# Patient Record
Sex: FEMALE | Race: WHITE | NOT HISPANIC OR LATINO | Employment: OTHER | URBAN - METROPOLITAN AREA
[De-identification: names, ages, dates, MRNs, and addresses within clinical notes are randomized per-mention and may not be internally consistent; named-entity substitution may affect disease eponyms.]

---

## 2017-03-30 DIAGNOSIS — I65.29 OCCLUSION AND STENOSIS OF UNSPECIFIED CAROTID ARTERY: ICD-10-CM

## 2017-03-30 DIAGNOSIS — I70.219 ATHEROSCLEROSIS OF NATIVE ARTERIES OF EXTREMITY WITH INTERMITTENT CLAUDICATION (HCC): ICD-10-CM

## 2017-04-27 ENCOUNTER — HOSPITAL ENCOUNTER (OUTPATIENT)
Dept: RADIOLOGY | Facility: HOSPITAL | Age: 77
Discharge: HOME | End: 2017-04-27
Payer: MEDICARE

## 2017-04-27 ENCOUNTER — HOSPITAL ENCOUNTER (OUTPATIENT)
Dept: RADIOLOGY | Facility: HOSPITAL | Age: 77
Discharge: HOME | End: 2017-04-27
Attending: SURGERY
Payer: MEDICARE

## 2017-04-27 DIAGNOSIS — I70.219 ATHEROSCLEROSIS OF NATIVE ARTERIES OF EXTREMITY WITH INTERMITTENT CLAUDICATION (HCC): ICD-10-CM

## 2017-04-27 DIAGNOSIS — I70.219 EXTREMITY ATHEROSCLEROSIS WITH INTERMITTENT CLAUDICATION (HCC): ICD-10-CM

## 2017-04-27 DIAGNOSIS — I65.29 OCCLUSION AND STENOSIS OF UNSPECIFIED CAROTID ARTERY: ICD-10-CM

## 2017-04-27 DIAGNOSIS — I65.29 CAROTID ARTERY OBSTRUCTION: ICD-10-CM

## 2017-04-27 PROCEDURE — 93880 EXTRACRANIAL BILAT STUDY: CPT

## 2017-04-27 PROCEDURE — 93923 UPR/LXTR ART STDY 3+ LVLS: CPT

## 2017-04-27 PROCEDURE — 93978 VASCULAR STUDY: CPT

## 2017-04-27 PROCEDURE — 93925 LOWER EXTREMITY STUDY: CPT

## 2017-05-30 ENCOUNTER — ALLSCRIPTS OFFICE VISIT (OUTPATIENT)
Dept: OTHER | Facility: OTHER | Age: 77
End: 2017-05-30

## 2017-08-29 DIAGNOSIS — I74.09 OTHER ARTERIAL EMBOLISM AND THROMBOSIS OF ABDOMINAL AORTA (HCC): ICD-10-CM

## 2017-08-29 DIAGNOSIS — I77.1 STRICTURE OF ARTERY (HCC): ICD-10-CM

## 2017-08-29 DIAGNOSIS — I10 ESSENTIAL (PRIMARY) HYPERTENSION: ICD-10-CM

## 2017-08-29 DIAGNOSIS — I65.29 OCCLUSION AND STENOSIS OF UNSPECIFIED CAROTID ARTERY: ICD-10-CM

## 2017-08-30 ENCOUNTER — APPOINTMENT (EMERGENCY)
Dept: RADIOLOGY | Facility: HOSPITAL | Age: 77
DRG: 068 | End: 2017-08-30
Payer: MEDICARE

## 2017-08-30 ENCOUNTER — HOSPITAL ENCOUNTER (INPATIENT)
Facility: HOSPITAL | Age: 77
LOS: 2 days | Discharge: HOME | DRG: 068 | End: 2017-09-01
Attending: EMERGENCY MEDICINE | Admitting: FAMILY MEDICINE
Payer: MEDICARE

## 2017-08-30 ENCOUNTER — GENERIC CONVERSION - ENCOUNTER (OUTPATIENT)
Dept: OTHER | Facility: OTHER | Age: 77
End: 2017-08-30

## 2017-08-30 DIAGNOSIS — I73.9 PAD (PERIPHERAL ARTERY DISEASE) (HCC): ICD-10-CM

## 2017-08-30 DIAGNOSIS — I16.0 HYPERTENSIVE URGENCY: Primary | ICD-10-CM

## 2017-08-30 DIAGNOSIS — I35.0 AORTIC STENOSIS: Chronic | ICD-10-CM

## 2017-08-30 DIAGNOSIS — I65.23 STENOSIS OF BOTH INTERNAL CAROTID ARTERIES: ICD-10-CM

## 2017-08-30 PROBLEM — R53.1 WEAKNESS: Status: ACTIVE | Noted: 2017-08-30

## 2017-08-30 PROBLEM — I65.29 CAROTID ARTERY STENOSIS: Chronic | Status: ACTIVE | Noted: 2017-08-30

## 2017-08-30 PROBLEM — R42 VERTIGO: Chronic | Status: ACTIVE | Noted: 2017-08-30

## 2017-08-30 PROBLEM — I10 HYPERTENSION: Chronic | Status: ACTIVE | Noted: 2017-08-30

## 2017-08-30 PROBLEM — R42 DIZZINESS: Status: ACTIVE | Noted: 2017-08-30

## 2017-08-30 PROBLEM — I70.90 ATHEROSCLEROSIS: Chronic | Status: ACTIVE | Noted: 2017-08-30

## 2017-08-30 LAB
ALBUMIN SERPL BCP-MCNC: 4.2 G/DL (ref 3.5–5)
ALP SERPL-CCNC: 87 U/L (ref 46–116)
ALT SERPL W P-5'-P-CCNC: 33 U/L (ref 12–78)
ANION GAP SERPL CALCULATED.3IONS-SCNC: 12 MMOL/L (ref 4–13)
APTT PPP: 27 SECONDS (ref 23–35)
AST SERPL W P-5'-P-CCNC: 40 U/L (ref 5–45)
BASOPHILS # BLD AUTO: 0.1 THOUSANDS/ΜL (ref 0–0.1)
BASOPHILS NFR BLD AUTO: 1 % (ref 0–1)
BILIRUB SERPL-MCNC: 0.6 MG/DL (ref 0.2–1)
BILIRUB UR QL STRIP: NEGATIVE
BUN SERPL-MCNC: 19 MG/DL (ref 5–25)
CALCIUM SERPL-MCNC: 9.3 MG/DL (ref 8.3–10.1)
CHLORIDE SERPL-SCNC: 101 MMOL/L (ref 100–108)
CLARITY UR: CLEAR
CO2 SERPL-SCNC: 25 MMOL/L (ref 21–32)
COLOR UR: YELLOW
CREAT SERPL-MCNC: 0.66 MG/DL (ref 0.6–1.3)
EOSINOPHIL # BLD AUTO: 0.3 THOUSAND/ΜL (ref 0–0.61)
EOSINOPHIL NFR BLD AUTO: 3 % (ref 0–6)
ERYTHROCYTE [DISTWIDTH] IN BLOOD BY AUTOMATED COUNT: 16.3 % (ref 11.6–15.1)
GFR SERPL CREATININE-BSD FRML MDRD: 85 ML/MIN/1.73SQ M
GLUCOSE SERPL-MCNC: 84 MG/DL (ref 65–140)
GLUCOSE SERPL-MCNC: 98 MG/DL (ref 65–140)
GLUCOSE UR STRIP-MCNC: NEGATIVE MG/DL
HCT VFR BLD AUTO: 39.4 % (ref 37–47)
HGB BLD-MCNC: 13.3 G/DL (ref 12–16)
HGB UR QL STRIP.AUTO: NEGATIVE
INR PPP: 1.11 (ref 0.86–1.16)
KETONES UR STRIP-MCNC: NEGATIVE MG/DL
LEUKOCYTE ESTERASE UR QL STRIP: NEGATIVE
LYMPHOCYTES # BLD AUTO: 2.6 THOUSANDS/ΜL (ref 0.6–4.47)
LYMPHOCYTES NFR BLD AUTO: 28 % (ref 14–44)
MCH RBC QN AUTO: 34 PG (ref 27–31)
MCHC RBC AUTO-ENTMCNC: 33.7 G/DL (ref 31.4–37.4)
MCV RBC AUTO: 101 FL (ref 82–98)
MONOCYTES # BLD AUTO: 0.9 THOUSAND/ΜL (ref 0.17–1.22)
MONOCYTES NFR BLD AUTO: 10 % (ref 4–12)
NEUTROPHILS # BLD AUTO: 5.6 THOUSANDS/ΜL (ref 1.85–7.62)
NEUTS SEG NFR BLD AUTO: 59 % (ref 43–75)
NITRITE UR QL STRIP: NEGATIVE
NRBC BLD AUTO-RTO: 0 /100 WBCS
PH UR STRIP.AUTO: 5.5 [PH] (ref 5–9)
PLATELET # BLD AUTO: 207 THOUSANDS/UL (ref 130–400)
PMV BLD AUTO: 10 FL (ref 8.9–12.7)
POTASSIUM SERPL-SCNC: 4.1 MMOL/L (ref 3.5–5.3)
PROT SERPL-MCNC: 8.1 G/DL (ref 6.4–8.2)
PROT UR STRIP-MCNC: NEGATIVE MG/DL
PROTHROMBIN TIME: 11.7 SECONDS (ref 9.4–11.7)
RBC # BLD AUTO: 3.9 MILLION/UL (ref 4.2–5.4)
SODIUM SERPL-SCNC: 138 MMOL/L (ref 136–145)
SP GR UR STRIP.AUTO: <=1.005 (ref 1–1.03)
TROPONIN I SERPL-MCNC: <0.02 NG/ML
UROBILINOGEN UR QL STRIP.AUTO: 0.2 E.U./DL
WBC # BLD AUTO: 9.6 THOUSAND/UL (ref 4.8–10.8)

## 2017-08-30 PROCEDURE — 85610 PROTHROMBIN TIME: CPT | Performed by: EMERGENCY MEDICINE

## 2017-08-30 PROCEDURE — 85025 COMPLETE CBC W/AUTO DIFF WBC: CPT | Performed by: EMERGENCY MEDICINE

## 2017-08-30 PROCEDURE — 82948 REAGENT STRIP/BLOOD GLUCOSE: CPT

## 2017-08-30 PROCEDURE — 96374 THER/PROPH/DIAG INJ IV PUSH: CPT

## 2017-08-30 PROCEDURE — 84484 ASSAY OF TROPONIN QUANT: CPT | Performed by: EMERGENCY MEDICINE

## 2017-08-30 PROCEDURE — 93005 ELECTROCARDIOGRAM TRACING: CPT | Performed by: EMERGENCY MEDICINE

## 2017-08-30 PROCEDURE — 87081 CULTURE SCREEN ONLY: CPT | Performed by: FAMILY MEDICINE

## 2017-08-30 PROCEDURE — 80053 COMPREHEN METABOLIC PANEL: CPT | Performed by: EMERGENCY MEDICINE

## 2017-08-30 PROCEDURE — 81003 URINALYSIS AUTO W/O SCOPE: CPT | Performed by: EMERGENCY MEDICINE

## 2017-08-30 PROCEDURE — 96361 HYDRATE IV INFUSION ADD-ON: CPT

## 2017-08-30 PROCEDURE — 85730 THROMBOPLASTIN TIME PARTIAL: CPT | Performed by: EMERGENCY MEDICINE

## 2017-08-30 PROCEDURE — 71010 HB CHEST X-RAY 1 VIEW FRONTAL: CPT

## 2017-08-30 PROCEDURE — 99285 EMERGENCY DEPT VISIT HI MDM: CPT

## 2017-08-30 PROCEDURE — 36415 COLL VENOUS BLD VENIPUNCTURE: CPT | Performed by: EMERGENCY MEDICINE

## 2017-08-30 PROCEDURE — 70450 CT HEAD/BRAIN W/O DYE: CPT

## 2017-08-30 RX ORDER — ATENOLOL 50 MG/1
50 TABLET ORAL 2 TIMES DAILY
Status: DISCONTINUED | OUTPATIENT
Start: 2017-08-30 | End: 2017-09-01 | Stop reason: HOSPADM

## 2017-08-30 RX ORDER — ATENOLOL 50 MG/1
1 TABLET ORAL 2 TIMES DAILY
Status: ON HOLD | COMMUNITY
Start: 2015-01-09 | End: 2017-09-01

## 2017-08-30 RX ORDER — ENALAPRIL MALEATE 5 MG/1
5 TABLET ORAL ONCE
Status: COMPLETED | OUTPATIENT
Start: 2017-08-30 | End: 2017-08-30

## 2017-08-30 RX ORDER — ENALAPRIL MALEATE 5 MG/1
5 TABLET ORAL DAILY
Status: DISCONTINUED | OUTPATIENT
Start: 2017-08-30 | End: 2017-08-30

## 2017-08-30 RX ORDER — ASCORBATE CALCIUM/BIOFLAVONOID 1000-200MG
1 TABLET, EXTENDED RELEASE ORAL DAILY
Status: DISCONTINUED | OUTPATIENT
Start: 2017-08-30 | End: 2017-09-01 | Stop reason: HOSPADM

## 2017-08-30 RX ORDER — ENALAPRIL MALEATE 5 MG/1
TABLET ORAL
COMMUNITY
Start: 2015-07-08 | End: 2017-09-01 | Stop reason: HOSPADM

## 2017-08-30 RX ORDER — ATORVASTATIN CALCIUM 40 MG/1
1 TABLET, FILM COATED ORAL
COMMUNITY
Start: 2015-07-08 | End: 2017-09-01 | Stop reason: HOSPADM

## 2017-08-30 RX ORDER — ASCORBATE CALCIUM/BIOFLAVONOID 1000-200MG
TABLET, EXTENDED RELEASE ORAL DAILY
COMMUNITY
End: 2020-04-16

## 2017-08-30 RX ORDER — ATORVASTATIN CALCIUM 40 MG/1
40 TABLET, FILM COATED ORAL
Status: DISCONTINUED | OUTPATIENT
Start: 2017-08-30 | End: 2017-08-31

## 2017-08-30 RX ORDER — ENALAPRIL MALEATE 10 MG/1
10 TABLET ORAL DAILY
Status: DISCONTINUED | OUTPATIENT
Start: 2017-08-31 | End: 2017-08-31

## 2017-08-30 RX ORDER — FLUTICASONE PROPIONATE 50 MCG
SPRAY, SUSPENSION (ML) NASAL
COMMUNITY
Start: 2016-09-22 | End: 2018-02-13 | Stop reason: SDUPTHER

## 2017-08-30 RX ORDER — HYDRALAZINE HYDROCHLORIDE 20 MG/ML
10 INJECTION INTRAMUSCULAR; INTRAVENOUS ONCE
Status: COMPLETED | OUTPATIENT
Start: 2017-08-30 | End: 2017-08-30

## 2017-08-30 RX ORDER — NICOTINE 21 MG/24HR
1 PATCH, TRANSDERMAL 24 HOURS TRANSDERMAL DAILY
Status: DISCONTINUED | OUTPATIENT
Start: 2017-08-30 | End: 2017-09-01 | Stop reason: HOSPADM

## 2017-08-30 RX ORDER — ASPIRIN 81 MG/1
162 TABLET, CHEWABLE ORAL DAILY
Status: DISCONTINUED | OUTPATIENT
Start: 2017-08-31 | End: 2017-09-01 | Stop reason: HOSPADM

## 2017-08-30 RX ORDER — FLUTICASONE PROPIONATE 50 MCG
1 SPRAY, SUSPENSION (ML) NASAL DAILY
Status: DISCONTINUED | OUTPATIENT
Start: 2017-08-30 | End: 2017-09-01 | Stop reason: HOSPADM

## 2017-08-30 RX ADMIN — ENOXAPARIN SODIUM 40 MG: 40 INJECTION SUBCUTANEOUS at 16:32

## 2017-08-30 RX ADMIN — NICOTINE 1 PATCH: 21 PATCH, EXTENDED RELEASE TRANSDERMAL at 16:32

## 2017-08-30 RX ADMIN — ENALAPRIL MALEATE 5 MG: 5 TABLET ORAL at 15:55

## 2017-08-30 RX ADMIN — FLUTICASONE PROPIONATE 1 SPRAY: 50 SPRAY, METERED NASAL at 16:33

## 2017-08-30 RX ADMIN — SODIUM CHLORIDE 1000 ML: 0.9 INJECTION, SOLUTION INTRAVENOUS at 11:11

## 2017-08-30 RX ADMIN — HYDRALAZINE HYDROCHLORIDE 10 MG: 20 INJECTION INTRAMUSCULAR; INTRAVENOUS at 11:19

## 2017-08-30 RX ADMIN — ATORVASTATIN CALCIUM 40 MG: 40 TABLET, FILM COATED ORAL at 22:17

## 2017-08-30 RX ADMIN — ATENOLOL 50 MG: 50 TABLET ORAL at 18:49

## 2017-08-31 ENCOUNTER — APPOINTMENT (INPATIENT)
Dept: NON INVASIVE DIAGNOSTICS | Facility: HOSPITAL | Age: 77
DRG: 068 | End: 2017-08-31
Payer: MEDICARE

## 2017-08-31 ENCOUNTER — GENERIC CONVERSION - ENCOUNTER (OUTPATIENT)
Dept: OTHER | Facility: OTHER | Age: 77
End: 2017-08-31

## 2017-08-31 ENCOUNTER — APPOINTMENT (INPATIENT)
Dept: RADIOLOGY | Facility: HOSPITAL | Age: 77
DRG: 068 | End: 2017-08-31
Payer: MEDICARE

## 2017-08-31 PROBLEM — I16.0 HYPERTENSIVE URGENCY: Status: RESOLVED | Noted: 2017-08-30 | Resolved: 2017-08-31

## 2017-08-31 PROBLEM — F17.200 NICOTINE DEPENDENCE: Status: ACTIVE | Noted: 2017-08-31

## 2017-08-31 PROBLEM — R42 DIZZINESS: Status: RESOLVED | Noted: 2017-08-30 | Resolved: 2017-08-31

## 2017-08-31 PROBLEM — E03.9 HYPOTHYROIDISM: Status: ACTIVE | Noted: 2017-08-31

## 2017-08-31 PROBLEM — R53.1 WEAKNESS: Status: RESOLVED | Noted: 2017-08-30 | Resolved: 2017-08-31

## 2017-08-31 LAB
ANION GAP SERPL CALCULATED.3IONS-SCNC: 13 MMOL/L (ref 4–13)
ATRIAL RATE: 58 BPM
BUN SERPL-MCNC: 21 MG/DL (ref 5–25)
CALCIUM SERPL-MCNC: 8.9 MG/DL (ref 8.3–10.1)
CHLORIDE SERPL-SCNC: 106 MMOL/L (ref 100–108)
CHOLEST SERPL-MCNC: 103 MG/DL (ref 50–200)
CO2 SERPL-SCNC: 21 MMOL/L (ref 21–32)
CREAT SERPL-MCNC: 0.56 MG/DL (ref 0.6–1.3)
ERYTHROCYTE [DISTWIDTH] IN BLOOD BY AUTOMATED COUNT: 17.7 % (ref 11.6–15.1)
GFR SERPL CREATININE-BSD FRML MDRD: 90 ML/MIN/1.73SQ M
GLUCOSE SERPL-MCNC: 76 MG/DL (ref 65–140)
HCT VFR BLD AUTO: 35.2 % (ref 37–47)
HDLC SERPL-MCNC: 58 MG/DL (ref 40–60)
HGB BLD-MCNC: 11.5 G/DL (ref 12–16)
LDLC SERPL CALC-MCNC: 35 MG/DL (ref 0–100)
MAGNESIUM SERPL-MCNC: 1.8 MG/DL (ref 1.6–2.6)
MCH RBC QN AUTO: 34.5 PG (ref 27–31)
MCHC RBC AUTO-ENTMCNC: 32.7 G/DL (ref 31.4–37.4)
MCV RBC AUTO: 105 FL (ref 82–98)
P AXIS: 79 DEGREES
PHOSPHATE SERPL-MCNC: 3.4 MG/DL (ref 2.3–4.1)
PLATELET # BLD AUTO: 154 THOUSANDS/UL (ref 130–400)
PMV BLD AUTO: 10.1 FL (ref 8.9–12.7)
POTASSIUM SERPL-SCNC: 3.7 MMOL/L (ref 3.5–5.3)
PR INTERVAL: 176 MS
QRS AXIS: 68 DEGREES
QRSD INTERVAL: 90 MS
QT INTERVAL: 442 MS
QTC INTERVAL: 433 MS
RBC # BLD AUTO: 3.34 MILLION/UL (ref 4.2–5.4)
SODIUM SERPL-SCNC: 140 MMOL/L (ref 136–145)
T WAVE AXIS: 62 DEGREES
T3 SERPL-MCNC: 0.8 NG/ML (ref 0.6–1.8)
T4 FREE SERPL-MCNC: 0.82 NG/DL (ref 0.76–1.46)
TRIGL SERPL-MCNC: 52 MG/DL
TSH SERPL DL<=0.05 MIU/L-ACNC: 5.3 UIU/ML (ref 0.36–3.74)
VENTRICULAR RATE: 58 BPM
WBC # BLD AUTO: 6.4 THOUSAND/UL (ref 4.8–10.8)

## 2017-08-31 PROCEDURE — 93880 EXTRACRANIAL BILAT STUDY: CPT

## 2017-08-31 PROCEDURE — 93005 ELECTROCARDIOGRAM TRACING: CPT | Performed by: STUDENT IN AN ORGANIZED HEALTH CARE EDUCATION/TRAINING PROGRAM

## 2017-08-31 PROCEDURE — 93978 VASCULAR STUDY: CPT

## 2017-08-31 PROCEDURE — 83735 ASSAY OF MAGNESIUM: CPT | Performed by: FAMILY MEDICINE

## 2017-08-31 PROCEDURE — 84480 ASSAY TRIIODOTHYRONINE (T3): CPT | Performed by: STUDENT IN AN ORGANIZED HEALTH CARE EDUCATION/TRAINING PROGRAM

## 2017-08-31 PROCEDURE — 85027 COMPLETE CBC AUTOMATED: CPT | Performed by: FAMILY MEDICINE

## 2017-08-31 PROCEDURE — 84439 ASSAY OF FREE THYROXINE: CPT | Performed by: STUDENT IN AN ORGANIZED HEALTH CARE EDUCATION/TRAINING PROGRAM

## 2017-08-31 PROCEDURE — 93925 LOWER EXTREMITY STUDY: CPT

## 2017-08-31 PROCEDURE — 93923 UPR/LXTR ART STDY 3+ LVLS: CPT

## 2017-08-31 PROCEDURE — 80048 BASIC METABOLIC PNL TOTAL CA: CPT | Performed by: FAMILY MEDICINE

## 2017-08-31 PROCEDURE — 80061 LIPID PANEL: CPT | Performed by: STUDENT IN AN ORGANIZED HEALTH CARE EDUCATION/TRAINING PROGRAM

## 2017-08-31 PROCEDURE — 93306 TTE W/DOPPLER COMPLETE: CPT

## 2017-08-31 PROCEDURE — 84443 ASSAY THYROID STIM HORMONE: CPT | Performed by: FAMILY MEDICINE

## 2017-08-31 PROCEDURE — 84100 ASSAY OF PHOSPHORUS: CPT | Performed by: FAMILY MEDICINE

## 2017-08-31 RX ORDER — LEVOTHYROXINE SODIUM 0.03 MG/1
25 TABLET ORAL
Status: DISCONTINUED | OUTPATIENT
Start: 2017-08-31 | End: 2017-09-01 | Stop reason: HOSPADM

## 2017-08-31 RX ORDER — ENALAPRIL MALEATE 10 MG/1
20 TABLET ORAL DAILY
Status: DISCONTINUED | OUTPATIENT
Start: 2017-09-01 | End: 2017-09-01 | Stop reason: HOSPADM

## 2017-08-31 RX ORDER — CHLORTHALIDONE 25 MG/1
25 TABLET ORAL DAILY
Status: DISCONTINUED | OUTPATIENT
Start: 2017-08-31 | End: 2017-09-01 | Stop reason: HOSPADM

## 2017-08-31 RX ORDER — ENALAPRIL MALEATE 10 MG/1
10 TABLET ORAL ONCE
Status: COMPLETED | OUTPATIENT
Start: 2017-08-31 | End: 2017-08-31

## 2017-08-31 RX ORDER — ATORVASTATIN CALCIUM 80 MG/1
80 TABLET, FILM COATED ORAL
Status: DISCONTINUED | OUTPATIENT
Start: 2017-08-31 | End: 2017-09-01 | Stop reason: HOSPADM

## 2017-08-31 RX ADMIN — CHLORTHALIDONE 25 MG: 25 TABLET ORAL at 15:19

## 2017-08-31 RX ADMIN — ATENOLOL 50 MG: 50 TABLET ORAL at 11:21

## 2017-08-31 RX ADMIN — ENOXAPARIN SODIUM 40 MG: 40 INJECTION SUBCUTANEOUS at 11:21

## 2017-08-31 RX ADMIN — NICOTINE 1 PATCH: 21 PATCH, EXTENDED RELEASE TRANSDERMAL at 11:23

## 2017-08-31 RX ADMIN — FLUTICASONE PROPIONATE 1 SPRAY: 50 SPRAY, METERED NASAL at 11:22

## 2017-08-31 RX ADMIN — ENALAPRIL MALEATE 10 MG: 10 TABLET ORAL at 11:22

## 2017-08-31 RX ADMIN — ASPIRIN 81 MG 162 MG: 81 TABLET ORAL at 11:21

## 2017-08-31 RX ADMIN — ATENOLOL 50 MG: 50 TABLET ORAL at 17:59

## 2017-08-31 RX ADMIN — ENALAPRIL MALEATE 10 MG: 10 TABLET ORAL at 15:19

## 2017-08-31 RX ADMIN — ATORVASTATIN CALCIUM 80 MG: 80 TABLET, FILM COATED ORAL at 22:00

## 2017-09-01 VITALS
HEIGHT: 64 IN | TEMPERATURE: 98.9 F | BODY MASS INDEX: 24.11 KG/M2 | DIASTOLIC BLOOD PRESSURE: 80 MMHG | WEIGHT: 141.2 LBS | RESPIRATION RATE: 20 BRPM | SYSTOLIC BLOOD PRESSURE: 176 MMHG | OXYGEN SATURATION: 98 % | HEART RATE: 50 BPM

## 2017-09-01 PROBLEM — I16.0 HYPERTENSIVE URGENCY: Status: RESOLVED | Noted: 2017-09-01 | Resolved: 2017-09-01

## 2017-09-01 PROBLEM — I16.0 HYPERTENSIVE URGENCY: Status: ACTIVE | Noted: 2017-09-01

## 2017-09-01 LAB
ANION GAP SERPL CALCULATED.3IONS-SCNC: 9 MMOL/L (ref 4–13)
BUN SERPL-MCNC: 15 MG/DL (ref 5–25)
CALCIUM SERPL-MCNC: 9.4 MG/DL (ref 8.3–10.1)
CHLORIDE SERPL-SCNC: 105 MMOL/L (ref 100–108)
CO2 SERPL-SCNC: 26 MMOL/L (ref 21–32)
CREAT SERPL-MCNC: 0.66 MG/DL (ref 0.6–1.3)
ERYTHROCYTE [DISTWIDTH] IN BLOOD BY AUTOMATED COUNT: 16.9 % (ref 11.6–15.1)
GFR SERPL CREATININE-BSD FRML MDRD: 85 ML/MIN/1.73SQ M
GLUCOSE SERPL-MCNC: 87 MG/DL (ref 65–140)
HCT VFR BLD AUTO: 37.1 % (ref 37–47)
HGB BLD-MCNC: 12.5 G/DL (ref 12–16)
MAGNESIUM SERPL-MCNC: 1.6 MG/DL (ref 1.6–2.6)
MCH RBC QN AUTO: 34.1 PG (ref 27–31)
MCHC RBC AUTO-ENTMCNC: 33.7 G/DL (ref 31.4–37.4)
MCV RBC AUTO: 101 FL (ref 82–98)
MRSA NOSE QL CULT: NORMAL
PHOSPHATE SERPL-MCNC: 3.4 MG/DL (ref 2.3–4.1)
PLATELET # BLD AUTO: 178 THOUSANDS/UL (ref 130–400)
PMV BLD AUTO: 9.9 FL (ref 8.9–12.7)
POTASSIUM SERPL-SCNC: 3.7 MMOL/L (ref 3.5–5.3)
RBC # BLD AUTO: 3.67 MILLION/UL (ref 4.2–5.4)
SODIUM SERPL-SCNC: 140 MMOL/L (ref 136–145)
WBC # BLD AUTO: 7.3 THOUSAND/UL (ref 4.8–10.8)

## 2017-09-01 PROCEDURE — 85027 COMPLETE CBC AUTOMATED: CPT | Performed by: STUDENT IN AN ORGANIZED HEALTH CARE EDUCATION/TRAINING PROGRAM

## 2017-09-01 PROCEDURE — 80048 BASIC METABOLIC PNL TOTAL CA: CPT | Performed by: STUDENT IN AN ORGANIZED HEALTH CARE EDUCATION/TRAINING PROGRAM

## 2017-09-01 PROCEDURE — 84100 ASSAY OF PHOSPHORUS: CPT | Performed by: STUDENT IN AN ORGANIZED HEALTH CARE EDUCATION/TRAINING PROGRAM

## 2017-09-01 PROCEDURE — 83735 ASSAY OF MAGNESIUM: CPT | Performed by: STUDENT IN AN ORGANIZED HEALTH CARE EDUCATION/TRAINING PROGRAM

## 2017-09-01 RX ORDER — ATENOLOL 50 MG/1
50 TABLET ORAL DAILY
Refills: 0
Start: 2017-09-01 | End: 2018-02-13 | Stop reason: SDUPTHER

## 2017-09-01 RX ORDER — ATORVASTATIN CALCIUM 80 MG/1
80 TABLET, FILM COATED ORAL
Refills: 0
Start: 2017-09-01 | End: 2018-02-13 | Stop reason: SDUPTHER

## 2017-09-01 RX ORDER — NICOTINE 21 MG/24HR
1 PATCH, TRANSDERMAL 24 HOURS TRANSDERMAL DAILY
Qty: 28 PATCH | Refills: 0 | Status: SHIPPED | OUTPATIENT
Start: 2017-09-01 | End: 2018-03-23 | Stop reason: ALTCHOICE

## 2017-09-01 RX ORDER — ENALAPRIL MALEATE 20 MG/1
20 TABLET ORAL DAILY
Refills: 0
Start: 2017-09-01 | End: 2018-02-13 | Stop reason: SDUPTHER

## 2017-09-01 RX ORDER — NICOTINE 21 MG/24HR
1 PATCH, TRANSDERMAL 24 HOURS TRANSDERMAL EVERY 24 HOURS
Qty: 28 PATCH | Refills: 0 | Status: ON HOLD | OUTPATIENT
Start: 2017-09-01 | End: 2017-09-14

## 2017-09-01 RX ORDER — CHLORTHALIDONE 25 MG/1
25 TABLET ORAL DAILY
Refills: 0
Start: 2017-09-01 | End: 2018-02-13 | Stop reason: SDUPTHER

## 2017-09-01 RX ORDER — LEVOTHYROXINE SODIUM 0.03 MG/1
25 TABLET ORAL
Refills: 0
Start: 2017-09-01 | End: 2018-02-13 | Stop reason: SDUPTHER

## 2017-09-01 RX ADMIN — ENOXAPARIN SODIUM 40 MG: 40 INJECTION SUBCUTANEOUS at 08:43

## 2017-09-01 RX ADMIN — NICOTINE 1 PATCH: 21 PATCH, EXTENDED RELEASE TRANSDERMAL at 08:48

## 2017-09-01 RX ADMIN — ENALAPRIL MALEATE 20 MG: 10 TABLET ORAL at 08:43

## 2017-09-01 RX ADMIN — CHLORTHALIDONE 25 MG: 25 TABLET ORAL at 08:44

## 2017-09-01 RX ADMIN — LEVOTHYROXINE SODIUM 25 MCG: 25 TABLET ORAL at 06:55

## 2017-09-01 RX ADMIN — ASPIRIN 81 MG 162 MG: 81 TABLET ORAL at 08:43

## 2017-09-01 RX ADMIN — FLUTICASONE PROPIONATE 1 SPRAY: 50 SPRAY, METERED NASAL at 08:43

## 2017-09-01 RX ADMIN — ATENOLOL 50 MG: 50 TABLET ORAL at 08:45

## 2017-09-07 ENCOUNTER — ALLSCRIPTS OFFICE VISIT (OUTPATIENT)
Dept: OTHER | Facility: OTHER | Age: 77
End: 2017-09-07

## 2017-09-09 LAB
ATRIAL RATE: 58 BPM
P AXIS: 78 DEGREES
PR INTERVAL: 188 MS
QRS AXIS: 64 DEGREES
QRSD INTERVAL: 86 MS
QT INTERVAL: 432 MS
QTC INTERVAL: 424 MS
T WAVE AXIS: 71 DEGREES
VENTRICULAR RATE: 58 BPM

## 2017-09-14 ENCOUNTER — GENERIC CONVERSION - ENCOUNTER (OUTPATIENT)
Dept: OTHER | Facility: OTHER | Age: 77
End: 2017-09-14

## 2017-09-14 ENCOUNTER — HOSPITAL ENCOUNTER (INPATIENT)
Facility: HOSPITAL | Age: 77
LOS: 1 days | Discharge: HOME | DRG: 305 | End: 2017-09-15
Attending: FAMILY MEDICINE | Admitting: FAMILY MEDICINE
Payer: MEDICARE

## 2017-09-14 DIAGNOSIS — I65.29 STENOSIS OF CAROTID ARTERY, UNSPECIFIED LATERALITY: Primary | Chronic | ICD-10-CM

## 2017-09-14 DIAGNOSIS — I70.90 ATHEROSCLEROSIS: Chronic | ICD-10-CM

## 2017-09-14 DIAGNOSIS — I10 ESSENTIAL HYPERTENSION: Chronic | ICD-10-CM

## 2017-09-14 LAB
ALBUMIN SERPL BCP-MCNC: 4.1 G/DL (ref 3.5–5)
ALP SERPL-CCNC: 74 U/L (ref 46–116)
ALT SERPL W P-5'-P-CCNC: 27 U/L (ref 12–78)
ANION GAP SERPL CALCULATED.3IONS-SCNC: 11 MMOL/L (ref 4–13)
APTT PPP: 27 SECONDS (ref 23–35)
AST SERPL W P-5'-P-CCNC: 27 U/L (ref 5–45)
BILIRUB SERPL-MCNC: 0.4 MG/DL (ref 0.2–1)
BUN SERPL-MCNC: 26 MG/DL (ref 5–25)
CALCIUM SERPL-MCNC: 9.6 MG/DL (ref 8.3–10.1)
CHLORIDE SERPL-SCNC: 99 MMOL/L (ref 100–108)
CO2 SERPL-SCNC: 24 MMOL/L (ref 21–32)
CREAT SERPL-MCNC: 0.86 MG/DL (ref 0.6–1.3)
ERYTHROCYTE [DISTWIDTH] IN BLOOD BY AUTOMATED COUNT: 15.7 % (ref 11.6–15.1)
GFR SERPL CREATININE-BSD FRML MDRD: 65 ML/MIN/1.73SQ M
GLUCOSE SERPL-MCNC: 80 MG/DL (ref 65–140)
HCT VFR BLD AUTO: 39.7 % (ref 37–47)
HGB BLD-MCNC: 13.1 G/DL (ref 12–16)
INR PPP: 1.1 (ref 0.86–1.16)
MAGNESIUM SERPL-MCNC: 1.6 MG/DL (ref 1.6–2.6)
MCH RBC QN AUTO: 33.8 PG (ref 27–31)
MCHC RBC AUTO-ENTMCNC: 33.1 G/DL (ref 31.4–37.4)
MCV RBC AUTO: 102 FL (ref 82–98)
PHOSPHATE SERPL-MCNC: 4.4 MG/DL (ref 2.3–4.1)
PLATELET # BLD AUTO: 187 THOUSANDS/UL (ref 130–400)
PMV BLD AUTO: 9.6 FL (ref 8.9–12.7)
POTASSIUM SERPL-SCNC: 3.7 MMOL/L (ref 3.5–5.3)
PROT SERPL-MCNC: 7.7 G/DL (ref 6.4–8.2)
PROTHROMBIN TIME: 11.6 SECONDS (ref 9.4–11.7)
RBC # BLD AUTO: 3.89 MILLION/UL (ref 4.2–5.4)
SODIUM SERPL-SCNC: 134 MMOL/L (ref 136–145)
WBC # BLD AUTO: 10.4 THOUSAND/UL (ref 4.8–10.8)

## 2017-09-14 PROCEDURE — 83735 ASSAY OF MAGNESIUM: CPT | Performed by: STUDENT IN AN ORGANIZED HEALTH CARE EDUCATION/TRAINING PROGRAM

## 2017-09-14 PROCEDURE — 85027 COMPLETE CBC AUTOMATED: CPT | Performed by: STUDENT IN AN ORGANIZED HEALTH CARE EDUCATION/TRAINING PROGRAM

## 2017-09-14 PROCEDURE — 80053 COMPREHEN METABOLIC PANEL: CPT | Performed by: STUDENT IN AN ORGANIZED HEALTH CARE EDUCATION/TRAINING PROGRAM

## 2017-09-14 PROCEDURE — 85730 THROMBOPLASTIN TIME PARTIAL: CPT | Performed by: STUDENT IN AN ORGANIZED HEALTH CARE EDUCATION/TRAINING PROGRAM

## 2017-09-14 PROCEDURE — 93005 ELECTROCARDIOGRAM TRACING: CPT | Performed by: STUDENT IN AN ORGANIZED HEALTH CARE EDUCATION/TRAINING PROGRAM

## 2017-09-14 PROCEDURE — 84100 ASSAY OF PHOSPHORUS: CPT | Performed by: STUDENT IN AN ORGANIZED HEALTH CARE EDUCATION/TRAINING PROGRAM

## 2017-09-14 PROCEDURE — 85610 PROTHROMBIN TIME: CPT | Performed by: STUDENT IN AN ORGANIZED HEALTH CARE EDUCATION/TRAINING PROGRAM

## 2017-09-14 PROCEDURE — 87081 CULTURE SCREEN ONLY: CPT | Performed by: STUDENT IN AN ORGANIZED HEALTH CARE EDUCATION/TRAINING PROGRAM

## 2017-09-14 RX ORDER — CHLORTHALIDONE 25 MG/1
25 TABLET ORAL DAILY
Status: DISCONTINUED | OUTPATIENT
Start: 2017-09-15 | End: 2017-09-15 | Stop reason: HOSPADM

## 2017-09-14 RX ORDER — FLUTICASONE PROPIONATE 50 MCG
1 SPRAY, SUSPENSION (ML) NASAL DAILY
Status: DISCONTINUED | OUTPATIENT
Start: 2017-09-15 | End: 2017-09-15 | Stop reason: HOSPADM

## 2017-09-14 RX ORDER — NICOTINE 21 MG/24HR
1 PATCH, TRANSDERMAL 24 HOURS TRANSDERMAL DAILY
Status: DISCONTINUED | OUTPATIENT
Start: 2017-09-15 | End: 2017-09-15 | Stop reason: HOSPADM

## 2017-09-14 RX ORDER — ATENOLOL 50 MG/1
50 TABLET ORAL DAILY
Status: DISCONTINUED | OUTPATIENT
Start: 2017-09-15 | End: 2017-09-15 | Stop reason: HOSPADM

## 2017-09-14 RX ORDER — ASCORBATE CALCIUM/BIOFLAVONOID 1000-200MG
TABLET, EXTENDED RELEASE ORAL DAILY
Status: DISCONTINUED | OUTPATIENT
Start: 2017-09-15 | End: 2017-09-14

## 2017-09-14 RX ORDER — ENALAPRIL MALEATE 10 MG/1
20 TABLET ORAL DAILY
Status: DISCONTINUED | OUTPATIENT
Start: 2017-09-15 | End: 2017-09-15 | Stop reason: HOSPADM

## 2017-09-14 RX ORDER — NICOTINE 21 MG/24HR
1 PATCH, TRANSDERMAL 24 HOURS TRANSDERMAL DAILY
Status: DISCONTINUED | OUTPATIENT
Start: 2017-09-15 | End: 2017-09-14

## 2017-09-14 RX ORDER — HYDRALAZINE HYDROCHLORIDE 20 MG/ML
5 INJECTION INTRAMUSCULAR; INTRAVENOUS EVERY 4 HOURS PRN
Status: DISCONTINUED | OUTPATIENT
Start: 2017-09-14 | End: 2017-09-15 | Stop reason: HOSPADM

## 2017-09-14 RX ORDER — ATORVASTATIN CALCIUM 80 MG/1
80 TABLET, FILM COATED ORAL
Status: DISCONTINUED | OUTPATIENT
Start: 2017-09-14 | End: 2017-09-15 | Stop reason: HOSPADM

## 2017-09-14 RX ORDER — LEVOTHYROXINE SODIUM 0.03 MG/1
25 TABLET ORAL
Status: DISCONTINUED | OUTPATIENT
Start: 2017-09-15 | End: 2017-09-15 | Stop reason: HOSPADM

## 2017-09-14 RX ORDER — ASPIRIN 81 MG/1
162 TABLET, CHEWABLE ORAL DAILY
Status: DISCONTINUED | OUTPATIENT
Start: 2017-09-15 | End: 2017-09-15 | Stop reason: HOSPADM

## 2017-09-14 RX ADMIN — ATORVASTATIN CALCIUM 80 MG: 80 TABLET, FILM COATED ORAL at 22:34

## 2017-09-15 ENCOUNTER — APPOINTMENT (INPATIENT)
Dept: RADIOLOGY | Facility: HOSPITAL | Age: 77
DRG: 305 | End: 2017-09-15
Payer: MEDICARE

## 2017-09-15 VITALS
RESPIRATION RATE: 18 BRPM | HEART RATE: 54 BPM | SYSTOLIC BLOOD PRESSURE: 134 MMHG | BODY MASS INDEX: 23.56 KG/M2 | HEIGHT: 64 IN | DIASTOLIC BLOOD PRESSURE: 63 MMHG | OXYGEN SATURATION: 99 % | TEMPERATURE: 98 F | WEIGHT: 138.01 LBS

## 2017-09-15 PROBLEM — R42 VERTIGO: Chronic | Status: RESOLVED | Noted: 2017-08-30 | Resolved: 2017-09-15

## 2017-09-15 PROBLEM — I16.0 HYPERTENSIVE URGENCY: Status: RESOLVED | Noted: 2017-09-01 | Resolved: 2017-09-15

## 2017-09-15 LAB
ANION GAP SERPL CALCULATED.3IONS-SCNC: 10 MMOL/L (ref 4–13)
ATRIAL RATE: 61 BPM
BASOPHILS # BLD AUTO: 0 THOUSANDS/ΜL (ref 0–0.1)
BASOPHILS NFR BLD AUTO: 1 % (ref 0–1)
BUN SERPL-MCNC: 25 MG/DL (ref 5–25)
CALCIUM SERPL-MCNC: 9.8 MG/DL (ref 8.3–10.1)
CHLORIDE SERPL-SCNC: 102 MMOL/L (ref 100–108)
CO2 SERPL-SCNC: 24 MMOL/L (ref 21–32)
CREAT SERPL-MCNC: 0.64 MG/DL (ref 0.6–1.3)
EOSINOPHIL # BLD AUTO: 0.3 THOUSAND/ΜL (ref 0–0.61)
EOSINOPHIL NFR BLD AUTO: 5 % (ref 0–6)
ERYTHROCYTE [DISTWIDTH] IN BLOOD BY AUTOMATED COUNT: 15.8 % (ref 11.6–15.1)
GFR SERPL CREATININE-BSD FRML MDRD: 86 ML/MIN/1.73SQ M
GLUCOSE SERPL-MCNC: 80 MG/DL (ref 65–140)
HCT VFR BLD AUTO: 36.4 % (ref 37–47)
HGB BLD-MCNC: 12.1 G/DL (ref 12–16)
LYMPHOCYTES # BLD AUTO: 2 THOUSANDS/ΜL (ref 0.6–4.47)
LYMPHOCYTES NFR BLD AUTO: 32 % (ref 14–44)
MAGNESIUM SERPL-MCNC: 1.6 MG/DL (ref 1.6–2.6)
MCH RBC QN AUTO: 33.7 PG (ref 27–31)
MCHC RBC AUTO-ENTMCNC: 33.2 G/DL (ref 31.4–37.4)
MCV RBC AUTO: 102 FL (ref 82–98)
MONOCYTES # BLD AUTO: 0.8 THOUSAND/ΜL (ref 0.17–1.22)
MONOCYTES NFR BLD AUTO: 14 % (ref 4–12)
NEUTROPHILS # BLD AUTO: 3.1 THOUSANDS/ΜL (ref 1.85–7.62)
NEUTS SEG NFR BLD AUTO: 49 % (ref 43–75)
NRBC BLD AUTO-RTO: 0 /100 WBCS
P AXIS: 83 DEGREES
PHOSPHATE SERPL-MCNC: 4.3 MG/DL (ref 2.3–4.1)
PLATELET # BLD AUTO: 176 THOUSANDS/UL (ref 130–400)
PMV BLD AUTO: 10 FL (ref 8.9–12.7)
POTASSIUM SERPL-SCNC: 3.5 MMOL/L (ref 3.5–5.3)
PR INTERVAL: 170 MS
QRS AXIS: 68 DEGREES
QRSD INTERVAL: 86 MS
QT INTERVAL: 432 MS
QTC INTERVAL: 434 MS
RBC # BLD AUTO: 3.58 MILLION/UL (ref 4.2–5.4)
SODIUM SERPL-SCNC: 136 MMOL/L (ref 136–145)
T WAVE AXIS: 61 DEGREES
VENTRICULAR RATE: 61 BPM
WBC # BLD AUTO: 6.3 THOUSAND/UL (ref 4.8–10.8)

## 2017-09-15 PROCEDURE — 85025 COMPLETE CBC W/AUTO DIFF WBC: CPT | Performed by: STUDENT IN AN ORGANIZED HEALTH CARE EDUCATION/TRAINING PROGRAM

## 2017-09-15 PROCEDURE — 83735 ASSAY OF MAGNESIUM: CPT | Performed by: STUDENT IN AN ORGANIZED HEALTH CARE EDUCATION/TRAINING PROGRAM

## 2017-09-15 PROCEDURE — 70496 CT ANGIOGRAPHY HEAD: CPT

## 2017-09-15 PROCEDURE — 70498 CT ANGIOGRAPHY NECK: CPT

## 2017-09-15 PROCEDURE — 84100 ASSAY OF PHOSPHORUS: CPT | Performed by: STUDENT IN AN ORGANIZED HEALTH CARE EDUCATION/TRAINING PROGRAM

## 2017-09-15 PROCEDURE — 80048 BASIC METABOLIC PNL TOTAL CA: CPT | Performed by: STUDENT IN AN ORGANIZED HEALTH CARE EDUCATION/TRAINING PROGRAM

## 2017-09-15 PROCEDURE — 90670 PCV13 VACCINE IM: CPT | Performed by: FAMILY MEDICINE

## 2017-09-15 PROCEDURE — G0009 ADMIN PNEUMOCOCCAL VACCINE: HCPCS | Performed by: FAMILY MEDICINE

## 2017-09-15 RX ADMIN — NICOTINE 1 PATCH: 21 PATCH, EXTENDED RELEASE TRANSDERMAL at 08:46

## 2017-09-15 RX ADMIN — LEVOTHYROXINE SODIUM 25 MCG: 25 TABLET ORAL at 05:39

## 2017-09-15 RX ADMIN — PNEUMOCOCCAL 13-VALENT CONJUGATE VACCINE 0.5 ML: 2.2; 2.2; 2.2; 2.2; 2.2; 4.4; 2.2; 2.2; 2.2; 2.2; 2.2; 2.2; 2.2 INJECTION, SUSPENSION INTRAMUSCULAR at 17:26

## 2017-09-15 RX ADMIN — IOHEXOL 85 ML: 350 INJECTION, SOLUTION INTRAVENOUS at 15:55

## 2017-09-15 RX ADMIN — ASPIRIN 81 MG 162 MG: 81 TABLET ORAL at 08:44

## 2017-09-15 RX ADMIN — CHLORTHALIDONE 25 MG: 25 TABLET ORAL at 08:53

## 2017-09-15 RX ADMIN — ENOXAPARIN SODIUM 40 MG: 40 INJECTION SUBCUTANEOUS at 08:46

## 2017-09-15 RX ADMIN — ATENOLOL 50 MG: 50 TABLET ORAL at 08:45

## 2017-09-15 RX ADMIN — ENALAPRIL MALEATE 20 MG: 10 TABLET ORAL at 08:44

## 2017-09-15 RX ADMIN — FLUTICASONE PROPIONATE 1 SPRAY: 50 SPRAY, METERED NASAL at 08:46

## 2017-09-16 LAB — MRSA NOSE QL CULT: NORMAL

## 2017-09-18 ENCOUNTER — ALLSCRIPTS OFFICE VISIT (OUTPATIENT)
Dept: OTHER | Facility: OTHER | Age: 77
End: 2017-09-18

## 2017-09-20 ENCOUNTER — GENERIC CONVERSION - ENCOUNTER (OUTPATIENT)
Dept: OTHER | Facility: OTHER | Age: 77
End: 2017-09-20

## 2017-09-20 ENCOUNTER — ALLSCRIPTS OFFICE VISIT (OUTPATIENT)
Dept: OTHER | Facility: OTHER | Age: 77
End: 2017-09-20

## 2017-09-25 ENCOUNTER — ALLSCRIPTS OFFICE VISIT (OUTPATIENT)
Dept: OTHER | Facility: OTHER | Age: 77
End: 2017-09-25

## 2017-10-04 ENCOUNTER — ALLSCRIPTS OFFICE VISIT (OUTPATIENT)
Dept: OTHER | Facility: OTHER | Age: 77
End: 2017-10-04

## 2017-10-30 DIAGNOSIS — F17.200 NICOTINE DEPENDENCE, UNCOMPLICATED: ICD-10-CM

## 2017-10-30 DIAGNOSIS — I70.219 ATHEROSCLEROSIS OF NATIVE ARTERIES OF EXTREMITY WITH INTERMITTENT CLAUDICATION (HCC): ICD-10-CM

## 2017-10-30 DIAGNOSIS — I65.29 OCCLUSION AND STENOSIS OF UNSPECIFIED CAROTID ARTERY: ICD-10-CM

## 2017-10-30 DIAGNOSIS — I74.09 OTHER ARTERIAL EMBOLISM AND THROMBOSIS OF ABDOMINAL AORTA (HCC): ICD-10-CM

## 2017-11-06 ENCOUNTER — GENERIC CONVERSION - ENCOUNTER (OUTPATIENT)
Dept: OTHER | Facility: OTHER | Age: 77
End: 2017-11-06

## 2018-01-11 NOTE — PROGRESS NOTES
Chief Complaint  patient pressure discussed with DR Ace Johnson patient ok to go home return Monday for recheck      Active Problems     1  Aortic stenosis (424 1) (I35 0)   2  Aortoiliac occlusive disease (444 09) (I74 09)   3  Atherosclerosis of native artery of extremity with intermittent claudication (440 21)   (I70 219)   4  Back pain (724 5) (M54 9)   5  Benign essential hypertension (401 1) (I10)   6  Body mass index (BMI) 22 0-22 9, adult (V85 1) (Z68 22)   7  Bug bite (919 4,E906 4) (W57 XXXA)   8  Bunion (727 1) (M21 619)   9  Callus (700) (L84)   10  Carotid artery stenosis (433 10) (I65 29)   11  Difficulty walking (719 7) (R26 2)   12  Dizziness (780 4) (R42)   13  Edema of leg (782 3) (R60 0)   14  Need for influenza vaccination (V04 81) (Z23)   15  Need for Tdap vaccination (V06 1) (Z23)   16  Nicotine dependence (305 1) (F17 200)   17  Onychomycosis (110 1) (B35 1)   18  Screening for cardiovascular condition (V81 2) (Z13 6)   19  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)   20  Subclavian artery stenosis, left (447 1) (I77 1)   21  Vertigo (780 4) (R42)   22  Visit for screening mammogram (V76 12) (Z12 31)   23  Worried well (V65 5) (Z71 1)    Pain in both feet (729 5) (M79 671)          Current Meds   1  Aspirin 81 MG TABS; TAKE 1 TABLET DAILY; Therapy: 03TYW2717 to (Evaluate:23Jan2014)  Requested for: 55Ytg5160; Last   Rx:41Dsi9606 Ordered   2  Atenolol 50 MG Oral Tablet; take 1 tablet by mouth once daily as directed; Therapy: 70YRF1179 to (Evaluate:12Jan2017)  Requested for: 09MHL8622; Last   Rx:18Jan2016 Ordered   3  Atorvastatin Calcium 40 MG Oral Tablet; Take 1 tablet by mouth at bedtime; Therapy: 95EIU8936 to (Evaluate:13Aco6485)  Requested for: 77GDU6240; Last   Rx:22Mar2016 Ordered   4  Calcium TABS; Therapy: (Sonido Jensen) to Recorded   5  Enalapril Maleate 5 MG Oral Tablet; TAKE 1 TABLET DAILY;    Therapy: 71AML0683 to (Evaluate:29Obe7184)  Requested for: 90YAM0632; Last Rx:30Jun2016 Ordered   6  Fluticasone Propionate 50 MCG/ACT Nasal Suspension; instill 1 spray into each nostril   once daily; Therapy: 45MVY0966 to (Evaluate:10Dsc1508)  Requested for: 17ODG8975; Last   Rx:22Sep2016 Ordered   7  Garlic CAPS; take 1 capsule daily; Therapy: (Recorded:48Eks7106) to Recorded   8  Meclizine HCl - 25 MG Oral Tablet; TAKE 1 TABLET 4 TIMES DAILY AS NEEDED FOR   DIZZINESS; Therapy: 75TMB8440 to (Evaluate:26Rlv9492)  Requested for: 56Tmf8072; Last   Rx:76Ahc0409 Ordered   9  Potassium TABS; Therapy: (Walter Romeo) to Recorded   10  Ventolin  (90 Base) MCG/ACT Inhalation Aerosol Solution; take 2 puffs four    times a day if needed; Therapy: 37PLZ8626 to (Evaluate:72Wfx5906)  Requested for: 54AJQ3436; Last    Rx:14Jan2016 Ordered   11  Vitamin B Complex CAPS; Therapy: (Recorded:18Cef6838) to Recorded    Allergies    1  No Known Drug Allergies    Vitals  Signs    Systolic: 176  Diastolic: 96  Heart Rate: 88    Signatures   Electronically signed by :  PHAM Sawant ; Nov 14 2016 10:02AM EST                       (Author)

## 2018-01-12 VITALS
DIASTOLIC BLOOD PRESSURE: 92 MMHG | SYSTOLIC BLOOD PRESSURE: 182 MMHG | WEIGHT: 140.56 LBS | HEIGHT: 64 IN | RESPIRATION RATE: 16 BRPM | HEART RATE: 80 BPM | BODY MASS INDEX: 24 KG/M2

## 2018-01-13 VITALS
HEIGHT: 64 IN | SYSTOLIC BLOOD PRESSURE: 102 MMHG | OXYGEN SATURATION: 99 % | DIASTOLIC BLOOD PRESSURE: 60 MMHG | RESPIRATION RATE: 16 BRPM | BODY MASS INDEX: 23.22 KG/M2 | WEIGHT: 136 LBS | HEART RATE: 58 BPM

## 2018-01-13 VITALS
HEART RATE: 56 BPM | SYSTOLIC BLOOD PRESSURE: 142 MMHG | OXYGEN SATURATION: 96 % | HEIGHT: 64 IN | BODY MASS INDEX: 23.31 KG/M2 | DIASTOLIC BLOOD PRESSURE: 80 MMHG | WEIGHT: 136.5 LBS

## 2018-01-13 NOTE — PROGRESS NOTES
Chief Complaint  Patient here for BP check  Right arm 230/110, left arm 182/110  Results given to Dr Magda Vu  Also, patient reported that she fell yesterday and injured her left foot  Left foot is swollen and has a small abrasion on it  Discussed this with Dr Magda Vu, and an order for an x-ray was given to the patient  Patient was advised that Dr Magda Vu has a phone call to Dr Sushma Renae in regards to her BP's and stenosis  Advised patient that she will either hear back from Dr Magda Vu or Dr Sushma Renae  Patient verbalized understanding  Brenda Gomez RN      Active Problems    1  Aortic stenosis (424 1) (I35 0)   2  Aortoiliac occlusive disease (444 09) (I74 09)   3  Atherosclerosis of native artery of extremity with intermittent claudication (440 21)   (I70 219)   4  Back pain (724 5) (M54 9)   5  Benign essential hypertension (401 1) (I10)   6  Body mass index (BMI) 22 0-22 9, adult (V85 1) (Z68 22)   7  Bug bite (919 4,E906 4) (W57 XXXA)   8  Bunion (727 1) (M21 619)   9  Callus (700) (L84)   10  Carotid artery stenosis (433 10) (I65 29)   11  Difficulty walking (719 7) (R26 2)   12  Dizziness (780 4) (R42)   13  Edema of leg (782 3) (R60 0)   14  Need for influenza vaccination (V04 81) (Z23)   15  Need for Tdap vaccination (V06 1) (Z23)   16  Nicotine dependence (305 1) (F17 200)   17  Onychomycosis (110 1) (B35 1)   18  Pain in both feet (729 5) (M79 671,M79 672)   19  Screening for cardiovascular condition (V81 2) (Z13 6)   20  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)   21  Subclavian artery stenosis, left (447 1) (I77 1)   22  Vertigo (780 4) (R42)   23  Visit for screening mammogram (V76 12) (Z12 31)   24  Worried well (V65 5) (Z71 1)    Current Meds   1  Aspirin 81 MG TABS; TAKE 1 TABLET DAILY; Therapy: 72XMK6076 to (Evaluate:23Jan2014)  Requested for: 24Wzy3650; Last   Rx:11Xxx5162 Ordered   2  Atenolol 50 MG Oral Tablet; take 1 tablet by mouth once daily as directed;    Therapy: 14TAP8491 to (Evaluate:12Jan2017)  Requested for: 34YMM6026; Last   Rx:18Jan2016 Ordered   3  Atorvastatin Calcium 40 MG Oral Tablet; Take 1 tablet by mouth at bedtime; Therapy: 90FVH6923 to (Evaluate:81Hbd8864)  Requested for: 89QNH4785; Last   Rx:22Mar2016 Ordered   4  Calcium TABS; Therapy: (Alex Diego) to Recorded   5  Enalapril Maleate 5 MG Oral Tablet; TAKE 1 TABLET DAILY; Therapy: 27HMA8248 to (Evaluate:56Whh6801)  Requested for: 23EKY9580; Last   Rx:30Jun2016 Ordered   6  Fluticasone Propionate 50 MCG/ACT Nasal Suspension; instill 1 spray into each nostril   once daily; Therapy: 16AUJ2381 to (Evaluate:79Tgr9187)  Requested for: 39QNV0332; Last   Rx:22Sep2016 Ordered   7  Garlic CAPS; take 1 capsule daily; Therapy: (Recorded:61Dlx9650) to Recorded   8  Meclizine HCl - 25 MG Oral Tablet; TAKE 1 TABLET 4 TIMES DAILY AS NEEDED FOR   DIZZINESS; Therapy: 86ULS4173 to (Evaluate:68Lpx6934)  Requested for: 45Yfj3630; Last   Rx:82Mvd4512 Ordered   9  Potassium TABS; Therapy: (Alex Diego) to Recorded   10  Ventolin  (90 Base) MCG/ACT Inhalation Aerosol Solution; take 2 puffs four    times a day if needed; Therapy: 65FJF7938 to (Evaluate:16Lyn8324)  Requested for: 84XFQ2023; Last    Rx:14Jan2016 Ordered   11  Vitamin B Complex CAPS; Therapy: (Recorded:41Bus3522) to Recorded    Allergies    1  No Known Drug Allergies    Signatures   Electronically signed by :  PHAM Bucio ; Nov 14 2016 10:01AM EST                       (Author)

## 2018-01-14 VITALS
TEMPERATURE: 97.9 F | HEIGHT: 64 IN | OXYGEN SATURATION: 97 % | WEIGHT: 140 LBS | SYSTOLIC BLOOD PRESSURE: 180 MMHG | HEART RATE: 51 BPM | RESPIRATION RATE: 16 BRPM | BODY MASS INDEX: 23.9 KG/M2 | DIASTOLIC BLOOD PRESSURE: 90 MMHG

## 2018-01-14 VITALS
SYSTOLIC BLOOD PRESSURE: 180 MMHG | HEIGHT: 64 IN | BODY MASS INDEX: 23.56 KG/M2 | WEIGHT: 138 LBS | DIASTOLIC BLOOD PRESSURE: 100 MMHG | RESPIRATION RATE: 14 BRPM | HEART RATE: 60 BPM

## 2018-01-14 NOTE — PROGRESS NOTES
Chief Complaint  patient saw vascular surgeon yesterday and he did not recommend surgery , patient is still feeling lightheaded today, does not see Dr Jennifer Franks until 10/11/17 and sees Dr Adele Zuniga on 10/4/17 per Dr Bliane Bautista patient needs to see cardiology sooner can you call Dr Jennifer Franks office to see if they will see her sooner      Active Problems     1  Aortoiliac occlusive disease (444 09) (I74 09)   2  Atherosclerosis of native artery of extremity with intermittent claudication (440 21)   (I70 219)   3  Back pain (724 5) (M54 9)   4  Benign essential hypertension (401 1) (I10)   5  Body mass index (BMI) 22 0-22 9, adult (V85 1) (Z68 22)   6  Bug bite (919 4,E906 4) (W57 XXXA)   7  Bunion (727 1) (M21 619)   8  Callus (700) (L84)   9  Depression screening (V79 0) (Z13 89)   10  Difficulty walking (719 7) (R26 2)   11  Dizziness (780 4) (R42)   12  Edema of leg (782 3) (R60 0)   13  Encounter for smoking cessation counseling (V65 42,305 1) (Z71 6,Z72 0)   14  Hypothyroidism (244 9) (E03 9)   15  Need for influenza vaccination (V04 81) (Z23)   16  Need for Tdap vaccination (V06 1) (Z23)   17  Onychomycosis (110 1) (B35 1)   18  Pain in both feet (729 5) (M79 671,M79 672)   19  Screening for cardiovascular condition (V81 2) (Z13 6)   20  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)   21  Subclavian artery stenosis, left (447 1) (I77 1)   22  Vertigo (780 4) (R42)   23  Visit for screening mammogram (V76 12) (Z12 31)   24  Worried well (V65 5) (Z71 1)    Carotid artery stenosis (433 10) (I65 29)       Nicotine dependence (305 1) (F17 200)       Aortic stenosis (424 1) (I35 0)       Accelerated hypertension (401 0) (I10)          Current Meds   1  Aspirin 81 MG TABS; TAKE 1 TABLET DAILY; Therapy: 52GQK9646 to (Evaluate:23Jan2014)  Requested for: 55Eha9695; Last   Rx:56Ixw4395 Ordered   2  Atenolol 50 MG Oral Tablet; take 1 tablet by mouth twice a day;    Therapy: 96TSS5057 to (Rosetta Grady)  Requested for: 61YJN9319; Last Rx: 24DMC4338 Ordered   3  Atorvastatin Calcium 80 MG Oral Tablet; TAKE 1 TABLET DAILY; Therapy: 04YVE4752 to (Rayshawn Ham)  Requested for: 01Sep2017; Last   Rx:01Sep2017 Ordered   4  Calcium TABS; Therapy: (Aura Herrera) to Recorded   5  Chlorthalidone 25 MG Oral Tablet; TAKE 1 TABLET DAILY; Therapy: 01Sep2017 to (Evaluate:99Vyr8641); Last Rx:01Sep2017 Ordered   6  Enalapril Maleate 20 MG Oral Tablet; TAKE 1 TABLET DAILY AS DIRECTED; Therapy: 08FFQ7128 to (Evaluate:20Mar2018)  Requested for: 77Xfq4775; Last   Rx:01Sep2017 Ordered   7  Fluticasone Propionate 50 MCG/ACT Nasal Suspension; instill 1 spray into each nostril   once daily; Therapy: 80HUX2005 to (Evaluate:14Mka2452)  Requested for: 85WLD4613; Last   Rx:81Bjp2828 Ordered   8  Garlic CAPS; take 1 capsule daily; Therapy: (Recorded:94Tpa0899) to Recorded   9  Levothyroxine Sodium 25 MCG Oral Tablet; TAKE 1 TABLET DAILY; Therapy: 01Sep2017 to (Evaluate:67Jym7649); Last Rx:01Sep2017 Ordered   10  Meclizine HCl - 25 MG Oral Tablet; TAKE 1 TABLET 4 TIMES DAILY AS NEEDED FOR    DIZZINESS; Therapy: 61NPH5069 to (Evaluate:17Hux0069)  Requested for: 43Fhx8350; Last    Rx:63Vjg6790 Ordered   11  Nicotine 21 MG/24HR Transdermal Patch 24 Hour; USE AS DIRECTED; Therapy: 01Sep2017 to (Last Rx:01Sep2017) Ordered   12  Potassium TABS; Therapy: (Aura Herrera) to Recorded   13  Ventolin  (90 Base) MCG/ACT Inhalation Aerosol Solution; take 2 puffs four    times a day if needed; Therapy: 59KWW4832 to (Evaluate:26Xfx2656)  Requested for: 60MTS7601; Last    Rx:14Jan2016 Ordered   14  Vitamin B Complex CAPS; Therapy: (Recorded:20Kjd8369) to Recorded    Allergies    1   No Known Drug Allergies    Vitals  Signs    Temperature: 97 1 F  Heart Rate: 57  Respiration: 18  Systolic: 091, RUE, Sitting  Diastolic: 72, RUE, Sitting  O2 Saturation: 96  Systolic: 327, LUE, Sitting  Diastolic: 64, LUE, Sitting    Future Appointments    Date/Time Provider Specialty Site   11/06/2017 11:00 AM Miles Esposito DO Cardiology [facility] CARDIOLOGY [facility]   10/04/2017 11:15 AM PHAM Khanna  [facility]   10/25/2017 10:15 AM PHAM Khanna  Family [facility]     Signatures   Electronically signed by : Janel Haque DO; Sep 20 2017  4:26PM EST                       (Author)    Electronically signed by :  PHAM Alcaraz ; Sep 26 2017 11:41AM EST                       (Author)

## 2018-01-15 NOTE — RESULT NOTES
Verified Results  *VB-Depression Screening 62Rla7582 11:52AM Dean Potter     Test Name Result Flag Reference   Depression Scale Result      Depression Screen - Negative For Symptoms

## 2018-01-15 NOTE — MISCELLANEOUS
Message   Recorded as Task   Date: 10/31/2016 02:54 PM, Created By: Rekha Cristina   Task Name: Care Coordination   Assigned To: Pam Mathews   Regarding Patient: Prince Chaudhry, Status: Active   CommentCharlyn Shorten - 31 Oct 2016 2:54 PM     TASK CREATED  spoke with dr Honorio Guillermo he states that he would expect a lower pressure   advised CTA of abdomen and pelvis  and to lower BP  will make beta blocker BID call her later to check  and will adjust as needed      no symptoms at all  no headaches  no chest or SOB no visaul changes  no numbness or tingling  no confusion      if any of the above occur to go to ED asap        Signatures   Electronically signed by :  PHAM Velázquez ; Oct 31 2016  2:55PM EST                       (Author)

## 2018-01-15 NOTE — PROGRESS NOTES
Assessment    1  Encounter for preventive health examination (V70 0) (Z00 00)   2  Benign essential hypertension (401 1) (I10)   3  Atherosclerosis of native artery of extremity with intermittent claudication (440 21)   (I70 219)    Plan  Need for influenza vaccination    · Fluzone High-Dose 0 5 ML Intramuscular Suspension Prefilled Syringe    Discussion/Summary  health maintenance visit Currently, she eats a healthy diet  Screening lab work includes hemoglobin, glucose and lipid profile  The immunizations are up to date  She was advised to be evaluated by she sees vascular several times a year  Here for her yearly CPE labs done in aug   no need for mammo and gyn  Chief Complaint  Annual Physical      History of Present Illness  HM, Adult Female: The patient is being seen for a health maintenance evaluation  The last health maintenance visit was 12 months month(s) ago  Social History: Household members include 3 daughter(s) and 1 deseasedf rom metastatic melanoma  She is   Work status: retired  The patient quit smoking > 1 year ago  She reports never drinking alcohol  General Health: The patient's health since the last visit is described as fair  She has regular dental visits  Immunizations status: up to date   wears glasses  Lifestyle:  She consumes a diverse and healthy diet  She does not have any weight concerns  She does not exercise regularly  PVD  Reproductive health: the patient is postmenopausal    Screening: cancer screening reviewed and current  metabolic screening reviewed and current  Review of Systems    Preventive Quality 65 and Older: The patient currently has no urinary incontinence symptoms  Active Problems    1  Aortic stenosis (424 1) (I35 0)   2  Aortoiliac occlusive disease (444 09) (I74 09)   3  Atherosclerosis of native artery of extremity with intermittent claudication (440 21)   (I70 219)   4  Back pain (724 5) (M54 9)   5   Benign essential hypertension (401 1) (I10)   6  Body mass index (BMI) 22 0-22 9, adult (V85 1) (Z68 22)   7  Bug bite (919 4,E906 4) (W57 XXXA)   8  Bunion (727 1) (M21 619)   9  Callus (700) (L84)   10  Carotid artery stenosis (433 10) (I65 29)   11  Difficulty walking (719 7) (R26 2)   12  Dizziness (780 4) (R42)   13  Edema of leg (782 3) (R60 0)   14  Need for Tdap vaccination (V06 1) (Z23)   15  Nicotine dependence (305 1) (F17 200)   16  Onychomycosis (110 1) (B35 1)   17  Pain in both feet (729 5) (M79 671,M79 672)   18  Screening for cardiovascular condition (V81 2) (Z13 6)   19  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)   20  Subclavian artery stenosis, left (447 1) (I77 1)   21  Vertigo (780 4) (R42)   22  Visit for screening mammogram (V76 12) (Z12 31)   23  Worried well (V65 5) (Z71 1)    Past Medical History    · History of Acute effusion of both middle ears (381 00) (H65 193)   · Benign essential hypertension (401 1) (I10)   · History of CAD (coronary artery disease) (414 00) (I25 10)   · History of arthritis (V13 4) (Z87 39)   · History of other diseases of the circulatory system, not elsewhere classified (V12 59)  (Z86 79)    Surgical History    · History of Breast Surgery   · History of Colonoscopy (Fiberoptic) Screening   · History of Hysterectomy   · History of Incisional Breast Biopsy    Family History  Father    · Family history of Hypertension (V17 49)  Family History    · Family history of CAD (coronary artery disease)   · Family history of arthritis (V17 7) (Z82 61)    Social History    · Alcohol   · 2 DRINKS/DAY- 50 YRS   · Current every day smoker (305 1) (F17 200)   · Current Smoker (305 1)   · smokes 1/2 ppd on average, has 2 boxes of nicorette gums at home to help quit   ·    · Denied: History of Drug Use   · Exercises rarely (V49 89) (Z78 9)   · Sleeps 6 -7 hours a day    Current Meds   1  Aspirin 81 MG TABS; TAKE 1 TABLET DAILY;    Therapy: 35UFC5375 to (Evaluate:23Jan2014)  Requested for: 97DBI4759; Last   Rx:69Bsb7019 Ordered   2  Atenolol 50 MG Oral Tablet; take 1 tablet by mouth once daily as directed; Therapy: 39IDQ9219 to (Evaluate:12Jan2017)  Requested for: 51RWC0352; Last   Rx:18Jan2016 Ordered   3  Atorvastatin Calcium 40 MG Oral Tablet; Take 1 tablet by mouth at bedtime; Therapy: 95JEL0858 to (Evaluate:28Lnq0399)  Requested for: 95XZH9427; Last   Rx:22Mar2016 Ordered   4  Calcium TABS; Therapy: (Brigette Bernal) to Recorded   5  Enalapril Maleate 5 MG Oral Tablet; TAKE 1 TABLET DAILY; Therapy: 07YHO4447 to (Evaluate:80Xaj5750)  Requested for: 27IFE0250; Last   Rx:30Jun2016 Ordered   6  Fluticasone Propionate 50 MCG/ACT Nasal Suspension; instill 1 spray into each nostril   once daily; Therapy: 62ZTK5048 to (Evaluate:43Jvw1861)  Requested for: 06MEX1521; Last   Rx:22Sep2016 Ordered   7  Garlic CAPS; take 1 capsule daily; Therapy: (Recorded:43Wjs9047) to Recorded   8  Meclizine HCl - 25 MG Oral Tablet; TAKE 1 TABLET 4 TIMES DAILY AS NEEDED FOR   DIZZINESS; Therapy: 87WSS5712 to (Evaluate:55Svr3231)  Requested for: 51Fnd9591; Last   Rx:82Cka7772 Ordered   9  Potassium TABS; Therapy: (Brigette Bernal) to Recorded   10  Ventolin  (90 Base) MCG/ACT Inhalation Aerosol Solution; take 2 puffs four    times a day if needed; Therapy: 97CXT5893 to (Evaluate:22Uiv3129)  Requested for: 68HNR5062; Last    Rx:14Jan2016 Ordered   11  Vitamin B Complex CAPS; Therapy: (Recorded:55Bxo0946) to Recorded    Allergies    1  No Known Drug Allergies    Vitals   Recorded: 54FWD5326 89:43XP   Systolic 856, RUE, Sitting   Diastolic 78, RUE, Sitting   Heart Rate 60   Respiration 18   Temperature 96 F   O2 Saturation 98   Height 5 ft 4 in   Weight 132 lb    BMI Calculated 22 66   BSA Calculated 1 64     Physical Exam    Constitutional   General appearance: No acute distress, well appearing and well nourished      Head and Face   Head and face: Normal     Palpation of the face and sinuses: No sinus tenderness  Eyes   Conjunctiva and lids: No swelling, erythema or discharge  Pupils and irises: Equal, round, reactive to light  Ears, Nose, Mouth, and Throat   External inspection of ears and nose: Normal     Otoscopic examination: Tympanic membranes translucent with normal light reflex  Canals patent without erythema  Hearing: Normal     Oropharynx: Normal with no erythema, edema, exudate or lesions  Neck   Neck: Supple, symmetric, trachea midline, no masses  Thyroid: Normal, no thyromegaly  Pulmonary   Respiratory effort: No increased work of breathing or signs of respiratory distress  Auscultation of lungs: Clear to auscultation  Cardiovascular   Auscultation of heart: Normal rate and rhythm, normal S1 and S2, no murmurs  Peripheral vascular exam: Abnormal   Posterior tibialis: right 1+ and left 1+  Dorsalis pedis: right 1+ and left 1+  Abdominal Aorta: no bruit heard  Chest   Breasts: Normal, no dimpling or skin changes appreciated  Palpation of breasts and axillae: Normal, no masses palpated  Chest: Normal     Abdomen   Abdomen: Non-tender, no masses  Liver and spleen: No hepatomegaly or splenomegaly  Genitourinary over 75 all in past were normal    Lymphatic   Palpation of lymph nodes in neck: No lymphadenopathy  Musculoskeletal   Gait and station: Normal     Skin   Skin and subcutaneous tissue: Normal without rashes or lesions  Neurologic   Cranial nerves: Cranial nerves II-XII intact  Cortical function: Normal mental status  Reflexes: 2+ and symmetric  Sensation: No sensory loss  Coordination: Normal finger to nose and heel to shin  Psychiatric   Judgment and insight: Normal     Orientation to person, place, and time: Normal     Recent and remote memory: Intact      Mood and affect: Normal        Results/Data  Falls Risk Assessment (Dx Z13 89 Screen for Neurologic Disorder) 81NFW4650 11:20AM User, [employer]     Test Name Result Flag Reference Falls Risk      No falls in the past year       Health Management  Visit for screening mammogram   Digital Bilateral Screening Mammogram With CAD; every 1 year; Last 50XAN7569; Next  Due: 91IGY4157; Near Due    Signatures   Electronically signed by :  PHAM Bucio ; Oct 22 2016  8:41PM EST                       (Author)

## 2018-01-22 VITALS
SYSTOLIC BLOOD PRESSURE: 136 MMHG | OXYGEN SATURATION: 99 % | BODY MASS INDEX: 23.73 KG/M2 | DIASTOLIC BLOOD PRESSURE: 80 MMHG | WEIGHT: 139 LBS | HEART RATE: 64 BPM | HEIGHT: 64 IN

## 2018-01-22 VITALS
DIASTOLIC BLOOD PRESSURE: 78 MMHG | WEIGHT: 138 LBS | SYSTOLIC BLOOD PRESSURE: 160 MMHG | OXYGEN SATURATION: 98 % | RESPIRATION RATE: 16 BRPM | HEART RATE: 56 BPM | BODY MASS INDEX: 23.56 KG/M2 | TEMPERATURE: 96 F | HEIGHT: 64 IN

## 2018-01-22 VITALS
DIASTOLIC BLOOD PRESSURE: 72 MMHG | RESPIRATION RATE: 18 BRPM | HEART RATE: 57 BPM | SYSTOLIC BLOOD PRESSURE: 144 MMHG | TEMPERATURE: 97.1 F | OXYGEN SATURATION: 96 %

## 2018-02-13 ENCOUNTER — TELEPHONE (OUTPATIENT)
Dept: FAMILY MEDICINE CLINIC | Facility: CLINIC | Age: 78
End: 2018-02-13

## 2018-02-13 DIAGNOSIS — T78.40XS ALLERGIC STATE, SEQUELA: ICD-10-CM

## 2018-02-13 DIAGNOSIS — E03.9 ACQUIRED HYPOTHYROIDISM: ICD-10-CM

## 2018-02-13 DIAGNOSIS — I10 ESSENTIAL HYPERTENSION: Primary | ICD-10-CM

## 2018-02-13 DIAGNOSIS — E78.2 MIXED HYPERLIPIDEMIA: ICD-10-CM

## 2018-02-13 RX ORDER — CHLORTHALIDONE 25 MG/1
25 TABLET ORAL DAILY
Qty: 90 TABLET | Refills: 3
Start: 2018-02-13 | End: 2018-04-26 | Stop reason: SDUPTHER

## 2018-02-13 RX ORDER — CHLORTHALIDONE 25 MG/1
25 TABLET ORAL DAILY
Refills: 0
Start: 2018-02-13 | End: 2018-02-13 | Stop reason: SDUPTHER

## 2018-02-13 RX ORDER — ENALAPRIL MALEATE 20 MG/1
20 TABLET ORAL DAILY
Qty: 90 TABLET | Refills: 3
Start: 2018-02-13 | End: 2018-07-02 | Stop reason: SDUPTHER

## 2018-02-13 RX ORDER — ATENOLOL 50 MG/1
50 TABLET ORAL DAILY
Refills: 0
Start: 2018-02-13 | End: 2018-02-13 | Stop reason: SDUPTHER

## 2018-02-13 RX ORDER — LEVOTHYROXINE SODIUM 0.03 MG/1
25 TABLET ORAL
Qty: 90 TABLET | Refills: 3
Start: 2018-02-13 | End: 2018-04-26 | Stop reason: SDUPTHER

## 2018-02-13 RX ORDER — ATORVASTATIN CALCIUM 80 MG/1
80 TABLET, FILM COATED ORAL
Refills: 0
Start: 2018-02-13 | End: 2018-04-19 | Stop reason: SDUPTHER

## 2018-02-13 RX ORDER — ENALAPRIL MALEATE 20 MG/1
20 TABLET ORAL DAILY
Refills: 0
Start: 2018-02-13 | End: 2018-02-13 | Stop reason: SDUPTHER

## 2018-02-13 RX ORDER — LEVOTHYROXINE SODIUM 0.03 MG/1
25 TABLET ORAL
Refills: 0
Start: 2018-02-13 | End: 2018-02-13 | Stop reason: SDUPTHER

## 2018-02-13 RX ORDER — FLUTICASONE PROPIONATE 50 MCG
1 SPRAY, SUSPENSION (ML) NASAL DAILY
Qty: 16 G | Refills: 0 | Status: SHIPPED | OUTPATIENT
Start: 2018-02-13 | End: 2018-05-04 | Stop reason: SDUPTHER

## 2018-02-13 RX ORDER — ATENOLOL 50 MG/1
50 TABLET ORAL DAILY
Qty: 90 TABLET | Refills: 3
Start: 2018-02-13 | End: 2018-04-19 | Stop reason: SDUPTHER

## 2018-02-13 NOTE — TELEPHONE ENCOUNTER
DR LI  PT,PT CALLED TO ASKS IF YOU WILL ORDER  A 90 DAY SUPPLY FOR ALL HER MEDS  HER INSURANCE TOLD HER THIS WILL BE CHEAPER FOR HER  USES [facility]

## 2018-03-02 ENCOUNTER — TELEPHONE (OUTPATIENT)
Dept: FAMILY MEDICINE CLINIC | Facility: CLINIC | Age: 78
End: 2018-03-02

## 2018-03-02 NOTE — TELEPHONE ENCOUNTER
Patient wanted 90 day supplies of her meds which were filled by dr Maryjane Heredia 2/13 but [facility]   Can they be called over pleasE?

## 2018-03-05 ENCOUNTER — HOSPITAL ENCOUNTER (OUTPATIENT)
Dept: RADIOLOGY | Facility: HOSPITAL | Age: 78
Discharge: HOME | End: 2018-03-05
Attending: SURGERY
Payer: MEDICARE

## 2018-03-05 ENCOUNTER — TELEPHONE (OUTPATIENT)
Dept: VASCULAR SURGERY | Facility: CLINIC | Age: 78
End: 2018-03-05

## 2018-03-05 DIAGNOSIS — I70.219 ATHEROSCLEROSIS OF NATIVE ARTERIES OF EXTREMITY WITH INTERMITTENT CLAUDICATION (HCC): ICD-10-CM

## 2018-03-05 DIAGNOSIS — I70.0 ATHEROSCLEROSIS OF AORTA (HCC): Primary | ICD-10-CM

## 2018-03-05 DIAGNOSIS — I74.09 OTHER ARTERIAL EMBOLISM AND THROMBOSIS OF ABDOMINAL AORTA (HCC): ICD-10-CM

## 2018-03-05 DIAGNOSIS — I73.9 PERIPHERAL VASCULAR DISEASE (HCC): ICD-10-CM

## 2018-03-05 DIAGNOSIS — F17.200 NICOTINE DEPENDENCE, UNCOMPLICATED: ICD-10-CM

## 2018-03-05 DIAGNOSIS — I65.29 OCCLUSION AND STENOSIS OF UNSPECIFIED CAROTID ARTERY: ICD-10-CM

## 2018-03-05 PROCEDURE — 93978 VASCULAR STUDY: CPT

## 2018-03-05 PROCEDURE — 93880 EXTRACRANIAL BILAT STUDY: CPT

## 2018-03-05 PROCEDURE — 93923 UPR/LXTR ART STDY 3+ LVLS: CPT

## 2018-03-05 PROCEDURE — 93925 LOWER EXTREMITY STUDY: CPT

## 2018-03-05 NOTE — TELEPHONE ENCOUNTER
Called to state the pt is there for her studies and the PHILIP/AOIL orders are not in her chart   I placed the orders then faxed them to her at [phone]

## 2018-03-06 PROCEDURE — 93880 EXTRACRANIAL BILAT STUDY: CPT | Performed by: SURGERY

## 2018-03-06 PROCEDURE — 93978 VASCULAR STUDY: CPT | Performed by: SURGERY

## 2018-03-06 PROCEDURE — 93922 UPR/L XTREMITY ART 2 LEVELS: CPT | Performed by: SURGERY

## 2018-03-06 PROCEDURE — 93925 LOWER EXTREMITY STUDY: CPT | Performed by: SURGERY

## 2018-03-16 ENCOUNTER — TELEPHONE (OUTPATIENT)
Dept: ADMINISTRATIVE | Facility: HOSPITAL | Age: 78
End: 2018-03-16

## 2018-03-16 NOTE — TELEPHONE ENCOUNTER
----- Message from Maite Addison MD sent at 3/12/2018  9:17 AM EDT -----  That would be fine  Thanks  ----- Message -----  From: Candy Cortez  Sent: 3/9/2018   2:32 PM  To: MD Dr Cuate Yanes [facility],  The next time you are in [address] [place] [address] Road availability is 4/24  Can this pt see Kasey Huitron?    ----- Message -----  From: Dale Romo RN  Sent: 3/8/2018   5:22 PM  To: Candy Cortez, Diane Mayer, #        ----- Message -----  From: Maite Addison MD  Sent: 3/8/2018   5:10 PM  To: [facility]    Please schedule office visit follow-up  ----- Message -----  From: Irais Santa  Sent: 3/8/2018   4:42 PM  To: Maite Addison MD    Please rev Lower Limb Arterial Duplex Bilateral Study

## 2018-03-18 DIAGNOSIS — I65.29 OCCLUSION AND STENOSIS OF UNSPECIFIED CAROTID ARTERY: ICD-10-CM

## 2018-03-18 DIAGNOSIS — F17.200 NICOTINE DEPENDENCE, UNCOMPLICATED: ICD-10-CM

## 2018-03-23 ENCOUNTER — OFFICE VISIT (OUTPATIENT)
Dept: FAMILY MEDICINE CLINIC | Facility: CLINIC | Age: 78
End: 2018-03-23
Payer: MEDICARE

## 2018-03-23 VITALS
SYSTOLIC BLOOD PRESSURE: 148 MMHG | RESPIRATION RATE: 16 BRPM | DIASTOLIC BLOOD PRESSURE: 100 MMHG | BODY MASS INDEX: 24.89 KG/M2 | OXYGEN SATURATION: 96 % | HEART RATE: 54 BPM | TEMPERATURE: 97.7 F | WEIGHT: 145 LBS

## 2018-03-23 DIAGNOSIS — D23.9 ATYPICAL MOLE SYNDROME: ICD-10-CM

## 2018-03-23 DIAGNOSIS — I73.9 PERIPHERAL ARTERY DISEASE (HCC): Primary | ICD-10-CM

## 2018-03-23 DIAGNOSIS — I10 ESSENTIAL HYPERTENSION: ICD-10-CM

## 2018-03-23 PROCEDURE — 99213 OFFICE O/P EST LOW 20 MIN: CPT | Performed by: FAMILY MEDICINE

## 2018-03-26 NOTE — PROGRESS NOTES
Assessment/Plan:    No problem-specific Assessment & Plan notes found for this encounter  Diagnoses and all orders for this visit:    Peripheral artery disease ([address] )  -     Comprehensive metabolic panel; Future  -     CBC and differential; Future  -     LDL cholesterol, direct; Future    Essential hypertension    Atypical mole syndrome          Subjective:      Patient ID: Jennifer Bey is a 68 y o  female  Patient here for check of her blood pressure repeat is about the same I do believe with her profuse peripheral vascular disease she has issues with renovascular hypertension she is taking her vascular surgeon soon and I told her to address it with him as her blood pressure will be very hard to control if there is renal artery stenosis    She also is concerned about a mole that now has different colors she does have a history of a daughter who  from metastatic malignant melanoma so she is going to go to a dermatologist for screening test she has moles and freckles everywhere            The following portions of the patient's history were reviewed and updated as appropriate: allergies, past medical history, past surgical history and problem list     Review of Systems   Constitutional: Negative  HENT: Negative  Respiratory: Negative  Cardiovascular: Negative  Gastrointestinal: Negative  Objective:      /100 (BP Location: Left arm, Patient Position: Sitting, Cuff Size: Large)   Pulse (!) 54   Temp 97 7 °F (36 5 °C) (Tympanic)   Resp 16   Wt 65 8 kg (145 lb)   SpO2 96%   BMI 24 89 kg/m²          Physical Exam   Constitutional: She appears well-developed and well-nourished  Cardiovascular: Normal rate and regular rhythm      Pulmonary/Chest: Effort normal and breath sounds normal

## 2018-04-17 ENCOUNTER — APPOINTMENT (OUTPATIENT)
Dept: LAB | Facility: HOSPITAL | Age: 78
End: 2018-04-17
Attending: FAMILY MEDICINE
Payer: MEDICARE

## 2018-04-17 ENCOUNTER — TRANSCRIBE ORDERS (OUTPATIENT)
Dept: ADMINISTRATIVE | Facility: HOSPITAL | Age: 78
End: 2018-04-17

## 2018-04-17 DIAGNOSIS — I73.9 PERIPHERAL ARTERY DISEASE (HCC): ICD-10-CM

## 2018-04-17 LAB
ALBUMIN SERPL BCP-MCNC: 3.7 G/DL (ref 3.5–5)
ALP SERPL-CCNC: 86 U/L (ref 46–116)
ALT SERPL W P-5'-P-CCNC: 47 U/L (ref 12–78)
ANION GAP SERPL CALCULATED.3IONS-SCNC: 12 MMOL/L (ref 4–13)
AST SERPL W P-5'-P-CCNC: 34 U/L (ref 5–45)
BASOPHILS # BLD AUTO: 0.1 THOUSANDS/ΜL (ref 0–0.1)
BASOPHILS NFR BLD AUTO: 2 % (ref 0–1)
BILIRUB SERPL-MCNC: 0.6 MG/DL (ref 0.2–1)
BUN SERPL-MCNC: 32 MG/DL (ref 5–25)
CALCIUM SERPL-MCNC: 9.7 MG/DL (ref 8.3–10.1)
CHLORIDE SERPL-SCNC: 109 MMOL/L (ref 100–108)
CO2 SERPL-SCNC: 24 MMOL/L (ref 21–32)
CREAT SERPL-MCNC: 0.94 MG/DL (ref 0.6–1.3)
EOSINOPHIL # BLD AUTO: 0.3 THOUSAND/ΜL (ref 0–0.61)
EOSINOPHIL NFR BLD AUTO: 4 % (ref 0–6)
ERYTHROCYTE [DISTWIDTH] IN BLOOD BY AUTOMATED COUNT: 15.4 % (ref 11.6–15.1)
GFR SERPL CREATININE-BSD FRML MDRD: 59 ML/MIN/1.73SQ M
GLUCOSE P FAST SERPL-MCNC: 99 MG/DL (ref 65–99)
HCT VFR BLD AUTO: 37.1 % (ref 37–47)
HGB BLD-MCNC: 12.6 G/DL (ref 12–16)
LDLC SERPL DIRECT ASSAY-MCNC: 43 MG/DL (ref 0–100)
LYMPHOCYTES # BLD AUTO: 1.7 THOUSANDS/ΜL (ref 0.6–4.47)
LYMPHOCYTES NFR BLD AUTO: 23 % (ref 14–44)
MCH RBC QN AUTO: 33.7 PG (ref 27–31)
MCHC RBC AUTO-ENTMCNC: 33.9 G/DL (ref 31.4–37.4)
MCV RBC AUTO: 100 FL (ref 82–98)
MONOCYTES # BLD AUTO: 1.2 THOUSAND/ΜL (ref 0.17–1.22)
MONOCYTES NFR BLD AUTO: 15 % (ref 4–12)
NEUTROPHILS # BLD AUTO: 4.4 THOUSANDS/ΜL (ref 1.85–7.62)
NEUTS SEG NFR BLD AUTO: 56 % (ref 43–75)
PLATELET # BLD AUTO: 150 THOUSANDS/UL (ref 130–400)
PMV BLD AUTO: 10.3 FL (ref 8.9–12.7)
POTASSIUM SERPL-SCNC: 3.9 MMOL/L (ref 3.5–5.3)
PROT SERPL-MCNC: 7.9 G/DL (ref 6.4–8.2)
RBC # BLD AUTO: 3.73 MILLION/UL (ref 4.2–5.4)
SODIUM SERPL-SCNC: 145 MMOL/L (ref 136–145)
WBC # BLD AUTO: 7.7 THOUSAND/UL (ref 4.8–10.8)

## 2018-04-17 PROCEDURE — 36415 COLL VENOUS BLD VENIPUNCTURE: CPT

## 2018-04-17 PROCEDURE — 83721 ASSAY OF BLOOD LIPOPROTEIN: CPT

## 2018-04-17 PROCEDURE — 80053 COMPREHEN METABOLIC PANEL: CPT

## 2018-04-17 PROCEDURE — 85025 COMPLETE CBC W/AUTO DIFF WBC: CPT

## 2018-04-19 DIAGNOSIS — E78.2 MIXED HYPERLIPIDEMIA: ICD-10-CM

## 2018-04-19 DIAGNOSIS — I10 ESSENTIAL HYPERTENSION: ICD-10-CM

## 2018-04-19 RX ORDER — ATORVASTATIN CALCIUM 80 MG/1
80 TABLET, FILM COATED ORAL
Refills: 0
Start: 2018-04-19 | End: 2018-07-26 | Stop reason: SDUPTHER

## 2018-04-19 RX ORDER — ATENOLOL 50 MG/1
50 TABLET ORAL DAILY
Qty: 90 TABLET | Refills: 0
Start: 2018-04-19 | End: 2018-07-19 | Stop reason: SDUPTHER

## 2018-04-24 ENCOUNTER — OFFICE VISIT (OUTPATIENT)
Dept: VASCULAR SURGERY | Facility: CLINIC | Age: 78
End: 2018-04-24
Payer: MEDICARE

## 2018-04-24 VITALS
WEIGHT: 143 LBS | DIASTOLIC BLOOD PRESSURE: 76 MMHG | TEMPERATURE: 97.1 F | HEIGHT: 64 IN | SYSTOLIC BLOOD PRESSURE: 148 MMHG | BODY MASS INDEX: 24.41 KG/M2 | RESPIRATION RATE: 18 BRPM | HEART RATE: 56 BPM

## 2018-04-24 DIAGNOSIS — F17.218 CIGARETTE NICOTINE DEPENDENCE WITH OTHER NICOTINE-INDUCED DISORDER: ICD-10-CM

## 2018-04-24 DIAGNOSIS — I77.1 SUBCLAVIAN ARTERIAL STENOSIS (HCC): Chronic | ICD-10-CM

## 2018-04-24 DIAGNOSIS — I70.219 ATHEROSCLEROSIS OF ARTERY OF EXTREMITY WITH INTERMITTENT CLAUDICATION (HCC): Primary | Chronic | ICD-10-CM

## 2018-04-24 DIAGNOSIS — I74.09 AORTOILIAC OCCLUSIVE DISEASE (HCC): Chronic | ICD-10-CM

## 2018-04-24 DIAGNOSIS — I65.21 STENOSIS OF RIGHT CAROTID ARTERY: Chronic | ICD-10-CM

## 2018-04-24 PROCEDURE — 99214 OFFICE O/P EST MOD 30 MIN: CPT | Performed by: SURGERY

## 2018-04-24 NOTE — PROGRESS NOTES
Assessment/Plan: Aortoiliac occlusive disease (HCC)  Severe aortoiliac and infrainguinal arterial occlusive disease which is minimally symptomatic  Patient states majority of her limitations are secondary to lower back pain  She denies rest pain or other limb-threatening symptoms  We did discuss the findings on most recent imaging studies along with the indications for further evaluation and treatment  At this time we will continue close surveillance with duplex follow-up and office visit in 6 months  We did discuss the signs and symptoms of progressive disease and the importance of early evaluation if there is any significant progression  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 (HCC)  Severe aortoiliac and infrainguinal arterial occlusive disease which is minimally symptomatic  Patient states majority of her limitations are secondary to lower back pain  She denies rest pain or other limb-threatening symptoms  We did discuss the findings on most recent imaging studies along with the indications for further evaluation and treatment  At this time we will continue close surveillance with duplex follow-up and office visit in 6 months  We did discuss the signs and symptoms of progressive disease and the importance of early evaluation if there is any significant progression  Carotid artery stenosis  Asymptomatic carotid occlusive disease  There has been no significant progression on recent duplex follow-up  Will continue current follow-up with duplex evaluation 6 months  Subclavian arterial stenosis (HCC)  Asymptomatic subclavian artery stenosis  This will also be followed with periodic duplex imaging  Nicotine dependence  Cigarette smoking  We discussed the relationship between cigarette smoking and atherosclerotic disease, disease progression and associated signs and symptoms  We discussed specifically the importance of smoking cessation in this regard  Diagnoses and all orders for this visit:    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 (HCC)  -     VAS lower limb arterial duplex, complete bilateral; Future  -     VAS carotid complete study; Future  -     VAS abdominal aorta/iliacs; complete study; Future    Aortoiliac occlusive disease (HCC)  -     VAS lower limb arterial duplex, complete bilateral; Future  -     VAS carotid complete study; Future  -     VAS abdominal aorta/iliacs; complete study; Future    Stenosis of right carotid artery  -     VAS lower limb arterial duplex, complete bilateral; Future  -     VAS carotid complete study; Future  -     VAS abdominal aorta/iliacs; complete study; Future    Subclavian arterial stenosis (HCC)  -     VAS lower limb arterial duplex, complete bilateral; Future  -     VAS carotid complete study; Future  -     VAS abdominal aorta/iliacs; complete study; Future    Cigarette nicotine dependence with other nicotine-induced disorder    Other orders  -     Potassium (POTASSIMIN PO); Take by mouth daily               Patient ID: Jacqeulin Damian is a 68 y o  female  CHIEF COMPLAINT: Pt is here to review PHILIP and AOIL done 3/5/18  Pt states she has cramping pain to right leg when walking  Pt states she can only walk half a block before stopping for a few minutes  Pt states she gets "sprak feelings to bottom of heel"  Pt denies numbness or tingling  Pt denies open wounds or sores  Pt does c/o lower back pain that is on and off  Pt denies abd  Pt is taking aspirin daily  49-year-old smoker with long history of cerebrovascular and peripheral vascular occlusive disease presents in follow-up after recent duplex evaluation  She states she had a difficult winter mostly due to respiratory symptoms  She states her activities been severely limited secondary to these symptoms as well as the weather  She states she is starting to walk more  Unfortunately her walking is limited because of lower back pain  She describes discomfort in the lower back which limits her walking to 1/2-1 block  She denies any typical buttock, thigh or calf claudication  She states this discomfort is centralized the back but she also notices some numbness periodically in her feet more severe on the right as compared to the left  This does not appear to be directly related to activity or position  She denies rest pain symptoms  She denies any nonhealing wounds  She also denies any signs or symptoms which would be consistent with TIA or CVA  She does continue to smoke 1/2 -1 pack per day  She states she has tried to quit in the past but she feels it is unlikely she will be able to quit  The following portions of the patient's history were reviewed and updated as appropriate: allergies, current medications, past family history, past medical history, past social history, past surgical history and problem list     Review of Systems   Constitutional: Negative  HENT: Negative  Eyes: Negative  Respiratory: Negative  Cardiovascular: Negative  Gastrointestinal: Negative  Endocrine: Negative  Genitourinary: Negative  Musculoskeletal: Negative  Leg pain  Leg cramping when walking  Skin: Negative  Allergic/Immunologic: Negative  Neurological: Negative  Hematological: Negative  Psychiatric/Behavioral: Negative  Objective:      /76 (BP Location: Right arm, Patient Position: Sitting, Cuff Size: Adult)   Pulse 56   Temp (!) 97 1 °F (36 2 °C) (Tympanic)   Resp 18   Ht 5' 4" (1 626 m)   Wt 64 9 kg (143 lb)   BMI 24 55 kg/m²          Physical Exam   Constitutional: She is oriented to person, place, and time  She appears well-developed and well-nourished  HENT:   Head: Normocephalic and atraumatic  Eyes: Conjunctivae and EOM are normal    Neck: Normal range of motion  Neck supple  No JVD present  Carotid bruit is present (Bilateral carotid bruit)     Cardiovascular: Normal rate, regular rhythm, S1 normal and S2 normal     Murmur heard  Systolic murmur is present   Pulses:       Carotid pulses are 2+ on the right side, and 2+ on the left side  Radial pulses are 2+ on the right side, and 2+ on the left side  Femoral pulses are 0 on the right side, and 2+ on the left side  Popliteal pulses are 0 on the right side, and 2+ on the left side  Dorsalis pedis pulses are 0 on the right side, and 0 on the left side  Posterior tibial pulses are 0 on the right side, and 0 on the left side  Right foot is ruborous on dependency  Pulmonary/Chest: Effort normal  She has wheezes (Bilateral expiratory wheeze)  Abdominal: Soft  Normal aorta  There is no tenderness  No hernia  Musculoskeletal: Normal range of motion  She exhibits no edema, tenderness or deformity  Neurological: She is alert and oriented to person, place, and time  No cranial nerve deficit  Skin: Skin is warm, dry and intact  Ruborous changes on the right with no areas of tissue loss  Psychiatric: She has a normal mood and affect

## 2018-04-24 NOTE — PATIENT INSTRUCTIONS
Assessment/Plan: Aortoiliac occlusive disease (HCC)  Severe aortoiliac and infrainguinal arterial occlusive disease which is minimally symptomatic  Patient states majority of her limitations are secondary to lower back pain  She denies rest pain or other limb-threatening symptoms  We did discuss the findings on most recent imaging studies along with the indications for further evaluation and treatment  At this time we will continue close surveillance with duplex follow-up and office visit in 6 months  We did discuss the signs and symptoms of progressive disease and the importance of early evaluation if there is any significant progression  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 (HCC)    Carotid artery stenosis  Asymptomatic carotid occlusive disease  There has been no significant progression on recent duplex follow-up  Will continue current follow-up with duplex evaluation 6 months  Subclavian arterial stenosis (HCC)  Asymptomatic subclavian artery stenosis  This will also be followed with periodic duplex imaging  Nicotine dependence  Cigarette smoking  We discussed the relationship between cigarette smoking and atherosclerotic disease, disease progression and associated signs and symptoms  We discussed specifically the importance of smoking cessation in this regard

## 2018-04-24 NOTE — ASSESSMENT & PLAN NOTE
Severe aortoiliac and infrainguinal arterial occlusive disease which is minimally symptomatic  Patient states majority of her limitations are secondary to lower back pain  She denies rest pain or other limb-threatening symptoms  We did discuss the findings on most recent imaging studies along with the indications for further evaluation and treatment  At this time we will continue close surveillance with duplex follow-up and office visit in 6 months  We did discuss the signs and symptoms of progressive disease and the importance of early evaluation if there is any significant progression

## 2018-04-24 NOTE — ASSESSMENT & PLAN NOTE
Cigarette smoking  We discussed the relationship between cigarette smoking and atherosclerotic disease, disease progression and associated signs and symptoms  We discussed specifically the importance of smoking cessation in this regard

## 2018-04-24 NOTE — ASSESSMENT & PLAN NOTE
Asymptomatic carotid occlusive disease  There has been no significant progression on recent duplex follow-up  Will continue current follow-up with duplex evaluation 6 months

## 2018-04-24 NOTE — LETTER
April 24, 2018     [facility], [address]    Patient: Ramesh Elder   YOB: 1940   Date of Visit: 4/24/2018       Dear Dr Dieudonne Talamantes: Thank you for referring Rafita Henning to me for evaluation  Below are the relevant portions of my assessment and plan of care  Assessment/Plan: Aortoiliac occlusive disease (HCC)  Severe aortoiliac and infrainguinal arterial occlusive disease which is minimally symptomatic  Patient states majority of her limitations are secondary to lower back pain  She denies rest pain or other limb-threatening symptoms  We did discuss the findings on most recent imaging studies along with the indications for further evaluation and treatment  At this time we will continue close surveillance with duplex follow-up and office visit in 6 months  We did discuss the signs and symptoms of progressive disease and the importance of early evaluation if there is any significant progression  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 (HCC)    Carotid artery stenosis  Asymptomatic carotid occlusive disease  There has been no significant progression on recent duplex follow-up  Will continue current follow-up with duplex evaluation 6 months  Subclavian arterial stenosis (HCC)  Asymptomatic subclavian artery stenosis  This will also be followed with periodic duplex imaging  Nicotine dependence  Cigarette smoking  We discussed the relationship between cigarette smoking and atherosclerotic disease, disease progression and associated signs and symptoms  We discussed specifically the importance of smoking cessation in this regard  If you have questions, please do not hesitate to call me  I look forward to following Jovon Curtis along with you           Sincerely,        Angie Escoto MD        CC: No Recipients

## 2018-04-26 DIAGNOSIS — E78.2 MIXED HYPERLIPIDEMIA: ICD-10-CM

## 2018-04-26 DIAGNOSIS — I10 ESSENTIAL HYPERTENSION: ICD-10-CM

## 2018-04-27 RX ORDER — CHLORTHALIDONE 25 MG/1
25 TABLET ORAL DAILY
Qty: 90 TABLET | Refills: 0
Start: 2018-04-27 | End: 2018-04-30 | Stop reason: SDUPTHER

## 2018-04-27 RX ORDER — LEVOTHYROXINE SODIUM 0.03 MG/1
25 TABLET ORAL
Qty: 90 TABLET | Refills: 0
Start: 2018-04-27 | End: 2018-04-30 | Stop reason: SDUPTHER

## 2018-04-30 DIAGNOSIS — F17.200 NICOTINE DEPENDENCE, UNCOMPLICATED: ICD-10-CM

## 2018-04-30 DIAGNOSIS — E78.2 MIXED HYPERLIPIDEMIA: ICD-10-CM

## 2018-04-30 DIAGNOSIS — I74.09 OTHER ARTERIAL EMBOLISM AND THROMBOSIS OF ABDOMINAL AORTA (HCC): ICD-10-CM

## 2018-04-30 DIAGNOSIS — I70.219 ATHEROSCLEROSIS OF NATIVE ARTERIES OF EXTREMITY WITH INTERMITTENT CLAUDICATION (HCC): ICD-10-CM

## 2018-04-30 DIAGNOSIS — I65.29 OCCLUSION AND STENOSIS OF UNSPECIFIED CAROTID ARTERY: ICD-10-CM

## 2018-04-30 DIAGNOSIS — I10 ESSENTIAL HYPERTENSION: ICD-10-CM

## 2018-05-04 DIAGNOSIS — E78.2 MIXED HYPERLIPIDEMIA: ICD-10-CM

## 2018-05-04 RX ORDER — FLUTICASONE PROPIONATE 50 MCG
1 SPRAY, SUSPENSION (ML) NASAL DAILY
Qty: 16 G | Refills: 0 | Status: SHIPPED | OUTPATIENT
Start: 2018-05-04 | End: 2018-09-04 | Stop reason: SDUPTHER

## 2018-05-04 RX ORDER — LEVOTHYROXINE SODIUM 0.03 MG/1
25 TABLET ORAL
Qty: 90 TABLET | Refills: 0
Start: 2018-05-04 | End: 2018-07-26 | Stop reason: SDUPTHER

## 2018-05-04 RX ORDER — CHLORTHALIDONE 25 MG/1
25 TABLET ORAL DAILY
Qty: 90 TABLET | Refills: 0
Start: 2018-05-04 | End: 2018-07-26 | Stop reason: SDUPTHER

## 2018-05-18 ENCOUNTER — OFFICE VISIT (OUTPATIENT)
Dept: CARDIOLOGY CLINIC | Facility: CLINIC | Age: 78
End: 2018-05-18
Payer: MEDICARE

## 2018-05-18 VITALS
RESPIRATION RATE: 16 BRPM | DIASTOLIC BLOOD PRESSURE: 72 MMHG | HEIGHT: 64 IN | BODY MASS INDEX: 24.72 KG/M2 | WEIGHT: 144.8 LBS | HEART RATE: 56 BPM | SYSTOLIC BLOOD PRESSURE: 132 MMHG

## 2018-05-18 DIAGNOSIS — E78.5 DYSLIPIDEMIA: ICD-10-CM

## 2018-05-18 DIAGNOSIS — I70.219 ATHEROSCLEROSIS OF ARTERY OF EXTREMITY WITH INTERMITTENT CLAUDICATION (HCC): ICD-10-CM

## 2018-05-18 DIAGNOSIS — F17.218 CIGARETTE NICOTINE DEPENDENCE WITH OTHER NICOTINE-INDUCED DISORDER: ICD-10-CM

## 2018-05-18 DIAGNOSIS — I35.0 NONRHEUMATIC AORTIC VALVE STENOSIS: ICD-10-CM

## 2018-05-18 DIAGNOSIS — I10 ESSENTIAL HYPERTENSION: Chronic | ICD-10-CM

## 2018-05-18 DIAGNOSIS — I65.21 STENOSIS OF RIGHT CAROTID ARTERY: Chronic | ICD-10-CM

## 2018-05-18 DIAGNOSIS — E03.9 ACQUIRED HYPOTHYROIDISM: ICD-10-CM

## 2018-05-18 DIAGNOSIS — I10 BENIGN ESSENTIAL HYPERTENSION: Primary | ICD-10-CM

## 2018-05-18 PROCEDURE — 99214 OFFICE O/P EST MOD 30 MIN: CPT | Performed by: INTERNAL MEDICINE

## 2018-05-18 PROCEDURE — 93000 ELECTROCARDIOGRAM COMPLETE: CPT | Performed by: INTERNAL MEDICINE

## 2018-05-18 NOTE — PROGRESS NOTES
Assessment/Plan     1  Benign essential hypertension    2  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 ([address] )    3  Nonrheumatic aortic valve stenosis    4  Stenosis of right carotid artery    5  Essential hypertension    6  Cigarette nicotine dependence with other nicotine-induced disorder    7  Acquired hypothyroidism    8  Dyslipidemia      Hypertension, normal blood pressure with treatment  Evidence of target organ damage: left ventricular hypertrophy  Medication: no change  Continue atenolol, chlorthalidone and lisinopril  Recent blood work reviewed  Normal renal function and sodium level  Dietary sodium restriction  Regular aerobic exercise  LDL < 70  Continue high intensity atorvastatin  ASA  Close followup with vascular surgery  Subjective     Jc Fiore is a 68 y o  female who presents for evaluation of elevated blood pressures  Age at onset of elevated blood pressure: 60  Cardiac symptoms: none  Patient denies: chest pain, dyspnea, orthopnea, palpitations, paroxysmal nocturnal dyspnea and syncope  Cardiovascular risk factors: advanced age (older than 54 for men, 72 for women), dyslipidemia, hypertension, sedentary lifestyle and smoking/ tobacco exposure  Use of agents associated with hypertension: none  History of target organ damage: left ventricular hypertrophy  The following portions of the patient's history were reviewed and updated as appropriate: She  has a past medical history of Aortic stenosis; Arthritis; Benign essential hypertension; CAD (coronary artery disease); Dizziness; Edema of leg; Hyperlipidemia; Hypertension; and Other disorder of circulatory system (CODE)  She  has a past surgical history that includes Hysterectomy; Breast surgery; Colonoscopy; and Incisional breast biopsy  Her family history includes Arthritis in her family; Coronary artery disease in her family; Hypertension in her father  She  reports that she has been smoking  She has been smoking about 1 00 pack per day  She has never used smokeless tobacco  She reports that she drinks alcohol  She reports that she does not use drugs  Current Outpatient Prescriptions   Medication Sig Dispense Refill    aspirin 81 MG tablet Take 2 tablets by mouth daily      atenolol (TENORMIN) 50 mg tablet Take 1 tablet (50 mg total) by mouth daily 90 tablet 0    atorvastatin (LIPITOR) 80 mg tablet Take 1 tablet (80 mg total) by mouth Medrol Dose Pack scheduling ONLY  0    chlorthalidone 25 mg tablet Take 1 tablet (25 mg total) by mouth daily 90 tablet 0    enalapril (VASOTEC) 20 mg tablet Take 1 tablet (20 mg total) by mouth daily 90 tablet 3    Flaxseed, Linseed, (FLAX SEED OIL PO) Take by mouth daily      fluticasone (FLONASE) 50 mcg/act nasal spray 1 spray into each nostril daily 16 g 0    levothyroxine 25 mcg tablet Take 1 tablet (25 mcg total) by mouth daily in the early morning 90 tablet 0    Potassium (POTASSIMIN PO) Take by mouth daily      Multiple Minerals (CALCIUM-MAGNESIUM-ZINC) TABS Take by mouth daily       No current facility-administered medications for this visit        Current Outpatient Prescriptions on File Prior to Visit   Medication Sig    aspirin 81 MG tablet Take 2 tablets by mouth daily    atenolol (TENORMIN) 50 mg tablet Take 1 tablet (50 mg total) by mouth daily    atorvastatin (LIPITOR) 80 mg tablet Take 1 tablet (80 mg total) by mouth Medrol Dose Pack scheduling ONLY    chlorthalidone 25 mg tablet Take 1 tablet (25 mg total) by mouth daily    enalapril (VASOTEC) 20 mg tablet Take 1 tablet (20 mg total) by mouth daily    fluticasone (FLONASE) 50 mcg/act nasal spray 1 spray into each nostril daily    levothyroxine 25 mcg tablet Take 1 tablet (25 mcg total) by mouth daily in the early morning    Potassium (POTASSIMIN PO) Take by mouth daily    Multiple Minerals (CALCIUM-MAGNESIUM-ZINC) TABS Take by mouth daily     No current facility-administered medications on file prior to visit  She has No Known Allergies       Review of Systems  Pertinent items are noted in HPI  Objective     /72   Pulse 56   Resp 16   Ht 5' 4" (1 626 m)   Wt 65 7 kg (144 lb 12 8 oz)   BMI 24 85 kg/m²    Physical Exam   Constitutional: She appears healthy  No distress  HENT:   Nose: Nose normal    Mouth/Throat: Oropharynx is clear  Neck: Neck supple  No JVD present  Cardiovascular: Normal rate and regular rhythm  Exam reveals no distant heart sounds and no friction rub  Murmur heard  Pulmonary/Chest: Effort normal and breath sounds normal  She has no wheezes  She has no rales  Abdominal: Soft  She exhibits no distension  There is no tenderness  Musculoskeletal: She exhibits no edema  Neurological: She is alert and oriented to person, place, and time  Skin: Skin is warm and dry  No rash noted  Cardiographics  ECG: sinus bradycardia, non-specific ST abnormalities      Recent blood work Results Reviewed

## 2018-05-30 ENCOUNTER — TELEPHONE (OUTPATIENT)
Dept: FAMILY MEDICINE CLINIC | Facility: CLINIC | Age: 78
End: 2018-05-30

## 2018-05-30 NOTE — TELEPHONE ENCOUNTER
Pt calling to ask if she should have a mammo done, got a letter from the hospital  Last mammo was 2015, has been gettting them since she was 21 yrs old  Will medicare pay for her to have one?

## 2018-07-02 DIAGNOSIS — I10 ESSENTIAL HYPERTENSION: ICD-10-CM

## 2018-07-09 RX ORDER — ENALAPRIL MALEATE 20 MG/1
20 TABLET ORAL DAILY
Qty: 90 TABLET | Refills: 0
Start: 2018-07-09 | End: 2018-07-19 | Stop reason: SDUPTHER

## 2018-07-11 ENCOUNTER — TELEPHONE (OUTPATIENT)
Dept: CARDIOLOGY CLINIC | Facility: CLINIC | Age: 78
End: 2018-07-11

## 2018-07-12 ENCOUNTER — OFFICE VISIT (OUTPATIENT)
Dept: PODIATRY | Facility: CLINIC | Age: 78
End: 2018-07-12
Payer: MEDICARE

## 2018-07-12 VITALS
RESPIRATION RATE: 16 BRPM | HEART RATE: 66 BPM | BODY MASS INDEX: 24.72 KG/M2 | HEIGHT: 64 IN | SYSTOLIC BLOOD PRESSURE: 134 MMHG | DIASTOLIC BLOOD PRESSURE: 88 MMHG | WEIGHT: 144.8 LBS

## 2018-07-12 DIAGNOSIS — M79.672 PAIN IN BOTH FEET: ICD-10-CM

## 2018-07-12 DIAGNOSIS — L03.039 PARONYCHIA OF TOENAIL, UNSPECIFIED LATERALITY: ICD-10-CM

## 2018-07-12 DIAGNOSIS — B35.1 ONYCHOMYCOSIS: ICD-10-CM

## 2018-07-12 DIAGNOSIS — M79.671 PAIN IN BOTH FEET: ICD-10-CM

## 2018-07-12 DIAGNOSIS — I70.219 ATHEROSCLEROSIS OF ARTERY OF EXTREMITY WITH INTERMITTENT CLAUDICATION (HCC): Primary | Chronic | ICD-10-CM

## 2018-07-12 PROCEDURE — 99213 OFFICE O/P EST LOW 20 MIN: CPT | Performed by: PODIATRIST

## 2018-07-12 NOTE — PROGRESS NOTES
Assessment/Plan:  Patient has pain upon ambulation  She has mycosis of nail  Paronychia  Dermatophytosis  Peripheral artery disease  Low back pain  Plan  Nails debrided  We will start topical antifungal   Patient will follow up with vascular surgery  Patient declines gabapentin  There are no diagnoses linked to this encounter  Subjective:  Patient has pain in her feet with ambulation  Patient has leg cramps  She has peripheral artery disease  She is concerned with thickened toenails  Patient ID: Juliana Samira is a 66 y o  female      Past Medical History:   Diagnosis Date    Aortic stenosis     Arthritis     Benign essential hypertension     CAD (coronary artery disease)     Dizziness     Edema of leg     Hyperlipidemia     Hypertension     Other disorder of circulatory system (CODE)        Past Surgical History:   Procedure Laterality Date    BREAST SURGERY      bxs,benign    COLONOSCOPY      HYSTERECTOMY      INCISIONAL BREAST BIOPSY      x5, 1964, 1965, 1985 and 1990       No Known Allergies      Current Outpatient Prescriptions:     aspirin 81 MG tablet, Take 2 tablets by mouth daily, Disp: , Rfl:     atenolol (TENORMIN) 50 mg tablet, Take 1 tablet (50 mg total) by mouth daily, Disp: 90 tablet, Rfl: 0    atorvastatin (LIPITOR) 80 mg tablet, Take 1 tablet (80 mg total) by mouth Medrol Dose Pack scheduling ONLY, Disp: , Rfl: 0    chlorthalidone 25 mg tablet, Take 1 tablet (25 mg total) by mouth daily, Disp: 90 tablet, Rfl: 0    enalapril (VASOTEC) 20 mg tablet, Take 1 tablet (20 mg total) by mouth daily, Disp: 90 tablet, Rfl: 0    Flaxseed, Linseed, (FLAX SEED OIL PO), Take by mouth daily, Disp: , Rfl:     fluticasone (FLONASE) 50 mcg/act nasal spray, 1 spray into each nostril daily, Disp: 16 g, Rfl: 0    levothyroxine 25 mcg tablet, Take 1 tablet (25 mcg total) by mouth daily in the early morning, Disp: 90 tablet, Rfl: 0    Multiple Minerals (CALCIUM-MAGNESIUM-ZINC) TABS, Take by mouth daily, Disp: , Rfl:     Potassium (POTASSIMIN PO), Take by mouth daily, Disp: , Rfl:     Patient Active Problem List   Diagnosis    Aortoiliac occlusive disease ([facility] [address] )    Carotid artery stenosis    Chronic Hypertension    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 (HCC)    Hypothyroidism    Nicotine dependence    Subclavian artery stenosis, left (HCC)    Aortic stenosis    Atherosclerosis 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)    Benign essential hypertension    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HCC)    Dyslipidemia       HPI    The following portions of the patient's history were reviewed and updated as appropriate: allergies, current medications, past family history, past medical history, past social history, past surgical history and problem list     Review of Systems      Historical Information   Past Medical History:   Diagnosis Date    Aortic stenosis     Arthritis     Benign essential hypertension     CAD (coronary artery disease)     Dizziness     Edema of leg     Hyperlipidemia     Hypertension     Other disorder of circulatory system (CODE)      Past Surgical History:   Procedure Laterality Date    BREAST SURGERY      bxs,benign    COLONOSCOPY      HYSTERECTOMY      INCISIONAL BREAST BIOPSY      x5, 1964, 1965, 1985 and 1990     Social History   History   Alcohol Use    Yes     Comment: manhattans 2-3 every day for 50 years     History   Drug Use No     Family History:   Family History   Problem Relation Age of Onset    Hypertension Father     Coronary artery disease Family     Arthritis Family        Meds/Allergies   No Known Allergies    Current Outpatient Prescriptions on File Prior to Visit   Medication Sig Dispense Refill    aspirin 81 MG tablet Take 2 tablets by mouth daily      atenolol (TENORMIN) 50 mg tablet Take 1 tablet (50 mg total) by mouth daily 90 tablet 0    atorvastatin (LIPITOR) 80 mg tablet Take 1 tablet (80 mg total) by mouth Medrol Dose Pack scheduling ONLY  0    chlorthalidone 25 mg tablet Take 1 tablet (25 mg total) by mouth daily 90 tablet 0    enalapril (VASOTEC) 20 mg tablet Take 1 tablet (20 mg total) by mouth daily 90 tablet 0    Flaxseed, Linseed, (FLAX SEED OIL PO) Take by mouth daily      fluticasone (FLONASE) 50 mcg/act nasal spray 1 spray into each nostril daily 16 g 0    levothyroxine 25 mcg tablet Take 1 tablet (25 mcg total) by mouth daily in the early morning 90 tablet 0    Multiple Minerals (CALCIUM-MAGNESIUM-ZINC) TABS Take by mouth daily      Potassium (POTASSIMIN PO) Take by mouth daily       No current facility-administered medications on file prior to visit  Objective:      Foot Exam    General  General Appearance: appears stated age and healthy   Orientation: alert and oriented to person, place, and time   Affect: appropriate   Gait: antalgic       Right Foot/Ankle     Inspection and Palpation  Tenderness: metatarsals   Swelling: metatarsals   Arch: pes planus  Hammertoes: second toe and fifth toe  Skin Exam: callus and dry skin;     Neurovascular  Dorsalis pedis: absent  Posterior tibial: 1+  Saphenous nerve sensation: diminished  Tibial nerve sensation: diminished  Superficial peroneal nerve sensation: diminished  Deep peroneal nerve sensation: diminished  Sural nerve sensation: diminished  Achilles reflex: 1+    Muscle Strength  Ankle dorsiflexion: 4+      Left Foot/Ankle      Inspection and Palpation  Tenderness: metatarsals   Swelling: metatarsals   Arch: pes planus  Hammertoes: second toe and fifth toe  Skin Exam: callus and dry skin;     Neurovascular  Dorsalis pedis: 1+  Posterior tibial: 1+  Saphenous nerve sensation: diminished  Tibial nerve sensation: diminished  Superficial peroneal nerve sensation: diminished  Deep peroneal nerve sensation: diminished  Sural nerve sensation: diminished  Achilles reflex: 1+    Muscle Strength  Ankle dorsiflexion: 4+        Physical Exam   Constitutional: She appears well-developed and well-nourished  Cardiovascular: Normal rate and regular rhythm  Pulses:       Dorsalis pedis pulses are Absent on the right side, and 1+ on the left side  Posterior tibial pulses are 1+ on the right side, and 1+ on the left side  Rubor of feet noted  Q 9 findings bilateral    Feet:   Right Foot:   Skin Integrity: Positive for callus and dry skin  Left Foot:   Skin Integrity: Positive for callus and dry skin  Neurological:   Reflex Scores:       Achilles reflexes are 1+ on the right side and 1+ on the left side  Skin:   Tyloma  noted heel bilateral     Severe profound mycotic toenail    Onychogryphosis hallux bilateral   Secondary paronychia

## 2018-07-18 NOTE — TELEPHONE ENCOUNTER
Dr Milo Steiner pt  Questioning if he participated in [address] - received her answer from [facility]

## 2018-07-19 DIAGNOSIS — I10 ESSENTIAL HYPERTENSION: ICD-10-CM

## 2018-07-19 RX ORDER — ENALAPRIL MALEATE 20 MG/1
20 TABLET ORAL DAILY
Qty: 90 TABLET | Refills: 0
Start: 2018-07-19 | End: 2018-10-12 | Stop reason: SDUPTHER

## 2018-07-19 RX ORDER — ATENOLOL 50 MG/1
50 TABLET ORAL DAILY
Qty: 90 TABLET | Refills: 0
Start: 2018-07-19 | End: 2019-04-15 | Stop reason: SDUPTHER

## 2018-07-24 ENCOUNTER — TELEPHONE (OUTPATIENT)
Dept: FAMILY MEDICINE CLINIC | Facility: CLINIC | Age: 78
End: 2018-07-24

## 2018-07-24 NOTE — TELEPHONE ENCOUNTER
PT CALLED RE ENALAPRIL AND ATENOLOL  IT LOOKS LIKE DR LI ORDERED THESE BUT DID NOT SEND THEM  CAN SOMEONE CALL THEM IN TO [facility]?

## 2018-07-26 DIAGNOSIS — I10 ESSENTIAL HYPERTENSION: ICD-10-CM

## 2018-07-26 DIAGNOSIS — E78.2 MIXED HYPERLIPIDEMIA: ICD-10-CM

## 2018-07-26 RX ORDER — CHLORTHALIDONE 25 MG/1
25 TABLET ORAL DAILY
Qty: 90 TABLET | Refills: 0
Start: 2018-07-26 | End: 2019-01-16 | Stop reason: SDUPTHER

## 2018-07-26 RX ORDER — ATORVASTATIN CALCIUM 80 MG/1
80 TABLET, FILM COATED ORAL
Refills: 0
Start: 2018-07-26 | End: 2019-05-21 | Stop reason: SDUPTHER

## 2018-07-26 RX ORDER — LEVOTHYROXINE SODIUM 0.03 MG/1
25 TABLET ORAL
Qty: 90 TABLET | Refills: 0
Start: 2018-07-26 | End: 2019-01-16 | Stop reason: SDUPTHER

## 2018-09-04 DIAGNOSIS — E78.2 MIXED HYPERLIPIDEMIA: ICD-10-CM

## 2018-09-04 DIAGNOSIS — J41.0 SIMPLE CHRONIC BRONCHITIS (HCC): Primary | ICD-10-CM

## 2018-09-17 RX ORDER — ALBUTEROL SULFATE 90 UG/1
2 AEROSOL, METERED RESPIRATORY (INHALATION) EVERY 6 HOURS PRN
Qty: 18 G | Refills: 2 | Status: SHIPPED | OUTPATIENT
Start: 2018-09-17 | End: 2020-07-23 | Stop reason: SDUPTHER

## 2018-09-17 RX ORDER — FLUTICASONE PROPIONATE 50 MCG
1 SPRAY, SUSPENSION (ML) NASAL DAILY
Qty: 16 G | Refills: 2 | Status: SHIPPED | OUTPATIENT
Start: 2018-09-17 | End: 2019-01-03 | Stop reason: SDUPTHER

## 2018-09-26 DIAGNOSIS — I70.0 ATHEROSCLEROSIS OF AORTA (HCC): ICD-10-CM

## 2018-09-26 DIAGNOSIS — I65.23 BILATERAL CAROTID ARTERY STENOSIS: Primary | ICD-10-CM

## 2018-10-04 ENCOUNTER — OFFICE VISIT (OUTPATIENT)
Dept: FAMILY MEDICINE CLINIC | Facility: CLINIC | Age: 78
End: 2018-10-04
Payer: MEDICARE

## 2018-10-04 VITALS
BODY MASS INDEX: 24.55 KG/M2 | HEART RATE: 90 BPM | DIASTOLIC BLOOD PRESSURE: 68 MMHG | RESPIRATION RATE: 18 BRPM | TEMPERATURE: 98.1 F | OXYGEN SATURATION: 98 % | WEIGHT: 143 LBS | SYSTOLIC BLOOD PRESSURE: 108 MMHG

## 2018-10-04 DIAGNOSIS — J06.9 VIRAL URI WITH COUGH: ICD-10-CM

## 2018-10-04 DIAGNOSIS — J06.9 VIRAL UPPER RESPIRATORY ILLNESS: Primary | ICD-10-CM

## 2018-10-04 PROCEDURE — 99213 OFFICE O/P EST LOW 20 MIN: CPT | Performed by: FAMILY MEDICINE

## 2018-10-04 RX ORDER — BENZONATATE 100 MG/1
100 CAPSULE ORAL 3 TIMES DAILY PRN
Qty: 20 CAPSULE | Refills: 0 | Status: SHIPPED | OUTPATIENT
Start: 2018-10-04 | End: 2018-10-17 | Stop reason: SDUPTHER

## 2018-10-06 NOTE — PROGRESS NOTES
Assessment/Plan:    Viral URI with cough  Patient is afebrile, and lungs are clear to auscultation  This is likely secondary to viral upper respiratory infection that will take longer than expected to resolve due to extensive smoking  Patient was counseled on importance of smoking cessation, and utilizing vitamin-C supplementation to help overcome viral illness  I also recommended supportive care including  adequate hydration, utilizing Ventolin albuterol inhaler as needed for shortness of breath, benzonatate Perles as needed for cough, and Nasonex spray for congestion and ear pain  I do not recommend an antibiotic at this time, as this illnesses likely secondary to viral infection, and patient remains afebrile with no evidence of pneumonia on physical exam   I advised patient to contact me if she develops a fever, shortness of breath, or difficulty breathing  Subjective:      Patient ID: Sana Sosa is a 66 y o  female  Renetta Braxton is a 44-year-old female with extensive smoking history who presents with 2 week history of dry cough, nasal congestion, rhinorrhea, headache, ear pain  She also developed diarrhea today  She denies any fevers, shortness of breath, or wheezing  Cough is described as dry in nature, nagging, and without sputum production  She reports multiple sick contacts during vacation in [place] 3 weeks prior  Although she developed diarrhea today, she denies any nausea, and is tolerating oral intake  She denies any lightheadedness, dizziness, chest pain, shortness of breath, wheezing  She does continue to smoke 1 pack a day, and is unclear if she is ready to quit at this time  She has not needed her Ventolin inhaler during this time          The following portions of the patient's history were reviewed and updated as appropriate: allergies, current medications, past family history, past medical history, past social history, past surgical history and problem list     Review of Systems   Constitutional: Negative for chills, fatigue and fever  HENT: Positive for congestion, ear pain and rhinorrhea  Negative for sore throat  Eyes: Negative for visual disturbance  Respiratory: Positive for cough  Negative for shortness of breath and wheezing  Cardiovascular: Negative for chest pain and palpitations  Gastrointestinal: Positive for diarrhea  Negative for abdominal pain, nausea and vomiting  Genitourinary: Negative for dysuria  Musculoskeletal: Negative for arthralgias  Neurological: Negative for dizziness, weakness and light-headedness  Psychiatric/Behavioral: Negative for suicidal ideas  Objective:      /68   Pulse 90   Temp 98 1 °F (36 7 °C)   Resp 18   Wt 64 9 kg (143 lb)   SpO2 98%   BMI 24 55 kg/m²          Physical Exam   Constitutional: She is oriented to person, place, and time  She appears well-developed and well-nourished  No distress  HENT:   Head: Normocephalic and atraumatic  Nose: Nose normal    Mouth/Throat: Oropharynx is clear and moist  No oropharyngeal exudate  Bilateral middle ear effusions, no erythema, no pus behind tympanic membrane   Eyes: Pupils are equal, round, and reactive to light  Conjunctivae and EOM are normal  Right eye exhibits no discharge  Left eye exhibits no discharge  No scleral icterus  Neck: Normal range of motion  Neck supple  No JVD present  No thyromegaly present  Cardiovascular: Normal rate, regular rhythm, normal heart sounds and intact distal pulses  Exam reveals no gallop and no friction rub  No murmur heard  Pulmonary/Chest: Effort normal and breath sounds normal  No respiratory distress  She has no wheezes  She has no rales  Abdominal: Soft  Bowel sounds are normal  She exhibits no distension  There is no tenderness  There is no rebound and no guarding  Musculoskeletal: She exhibits no edema  Lymphadenopathy:     She has no cervical adenopathy     Neurological: She is alert and oriented to person, place, and time  Skin: Skin is warm and dry  No rash noted  She is not diaphoretic  No pallor  Psychiatric: She has a normal mood and affect   Her behavior is normal

## 2018-10-06 NOTE — ASSESSMENT & PLAN NOTE
Patient is afebrile, and lungs are clear to auscultation  This is likely secondary to viral upper respiratory infection that will take longer than expected to resolve due to extensive smoking  Patient was counseled on importance of smoking cessation, and utilizing vitamin-C supplementation to help overcome viral illness  I also recommended supportive care including  adequate hydration, utilizing Ventolin albuterol inhaler as needed for shortness of breath, benzonatate Perles as needed for cough, and Nasonex spray for congestion and ear pain  I do not recommend an antibiotic at this time, as this illnesses likely secondary to viral infection, and patient remains afebrile with no evidence of pneumonia on physical exam   I advised patient to contact me if she develops a fever, shortness of breath, or difficulty breathing

## 2018-10-09 ENCOUNTER — TELEPHONE (OUTPATIENT)
Dept: FAMILY MEDICINE CLINIC | Facility: CLINIC | Age: 78
End: 2018-10-09

## 2018-10-09 NOTE — TELEPHONE ENCOUNTER
PT CALLED ,HAS BEEN SICK FOR 3 WEEKS  HAS A COUGH AND IS FEELING WEAK,ALSO HAS DIARRHEA WAS HERE LAST WEEK

## 2018-10-09 NOTE — TELEPHONE ENCOUNTER
Spoke with patient who was seen last week DX Viral URI  Per MD note he did advise her due to history of smoking may take weeks for cough to quite I reminded her of this and told her it can take up to 4-8 weeks sometimes  Patient stated not getting worse , no fever or SOB  Advised to continue her inhalers, cough medicine, humidifier,& warm fluids  and to call if any questions or new/worsing symptoms   Patient agreed

## 2018-10-12 DIAGNOSIS — I10 ESSENTIAL HYPERTENSION: ICD-10-CM

## 2018-10-16 RX ORDER — ENALAPRIL MALEATE 20 MG/1
20 TABLET ORAL DAILY
Qty: 90 TABLET | Refills: 0
Start: 2018-10-16 | End: 2018-10-17 | Stop reason: SDUPTHER

## 2018-10-17 DIAGNOSIS — J06.9 VIRAL UPPER RESPIRATORY ILLNESS: ICD-10-CM

## 2018-10-17 DIAGNOSIS — I10 ESSENTIAL HYPERTENSION: ICD-10-CM

## 2018-10-17 NOTE — TELEPHONE ENCOUNTER
DR Will Uribe - PT IS REQUESTING REFILL FOR BENZONATATE  SHE WOULD LIKE THIS TODAY  PT ALSO NEEDS REFILL ENALAPRIL

## 2018-10-18 RX ORDER — ENALAPRIL MALEATE 20 MG/1
20 TABLET ORAL DAILY
Qty: 90 TABLET | Refills: 0 | Status: SHIPPED | OUTPATIENT
Start: 2018-10-18 | End: 2019-01-16 | Stop reason: SDUPTHER

## 2018-10-18 RX ORDER — BENZONATATE 100 MG/1
100 CAPSULE ORAL 3 TIMES DAILY PRN
Qty: 20 CAPSULE | Refills: 0 | Status: SHIPPED | OUTPATIENT
Start: 2018-10-18 | End: 2019-03-05 | Stop reason: ALTCHOICE

## 2018-10-24 ENCOUNTER — HOSPITAL ENCOUNTER (OUTPATIENT)
Dept: RADIOLOGY | Facility: HOSPITAL | Age: 78
Discharge: HOME | End: 2018-10-24
Attending: SURGERY
Payer: MEDICARE

## 2018-10-24 DIAGNOSIS — I77.1 SUBCLAVIAN ARTERIAL STENOSIS (HCC): Chronic | ICD-10-CM

## 2018-10-24 DIAGNOSIS — I65.21 STENOSIS OF RIGHT CAROTID ARTERY: Chronic | ICD-10-CM

## 2018-10-24 DIAGNOSIS — I74.09 AORTOILIAC OCCLUSIVE DISEASE (HCC): Chronic | ICD-10-CM

## 2018-10-24 DIAGNOSIS — I70.219 ATHEROSCLEROSIS OF ARTERY OF EXTREMITY WITH INTERMITTENT CLAUDICATION (HCC): Chronic | ICD-10-CM

## 2018-10-24 PROCEDURE — 93880 EXTRACRANIAL BILAT STUDY: CPT | Performed by: SURGERY

## 2018-10-24 PROCEDURE — 93923 UPR/LXTR ART STDY 3+ LVLS: CPT

## 2018-10-24 PROCEDURE — 93880 EXTRACRANIAL BILAT STUDY: CPT

## 2018-10-24 PROCEDURE — 93978 VASCULAR STUDY: CPT

## 2018-10-24 PROCEDURE — 93925 LOWER EXTREMITY STUDY: CPT

## 2018-10-24 PROCEDURE — 93978 VASCULAR STUDY: CPT | Performed by: SURGERY

## 2018-10-25 PROCEDURE — 93922 UPR/L XTREMITY ART 2 LEVELS: CPT | Performed by: SURGERY

## 2018-10-25 PROCEDURE — 93925 LOWER EXTREMITY STUDY: CPT | Performed by: SURGERY

## 2018-11-26 ENCOUNTER — TELEPHONE (OUTPATIENT)
Dept: VASCULAR SURGERY | Facility: CLINIC | Age: 78
End: 2018-11-26

## 2018-11-26 NOTE — TELEPHONE ENCOUNTER
Spoke with patient   She has had no change in symptoms and she knows to use good foot care and call if any changes in pain or ulcers on her feet  She will stay in doppler recall and have an [facility] visit

## 2018-11-26 NOTE — TELEPHONE ENCOUNTER
----- Message from Jose L Ortega PA-C sent at 11/21/2018  3:39 PM EST -----  Madilyn Boast,   Dr Ashu Ralph had recommended 6 mo surveillance and OV per his note  As long as she is doing all right, she would have to come in but it would be a good idea to be checked annually in the office for [facility]          ----- Message -----  From: Hamlet Alonso RN  Sent: 11/5/2018  11:48 AM  To: Jose L Ortega PA-C    Patient had Tran Rater and ao/iliac and received letters saying everything stayed same  She is asking why she has to come in for appointment with you

## 2018-11-29 ENCOUNTER — OFFICE VISIT (OUTPATIENT)
Dept: FAMILY MEDICINE CLINIC | Facility: CLINIC | Age: 78
End: 2018-11-29
Payer: MEDICARE

## 2018-11-29 VITALS
OXYGEN SATURATION: 99 % | HEART RATE: 98 BPM | DIASTOLIC BLOOD PRESSURE: 82 MMHG | TEMPERATURE: 98 F | SYSTOLIC BLOOD PRESSURE: 152 MMHG | WEIGHT: 139 LBS | BODY MASS INDEX: 23.86 KG/M2 | RESPIRATION RATE: 16 BRPM

## 2018-11-29 DIAGNOSIS — Z00.00 MEDICARE ANNUAL WELLNESS VISIT, INITIAL: Primary | ICD-10-CM

## 2018-11-29 DIAGNOSIS — Z23 NEED FOR INFLUENZA VACCINATION: ICD-10-CM

## 2018-11-29 PROCEDURE — G0008 ADMIN INFLUENZA VIRUS VAC: HCPCS | Performed by: FAMILY MEDICINE

## 2018-11-29 PROCEDURE — 90662 IIV NO PRSV INCREASED AG IM: CPT | Performed by: FAMILY MEDICINE

## 2018-11-29 PROCEDURE — G0439 PPPS, SUBSEQ VISIT: HCPCS | Performed by: FAMILY MEDICINE

## 2018-11-29 NOTE — PROGRESS NOTES
Silvia Connell is here for her Subsequent Wellness visit  Last Medicare Wellness visit information reviewed, patient interviewed and updates made to the record today  Health Risk Assessment:  Patient rates overall health as fair  Patient feels that their physical health rating is Same  Eyesight was rated as Same  Hearing was rated as Slightly worse  Patient feels that their emotional and mental health rating is Slightly worse  Pain experienced by patient in the last 7 days has been Some  Patient's pain rating has been 7/10  Emotional/Mental Health:  Patient has been feeling nervous/anxious  PHQ-9 Depression Screening:    Frequency of the following problems over the past two weeks:      1  Little interest or pleasure in doing things: 1 - several days      2  Feeling down, depressed, or hopeless: 1 - several days  PHQ-2 Score: 2          Broken Bones/Falls: Fall Risk Assessment:    In the past year, patient has experienced: History of falling in past year     Number of falls: 2 or more  Patient feels unsteady standing  Patient often has a need to rush to the toilet  Bladder/Bowel:  Patient has leaked urine accidently in the last six months  Patient reports loss of bowel control  Immunizations:  Patient has had a flu vaccination within the last year  Patient has received a pneumonia shot  Patient has received a shingles shot  Patient has received tetanus/diphtheria shot  Date of tetanus/diphtheria shot: 12/22/2014    Home Safety:  Patient has trouble with stairs inside or outside of their home     Patient currently reports that there are no safety hazards present in home, working smoke alarms,     Preventative Screenings:   No breast cancer screening performed, no colon cancer screen completed, cholesterol screen completed, 4/17/2018  glaucoma eye exam completed, 11/30/2015      Nutrition:  Current diet: Regular and Frequent junk food with servings of the following:    Medications:  Patient is not currently taking any over-the-counter supplements  Patient is able to manage medications  Lifestyle Choices:  Patient reports current tobacco use  Patient reports alcohol use  Patient does not drive a vehicle    Current level of exercise of physical activity described by patient as: minimal         Activities of Daily Living:  Can get out of bed by his or her self, able to dress self, able to make own meals, able to do own shopping, able to bathe self, can do own laundry/housekeeping, can manage own money, pay bills and track expenses        Of note the falls that she described her last year and they were all on the ice

## 2018-12-24 ENCOUNTER — HOSPITAL ENCOUNTER (EMERGENCY)
Facility: HOSPITAL | Age: 78
Discharge: HOME | End: 2018-12-24
Attending: EMERGENCY MEDICINE | Admitting: EMERGENCY MEDICINE
Payer: MEDICARE

## 2018-12-24 ENCOUNTER — APPOINTMENT (EMERGENCY)
Dept: RADIOLOGY | Facility: HOSPITAL | Age: 78
End: 2018-12-24
Payer: MEDICARE

## 2018-12-24 VITALS
RESPIRATION RATE: 18 BRPM | HEART RATE: 61 BPM | DIASTOLIC BLOOD PRESSURE: 84 MMHG | OXYGEN SATURATION: 100 % | SYSTOLIC BLOOD PRESSURE: 206 MMHG | TEMPERATURE: 97.3 F
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
## 2018-12-24 DIAGNOSIS — R07.81 RIB PAIN: Primary | ICD-10-CM

## 2018-12-24 LAB
ANION GAP SERPL CALCULATED.3IONS-SCNC: 14 MMOL/L (ref 4–13)
ATRIAL RATE: 61 BPM
BASOPHILS # BLD AUTO: 0.05 THOUSANDS/ΜL (ref 0–0.1)
BASOPHILS NFR BLD AUTO: 1 % (ref 0–1)
BILIRUB UR QL STRIP: NEGATIVE
BUN SERPL-MCNC: 30 MG/DL (ref 5–25)
CALCIUM SERPL-MCNC: 9.1 MG/DL (ref 8.3–10.1)
CHLORIDE SERPL-SCNC: 99 MMOL/L (ref 100–108)
CLARITY UR: CLEAR
CO2 SERPL-SCNC: 22 MMOL/L (ref 21–32)
COLOR UR: NORMAL
CREAT SERPL-MCNC: 1.34 MG/DL (ref 0.6–1.3)
EOSINOPHIL # BLD AUTO: 0.3 THOUSAND/ΜL (ref 0–0.61)
EOSINOPHIL NFR BLD AUTO: 3 % (ref 0–6)
ERYTHROCYTE [DISTWIDTH] IN BLOOD BY AUTOMATED COUNT: 15.1 % (ref 11.6–15.1)
GFR SERPL CREATININE-BSD FRML MDRD: 38 ML/MIN/1.73SQ M
GLUCOSE SERPL-MCNC: 79 MG/DL (ref 65–140)
GLUCOSE SERPL-MCNC: 86 MG/DL (ref 65–140)
GLUCOSE UR STRIP-MCNC: NEGATIVE MG/DL
HCT VFR BLD AUTO: 36.6 % (ref 34.8–46.1)
HGB BLD-MCNC: 12.1 G/DL (ref 11.5–15.4)
HGB UR QL STRIP.AUTO: NEGATIVE
IMM GRANULOCYTES # BLD AUTO: 0.04 THOUSAND/UL (ref 0–0.2)
IMM GRANULOCYTES NFR BLD AUTO: 0 % (ref 0–2)
KETONES UR STRIP-MCNC: NEGATIVE MG/DL
LEUKOCYTE ESTERASE UR QL STRIP: NEGATIVE
LYMPHOCYTES # BLD AUTO: 1.87 THOUSANDS/ΜL (ref 0.6–4.47)
LYMPHOCYTES NFR BLD AUTO: 21 % (ref 14–44)
MCH RBC QN AUTO: 34.2 PG (ref 26.8–34.3)
MCHC RBC AUTO-ENTMCNC: 33.1 G/DL (ref 31.4–37.4)
MCV RBC AUTO: 103 FL (ref 82–98)
MONOCYTES # BLD AUTO: 1.29 THOUSAND/ΜL (ref 0.17–1.22)
MONOCYTES NFR BLD AUTO: 14 % (ref 4–12)
NEUTROPHILS # BLD AUTO: 5.56 THOUSANDS/ΜL (ref 1.85–7.62)
NEUTS SEG NFR BLD AUTO: 61 % (ref 43–75)
NITRITE UR QL STRIP: NEGATIVE
NRBC BLD AUTO-RTO: 0 /100 WBCS
P AXIS: 75 DEGREES
PH UR STRIP.AUTO: 6 [PH] (ref 5–9)
PLATELET # BLD AUTO: 176 THOUSANDS/UL (ref 149–390)
PMV BLD AUTO: 11.8 FL (ref 8.9–12.7)
POTASSIUM SERPL-SCNC: 3.8 MMOL/L (ref 3.5–5.3)
PR INTERVAL: 186 MS
PROT UR STRIP-MCNC: NEGATIVE MG/DL
QRS AXIS: 62 DEGREES
QRSD INTERVAL: 92 MS
QT INTERVAL: 416 MS
QTC INTERVAL: 418 MS
RBC # BLD AUTO: 3.54 MILLION/UL (ref 3.81–5.12)
SODIUM SERPL-SCNC: 135 MMOL/L (ref 136–145)
SP GR UR STRIP.AUTO: <=1.005 (ref 1–1.03)
T WAVE AXIS: 54 DEGREES
TROPONIN I SERPL-MCNC: <0.02 NG/ML
UROBILINOGEN UR QL STRIP.AUTO: 0.2 E.U./DL
VENTRICULAR RATE: 61 BPM
WBC # BLD AUTO: 9.11 THOUSAND/UL (ref 4.31–10.16)

## 2018-12-24 PROCEDURE — 72125 CT NECK SPINE W/O DYE: CPT

## 2018-12-24 PROCEDURE — 93010 ELECTROCARDIOGRAM REPORT: CPT | Performed by: INTERNAL MEDICINE

## 2018-12-24 PROCEDURE — 84484 ASSAY OF TROPONIN QUANT: CPT | Performed by: PHYSICIAN ASSISTANT

## 2018-12-24 PROCEDURE — 85025 COMPLETE CBC W/AUTO DIFF WBC: CPT | Performed by: PHYSICIAN ASSISTANT

## 2018-12-24 PROCEDURE — 81003 URINALYSIS AUTO W/O SCOPE: CPT | Performed by: PHYSICIAN ASSISTANT

## 2018-12-24 PROCEDURE — 93005 ELECTROCARDIOGRAM TRACING: CPT

## 2018-12-24 PROCEDURE — 82948 REAGENT STRIP/BLOOD GLUCOSE: CPT

## 2018-12-24 PROCEDURE — 36415 COLL VENOUS BLD VENIPUNCTURE: CPT | Performed by: PHYSICIAN ASSISTANT

## 2018-12-24 PROCEDURE — 71101 X-RAY EXAM UNILAT RIBS/CHEST: CPT

## 2018-12-24 PROCEDURE — 80048 BASIC METABOLIC PNL TOTAL CA: CPT | Performed by: PHYSICIAN ASSISTANT

## 2018-12-24 PROCEDURE — 99284 EMERGENCY DEPT VISIT MOD MDM: CPT

## 2018-12-24 PROCEDURE — 70450 CT HEAD/BRAIN W/O DYE: CPT

## 2018-12-24 NOTE — ED PROVIDER NOTES
History  Chief Complaint   Patient presents with    Dizziness      pt presents to the ed with headache post fall  pt states she fell lastnight with unkown loss of loc  pt states she has chronic dizziness and that usually subsides but is now constant      72-year-old female hx HTN, dizziness presents with headache x1 day  She states she has a history of dizziness, she has meclizine at home she takes when she feels dizzy but states she was not diagnosed vertigo  Last night she felt dizzy, lost her balance and fell on her side and hit the back of her head  She does take aspirin daily  She did not lose consciousness  She was able to get up after the fall without difficulty  She has bruising to the right side of her ribs and breast   At this time she does not feel dizzy  She has a mild headache at the back of her head  She denies any fever, chills, chest pain, difficulty breathing, shortness of breath, abdominal pain, nausea, vomiting, diarrhea, constipation, blurry vision, double vision, numbness, tingling  Prior to Admission Medications   Prescriptions Last Dose Informant Patient Reported? Taking?    Flaxseed, Linseed, (FLAX SEED OIL PO)  Self Yes No   Sig: Take by mouth daily   Multiple Minerals (CALCIUM-MAGNESIUM-ZINC) TABS  Self Yes No   Sig: Take by mouth daily   Potassium (POTASSIMIN PO)  Self Yes No   Sig: Take by mouth daily   albuterol (VENTOLIN HFA) 90 mcg/act inhaler   No No   Sig: Inhale 2 puffs every 6 (six) hours as needed for wheezing   aspirin 81 MG tablet  Self Yes No   Sig: Take 2 tablets by mouth daily   atenolol (TENORMIN) 50 mg tablet   No No   Sig: Take 1 tablet (50 mg total) by mouth daily   atorvastatin (LIPITOR) 80 mg tablet   No No   Sig: Take 1 tablet (80 mg total) by mouth Medrol Dose Pack scheduling ONLY   benzonatate (TESSALON PERLES) 100 mg capsule   No No   Sig: Take 1 capsule (100 mg total) by mouth 3 (three) times a day as needed for cough   chlorthalidone 25 mg tablet   No No   Sig: Take 1 tablet (25 mg total) by mouth daily   ciclopirox (LOPROX) 0 77 % cream   No No   Sig: Apply topically 2 (two) times a day for 20 days   enalapril (VASOTEC) 20 mg tablet   No No   Sig: Take 1 tablet (20 mg total) by mouth daily   fluticasone (FLONASE) 50 mcg/act nasal spray   No No   Si spray into each nostril daily   levothyroxine 25 mcg tablet   No No   Sig: Take 1 tablet (25 mcg total) by mouth daily in the early morning      Facility-Administered Medications: None       Past Medical History:   Diagnosis Date    Aortic stenosis     Arthritis     Benign essential hypertension     CAD (coronary artery disease)     Dizziness     Edema of leg     Hyperlipidemia     Hypertension     Other disorder of circulatory system (CODE)        Past Surgical History:   Procedure Laterality Date    BREAST SURGERY      bxs,benign    COLONOSCOPY      HYSTERECTOMY      INCISIONAL BREAST BIOPSY      x5, 1964, 1965,  and        Family History   Problem Relation Age of Onset    Hypertension Father     Coronary artery disease Family     Arthritis Family      I have reviewed and agree with the history as documented  Social History   Substance Use Topics    Smoking status: Current Every Day Smoker     Packs/day: 1 00    Smokeless tobacco: Never Used    Alcohol use Yes      Comment: manhattans 2-3 every day for 50 years        Review of Systems   Constitutional: Negative for chills and fever  HENT: Negative for sneezing and sore throat  Eyes: Negative for photophobia and visual disturbance  Respiratory: Negative for cough and shortness of breath  Cardiovascular: Negative for chest pain, palpitations and leg swelling  Gastrointestinal: Negative for abdominal pain, constipation, diarrhea, nausea and vomiting  Musculoskeletal: Positive for myalgias  Negative for back pain, gait problem and joint swelling  Skin: Negative for color change, pallor, rash and wound     Neurological: Positive for headaches  Negative for dizziness, syncope, weakness, light-headedness and numbness  All other systems reviewed and are negative  Physical Exam  Physical Exam   Constitutional: She is oriented to person, place, and time  She appears well-developed and well-nourished  No distress  HENT:   Head: Normocephalic and atraumatic  Nose: Nose normal    Eyes: Pupils are equal, round, and reactive to light  Conjunctivae and EOM are normal  Right eye exhibits no discharge  Left eye exhibits no discharge  No scleral icterus  Neck: Normal range of motion  Neck supple  Cardiovascular: Normal rate, regular rhythm, normal heart sounds and intact distal pulses  Exam reveals no gallop and no friction rub  No murmur heard  Pulmonary/Chest: Effort normal and breath sounds normal  No respiratory distress  She has no wheezes  She has no rales  Chest wall is not dull to percussion  She exhibits bony tenderness  She exhibits no mass, no laceration, no crepitus, no edema, no deformity, no swelling and no retraction  Sp02 is 100% indicating adequate oxygenation on room air   Musculoskeletal:   No midline tenderness at cervical, thoracic, or lumbar spine  No paravertebral tenderness  No step offs  No bruising on back  Lymphadenopathy:     She has no cervical adenopathy  Neurological: She is alert and oriented to person, place, and time  She has normal strength  She displays no atrophy and no tremor  No cranial nerve deficit or sensory deficit  She exhibits normal muscle tone  She displays a negative Romberg sign  Coordination and gait normal    No signs of ataxia  Good finger to nose, heel to shin, rapid alternating movements  Skin: Skin is warm and dry  Capillary refill takes less than 2 seconds  No rash noted  She is not diaphoretic  No erythema  No pallor  Nursing note and vitals reviewed        Vital Signs  ED Triage Vitals   Temperature Pulse Respirations Blood Pressure SpO2   12/24/18 1230 12/24/18 1230 12/24/18 1230 12/24/18 1233 12/24/18 1230   (!) 97 3 °F (36 3 °C) 61 18 (!) 206/84 100 %      Temp Source Heart Rate Source Patient Position - Orthostatic VS BP Location FiO2 (%)   12/24/18 1230 12/24/18 1230 -- -- --   Tympanic Monitor         Pain Score       --                  Vitals:    12/24/18 1230 12/24/18 1233   BP:  (!) 206/84   Pulse: 61        Visual Acuity      ED Medications  Medications - No data to display    Diagnostic Studies  Results Reviewed     Procedure Component Value Units Date/Time    UA (URINE) with reflex to Microscopic [[id number]] Collected:  12/24/18 1522    Lab Status:  Final result Specimen:  Urine from Urine, Clean Catch Updated:  12/24/18 1527     Color, UA Light Yellow     Clarity, UA Clear     Specific Gravity, UA <=1 005     pH, UA 6 0     Leukocytes, UA Negative     Nitrite, UA Negative     Protein, UA Negative mg/dl      Glucose, UA Negative mg/dl      Ketones, UA Negative mg/dl      Urobilinogen, UA 0 2 E U /dl      Bilirubin, UA Negative     Blood, UA Negative    Troponin I [[id number]]  (Normal) Collected:  12/24/18 1400    Lab Status:  Final result Specimen:  Blood from Arm, Right Updated:  12/24/18 1426     Troponin I <0 02 ng/mL     Basic metabolic panel [[id number]]  (Abnormal) Collected:  12/24/18 1400    Lab Status:  Final result Specimen:  Blood from Arm, Right Updated:  12/24/18 1417     Sodium 135 (L) mmol/L      Potassium 3 8 mmol/L      Chloride 99 (L) mmol/L      CO2 22 mmol/L      ANION GAP 14 (H) mmol/L      BUN 30 (H) mg/dL      Creatinine 1 34 (H) mg/dL      Glucose 86 mg/dL      Calcium 9 1 mg/dL      eGFR 38 ml/min/1 73sq m     Narrative:         National Kidney Disease Education Program recommendations are as follows:  GFR calculation is accurate only with a steady state creatinine  Chronic Kidney disease less than 60 ml/min/1 73 sq  meters  Kidney failure less than 15 ml/min/1 73 sq  meters      CBC and differential [[id number]]  (Abnormal) Collected:  12/24/18 1400    Lab Status:  Final result Specimen:  Blood from Arm, Right Updated:  12/24/18 1406     WBC 9 11 Thousand/uL      RBC 3 54 (L) Million/uL      Hemoglobin 12 1 g/dL      Hematocrit 36 6 %       (H) fL      MCH 34 2 pg      MCHC 33 1 g/dL      RDW 15 1 %      MPV 11 8 fL      Platelets 718 Thousands/uL      nRBC 0 /100 WBCs      Neutrophils Relative 61 %      Immat GRANS % 0 %      Lymphocytes Relative 21 %      Monocytes Relative 14 (H) %      Eosinophils Relative 3 %      Basophils Relative 1 %      Neutrophils Absolute 5 56 Thousands/µL      Immature Grans Absolute 0 04 Thousand/uL      Lymphocytes Absolute 1 87 Thousands/µL      Monocytes Absolute 1 29 (H) Thousand/µL      Eosinophils Absolute 0 30 Thousand/µL      Basophils Absolute 0 05 Thousands/µL     Fingerstick Glucose (POCT) [[id number]]  (Normal) Collected:  12/24/18 1358    Lab Status:  Final result Updated:  12/24/18 1359     POC Glucose 79 mg/dl                  CT head without contrast   Final Result by Stacey Clark DO (12/24 1456)   No acute intracranial abnormality  Workstation performed: [id number]         XR ribs with pa chest min 3 views RIGHT   Final Result by Justyna Felix MD (12/24 1446)      No active cardiopulmonary disease  No evidence of rib fractures  Workstation performed: [id number]         CT spine cervical without contrast   Final Result by Stacey Clark DO (12/24 1438)   Advanced degenerative changes commensurate with patient's age  No cervical spine fracture or traumatic malalignment                     Workstation performed: [id number]                    Procedures  Procedures       Phone Contacts  ED Phone Contact    ED Course  ED Course as of Dec 24 2021   Mon Dec 24, 2018   1512 Patient feeling much better, no longer feeling dizzy, will check urine and d/c                                MDM  Number of Diagnoses or Management Options  Fall, initial encounter:   Rib pain:   Diagnosis management comments: Patient well appearing, neurologically intact  CT and CXR no acute findings  Advised to continue to use meclizine as needed for dizziness  Can take tylenol or ibuprofen as needed for pain, can use heating pad for rib pain  Gave patient proper education regarding diagnosis  Answered all questions  Return to ED for any worsening of symptoms otherwise follow up with primary care physician for re-evaluation  Discussed plan with patient who verbalized understanding and agreed to plan  Amount and/or Complexity of Data Reviewed  Clinical lab tests: reviewed and ordered  Tests in the radiology section of CPT®: ordered and reviewed  Tests in the medicine section of CPT®: reviewed and ordered  Discussion of test results with the performing providers: yes  Review and summarize past medical records: yes  Discuss the patient with other providers: yes  Independent visualization of images, tracings, or specimens: yes      CritCare Time    Disposition  Final diagnoses:   Rib pain   Fall, initial encounter     Time reflects when diagnosis was documented in both MDM as applicable and the Disposition within this note     Time User Action Codes Description Comment    12/24/2018  3:32 PM Belenda Kocher Add [R07 81] Rib pain     12/24/2018  3:32 PM Belenda Kocher Add [W19  XXXA] Fall, initial encounter       ED Disposition     ED Disposition Condition Comment    Discharge  Toniae She Mariella discharge to home/self care      Condition at discharge: Good        Follow-up Information     Follow up With Specialties Details Why Contact Info Additional [address], DO Family Medicine, Internal Medicine Schedule an appointment as soon as possible for a visit in 3 days If symptoms worsen [address] Emergency Department Emergency Medicine Go to As needed [address] [facility] [place]  [phone] [facility], [place], [place], [address]          Discharge Medication List as of 12/24/2018  3:32 PM      CONTINUE these medications which have NOT CHANGED    Details   albuterol (VENTOLIN HFA) 90 mcg/act inhaler Inhale 2 puffs every 6 (six) hours as needed for wheezing, Starting Mon 9/17/2018, Normal      aspirin 81 MG tablet Take 2 tablets by mouth daily, Starting Tue 12/24/2013, Historical Med      atenolol (TENORMIN) 50 mg tablet Take 1 tablet (50 mg total) by mouth daily, Starting Thu 7/19/2018, No Print      atorvastatin (LIPITOR) 80 mg tablet Take 1 tablet (80 mg total) by mouth Medrol Dose Pack scheduling ONLY, Starting Thu 7/26/2018, No Print      benzonatate (TESSALON PERLES) 100 mg capsule Take 1 capsule (100 mg total) by mouth 3 (three) times a day as needed for cough, Starting Thu 10/18/2018, Normal      chlorthalidone 25 mg tablet Take 1 tablet (25 mg total) by mouth daily, Starting Thu 7/26/2018, No Print      ciclopirox (LOPROX) 0 77 % cream Apply topically 2 (two) times a day for 20 days, Starting Thu 7/12/2018, Until Wed 8/1/2018, Normal      enalapril (VASOTEC) 20 mg tablet Take 1 tablet (20 mg total) by mouth daily, Starting Thu 10/18/2018, Normal      Flaxseed, Linseed, (FLAX SEED OIL PO) Take by mouth daily, Historical Med      fluticasone (FLONASE) 50 mcg/act nasal spray 1 spray into each nostril daily, Starting Mon 9/17/2018, Normal      levothyroxine 25 mcg tablet Take 1 tablet (25 mcg total) by mouth daily in the early morning, Starting Thu 7/26/2018, No Print      Multiple Minerals (CALCIUM-MAGNESIUM-ZINC) TABS Take by mouth daily, Historical Med      Potassium (POTASSIMIN PO) Take by mouth daily, Historical Med           No discharge procedures on file      ED Provider  Electronically Signed by           Yas Zarate PA-C  12/24/18 2021

## 2018-12-24 NOTE — DISCHARGE INSTRUCTIONS
Costochondritis   WHAT YOU NEED TO KNOW:   Costochondritis is a condition that causes pain in the cartilage that connect your ribs to your sternum (breastbone)  Cartilage is the tough, bendable tissue that protects your bones  DISCHARGE INSTRUCTIONS:   Medicines:   · Acetaminophen: This medicine decreases pain  Acetaminophen is available without a doctor's order  Ask how much to take and how often to take it  Follow directions  Acetaminophen can cause liver damage if not taken correctly  · NSAIDs , such as ibuprofen, help decrease swelling, pain, and fever  This medicine is available with or without a doctor's order  NSAIDs can cause stomach bleeding or kidney problems in certain people  If you take blood thinner medicine, always ask if NSAIDs are safe for you  Always read the medicine label and follow directions  Do not give these medicines to children under 10months of age without direction from your child's healthcare provider  · Take your medicine as directed  Contact your healthcare provider if you think your medicine is not helping or if you have side effects  Tell him of her if you are allergic to any medicine  Keep a list of the medicines, vitamins, and herbs you take  Include the amounts, and when and why you take them  Bring the list or the pill bottles to follow-up visits  Carry your medicine list with you in case of an emergency  Follow up with your healthcare provider as directed:  Write down your questions so you remember to ask them during your visits  Rest:  You may need to get more rest  Learn which movements and activities cause pain, and avoid doing them  Do not carry objects, such as a purse or backpack, if this is painful  Avoid activities such as rowing and weightlifting until your pain decreases or goes away  Ask which activities are best for you to do while you recover  Heat:  Heat helps decrease pain in some patients   Apply heat on the area for 20 to 30 minutes every 2 hours for as many days as directed  Ice:  Ice helps decrease swelling and pain  Ice may also help prevent tissue damage  Use an ice pack, or put crushed ice in a plastic bag  Cover it with a towel and place it on the painful area for 15 to 20 minutes every hour or as directed  Stretching exercises:  Gentle stretching may help your symptoms   a doorway and put your hands on the door frame at the level of your ears or shoulders  Take 1 step forward and gently stretch your chest  Try this with your hands higher up on the doorway  Contact your healthcare provider if:   · You have a fever  · The painful areas of your chest look swollen, red, and feel warm to the touch  · You cannot sleep because of the pain  · You have questions or concerns about your condition or care  © 2017 [employer]  Information is for End User's use only and may not be sold, redistributed or otherwise used for commercial purposes  All illustrations and images included in CareNotes® are the copyrighted property of A D [employer] M [employer] Inc  or [employer] Godinez  The above information is an  only  It is not intended as medical advice for individual conditions or treatments  Talk to your doctor, nurse or pharmacist before following any medical regimen to see if it is safe and effective for you

## 2018-12-28 ENCOUNTER — VBI (OUTPATIENT)
Dept: FAMILY MEDICINE CLINIC | Facility: CLINIC | Age: 78
End: 2018-12-28

## 2018-12-28 NOTE — TELEPHONE ENCOUNTER
Pt was seen in [address] on 12/24/18  CC: Dizziness  DX: Rib pain; fall   Left message, informed pt of  on call, office hours and phone number

## 2019-01-03 DIAGNOSIS — E78.2 MIXED HYPERLIPIDEMIA: ICD-10-CM

## 2019-01-03 RX ORDER — FLUTICASONE PROPIONATE 50 MCG
1 SPRAY, SUSPENSION (ML) NASAL DAILY
Qty: 16 G | Refills: 3 | Status: SHIPPED | OUTPATIENT
Start: 2019-01-03 | End: 2020-11-07 | Stop reason: SDUPTHER

## 2019-01-11 DIAGNOSIS — I70.219 EXTREMITY ATHEROSCLEROSIS WITH INTERMITTENT CLAUDICATION (HCC): Primary | ICD-10-CM

## 2019-01-16 DIAGNOSIS — E78.2 MIXED HYPERLIPIDEMIA: ICD-10-CM

## 2019-01-16 DIAGNOSIS — I10 ESSENTIAL HYPERTENSION: ICD-10-CM

## 2019-01-16 DIAGNOSIS — E03.9 HYPOTHYROIDISM, UNSPECIFIED TYPE: Primary | ICD-10-CM

## 2019-01-16 NOTE — TELEPHONE ENCOUNTER
Pt also dropped off parking placard form to be completed     Copy made for scanning and original placed with sharda

## 2019-01-17 RX ORDER — ENALAPRIL MALEATE 20 MG/1
20 TABLET ORAL DAILY
Qty: 90 TABLET | Refills: 0 | Status: SHIPPED | OUTPATIENT
Start: 2019-01-17 | End: 2019-04-15 | Stop reason: SDUPTHER

## 2019-01-17 RX ORDER — CHLORTHALIDONE 25 MG/1
25 TABLET ORAL DAILY
Qty: 90 TABLET | Refills: 0
Start: 2019-01-17 | End: 2019-04-15 | Stop reason: SDUPTHER

## 2019-01-17 RX ORDER — LEVOTHYROXINE SODIUM 0.03 MG/1
25 TABLET ORAL
Qty: 90 TABLET | Refills: 0
Start: 2019-01-17 | End: 2019-04-15 | Stop reason: SDUPTHER

## 2019-02-08 ENCOUNTER — OFFICE VISIT (OUTPATIENT)
Dept: FAMILY MEDICINE CLINIC | Facility: CLINIC | Age: 79
End: 2019-02-08
Payer: COMMERCIAL

## 2019-02-08 VITALS
HEART RATE: 102 BPM | DIASTOLIC BLOOD PRESSURE: 92 MMHG | WEIGHT: 143 LBS | RESPIRATION RATE: 16 BRPM | OXYGEN SATURATION: 98 % | SYSTOLIC BLOOD PRESSURE: 162 MMHG | BODY MASS INDEX: 24.55 KG/M2

## 2019-02-08 DIAGNOSIS — I10 ESSENTIAL HYPERTENSION: Primary | ICD-10-CM

## 2019-02-08 DIAGNOSIS — I74.09 AORTOILIAC OCCLUSIVE DISEASE (HCC): ICD-10-CM

## 2019-02-08 DIAGNOSIS — J41.0 SIMPLE CHRONIC BRONCHITIS (HCC): ICD-10-CM

## 2019-02-08 PROCEDURE — 99213 OFFICE O/P EST LOW 20 MIN: CPT | Performed by: FAMILY MEDICINE

## 2019-02-11 PROBLEM — J41.0 SIMPLE CHRONIC BRONCHITIS (HCC): Status: ACTIVE | Noted: 2019-02-11

## 2019-02-12 NOTE — PROGRESS NOTES
Assessment/Plan:    No problem-specific Assessment & Plan notes found for this encounter  Diagnoses and all orders for this visit:    Essential hypertension    Simple chronic bronchitis (HCC)    Aortoiliac occlusive disease ([address] )          Subjective:      Patient ID: Jennifer Bey is a 66 y o  female  Aleah Loredo is here for follow-up of her chronic medical problems including hypertension and peripheral vascular disease she does follow with Dr Cipriano Wong skin nothing has changed since her last visit the just continue to monitor her peripheral vascular disease she has quit smoking      The following portions of the patient's history were reviewed and updated as appropriate: current medications, past family history, past medical history, past social history, past surgical history and problem list     Review of Systems   Constitutional: Negative  HENT: Negative  Eyes: Negative  Respiratory: Negative  Cardiovascular: Negative  Gastrointestinal: Negative  Endocrine: Negative  Genitourinary: Negative  Musculoskeletal: Negative  Objective:      /92   Pulse 102   Resp 16   Wt 64 9 kg (143 lb)   SpO2 98%   BMI 24 55 kg/m²          Physical Exam   Constitutional: She is oriented to person, place, and time  She appears well-developed and well-nourished  HENT:   Head: Normocephalic and atraumatic  Eyes: Pupils are equal, round, and reactive to light  Conjunctivae and EOM are normal    Neck: Normal range of motion  Cardiovascular: Normal rate, regular rhythm and normal heart sounds  Pulmonary/Chest: Effort normal and breath sounds normal    Abdominal: Soft  Bowel sounds are normal    Musculoskeletal: Normal range of motion  Neurological: She is alert and oriented to person, place, and time  Skin: Skin is warm and dry  Psychiatric: She has a normal mood and affect   Her behavior is normal  Judgment and thought content normal

## 2019-02-22 ENCOUNTER — PROCEDURE VISIT (OUTPATIENT)
Dept: FAMILY MEDICINE CLINIC | Facility: CLINIC | Age: 79
End: 2019-02-22
Payer: COMMERCIAL

## 2019-02-22 VITALS
TEMPERATURE: 96.6 F | OXYGEN SATURATION: 99 % | RESPIRATION RATE: 20 BRPM | WEIGHT: 139 LBS | HEART RATE: 70 BPM | DIASTOLIC BLOOD PRESSURE: 80 MMHG | BODY MASS INDEX: 23.86 KG/M2 | SYSTOLIC BLOOD PRESSURE: 138 MMHG

## 2019-02-22 DIAGNOSIS — D22.9 ATYPICAL MOLE: ICD-10-CM

## 2019-02-22 DIAGNOSIS — L81.9 PIGMENTED SKIN LESION SUSPICIOUS FOR MALIGNANT NEOPLASM: Primary | ICD-10-CM

## 2019-02-22 PROCEDURE — 88341 IMHCHEM/IMCYTCHM EA ADD ANTB: CPT | Performed by: PATHOLOGY

## 2019-02-22 PROCEDURE — 11401 EXC TR-EXT B9+MARG 0.6-1 CM: CPT | Performed by: FAMILY MEDICINE

## 2019-02-22 PROCEDURE — 88305 TISSUE EXAM BY PATHOLOGIST: CPT | Performed by: PATHOLOGY

## 2019-02-22 PROCEDURE — 88342 IMHCHEM/IMCYTCHM 1ST ANTB: CPT | Performed by: PATHOLOGY

## 2019-02-25 ENCOUNTER — LAB REQUISITION (OUTPATIENT)
Dept: LAB | Facility: HOSPITAL | Age: 79
End: 2019-02-25
Payer: COMMERCIAL

## 2019-02-25 DIAGNOSIS — L81.9 DISORDER OF PIGMENTATION: ICD-10-CM

## 2019-03-05 ENCOUNTER — OFFICE VISIT (OUTPATIENT)
Dept: FAMILY MEDICINE CLINIC | Facility: CLINIC | Age: 79
End: 2019-03-05
Payer: COMMERCIAL

## 2019-03-05 VITALS
WEIGHT: 140.5 LBS | BODY MASS INDEX: 24.12 KG/M2 | DIASTOLIC BLOOD PRESSURE: 88 MMHG | TEMPERATURE: 98.4 F | RESPIRATION RATE: 18 BRPM | SYSTOLIC BLOOD PRESSURE: 138 MMHG | HEART RATE: 91 BPM | OXYGEN SATURATION: 98 %

## 2019-03-05 DIAGNOSIS — Z48.02 VISIT FOR SUTURE REMOVAL: Primary | ICD-10-CM

## 2019-03-05 PROCEDURE — 99213 OFFICE O/P EST LOW 20 MIN: CPT | Performed by: FAMILY MEDICINE

## 2019-03-05 NOTE — PROGRESS NOTES
Suture removal  Date/Time: 3/5/2019 1:27 PM  Performed by: Isa Galvan DO  Authorized by: Mansoor Be MD     Patient location:  Bedside  Consent:     Consent obtained:  Verbal    Consent given by:  Patient    Risks discussed:  Bleeding, pain and wound separation    Alternatives discussed:  Delayed treatment and no treatment  Universal protocol:     Procedure explained and questions answered to patient or proxy's satisfaction: yes      Relevant documents present and verified: yes      Patient identity confirmed:  Verbally with patient  Location:     Laterality:  Median    Location:  Trunk (upper chest area)    Trunk location:  Chest  Procedure details: Tools used:  Suture removal kit    Wound appearance:  No sign(s) of infection and good wound healing    Number of sutures removed:  1  Post-procedure details:     Post-removal:  No dressing applied    Patient tolerance of procedure:   Tolerated well, no immediate complications

## 2019-03-05 NOTE — PROGRESS NOTES
Assessment/Plan:    No problem-specific Assessment & Plan notes found for this encounter  Diagnoses and all orders for this visit:    Pigmented skin lesion suspicious for malignant neoplasm  -     Cancel: MISCELLANEOUS LAB TEST  -     MISCELLANEOUS LAB TEST    Atypical mole          Subjective:      Patient ID: Nadeen Chavez is a 66 y o  female      Piper Pond is here for removal of the lesion on her chest that keeps irritating her getting larger      The following portions of the patient's history were reviewed and updated as appropriate: current medications, past family history, past medical history, past social history, past surgical history and problem list     Review of Systems   Skin:        See chief complaint         Objective:      /80 (BP Location: Left arm, Patient Position: Sitting, Cuff Size: Adult)   Pulse 70   Temp (!) 96 6 °F (35 9 °C) (Tympanic)   Resp 20   Wt 63 kg (139 lb)   SpO2 99%   BMI 23 86 kg/m²          Physical Exam   Skin:   The area has actually gone down in size per the patient actually have difficulty finding on the chest I did located it is not palpable as it has been in the past and just scaly a 5 mm punch was taken area was prepped and cleaned and a for a 1 interrupted suture was placed sent for biopsy as well

## 2019-03-14 ENCOUNTER — TELEPHONE (OUTPATIENT)
Dept: ADMINISTRATIVE | Facility: HOSPITAL | Age: 79
End: 2019-03-14

## 2019-03-14 DIAGNOSIS — I74.09 AORTOILIAC OCCLUSIVE DISEASE (HCC): ICD-10-CM

## 2019-03-14 DIAGNOSIS — I70.219 EXTREMITY ATHEROSCLEROSIS WITH INTERMITTENT CLAUDICATION (HCC): Primary | ICD-10-CM

## 2019-03-14 DIAGNOSIS — I65.23 CAROTID STENOSIS, BILATERAL: ICD-10-CM

## 2019-04-15 DIAGNOSIS — I10 ESSENTIAL HYPERTENSION: ICD-10-CM

## 2019-04-15 DIAGNOSIS — E03.9 HYPOTHYROIDISM, UNSPECIFIED TYPE: ICD-10-CM

## 2019-04-15 RX ORDER — CHLORTHALIDONE 25 MG/1
25 TABLET ORAL DAILY
Qty: 90 TABLET | Refills: 3 | Status: SHIPPED | OUTPATIENT
Start: 2019-04-15 | End: 2019-06-24 | Stop reason: SDUPTHER

## 2019-04-15 RX ORDER — LEVOTHYROXINE SODIUM 0.03 MG/1
25 TABLET ORAL
Qty: 90 TABLET | Refills: 3 | Status: SHIPPED | OUTPATIENT
Start: 2019-04-15 | End: 2019-09-19 | Stop reason: SDUPTHER

## 2019-04-15 RX ORDER — ENALAPRIL MALEATE 20 MG/1
20 TABLET ORAL DAILY
Qty: 90 TABLET | Refills: 3 | Status: SHIPPED | OUTPATIENT
Start: 2019-04-15 | End: 2019-09-11

## 2019-04-15 RX ORDER — ATENOLOL 50 MG/1
50 TABLET ORAL DAILY
Qty: 90 TABLET | Refills: 3 | Status: SHIPPED | OUTPATIENT
Start: 2019-04-15 | End: 2019-09-18 | Stop reason: SDUPTHER

## 2019-05-21 DIAGNOSIS — E78.2 MIXED HYPERLIPIDEMIA: ICD-10-CM

## 2019-05-21 RX ORDER — ATORVASTATIN CALCIUM 80 MG/1
TABLET, FILM COATED ORAL
Qty: 90 TABLET | Refills: 3 | Status: SHIPPED | OUTPATIENT
Start: 2019-05-21 | End: 2020-02-18 | Stop reason: SDUPTHER

## 2019-05-28 ENCOUNTER — OFFICE VISIT (OUTPATIENT)
Dept: FAMILY MEDICINE CLINIC | Facility: CLINIC | Age: 79
End: 2019-05-28
Payer: COMMERCIAL

## 2019-05-28 ENCOUNTER — HOSPITAL ENCOUNTER (OUTPATIENT)
Dept: RADIOLOGY | Facility: HOSPITAL | Age: 79
Discharge: HOME | End: 2019-05-28
Attending: SURGERY
Payer: COMMERCIAL

## 2019-05-28 VITALS
TEMPERATURE: 98.4 F | DIASTOLIC BLOOD PRESSURE: 92 MMHG | SYSTOLIC BLOOD PRESSURE: 172 MMHG | BODY MASS INDEX: 23.38 KG/M2 | HEART RATE: 100 BPM | WEIGHT: 136.19 LBS | OXYGEN SATURATION: 98 %

## 2019-05-28 DIAGNOSIS — I65.23 CAROTID STENOSIS, BILATERAL: ICD-10-CM

## 2019-05-28 DIAGNOSIS — I74.09 AORTOILIAC OCCLUSIVE DISEASE (HCC): ICD-10-CM

## 2019-05-28 DIAGNOSIS — I10 ESSENTIAL HYPERTENSION: Primary | ICD-10-CM

## 2019-05-28 PROCEDURE — 93880 EXTRACRANIAL BILAT STUDY: CPT

## 2019-05-28 PROCEDURE — 93978 VASCULAR STUDY: CPT

## 2019-05-28 PROCEDURE — 99213 OFFICE O/P EST LOW 20 MIN: CPT | Performed by: FAMILY MEDICINE

## 2019-05-28 PROCEDURE — 93880 EXTRACRANIAL BILAT STUDY: CPT | Performed by: SURGERY

## 2019-05-28 RX ORDER — TRIAMCINOLONE ACETONIDE 1 MG/G
1 CREAM TOPICAL 2 TIMES DAILY
Refills: 0 | COMMUNITY
Start: 2019-04-24 | End: 2019-08-21 | Stop reason: ALTCHOICE

## 2019-05-28 RX ORDER — ATENOLOL 25 MG/1
TABLET ORAL
Qty: 30 TABLET | Refills: 0 | Status: SHIPPED | OUTPATIENT
Start: 2019-05-28 | End: 2019-06-25 | Stop reason: SDUPTHER

## 2019-05-29 PROCEDURE — 93978 VASCULAR STUDY: CPT | Performed by: SURGERY

## 2019-05-31 ENCOUNTER — OFFICE VISIT (OUTPATIENT)
Dept: FAMILY MEDICINE CLINIC | Facility: CLINIC | Age: 79
End: 2019-05-31
Payer: COMMERCIAL

## 2019-05-31 VITALS — SYSTOLIC BLOOD PRESSURE: 164 MMHG | DIASTOLIC BLOOD PRESSURE: 78 MMHG

## 2019-05-31 DIAGNOSIS — Z01.30 BLOOD PRESSURE CHECK: Primary | ICD-10-CM

## 2019-05-31 PROCEDURE — 99211 OFF/OP EST MAY X REQ PHY/QHP: CPT | Performed by: FAMILY MEDICINE

## 2019-06-06 ENCOUNTER — OFFICE VISIT (OUTPATIENT)
Dept: FAMILY MEDICINE CLINIC | Facility: CLINIC | Age: 79
End: 2019-06-06
Payer: COMMERCIAL

## 2019-06-06 VITALS — SYSTOLIC BLOOD PRESSURE: 154 MMHG | DIASTOLIC BLOOD PRESSURE: 94 MMHG

## 2019-06-06 DIAGNOSIS — I10 BENIGN ESSENTIAL HYPERTENSION: Primary | ICD-10-CM

## 2019-06-06 PROCEDURE — 99211 OFF/OP EST MAY X REQ PHY/QHP: CPT | Performed by: FAMILY MEDICINE

## 2019-06-24 DIAGNOSIS — I10 ESSENTIAL HYPERTENSION: ICD-10-CM

## 2019-06-24 RX ORDER — CHLORTHALIDONE 25 MG/1
25 TABLET ORAL DAILY
Qty: 90 TABLET | Refills: 3 | Status: SHIPPED | OUTPATIENT
Start: 2019-06-24 | End: 2019-09-11

## 2019-06-25 DIAGNOSIS — I10 ESSENTIAL HYPERTENSION: ICD-10-CM

## 2019-06-25 RX ORDER — ATENOLOL 25 MG/1
TABLET ORAL
Qty: 30 TABLET | Refills: 0 | Status: SHIPPED | OUTPATIENT
Start: 2019-06-25 | End: 2019-07-25 | Stop reason: SDUPTHER

## 2019-07-01 ENCOUNTER — HOSPITAL ENCOUNTER (OUTPATIENT)
Dept: RADIOLOGY | Facility: HOSPITAL | Age: 79
Discharge: HOME | End: 2019-07-01
Attending: SURGERY
Payer: COMMERCIAL

## 2019-07-01 DIAGNOSIS — I70.219 EXTREMITY ATHEROSCLEROSIS WITH INTERMITTENT CLAUDICATION (HCC): ICD-10-CM

## 2019-07-01 PROCEDURE — 93925 LOWER EXTREMITY STUDY: CPT

## 2019-07-01 PROCEDURE — 93923 UPR/LXTR ART STDY 3+ LVLS: CPT

## 2019-07-02 PROCEDURE — 93925 LOWER EXTREMITY STUDY: CPT | Performed by: SURGERY

## 2019-07-02 PROCEDURE — 93922 UPR/L XTREMITY ART 2 LEVELS: CPT | Performed by: SURGERY

## 2019-07-08 ENCOUNTER — TELEPHONE (OUTPATIENT)
Dept: ADMINISTRATIVE | Facility: HOSPITAL | Age: 79
End: 2019-07-08

## 2019-07-08 NOTE — TELEPHONE ENCOUNTER
----- Message from Shola Banuelos sent at 7/3/2019  1:49 PM EDT -----  Call patient for OV with Armond Bruno

## 2019-07-19 NOTE — PROGRESS NOTES
Assessment/Plan:    No problem-specific Assessment & Plan notes found for this encounter  Patient here for follow-up of hypertension patient here for follow-up of hypertension  She is running slightly she is running slightly more more high than-like like her run but as her on but as we have tried we have tried in the past we lower in the past we lower her her to 150/160 slowly to 150/160 slowly she become symptomatic she becomes symptomatic she has significant peripheral vascular she has significant peripheral vascular disease not sure disease in her sugars some cerebrovascular disease there is some cerebrovascular disease but she becomes but she becomes weight 2 symptomatic weight 2 symptomatic even even in systolics in systolics in the 381P and feels very in the 150s and feels very vertiginous and she will fall vertiginous and she will fall     Diagnoses and all orders for this visit:    Essential hypertension  -     Discontinue: atenolol (TENORMIN) 25 mg tablet; 1/2 pill with her 50 mg dose    Other orders  -     Discontinue: triamcinolone (KENALOG) 0 1 % cream; Apply 1 application topically 2 (two) times a day          Subjective:      Patient ID: Teddy Gant is a 78 y o  female      HPI  Here for HTN check  She is feeling well overall  I run her BP slightly high she has a lot of PVD if I try to lower she becomes very symptomatic with lightheadedness and dizzyness  She is stable at this BP- annia gotten her to systolic 715 and she gets grossly symptomatic    The following portions of the patient's history were reviewed and updated as appropriate: allergies, past family history, past medical history, past social history, past surgical history and problem list     Review of Systems      Objective:      BP (!) 172/92 (BP Location: Left arm, Patient Position: Sitting, Cuff Size: Large)   Pulse 100   Temp 98 4 °F (36 9 °C) (Tympanic)   Wt 61 8 kg (136 lb 3 oz)   SpO2 98%   BMI 23 38 kg/m²          Physical Exam      gen-NAD  Cardio- RRR no MCG

## 2019-07-25 DIAGNOSIS — I10 ESSENTIAL HYPERTENSION: ICD-10-CM

## 2019-07-25 RX ORDER — ATENOLOL 25 MG/1
TABLET ORAL
Qty: 30 TABLET | Refills: 0 | Status: SHIPPED | OUTPATIENT
Start: 2019-07-25 | End: 2019-09-09 | Stop reason: SDUPTHER

## 2019-07-25 NOTE — TELEPHONE ENCOUNTER
DR Brannon [place] - PT HAD JUST GOTTEN THIS REFILLED, BUT SHE LOST IT    SHE IS REQUESTING A NEW REFILL FOR ATENOLOL 25 MG

## 2019-08-07 ENCOUNTER — OFFICE VISIT (OUTPATIENT)
Dept: FAMILY MEDICINE CLINIC | Facility: CLINIC | Age: 79
End: 2019-08-07
Payer: COMMERCIAL

## 2019-08-07 VITALS
HEART RATE: 87 BPM | OXYGEN SATURATION: 98 % | WEIGHT: 131.8 LBS | BODY MASS INDEX: 22.62 KG/M2 | DIASTOLIC BLOOD PRESSURE: 78 MMHG | SYSTOLIC BLOOD PRESSURE: 144 MMHG | TEMPERATURE: 97.6 F

## 2019-08-07 DIAGNOSIS — I73.9 PERIPHERAL VASCULAR DISEASE (HCC): ICD-10-CM

## 2019-08-07 DIAGNOSIS — I10 ESSENTIAL HYPERTENSION: ICD-10-CM

## 2019-08-07 DIAGNOSIS — Z23 ENCOUNTER FOR VACCINATION: Primary | ICD-10-CM

## 2019-08-07 PROCEDURE — 90714 TD VACC NO PRESV 7 YRS+ IM: CPT | Performed by: FAMILY MEDICINE

## 2019-08-07 PROCEDURE — 99213 OFFICE O/P EST LOW 20 MIN: CPT | Performed by: FAMILY MEDICINE

## 2019-08-07 PROCEDURE — 90471 IMMUNIZATION ADMIN: CPT

## 2019-08-07 RX ORDER — VITAMIN B COMPLEX
TABLET ORAL DAILY
COMMUNITY

## 2019-08-09 DIAGNOSIS — I10 ESSENTIAL HYPERTENSION: ICD-10-CM

## 2019-08-09 DIAGNOSIS — E78.2 MIXED HYPERLIPIDEMIA: ICD-10-CM

## 2019-08-09 RX ORDER — FLUTICASONE PROPIONATE 50 MCG
1 SPRAY, SUSPENSION (ML) NASAL DAILY
Qty: 16 G | Refills: 3 | Status: CANCELLED | OUTPATIENT
Start: 2019-08-09

## 2019-08-09 RX ORDER — ATENOLOL 25 MG/1
TABLET ORAL
Qty: 30 TABLET | Refills: 0 | Status: CANCELLED | OUTPATIENT
Start: 2019-08-09

## 2019-08-12 NOTE — PROGRESS NOTES
Assessment/Plan:    No problem-specific Assessment & Plan notes found for this encounter  Diagnoses and all orders for this visit:    Encounter for vaccination  -     DTAP 5 PERTUSSIS ANTIGENS VACCINE IM (Daptacel)    Peripheral vascular disease ([facility] [address] )    Essential hypertension    Other orders  -     Multiple Vitamins-Minerals (CENTRUM SILVER PO); Take by mouth  -     B Complex Vitamins (VITAMIN B-COMPLEX) TABS; Take by mouth  -     GARLIC PO; Take by mouth          Subjective:      Patient ID: Rian Beltre is a 78 y o  female  Gavi Simons is here to discuss the reports for Doppler she was notified by the vascular surgeon to make an appointment relatively soon but no in discussed with her she wanted to come in here so I could explain it to her we did discuss that I did tell her that ultimately the final point is that her disease is progressing it is important that she make sure she kept the appointment as she did have an appointment today but she canceled that because she wanted me to explain it to her  Again encouraged smoking cessation she clearly is not ready to do it but I told her that the single best thing she can do because if surgery is to be considered an option it will not be successful song she keeps smoking  She at times has resting claudication and definitely has exertional claudication  And she does display dependent rubor  I did discuss with her the various different modes of smoking cessation as her seriously consider them and she will let me know what her choice is      The following portions of the patient's history were reviewed and updated as appropriate: current medications, past family history, past medical history, past social history, past surgical history and problem list     Review of Systems   Eyes: Negative  Respiratory: Negative  Cardiovascular: Negative            Objective:      /78 (BP Location: Left arm, Patient Position: Sitting, Cuff Size: Adult)   Pulse 87   Temp 97 6 °F (36 4 °C) (Tympanic)   Wt 59 8 kg (131 lb 12 8 oz)   SpO2 98%   BMI 22 62 kg/m²          Physical Exam   Cardiovascular: Normal rate, regular rhythm and normal heart sounds     Pulmonary/Chest: Effort normal and breath sounds normal    Skin:   Dependent rubor in the sitting position very delayed capillary refill

## 2019-08-21 ENCOUNTER — TELEPHONE (OUTPATIENT)
Dept: ADMINISTRATIVE | Facility: HOSPITAL | Age: 79
End: 2019-08-21

## 2019-08-21 ENCOUNTER — OFFICE VISIT (OUTPATIENT)
Dept: VASCULAR SURGERY | Facility: CLINIC | Age: 79
End: 2019-08-21
Payer: COMMERCIAL

## 2019-08-21 VITALS
RESPIRATION RATE: 18 BRPM | SYSTOLIC BLOOD PRESSURE: 128 MMHG | DIASTOLIC BLOOD PRESSURE: 88 MMHG | HEIGHT: 64 IN | WEIGHT: 131 LBS | HEART RATE: 75 BPM | BODY MASS INDEX: 22.36 KG/M2

## 2019-08-21 DIAGNOSIS — I70.219 ATHEROSCLEROSIS OF ARTERY OF EXTREMITY WITH INTERMITTENT CLAUDICATION (HCC): Chronic | ICD-10-CM

## 2019-08-21 DIAGNOSIS — I77.9 AORTO-ILIAC DISEASE (HCC): Primary | ICD-10-CM

## 2019-08-21 DIAGNOSIS — F17.210 CIGARETTE SMOKER: Chronic | ICD-10-CM

## 2019-08-21 DIAGNOSIS — N18.30 CKD (CHRONIC KIDNEY DISEASE) STAGE 3, GFR 30-59 ML/MIN (HCC): Chronic | ICD-10-CM

## 2019-08-21 DIAGNOSIS — I74.09 AORTOILIAC OCCLUSIVE DISEASE (HCC): Primary | Chronic | ICD-10-CM

## 2019-08-21 PROCEDURE — 99215 OFFICE O/P EST HI 40 MIN: CPT | Performed by: SURGERY

## 2019-08-21 NOTE — TELEPHONE ENCOUNTER
Patient was seen today 08/21/19 by Dr Lucero Olivares and needs Nephrology clearance for a CTA Abdomen w run off wwo contrast  Spoke with Desire from [facility] and schedule patient for [email or web address]  Patient's daughter Phil Gamez was informed, also patient needs to have blood work done prior her appointment(order in [address])  Forms were faxed to [phone]

## 2019-08-21 NOTE — ASSESSMENT & PLAN NOTE
Cigarette smoking  We did discuss the relationship between cigarette smoking and atherosclerotic disease along with recurrent disease if any intervention is performed  She states she would like to try to quit and is planning to start Chantix as per her primary care physician  I have encouraged her to follow this plan and make every attempt to quit

## 2019-08-21 NOTE — PROGRESS NOTES
Assessment/Plan: Aortoiliac occlusive disease (HCC)   Severe aortoiliac and infrainguinal arterial occlusive disease with symptoms which may be consistent with a rest pain equivalent in the right foot  There has been significant progression disease since previous evaluation  We discussed the pathophysiology of occlusive disease and the indications for treatment  At this point her perfusion level does put her at risk of tissue loss  We discussed the option of further evaluation and potential treatment  Since she has severe aortoiliac occlusive disease to include occlusion of the right common iliac artery initial evaluation with CT angiogram would be advised  With her underlying renal insufficiency we will ask that Nephrology evaluation be performed prior to imaging  CKD (chronic kidney disease) stage 3, GFR 30-59 ml/min ([facility])    Chronic renal insufficiency with GFR of 38  With plans to proceed with contrast based imaging we will ask that Nephrology evaluate and treat prior to CT angiogram     Cigarette smoker    Cigarette smoking  We did discuss the relationship between cigarette smoking and atherosclerotic disease along with recurrent disease if any intervention is performed  She states she would like to try to quit and is planning to start Chantix as per her primary care physician  I have encouraged her to follow this plan and make every attempt to quit  Diagnoses and all orders for this visit:    Aortoiliac occlusive disease ([facility] [address] )  -     Ambulatory referral to Nephrology; Future  -     CTA abdominal w run off w wo contrast; Future    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 ([facility] [address] )  -     Ambulatory referral to Nephrology; Future  -     CTA abdominal w run off w wo contrast; Future    CKD (chronic kidney disease) stage 3, GFR 30-59 ml/min ([facility])  -     Ambulatory referral to Nephrology;  Future    Cigarette smoker          Subjective:      Patient ID: Elvira Johnston is a 78 y o  female  Patient had a PHILIP on 7/1/19  Pt c/o tingling in the right leg that starts in the foot and radiates up to her buttock  Pt has occasional numbness in the right foot  Pt has notice that this started about 1 month ago  Pt states that she rest for 2 minutes to get feeling back  Pt has occasional cramping pain at night  Pt has redness in her right foot  Pt denies any tissue loss  Pt is on ASA 81 mg and Lipitor  Pt continues to smoke 1 ppd       75-year-old with long history of severe peripheral arterial occlusive disease presents in follow-up after recent duplex evaluation  On evaluation today she does complain of bilateral leg discomfort which does not follow a specific pattern consistent with claudication but she does have some known underlying spinal disease for which she complains of some numbness more in her right leg than the left  She does note at nights she frequently gets a cramping sensation in the right 1st toe and ankle which does resolve with dependency or when she stands to walk briefly  This does appear to be consistent with a rest pain equivalent  She denies any wound healing problems  She denies any chest pain or shortness of breath  She notes she does continue to smoke 1/2 -1 pack per day and has discussed the option of quitting in using Chantix with her primary care physician  Multiple previous arterial duplex studies are reviewed to include aortic and lower extremity imaging studies  This shows right common iliac artery occlusion and greater than 75% left common iliac artery stenosis with diffuse disease throughout the femoral -popliteal segments with ankle-brachial index is 0 17 on the right and 0 35 on the left  This is a marked progression since previous study  Carotid duplex is also reviewed with 50-69% bilateral internal carotid artery stenosis without significant change        The following portions of the patient's history were reviewed and updated as appropriate: allergies, current medications, past family history, past medical history, past social history, past surgical history and problem list     The ROS as john was reviewed and changes made as indictated       Review of Systems   Constitutional: Negative  HENT: Negative  Eyes: Negative  Respiratory: Negative  Cardiovascular: Negative  Gastrointestinal: Negative  Endocrine: Negative  Genitourinary: Negative  Musculoskeletal: Positive for arthralgias, back pain, gait problem and myalgias  Leg pain   Skin: Positive for color change  Negative for wound  Allergic/Immunologic: Negative  Neurological: Positive for numbness  Hematological: Negative  Psychiatric/Behavioral: Negative  Objective:      /88 (BP Location: Left arm, Patient Position: Sitting)   Pulse 75   Resp 18   Ht 5' 4" (1 626 m)   Wt 59 4 kg (131 lb)   BMI 22 49 kg/m²          Physical Exam   Constitutional: She is oriented to person, place, and time  She appears well-developed and well-nourished  HENT:   Head: Normocephalic and atraumatic  Eyes: Conjunctivae and EOM are normal    Neck: Normal range of motion  Neck supple  No JVD present  Carotid bruit is present ( bilateral)  Cardiovascular: Normal rate, regular rhythm, S1 normal and S2 normal    Murmur heard  Systolic murmur is present  Pulses:       Carotid pulses are 2+ on the right side with bruit, and 2+ on the left side with bruit  Radial pulses are 2+ on the right side, and 2+ on the left side  Femoral pulses are 0 on the right side, and 2+ on the left side  Popliteal pulses are 0 on the right side, and 0 on the left side  Dorsalis pedis pulses are 0 on the right side, and 0 on the left side  Posterior tibial pulses are 0 on the right side, and 0 on the left side       Right foot is ruborous on dependency and  Pale with elevation   Pulmonary/Chest: Effort normal and breath sounds normal  Abdominal: Soft  Normal appearance and normal aorta  She exhibits no abdominal bruit and no pulsatile midline mass  There is no tenderness  No hernia  Musculoskeletal: Normal range of motion  She exhibits no edema, tenderness or deformity  Neurological: She is alert and oriented to person, place, and time  She has normal strength  No sensory deficit  Skin: Skin is warm, dry and intact  No pallor  Psychiatric: She has a normal mood and affect   Her speech is normal and behavior is normal

## 2019-08-21 NOTE — ASSESSMENT & PLAN NOTE
Chronic renal insufficiency with GFR of 38   With plans to proceed with contrast based imaging we will ask that Nephrology evaluate and treat prior to CT angiogram

## 2019-08-21 NOTE — LETTER
August 21, 2019     Dick Junior, [address]    Patient: Elias Arreola   YOB: 1940   Date of Visit: 8/21/2019       Dear Dr Yung Ring: Thank you for referring Tosin Venegas to me for evaluation  Below are the relevant portions of my assessment and plan of care  Aortoiliac occlusive disease (HCC)   Severe aortoiliac and infrainguinal arterial occlusive disease with symptoms which may be consistent with a rest pain equivalent in the right foot  There has been significant progression disease since previous evaluation  We discussed the pathophysiology of occlusive disease and the indications for treatment  At this point her perfusion level does put her at risk of tissue loss  We discussed the option of further evaluation and potential treatment  Since she has severe aortoiliac occlusive disease to include occlusion of the right common iliac artery initial evaluation with CT angiogram would be advised  With her underlying renal insufficiency we will ask that Nephrology evaluation be performed prior to imaging  CKD (chronic kidney disease) stage 3, GFR 30-59 ml/min (HCC)    Chronic renal insufficiency with GFR of 38  With plans to proceed with contrast based imaging we will ask that Nephrology evaluate and treat prior to CT angiogram     Cigarette smoker    Cigarette smoking  We did discuss the relationship between cigarette smoking and atherosclerotic disease along with recurrent disease if any intervention is performed  She states she would like to try to quit and is planning to start Chantix as per her primary care physician  I have encouraged her to follow this plan and make every attempt to quit  If you have questions, please do not hesitate to call me  I look forward to following Wilma Jacobs along with you           Sincerely,        Sukhwinder Marr MD        CC: No Recipients

## 2019-08-21 NOTE — ASSESSMENT & PLAN NOTE
Severe aortoiliac and infrainguinal arterial occlusive disease with symptoms which may be consistent with a rest pain equivalent in the right foot  There has been significant progression disease since previous evaluation  We discussed the pathophysiology of occlusive disease and the indications for treatment  At this point her perfusion level does put her at risk of tissue loss  We discussed the option of further evaluation and potential treatment  Since she has severe aortoiliac occlusive disease to include occlusion of the right common iliac artery initial evaluation with CT angiogram would be advised  With her underlying renal insufficiency we will ask that Nephrology evaluation be performed prior to imaging

## 2019-08-21 NOTE — PATIENT INSTRUCTIONS
Aortoiliac occlusive disease (HCC)   Severe aortoiliac and infrainguinal arterial occlusive disease with symptoms which may be consistent with a rest pain equivalent in the right foot  There has been significant progression disease since previous evaluation  We discussed the pathophysiology of occlusive disease and the indications for treatment  At this point her perfusion level does put her at risk of tissue loss  We discussed the option of further evaluation and potential treatment  Since she has severe aortoiliac occlusive disease to include occlusion of the right common iliac artery initial evaluation with CT angiogram would be advised  With her underlying renal insufficiency we will ask that Nephrology evaluation be performed prior to imaging  CKD (chronic kidney disease) stage 3, GFR 30-59 ml/min (HCC)    Chronic renal insufficiency with GFR of 38  With plans to proceed with contrast based imaging we will ask that Nephrology evaluate and treat prior to CT angiogram     Cigarette smoker    Cigarette smoking  We did discuss the relationship between cigarette smoking and atherosclerotic disease along with recurrent disease if any intervention is performed  She states she would like to try to quit and is planning to start Chantix as per her primary care physician  I have encouraged her to follow this plan and make every attempt to quit

## 2019-08-27 ENCOUNTER — APPOINTMENT (OUTPATIENT)
Dept: LAB | Facility: HOSPITAL | Age: 79
End: 2019-08-27
Attending: SURGERY
Payer: COMMERCIAL

## 2019-08-27 ENCOUNTER — TELEPHONE (OUTPATIENT)
Dept: FAMILY MEDICINE CLINIC | Facility: CLINIC | Age: 79
End: 2019-08-27

## 2019-08-27 ENCOUNTER — TRANSCRIBE ORDERS (OUTPATIENT)
Dept: ADMINISTRATIVE | Facility: HOSPITAL | Age: 79
End: 2019-08-27

## 2019-08-27 DIAGNOSIS — I77.9 AORTO-ILIAC DISEASE (HCC): ICD-10-CM

## 2019-08-27 LAB
ANION GAP SERPL CALCULATED.3IONS-SCNC: 11 MMOL/L (ref 4–13)
BUN SERPL-MCNC: 29 MG/DL (ref 5–25)
CALCIUM SERPL-MCNC: 9.4 MG/DL (ref 8.3–10.1)
CHLORIDE SERPL-SCNC: 98 MMOL/L (ref 100–108)
CO2 SERPL-SCNC: 26 MMOL/L (ref 21–32)
CREAT SERPL-MCNC: 1.06 MG/DL (ref 0.6–1.3)
GFR SERPL CREATININE-BSD FRML MDRD: 50 ML/MIN/1.73SQ M
GLUCOSE P FAST SERPL-MCNC: 93 MG/DL (ref 65–99)
POTASSIUM SERPL-SCNC: 4 MMOL/L (ref 3.5–5.3)
SODIUM SERPL-SCNC: 135 MMOL/L (ref 136–145)

## 2019-08-27 PROCEDURE — 36415 COLL VENOUS BLD VENIPUNCTURE: CPT

## 2019-08-27 PROCEDURE — 80048 BASIC METABOLIC PNL TOTAL CA: CPT

## 2019-08-27 NOTE — TELEPHONE ENCOUNTER
DR  [address] [facility]  SHE SAID SHE SPOKE TO YOU ABOUT IT AT HER LAST VISIT

## 2019-09-04 ENCOUNTER — TELEPHONE (OUTPATIENT)
Dept: CARDIOLOGY CLINIC | Facility: CLINIC | Age: 79
End: 2019-09-04

## 2019-09-09 DIAGNOSIS — I10 ESSENTIAL HYPERTENSION: ICD-10-CM

## 2019-09-09 DIAGNOSIS — J41.0 SIMPLE CHRONIC BRONCHITIS (HCC): ICD-10-CM

## 2019-09-11 ENCOUNTER — CONSULT (OUTPATIENT)
Dept: NEPHROLOGY | Facility: CLINIC | Age: 79
End: 2019-09-11
Payer: COMMERCIAL

## 2019-09-11 VITALS — HEIGHT: 64 IN | BODY MASS INDEX: 22.26 KG/M2 | WEIGHT: 130.4 LBS

## 2019-09-11 DIAGNOSIS — Z71.6 ENCOUNTER FOR SMOKING CESSATION COUNSELING: Primary | ICD-10-CM

## 2019-09-11 DIAGNOSIS — I70.219 ATHEROSCLEROSIS OF ARTERY OF EXTREMITY WITH INTERMITTENT CLAUDICATION (HCC): Chronic | ICD-10-CM

## 2019-09-11 DIAGNOSIS — I10 ESSENTIAL HYPERTENSION: ICD-10-CM

## 2019-09-11 DIAGNOSIS — R01.1 CARDIAC MURMUR: Primary | ICD-10-CM

## 2019-09-11 DIAGNOSIS — N18.30 CKD (CHRONIC KIDNEY DISEASE) STAGE 3, GFR 30-59 ML/MIN (HCC): Chronic | ICD-10-CM

## 2019-09-11 DIAGNOSIS — I74.09 AORTOILIAC OCCLUSIVE DISEASE (HCC): Chronic | ICD-10-CM

## 2019-09-11 PROCEDURE — 99205 OFFICE O/P NEW HI 60 MIN: CPT | Performed by: INTERNAL MEDICINE

## 2019-09-11 RX ORDER — VARENICLINE TARTRATE 25 MG
KIT ORAL
Qty: 53 TABLET | Refills: 0 | Status: ON HOLD | OUTPATIENT
Start: 2019-09-11 | End: 2019-12-06 | Stop reason: CLARIF

## 2019-09-11 RX ORDER — ATENOLOL 25 MG/1
TABLET ORAL
Qty: 45 TABLET | Refills: 3 | Status: SHIPPED | OUTPATIENT
Start: 2019-09-11 | End: 2019-09-18 | Stop reason: SDUPTHER

## 2019-09-11 RX ORDER — ENALAPRIL MALEATE 20 MG/1
20 TABLET ORAL 2 TIMES DAILY
Qty: 180 TABLET | Refills: 3 | Status: SHIPPED | OUTPATIENT
Start: 2019-09-11 | End: 2020-07-21

## 2019-09-11 RX ORDER — AMLODIPINE BESYLATE 5 MG/1
5 TABLET ORAL DAILY
Qty: 90 TABLET | Refills: 3 | Status: SHIPPED | OUTPATIENT
Start: 2019-09-11 | End: 2020-01-13

## 2019-09-11 NOTE — TELEPHONE ENCOUNTER
S/w Pt, she would like a call back about RX being called in for Chantix, states this was discussed in the past and she is now ready to quit smoking

## 2019-09-11 NOTE — PATIENT INSTRUCTIONS
1  Medication changes today:  -increase enalapril to 20 mg twice a day beginning today  -begin amlodipine 5 mg daily in the morning  Watch for leg swelling and if he develops any please call me  -stop chlorthalidone  -begin torsemide 5 mg daily beginning tomorrow morning     2  Please wait 1 week after the above medication changes and go for nonfasting lab work but in the morning    3  Please go for an ultrasound of your kidneys which we will arrange in the next week or so  4  Please go for a echocardiogram of your heart which will arrange for the next week or so     5  Please make an appointment to see Dr Mechelle Reeder your cardiologist in the next few weeks  If you need any intervention on her legs it will be important to get cardiac clearance  You also have a heart murmur and that is why we are doing the echocardiogram and having Dr Mechelle Reeder see you  6  Please take 1 week a blood pressure readings beginning on Monday 9/16 for 1 full week morning evening:  -the morning readings before taking any medications  -the evening readings before supper  THEN PLEASE BRING IN THESE READINGS TO SEE 1 OF MY ADVANCED PRACTITIONER'S ALONG WITH BRING IN YOUR BLOOD PRESSURE MACHINE   WE WILL RE-EVALUATE YOUR BLOOD PRESSURE MACHINE ACCURACY AND YOUR BLOOD PRESSURE READINGS AT HOME   WE MAY MAKE FURTHER CHANGES AT THAT TIME  7  In preparation for your x-ray/CT dye  study that will be ordered by Dr Jacqueline Orona:  -please hold your Enalapril and torsemide 1 day before the procedure, and the day of the procedure  Restart your medications the day after the procedure   -avoid any type of medications such as Advil, Aleve, ibuprofen Motrin Naprosyn etc  -you will need to go for a basic metabolic profile/lab work ordered by the vascular surgeons the day after the procedure  If you do not have a lab slip please call      8  Follow-up in 3 months:  -please bring in 1 week a blood pressure readings morning evening, sitting and standing:  · Take the morning readings before any medications  · Take the evening readings before supper  · When taking standing readings, keep your arm supported at heart level and not dangling  · Make sure you are sitting with your back supported in feet on the ground on crossed  · Make sure you have not taken any coffee or caffeine products or exercised or smoke cigarettes at least 30 minutes before taking your blood pressure  -please go for fasting lab work prior to your appointment    9  General instructions:  -AVOID SALT BUT NOT ADDING AN READING LABELS TO MAKE SURE THERE IS LOW-SALT IN THE FOOD THAT YOU ARE EATING  -avoid nonsteroidal anti-inflammatory drugs such as Naprosyn, ibuprofen, Aleve, Advil, Celebrex, etc   You can use Tylenol as needed if you do not have any liver condition to be concerned about  -avoid medications such as Sudafed or decongestants and antihistamines that contained the D component which is the decongestant    You can take antihistamines without the decongestant or D component   -try to exercise at least 30 minutes 3 days a week to begin with with an ultimate goal of 5 days a week for at least 30 minutes, when you are able to    [address] 1 DRINK A DAY AS THIS WILL HELP YOUR BLOOD PRESSURE  PLEASE CALL IF YOUR BLOOD PRESSURE PERSISTS OVER 180 ON THE TOP NUMBER OR IF YOU HAVE ANY SYMPTOMS SUCH AS HEADACHE OR CHEST PAIN  PLEASE CONTINUE TO WORK WITH YOUR FAMILY PHYSICIAN REGARDING TAPERING OFF YOUR CIGARETTE SMOKING AS THIS IS [address]

## 2019-09-11 NOTE — LETTER
September 11, 2019     Reza Pineda MD  [address] [place] [address] [address]  [address] [address]    Patient: Ivette Pablo   YOB: 1940   Date of Visit: 9/11/2019       Dear Dr Delonte Damon:    Thank you for referring Lucille Villafuerte to me for evaluation  Below are my notes for this consultation  If you have questions, please do not hesitate to call me  I look forward to following your patient along with you  Sincerely,        Kelsie Metzger MD        CC: DO Bita Elizabeth MD  9/11/2019  5:39 PM  Sign at close encounter  Consultation - Nephrology 9/11/2019        History of Present Illness   Reason for Consult / Principal Problem:  Contrast clearance in the setting of CKD stage 3    HPI: Ivette Pablo is a 78y o  year old female with a history of severe aortoiliac and infra inguinal arterial occlusion disease associated with rest pain in the right foot  Apparently this has been progressive  Apparently she is at risk for tissue loss going forward  Dr Delonte Damon the vascular surgeon would like to perform a CT angiography  We are asked to see her regarding CKD stage 3 in preparation for the contrast   The patient as the following creatinine levels:  · 0 94 mg/dL as of 04/17/2018  · 1 34 mg/dL as of 12/24/2018  · 1 06 mg/dL as of 08/27/2019; normal calcium  · Serum sodium of 135 with a normal glucose (in review of her records her sodium in the last 2 years has ranged 134-145)  · Urinalysis:  Negative proteinuria, negative hematuria negative pyuria as of 12/24/2018    The patient denies any prior kidney disease  She denies any dysuria hematuria voiding symptoms or foamy urine  She denies any history kidney stones, frequent urinary tract infections, pyelonephritis or bladder problems  She denies any excessive NSAID use only once in awhile Advil use at most twice a month  No significant leg swelling  On and off mild joint pains  No unusual skin rash at this time    No history of diabetes mellitus  History of hypertension:  For approximately 20 years  Somewhat labile  She does not monitor the blood pressure at home  Blood pressure medications:  · Enalapril 20 mg in the morning  · Atenolol 75 mg daily in the morning   · Chlorthalidone 25 mg daily in the morning  Of note, patient is on atorvastatin 80 mg once a day  General:  Overall feels well  No recent fevers chills or illnesses  Appetite is good and weight has been stable  Energy is fair with ups and Downs at times  Cardiovascular:  No chest pain, shortness of breath or leg swelling at this time  Respiratory:  :  She has asthma/postnasal drip with occasional wheezing but no coughing  Gastrointestinal:  Intermittent occasional diarrhea versus constipation, no nausea vomiting, lot of gas problems; last colonoscopy was within the last 10 years    Genitourinary:  See HPI  Neurology:  Pins and needles in sometimes numbness all the way up her legs at times; rare headaches, rare dizziness or lightheadedness upon standing  Rest of review of systems as completely reviewed with the patient are negative    Historical Information   Past Medical History:   Diagnosis Date    Aortic stenosis     Arthritis     Benign essential hypertension     CAD (coronary artery disease)     Dizziness     Edema of leg     Hyperlipidemia     Hypertension     Other disorder of circulatory system (CODE)    ·  History of asthma improved/no history of CVA or seizure/anemia when younger/no thyroid disease/no history of cancer/no history of diabetes mellitus/no history of an MI/angina or CHF  Past Surgical History:   Procedure Laterality Date    BREAST SURGERY      bxs,benign    COLONOSCOPY      HYSTERECTOMY      INCISIONAL BREAST BIOPSY      x5, 1964, 1965, 1985 and 1990     Social History   Social History     Substance and Sexual Activity   Alcohol Use Yes    Comment: manhattans 2-3 every day for 50 years     Social History     Substance and Sexual Activity   Drug Use No     Social History     Tobacco Use   Smoking Status Current Every Day Smoker    Packs/day: 1 00   Smokeless Tobacco Never Used   · Fifty pack year smoking history currently smoking  · Two mixed drinks daily  · Does try to stay on a low-sodium diet  · Slight walking but no overload dedicated exercise  Family History   Problem Relation Age of Onset    Hypertension Father     Coronary artery disease Family     Arthritis Family    · No family history kidney disease  · Father and mother with hypertension    Meds/Allergies   all current active meds have been reviewed, current meds:   Current Outpatient Medications:     albuterol (VENTOLIN HFA) 90 mcg/act inhaler, Inhale 2 puffs every 6 (six) hours as needed for wheezing, Disp: 18 g, Rfl: 2    aspirin 81 MG tablet, Take 2 tablets by mouth daily, Disp: , Rfl:     atenolol (TENORMIN) 25 mg tablet, 1/2 tab daily, Disp: 45 tablet, Rfl: 3    atenolol (TENORMIN) 50 mg tablet, Take 1 tablet (50 mg total) by mouth daily, Disp: 90 tablet, Rfl: 3    atorvastatin (LIPITOR) 80 mg tablet, take 1 tablet by mouth once daily, Disp: 90 tablet, Rfl: 3    B Complex Vitamins (VITAMIN B-COMPLEX) TABS, Take by mouth, Disp: , Rfl:     Bioflavonoid Products (VITAMIN C PLUS PO), Take by mouth daily, Disp: , Rfl:     ciclopirox (LOPROX) 0 77 % cream, Apply topically 2 (two) times a day for 20 days, Disp: 45 g, Rfl: 0    enalapril (VASOTEC) 20 mg tablet, Take 1 tablet (20 mg total) by mouth 2 (two) times a day, Disp: 180 tablet, Rfl: 3    fluticasone (FLONASE) 50 mcg/act nasal spray, 1 spray into each nostril daily, Disp: 16 g, Rfl: 3    GARLIC PO, Take by mouth, Disp: , Rfl:     levothyroxine 25 mcg tablet, Take 1 tablet (25 mcg total) by mouth daily in the early morning, Disp: 90 tablet, Rfl: 3    Multiple Minerals (CALCIUM-MAGNESIUM-ZINC) TABS, Take by mouth daily, Disp: , Rfl:     Potassium (POTASSIMIN PO), Take by mouth daily, Disp: , Rfl:    amLODIPine (NORVASC) 5 mg tablet, Take 1 tablet (5 mg total) by mouth daily, Disp: 90 tablet, Rfl: 3    Multiple Vitamins-Minerals (CENTRUM SILVER PO), Take by mouth, Disp: , Rfl:     varenicline (CHANTIX STARTING MONTH PAK) 0 5 MG X 11 & 1 MG X 42 tablet, Take one 0 5mg tab by mouth 1x daily for 3 days, then increase to one 0 5mg tab 2x daily for 3 days, then increase to one 1mg tab 2x daily (Patient not taking: Reported on 9/11/2019), Disp: 53 tablet, Rfl: 0    Allergies   Allergen Reactions    Thiazide-Type Diuretics      Hyponatremia       Objective    BP sitting on right:200/80 with a heart rate of 92 and regular; negative Osler maneuver  BP sitting on left:196/80  BP standing on right:  200/82 with a heart rate of 92 and regular    Body mass index is 22 38 kg/m²  General:  Well-developed well-nourished no acute distress  Skin:  No acute rash  Eyes:  No scleral icterus, noninjected; conjunctivae are normal  ENT:  Normocephalic/atraumatic, mucous membranes moist  Neck:  Supple, no jugular venous distention, 1+ carotid upstroke; bilateral carotid bruits  Back:  No CVA tenderness  Chest:  Clear to auscultation and percussion, good respiratory effort, no use of accessory respiratory muscles  CVS:  Regular rate and rhythm with a grade 3/6 systolic ejection type murmur heard throughout the precordium; no rubs or gallops appreciable  Abdomen:  Normal bowel sounds, soft and nontender without hepatosplenomegaly or bruits or masses appreciable  Extremities:  No clubbing, no cyanosis, no edema,; rubor of the right toes; poor distal pulses; no femoral bruits; no arthritic changes  Neuro:  No gross focality  Psych:  Alert oriented appropriate    Current Weight:   Weight (last 2 days)     Date/Time   Weight    09/11/19 1606   59 1 (130 4)                Lab Results:  I have personally reviewed pertinent labs    Results for orders placed or performed in visit on 16/00/90   Basic metabolic panel   Result Value Ref Range Sodium 135 (L) 136 - 145 mmol/L    Potassium 4 0 3 5 - 5 3 mmol/L    Chloride 98 (L) 100 - 108 mmol/L    CO2 26 21 - 32 mmol/L    ANION GAP 11 4 - 13 mmol/L    BUN 29 (H) 5 - 25 mg/dL    Creatinine 1 06 0 60 - 1 30 mg/dL    Glucose, Fasting 93 65 - 99 mg/dL    Calcium 9 4 8 3 - 10 1 mg/dL    eGFR 50 ml/min/1 73sq m               ASSESSMENT AND PLAN:  1  CKD stage 3:  Differential diagnosis would include hypertensive nephrosclerosis/arteriolar nephrosclerosis/? Renal artery disease given peripheral artery disease  Less likely a primary glomerular process given lack of proteinuria and hematuria on the urinalysis from last December  Also rule out obstructive uropathy  Recommendations: Workup:  · UA:  · Renal ultrasound:     · Renal artery duplex[de-identified]  Will do CT angiography  · Follow-up chemistry profile at this time to determine stability of creatinine  · Mineral bone disease evaluation including magnesium/phosphorus/PTH intact level/vitamin-D level  · Anemia evaluation with a CBC further evaluation depending upon the result  Treatment:  · In the setting of contrast I would recommend the following:        IV fluid with isotonic saline in preparation        Mucomyst 1200 mg twice a day the day before and the day of the procedure        Monitor renal function 1-2 days after the contrast procedure  · Treat hypertension please see below  · Treat dyslipidemia  · Avoid nephrotoxic agents such as NSAIDs, patient counseled as such  · Overall good health with weight loss, isotonic exercise and avoidance of salt; patient counseled as such    2  Hypertension:  Patient clearly with accelerated hypertension but not any overt and organ involvement at the moment  There may be a component of anxiety given the fact that she had locked her purse in her apartment before the appointment making her late as well as white coat hypertension    Clearly however we need to rule out secondary hypertension including renal artery disease as outlined above  (no evidence of pseudo hypertension given negative Osler maneuver on exam)  Recommendations: Workup:  · CT angiography to evaluate lower extremities as well as renal arteries to be ordered by the vascular surgeon Dr Tito Felix  · Thyroid profile  · Aldosterone/renin ratio to rule out primary aldosterone state  · Plasma free metanephrines to rule out pheochromocytoma  · Normal calcium essentially rules out primary hyperparathyroidism  · No history of snoring to suggest obstructive sleep apnea  Treatment:  · Certainly push nonmedical regimen:        -decrease alcohol intake to 1 drink per day as this may be contributing        -Low sodium diet        -exercise as able        -taper cigarette smoking; patient is working with her internist on this which will also help her vascular disease  · Medication changes today:        -stop chlorthalidone given borderline hyponatremia        -increase enalapril to 20 mg twice a day        -add amlodipine 5 mg daily        -switch chlorthalidone to torsemide 5 mg daily        -next possibility is either increasing amlodipine or adding clonidine  · I would have the patient begin taking blood pressures at home; then check the blood pressure monitor accuracy in the office in the next 1 week with a follow-up in 1 week regarding the blood pressure  · GOAL BLOOD PRESSURE IS LESS THAN 120-125/80 GIVEN PERIPHERAL ARTERY DISEASE  3  Hyponatremia:  Most likely related to the chlorthalidone  Switch to torsemide  If persisting consider further evaluation workup  4  PAD:  Evaluation per vascular surgery as outlined  Please see below for preparation and clearance  5  Other problems:  · Carotid bruits: Followed by Dr Tito Felix; 50-69% stenosis on the right, less than 50% on the left  This was in May of 2019  Part of this may be related to the aortic murmur  · Cardiac murmur:  I will reorder an echocardiogram at this time    I would also recommend follow-up with   Emelia given severe comorbidities with the risk for cardiac disease  · Cigarette smoking:  Being managed by primary medical physician for potential tapering  I have personally discussed the risks and benefits of the CT angiography from a renal standpoint with the patient in depth, and advised the patient about the risk of SERJIO and the course of SERJIO if it occurs with the small probability of the need for renal replacement therapy/hemodialysis with the kidney machine in the worse case scenario  Patient voiced understanding   From a renal standpoint the patient is at a low to moderate risk for the CT angiography/dye causing acute kidney injury/contrast induced nephropathy:  with the following recommendations:   Fluids to administer:  Isotonic saline at 250 mL/hour 2 hours prior to the procedure and subsequently 100 mL/hour through the procedure and 4 hours after the procedure and then can be hep-locked   Medication Recommendations:   Minimize any IV contrast use (If IV contrast is used, please check BMP POD # 1)   Hold NSAIDs for at least 10 days prior to surgery   Hold ACEi/ARB :  Hold Vasotec 1 day before the procedure the day of the procedure and restart the next day   Hold the torsemide the day before the procedure on the day of the procedure and then re-initiate the next day   Monitor blood pressure through the procedure and call if systolic blood pressure greater than or equal to 180   Other Recommendations:   If possible give Mucomyst 1200 mg twice a day the day before the procedure and the day of the procedure   AGAIN, PLEASE OBTAIN A BASIC METABOLIC PROFILE 1-2 DAYS AFTER THE PROCEDURE      I reviewed the above impression and plan with the patient including the different causes for her chronic mild kidney disease as well as the plan for monitoring and keeping her kidney function stable    In addition, we discussed at length her elevated blood pressure and measures to treat and monitor going forward in all the nonmedical treatments as outlined  I answered all questions to the patient's satisfaction  This was a complex consult  Patient Instructions   1  Medication changes today:  -increase enalapril to 20 mg twice a day beginning today  -begin amlodipine 5 mg daily in the morning  Watch for leg swelling and if he develops any please call me  -stop chlorthalidone  -begin torsemide 5 mg daily beginning tomorrow morning     2  Please wait 1 week after the above medication changes and go for nonfasting lab work but in the morning    3  Please go for an ultrasound of your kidneys which we will arrange in the next week or so  4  Please go for a echocardiogram of your heart which will arrange for the next week or so     5  Please make an appointment to see Dr Killian Marte your cardiologist in the next few weeks  If you need any intervention on her legs it will be important to get cardiac clearance  You also have a heart murmur and that is why we are doing the echocardiogram and having Dr Killian Marte see you  6  Please take 1 week a blood pressure readings beginning on Monday 9/16 for 1 full week morning evening:  -the morning readings before taking any medications  -the evening readings before supper  THEN PLEASE BRING IN THESE READINGS TO SEE 1 OF MY ADVANCED PRACTITIONER'S ALONG WITH BRING IN YOUR BLOOD PRESSURE MACHINE   WE WILL RE-EVALUATE YOUR BLOOD PRESSURE MACHINE ACCURACY AND YOUR BLOOD PRESSURE READINGS AT HOME   WE MAY MAKE FURTHER CHANGES AT THAT TIME  7  In preparation for your x-ray/CT dye  study that will be ordered by Dr Ludwig [address]:  -please hold your Enalapril and torsemide 1 day before the procedure, and the day of the procedure  Restart your medications the day after the procedure   -avoid any type of medications such as Advil, Aleve, ibuprofen Motrin Naprosyn etc  -you will need to go for a basic metabolic profile/lab work ordered by the vascular surgeons the day after the procedure    If you do not have a lab slip please call  8  Follow-up in 3 months:  -please bring in 1 week a blood pressure readings morning evening, sitting and standing:  · Take the morning readings before any medications  · Take the evening readings before supper  · When taking standing readings, keep your arm supported at heart level and not dangling  · Make sure you are sitting with your back supported in feet on the ground on crossed  · Make sure you have not taken any coffee or caffeine products or exercised or smoke cigarettes at least 30 minutes before taking your blood pressure  -please go for fasting lab work prior to your appointment    9  General instructions:  -AVOID SALT BUT NOT ADDING AN READING LABELS TO MAKE SURE THERE IS LOW-SALT IN THE FOOD THAT YOU ARE EATING  -avoid nonsteroidal anti-inflammatory drugs such as Naprosyn, ibuprofen, Aleve, Advil, Celebrex, etc   You can use Tylenol as needed if you do not have any liver condition to be concerned about  -avoid medications such as Sudafed or decongestants and antihistamines that contained the D component which is the decongestant    You can take antihistamines without the decongestant or D component   -try to exercise at least 30 minutes 3 days a week to begin with with an ultimate goal of 5 days a week for at least 30 minutes, when you are able to    [address] 1 DRINK A DAY AS THIS WILL HELP YOUR BLOOD PRESSURE  PLEASE CALL IF YOUR BLOOD PRESSURE PERSISTS OVER 180 ON THE TOP NUMBER OR IF YOU HAVE ANY SYMPTOMS SUCH AS HEADACHE OR CHEST PAIN  PLEASE CONTINUE TO WORK WITH YOUR FAMILY PHYSICIAN REGARDING TAPERING OFF YOUR CIGARETTE SMOKING AS THIS IS IMPERATIVE FOR OVERALL HEALTH AND FOR YOUR BLOOD VESSEL [address]          Portions of the record may have been created with voice recognition software   Occasional wrong word or "sound a like" substitutions may have occurred due to the inherent limitations of voice recognition software   Read the chart carefully and recognize, using context, where substitutions have occurred      Angelo Mccormick MD

## 2019-09-11 NOTE — PROGRESS NOTES
Consultation - Nephrology 9/11/2019        History of Present Illness   Reason for Consult / Principal Problem:  Contrast clearance in the setting of CKD stage 3    HPI: Phyllis Lowery is a 78y o  year old female with a history of severe aortoiliac and infra inguinal arterial occlusion disease associated with rest pain in the right foot  Apparently this has been progressive  Apparently she is at risk for tissue loss going forward  Dr Claude Velazquez the vascular surgeon would like to perform a CT angiography  We are asked to see her regarding CKD stage 3 in preparation for the contrast   The patient as the following creatinine levels:  · 0 94 mg/dL as of 04/17/2018  · 1 34 mg/dL as of 12/24/2018  · 1 06 mg/dL as of 08/27/2019; normal calcium  · Serum sodium of 135 with a normal glucose (in review of her records her sodium in the last 2 years has ranged 134-145)  · Urinalysis:  Negative proteinuria, negative hematuria negative pyuria as of 12/24/2018    The patient denies any prior kidney disease  She denies any dysuria hematuria voiding symptoms or foamy urine  She denies any history kidney stones, frequent urinary tract infections, pyelonephritis or bladder problems  She denies any excessive NSAID use only once in awhile Advil use at most twice a month  No significant leg swelling  On and off mild joint pains  No unusual skin rash at this time  No history of diabetes mellitus  History of hypertension:  For approximately 20 years  Somewhat labile  She does not monitor the blood pressure at home  Blood pressure medications:  · Enalapril 20 mg in the morning  · Atenolol 75 mg daily in the morning   · Chlorthalidone 25 mg daily in the morning  Of note, patient is on atorvastatin 80 mg once a day  General:  Overall feels well  No recent fevers chills or illnesses  Appetite is good and weight has been stable  Energy is fair with ups and Downs at times    Cardiovascular:  No chest pain, shortness of breath or leg swelling at this time  Respiratory:  :  She has asthma/postnasal drip with occasional wheezing but no coughing  Gastrointestinal:  Intermittent occasional diarrhea versus constipation, no nausea vomiting, lot of gas problems; last colonoscopy was within the last 10 years    Genitourinary:  See HPI  Neurology:  Pins and needles in sometimes numbness all the way up her legs at times; rare headaches, rare dizziness or lightheadedness upon standing  Rest of review of systems as completely reviewed with the patient are negative    Historical Information   Past Medical History:   Diagnosis Date    Aortic stenosis     Arthritis     Benign essential hypertension     CAD (coronary artery disease)     Dizziness     Edema of leg     Hyperlipidemia     Hypertension     Other disorder of circulatory system (CODE)    ·  History of asthma improved/no history of CVA or seizure/anemia when younger/no thyroid disease/no history of cancer/no history of diabetes mellitus/no history of an MI/angina or CHF  Past Surgical History:   Procedure Laterality Date    BREAST SURGERY      bxs,benign    COLONOSCOPY      HYSTERECTOMY      INCISIONAL BREAST BIOPSY      x5, 1964, 1965, 1985 and 1990     Social History   Social History     Substance and Sexual Activity   Alcohol Use Yes    Comment: [facility] 2-3 every day for 50 years     Social History     Substance and Sexual Activity   Drug Use No     Social History     Tobacco Use   Smoking Status Current Every Day Smoker    Packs/day: 1 00   Smokeless Tobacco Never Used   · Fifty pack year smoking history currently smoking  · Two mixed drinks daily  · Does try to stay on a low-sodium diet  · Slight walking but no overload dedicated exercise  Family History   Problem Relation Age of Onset    Hypertension Father     Coronary artery disease Family     Arthritis Family    · No family history kidney disease  · Father and mother with hypertension    Meds/Allergies   all current active meds have been reviewed, current meds:   Current Outpatient Medications:     albuterol (VENTOLIN HFA) 90 mcg/act inhaler, Inhale 2 puffs every 6 (six) hours as needed for wheezing, Disp: 18 g, Rfl: 2    aspirin 81 MG tablet, Take 2 tablets by mouth daily, Disp: , Rfl:     atenolol (TENORMIN) 25 mg tablet, 1/2 tab daily, Disp: 45 tablet, Rfl: 3    atenolol (TENORMIN) 50 mg tablet, Take 1 tablet (50 mg total) by mouth daily, Disp: 90 tablet, Rfl: 3    atorvastatin (LIPITOR) 80 mg tablet, take 1 tablet by mouth once daily, Disp: 90 tablet, Rfl: 3    B Complex Vitamins (VITAMIN B-COMPLEX) TABS, Take by mouth, Disp: , Rfl:     Bioflavonoid Products (VITAMIN C PLUS PO), Take by mouth daily, Disp: , Rfl:     ciclopirox (LOPROX) 0 77 % cream, Apply topically 2 (two) times a day for 20 days, Disp: 45 g, Rfl: 0    enalapril (VASOTEC) 20 mg tablet, Take 1 tablet (20 mg total) by mouth 2 (two) times a day, Disp: 180 tablet, Rfl: 3    fluticasone (FLONASE) 50 mcg/act nasal spray, 1 spray into each nostril daily, Disp: 16 g, Rfl: 3    GARLIC PO, Take by mouth, Disp: , Rfl:     levothyroxine 25 mcg tablet, Take 1 tablet (25 mcg total) by mouth daily in the early morning, Disp: 90 tablet, Rfl: 3    Multiple Minerals (CALCIUM-MAGNESIUM-ZINC) TABS, Take by mouth daily, Disp: , Rfl:     Potassium (POTASSIMIN PO), Take by mouth daily, Disp: , Rfl:     amLODIPine (NORVASC) 5 mg tablet, Take 1 tablet (5 mg total) by mouth daily, Disp: 90 tablet, Rfl: 3    Multiple Vitamins-Minerals (CENTRUM SILVER PO), Take by mouth, Disp: , Rfl:     varenicline (CHANTIX STARTING MONTH BALDEMAR) 0 5 MG X 11 & 1 MG X 42 tablet, Take one 0 5mg tab by mouth 1x daily for 3 days, then increase to one 0 5mg tab 2x daily for 3 days, then increase to one 1mg tab 2x daily (Patient not taking: Reported on 9/11/2019), Disp: 53 tablet, Rfl: 0    Allergies   Allergen Reactions    Thiazide-Type Diuretics      Hyponatremia       Objective    BP sitting on right:200/80 with a heart rate of 92 and regular; negative Osler maneuver  BP sitting on left:196/80  BP standing on right:  200/82 with a heart rate of 92 and regular    Body mass index is 22 38 kg/m²  General:  Well-developed well-nourished no acute distress  Skin:  No acute rash  Eyes:  No scleral icterus, noninjected; conjunctivae are normal  ENT:  Normocephalic/atraumatic, mucous membranes moist  Neck:  Supple, no jugular venous distention, 1+ carotid upstroke; bilateral carotid bruits  Back:  No CVA tenderness  Chest:  Clear to auscultation and percussion, good respiratory effort, no use of accessory respiratory muscles  CVS:  Regular rate and rhythm with a grade 3/6 systolic ejection type murmur heard throughout the precordium; no rubs or gallops appreciable  Abdomen:  Normal bowel sounds, soft and nontender without hepatosplenomegaly or bruits or masses appreciable  Extremities:  No clubbing, no cyanosis, no edema,; rubor of the right toes; poor distal pulses; no femoral bruits; no arthritic changes  Neuro:  No gross focality  Psych:  Alert oriented appropriate    Current Weight:   Weight (last 2 days)     Date/Time   Weight    09/11/19 1606   59 1 (130 4)                Lab Results:  I have personally reviewed pertinent labs  Results for orders placed or performed in visit on 58/32/22   Basic metabolic panel   Result Value Ref Range    Sodium 135 (L) 136 - 145 mmol/L    Potassium 4 0 3 5 - 5 3 mmol/L    Chloride 98 (L) 100 - 108 mmol/L    CO2 26 21 - 32 mmol/L    ANION GAP 11 4 - 13 mmol/L    BUN 29 (H) 5 - 25 mg/dL    Creatinine 1 06 0 60 - 1 30 mg/dL    Glucose, Fasting 93 65 - 99 mg/dL    Calcium 9 4 8 3 - 10 1 mg/dL    eGFR 50 ml/min/1 73sq m               ASSESSMENT AND PLAN:  1  CKD stage 3:  Differential diagnosis would include hypertensive nephrosclerosis/arteriolar nephrosclerosis/? Renal artery disease given peripheral artery disease    Less likely a primary glomerular process given lack of proteinuria and hematuria on the urinalysis from last December  Also rule out obstructive uropathy  Recommendations: Workup:  · UA:  · Renal ultrasound:     · Renal artery duplex[de-identified]  Will do CT angiography  · Follow-up chemistry profile at this time to determine stability of creatinine  · Mineral bone disease evaluation including magnesium/phosphorus/PTH intact level/vitamin-D level  · Anemia evaluation with a CBC further evaluation depending upon the result  Treatment:  · In the setting of contrast I would recommend the following:        IV fluid with isotonic saline in preparation        Mucomyst 1200 mg twice a day the day before and the day of the procedure        Monitor renal function 1-2 days after the contrast procedure  · Treat hypertension please see below  · Treat dyslipidemia  · Avoid nephrotoxic agents such as NSAIDs, patient counseled as such  · Overall good health with weight loss, isotonic exercise and avoidance of salt; patient counseled as such    2  Hypertension:  Patient clearly with accelerated hypertension but not any overt and organ involvement at the moment  There may be a component of anxiety given the fact that she had locked her purse in her apartment before the appointment making her late as well as white coat hypertension  Clearly however we need to rule out secondary hypertension including renal artery disease as outlined above  (no evidence of pseudo hypertension given negative Osler maneuver on exam)  Recommendations:   Workup:  · CT angiography to evaluate lower extremities as well as renal arteries to be ordered by the vascular surgeon Dr Parth Maloney  · Thyroid profile  · Aldosterone/renin ratio to rule out primary aldosterone state  · Plasma free metanephrines to rule out pheochromocytoma  · Normal calcium essentially rules out primary hyperparathyroidism  · No history of snoring to suggest obstructive sleep apnea  Treatment:  · Certainly push nonmedical regimen: -decrease alcohol intake to 1 drink per day as this may be contributing        -Low sodium diet        -exercise as able        -taper cigarette smoking; patient is working with her internist on this which will also help her vascular disease  · Medication changes today:        -stop chlorthalidone given borderline hyponatremia        -increase enalapril to 20 mg twice a day        -add amlodipine 5 mg daily        -switch chlorthalidone to torsemide 5 mg daily        -next possibility is either increasing amlodipine or adding clonidine  · I would have the patient begin taking blood pressures at home; then check the blood pressure monitor accuracy in the office in the next 1 week with a follow-up in 1 week regarding the blood pressure  · GOAL BLOOD PRESSURE IS LESS THAN 120-125/80 GIVEN PERIPHERAL ARTERY DISEASE  3  Hyponatremia:  Most likely related to the chlorthalidone  Switch to torsemide  If persisting consider further evaluation workup  4  PAD:  Evaluation per vascular surgery as outlined  Please see below for preparation and clearance  5  Other problems:  · Carotid bruits: Followed by Dr Roman Pap; 50-69% stenosis on the right, less than 50% on the left  This was in May of 2019  Part of this may be related to the aortic murmur  · Cardiac murmur:  I will reorder an echocardiogram at this time  I would also recommend follow-up with Dr Dior Wayne given severe comorbidities with the risk for cardiac disease  · Cigarette smoking:  Being managed by primary medical physician for potential tapering  I have personally discussed the risks and benefits of the CT angiography from a renal standpoint with the patient in depth, and advised the patient about the risk of SERJIO and the course of SERJIO if it occurs with the small probability of the need for renal replacement therapy/hemodialysis with the kidney machine in the worse case scenario  Patient voiced understanding       From a renal standpoint the patient is at a low to moderate risk for the CT angiography/dye causing acute kidney injury/contrast induced nephropathy:  with the following recommendations:   Fluids to administer:  Isotonic saline at 250 mL/hour 2 hours prior to the procedure and subsequently 100 mL/hour through the procedure and 4 hours after the procedure and then can be hep-locked   Medication Recommendations:   Minimize any IV contrast use (If IV contrast is used, please check BMP POD # 1)   Hold NSAIDs for at least 10 days prior to surgery   Hold ACEi/ARB :  Hold Vasotec 1 day before the procedure the day of the procedure and restart the next day   Hold the torsemide the day before the procedure on the day of the procedure and then re-initiate the next day   Monitor blood pressure through the procedure and call if systolic blood pressure greater than or equal to 180   Other Recommendations:   If possible give Mucomyst 1200 mg twice a day the day before the procedure and the day of the procedure   AGAIN, PLEASE OBTAIN A BASIC METABOLIC PROFILE 1-2 DAYS AFTER THE PROCEDURE      I reviewed the above impression and plan with the patient including the different causes for her chronic mild kidney disease as well as the plan for monitoring and keeping her kidney function stable  In addition, we discussed at length her elevated blood pressure and measures to treat and monitor going forward in all the nonmedical treatments as outlined  I answered all questions to the patient's satisfaction  This was a complex consult  Patient Instructions   1  Medication changes today:  -increase enalapril to 20 mg twice a day beginning today  -begin amlodipine 5 mg daily in the morning  Watch for leg swelling and if he develops any please call me  -stop chlorthalidone  -begin torsemide 5 mg daily beginning tomorrow morning     2  Please wait 1 week after the above medication changes and go for nonfasting lab work but in the morning    3   Please go for an ultrasound of your kidneys which we will arrange in the next week or so  4  Please go for a echocardiogram of your heart which will arrange for the next week or so     5  Please make an appointment to see Dr Dorota Gamez your cardiologist in the next few weeks  If you need any intervention on her legs it will be important to get cardiac clearance  You also have a heart murmur and that is why we are doing the echocardiogram and having Dr Dorota Gamez see you  6  Please take 1 week a blood pressure readings beginning on Monday 9/16 for 1 full week morning evening:  -the morning readings before taking any medications  -the evening readings before supper  THEN PLEASE BRING IN THESE READINGS TO SEE 1 OF MY ADVANCED PRACTITIONER'S ALONG WITH BRING IN YOUR BLOOD PRESSURE MACHINE   WE WILL RE-EVALUATE YOUR BLOOD PRESSURE MACHINE ACCURACY AND YOUR BLOOD PRESSURE READINGS AT HOME   WE MAY MAKE FURTHER CHANGES AT THAT TIME  7  In preparation for your x-ray/CT dye  study that will be ordered by Dr Esau Tabares:  -please hold your Enalapril and torsemide 1 day before the procedure, and the day of the procedure  Restart your medications the day after the procedure   -avoid any type of medications such as Advil, Aleve, ibuprofen Motrin Naprosyn etc  -you will need to go for a basic metabolic profile/lab work ordered by the vascular surgeons the day after the procedure  If you do not have a lab slip please call      8  Follow-up in 3 months:  -please bring in 1 week a blood pressure readings morning evening, sitting and standing:  · Take the morning readings before any medications  · Take the evening readings before supper  · When taking standing readings, keep your arm supported at heart level and not dangling  · Make sure you are sitting with your back supported in feet on the ground on crossed  · Make sure you have not taken any coffee or caffeine products or exercised or smoke cigarettes at least 30 minutes before taking your blood pressure  -please go for fasting lab work prior to your appointment    9  General instructions:  -AVOID SALT BUT NOT ADDING AN READING LABELS TO MAKE SURE THERE IS LOW-SALT IN THE FOOD THAT YOU ARE EATING  -avoid nonsteroidal anti-inflammatory drugs such as Naprosyn, ibuprofen, Aleve, Advil, Celebrex, etc   You can use Tylenol as needed if you do not have any liver condition to be concerned about  -avoid medications such as Sudafed or decongestants and antihistamines that contained the D component which is the decongestant  You can take antihistamines without the decongestant or D component   -try to exercise at least 30 minutes 3 days a week to begin with with an ultimate goal of 5 days a week for at least 30 minutes, when you are able to    [address] 1 DRINK A DAY AS THIS WILL HELP YOUR BLOOD PRESSURE  PLEASE CALL IF YOUR BLOOD PRESSURE PERSISTS OVER 180 ON THE TOP NUMBER OR IF YOU HAVE ANY SYMPTOMS SUCH AS HEADACHE OR CHEST PAIN  PLEASE CONTINUE TO WORK WITH YOUR FAMILY PHYSICIAN REGARDING TAPERING OFF YOUR CIGARETTE SMOKING AS THIS IS IMPERATIVE FOR OVERALL HEALTH AND FOR YOUR BLOOD [address]          Portions of the record may have been created with voice recognition software   Occasional wrong word or "sound a like" substitutions may have occurred due to the inherent limitations of voice recognition software   Read the chart carefully and recognize, using context, where substitutions have occurred      Honorio Johnson MD

## 2019-09-12 ENCOUNTER — TELEPHONE (OUTPATIENT)
Dept: VASCULAR SURGERY | Facility: CLINIC | Age: 79
End: 2019-09-12

## 2019-09-12 ENCOUNTER — TELEPHONE (OUTPATIENT)
Dept: FAMILY MEDICINE CLINIC | Facility: CLINIC | Age: 79
End: 2019-09-12

## 2019-09-12 DIAGNOSIS — I10 ESSENTIAL HYPERTENSION: ICD-10-CM

## 2019-09-12 DIAGNOSIS — I74.09 AORTOILIAC OCCLUSIVE DISEASE (HCC): Primary | ICD-10-CM

## 2019-09-12 RX ORDER — SODIUM CHLORIDE 9 MG/ML
250 INJECTION, SOLUTION INTRAVENOUS CONTINUOUS
Status: CANCELLED | OUTPATIENT
Start: 2019-10-07 | End: 2019-10-07

## 2019-09-12 RX ORDER — SODIUM CHLORIDE 9 MG/ML
100 INJECTION, SOLUTION INTRAVENOUS CONTINUOUS
Status: CANCELLED | OUTPATIENT
Start: 2019-10-07 | End: 2019-10-07

## 2019-09-12 NOTE — PROGRESS NOTES
Per nephrology recommendations, patient should also continue to receive hydration during procedure at 100 mL/hr

## 2019-09-12 NOTE — TELEPHONE ENCOUNTER
Hydration orders placed in beacon as requested by email from [facility]

## 2019-09-12 NOTE — TELEPHONE ENCOUNTER
Lmom for pt to call back to schedule CTA abdominal w runoff w wo contrast  Pt needs 2 hrs pre and 4 hrs post hydration

## 2019-09-12 NOTE — TELEPHONE ENCOUNTER
Please schedule patient for the [facility]  Per email instructions, patient needs 2 hr pre hydration, hydration during procedure, and 4 hr post hydration  Email also sent to Kim Matias to schedule

## 2019-09-12 NOTE — TELEPHONE ENCOUNTER
Please have Dr Sims Most place hydration orders in beacon as he recommends, also provide patient with Mycomyst order    Thank you

## 2019-09-13 ENCOUNTER — TELEPHONE (OUTPATIENT)
Dept: ADMINISTRATIVE | Facility: HOSPITAL | Age: 79
End: 2019-09-13

## 2019-09-13 NOTE — TELEPHONE ENCOUNTER
Pt returned my call and she want to hold off on doing her CTA  She does have nephrology clearance from Dr Iveth Ramachandran as of 9/11/19  She has a few different blood test and other testing Dr Iveth Ramachandran would like her to get done first prior to the CTA  The pt also said she is going on a trip within the next few weeks and would prefer to wait until she gets back from her trip and when she is done with all of the testing from Dr Iveth Ramachandran  The nursing staff and I will continue to f/u with her within the next few weeks and for her to consider testing again

## 2019-09-13 NOTE — TELEPHONE ENCOUNTER
Rhoda Reeves called and said patient wants to hold off on scheudling because dr Brittni Ibrahim gave her multiple things to do , and she wants to hold off right now

## 2019-09-17 NOTE — TELEPHONE ENCOUNTER
Not sure whats going on with this, I called the pharmacy and they have the sig as 25 mg tablet take 1/2 tablet daily, is this the correct sig?

## 2019-09-18 RX ORDER — ATENOLOL 25 MG/1
TABLET ORAL
Qty: 90 TABLET | Refills: 3 | Status: SHIPPED | OUTPATIENT
Start: 2019-09-18 | End: 2019-09-19 | Stop reason: SDUPTHER

## 2019-09-18 RX ORDER — ATENOLOL 50 MG/1
50 TABLET ORAL DAILY
Qty: 90 TABLET | Refills: 3 | Status: SHIPPED | OUTPATIENT
Start: 2019-09-18 | End: 2019-09-19 | Stop reason: SDUPTHER

## 2019-09-18 NOTE — TELEPHONE ENCOUNTER
Spoke to patient regarding her atenolol, patient states she takes 75 mg daily and no longer needs to cut the 50 mg tablet in half since she now has 25 mg  (She has been cutting one 50mg tablet in half to get 75 mg)  She needs the Atenolol 50 mg and the 25 mg refilled for 90 tablets each, also needs refill of her levothyroxine, dispense #90  She is going away and needs to take these with her

## 2019-09-19 DIAGNOSIS — I10 ESSENTIAL HYPERTENSION: ICD-10-CM

## 2019-09-19 DIAGNOSIS — E03.9 HYPOTHYROIDISM, UNSPECIFIED TYPE: ICD-10-CM

## 2019-09-20 ENCOUNTER — TELEPHONE (OUTPATIENT)
Dept: NEPHROLOGY | Facility: CLINIC | Age: 79
End: 2019-09-20

## 2019-09-24 NOTE — TELEPHONE ENCOUNTER
Patient is returning your call she will be going to [facility] for her lab work        Sara Gilmore MA

## 2019-09-25 ENCOUNTER — TRANSCRIBE ORDERS (OUTPATIENT)
Dept: ADMINISTRATIVE | Facility: HOSPITAL | Age: 79
End: 2019-09-25

## 2019-09-25 ENCOUNTER — APPOINTMENT (OUTPATIENT)
Dept: LAB | Facility: HOSPITAL | Age: 79
End: 2019-09-25
Attending: INTERNAL MEDICINE
Payer: COMMERCIAL

## 2019-09-25 DIAGNOSIS — I70.219 ATHEROSCLEROSIS OF ARTERY OF EXTREMITY WITH INTERMITTENT CLAUDICATION (HCC): Chronic | ICD-10-CM

## 2019-09-25 DIAGNOSIS — N18.30 CHRONIC KIDNEY DISEASE, STAGE III (MODERATE) (HCC): Primary | ICD-10-CM

## 2019-09-25 DIAGNOSIS — I74.09 AORTOILIAC OCCLUSIVE DISEASE (HCC): Chronic | ICD-10-CM

## 2019-09-25 DIAGNOSIS — N18.30 CKD (CHRONIC KIDNEY DISEASE) STAGE 3, GFR 30-59 ML/MIN (HCC): Chronic | ICD-10-CM

## 2019-09-25 LAB
25(OH)D3 SERPL-MCNC: 57.3 NG/ML (ref 30–100)
ANION GAP SERPL CALCULATED.3IONS-SCNC: 8 MMOL/L (ref 4–13)
BACTERIA UR QL AUTO: ABNORMAL /HPF
BILIRUB UR QL STRIP: NEGATIVE
BUN SERPL-MCNC: 23 MG/DL (ref 5–25)
CALCIUM SERPL-MCNC: 8.9 MG/DL (ref 8.3–10.1)
CHLORIDE SERPL-SCNC: 104 MMOL/L (ref 100–108)
CHOLEST SERPL-MCNC: 110 MG/DL (ref 50–200)
CK SERPL-CCNC: 94 U/L (ref 26–192)
CLARITY UR: CLEAR
CO2 SERPL-SCNC: 28 MMOL/L (ref 21–32)
COLOR UR: YELLOW
CREAT SERPL-MCNC: 0.87 MG/DL (ref 0.6–1.3)
ERYTHROCYTE [DISTWIDTH] IN BLOOD BY AUTOMATED COUNT: 15 % (ref 11.6–15.1)
GFR SERPL CREATININE-BSD FRML MDRD: 64 ML/MIN/1.73SQ M
GLUCOSE P FAST SERPL-MCNC: 97 MG/DL (ref 65–99)
GLUCOSE UR STRIP-MCNC: NEGATIVE MG/DL
HCT VFR BLD AUTO: 34.4 % (ref 34.8–46.1)
HDLC SERPL-MCNC: 82 MG/DL (ref 40–60)
HGB BLD-MCNC: 11.5 G/DL (ref 11.5–15.4)
HGB UR QL STRIP.AUTO: ABNORMAL
KETONES UR STRIP-MCNC: NEGATIVE MG/DL
LDLC SERPL CALC-MCNC: 23 MG/DL (ref 0–100)
LEUKOCYTE ESTERASE UR QL STRIP: ABNORMAL
MAGNESIUM SERPL-MCNC: 1.3 MG/DL (ref 1.6–2.6)
MCH RBC QN AUTO: 35.1 PG (ref 26.8–34.3)
MCHC RBC AUTO-ENTMCNC: 33.4 G/DL (ref 31.4–37.4)
MCV RBC AUTO: 105 FL (ref 82–98)
NITRITE UR QL STRIP: NEGATIVE
NON-SQ EPI CELLS URNS QL MICRO: ABNORMAL /HPF
PH UR STRIP.AUTO: 6 [PH]
PHOSPHATE SERPL-MCNC: 3.7 MG/DL (ref 2.3–4.1)
PLATELET # BLD AUTO: 197 THOUSANDS/UL (ref 149–390)
PMV BLD AUTO: 11.8 FL (ref 8.9–12.7)
POTASSIUM SERPL-SCNC: 3.9 MMOL/L (ref 3.5–5.3)
PROT UR STRIP-MCNC: ABNORMAL MG/DL
PTH-INTACT SERPL-MCNC: 36 PG/ML (ref 18.4–80.1)
RBC # BLD AUTO: 3.28 MILLION/UL (ref 3.81–5.12)
RBC #/AREA URNS AUTO: ABNORMAL /HPF
SODIUM SERPL-SCNC: 140 MMOL/L (ref 136–145)
SP GR UR STRIP.AUTO: 1.01 (ref 1–1.03)
TRIGL SERPL-MCNC: 24 MG/DL
TSH SERPL DL<=0.05 MIU/L-ACNC: 2.12 UIU/ML (ref 0.36–3.74)
UROBILINOGEN UR QL STRIP.AUTO: 0.2 E.U./DL
WBC # BLD AUTO: 7.84 THOUSAND/UL (ref 4.31–10.16)
WBC #/AREA URNS AUTO: ABNORMAL /HPF

## 2019-09-25 PROCEDURE — 83735 ASSAY OF MAGNESIUM: CPT

## 2019-09-25 PROCEDURE — 36415 COLL VENOUS BLD VENIPUNCTURE: CPT

## 2019-09-25 PROCEDURE — 81001 URINALYSIS AUTO W/SCOPE: CPT

## 2019-09-25 PROCEDURE — 82550 ASSAY OF CK (CPK): CPT

## 2019-09-25 PROCEDURE — 82088 ASSAY OF ALDOSTERONE: CPT

## 2019-09-25 PROCEDURE — 85027 COMPLETE CBC AUTOMATED: CPT

## 2019-09-25 PROCEDURE — 80048 BASIC METABOLIC PNL TOTAL CA: CPT

## 2019-09-25 PROCEDURE — 84443 ASSAY THYROID STIM HORMONE: CPT

## 2019-09-25 PROCEDURE — 83835 ASSAY OF METANEPHRINES: CPT

## 2019-09-25 PROCEDURE — 82306 VITAMIN D 25 HYDROXY: CPT

## 2019-09-25 PROCEDURE — 84244 ASSAY OF RENIN: CPT

## 2019-09-25 PROCEDURE — 84100 ASSAY OF PHOSPHORUS: CPT

## 2019-09-25 PROCEDURE — 80061 LIPID PANEL: CPT

## 2019-09-25 PROCEDURE — 83970 ASSAY OF PARATHORMONE: CPT

## 2019-09-26 ENCOUNTER — TELEPHONE (OUTPATIENT)
Dept: NEPHROLOGY | Facility: CLINIC | Age: 79
End: 2019-09-26

## 2019-09-26 NOTE — TELEPHONE ENCOUNTER
----- Message from Han Fine MD sent at 9/26/2019  8:20 AM EDT -----  Please let the patient know that most of her labs look good except her magnesium is low 1 3  Recommend:  1   Magnesium oxide 400 mg daily watch for diarrhea

## 2019-09-27 ENCOUNTER — OFFICE VISIT (OUTPATIENT)
Dept: NEPHROLOGY | Facility: CLINIC | Age: 79
End: 2019-09-27
Payer: COMMERCIAL

## 2019-09-27 VITALS
DIASTOLIC BLOOD PRESSURE: 80 MMHG | SYSTOLIC BLOOD PRESSURE: 182 MMHG | BODY MASS INDEX: 22.02 KG/M2 | WEIGHT: 129 LBS | HEIGHT: 64 IN

## 2019-09-27 DIAGNOSIS — R01.1 CARDIAC MURMUR: ICD-10-CM

## 2019-09-27 DIAGNOSIS — E78.5 DYSLIPIDEMIA: ICD-10-CM

## 2019-09-27 DIAGNOSIS — I12.9 HYPERTENSIVE CHRONIC KIDNEY DISEASE WITH STAGE 1 THROUGH STAGE 4 CHRONIC KIDNEY DISEASE, OR UNSPECIFIED CHRONIC KIDNEY DISEASE: Primary | ICD-10-CM

## 2019-09-27 DIAGNOSIS — N18.30 CKD (CHRONIC KIDNEY DISEASE) STAGE 3, GFR 30-59 ML/MIN (HCC): Chronic | ICD-10-CM

## 2019-09-27 PROCEDURE — 99215 OFFICE O/P EST HI 40 MIN: CPT | Performed by: INTERNAL MEDICINE

## 2019-09-27 RX ORDER — SPIRONOLACTONE 25 MG/1
25 TABLET ORAL DAILY
Qty: 30 TABLET | Refills: 5 | Status: SHIPPED | OUTPATIENT
Start: 2019-09-27 | End: 2020-03-20

## 2019-09-27 RX ORDER — TORSEMIDE 5 MG/1
5 TABLET ORAL DAILY
Qty: 30 TABLET | Refills: 5 | Status: SHIPPED | OUTPATIENT
Start: 2019-09-27 | End: 2020-03-05 | Stop reason: SDUPTHER

## 2019-09-27 NOTE — PATIENT INSTRUCTIONS
1  Medication changes today:  · Please begin torsemide 5 mg daily in the morning  · Please begin spironolactone 25 mg daily in the morning watch for breast soreness and call if you developed any  · Please begin magnesium oxide 400 mg once a day for your low magnesium level  Please watch for diarrhea and call if you developed any period  · Please make sure to continue taking Enalapril/Vasotec 20 mg twice a day  · Please continue atenolol/Tenormin 75 mg once a day  · Please continue amlodipine 5 mg daily in the morning  · Please make sure to stop taking potassium supplement and the chlorthalidone  2  Please go for nonfasting lab work 1 week after making the above medication changes  3  Please go for the ultrasound of the kidneys which we will help to arrange  4  Please go for the echocardiogram or ultrasound of the heart which we will order  This will be before your appointment with Dr Chi Grady    5  Please wait 1 week after taking the above new medications and all the changes and take an additional week a blood pressure readings morning and evening just sitting as outlined:  · Take the morning readings before any medications  · Take the evening readings before supper  · When taking standing readings, keep your arm supported at heart level and not dangling  · Make sure you are sitting with your back supported in feet on the ground on crossed  · Make sure you have not taken any coffee or caffeine products or exercised or smoke cigarettes at least 30 minutes before taking your blood pressure  Then please send those blood pressures in  If your blood pressure persists over 180 on the top number or if he develops any severe headaches chest pain etc please call me    8  In preparation for your x-ray/CT dye  study that will be ordered by Dr Christophe Mcdonald:  · please hold your Enalapril and torsemide and the spironolactone 1 day before the procedure, and the day of the procedure    Restart your medications the day after the procedure  · avoid any type of medications such as Advil, Aleve, ibuprofen Motrin Naprosyn etc  · you will need to go for a basic metabolic profile/lab work ordered by the vascular surgeons the day after the procedure  If you do not have a lab slip please call  · Dr Conway Homes a office will make any preparation for intravenous fluids prior to the procedure  9  Please follow-up in 3 months:  · Please bring in 1 week a blood pressure readings morning evening, sitting and standing is outlined above  · Please go for fasting lab work prior to your appointment    10  General instructions:   AVOID SALT BUT NOT ADDING AN READING LABELS TO MAKE SURE THERE IS LOW-SALT IN THE FOOD THAT YOU ARE EATING     Avoid nonsteroidal anti-inflammatory drugs such as Naprosyn, ibuprofen, Aleve, Advil, Celebrex, etc   You can use Tylenol as needed if you do not have any liver condition to be concerned about     Avoid medications such as Sudafed or decongestants and antihistamines that contained the D component which is the decongestant  You can take antihistamines without the decongestant or D component   Try to exercise at least 30 minutes 3 days a week to begin with with an ultimate goal of 5 days a week for at least 30 minutes     Try to lose 5-10 lb by your next visit     Please do not drink more than 2 glasses of alcohol/wine on a daily basis as this may contribute to your high blood pressure

## 2019-09-27 NOTE — PROGRESS NOTES
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  · Baseline creatinine:  1-1 34  · Current creatinine:  0 87 at goal  · Urine protein creatinine ratio: To be obtained  · UA:  Trace proteinuria, 1-2 RBCs, 4-10 WBCs  · Renal ultrasound: To be obtained  · CT angiography: To be obtained  Recommendations:  · Treat hypertension-please see below  · Treat dyslipidemia-please see below  · Maintain proteinuria less than 1 g or as low as possible  · Avoid nephrotoxic agents such as NSAIDs, patient counseled as such  2   Volume:  Euvolemic  3  Hypertension:  Secondary workup:  · CT angiography regarding renal artery disease:  · TSH: To be obtained  · Plasma free metanephrines: To be obtained  · Aldosterone/renin ratio: To be obtained       Current blood pressure averages:  AM:  Roughly 190/81  PM:  Roughly 150/76    · Goal blood pressure:  Less than 120-125/80 given PAD  Recommendations:  · Push nonmedical regimen including weight loss, isotonic exercise and a low sodium diet  Patient has been counseled the such  · MedicationChanges today:  I will readmit torsemide 5 mg daily and spironolactone 25 mg daily and stop the potassium supplement  We will like to gradually lower her blood pressure to eventually get to goal   We do not want to do a sudden dramatic decrease as this may cause hypoperfusion  She will send in a week a blood pressure readings 1 week after making the above changes  Next step may include increasing amlodipine to 5 twice a day, possibly adding another agent such as clonidine or hydralazine  4   Electrolytes: All acceptable  5  Mineral bone disorder:  · Calcium/magnesium/phosphorus:  Hypomagnesemia:  Replete  · PTH intact:  36 which is normal  · Vitamin-D:  57 3 which is at goal  6    Dyslipidemia:  · Goal LDL:  Less than 70 given PAD  · Current lipid profile:  LDL 23/HDL 82/triglycerides 24    Recommendations: At goal therefore no changes  7  Anemia:  Stable at 11 5  Check iron studies next visit  8   Other problems:  · PAD followed by vascular surgery  · Carotid bruits followed by Dr Keyana Espinoza  · Cardiac murmur:  Echo to be obtained was ordered    I have personally discussed the risks and benefits of the CT angiography from a renal standpoint with the patient in depth, and advised the patient about the risk of SERJIO and the course of SERJIO if it occurs with the small probability of the need for renal replacement therapy/hemodialysis with the kidney machine in the worse case scenario  Patient voiced understanding      · From a renal standpoint the patient is at a low to moderate risk for the CT angiography/dye causing acute kidney injury/contrast induced nephropathy:  with the following recommendations:  · Fluids to administer:  Isotonic saline at 250 mL/hour 2 hours prior to the procedure and subsequently 100 mL/hour through the procedure and 4 hours after the procedure and then can be hep-locked  · Medication Recommendations:  · Minimize any IV contrast use (If IV contrast is used, please check BMP POD # 1)  · Hold NSAIDs for at least 10 days prior to surgery  · Hold ACEi/ARB :  Hold Vasotec 1 day before the procedure the day of the procedure and restart the next day  · Hold the torsemide the day before the procedure on the day of the procedure and then re-initiate the next day  · Monitor blood pressure through the procedure and call if systolic blood pressure greater than or equal to 180  · Other Recommendations:  · If possible give Mucomyst 1200 mg twice a day the day before the procedure and the day of the procedure  · AGAIN, PLEASE OBTAIN A BASIC METABOLIC PROFILE 1-2 DAYS AFTER THE PROCEDURE      PATIENT INSTRUCTIONS:    Patient Instructions   1   Medication changes today:  · Please begin torsemide 5 mg daily in the morning  · Please begin spironolactone 25 mg daily in the morning watch for breast soreness and call if you developed any  · Please begin magnesium oxide 400 mg once a day for your low magnesium level  Please watch for diarrhea and call if you developed any period  · Please make sure to continue taking Enalapril/Vasotec 20 mg twice a day  · Please continue atenolol/Tenormin 75 mg once a day  · Please continue amlodipine 5 mg daily in the morning  · Please make sure to stop taking potassium supplement and the chlorthalidone  2  Please go for nonfasting lab work 1 week after making the above medication changes  3  Please go for the ultrasound of the kidneys which we will help to arrange  4  Please go for the echocardiogram or ultrasound of the heart which we will order  This will be before your appointment with Dr Milo Steiner    5  Please wait 1 week after taking the above new medications and all the changes and take an additional week a blood pressure readings morning and evening just sitting as outlined:  · Take the morning readings before any medications  · Take the evening readings before supper  · When taking standing readings, keep your arm supported at heart level and not dangling  · Make sure you are sitting with your back supported in feet on the ground on crossed  · Make sure you have not taken any coffee or caffeine products or exercised or smoke cigarettes at least 30 minutes before taking your blood pressure  Then please send those blood pressures in  If your blood pressure persists over 180 on the top number or if he develops any severe headaches chest pain etc please call me    8  In preparation for your x-ray/CT dye  study that will be ordered by Dr Neto Gilbert:  · please hold your Enalapril and torsemide and the spironolactone 1 day before the procedure, and the day of the procedure  Restart your medications the day after the procedure    · avoid any type of medications such as Advil, Aleve, ibuprofen Motrin Naprosyn etc  · you will need to go for a basic metabolic profile/lab work ordered by the vascular surgeons the day after the procedure  If you do not have a lab slip please call  · Dr Conway Homes a office will make any preparation for intravenous fluids prior to the procedure  9  Please follow-up in 3 months:  · Please bring in 1 week a blood pressure readings morning evening, sitting and standing is outlined above  · Please go for fasting lab work prior to your appointment    10  General instructions:   AVOID SALT BUT NOT ADDING AN READING LABELS TO MAKE SURE THERE IS LOW-SALT IN THE FOOD THAT YOU ARE EATING     Avoid nonsteroidal anti-inflammatory drugs such as Naprosyn, ibuprofen, Aleve, Advil, Celebrex, etc   You can use Tylenol as needed if you do not have any liver condition to be concerned about     Avoid medications such as Sudafed or decongestants and antihistamines that contained the D component which is the decongestant  You can take antihistamines without the decongestant or D component   Try to exercise at least 30 minutes 3 days a week to begin with with an ultimate goal of 5 days a week for at least 30 minutes     Try to lose 5-10 lb by your next visit     Please do not drink more than 2 glasses of alcohol/wine on a daily basis as this may contribute to your high blood pressure  Subjective: The patient overall is feeling well  She has not had some of the studies including the CT angiography as of yet  No fevers chills cough or colds    Good appetite and good energy  No urinary symptoms including foamy urine or blood in the urine  No gastrointestinal symptoms  No cardiovascular symptoms including swelling of the legs  No headaches, dizziness or lightheadedness  Blood pressure medications:  · Potassium once a day over-the-counter  · Enalapril/Vasotec 20 mg twice a day  · Atenolol/Tenormin 75 mg daily  · Amlodipine 5 mg daily in the morning  · Torsemide 5 mg daily but she did not fill it yet at this time      ROS:  See HPI, otherwise review of systems as completely reviewed with the patient are negative    Past Medical History:   Diagnosis Date    Aortic stenosis     Arthritis     Benign essential hypertension     CAD (coronary artery disease)     Dizziness     Edema of leg     Hyperlipidemia     Hypertension     Other disorder of circulatory system (CODE)      Past Surgical History:   Procedure Laterality Date    BREAST SURGERY      bxs,benign    COLONOSCOPY      HYSTERECTOMY      INCISIONAL BREAST BIOPSY      x5, 1964, 1965, 1985 and 1990     Family History   Problem Relation Age of Onset    Hypertension Father     Coronary artery disease Family     Arthritis Family       reports that she has been smoking  She has been smoking about 1 00 pack per day  She has never used smokeless tobacco  She reports that she drinks alcohol  She reports that she does not use drugs  I COMPLETELY REVIEWED THE PAST MEDICAL HISTORY/PAST SURGICAL HISTORY/SOCIAL HISTORY/FAMILY HISTORY/AND MEDICATIONS  AND UPDATED ALL    Objective:     Vitals:   Vitals:    09/27/19 1123   BP: (!) 182/80    BP sitting on right:210/72 same on left:  Heart rate 64 and regular  BP standing on left:206/68 with a heart rate of 64 and regular    Weight (last 2 days)     Date/Time   Weight    09/27/19 1123   58 5 (129)            Wt Readings from Last 3 Encounters:   09/27/19 58 5 kg (129 lb)   09/11/19 59 1 kg (130 lb 6 4 oz)   08/21/19 59 4 kg (131 lb)       Body mass index is 22 14 kg/m²      Physical Exam: General:  No acute distress  Skin:  No acute rash  Eyes:  No scleral icterus, noninjected  ENT:  Moist mucous membranes  Neck:  Supple, no jugular venous distention  Back   No CVAT  Chest:  Clear to auscultation and percussion, good respiratory effort  CVS:  Regular rate and rhythm without a rub, murmurs or gallops  Abdomen:  Soft and nontender with normal bowel sounds  Extremities:  No cyanosis and no edema  Neuro:  Grossly intact  Psych:  Alert, oriented x3 and appropriate      Medications:    Current Outpatient Medications:     albuterol (VENTOLIN HFA) 90 mcg/act inhaler, Inhale 2 puffs every 6 (six) hours as needed for wheezing, Disp: 18 g, Rfl: 2    amLODIPine (NORVASC) 5 mg tablet, Take 1 tablet (5 mg total) by mouth daily, Disp: 90 tablet, Rfl: 3    aspirin 81 MG tablet, Take 2 tablets by mouth daily, Disp: , Rfl:     atenolol (TENORMIN) 25 mg tablet, 1/2 tab daily (Patient taking differently: Take 25 mg by mouth daily ), Disp: 90 tablet, Rfl: 3    atenolol (TENORMIN) 50 mg tablet, Take 1 tablet (50 mg total) by mouth daily, Disp: 90 tablet, Rfl: 3    atorvastatin (LIPITOR) 80 mg tablet, take 1 tablet by mouth once daily, Disp: 90 tablet, Rfl: 3    B Complex Vitamins (VITAMIN B-COMPLEX) TABS, Take by mouth, Disp: , Rfl:     Bioflavonoid Products (VITAMIN C PLUS PO), Take by mouth daily, Disp: , Rfl:     ciclopirox (LOPROX) 0 77 % cream, Apply topically 2 (two) times a day for 20 days, Disp: 45 g, Rfl: 0    enalapril (VASOTEC) 20 mg tablet, Take 1 tablet (20 mg total) by mouth 2 (two) times a day, Disp: 180 tablet, Rfl: 3    fluticasone (FLONASE) 50 mcg/act nasal spray, 1 spray into each nostril daily, Disp: 16 g, Rfl: 3    GARLIC PO, Take by mouth daily , Disp: , Rfl:     levothyroxine 25 mcg tablet, Take 1 tablet (25 mcg total) by mouth daily in the early morning, Disp: 90 tablet, Rfl: 3    Multiple Vitamins-Minerals (CENTRUM SILVER PO), Take by mouth, Disp: , Rfl:     magnesium oxide (MAG-OX) 400 mg, Take 1 tablet (400 mg total) by mouth daily, Disp: 30 tablet, Rfl: 5    Multiple Minerals (CALCIUM-MAGNESIUM-ZINC) TABS, Take by mouth daily, Disp: , Rfl:     spironolactone (ALDACTONE) 25 mg tablet, Take 1 tablet (25 mg total) by mouth daily, Disp: 30 tablet, Rfl: 5    torsemide (DEMADEX) 5 MG tablet, Take 1 tablet (5 mg total) by mouth daily, Disp: 30 tablet, Rfl: 5    varenicline (CHANTIX STARTING MONTH BALDEMAR) 0 5 MG X 11 & 1 MG X 42 tablet, Take one 0 5mg tab by mouth 1x daily for 3 days, then increase to one 0 5mg tab 2x daily for 3 days, then increase to one 1mg tab 2x daily (Patient not taking: Reported on 9/11/2019), Disp: 53 tablet, Rfl: 0    Lab, Imaging and other studies: I have personally reviewed pertinent labs    Laboratory Results:  Results for orders placed or performed in visit on 56/35/98   Basic metabolic panel   Result Value Ref Range    Sodium 140 136 - 145 mmol/L    Potassium 3 9 3 5 - 5 3 mmol/L    Chloride 104 100 - 108 mmol/L    CO2 28 21 - 32 mmol/L    ANION GAP 8 4 - 13 mmol/L    BUN 23 5 - 25 mg/dL    Creatinine 0 87 0 60 - 1 30 mg/dL    Glucose, Fasting 97 65 - 99 mg/dL    Calcium 8 9 8 3 - 10 1 mg/dL    eGFR 64 ml/min/1 73sq m   CBC   Result Value Ref Range    WBC 7 84 4 31 - 10 16 Thousand/uL    RBC 3 28 (L) 3 81 - 5 12 Million/uL    Hemoglobin 11 5 11 5 - 15 4 g/dL    Hematocrit 34 4 (L) 34 8 - 46 1 %     (H) 82 - 98 fL    MCH 35 1 (H) 26 8 - 34 3 pg    MCHC 33 4 31 4 - 37 4 g/dL    RDW 15 0 11 6 - 15 1 %    Platelets 977 372 - 713 Thousands/uL    MPV 11 8 8 9 - 12 7 fL   CK   Result Value Ref Range    Total CK 94 26 - 192 U/L   Lipid Panel with Direct LDL reflex   Result Value Ref Range    Cholesterol 110 50 - 200 mg/dL    Triglycerides 24 <=150 mg/dL    HDL, Direct 82 (H) 40 - 60 mg/dL    LDL Calculated 23 0 - 100 mg/dL   Magnesium   Result Value Ref Range    Magnesium 1 3 (L) 1 6 - 2 6 mg/dL   Phosphorus   Result Value Ref Range    Phosphorus 3 7 2 3 - 4 1 mg/dL   PTH, intact   Result Value Ref Range    PTH 36 0 18 4 - 80 1 pg/mL   Vitamin D 25 hydroxy   Result Value Ref Range    Vit D, 25-Hydroxy 57 3 30 0 - 100 0 ng/mL   Urinalysis with microscopic   Result Value Ref Range    Clarity, UA Clear     Color, UA Yellow     Specific Gravity, UA 1 010 1 000 - 1 030    pH, UA 6 0 5 0, 5 5, 6 0, 6 5, 7 0, 7 5, 8 0, 8 5, 9 0    Glucose, UA Negative Negative mg/dl    Ketones, UA Negative Negative mg/dl    Blood, UA Trace-Intact (A) Negative    Protein, UA Trace (A) Negative mg/dl    Nitrite, UA Negative Negative    Bilirubin, UA Negative Negative    Urobilinogen, UA 0 2 0 2, 1 0 E U /dl E U /dl    Leukocytes, UA Trace (A) Negative    WBC, UA 4-10 (A) None Seen, 0-5, 5-55, 5-65 /hpf    RBC, UA 1-2 (A) None Seen, 0-5 /hpf    Bacteria, UA Occasional None Seen, Occasional /hpf    Epithelial Cells Occasional None Seen, Occasional /hpf   TSH, 3rd generation with Free T4 reflex   Result Value Ref Range    TSH 3RD GENERATON 2 115 0 358 - 3 740 uIU/mL       Results from last 7 days   Lab Units 09/25/19  0713   WBC Thousand/uL 7 84   HEMOGLOBIN g/dL 11 5   HEMATOCRIT % 34 4*   PLATELETS Thousands/uL 197   POTASSIUM mmol/L 3 9   CHLORIDE mmol/L 104   CO2 mmol/L 28   BUN mg/dL 23   CREATININE mg/dL 0 87   CALCIUM mg/dL 8 9   MAGNESIUM mg/dL 1 3*   PHOSPHORUS mg/dL 3 7         Radiology review:   chest X-ray    Ultrasound      Portions of the record may have been created with voice recognition software  Occasional wrong word or "sound a like" substitutions may have occurred due to the inherent limitations of voice recognition software  Read the chart carefully and recognize, using context, where substitutions have occurred

## 2019-09-27 NOTE — LETTER
September 27, 2019     [facility], [address] [address]    Patient: Ramesh Elder   YOB: 1940   Date of Visit: 9/27/2019       Dear Dr Dieudonne Talamantes: Thank you for referring Rafita Henning to me for evaluation  Below are my notes for this consultation  If you have questions, please do not hesitate to call me  I look forward to following your patient along with you  Sincerely,        Sandra Cabezas MD        CC: DO Angie Hernandez MD Pearle Ring, MD  9/27/2019 12:16 PM  Sign at close encounter  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  · Baseline creatinine:  1-1 34  · Current creatinine:  0 87 at goal  · Urine protein creatinine ratio: To be obtained  · UA:  Trace proteinuria, 1-2 RBCs, 4-10 WBCs  · Renal ultrasound: To be obtained  · CT angiography: To be obtained  Recommendations:  · Treat hypertension-please see below  · Treat dyslipidemia-please see below  · Maintain proteinuria less than 1 g or as low as possible  · Avoid nephrotoxic agents such as NSAIDs, patient counseled as such  2   Volume:  Euvolemic  3  Hypertension:  Secondary workup:  · CT angiography regarding renal artery disease:  · TSH: To be obtained  · Plasma free metanephrines: To be obtained  · Aldosterone/renin ratio: To be obtained       Current blood pressure averages:  AM:  Roughly 190/81  PM:  Roughly 150/76    · Goal blood pressure:  Less than 120-125/80 given PAD  Recommendations:  · Push nonmedical regimen including weight loss, isotonic exercise and a low sodium diet  Patient has been counseled the such  · MedicationChanges today:  I will readmit torsemide 5 mg daily and spironolactone 25 mg daily and stop the potassium supplement    We will like to gradually lower her blood pressure to eventually get to goal   We do not want to do a sudden dramatic decrease as this may cause hypoperfusion  She will send in a week a blood pressure readings 1 week after making the above changes  Next step may include increasing amlodipine to 5 twice a day, possibly adding another agent such as clonidine or hydralazine  4   Electrolytes: All acceptable  5  Mineral bone disorder:  · Calcium/magnesium/phosphorus:  Hypomagnesemia:  Replete  · PTH intact:  36 which is normal  · Vitamin-D:  57 3 which is at goal  6  Dyslipidemia:  · Goal LDL:  Less than 70 given PAD  · Current lipid profile:  LDL 23/HDL 82/triglycerides 24    Recommendations: At goal therefore no changes  7  Anemia:  Stable at 11 5  Check iron studies next visit  8   Other problems:  · PAD followed by vascular surgery  · Carotid bruits followed by Dr Pat Nunn  · Cardiac murmur:  Echo to be obtained was ordered    I have personally discussed the risks and benefits of the CT angiography from a renal standpoint with the patient in depth, and advised the patient about the risk of SERJIO and the course of SERJIO if it occurs with the small probability of the need for renal replacement therapy/hemodialysis with the kidney machine in the worse case scenario  Patient voiced understanding      · From a renal standpoint the patient is at a low to moderate risk for the CT angiography/dye causing acute kidney injury/contrast induced nephropathy:  with the following recommendations:  · Fluids to administer:  Isotonic saline at 250 mL/hour 2 hours prior to the procedure and subsequently 100 mL/hour through the procedure and 4 hours after the procedure and then can be hep-locked  · Medication Recommendations:  · Minimize any IV contrast use (If IV contrast is used, please check BMP POD # 1)  · Hold NSAIDs for at least 10 days prior to surgery  · Hold ACEi/ARB :  Hold Vasotec 1 day before the procedure the day of the procedure and restart the next day    · Hold the torsemide the day before the procedure on the day of the procedure and then re-initiate the next day  · Monitor blood pressure through the procedure and call if systolic blood pressure greater than or equal to 180  · Other Recommendations:  · If possible give Mucomyst 1200 mg twice a day the day before the procedure and the day of the procedure  · AGAIN, PLEASE OBTAIN A BASIC METABOLIC PROFILE 1-2 DAYS AFTER THE PROCEDURE      PATIENT INSTRUCTIONS:    Patient Instructions   1  Medication changes today:  · Please begin torsemide 5 mg daily in the morning  · Please begin spironolactone 25 mg daily in the morning watch for breast soreness and call if you developed any  · Please begin magnesium oxide 400 mg once a day for your low magnesium level  Please watch for diarrhea and call if you developed any period  · Please make sure to continue taking Enalapril/Vasotec 20 mg twice a day  · Please continue atenolol/Tenormin 75 mg once a day  · Please continue amlodipine 5 mg daily in the morning  · Please make sure to stop taking potassium supplement and the chlorthalidone  2  Please go for nonfasting lab work 1 week after making the above medication changes  3  Please go for the ultrasound of the kidneys which we will help to arrange  4  Please go for the echocardiogram or ultrasound of the heart which we will order  This will be before your appointment with Dr Robert Helms    5   Please wait 1 week after taking the above new medications and all the changes and take an additional week a blood pressure readings morning and evening just sitting as outlined:  · Take the morning readings before any medications  · Take the evening readings before supper  · When taking standing readings, keep your arm supported at heart level and not dangling  · Make sure you are sitting with your back supported in feet on the ground on crossed  · Make sure you have not taken any coffee or caffeine products or exercised or smoke cigarettes at least 30 minutes before taking your blood pressure  Then please send those blood pressures in  If your blood pressure persists over 180 on the top number or if he develops any severe headaches chest pain etc please call me    8  In preparation for your x-ray/CT dye  study that will be ordered by Dr Manuel Chavis:  · please hold your Enalapril and torsemide and the spironolactone 1 day before the procedure, and the day of the procedure  Restart your medications the day after the procedure  · avoid any type of medications such as Advil, Aleve, ibuprofen Motrin Naprosyn etc  · you will need to go for a basic metabolic profile/lab work ordered by the vascular surgeons the day after the procedure  If you do not have a lab slip please call  · Dr Mona Rascon a office will make any preparation for intravenous fluids prior to the procedure  9  Please follow-up in 3 months:  · Please bring in 1 week a blood pressure readings morning evening, sitting and standing is outlined above  · Please go for fasting lab work prior to your appointment    10  General instructions:   AVOID SALT BUT NOT ADDING AN READING LABELS TO MAKE SURE THERE IS LOW-SALT IN THE FOOD THAT YOU ARE EATING     Avoid nonsteroidal anti-inflammatory drugs such as Naprosyn, ibuprofen, Aleve, Advil, Celebrex, etc   You can use Tylenol as needed if you do not have any liver condition to be concerned about     Avoid medications such as Sudafed or decongestants and antihistamines that contained the D component which is the decongestant  You can take antihistamines without the decongestant or D component   Try to exercise at least 30 minutes 3 days a week to begin with with an ultimate goal of 5 days a week for at least 30 minutes     Try to lose 5-10 lb by your next visit     Please do not drink more than 2 glasses of alcohol/wine on a daily basis as this may contribute to your high blood pressure  Subjective: The patient overall is feeling well    She has not had some of the studies including the CT angiography as of yet  No fevers chills cough or colds  Good appetite and good energy  No urinary symptoms including foamy urine or blood in the urine  No gastrointestinal symptoms  No cardiovascular symptoms including swelling of the legs  No headaches, dizziness or lightheadedness  Blood pressure medications:  · Potassium once a day over-the-counter  · Enalapril/Vasotec 20 mg twice a day  · Atenolol/Tenormin 75 mg daily  · Amlodipine 5 mg daily in the morning  · Torsemide 5 mg daily but she did not fill it yet at this time      ROS:  See HPI, otherwise review of systems as completely reviewed with the patient are negative    Past Medical History:   Diagnosis Date    Aortic stenosis     Arthritis     Benign essential hypertension     CAD (coronary artery disease)     Dizziness     Edema of leg     Hyperlipidemia     Hypertension     Other disorder of circulatory system (CODE)      Past Surgical History:   Procedure Laterality Date    BREAST SURGERY      bxs,benign    COLONOSCOPY      HYSTERECTOMY      INCISIONAL BREAST BIOPSY      x5, 1964, 1965, 1985 and 1990     Family History   Problem Relation Age of Onset    Hypertension Father     Coronary artery disease Family     Arthritis Family       reports that she has been smoking  She has been smoking about 1 00 pack per day  She has never used smokeless tobacco  She reports that she drinks alcohol  She reports that she does not use drugs      I COMPLETELY REVIEWED THE PAST MEDICAL HISTORY/PAST SURGICAL HISTORY/SOCIAL HISTORY/FAMILY HISTORY/AND MEDICATIONS  AND UPDATED ALL    Objective:     Vitals:   Vitals:    09/27/19 1123   BP: (!) 182/80    BP sitting on right:210/72 same on left:  Heart rate 64 and regular  BP standing on left:206/68 with a heart rate of 64 and regular    Weight (last 2 days)     Date/Time   Weight    09/27/19 1123   58 5 (129)            Wt Readings from Last 3 Encounters:   09/27/19 58 5 kg (129 lb)   09/11/19 59 1 kg (130 lb 6 4 oz)   08/21/19 59 4 kg (131 lb)       Body mass index is 22 14 kg/m²      Physical Exam: General:  No acute distress  Skin:  No acute rash  Eyes:  No scleral icterus, noninjected  ENT:  Moist mucous membranes  Neck:  Supple, no jugular venous distention  Back   No CVAT  Chest:  Clear to auscultation and percussion, good respiratory effort  CVS:  Regular rate and rhythm without a rub, murmurs or gallops  Abdomen:  Soft and nontender with normal bowel sounds  Extremities:  No cyanosis and no edema  Neuro:  Grossly intact  Psych:  Alert, oriented x3 and appropriate      Medications:    Current Outpatient Medications:     albuterol (VENTOLIN HFA) 90 mcg/act inhaler, Inhale 2 puffs every 6 (six) hours as needed for wheezing, Disp: 18 g, Rfl: 2    amLODIPine (NORVASC) 5 mg tablet, Take 1 tablet (5 mg total) by mouth daily, Disp: 90 tablet, Rfl: 3    aspirin 81 MG tablet, Take 2 tablets by mouth daily, Disp: , Rfl:     atenolol (TENORMIN) 25 mg tablet, 1/2 tab daily (Patient taking differently: Take 25 mg by mouth daily ), Disp: 90 tablet, Rfl: 3    atenolol (TENORMIN) 50 mg tablet, Take 1 tablet (50 mg total) by mouth daily, Disp: 90 tablet, Rfl: 3    atorvastatin (LIPITOR) 80 mg tablet, take 1 tablet by mouth once daily, Disp: 90 tablet, Rfl: 3    B Complex Vitamins (VITAMIN B-COMPLEX) TABS, Take by mouth, Disp: , Rfl:     Bioflavonoid Products (VITAMIN C PLUS PO), Take by mouth daily, Disp: , Rfl:     ciclopirox (LOPROX) 0 77 % cream, Apply topically 2 (two) times a day for 20 days, Disp: 45 g, Rfl: 0    enalapril (VASOTEC) 20 mg tablet, Take 1 tablet (20 mg total) by mouth 2 (two) times a day, Disp: 180 tablet, Rfl: 3    fluticasone (FLONASE) 50 mcg/act nasal spray, 1 spray into each nostril daily, Disp: 16 g, Rfl: 3    GARLIC PO, Take by mouth daily , Disp: , Rfl:     levothyroxine 25 mcg tablet, Take 1 tablet (25 mcg total) by mouth daily in the early morning, Disp: 90 tablet, Rfl: 3    Multiple Vitamins-Minerals (CENTRUM SILVER PO), Take by mouth, Disp: , Rfl:     magnesium oxide (MAG-OX) 400 mg, Take 1 tablet (400 mg total) by mouth daily, Disp: 30 tablet, Rfl: 5    Multiple Minerals (CALCIUM-MAGNESIUM-ZINC) TABS, Take by mouth daily, Disp: , Rfl:     spironolactone (ALDACTONE) 25 mg tablet, Take 1 tablet (25 mg total) by mouth daily, Disp: 30 tablet, Rfl: 5    torsemide (DEMADEX) 5 MG tablet, Take 1 tablet (5 mg total) by mouth daily, Disp: 30 tablet, Rfl: 5    varenicline (CHANTIX STARTING MONTH BALDEMAR) 0 5 MG X 11 & 1 MG X 42 tablet, Take one 0 5mg tab by mouth 1x daily for 3 days, then increase to one 0 5mg tab 2x daily for 3 days, then increase to one 1mg tab 2x daily (Patient not taking: Reported on 9/11/2019), Disp: 53 tablet, Rfl: 0    Lab, Imaging and other studies: I have personally reviewed pertinent labs    Laboratory Results:  Results for orders placed or performed in visit on 61/94/51   Basic metabolic panel   Result Value Ref Range    Sodium 140 136 - 145 mmol/L    Potassium 3 9 3 5 - 5 3 mmol/L    Chloride 104 100 - 108 mmol/L    CO2 28 21 - 32 mmol/L    ANION GAP 8 4 - 13 mmol/L    BUN 23 5 - 25 mg/dL    Creatinine 0 87 0 60 - 1 30 mg/dL    Glucose, Fasting 97 65 - 99 mg/dL    Calcium 8 9 8 3 - 10 1 mg/dL    eGFR 64 ml/min/1 73sq m   CBC   Result Value Ref Range    WBC 7 84 4 31 - 10 16 Thousand/uL    RBC 3 28 (L) 3 81 - 5 12 Million/uL    Hemoglobin 11 5 11 5 - 15 4 g/dL    Hematocrit 34 4 (L) 34 8 - 46 1 %     (H) 82 - 98 fL    MCH 35 1 (H) 26 8 - 34 3 pg    MCHC 33 4 31 4 - 37 4 g/dL    RDW 15 0 11 6 - 15 1 %    Platelets 821 086 - 887 Thousands/uL    MPV 11 8 8 9 - 12 7 fL   CK   Result Value Ref Range    Total CK 94 26 - 192 U/L   Lipid Panel with Direct LDL reflex   Result Value Ref Range    Cholesterol 110 50 - 200 mg/dL    Triglycerides 24 <=150 mg/dL    HDL, Direct 82 (H) 40 - 60 mg/dL    LDL Calculated 23 0 - 100 mg/dL   Magnesium   Result Value Ref Range Magnesium 1 3 (L) 1 6 - 2 6 mg/dL   Phosphorus   Result Value Ref Range    Phosphorus 3 7 2 3 - 4 1 mg/dL   PTH, intact   Result Value Ref Range    PTH 36 0 18 4 - 80 1 pg/mL   Vitamin D 25 hydroxy   Result Value Ref Range    Vit D, 25-Hydroxy 57 3 30 0 - 100 0 ng/mL   Urinalysis with microscopic   Result Value Ref Range    Clarity, UA Clear     Color, UA Yellow     Specific Gravity, UA 1 010 1 000 - 1 030    pH, UA 6 0 5 0, 5 5, 6 0, 6 5, 7 0, 7 5, 8 0, 8 5, 9 0    Glucose, UA Negative Negative mg/dl    Ketones, UA Negative Negative mg/dl    Blood, UA Trace-Intact (A) Negative    Protein, UA Trace (A) Negative mg/dl    Nitrite, UA Negative Negative    Bilirubin, UA Negative Negative    Urobilinogen, UA 0 2 0 2, 1 0 E U /dl E U /dl    Leukocytes, UA Trace (A) Negative    WBC, UA 4-10 (A) None Seen, 0-5, 5-55, 5-65 /hpf    RBC, UA 1-2 (A) None Seen, 0-5 /hpf    Bacteria, UA Occasional None Seen, Occasional /hpf    Epithelial Cells Occasional None Seen, Occasional /hpf   TSH, 3rd generation with Free T4 reflex   Result Value Ref Range    TSH 3RD GENERATON 2 115 0 358 - 3 740 uIU/mL       Results from last 7 days   Lab Units 09/25/19  0713   WBC Thousand/uL 7 84   HEMOGLOBIN g/dL 11 5   HEMATOCRIT % 34 4*   PLATELETS Thousands/uL 197   POTASSIUM mmol/L 3 9   CHLORIDE mmol/L 104   CO2 mmol/L 28   BUN mg/dL 23   CREATININE mg/dL 0 87   CALCIUM mg/dL 8 9   MAGNESIUM mg/dL 1 3*   PHOSPHORUS mg/dL 3 7         Radiology review:   chest X-ray    Ultrasound      Portions of the record may have been created with voice recognition software  Occasional wrong word or "sound a like" substitutions may have occurred due to the inherent limitations of voice recognition software  Read the chart carefully and recognize, using context, where substitutions have occurred

## 2019-09-27 NOTE — LETTER
September 27, 2019     Kaiden Blake, [address] [facility] [address]    Patient: Marlene Vazquez   YOB: 1940   Date of Visit: 9/27/2019       Dear Dr Kenia Herman: Thank you for referring Joel Sánchez to me for evaluation  Below are my notes for this consultation  If you have questions, please do not hesitate to call me  I look forward to following your patient along with you  Sincerely,        Howard Jaramillo MD        CC: Cyndia Boyers, DO Grace Ganser, MD Marcial Millet, MD  9/27/2019 12:15 PM  Sign at close encounter  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  · Baseline creatinine:  1-1 34  · Current creatinine:  0 87 at goal  · Urine protein creatinine ratio: To be obtained  · UA:  Trace proteinuria, 1-2 RBCs, 4-10 WBCs  · Renal ultrasound: To be obtained  · CT angiography: To be obtained  Recommendations:  · Treat hypertension-please see below  · Treat dyslipidemia-please see below  · Maintain proteinuria less than 1 g or as low as possible  · Avoid nephrotoxic agents such as NSAIDs, patient counseled as such  2   Volume:  Euvolemic  3  Hypertension:  Secondary workup:  · CT angiography regarding renal artery disease:  · TSH: To be obtained  · Plasma free metanephrines: To be obtained  · Aldosterone/renin ratio: To be obtained       Current blood pressure averages:  AM:  Roughly 190/81  PM:  Roughly 150/76    · Goal blood pressure:  Less than 120-125/80 given PAD  Recommendations:  · Push nonmedical regimen including weight loss, isotonic exercise and a low sodium diet  Patient has been counseled the such  · MedicationChanges today:  I will readmit torsemide 5 mg daily and spironolactone 25 mg daily and stop the potassium supplement    We will like to gradually lower her blood pressure to eventually get to goal   We do not want to do a sudden dramatic decrease as this may cause hypoperfusion  She will send in a week a blood pressure readings 1 week after making the above changes  Next step may include increasing amlodipine to 5 twice a day, possibly adding another agent such as clonidine or hydralazine  4   Electrolytes: All acceptable  5  Mineral bone disorder:  · Calcium/magnesium/phosphorus:  Hypomagnesemia:  Replete  · PTH intact:  36 which is normal  · Vitamin-D:  57 3 which is at goal  6  Dyslipidemia:  · Goal LDL:  Less than 70 given PAD  · Current lipid profile:  LDL 23/HDL 82/triglycerides 24    Recommendations: At goal therefore no changes  7  Anemia:  Stable at 11 5  Check iron studies next visit  8   Other problems:  · PAD followed by vascular surgery  · Carotid bruits followed by Dr Stephanie Vogel  · Cardiac murmur:  Echo to be obtained was ordered          PATIENT INSTRUCTIONS:    Patient Instructions   1  Medication changes today:  · Please begin torsemide 5 mg daily in the morning  · Please begin spironolactone 25 mg daily in the morning watch for breast soreness and call if you developed any  · Please begin magnesium oxide 400 mg once a day for your low magnesium level  Please watch for diarrhea and call if you developed any period  · Please make sure to continue taking Enalapril/Vasotec 20 mg twice a day  · Please continue atenolol/Tenormin 75 mg once a day  · Please continue amlodipine 5 mg daily in the morning  · Please make sure to stop taking potassium supplement and the chlorthalidone  2  Please go for nonfasting lab work 1 week after making the above medication changes  3  Please go for the ultrasound of the kidneys which we will help to arrange  4  Please go for the echocardiogram or ultrasound of the heart which we will order  This will be before your appointment with Dr Sandra Forrest    5   Please wait 1 week after taking the above new medications and all the changes and take an additional week a blood pressure readings morning and evening just sitting as outlined:  · Take the morning readings before any medications  · Take the evening readings before supper  · When taking standing readings, keep your arm supported at heart level and not dangling  · Make sure you are sitting with your back supported in feet on the ground on crossed  · Make sure you have not taken any coffee or caffeine products or exercised or smoke cigarettes at least 30 minutes before taking your blood pressure  Then please send those blood pressures in  If your blood pressure persists over 180 on the top number or if he develops any severe headaches chest pain etc please call me    8  In preparation for your x-ray/CT dye  study that will be ordered by Dr Esau Tabares:  · please hold your Enalapril and torsemide and the spironolactone 1 day before the procedure, and the day of the procedure  Restart your medications the day after the procedure  · avoid any type of medications such as Advil, Aleve, ibuprofen Motrin Naprosyn etc  · you will need to go for a basic metabolic profile/lab work ordered by the vascular surgeons the day after the procedure  If you do not have a lab slip please call  · Dr Deya Allen a office will make any preparation for intravenous fluids prior to the procedure  9  Please follow-up in 3 months:  · Please bring in 1 week a blood pressure readings morning evening, sitting and standing is outlined above  · Please go for fasting lab work prior to your appointment    10  General instructions:   AVOID SALT BUT NOT ADDING AN READING LABELS TO MAKE SURE THERE IS LOW-SALT IN THE FOOD THAT YOU ARE EATING     Avoid nonsteroidal anti-inflammatory drugs such as Naprosyn, ibuprofen, Aleve, Advil, Celebrex, etc   You can use Tylenol as needed if you do not have any liver condition to be concerned about     Avoid medications such as Sudafed or decongestants and antihistamines that contained the D component which is the decongestant    You can take antihistamines without the decongestant or D component   Try to exercise at least 30 minutes 3 days a week to begin with with an ultimate goal of 5 days a week for at least 30 minutes     Try to lose 5-10 lb by your next visit     Please do not drink more than 2 glasses of alcohol/wine on a daily basis as this may contribute to your high blood pressure  Subjective: The patient overall is feeling well  She has not had some of the studies including the CT angiography as of yet  No fevers chills cough or colds  Good appetite and good energy  No urinary symptoms including foamy urine or blood in the urine  No gastrointestinal symptoms  No cardiovascular symptoms including swelling of the legs  No headaches, dizziness or lightheadedness  Blood pressure medications:  · Potassium once a day over-the-counter  · Enalapril/Vasotec 20 mg twice a day  · Atenolol/Tenormin 75 mg daily  · Amlodipine 5 mg daily in the morning  · Torsemide 5 mg daily but she did not fill it yet at this time      ROS:  See HPI, otherwise review of systems as completely reviewed with the patient are negative    Past Medical History:   Diagnosis Date    Aortic stenosis     Arthritis     Benign essential hypertension     CAD (coronary artery disease)     Dizziness     Edema of leg     Hyperlipidemia     Hypertension     Other disorder of circulatory system (CODE)      Past Surgical History:   Procedure Laterality Date    BREAST SURGERY      bxs,benign    COLONOSCOPY      HYSTERECTOMY      INCISIONAL BREAST BIOPSY      x5, 1964, 1965, 1985 and 1990     Family History   Problem Relation Age of Onset    Hypertension Father     Coronary artery disease Family     Arthritis Family       reports that she has been smoking  She has been smoking about 1 00 pack per day  She has never used smokeless tobacco  She reports that she drinks alcohol  She reports that she does not use drugs      I COMPLETELY REVIEWED THE PAST MEDICAL HISTORY/PAST SURGICAL HISTORY/SOCIAL HISTORY/FAMILY HISTORY/AND MEDICATIONS  AND UPDATED ALL    Objective:     Vitals:   Vitals:    09/27/19 1123   BP: (!) 182/80    BP sitting on right:210/72 same on left:  Heart rate 64 and regular  BP standing on left:206/68 with a heart rate of 64 and regular    Weight (last 2 days)     Date/Time   Weight    09/27/19 1123   58 5 (129)            Wt Readings from Last 3 Encounters:   09/27/19 58 5 kg (129 lb)   09/11/19 59 1 kg (130 lb 6 4 oz)   08/21/19 59 4 kg (131 lb)       Body mass index is 22 14 kg/m²      Physical Exam: General:  No acute distress  Skin:  No acute rash  Eyes:  No scleral icterus, noninjected  ENT:  Moist mucous membranes  Neck:  Supple, no jugular venous distention  Back   No CVAT  Chest:  Clear to auscultation and percussion, good respiratory effort  CVS:  Regular rate and rhythm without a rub, murmurs or gallops  Abdomen:  Soft and nontender with normal bowel sounds  Extremities:  No cyanosis and no edema  Neuro:  Grossly intact  Psych:  Alert, oriented x3 and appropriate      Medications:    Current Outpatient Medications:     albuterol (VENTOLIN HFA) 90 mcg/act inhaler, Inhale 2 puffs every 6 (six) hours as needed for wheezing, Disp: 18 g, Rfl: 2    amLODIPine (NORVASC) 5 mg tablet, Take 1 tablet (5 mg total) by mouth daily, Disp: 90 tablet, Rfl: 3    aspirin 81 MG tablet, Take 2 tablets by mouth daily, Disp: , Rfl:     atenolol (TENORMIN) 25 mg tablet, 1/2 tab daily (Patient taking differently: Take 25 mg by mouth daily ), Disp: 90 tablet, Rfl: 3    atenolol (TENORMIN) 50 mg tablet, Take 1 tablet (50 mg total) by mouth daily, Disp: 90 tablet, Rfl: 3    atorvastatin (LIPITOR) 80 mg tablet, take 1 tablet by mouth once daily, Disp: 90 tablet, Rfl: 3    B Complex Vitamins (VITAMIN B-COMPLEX) TABS, Take by mouth, Disp: , Rfl:     Bioflavonoid Products (VITAMIN C PLUS PO), Take by mouth daily, Disp: , Rfl:     ciclopirox (LOPROX) 0 77 % cream, Apply topically 2 (two) times a day for 20 days, Disp: 45 g, Rfl: 0    enalapril (VASOTEC) 20 mg tablet, Take 1 tablet (20 mg total) by mouth 2 (two) times a day, Disp: 180 tablet, Rfl: 3    fluticasone (FLONASE) 50 mcg/act nasal spray, 1 spray into each nostril daily, Disp: 16 g, Rfl: 3    GARLIC PO, Take by mouth daily , Disp: , Rfl:     levothyroxine 25 mcg tablet, Take 1 tablet (25 mcg total) by mouth daily in the early morning, Disp: 90 tablet, Rfl: 3    Multiple Vitamins-Minerals (CENTRUM SILVER PO), Take by mouth, Disp: , Rfl:     magnesium oxide (MAG-OX) 400 mg, Take 1 tablet (400 mg total) by mouth daily, Disp: 30 tablet, Rfl: 5    Multiple Minerals (CALCIUM-MAGNESIUM-ZINC) TABS, Take by mouth daily, Disp: , Rfl:     spironolactone (ALDACTONE) 25 mg tablet, Take 1 tablet (25 mg total) by mouth daily, Disp: 30 tablet, Rfl: 5    torsemide (DEMADEX) 5 MG tablet, Take 1 tablet (5 mg total) by mouth daily, Disp: 30 tablet, Rfl: 5    varenicline (CHANTIX STARTING MONTH PAK) 0 5 MG X 11 & 1 MG X 42 tablet, Take one 0 5mg tab by mouth 1x daily for 3 days, then increase to one 0 5mg tab 2x daily for 3 days, then increase to one 1mg tab 2x daily (Patient not taking: Reported on 9/11/2019), Disp: 53 tablet, Rfl: 0    Lab, Imaging and other studies: I have personally reviewed pertinent labs    Laboratory Results:  Results for orders placed or performed in visit on 96/23/25   Basic metabolic panel   Result Value Ref Range    Sodium 140 136 - 145 mmol/L    Potassium 3 9 3 5 - 5 3 mmol/L    Chloride 104 100 - 108 mmol/L    CO2 28 21 - 32 mmol/L    ANION GAP 8 4 - 13 mmol/L    BUN 23 5 - 25 mg/dL    Creatinine 0 87 0 60 - 1 30 mg/dL    Glucose, Fasting 97 65 - 99 mg/dL    Calcium 8 9 8 3 - 10 1 mg/dL    eGFR 64 ml/min/1 73sq m   CBC   Result Value Ref Range    WBC 7 84 4 31 - 10 16 Thousand/uL    RBC 3 28 (L) 3 81 - 5 12 Million/uL    Hemoglobin 11 5 11 5 - 15 4 g/dL    Hematocrit 34 4 (L) 34 8 - 46 1 %  (H) 82 - 98 fL    MCH 35 1 (H) 26 8 - 34 3 pg    MCHC 33 4 31 4 - 37 4 g/dL    RDW 15 0 11 6 - 15 1 %    Platelets 822 561 - 054 Thousands/uL    MPV 11 8 8 9 - 12 7 fL   CK   Result Value Ref Range    Total CK 94 26 - 192 U/L   Lipid Panel with Direct LDL reflex   Result Value Ref Range    Cholesterol 110 50 - 200 mg/dL    Triglycerides 24 <=150 mg/dL    HDL, Direct 82 (H) 40 - 60 mg/dL    LDL Calculated 23 0 - 100 mg/dL   Magnesium   Result Value Ref Range    Magnesium 1 3 (L) 1 6 - 2 6 mg/dL   Phosphorus   Result Value Ref Range    Phosphorus 3 7 2 3 - 4 1 mg/dL   PTH, intact   Result Value Ref Range    PTH 36 0 18 4 - 80 1 pg/mL   Vitamin D 25 hydroxy   Result Value Ref Range    Vit D, 25-Hydroxy 57 3 30 0 - 100 0 ng/mL   Urinalysis with microscopic   Result Value Ref Range    Clarity, UA Clear     Color, UA Yellow     Specific Gravity, UA 1 010 1 000 - 1 030    pH, UA 6 0 5 0, 5 5, 6 0, 6 5, 7 0, 7 5, 8 0, 8 5, 9 0    Glucose, UA Negative Negative mg/dl    Ketones, UA Negative Negative mg/dl    Blood, UA Trace-Intact (A) Negative    Protein, UA Trace (A) Negative mg/dl    Nitrite, UA Negative Negative    Bilirubin, UA Negative Negative    Urobilinogen, UA 0 2 0 2, 1 0 E U /dl E U /dl    Leukocytes, UA Trace (A) Negative    WBC, UA 4-10 (A) None Seen, 0-5, 5-55, 5-65 /hpf    RBC, UA 1-2 (A) None Seen, 0-5 /hpf    Bacteria, UA Occasional None Seen, Occasional /hpf    Epithelial Cells Occasional None Seen, Occasional /hpf   TSH, 3rd generation with Free T4 reflex   Result Value Ref Range    TSH 3RD GENERATON 2 115 0 358 - 3 740 uIU/mL       Results from last 7 days   Lab Units 09/25/19  0713   WBC Thousand/uL 7 84   HEMOGLOBIN g/dL 11 5   HEMATOCRIT % 34 4*   PLATELETS Thousands/uL 197   POTASSIUM mmol/L 3 9   CHLORIDE mmol/L 104   CO2 mmol/L 28   BUN mg/dL 23   CREATININE mg/dL 0 87   CALCIUM mg/dL 8 9   MAGNESIUM mg/dL 1 3*   PHOSPHORUS mg/dL 3 7         Radiology review:   chest X-ray    Ultrasound      Portions of the record may have been created with voice recognition software  Occasional wrong word or "sound a like" substitutions may have occurred due to the inherent limitations of voice recognition software  Read the chart carefully and recognize, using context, where substitutions have occurred

## 2019-09-28 LAB — RENIN PLAS-CCNC: 0.43 NG/ML/HR (ref 0.17–5.38)

## 2019-09-30 ENCOUNTER — HOSPITAL ENCOUNTER (OUTPATIENT)
Dept: RADIOLOGY | Facility: HOSPITAL | Age: 79
Discharge: HOME | End: 2019-09-30
Attending: INTERNAL MEDICINE
Payer: COMMERCIAL

## 2019-09-30 DIAGNOSIS — N18.30 CKD (CHRONIC KIDNEY DISEASE) STAGE 3, GFR 30-59 ML/MIN (HCC): Chronic | ICD-10-CM

## 2019-09-30 DIAGNOSIS — I70.219 ATHEROSCLEROSIS OF ARTERY OF EXTREMITY WITH INTERMITTENT CLAUDICATION (HCC): Chronic | ICD-10-CM

## 2019-09-30 DIAGNOSIS — I74.09 AORTOILIAC OCCLUSIVE DISEASE (HCC): Chronic | ICD-10-CM

## 2019-09-30 LAB — ALDOST SERPL-MCNC: 1 NG/DL (ref 0–30)

## 2019-09-30 PROCEDURE — 51798 US URINE CAPACITY MEASURE: CPT

## 2019-09-30 NOTE — TELEPHONE ENCOUNTER
Pt I scheduled for CTA w/ hydration at [facility] on 10/7 at 8 am for 2 hrs pre hydration and CTA is at 10:30 am followed by 4 hrs of post hydration

## 2019-10-03 RX ORDER — LEVOTHYROXINE SODIUM 0.03 MG/1
25 TABLET ORAL
Qty: 90 TABLET | Refills: 3 | Status: SHIPPED | OUTPATIENT
Start: 2019-10-03 | End: 2020-12-01 | Stop reason: SDUPTHER

## 2019-10-03 RX ORDER — ATENOLOL 25 MG/1
25 TABLET ORAL DAILY
Qty: 90 TABLET | Refills: 3 | Status: SHIPPED | OUTPATIENT
Start: 2019-10-03 | End: 2019-10-10 | Stop reason: ALTCHOICE

## 2019-10-03 RX ORDER — ATENOLOL 50 MG/1
50 TABLET ORAL DAILY
Qty: 90 TABLET | Refills: 3 | Status: SHIPPED | OUTPATIENT
Start: 2019-10-03 | End: 2019-10-10 | Stop reason: ALTCHOICE

## 2019-10-06 LAB
METANEPH FREE SERPL-MCNC: 11 PG/ML (ref 0–62)
NORMETANEPHRINE SERPL-MCNC: 63 PG/ML (ref 0–145)

## 2019-10-07 ENCOUNTER — HOSPITAL ENCOUNTER (OUTPATIENT)
Dept: RADIOLOGY | Facility: HOSPITAL | Age: 79
Discharge: HOME | End: 2019-10-07
Attending: SURGERY
Payer: COMMERCIAL

## 2019-10-07 ENCOUNTER — HOSPITAL ENCOUNTER (OUTPATIENT)
Dept: INFUSION CENTER | Facility: HOSPITAL | Age: 79
Discharge: HOME | End: 2019-10-07
Payer: COMMERCIAL

## 2019-10-07 VITALS — TEMPERATURE: 98 F | RESPIRATION RATE: 20 BRPM | OXYGEN SATURATION: 99 % | HEART RATE: 63 BPM

## 2019-10-07 DIAGNOSIS — I74.09 AORTOILIAC OCCLUSIVE DISEASE (HCC): Primary | ICD-10-CM

## 2019-10-07 DIAGNOSIS — I70.219 ATHEROSCLEROSIS OF ARTERY OF EXTREMITY WITH INTERMITTENT CLAUDICATION (HCC): ICD-10-CM

## 2019-10-07 DIAGNOSIS — I74.09 AORTOILIAC OCCLUSIVE DISEASE (HCC): ICD-10-CM

## 2019-10-07 LAB
CREAT UR-MCNC: 37.3 MG/DL
PROT UR-MCNC: 7 MG/DL
PROT/CREAT UR: 0.19 MG/G{CREAT} (ref 0–0.1)

## 2019-10-07 PROCEDURE — 75635 CT ANGIO ABDOMINAL ARTERIES: CPT

## 2019-10-07 PROCEDURE — 84156 ASSAY OF PROTEIN URINE: CPT | Performed by: INTERNAL MEDICINE

## 2019-10-07 PROCEDURE — 82570 ASSAY OF URINE CREATININE: CPT | Performed by: INTERNAL MEDICINE

## 2019-10-07 RX ORDER — SODIUM CHLORIDE 9 MG/ML
100 INJECTION, SOLUTION INTRAVENOUS CONTINUOUS
Status: CANCELLED | OUTPATIENT
Start: 2019-10-07

## 2019-10-07 RX ORDER — SODIUM CHLORIDE 9 MG/ML
250 INJECTION, SOLUTION INTRAVENOUS CONTINUOUS
Status: CANCELLED | OUTPATIENT
Start: 2019-10-07

## 2019-10-07 RX ORDER — SODIUM CHLORIDE 9 MG/ML
100 INJECTION, SOLUTION INTRAVENOUS CONTINUOUS
Status: DISPENSED | OUTPATIENT
Start: 2019-10-07 | End: 2019-10-07

## 2019-10-07 RX ORDER — SODIUM CHLORIDE 9 MG/ML
250 INJECTION, SOLUTION INTRAVENOUS CONTINUOUS
Status: DISPENSED | OUTPATIENT
Start: 2019-10-07 | End: 2019-10-07

## 2019-10-07 RX ADMIN — IOHEXOL 150 ML: 350 INJECTION, SOLUTION INTRAVENOUS at 11:06

## 2019-10-07 RX ADMIN — SODIUM CHLORIDE 250 ML/HR: 0.9 INJECTION, SOLUTION INTRAVENOUS at 08:38

## 2019-10-07 RX ADMIN — SODIUM CHLORIDE 100 ML/HR: 0.9 INJECTION, SOLUTION INTRAVENOUS at 11:15

## 2019-10-07 NOTE — PLAN OF CARE
Problem: Potential for Falls  Goal: Patient will remain free of falls  Description  INTERVENTIONS:  - Assess patient frequently for physical needs  -  Identify cognitive and physical deficits and behaviors that affect risk of falls    -  [place] fall precautions as indicated by assessment   - Educate patient/family on patient safety including physical limitations  - Instruct patient to call for assistance with activity based on assessment  - Modify environment to reduce risk of injury  CARE COMPANION AT CHAIRSIDE  Outcome: Progressing     Problem: SAFETY ADULT  Goal: Maintain or return to baseline ADL function  Description  INTERVENTIONS:  -  Assess patient's ability to carry out ADLs; assess patient's baseline for ADL function and identify physical deficits which impact ability to perform ADLs (bathing, care of mouth/teeth, toileting, grooming, dressing, etc )  - Assess/evaluate cause of self-care deficits   - Assess range of motion  - Assess patient's mobility; develop plan if impaired  - Assess patient's need for assistive devices and provide as appropriate  - Encourage maximum independence but intervene and supervise when necessary  - Involve family in performance of ADLs  - Assess for home care needs following discharge   - Consider OT consult to assist with ADL evaluation and planning for discharge  - Provide patient education as appropriate  Outcome: Progressing  Goal: Maintain or return mobility status to optimal level  Description  INTERVENTIONS:  - Assess patient's baseline mobility status (ambulation, transfers, stairs, etc )    - Identify cognitive and physical deficits and behaviors that affect mobility  - Identify mobility aids required to assist with transfers and/or ambulation (gait belt, sit-to-stand, lift, walker, cane, etc )  - [place] fall precautions as indicated by assessment  - Record patient progress and toleration of activity level on Mobility SBAR; progress patient to next Phase/Stage  - Instruct patient to call for assistance with activity based on assessment  - Consider rehabilitation consult to assist with strengthening/weightbearing, etc   Outcome: Progressing     Problem: Knowledge Deficit  Goal: Patient/family/caregiver demonstrates understanding of disease process, treatment plan, medications, and discharge instructions  Description  Complete learning assessment and assess knowledge base    Interventions:  - Provide teaching at level of understanding  - Provide teaching via preferred learning methods  Outcome: Progressing

## 2019-10-08 ENCOUNTER — TELEPHONE (OUTPATIENT)
Dept: NEPHROLOGY | Facility: CLINIC | Age: 79
End: 2019-10-08

## 2019-10-08 NOTE — TELEPHONE ENCOUNTER
I called patient to schedule December follow up appt with Dr Colon Forth  She said she will call the office back when she is ready to schedule

## 2019-10-10 ENCOUNTER — OFFICE VISIT (OUTPATIENT)
Dept: CARDIOLOGY CLINIC | Facility: CLINIC | Age: 79
End: 2019-10-10
Payer: COMMERCIAL

## 2019-10-10 VITALS
DIASTOLIC BLOOD PRESSURE: 78 MMHG | SYSTOLIC BLOOD PRESSURE: 162 MMHG | WEIGHT: 129 LBS | HEIGHT: 64 IN | HEART RATE: 83 BPM | BODY MASS INDEX: 22.02 KG/M2 | OXYGEN SATURATION: 97 %

## 2019-10-10 DIAGNOSIS — R01.1 CARDIAC MURMUR: ICD-10-CM

## 2019-10-10 DIAGNOSIS — I74.09 AORTOILIAC OCCLUSIVE DISEASE (HCC): Chronic | ICD-10-CM

## 2019-10-10 DIAGNOSIS — I70.219 ATHEROSCLEROSIS OF ARTERY OF EXTREMITY WITH INTERMITTENT CLAUDICATION (HCC): Chronic | ICD-10-CM

## 2019-10-10 DIAGNOSIS — N18.30 CKD (CHRONIC KIDNEY DISEASE) STAGE 3, GFR 30-59 ML/MIN (HCC): Chronic | ICD-10-CM

## 2019-10-10 DIAGNOSIS — I15.0 RENOVASCULAR HYPERTENSION: Primary | ICD-10-CM

## 2019-10-10 PROCEDURE — 93000 ELECTROCARDIOGRAM COMPLETE: CPT | Performed by: INTERNAL MEDICINE

## 2019-10-10 PROCEDURE — 99214 OFFICE O/P EST MOD 30 MIN: CPT | Performed by: INTERNAL MEDICINE

## 2019-10-10 RX ORDER — CARVEDILOL 12.5 MG/1
12.5 TABLET ORAL 2 TIMES DAILY WITH MEALS
Qty: 60 TABLET | Refills: 5 | Status: SHIPPED | OUTPATIENT
Start: 2019-10-10 | End: 2020-07-16

## 2019-10-10 NOTE — PROGRESS NOTES
Cardiology Follow Up    Lev Monae  1940  [id number]      Interval History: Lev Monae is here for follow up of hypertension and to discuss heart murmur  She was last seen In May 2018 because of hypertension and peripheral vascular disease  At that time, blood pressure was controlled with atenolol, lisinopril and chlorthalidone  Blood pressure has continued to increase  She follows with vascular surgery who are planning on intervention of an occlusive iliac disease  She was seen by Nephrology prior to this procedure and blood pressure medications were adjusted  She continues to have elevated blood pressure  Her systolic blood pressure on home measurements ranges from 160 to 180 mm Hg  She reports good adherence with medications  She continues to smoke half a pack per day  She denies any shortness of breath or chest pain  She has pain in both extremities as she walks      The following portions of the patient's history were reviewed and updated as appropriate: allergies, current medications, past family history, past medical history, past social history, past surgical history and problem list     Current Outpatient Medications on File Prior to Visit   Medication Sig Dispense Refill    albuterol (VENTOLIN HFA) 90 mcg/act inhaler Inhale 2 puffs every 6 (six) hours as needed for wheezing 18 g 2    amLODIPine (NORVASC) 5 mg tablet Take 1 tablet (5 mg total) by mouth daily 90 tablet 3    aspirin 81 MG tablet Take 2 tablets by mouth daily      atenolol (TENORMIN) 25 mg tablet Take 1 tablet (25 mg total) by mouth daily 90 tablet 3    atenolol (TENORMIN) 50 mg tablet Take 1 tablet (50 mg total) by mouth daily 90 tablet 3    atorvastatin (LIPITOR) 80 mg tablet take 1 tablet by mouth once daily 90 tablet 3    B Complex Vitamins (VITAMIN B-COMPLEX) TABS Take by mouth      Bioflavonoid Products (VITAMIN C PLUS PO) Take by mouth daily      ciclopirox (LOPROX) 0 77 % cream Apply topically 2 (two) times a day for 20 days 45 g 0    enalapril (VASOTEC) 20 mg tablet Take 1 tablet (20 mg total) by mouth 2 (two) times a day 180 tablet 3    fluticasone (FLONASE) 50 mcg/act nasal spray 1 spray into each nostril daily 16 g 3    GARLIC PO Take by mouth daily       levothyroxine 25 mcg tablet Take 1 tablet (25 mcg total) by mouth daily in the early morning 90 tablet 3    magnesium oxide (MAG-OX) 400 mg Take 1 tablet (400 mg total) by mouth daily 30 tablet 5    Multiple Minerals (CALCIUM-MAGNESIUM-ZINC) TABS Take by mouth daily      Multiple Vitamins-Minerals (CENTRUM SILVER PO) Take by mouth      spironolactone (ALDACTONE) 25 mg tablet Take 1 tablet (25 mg total) by mouth daily 30 tablet 5    torsemide (DEMADEX) 5 MG tablet Take 1 tablet (5 mg total) by mouth daily 30 tablet 5    varenicline (CHANTIX STARTING MONTH PAK) 0 5 MG X 11 & 1 MG X 42 tablet Take one 0 5mg tab by mouth 1x daily for 3 days, then increase to one 0 5mg tab 2x daily for 3 days, then increase to one 1mg tab 2x daily (Patient not taking: Reported on 10/10/2019) 53 tablet 0     No current facility-administered medications on file prior to visit  Review of Systems:  Review of Systems   Constitutional: Negative for chills, fatigue and fever  HENT: Negative for congestion, nosebleeds and postnasal drip  Respiratory: Negative for cough, chest tightness and shortness of breath  Cardiovascular: Negative for chest pain, palpitations and leg swelling  Gastrointestinal: Negative for abdominal distention, abdominal pain, diarrhea, nausea and vomiting  Endocrine: Negative for polydipsia, polyphagia and polyuria  Musculoskeletal: Positive for arthralgias and myalgias  Negative for gait problem  Skin: Negative for color change, pallor and rash  Allergic/Immunologic: Negative for environmental allergies, food allergies and immunocompromised state     Neurological: Negative for dizziness, seizures, syncope and light-headedness  Hematological: Negative for adenopathy  Does not bruise/bleed easily  Psychiatric/Behavioral: Negative for dysphoric mood  The patient is not nervous/anxious  Physical Exam:  Physical Exam   Constitutional: She is oriented to person, place, and time  She appears well-developed  No distress  HENT:   Head: Normocephalic and atraumatic  Eyes: Pupils are equal, round, and reactive to light  Conjunctivae and EOM are normal    Neck: Neck supple  No JVD present  No thyromegaly present  Cardiovascular: Normal rate and regular rhythm  Exam reveals no gallop and no friction rub  Murmur heard  Systolic murmur is present with a grade of 3/6  Pulmonary/Chest: Effort normal and breath sounds normal    Abdominal: Soft  She exhibits no distension  There is no tenderness  Musculoskeletal: She exhibits no edema  Neurological: She is alert and oriented to person, place, and time  No cranial nerve deficit  Skin: Skin is warm and dry  No rash noted  She is not diaphoretic  No erythema  Psychiatric: She has a normal mood and affect   Her behavior is normal  Judgment and thought content normal        Cardiographics  ECG: NSR with LVH    Labs:  Lab Results   Component Value Date    K 3 9 09/25/2019     09/25/2019    CO2 28 09/25/2019    BUN 23 09/25/2019    CREATININE 0 87 09/25/2019    CALCIUM 8 9 09/25/2019     Lab Results   Component Value Date    WBC 7 84 09/25/2019    HGB 11 5 09/25/2019    HCT 34 4 (L) 09/25/2019     (H) 09/25/2019     09/25/2019     Lab Results   Component Value Date    CHOL 151 09/09/2014    TRIG 24 09/25/2019    TRIG 58 09/09/2014    HDL 82 (H) 09/25/2019    HDL 72 09/09/2014    LDLDIRECT 43 04/17/2018    LDLDIRECT 67 09/09/2014     Imaging: Cta Abdominal W Run Off W Wo Contrast    Result Date: 10/9/2019  Narrative: CT ANGIOGRAM OF THE AORTA AND LOWER EXTREMITIES WITH IV CONTRAST INDICATION:  Severe aorto iliac and infrainguinal arterial occlusive disease with symptoms which may be consistent with rest pain equivalent in the right foot  Significant progression since previous evaluation  Risk of tissue loss  COMPARISON: None  TECHNIQUE:  CT angiogram examination of the abdomen, pelvis, and lower extremities was performed according to standard protocol with intravenous contrast   This examination, like all CT scans performed in the [place], was performed utilizing techniques to minimize radiation dose exposure, including the use of iterative reconstruction and automated exposure control  3D reconstructions were performed an independent workstation, and are supplied for review  Rad dose 1814 08 mGy-cm IV Contrast:  150 mL of iohexol (OMNIPAQUE)  FINDINGS: VASCULAR STRUCTURES:   The aorta is normal in caliber  There is variant anatomy of the celiac and superior mesenteric  The left gastric, superior mesenteric, and celiac all take their origin on top of each other  There is stenosis of both renal arteries  There is poststenotic dilation, versus aneurysm of a hypertrophied L4 lumbar branch  This may merely be due to increased flow, due to the iliac disease  This should be followed to ensure regression after treatment of the iliac disease  Right side: Low dense material fills the entirety of the lumen of the common iliac  Distally, there is dense calcification filling the entirety of the lumen at the level of the distal common iliac  Please see image [id number]  I suspect this lesion is causative in the thrombosis of the right common iliac  The common iliac bifurcation is reconstituted  There is a very small external iliac  Some of this may be due to low flow  I suspect there is some atherosclerotic disease of the right external iliac is well  There is a patent right common femoral which would allow for retrograde access  The femoropopliteal system is patent  There is three-vessel runoff    The posterior tibial is the dominant vessel  Left side: The left common iliac shows calcified plaque causing a high-grade stenosis proximally  Please see image [id number]  This is in a vessel that is otherwise 7 to 8 mm in diameter  External iliac is patent  The femoropopliteal system is patent  There is three-vessel runoff  The posterior tibial is the dominant vessel  OTHER FINDINGS ABDOMEN LOWER CHEST:  No significant abnormality in the lung bases  LIVER/BILIARY TREE:  Unremarkable  GALLBLADDER:  No calcified gallstones  No pericholecystic inflammatory change  SPLEEN:  Unremarkable  Normal size  PANCREAS:  Unremarkable  ADRENAL GLANDS: Unremarkable  KIDNEYS/URETERS:  No solid renal mass  No hydronephrosis  No urinary tract calculi  PELVIS REPRODUCTIVE ORGANS:  Unremarkable for patient's age  URINARY BLADDER:  Unremarkable  ADDITIONAL ABDOMINAL AND PELVIC STRUCTURES STOMACH AND BOWEL: ABDOMINOPELVIC CAVITY:   No pathologically enlarged mesenteric or retroperitoneal lymph nodes  No ascites or free intraperitoneal air  ABDOMINAL WALL/INGUINAL REGIONS:  Unremarkable  OSSEOUS STRUCTURES:  There is a ventral epidural mass at the level of the body of L1  This is fairly dense  It's difficult to tell this is calcified, or enhancing  Possibilities include meningioma, calcified chronic herniation of disc, atypical osteophyte  Impression: High-grade densely calcified lesion of the distal external iliac on the right  There appears to be back thrombosis to the level of the origin of the common iliac  There is probably some intrinsic disease of the right external iliac is well  Its difficult to tell due to underfilling of the artery  Left common iliac artery stenosis Lesion of spine, consider MRI with contrast  Workstation performed: [id number]     Us Kidney And Bladder With Pvr    Result Date: 10/2/2019  Narrative: RENAL ULTRASOUND INDICATION:   I74 09:  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 I70 219: Atherosclerosis of native arteries of extremities with intermittent claudication, unspecified extremity N18 3: Chronic kidney disease, stage 3 (moderate)  COMPARISON: None TECHNIQUE:   Ultrasound of the retroperitoneum was performed with a curvilinear transducer utilizing volumetric sweeps and still imaging techniques  FINDINGS: KIDNEYS: The right kidney is slightly smaller than the left  Right kidney:  9 8 x 2 7 cm  The renal cortex measures 1 1 cm  Left kidney:  11 4 x 4 4 cm  The renal cortex measures 1 6 cm  Right kidney Normal echogenicity and contour  No suspicious masses detected  No hydronephrosis  No shadowing calculi  No perinephric fluid collections  Left kidney Normal echogenicity and contour  No suspicious masses detected  No hydronephrosis  No shadowing calculi  No perinephric fluid collections  URETERS: Nonvisualized  BLADDER: Normally distended  The prevoiding volume is 1 23 mL  There is no post void residual  No focal thickening or mass lesions  Bilateral ureteral jets detected  Impression: 1  The right kidney is slightly smaller than the left  2   No post void residual  Workstation performed: [id number]       Discussion/Summary:  1  Essential hypertension    2  Aortoiliac occlusive disease ([facility])    3  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 ([facility] [address] )    4  CKD (chronic kidney disease) stage 3, GFR 30-59 ml/min (formerly [facility])    5  Cardiac murmur      - Will discuss with vascular surgery - she has refractory hypertension in spite of being on 5 BP medications (including 2 diuretics)  Renal ultrasound showed right renal artery stenosis  Consider insertion of renal artery stent for assistance of treatment of renovascular hypertension   - Will switch atenolol to carvedilol  - continue amlodipine and spironolactone  - Continue enalapril   - Last LDL was at goal < 40 mg/dL  -   Discussed smoking cessation in detail    She has a large amount of vascular disease present which is a direct result of tobacco use  She has Chantix but is hesitant on using it  Discussed with her in detail  She will likely begin Chantix  - Echocardiogram scheduled for systolic murmur likely due to aortic stenosis  No significant valve disease was seen in 2017

## 2019-10-11 ENCOUNTER — APPOINTMENT (OUTPATIENT)
Dept: LAB | Facility: HOSPITAL | Age: 79
End: 2019-10-11
Attending: INTERNAL MEDICINE
Payer: COMMERCIAL

## 2019-10-11 ENCOUNTER — TRANSCRIBE ORDERS (OUTPATIENT)
Dept: ADMINISTRATIVE | Facility: HOSPITAL | Age: 79
End: 2019-10-11

## 2019-10-11 ENCOUNTER — HOSPITAL ENCOUNTER (OUTPATIENT)
Dept: NON INVASIVE DIAGNOSTICS | Facility: HOSPITAL | Age: 79
Discharge: HOME | End: 2019-10-11
Attending: INTERNAL MEDICINE
Payer: COMMERCIAL

## 2019-10-11 DIAGNOSIS — N18.30 CKD (CHRONIC KIDNEY DISEASE) STAGE 3, GFR 30-59 ML/MIN (HCC): Chronic | ICD-10-CM

## 2019-10-11 DIAGNOSIS — I74.09 AORTOILIAC OCCLUSIVE DISEASE (HCC): Chronic | ICD-10-CM

## 2019-10-11 DIAGNOSIS — I10 ESSENTIAL HYPERTENSION: ICD-10-CM

## 2019-10-11 DIAGNOSIS — R01.1 CARDIAC MURMUR: ICD-10-CM

## 2019-10-11 DIAGNOSIS — I70.219 ATHEROSCLEROSIS OF ARTERY OF EXTREMITY WITH INTERMITTENT CLAUDICATION (HCC): Chronic | ICD-10-CM

## 2019-10-11 LAB
ANION GAP SERPL CALCULATED.3IONS-SCNC: 8 MMOL/L (ref 4–13)
BUN SERPL-MCNC: 19 MG/DL (ref 5–25)
CALCIUM SERPL-MCNC: 9.2 MG/DL (ref 8.3–10.1)
CHLORIDE SERPL-SCNC: 103 MMOL/L (ref 100–108)
CO2 SERPL-SCNC: 30 MMOL/L (ref 21–32)
CREAT SERPL-MCNC: 0.96 MG/DL (ref 0.6–1.3)
GFR SERPL CREATININE-BSD FRML MDRD: 56 ML/MIN/1.73SQ M
GLUCOSE P FAST SERPL-MCNC: 94 MG/DL (ref 65–99)
POTASSIUM SERPL-SCNC: 4.5 MMOL/L (ref 3.5–5.3)
SODIUM SERPL-SCNC: 141 MMOL/L (ref 136–145)

## 2019-10-11 PROCEDURE — 80048 BASIC METABOLIC PNL TOTAL CA: CPT

## 2019-10-11 PROCEDURE — 93306 TTE W/DOPPLER COMPLETE: CPT

## 2019-10-11 PROCEDURE — 36415 COLL VENOUS BLD VENIPUNCTURE: CPT

## 2019-10-14 ENCOUNTER — TELEPHONE (OUTPATIENT)
Dept: NEPHROLOGY | Facility: CLINIC | Age: 79
End: 2019-10-14

## 2019-10-14 PROCEDURE — 93306 TTE W/DOPPLER COMPLETE: CPT | Performed by: INTERNAL MEDICINE

## 2019-10-14 NOTE — TELEPHONE ENCOUNTER
----- Message from Antwan Cortés MD sent at 10/10/2019  3:45 PM EDT -----  We need to have the patient come in to see me in the next couple of weeks to discuss possible further intervention on her kidney arteries for her blood pressure

## 2019-10-14 NOTE — TELEPHONE ENCOUNTER
----- Message from Chalino Green MD sent at 10/14/2019  7:44 AM EDT -----  Please let the patient know that essentially her echocardiogram looks good

## 2019-10-14 NOTE — TELEPHONE ENCOUNTER
Patient is aware that echocardiogram was normal      Patient going away the end of the month will call to schedule

## 2019-10-22 ENCOUNTER — TELEPHONE (OUTPATIENT)
Dept: NEPHROLOGY | Facility: CLINIC | Age: 79
End: 2019-10-22

## 2019-10-22 NOTE — TELEPHONE ENCOUNTER
I attempted to call and schedule December follow up appt w/ Dr Adama Storey, phone just rang with no voice mail, I will try again at a later date

## 2019-11-04 ENCOUNTER — TELEPHONE (OUTPATIENT)
Dept: ADMINISTRATIVE | Facility: HOSPITAL | Age: 79
End: 2019-11-04

## 2019-11-04 NOTE — TELEPHONE ENCOUNTER
Patient received letter to schedule ov to review CTA  She states Cardiology went over this with her and she doesn't wish to come in  She will speak with Dr Tea Oneal as well to make sure she doesn't need to follow up

## 2019-11-12 ENCOUNTER — TELEPHONE (OUTPATIENT)
Dept: VASCULAR SURGERY | Facility: CLINIC | Age: 79
End: 2019-11-12

## 2019-11-12 NOTE — TELEPHONE ENCOUNTER
Pt called stating she is confused about CTA results  Advised her we have been trying to reach her, Dr Queta Phelps wants to see her in office to review  She states she saw Dr Florentino Vásquez a few days after the cta and was told it was ok  Explained that Dr Queta Phelps needs to review results w/ her  She has been in [place] and just returned approx one week ago  She agrees to make f/u ov, transf to Jonatan Cruz to schedule apt

## 2019-11-15 ENCOUNTER — TELEPHONE (OUTPATIENT)
Dept: GASTROENTEROLOGY | Facility: CLINIC | Age: 79
End: 2019-11-15

## 2019-11-15 NOTE — TELEPHONE ENCOUNTER
Called pt she stated [facility]

## 2019-11-19 ENCOUNTER — OFFICE VISIT (OUTPATIENT)
Dept: GASTROENTEROLOGY | Facility: CLINIC | Age: 79
End: 2019-11-19
Payer: COMMERCIAL

## 2019-11-19 VITALS
HEART RATE: 70 BPM | DIASTOLIC BLOOD PRESSURE: 60 MMHG | BODY MASS INDEX: 21.41 KG/M2 | HEIGHT: 64 IN | WEIGHT: 125.4 LBS | RESPIRATION RATE: 18 BRPM | TEMPERATURE: 97 F | SYSTOLIC BLOOD PRESSURE: 140 MMHG

## 2019-11-19 DIAGNOSIS — R19.8 CHANGE IN BOWEL FUNCTION: ICD-10-CM

## 2019-11-19 DIAGNOSIS — R19.8 ALTERNATING CONSTIPATION AND DIARRHEA: Primary | ICD-10-CM

## 2019-11-19 PROCEDURE — 99204 OFFICE O/P NEW MOD 45 MIN: CPT | Performed by: INTERNAL MEDICINE

## 2019-11-19 NOTE — LETTER
November 19, 2019     [place], [address]    Patient: Lorene Wells   YOB: 1940   Date of Visit: 11/19/2019       Dear Dr Julián Ohara: Thank you for referring Yeny Jones to me for evaluation  Below are my notes for this consultation  If you have questions, please do not hesitate to call me  I look forward to following your patient along with you  Sincerely,        Grupo Rivero MD        CC: No Recipients  Grupo Rivero MD  11/19/2019  4:47 PM  Sign at close encounter  Consultation - [address] [facility]  Lorene Wells 78 y o  female MRN: [id number]  Unit/Bed#:  Encounter: [id number]        Consults    ASSESSMENT/PLAN:     1  Change in bowel habits alternating diarrhea and constipation-may be secondary to recent change in medications, she has recently started on magnesium for hypomagnesemia  Other possibility does include colon polyps or lesions as her previous colonoscopy was almost 10 years ago  Fortunately her hemoglobin is normal and CT did not show any obvious colonic lesions or lymphadenopathy   -we discussed adding probiotics  -add a bulking agent such as Metamucil or Benefiber to help with constipation  -increase water intake to 64 oz daily   -continue magnesium as magnesium levels were low on previous labs  -calcium was normal   TSH was normal   -will plan for colonoscopy to assess for colon polyps and lesions   - Patient was explained about  the risks and benefits of the procedure  Risks including but not limited to bleeding, infection, perforation were explained in detail  Also explained about less than 100% sensitivity with the exam and other alternatives    -we discussed at length regarding importance of smoking cessation given significant peripheral vascular disease             ______________________________________________________________________    Reason for Consult / Principal Problem: [unfilled]    HPI: Leonie Maldonado is a 78y o  year old female with history of atherosclerosis disease, aortoiliac occlusive disease, hypothyroidism, aortic stenosis, essential hypertension, current smoker, presents for colon cancer screening evaluation  Patient states that she underwent colonoscopy close to 10 years ago  She states that she is unsure whether not she had polyps  Patient is more concerned because over the past 6 months to year she has had change in bowel habits with alternating diarrhea and constipation  She states that she has days when she has a several bowel movements followed by days when she has to use a suppository to have a bowel movement  She denies any nocturnal bowel movements  She also complains of associated bloating  She states that she has not noted any hematochezia or unintentional weight loss  Denies abdominal pain but does report occasional fecal incontinence  She reports that she has had change in medications, unsure which medications are new  On review of her medications, it appears that she was started on magnesium oxide recently, she was also placed on carvedilol instead of metoprolol by Cardiology  She is on aspirin 81 mg  She denies any heartburn symptoms, denies dysphagia, hematemesis, coffee-ground emesis or melena  She does not take any acid suppressive medications  She is an active smoker  She has never had an EGD  Denies history of peptic ulcer disease  Labs are reviewed, creatinine is 0 96, LFTs are normal, hemoglobin is 11 5, , platelets 546  INR is 1 1  She underwent CT a recently which was notable for normal gallbladder, normal biliary tree, normal pancreas, no lymphadenopathy  She does have high-grade calcified lesion in the distal ext external iliac on the right she also has left common iliac artery stenosis  She is being followed by vascular surgery and is planned to undergo intervention for occlusive iliac disease      Review of Systems: The remainder of the review of systems was negative except for the pertinent positives noted in HPI  Historical Information   Past Medical History:   Diagnosis Date    Aortic stenosis     Arthritis     Benign essential hypertension     CAD (coronary artery disease)     Dizziness     Edema of leg     Hyperlipidemia     Hypertension     Other disorder of circulatory system (CODE)      Past Surgical History:   Procedure Laterality Date    BREAST SURGERY      bxs,benign    COLONOSCOPY      HYSTERECTOMY      INCISIONAL BREAST BIOPSY      x5, 1964, 1965, 1985 and 1990     Social History   Social History     Substance and Sexual Activity   Alcohol Use Yes    Comment: manhattans 2-3 every day for 50 years     Social History     Substance and Sexual Activity   Drug Use No     Social History     Tobacco Use   Smoking Status Current Every Day Smoker    Packs/day: 1 00   Smokeless Tobacco Never Used     Family History   Problem Relation Age of Onset    Hypertension Father     Coronary artery disease Family     Arthritis Family        Meds/Allergies       (Not in a hospital admission)  No current facility-administered medications for this visit  Allergies   Allergen Reactions    Thiazide-Type Diuretics      Hyponatremia       Objective     Blood pressure 140/60, pulse 70, temperature (!) 97 °F (36 1 °C), temperature source Tympanic, resp  rate 18, height 5' 4" (1 626 m), weight 56 9 kg (125 lb 6 4 oz)  [unfilled]    PHYSICAL EXAM     GEN: well nourished, well developed, no acute distress  HEENT: anicteric, MMM, no cervical or supraclavicular lymphadenopathy  CV: RRR, no m/r/g  CHEST: CTA b/l, no WRR  ABD: +BS, soft, NT/ND, no hepatosplenomegaly  EXT: no c/c/e  SKIN: no rashes,  NEURO: aaox3    Lab Results:   No visits with results within 1 Day(s) from this visit     Latest known visit with results is:   Appointment on 10/11/2019   Component Date Value    Sodium 10/11/2019 141     Potassium 10/11/2019 4 5     Chloride 10/11/2019 103     CO2 10/11/2019 30     ANION GAP 10/11/2019 8     BUN 10/11/2019 19     Creatinine 10/11/2019 0 96     Glucose, Fasting 10/11/2019 94     Calcium 10/11/2019 9 2     eGFR 10/11/2019 56      Imaging Studies: I have personally reviewed pertinent films in PACS

## 2019-11-19 NOTE — PROGRESS NOTES
Consultation - 126 [facility]  Jose Bejarano 78 y o  female MRN: [id number]  Unit/Bed#:  Encounter: [id number]        Consults    ASSESSMENT/PLAN:     1  Change in bowel habits alternating diarrhea and constipation-may be secondary to recent change in medications, she has recently started on magnesium for hypomagnesemia  Other possibility does include colon polyps or lesions as her previous colonoscopy was almost 10 years ago  Fortunately her hemoglobin is normal and CT did not show any obvious colonic lesions or lymphadenopathy   -we discussed adding probiotics  -add a bulking agent such as Metamucil or Benefiber to help with constipation  -increase water intake to 64 oz daily   -continue magnesium as magnesium levels were low on previous labs  -calcium was normal   TSH was normal   -will plan for colonoscopy to assess for colon polyps and lesions   - Patient was explained about  the risks and benefits of the procedure  Risks including but not limited to bleeding, infection, perforation were explained in detail  Also explained about less than 100% sensitivity with the exam and other alternatives  -we discussed at length regarding importance of smoking cessation given significant peripheral vascular disease             ______________________________________________________________________    Reason for Consult / Principal Problem: [unfilled]    HPI: Jose Bejarano is a 78y o  year old female with history of atherosclerosis disease, aortoiliac occlusive disease, hypothyroidism, aortic stenosis, essential hypertension, current smoker, presents for colon cancer screening evaluation  Patient states that she underwent colonoscopy close to 10 years ago  She states that she is unsure whether not she had polyps  Patient is more concerned because over the past 6 months to year she has had change in bowel habits with alternating diarrhea and constipation    She states that she has days when she has a several bowel movements followed by days when she has to use a suppository to have a bowel movement  She denies any nocturnal bowel movements  She also complains of associated bloating  She states that she has not noted any hematochezia or unintentional weight loss  Denies abdominal pain but does report occasional fecal incontinence  She reports that she has had change in medications, unsure which medications are new  On review of her medications, it appears that she was started on magnesium oxide recently, she was also placed on carvedilol instead of metoprolol by Cardiology  She is on aspirin 81 mg  She denies any heartburn symptoms, denies dysphagia, hematemesis, coffee-ground emesis or melena  She does not take any acid suppressive medications  She is an active smoker  She has never had an EGD  Denies history of peptic ulcer disease  Labs are reviewed, creatinine is 0 96, LFTs are normal, hemoglobin is 11 5, , platelets 306  INR is 1 1  She underwent CT a recently which was notable for normal gallbladder, normal biliary tree, normal pancreas, no lymphadenopathy  She does have high-grade calcified lesion in the distal ext external iliac on the right she also has left common iliac artery stenosis  She is being followed by vascular surgery and is planned to undergo intervention for occlusive iliac disease  Review of Systems: The remainder of the review of systems was negative except for the pertinent positives noted in HPI       Historical Information   Past Medical History:   Diagnosis Date    Aortic stenosis     Arthritis     Benign essential hypertension     CAD (coronary artery disease)     Dizziness     Edema of leg     Hyperlipidemia     Hypertension     Other disorder of circulatory system (CODE)      Past Surgical History:   Procedure Laterality Date    BREAST SURGERY      bxs,benign    COLONOSCOPY      HYSTERECTOMY      INCISIONAL BREAST BIOPSY x5, 1964, 1965, 1985 and 1990     Social History   Social History     Substance and Sexual Activity   Alcohol Use Yes    Comment: wilfrid 2-3 every day for 50 years     Social History     Substance and Sexual Activity   Drug Use No     Social History     Tobacco Use   Smoking Status Current Every Day Smoker    Packs/day: 1 00   Smokeless Tobacco Never Used     Family History   Problem Relation Age of Onset    Hypertension Father     Coronary artery disease Family     Arthritis Family        Meds/Allergies       (Not in a hospital admission)  No current facility-administered medications for this visit  Allergies   Allergen Reactions    Thiazide-Type Diuretics      Hyponatremia       Objective     Blood pressure 140/60, pulse 70, temperature (!) 97 °F (36 1 °C), temperature source Tympanic, resp  rate 18, height 5' 4" (1 626 m), weight 56 9 kg (125 lb 6 4 oz)  [unfilled]    PHYSICAL EXAM     GEN: well nourished, well developed, no acute distress  HEENT: anicteric, MMM, no cervical or supraclavicular lymphadenopathy  CV: RRR, no m/r/g  CHEST: CTA b/l, no WRR  ABD: +BS, soft, NT/ND, no hepatosplenomegaly  EXT: no c/c/e  SKIN: no rashes,  NEURO: aaox3    Lab Results:   No visits with results within 1 Day(s) from this visit     Latest known visit with results is:   Appointment on 10/11/2019   Component Date Value    Sodium 10/11/2019 141     Potassium 10/11/2019 4 5     Chloride 10/11/2019 103     CO2 10/11/2019 30     ANION GAP 10/11/2019 8     BUN 10/11/2019 19     Creatinine 10/11/2019 0 96     Glucose, Fasting 10/11/2019 94     Calcium 10/11/2019 9 2     eGFR 10/11/2019 56      Imaging Studies: I have personally reviewed pertinent films in PACS

## 2019-11-24 ENCOUNTER — APPOINTMENT (EMERGENCY)
Dept: RADIOLOGY | Facility: HOSPITAL | Age: 79
DRG: 872 | End: 2019-11-24
Payer: COMMERCIAL

## 2019-11-24 ENCOUNTER — HOSPITAL ENCOUNTER (INPATIENT)
Facility: HOSPITAL | Age: 79
LOS: 3 days | Discharge: HOME | DRG: 872 | End: 2019-11-27
Attending: EMERGENCY MEDICINE | Admitting: FAMILY MEDICINE
Payer: COMMERCIAL

## 2019-11-24 DIAGNOSIS — N17.9 AKI (ACUTE KIDNEY INJURY) (HCC): ICD-10-CM

## 2019-11-24 DIAGNOSIS — R78.81 BACTEREMIA DUE TO GRAM-NEGATIVE BACTERIA: ICD-10-CM

## 2019-11-24 DIAGNOSIS — I73.9 PAD (PERIPHERAL ARTERY DISEASE) (HCC): ICD-10-CM

## 2019-11-24 DIAGNOSIS — F17.218 CIGARETTE NICOTINE DEPENDENCE WITH OTHER NICOTINE-INDUCED DISORDER: ICD-10-CM

## 2019-11-24 DIAGNOSIS — A41.9 SEPSIS (HCC): Primary | ICD-10-CM

## 2019-11-24 DIAGNOSIS — N39.0 UTI (URINARY TRACT INFECTION): ICD-10-CM

## 2019-11-24 PROBLEM — R65.20 SEVERE SEPSIS (HCC): Status: ACTIVE | Noted: 2019-11-24

## 2019-11-24 LAB
ALBUMIN SERPL BCP-MCNC: 2.8 G/DL (ref 3.5–5)
ALP SERPL-CCNC: 84 U/L (ref 46–116)
ALT SERPL W P-5'-P-CCNC: 24 U/L (ref 12–78)
ANION GAP SERPL CALCULATED.3IONS-SCNC: 11 MMOL/L (ref 4–13)
ANION GAP SERPL CALCULATED.3IONS-SCNC: 13 MMOL/L (ref 4–13)
APTT PPP: 24 SECONDS (ref 23–37)
AST SERPL W P-5'-P-CCNC: 58 U/L (ref 5–45)
BACTERIA UR QL AUTO: ABNORMAL /HPF
BASOPHILS # BLD AUTO: 0.04 THOUSANDS/ΜL (ref 0–0.1)
BASOPHILS NFR BLD AUTO: 0 % (ref 0–1)
BILIRUB SERPL-MCNC: 0.9 MG/DL (ref 0.2–1)
BILIRUB UR QL STRIP: NEGATIVE
BUN SERPL-MCNC: 34 MG/DL (ref 5–25)
BUN SERPL-MCNC: 35 MG/DL (ref 5–25)
CALCIUM SERPL-MCNC: 7.8 MG/DL (ref 8.3–10.1)
CALCIUM SERPL-MCNC: 8.5 MG/DL (ref 8.3–10.1)
CHLORIDE SERPL-SCNC: 100 MMOL/L (ref 100–108)
CHLORIDE SERPL-SCNC: 97 MMOL/L (ref 100–108)
CLARITY UR: ABNORMAL
CO2 SERPL-SCNC: 22 MMOL/L (ref 21–32)
CO2 SERPL-SCNC: 23 MMOL/L (ref 21–32)
COLOR UR: YELLOW
CREAT SERPL-MCNC: 1.42 MG/DL (ref 0.6–1.3)
CREAT SERPL-MCNC: 1.6 MG/DL (ref 0.6–1.3)
EOSINOPHIL # BLD AUTO: 0.01 THOUSAND/ΜL (ref 0–0.61)
EOSINOPHIL NFR BLD AUTO: 0 % (ref 0–6)
ERYTHROCYTE [DISTWIDTH] IN BLOOD BY AUTOMATED COUNT: 15.3 % (ref 11.6–15.1)
GFR SERPL CREATININE-BSD FRML MDRD: 30 ML/MIN/1.73SQ M
GFR SERPL CREATININE-BSD FRML MDRD: 35 ML/MIN/1.73SQ M
GLUCOSE SERPL-MCNC: 187 MG/DL (ref 65–140)
GLUCOSE SERPL-MCNC: 193 MG/DL (ref 65–140)
GLUCOSE UR STRIP-MCNC: NEGATIVE MG/DL
HCT VFR BLD AUTO: 31.8 % (ref 34.8–46.1)
HGB BLD-MCNC: 10.6 G/DL (ref 11.5–15.4)
HGB UR QL STRIP.AUTO: ABNORMAL
IMM GRANULOCYTES # BLD AUTO: 0.15 THOUSAND/UL (ref 0–0.2)
IMM GRANULOCYTES NFR BLD AUTO: 1 % (ref 0–2)
INR PPP: 1.09 (ref 0.91–1.09)
KETONES UR STRIP-MCNC: NEGATIVE MG/DL
LACTATE SERPL-SCNC: 1.5 MMOL/L (ref 0.5–2)
LACTATE SERPL-SCNC: 4.2 MMOL/L (ref 0.5–2)
LEUKOCYTE ESTERASE UR QL STRIP: ABNORMAL
LYMPHOCYTES # BLD AUTO: 0.82 THOUSANDS/ΜL (ref 0.6–4.47)
LYMPHOCYTES NFR BLD AUTO: 4 % (ref 14–44)
MCH RBC QN AUTO: 34.6 PG (ref 26.8–34.3)
MCHC RBC AUTO-ENTMCNC: 33.3 G/DL (ref 31.4–37.4)
MCV RBC AUTO: 104 FL (ref 82–98)
MONOCYTES # BLD AUTO: 1.96 THOUSAND/ΜL (ref 0.17–1.22)
MONOCYTES NFR BLD AUTO: 10 % (ref 4–12)
NEUTROPHILS # BLD AUTO: 15.84 THOUSANDS/ΜL (ref 1.85–7.62)
NEUTS SEG NFR BLD AUTO: 85 % (ref 43–75)
NITRITE UR QL STRIP: POSITIVE
NON-SQ EPI CELLS URNS QL MICRO: ABNORMAL /HPF
NRBC BLD AUTO-RTO: 0 /100 WBCS
PH UR STRIP.AUTO: 6 [PH]
PLATELET # BLD AUTO: 140 THOUSANDS/UL (ref 149–390)
PMV BLD AUTO: 12 FL (ref 8.9–12.7)
POTASSIUM SERPL-SCNC: 3.9 MMOL/L (ref 3.5–5.3)
POTASSIUM SERPL-SCNC: 5.9 MMOL/L (ref 3.5–5.3)
PROT SERPL-MCNC: 7.4 G/DL (ref 6.4–8.2)
PROT UR STRIP-MCNC: ABNORMAL MG/DL
PROTHROMBIN TIME: 11.7 SECONDS (ref 9.8–12)
RBC # BLD AUTO: 3.06 MILLION/UL (ref 3.81–5.12)
RBC #/AREA URNS AUTO: ABNORMAL /HPF
SODIUM SERPL-SCNC: 132 MMOL/L (ref 136–145)
SODIUM SERPL-SCNC: 134 MMOL/L (ref 136–145)
SP GR UR STRIP.AUTO: 1.02 (ref 1–1.03)
UROBILINOGEN UR QL STRIP.AUTO: 0.2 E.U./DL
WBC # BLD AUTO: 18.82 THOUSAND/UL (ref 4.31–10.16)
WBC #/AREA URNS AUTO: ABNORMAL /HPF

## 2019-11-24 PROCEDURE — 81001 URINALYSIS AUTO W/SCOPE: CPT | Performed by: EMERGENCY MEDICINE

## 2019-11-24 PROCEDURE — 96360 HYDRATION IV INFUSION INIT: CPT

## 2019-11-24 PROCEDURE — 87086 URINE CULTURE/COLONY COUNT: CPT | Performed by: EMERGENCY MEDICINE

## 2019-11-24 PROCEDURE — 94664 DEMO&/EVAL PT USE INHALER: CPT

## 2019-11-24 PROCEDURE — 87077 CULTURE AEROBIC IDENTIFY: CPT | Performed by: EMERGENCY MEDICINE

## 2019-11-24 PROCEDURE — 93005 ELECTROCARDIOGRAM TRACING: CPT

## 2019-11-24 PROCEDURE — 71045 X-RAY EXAM CHEST 1 VIEW: CPT

## 2019-11-24 PROCEDURE — 99221 1ST HOSP IP/OBS SF/LOW 40: CPT | Performed by: FAMILY MEDICINE

## 2019-11-24 PROCEDURE — 99285 EMERGENCY DEPT VISIT HI MDM: CPT

## 2019-11-24 PROCEDURE — 85610 PROTHROMBIN TIME: CPT | Performed by: EMERGENCY MEDICINE

## 2019-11-24 PROCEDURE — 87186 SC STD MICRODIL/AGAR DIL: CPT | Performed by: EMERGENCY MEDICINE

## 2019-11-24 PROCEDURE — 80053 COMPREHEN METABOLIC PANEL: CPT | Performed by: EMERGENCY MEDICINE

## 2019-11-24 PROCEDURE — 83605 ASSAY OF LACTIC ACID: CPT | Performed by: EMERGENCY MEDICINE

## 2019-11-24 PROCEDURE — 36415 COLL VENOUS BLD VENIPUNCTURE: CPT | Performed by: EMERGENCY MEDICINE

## 2019-11-24 PROCEDURE — 80048 BASIC METABOLIC PNL TOTAL CA: CPT | Performed by: STUDENT IN AN ORGANIZED HEALTH CARE EDUCATION/TRAINING PROGRAM

## 2019-11-24 PROCEDURE — 85025 COMPLETE CBC W/AUTO DIFF WBC: CPT | Performed by: EMERGENCY MEDICINE

## 2019-11-24 PROCEDURE — 87040 BLOOD CULTURE FOR BACTERIA: CPT | Performed by: EMERGENCY MEDICINE

## 2019-11-24 PROCEDURE — 85730 THROMBOPLASTIN TIME PARTIAL: CPT | Performed by: EMERGENCY MEDICINE

## 2019-11-24 RX ORDER — ASPIRIN 81 MG/1
162 TABLET, CHEWABLE ORAL DAILY
Status: DISCONTINUED | OUTPATIENT
Start: 2019-11-24 | End: 2019-11-27 | Stop reason: HOSPADM

## 2019-11-24 RX ORDER — ACETAMINOPHEN 325 MG/1
650 TABLET ORAL ONCE
Status: COMPLETED | OUTPATIENT
Start: 2019-11-24 | End: 2019-11-24

## 2019-11-24 RX ORDER — TORSEMIDE 10 MG/1
5 TABLET ORAL DAILY
Status: DISCONTINUED | OUTPATIENT
Start: 2019-11-24 | End: 2019-11-24

## 2019-11-24 RX ORDER — ACETAMINOPHEN 325 MG/1
650 TABLET ORAL EVERY 6 HOURS PRN
Status: DISCONTINUED | OUTPATIENT
Start: 2019-11-24 | End: 2019-11-25

## 2019-11-24 RX ORDER — CARVEDILOL 12.5 MG/1
12.5 TABLET ORAL 2 TIMES DAILY WITH MEALS
Status: DISCONTINUED | OUTPATIENT
Start: 2019-11-24 | End: 2019-11-25

## 2019-11-24 RX ORDER — CARVEDILOL 12.5 MG/1
12.5 TABLET ORAL 2 TIMES DAILY WITH MEALS
Status: DISCONTINUED | OUTPATIENT
Start: 2019-11-24 | End: 2019-11-24

## 2019-11-24 RX ORDER — TORSEMIDE 10 MG/1
5 TABLET ORAL DAILY
Status: DISCONTINUED | OUTPATIENT
Start: 2019-11-25 | End: 2019-11-25

## 2019-11-24 RX ORDER — SODIUM CHLORIDE 9 MG/ML
75 INJECTION, SOLUTION INTRAVENOUS CONTINUOUS
Status: DISCONTINUED | OUTPATIENT
Start: 2019-11-24 | End: 2019-11-27

## 2019-11-24 RX ORDER — SPIRONOLACTONE 25 MG/1
25 TABLET ORAL DAILY
Status: DISCONTINUED | OUTPATIENT
Start: 2019-11-24 | End: 2019-11-24

## 2019-11-24 RX ORDER — SPIRONOLACTONE 25 MG/1
25 TABLET ORAL DAILY
Status: DISCONTINUED | OUTPATIENT
Start: 2019-11-25 | End: 2019-11-27 | Stop reason: HOSPADM

## 2019-11-24 RX ORDER — LISINOPRIL 20 MG/1
40 TABLET ORAL DAILY
Status: DISCONTINUED | OUTPATIENT
Start: 2019-11-25 | End: 2019-11-24

## 2019-11-24 RX ORDER — CEFTRIAXONE 1 G/50ML
1000 INJECTION, SOLUTION INTRAVENOUS EVERY 24 HOURS
Status: DISCONTINUED | OUTPATIENT
Start: 2019-11-25 | End: 2019-11-27

## 2019-11-24 RX ORDER — AMLODIPINE BESYLATE 5 MG/1
5 TABLET ORAL DAILY
Status: DISCONTINUED | OUTPATIENT
Start: 2019-11-24 | End: 2019-11-24

## 2019-11-24 RX ORDER — NICOTINE 21 MG/24HR
1 PATCH, TRANSDERMAL 24 HOURS TRANSDERMAL DAILY
Status: DISCONTINUED | OUTPATIENT
Start: 2019-11-24 | End: 2019-11-27 | Stop reason: HOSPADM

## 2019-11-24 RX ORDER — LISINOPRIL 20 MG/1
40 TABLET ORAL DAILY
Status: DISCONTINUED | OUTPATIENT
Start: 2019-11-24 | End: 2019-11-24

## 2019-11-24 RX ORDER — AMLODIPINE BESYLATE 5 MG/1
5 TABLET ORAL DAILY
Status: DISCONTINUED | OUTPATIENT
Start: 2019-11-25 | End: 2019-11-25

## 2019-11-24 RX ORDER — HEPARIN SODIUM 5000 [USP'U]/ML
5000 INJECTION, SOLUTION INTRAVENOUS; SUBCUTANEOUS EVERY 8 HOURS SCHEDULED
Status: DISCONTINUED | OUTPATIENT
Start: 2019-11-24 | End: 2019-11-27 | Stop reason: HOSPADM

## 2019-11-24 RX ORDER — ATORVASTATIN CALCIUM 80 MG/1
80 TABLET, FILM COATED ORAL
Status: DISCONTINUED | OUTPATIENT
Start: 2019-11-24 | End: 2019-11-27 | Stop reason: HOSPADM

## 2019-11-24 RX ORDER — CEFTRIAXONE 1 G/50ML
1000 INJECTION, SOLUTION INTRAVENOUS ONCE
Status: COMPLETED | OUTPATIENT
Start: 2019-11-24 | End: 2019-11-24

## 2019-11-24 RX ORDER — LEVOTHYROXINE SODIUM 0.03 MG/1
25 TABLET ORAL
Status: DISCONTINUED | OUTPATIENT
Start: 2019-11-25 | End: 2019-11-27 | Stop reason: HOSPADM

## 2019-11-24 RX ADMIN — ATORVASTATIN CALCIUM 80 MG: 80 TABLET, FILM COATED ORAL at 17:38

## 2019-11-24 RX ADMIN — CARVEDILOL 12.5 MG: 12.5 TABLET, FILM COATED ORAL at 17:38

## 2019-11-24 RX ADMIN — HEPARIN SODIUM 5000 UNITS: 5000 INJECTION INTRAVENOUS; SUBCUTANEOUS at 14:09

## 2019-11-24 RX ADMIN — SODIUM CHLORIDE 1000 ML: 0.9 INJECTION, SOLUTION INTRAVENOUS at 12:29

## 2019-11-24 RX ADMIN — SODIUM CHLORIDE 1000 ML: 0.9 INJECTION, SOLUTION INTRAVENOUS at 11:38

## 2019-11-24 RX ADMIN — SODIUM CHLORIDE 150 ML/HR: 0.9 INJECTION, SOLUTION INTRAVENOUS at 14:11

## 2019-11-24 RX ADMIN — ACETAMINOPHEN 650 MG: 325 TABLET, FILM COATED ORAL at 11:44

## 2019-11-24 RX ADMIN — ASPIRIN 81 MG 162 MG: 81 TABLET ORAL at 14:09

## 2019-11-24 RX ADMIN — AMLODIPINE BESYLATE 5 MG: 5 TABLET ORAL at 14:09

## 2019-11-24 RX ADMIN — NICOTINE 1 PATCH: 21 PATCH, EXTENDED RELEASE TRANSDERMAL at 14:08

## 2019-11-24 RX ADMIN — SODIUM CHLORIDE 100 ML/HR: 0.9 INJECTION, SOLUTION INTRAVENOUS at 21:34

## 2019-11-24 RX ADMIN — HEPARIN SODIUM 5000 UNITS: 5000 INJECTION INTRAVENOUS; SUBCUTANEOUS at 21:34

## 2019-11-24 RX ADMIN — CEFTRIAXONE 1000 MG: 1 INJECTION, SOLUTION INTRAVENOUS at 12:29

## 2019-11-25 PROBLEM — R65.20 SEVERE SEPSIS (HCC): Status: RESOLVED | Noted: 2019-11-24 | Resolved: 2019-11-25

## 2019-11-25 PROBLEM — A41.9 SEVERE SEPSIS (HCC): Status: RESOLVED | Noted: 2019-11-24 | Resolved: 2019-11-25

## 2019-11-25 PROBLEM — R78.81 BACTEREMIA: Status: ACTIVE | Noted: 2019-11-25

## 2019-11-25 LAB
ACANTHOCYTES BLD QL SMEAR: PRESENT
ALBUMIN SERPL BCP-MCNC: 2.3 G/DL (ref 3.5–5)
ALP SERPL-CCNC: 78 U/L (ref 46–116)
ALT SERPL W P-5'-P-CCNC: 30 U/L (ref 12–78)
ANION GAP SERPL CALCULATED.3IONS-SCNC: 10 MMOL/L (ref 4–13)
ANISOCYTOSIS BLD QL SMEAR: PRESENT
AST SERPL W P-5'-P-CCNC: 44 U/L (ref 5–45)
ATRIAL RATE: 111 BPM
BASOPHILS # BLD MANUAL: 0 THOUSAND/UL (ref 0–0.1)
BASOPHILS NFR MAR MANUAL: 0 % (ref 0–1)
BILIRUB SERPL-MCNC: 0.5 MG/DL (ref 0.2–1)
BUN SERPL-MCNC: 25 MG/DL (ref 5–25)
BURR CELLS BLD QL SMEAR: PRESENT
CALCIUM SERPL-MCNC: 7.5 MG/DL (ref 8.3–10.1)
CHLORIDE SERPL-SCNC: 105 MMOL/L (ref 100–108)
CO2 SERPL-SCNC: 22 MMOL/L (ref 21–32)
CREAT SERPL-MCNC: 0.94 MG/DL (ref 0.6–1.3)
EOSINOPHIL # BLD MANUAL: 0 THOUSAND/UL (ref 0–0.4)
EOSINOPHIL NFR BLD MANUAL: 0 % (ref 0–6)
ERYTHROCYTE [DISTWIDTH] IN BLOOD BY AUTOMATED COUNT: 15 % (ref 11.6–15.1)
GFR SERPL CREATININE-BSD FRML MDRD: 58 ML/MIN/1.73SQ M
GLUCOSE SERPL-MCNC: 112 MG/DL (ref 65–140)
HCT VFR BLD AUTO: 28.9 % (ref 34.8–46.1)
HGB BLD-MCNC: 9.8 G/DL (ref 11.5–15.4)
LYMPHOCYTES # BLD AUTO: 0.64 THOUSAND/UL (ref 0.6–4.47)
LYMPHOCYTES # BLD AUTO: 4 % (ref 14–44)
MAGNESIUM SERPL-MCNC: 1.5 MG/DL (ref 1.6–2.6)
MCH RBC QN AUTO: 34.9 PG (ref 26.8–34.3)
MCHC RBC AUTO-ENTMCNC: 33.9 G/DL (ref 31.4–37.4)
MCV RBC AUTO: 103 FL (ref 82–98)
MONOCYTES # BLD AUTO: 0.32 THOUSAND/UL (ref 0–1.22)
MONOCYTES NFR BLD: 2 % (ref 4–12)
NEUTROPHILS # BLD MANUAL: 15.07 THOUSAND/UL (ref 1.85–7.62)
NEUTS BAND NFR BLD MANUAL: 1 % (ref 0–8)
NEUTS SEG NFR BLD AUTO: 93 % (ref 43–75)
NRBC BLD AUTO-RTO: 0 /100 WBCS
OVALOCYTES BLD QL SMEAR: PRESENT
P AXIS: 79 DEGREES
PHOSPHATE SERPL-MCNC: 2.3 MG/DL (ref 2.3–4.1)
PLATELET # BLD AUTO: 138 THOUSANDS/UL (ref 149–390)
PLATELET BLD QL SMEAR: ABNORMAL
PMV BLD AUTO: 12 FL (ref 8.9–12.7)
POIKILOCYTOSIS BLD QL SMEAR: PRESENT
POTASSIUM SERPL-SCNC: 3.3 MMOL/L (ref 3.5–5.3)
PR INTERVAL: 170 MS
PROT SERPL-MCNC: 6.2 G/DL (ref 6.4–8.2)
QRS AXIS: 70 DEGREES
QRSD INTERVAL: 80 MS
QT INTERVAL: 310 MS
QTC INTERVAL: 421 MS
RBC # BLD AUTO: 2.81 MILLION/UL (ref 3.81–5.12)
RBC MORPH BLD: PRESENT
SODIUM SERPL-SCNC: 137 MMOL/L (ref 136–145)
T WAVE AXIS: 64 DEGREES
TOTAL CELLS COUNTED SPEC: 100
VENTRICULAR RATE: 111 BPM
WBC # BLD AUTO: 16.03 THOUSAND/UL (ref 4.31–10.16)

## 2019-11-25 PROCEDURE — 84100 ASSAY OF PHOSPHORUS: CPT | Performed by: STUDENT IN AN ORGANIZED HEALTH CARE EDUCATION/TRAINING PROGRAM

## 2019-11-25 PROCEDURE — 83735 ASSAY OF MAGNESIUM: CPT | Performed by: STUDENT IN AN ORGANIZED HEALTH CARE EDUCATION/TRAINING PROGRAM

## 2019-11-25 PROCEDURE — 80053 COMPREHEN METABOLIC PANEL: CPT | Performed by: STUDENT IN AN ORGANIZED HEALTH CARE EDUCATION/TRAINING PROGRAM

## 2019-11-25 PROCEDURE — 99232 SBSQ HOSP IP/OBS MODERATE 35: CPT | Performed by: FAMILY MEDICINE

## 2019-11-25 PROCEDURE — 85027 COMPLETE CBC AUTOMATED: CPT | Performed by: STUDENT IN AN ORGANIZED HEALTH CARE EDUCATION/TRAINING PROGRAM

## 2019-11-25 PROCEDURE — 93010 ELECTROCARDIOGRAM REPORT: CPT | Performed by: INTERNAL MEDICINE

## 2019-11-25 PROCEDURE — 85007 BL SMEAR W/DIFF WBC COUNT: CPT | Performed by: STUDENT IN AN ORGANIZED HEALTH CARE EDUCATION/TRAINING PROGRAM

## 2019-11-25 RX ORDER — ACETAMINOPHEN 325 MG/1
650 TABLET ORAL EVERY 6 HOURS PRN
Status: DISCONTINUED | OUTPATIENT
Start: 2019-11-25 | End: 2019-11-27 | Stop reason: HOSPADM

## 2019-11-25 RX ORDER — LISINOPRIL 20 MG/1
40 TABLET ORAL DAILY
Status: DISCONTINUED | OUTPATIENT
Start: 2019-11-25 | End: 2019-11-25

## 2019-11-25 RX ORDER — CARVEDILOL 12.5 MG/1
12.5 TABLET ORAL 2 TIMES DAILY WITH MEALS
Status: DISCONTINUED | OUTPATIENT
Start: 2019-11-25 | End: 2019-11-27 | Stop reason: HOSPADM

## 2019-11-25 RX ORDER — AMLODIPINE BESYLATE 5 MG/1
5 TABLET ORAL DAILY
Status: DISCONTINUED | OUTPATIENT
Start: 2019-11-25 | End: 2019-11-27 | Stop reason: HOSPADM

## 2019-11-25 RX ORDER — ACETAMINOPHEN 325 MG/1
650 TABLET ORAL EVERY 6 HOURS PRN
Status: DISCONTINUED | OUTPATIENT
Start: 2019-11-25 | End: 2019-11-25

## 2019-11-25 RX ORDER — LISINOPRIL 20 MG/1
20 TABLET ORAL 2 TIMES DAILY
Status: DISCONTINUED | OUTPATIENT
Start: 2019-11-25 | End: 2019-11-27 | Stop reason: HOSPADM

## 2019-11-25 RX ORDER — POTASSIUM CHLORIDE 20 MEQ/1
40 TABLET, EXTENDED RELEASE ORAL ONCE
Status: COMPLETED | OUTPATIENT
Start: 2019-11-25 | End: 2019-11-25

## 2019-11-25 RX ORDER — MAGNESIUM SULFATE HEPTAHYDRATE 40 MG/ML
2 INJECTION, SOLUTION INTRAVENOUS ONCE
Status: COMPLETED | OUTPATIENT
Start: 2019-11-25 | End: 2019-11-25

## 2019-11-25 RX ORDER — TORSEMIDE 10 MG/1
5 TABLET ORAL DAILY
Status: DISCONTINUED | OUTPATIENT
Start: 2019-11-25 | End: 2019-11-27 | Stop reason: HOSPADM

## 2019-11-25 RX ADMIN — CARVEDILOL 12.5 MG: 12.5 TABLET, FILM COATED ORAL at 17:04

## 2019-11-25 RX ADMIN — CEFTRIAXONE 1000 MG: 1 INJECTION, SOLUTION INTRAVENOUS at 12:32

## 2019-11-25 RX ADMIN — HEPARIN SODIUM 5000 UNITS: 5000 INJECTION INTRAVENOUS; SUBCUTANEOUS at 05:35

## 2019-11-25 RX ADMIN — NICOTINE 1 PATCH: 21 PATCH, EXTENDED RELEASE TRANSDERMAL at 08:37

## 2019-11-25 RX ADMIN — SODIUM CHLORIDE 75 ML/HR: 0.9 INJECTION, SOLUTION INTRAVENOUS at 21:23

## 2019-11-25 RX ADMIN — CARVEDILOL 12.5 MG: 12.5 TABLET, FILM COATED ORAL at 08:36

## 2019-11-25 RX ADMIN — LISINOPRIL 20 MG: 20 TABLET ORAL at 08:37

## 2019-11-25 RX ADMIN — HEPARIN SODIUM 5000 UNITS: 5000 INJECTION INTRAVENOUS; SUBCUTANEOUS at 13:58

## 2019-11-25 RX ADMIN — ASPIRIN 81 MG 162 MG: 81 TABLET ORAL at 08:36

## 2019-11-25 RX ADMIN — AMLODIPINE BESYLATE 5 MG: 5 TABLET ORAL at 08:36

## 2019-11-25 RX ADMIN — ACETAMINOPHEN 650 MG: 325 TABLET, FILM COATED ORAL at 21:42

## 2019-11-25 RX ADMIN — MAGNESIUM SULFATE HEPTAHYDRATE 2 G: 40 INJECTION, SOLUTION INTRAVENOUS at 09:07

## 2019-11-25 RX ADMIN — TORSEMIDE 5 MG: 10 TABLET ORAL at 08:37

## 2019-11-25 RX ADMIN — LEVOTHYROXINE SODIUM 25 MCG: 25 TABLET ORAL at 05:35

## 2019-11-25 RX ADMIN — SODIUM CHLORIDE 75 ML/HR: 0.9 INJECTION, SOLUTION INTRAVENOUS at 09:09

## 2019-11-25 RX ADMIN — POTASSIUM CHLORIDE 40 MEQ: 1500 TABLET, EXTENDED RELEASE ORAL at 08:36

## 2019-11-25 RX ADMIN — HEPARIN SODIUM 5000 UNITS: 5000 INJECTION INTRAVENOUS; SUBCUTANEOUS at 21:15

## 2019-11-25 RX ADMIN — SPIRONOLACTONE 25 MG: 25 TABLET ORAL at 09:43

## 2019-11-25 RX ADMIN — LISINOPRIL 20 MG: 20 TABLET ORAL at 17:04

## 2019-11-25 RX ADMIN — ATORVASTATIN CALCIUM 80 MG: 80 TABLET, FILM COATED ORAL at 17:04

## 2019-11-26 ENCOUNTER — TELEPHONE (OUTPATIENT)
Dept: GASTROENTEROLOGY | Facility: CLINIC | Age: 79
End: 2019-11-26

## 2019-11-26 LAB
ANION GAP SERPL CALCULATED.3IONS-SCNC: 12 MMOL/L (ref 4–13)
BACTERIA UR CULT: ABNORMAL
BASOPHILS # BLD AUTO: 0.04 THOUSANDS/ΜL (ref 0–0.1)
BASOPHILS NFR BLD AUTO: 0 % (ref 0–1)
BUN SERPL-MCNC: 23 MG/DL (ref 5–25)
CALCIUM SERPL-MCNC: 8 MG/DL (ref 8.3–10.1)
CHLORIDE SERPL-SCNC: 106 MMOL/L (ref 100–108)
CO2 SERPL-SCNC: 21 MMOL/L (ref 21–32)
CREAT SERPL-MCNC: 0.75 MG/DL (ref 0.6–1.3)
EOSINOPHIL # BLD AUTO: 0.08 THOUSAND/ΜL (ref 0–0.61)
EOSINOPHIL NFR BLD AUTO: 1 % (ref 0–6)
ERYTHROCYTE [DISTWIDTH] IN BLOOD BY AUTOMATED COUNT: 15.1 % (ref 11.6–15.1)
GFR SERPL CREATININE-BSD FRML MDRD: 76 ML/MIN/1.73SQ M
GLUCOSE SERPL-MCNC: 96 MG/DL (ref 65–140)
HCT VFR BLD AUTO: 28.5 % (ref 34.8–46.1)
HGB BLD-MCNC: 9.6 G/DL (ref 11.5–15.4)
IMM GRANULOCYTES # BLD AUTO: 0.18 THOUSAND/UL (ref 0–0.2)
IMM GRANULOCYTES NFR BLD AUTO: 1 % (ref 0–2)
LYMPHOCYTES # BLD AUTO: 1.04 THOUSANDS/ΜL (ref 0.6–4.47)
LYMPHOCYTES NFR BLD AUTO: 8 % (ref 14–44)
MAGNESIUM SERPL-MCNC: 1.7 MG/DL (ref 1.6–2.6)
MCH RBC QN AUTO: 34.5 PG (ref 26.8–34.3)
MCHC RBC AUTO-ENTMCNC: 33.7 G/DL (ref 31.4–37.4)
MCV RBC AUTO: 103 FL (ref 82–98)
MONOCYTES # BLD AUTO: 1.24 THOUSAND/ΜL (ref 0.17–1.22)
MONOCYTES NFR BLD AUTO: 9 % (ref 4–12)
NEUTROPHILS # BLD AUTO: 10.65 THOUSANDS/ΜL (ref 1.85–7.62)
NEUTS SEG NFR BLD AUTO: 81 % (ref 43–75)
NRBC BLD AUTO-RTO: 0 /100 WBCS
PHOSPHATE SERPL-MCNC: 2.3 MG/DL (ref 2.3–4.1)
PLATELET # BLD AUTO: 172 THOUSANDS/UL (ref 149–390)
PMV BLD AUTO: 12.2 FL (ref 8.9–12.7)
POTASSIUM SERPL-SCNC: 3.4 MMOL/L (ref 3.5–5.3)
RBC # BLD AUTO: 2.78 MILLION/UL (ref 3.81–5.12)
SODIUM SERPL-SCNC: 139 MMOL/L (ref 136–145)
WBC # BLD AUTO: 13.23 THOUSAND/UL (ref 4.31–10.16)

## 2019-11-26 PROCEDURE — 83735 ASSAY OF MAGNESIUM: CPT | Performed by: STUDENT IN AN ORGANIZED HEALTH CARE EDUCATION/TRAINING PROGRAM

## 2019-11-26 PROCEDURE — 4A02XFZ MEASUREMENT OF CARDIAC RHYTHM, EXTERNAL APPROACH: ICD-10-PCS | Performed by: EMERGENCY MEDICINE

## 2019-11-26 PROCEDURE — 80048 BASIC METABOLIC PNL TOTAL CA: CPT | Performed by: STUDENT IN AN ORGANIZED HEALTH CARE EDUCATION/TRAINING PROGRAM

## 2019-11-26 PROCEDURE — 84100 ASSAY OF PHOSPHORUS: CPT | Performed by: STUDENT IN AN ORGANIZED HEALTH CARE EDUCATION/TRAINING PROGRAM

## 2019-11-26 PROCEDURE — 85025 COMPLETE CBC W/AUTO DIFF WBC: CPT | Performed by: STUDENT IN AN ORGANIZED HEALTH CARE EDUCATION/TRAINING PROGRAM

## 2019-11-26 PROCEDURE — 99231 SBSQ HOSP IP/OBS SF/LOW 25: CPT | Performed by: FAMILY MEDICINE

## 2019-11-26 RX ORDER — DOCUSATE SODIUM 100 MG/1
100 CAPSULE, LIQUID FILLED ORAL 2 TIMES DAILY
Status: DISCONTINUED | OUTPATIENT
Start: 2019-11-26 | End: 2019-11-27 | Stop reason: HOSPADM

## 2019-11-26 RX ORDER — POLYETHYLENE GLYCOL 3350 17 G/17G
17 POWDER, FOR SOLUTION ORAL DAILY PRN
Status: DISCONTINUED | OUTPATIENT
Start: 2019-11-26 | End: 2019-11-27 | Stop reason: HOSPADM

## 2019-11-26 RX ORDER — CEPHALEXIN 500 MG/1
500 CAPSULE ORAL EVERY 6 HOURS SCHEDULED
Qty: 28 CAPSULE | Refills: 0 | Status: SHIPPED | OUTPATIENT
Start: 2019-11-27 | End: 2019-11-27 | Stop reason: SDUPTHER

## 2019-11-26 RX ORDER — POTASSIUM CHLORIDE 20 MEQ/1
40 TABLET, EXTENDED RELEASE ORAL ONCE
Status: COMPLETED | OUTPATIENT
Start: 2019-11-26 | End: 2019-11-26

## 2019-11-26 RX ORDER — DOCUSATE SODIUM 100 MG/1
100 CAPSULE, LIQUID FILLED ORAL 2 TIMES DAILY
Status: DISCONTINUED | OUTPATIENT
Start: 2019-11-26 | End: 2019-11-26

## 2019-11-26 RX ADMIN — SPIRONOLACTONE 25 MG: 25 TABLET ORAL at 08:29

## 2019-11-26 RX ADMIN — AMLODIPINE BESYLATE 5 MG: 5 TABLET ORAL at 08:28

## 2019-11-26 RX ADMIN — HEPARIN SODIUM 5000 UNITS: 5000 INJECTION INTRAVENOUS; SUBCUTANEOUS at 13:59

## 2019-11-26 RX ADMIN — ATORVASTATIN CALCIUM 80 MG: 80 TABLET, FILM COATED ORAL at 17:33

## 2019-11-26 RX ADMIN — HEPARIN SODIUM 5000 UNITS: 5000 INJECTION INTRAVENOUS; SUBCUTANEOUS at 21:03

## 2019-11-26 RX ADMIN — LISINOPRIL 20 MG: 20 TABLET ORAL at 17:33

## 2019-11-26 RX ADMIN — CARVEDILOL 12.5 MG: 12.5 TABLET, FILM COATED ORAL at 17:33

## 2019-11-26 RX ADMIN — CARVEDILOL 12.5 MG: 12.5 TABLET, FILM COATED ORAL at 07:45

## 2019-11-26 RX ADMIN — DOCUSATE SODIUM 100 MG: 100 CAPSULE, LIQUID FILLED ORAL at 12:06

## 2019-11-26 RX ADMIN — ASPIRIN 81 MG 162 MG: 81 TABLET ORAL at 08:28

## 2019-11-26 RX ADMIN — TORSEMIDE 5 MG: 10 TABLET ORAL at 08:28

## 2019-11-26 RX ADMIN — POTASSIUM CHLORIDE 40 MEQ: 1500 TABLET, EXTENDED RELEASE ORAL at 07:13

## 2019-11-26 RX ADMIN — LEVOTHYROXINE SODIUM 25 MCG: 25 TABLET ORAL at 06:27

## 2019-11-26 RX ADMIN — SODIUM CHLORIDE 75 ML/HR: 0.9 INJECTION, SOLUTION INTRAVENOUS at 11:19

## 2019-11-26 RX ADMIN — HEPARIN SODIUM 5000 UNITS: 5000 INJECTION INTRAVENOUS; SUBCUTANEOUS at 06:29

## 2019-11-26 RX ADMIN — NICOTINE 1 PATCH: 21 PATCH, EXTENDED RELEASE TRANSDERMAL at 08:29

## 2019-11-26 RX ADMIN — LISINOPRIL 20 MG: 20 TABLET ORAL at 08:28

## 2019-11-26 RX ADMIN — CEFTRIAXONE 1000 MG: 1 INJECTION, SOLUTION INTRAVENOUS at 12:05

## 2019-11-26 NOTE — TELEPHONE ENCOUNTER
Pt is currently admitted into hospital, lmom at home (did not want to disturb pt while admitted) we need to reschedule colon procedure with Dr Barbra Martinez

## 2019-11-27 ENCOUNTER — TELEPHONE (OUTPATIENT)
Dept: VASCULAR SURGERY | Facility: CLINIC | Age: 79
End: 2019-11-27

## 2019-11-27 ENCOUNTER — TELEPHONE (OUTPATIENT)
Dept: FAMILY MEDICINE CLINIC | Facility: CLINIC | Age: 79
End: 2019-11-27

## 2019-11-27 VITALS
HEIGHT: 64 IN | SYSTOLIC BLOOD PRESSURE: 176 MMHG | DIASTOLIC BLOOD PRESSURE: 73 MMHG | WEIGHT: 123.46 LBS | TEMPERATURE: 98 F | BODY MASS INDEX: 21.08 KG/M2 | HEART RATE: 85 BPM | OXYGEN SATURATION: 96 % | RESPIRATION RATE: 20 BRPM

## 2019-11-27 LAB
ANION GAP SERPL CALCULATED.3IONS-SCNC: 10 MMOL/L (ref 4–13)
BACTERIA BLD CULT: ABNORMAL
BASOPHILS # BLD AUTO: 0.05 THOUSANDS/ΜL (ref 0–0.1)
BASOPHILS NFR BLD AUTO: 1 % (ref 0–1)
BUN SERPL-MCNC: 14 MG/DL (ref 5–25)
CALCIUM SERPL-MCNC: 8.2 MG/DL (ref 8.3–10.1)
CHLORIDE SERPL-SCNC: 108 MMOL/L (ref 100–108)
CO2 SERPL-SCNC: 23 MMOL/L (ref 21–32)
CREAT SERPL-MCNC: 0.68 MG/DL (ref 0.6–1.3)
EOSINOPHIL # BLD AUTO: 0.15 THOUSAND/ΜL (ref 0–0.61)
EOSINOPHIL NFR BLD AUTO: 1 % (ref 0–6)
ERYTHROCYTE [DISTWIDTH] IN BLOOD BY AUTOMATED COUNT: 15.1 % (ref 11.6–15.1)
GFR SERPL CREATININE-BSD FRML MDRD: 83 ML/MIN/1.73SQ M
GLUCOSE SERPL-MCNC: 88 MG/DL (ref 65–140)
GRAM STN SPEC: ABNORMAL
HCT VFR BLD AUTO: 28.5 % (ref 34.8–46.1)
HGB BLD-MCNC: 9.6 G/DL (ref 11.5–15.4)
IMM GRANULOCYTES # BLD AUTO: 0.26 THOUSAND/UL (ref 0–0.2)
IMM GRANULOCYTES NFR BLD AUTO: 2 % (ref 0–2)
LYMPHOCYTES # BLD AUTO: 1.32 THOUSANDS/ΜL (ref 0.6–4.47)
LYMPHOCYTES NFR BLD AUTO: 12 % (ref 14–44)
MAGNESIUM SERPL-MCNC: 1.4 MG/DL (ref 1.6–2.6)
MCH RBC QN AUTO: 34.5 PG (ref 26.8–34.3)
MCHC RBC AUTO-ENTMCNC: 33.7 G/DL (ref 31.4–37.4)
MCV RBC AUTO: 103 FL (ref 82–98)
MONOCYTES # BLD AUTO: 1.15 THOUSAND/ΜL (ref 0.17–1.22)
MONOCYTES NFR BLD AUTO: 11 % (ref 4–12)
NEUTROPHILS # BLD AUTO: 7.69 THOUSANDS/ΜL (ref 1.85–7.62)
NEUTS SEG NFR BLD AUTO: 73 % (ref 43–75)
NRBC BLD AUTO-RTO: 0 /100 WBCS
PHOSPHATE SERPL-MCNC: 2.8 MG/DL (ref 2.3–4.1)
PLATELET # BLD AUTO: 213 THOUSANDS/UL (ref 149–390)
PMV BLD AUTO: 12.1 FL (ref 8.9–12.7)
POTASSIUM SERPL-SCNC: 3.4 MMOL/L (ref 3.5–5.3)
RBC # BLD AUTO: 2.78 MILLION/UL (ref 3.81–5.12)
SODIUM SERPL-SCNC: 141 MMOL/L (ref 136–145)
WBC # BLD AUTO: 10.62 THOUSAND/UL (ref 4.31–10.16)

## 2019-11-27 PROCEDURE — 84100 ASSAY OF PHOSPHORUS: CPT | Performed by: STUDENT IN AN ORGANIZED HEALTH CARE EDUCATION/TRAINING PROGRAM

## 2019-11-27 PROCEDURE — 85025 COMPLETE CBC W/AUTO DIFF WBC: CPT | Performed by: STUDENT IN AN ORGANIZED HEALTH CARE EDUCATION/TRAINING PROGRAM

## 2019-11-27 PROCEDURE — 83735 ASSAY OF MAGNESIUM: CPT | Performed by: STUDENT IN AN ORGANIZED HEALTH CARE EDUCATION/TRAINING PROGRAM

## 2019-11-27 PROCEDURE — 99238 HOSP IP/OBS DSCHRG MGMT 30/<: CPT | Performed by: FAMILY MEDICINE

## 2019-11-27 PROCEDURE — 97110 THERAPEUTIC EXERCISES: CPT

## 2019-11-27 PROCEDURE — NC001 PR NO CHARGE: Performed by: FAMILY MEDICINE

## 2019-11-27 PROCEDURE — 80048 BASIC METABOLIC PNL TOTAL CA: CPT | Performed by: STUDENT IN AN ORGANIZED HEALTH CARE EDUCATION/TRAINING PROGRAM

## 2019-11-27 PROCEDURE — 97163 PT EVAL HIGH COMPLEX 45 MIN: CPT

## 2019-11-27 RX ORDER — NICOTINE 21 MG/24HR
1 PATCH, TRANSDERMAL 24 HOURS TRANSDERMAL EVERY 24 HOURS
Qty: 28 PATCH | Refills: 0 | Status: SHIPPED | OUTPATIENT
Start: 2019-11-27 | End: 2020-07-21

## 2019-11-27 RX ORDER — CEFTRIAXONE 1 G/50ML
1000 INJECTION, SOLUTION INTRAVENOUS EVERY 24 HOURS
Status: DISCONTINUED | OUTPATIENT
Start: 2019-11-27 | End: 2019-11-27 | Stop reason: HOSPADM

## 2019-11-27 RX ORDER — CEPHALEXIN 500 MG/1
500 CAPSULE ORAL EVERY 6 HOURS SCHEDULED
Qty: 24 CAPSULE | Refills: 0 | Status: SHIPPED | OUTPATIENT
Start: 2019-11-27 | End: 2019-12-03

## 2019-11-27 RX ADMIN — LEVOTHYROXINE SODIUM 25 MCG: 25 TABLET ORAL at 05:00

## 2019-11-27 RX ADMIN — NICOTINE 1 PATCH: 21 PATCH, EXTENDED RELEASE TRANSDERMAL at 08:05

## 2019-11-27 RX ADMIN — CARVEDILOL 12.5 MG: 12.5 TABLET, FILM COATED ORAL at 07:39

## 2019-11-27 RX ADMIN — CEFTRIAXONE 1000 MG: 1 INJECTION, SOLUTION INTRAVENOUS at 07:39

## 2019-11-27 RX ADMIN — SPIRONOLACTONE 25 MG: 25 TABLET ORAL at 08:04

## 2019-11-27 RX ADMIN — HEPARIN SODIUM 5000 UNITS: 5000 INJECTION INTRAVENOUS; SUBCUTANEOUS at 05:00

## 2019-11-27 RX ADMIN — LISINOPRIL 20 MG: 20 TABLET ORAL at 08:05

## 2019-11-27 RX ADMIN — AMLODIPINE BESYLATE 5 MG: 5 TABLET ORAL at 08:04

## 2019-11-27 RX ADMIN — ASPIRIN 81 MG 162 MG: 81 TABLET ORAL at 08:05

## 2019-11-27 RX ADMIN — TORSEMIDE 5 MG: 10 TABLET ORAL at 08:05

## 2019-11-27 RX ADMIN — SODIUM CHLORIDE 75 ML/HR: 0.9 INJECTION, SOLUTION INTRAVENOUS at 01:54

## 2019-11-27 NOTE — TELEPHONE ENCOUNTER
Patient is currently in house at [facility]   Sw pts friend and left a message for Slick Winston to call back and confirm the change of appointment to 12/6

## 2019-11-27 NOTE — TELEPHONE ENCOUNTER
Pt calling requesting call back to discuss her appt with dr Swapna Rivers and what your discussion with him was about

## 2019-11-29 LAB — BACTERIA BLD CULT: NORMAL

## 2019-11-30 ENCOUNTER — TELEPHONE (OUTPATIENT)
Dept: OTHER | Facility: OTHER | Age: 79
End: 2019-11-30

## 2019-11-30 NOTE — TELEPHONE ENCOUNTER
Mary Olivas 1940  CONFIDENTIALTY NOTICE: This fax transmission is intended only for the addressee  It contains information that is legally privileged,  confidential or otherwise protected from use or disclosure  If you are not the intended recipient, you are strictly prohibited from reviewing,  disclosing, copying using or disseminating any of this information or taking any action in reliance on or regarding this information  If you have  received this fax in error, please notify us immediately by telephone so that we can arrange for its return to us  Page:  2  Call Id: [id number]  Health Call  Standard Call Report  Health Call  Patient Name: Mary Olivas  Gender: Female  : 1940  Age: 78 Y 11 M 21 D  Return Phone  Number: [phone] (Home)  Address: [address] [place] [address], [facility] [address] [address]  City/State/Zip: [address] [place] [address]  Practice Name: [employer] Charged:  Physician:  [address] [place] [address] Name:  Relationship To  Patient: Self  Return Phone Number: [phone] (Home)  Presenting Problem: "I am still having some numbness in  my right leg "  Service Type: Triage  Charged Service 1: Evangelina Mosher U  38  Name and  Number:  Nurse Assessment  Nurse: Tri Forbes Date/Time: 2019 9:18:51 AM  Type of assessment required:  ---General (Adult or Child)  Duration of Current S/S  ---  Location/Radiation  ---Left Leg  Temperature (F) and route:  ---Denies  Symptom Specific Meds (Dose/Time):  ---None  Other S/S  ---Pain comes at night  The numbness present all day  Ongoing  Been seen by Dr Himanshu Aponte  for this  She is feeling very depressed over the ongoing condition  She is just very  frustrated over this still ongoing  Lots of pin and needle feelings now and numbness,  Family is checking in on her to help    Pain Scale on scale of 1-10, 10 being the worst:  ---None  Symptom progression:  ---worse  Intake and Output  Mary Olivas 1940  CONFIDENTIALTY NOTICE: This fax transmission is intended only for the addressee  It contains information that is legally privileged,  confidential or otherwise protected from use or disclosure  If you are not the intended recipient, you are strictly prohibited from reviewing,  disclosing, copying using or disseminating any of this information or taking any action in reliance on or regarding this information  If you have  received this fax in error, please notify us immediately by telephone so that we can arrange for its return to us  Page: 2 of 2  Call Id: [id number]  Nurse Assessment  ---WNL  Last Exam/Treatment:  ---Just discharged from the hospital this week  Protocols  Protocol Title Nurse Date/Time  Neurologic Deficit Zelda Shock 11/30/2019 9:22:58 AM  Question [address]  Time Disposition Final User  11/30/2019 9:23:50 AM See PCP When Office is Open (within 3  days)  Yes Zelda Shock  11/30/2019 9:24:11 AM RN Triaged Derek Kapoor Advice Given Per Protocol  SEE PCP WITHIN 3 DAYS: * You need to be seen within 2 or 3 days  Call your doctor during regular office hours and make an  appointment  An urgent care center is often the best source of care if your doctor's office is closed or you can't get an appointment  NOTE: If office will be open tomorrow, tell caller to call then, not in 3 days  CALL BACK IF: * Constant numbness or weakness in arms  or legs * Problems with bowel or bladder control * You become worse  CARE ADVICE given per Neurologic Deficit (Adult) guideline    Caller Understands: Yes  Caller Disagree/Comply: Comply  PreDisposition: Unsure

## 2019-12-03 ENCOUNTER — TELEPHONE (OUTPATIENT)
Dept: FAMILY MEDICINE CLINIC | Facility: CLINIC | Age: 79
End: 2019-12-03

## 2019-12-03 NOTE — TELEPHONE ENCOUNTER
Dr Delmar Barroso    Pt was at the [facility] and will like you to call her or prescribe a pain pills she is in a lot of pain    She rito't sleep and the legs get numbness    [phone] cell

## 2019-12-03 NOTE — TELEPHONE ENCOUNTER
Can't move due to pain being so bad so couldn't come in to office for appointment  PAtient said she isn't in [place] and is at her daughters    Would like a call back  [phone]

## 2019-12-05 ENCOUNTER — CLINICAL SUPPORT (OUTPATIENT)
Dept: FAMILY MEDICINE CLINIC | Facility: CLINIC | Age: 79
End: 2019-12-05

## 2019-12-05 DIAGNOSIS — N39.0 URINARY TRACT INFECTION WITHOUT HEMATURIA, SITE UNSPECIFIED: Primary | ICD-10-CM

## 2019-12-05 DIAGNOSIS — R30.0 DYSURIA: ICD-10-CM

## 2019-12-06 ENCOUNTER — OFFICE VISIT (OUTPATIENT)
Dept: VASCULAR SURGERY | Facility: CLINIC | Age: 79
End: 2019-12-06
Payer: COMMERCIAL

## 2019-12-06 ENCOUNTER — APPOINTMENT (EMERGENCY)
Dept: RADIOLOGY | Facility: HOSPITAL | Age: 79
End: 2019-12-06
Attending: EMERGENCY MEDICINE
Payer: COMMERCIAL

## 2019-12-06 ENCOUNTER — HOSPITAL ENCOUNTER (INPATIENT)
Facility: HOSPITAL | Age: 79
LOS: 10 days | Discharge: NURSING, REHAB OR LONG-TERM CARE | DRG: 269 | End: 2019-12-16
Attending: INTERNAL MEDICINE | Admitting: ANESTHESIOLOGY
Payer: COMMERCIAL

## 2019-12-06 ENCOUNTER — HOSPITAL ENCOUNTER (EMERGENCY)
Facility: HOSPITAL | Age: 79
End: 2019-12-06
Attending: EMERGENCY MEDICINE
Payer: COMMERCIAL

## 2019-12-06 ENCOUNTER — APPOINTMENT (EMERGENCY)
Dept: RADIOLOGY | Facility: HOSPITAL | Age: 79
End: 2019-12-06
Payer: COMMERCIAL

## 2019-12-06 VITALS
BODY MASS INDEX: 20.93 KG/M2 | HEART RATE: 72 BPM | OXYGEN SATURATION: 97 % | TEMPERATURE: 97.8 F | WEIGHT: 121.91 LBS | SYSTOLIC BLOOD PRESSURE: 187 MMHG | DIASTOLIC BLOOD PRESSURE: 71 MMHG | RESPIRATION RATE: 18 BRPM

## 2019-12-06 VITALS — SYSTOLIC BLOOD PRESSURE: 126 MMHG | DIASTOLIC BLOOD PRESSURE: 68 MMHG | TEMPERATURE: 97.4 F | HEART RATE: 68 BPM

## 2019-12-06 DIAGNOSIS — M89.8X8 MASS OF SPINE: Chronic | ICD-10-CM

## 2019-12-06 DIAGNOSIS — I70.229 ATHEROSCLEROSIS OF NATIVE ARTERY OF LOWER EXTREMITY WITH REST PAIN, UNSPECIFIED LATERALITY (HCC): Primary | ICD-10-CM

## 2019-12-06 DIAGNOSIS — I74.09 AORTOILIAC OCCLUSIVE DISEASE (HCC): Primary | Chronic | ICD-10-CM

## 2019-12-06 DIAGNOSIS — I35.0 NONRHEUMATIC AORTIC VALVE STENOSIS: ICD-10-CM

## 2019-12-06 DIAGNOSIS — I70.229 ATHEROSCLEROSIS OF ARTERY OF EXTREMITY WITH REST PAIN (HCC): Chronic | ICD-10-CM

## 2019-12-06 DIAGNOSIS — F17.218 CIGARETTE NICOTINE DEPENDENCE WITH OTHER NICOTINE-INDUCED DISORDER: ICD-10-CM

## 2019-12-06 DIAGNOSIS — I70.209 OCCLUSIVE DISEASE OF ARTERY OF LOWER EXTREMITY (HCC): Primary | ICD-10-CM

## 2019-12-06 PROBLEM — I50.32 CHRONIC DIASTOLIC HEART FAILURE (HCC): Status: ACTIVE | Noted: 2019-12-06

## 2019-12-06 LAB
ALBUMIN SERPL BCP-MCNC: 3.2 G/DL (ref 3.5–5)
ALP SERPL-CCNC: 86 U/L (ref 46–116)
ALT SERPL W P-5'-P-CCNC: 48 U/L (ref 12–78)
ANION GAP SERPL CALCULATED.3IONS-SCNC: 11 MMOL/L (ref 4–13)
APPEARANCE UR: CLEAR
APTT PPP: 24 SECONDS (ref 23–37)
APTT PPP: 93 SECONDS (ref 23–37)
AST SERPL W P-5'-P-CCNC: 50 U/L (ref 5–45)
BACTERIA UR CULT: NORMAL
BACTERIA URNS QL MICRO: NORMAL
BASOPHILS # BLD AUTO: 0.08 THOUSANDS/ΜL (ref 0–0.1)
BASOPHILS NFR BLD AUTO: 1 % (ref 0–1)
BILIRUB SERPL-MCNC: 0.5 MG/DL (ref 0.2–1)
BILIRUB UR QL STRIP: NEGATIVE
BUN SERPL-MCNC: 32 MG/DL (ref 5–25)
CALCIUM SERPL-MCNC: 9.3 MG/DL (ref 8.3–10.1)
CHLORIDE SERPL-SCNC: 102 MMOL/L (ref 100–108)
CO2 SERPL-SCNC: 27 MMOL/L (ref 21–32)
COLOR UR: YELLOW
CREAT SERPL-MCNC: 0.86 MG/DL (ref 0.6–1.3)
EOSINOPHIL # BLD AUTO: 0.18 THOUSAND/ΜL (ref 0–0.61)
EOSINOPHIL NFR BLD AUTO: 2 % (ref 0–6)
EPI CELLS #/AREA URNS HPF: NORMAL /HPF (ref 0–10)
ERYTHROCYTE [DISTWIDTH] IN BLOOD BY AUTOMATED COUNT: 15.3 % (ref 11.6–15.1)
ERYTHROCYTE [DISTWIDTH] IN BLOOD BY AUTOMATED COUNT: 15.4 % (ref 11.6–15.1)
GFR SERPL CREATININE-BSD FRML MDRD: 64 ML/MIN/1.73SQ M
GLUCOSE SERPL-MCNC: 94 MG/DL (ref 65–140)
GLUCOSE UR QL: NEGATIVE
HCT VFR BLD AUTO: 30.5 % (ref 34.8–46.1)
HCT VFR BLD AUTO: 31.8 % (ref 34.8–46.1)
HGB BLD-MCNC: 10 G/DL (ref 11.5–15.4)
HGB BLD-MCNC: 10.6 G/DL (ref 11.5–15.4)
HGB UR QL STRIP: NEGATIVE
IMM GRANULOCYTES # BLD AUTO: 0.1 THOUSAND/UL (ref 0–0.2)
IMM GRANULOCYTES NFR BLD AUTO: 1 % (ref 0–2)
INR PPP: 1.02 (ref 0.91–1.09)
INR PPP: 1.14 (ref 0.84–1.19)
KETONES UR QL STRIP: NEGATIVE
LEUKOCYTE ESTERASE UR QL STRIP: NEGATIVE
LYMPHOCYTES # BLD AUTO: 1.75 THOUSANDS/ΜL (ref 0.6–4.47)
LYMPHOCYTES NFR BLD AUTO: 20 % (ref 14–44)
Lab: NO GROWTH
MAGNESIUM SERPL-MCNC: 1.8 MG/DL (ref 1.6–2.6)
MCH RBC QN AUTO: 33.7 PG (ref 26.8–34.3)
MCH RBC QN AUTO: 34.2 PG (ref 26.8–34.3)
MCHC RBC AUTO-ENTMCNC: 32.8 G/DL (ref 31.4–37.4)
MCHC RBC AUTO-ENTMCNC: 33.3 G/DL (ref 31.4–37.4)
MCV RBC AUTO: 103 FL (ref 82–98)
MCV RBC AUTO: 103 FL (ref 82–98)
MICRO URNS: NORMAL
MICRO URNS: NORMAL
MONOCYTES # BLD AUTO: 0.83 THOUSAND/ΜL (ref 0.17–1.22)
MONOCYTES NFR BLD AUTO: 9 % (ref 4–12)
MUCOUS THREADS URNS QL MICRO: PRESENT
NEUTROPHILS # BLD AUTO: 6.03 THOUSANDS/ΜL (ref 1.85–7.62)
NEUTS SEG NFR BLD AUTO: 67 % (ref 43–75)
NITRITE UR QL STRIP: NEGATIVE
NRBC BLD AUTO-RTO: 0 /100 WBCS
PH UR STRIP: 6.5 [PH] (ref 5–7.5)
PLATELET # BLD AUTO: 352 THOUSANDS/UL (ref 149–390)
PLATELET # BLD AUTO: 369 THOUSANDS/UL (ref 149–390)
PMV BLD AUTO: 10.7 FL (ref 8.9–12.7)
PMV BLD AUTO: 11.1 FL (ref 8.9–12.7)
POTASSIUM SERPL-SCNC: 4.2 MMOL/L (ref 3.5–5.3)
PROT SERPL-MCNC: 7.9 G/DL (ref 6.4–8.2)
PROT UR QL STRIP: NEGATIVE
PROTHROMBIN TIME: 11 SECONDS (ref 9.8–12)
PROTHROMBIN TIME: 14.2 SECONDS (ref 11.6–14.5)
RBC # BLD AUTO: 2.97 MILLION/UL (ref 3.81–5.12)
RBC # BLD AUTO: 3.1 MILLION/UL (ref 3.81–5.12)
RBC #/AREA URNS HPF: NORMAL /HPF (ref 0–2)
SODIUM SERPL-SCNC: 140 MMOL/L (ref 136–145)
SP GR UR: 1.01 (ref 1–1.03)
UROBILINOGEN UR STRIP-ACNC: 0.2 MG/DL (ref 0.2–1)
WBC # BLD AUTO: 8.97 THOUSAND/UL (ref 4.31–10.16)
WBC # BLD AUTO: 9.1 THOUSAND/UL (ref 4.31–10.16)
WBC #/AREA URNS HPF: NORMAL /HPF (ref 0–5)

## 2019-12-06 PROCEDURE — 85025 COMPLETE CBC W/AUTO DIFF WBC: CPT | Performed by: EMERGENCY MEDICINE

## 2019-12-06 PROCEDURE — 85730 THROMBOPLASTIN TIME PARTIAL: CPT | Performed by: EMERGENCY MEDICINE

## 2019-12-06 PROCEDURE — 99214 OFFICE O/P EST MOD 30 MIN: CPT | Performed by: SURGERY

## 2019-12-06 PROCEDURE — 83735 ASSAY OF MAGNESIUM: CPT | Performed by: EMERGENCY MEDICINE

## 2019-12-06 PROCEDURE — 75635 CT ANGIO ABDOMINAL ARTERIES: CPT

## 2019-12-06 PROCEDURE — 85610 PROTHROMBIN TIME: CPT | Performed by: INTERNAL MEDICINE

## 2019-12-06 PROCEDURE — 93925 LOWER EXTREMITY STUDY: CPT

## 2019-12-06 PROCEDURE — 93923 UPR/LXTR ART STDY 3+ LVLS: CPT

## 2019-12-06 PROCEDURE — 80053 COMPREHEN METABOLIC PANEL: CPT | Performed by: EMERGENCY MEDICINE

## 2019-12-06 PROCEDURE — ND001 PR NO DOCUMENTATION: Performed by: SURGERY

## 2019-12-06 PROCEDURE — 85610 PROTHROMBIN TIME: CPT | Performed by: EMERGENCY MEDICINE

## 2019-12-06 PROCEDURE — 99285 EMERGENCY DEPT VISIT HI MDM: CPT

## 2019-12-06 PROCEDURE — 96366 THER/PROPH/DIAG IV INF ADDON: CPT

## 2019-12-06 PROCEDURE — 99222 1ST HOSP IP/OBS MODERATE 55: CPT | Performed by: INTERNAL MEDICINE

## 2019-12-06 PROCEDURE — 85730 THROMBOPLASTIN TIME PARTIAL: CPT | Performed by: INTERNAL MEDICINE

## 2019-12-06 PROCEDURE — 85027 COMPLETE CBC AUTOMATED: CPT | Performed by: INTERNAL MEDICINE

## 2019-12-06 PROCEDURE — 96375 TX/PRO/DX INJ NEW DRUG ADDON: CPT

## 2019-12-06 PROCEDURE — 96365 THER/PROPH/DIAG IV INF INIT: CPT

## 2019-12-06 PROCEDURE — 36415 COLL VENOUS BLD VENIPUNCTURE: CPT | Performed by: EMERGENCY MEDICINE

## 2019-12-06 RX ORDER — HEPARIN SODIUM 10000 [USP'U]/100ML
3-20 INJECTION, SOLUTION INTRAVENOUS
Status: DISCONTINUED | OUTPATIENT
Start: 2019-12-06 | End: 2019-12-09

## 2019-12-06 RX ORDER — VITAMIN B COMPLEX
1 TABLET ORAL DAILY
Status: DISCONTINUED | OUTPATIENT
Start: 2019-12-07 | End: 2019-12-06

## 2019-12-06 RX ORDER — LEVOTHYROXINE SODIUM 0.03 MG/1
25 TABLET ORAL
Status: DISCONTINUED | OUTPATIENT
Start: 2019-12-07 | End: 2019-12-16 | Stop reason: HOSPADM

## 2019-12-06 RX ORDER — HYDROMORPHONE HCL/PF 1 MG/ML
0.5 SYRINGE (ML) INJECTION
Status: DISCONTINUED | OUTPATIENT
Start: 2019-12-06 | End: 2019-12-16 | Stop reason: HOSPADM

## 2019-12-06 RX ORDER — ACETAMINOPHEN 325 MG/1
650 TABLET ORAL EVERY 6 HOURS PRN
Status: DISCONTINUED | OUTPATIENT
Start: 2019-12-06 | End: 2019-12-16 | Stop reason: HOSPADM

## 2019-12-06 RX ORDER — NICOTINE 21 MG/24HR
1 PATCH, TRANSDERMAL 24 HOURS TRANSDERMAL DAILY
Status: DISCONTINUED | OUTPATIENT
Start: 2019-12-07 | End: 2019-12-16 | Stop reason: HOSPADM

## 2019-12-06 RX ORDER — ATORVASTATIN CALCIUM 80 MG/1
80 TABLET, FILM COATED ORAL DAILY
Status: DISCONTINUED | OUTPATIENT
Start: 2019-12-07 | End: 2019-12-16 | Stop reason: HOSPADM

## 2019-12-06 RX ORDER — SENNOSIDES 8.6 MG
1 TABLET ORAL DAILY
Status: DISCONTINUED | OUTPATIENT
Start: 2019-12-07 | End: 2019-12-16 | Stop reason: HOSPADM

## 2019-12-06 RX ORDER — CARVEDILOL 12.5 MG/1
12.5 TABLET ORAL 2 TIMES DAILY WITH MEALS
Status: DISCONTINUED | OUTPATIENT
Start: 2019-12-06 | End: 2019-12-12

## 2019-12-06 RX ORDER — HEPARIN SODIUM 1000 [USP'U]/ML
3300 INJECTION, SOLUTION INTRAVENOUS; SUBCUTANEOUS AS NEEDED
Status: DISCONTINUED | OUTPATIENT
Start: 2019-12-06 | End: 2019-12-09

## 2019-12-06 RX ORDER — TORSEMIDE 5 MG/1
5 TABLET ORAL DAILY
Status: DISCONTINUED | OUTPATIENT
Start: 2019-12-07 | End: 2019-12-06

## 2019-12-06 RX ORDER — HEPARIN SODIUM 1000 [USP'U]/ML
1650 INJECTION, SOLUTION INTRAVENOUS; SUBCUTANEOUS AS NEEDED
Status: DISCONTINUED | OUTPATIENT
Start: 2019-12-06 | End: 2019-12-09

## 2019-12-06 RX ORDER — HEPARIN SODIUM 10000 [USP'U]/100ML
3-30 INJECTION, SOLUTION INTRAVENOUS
Status: DISCONTINUED | OUTPATIENT
Start: 2019-12-06 | End: 2019-12-06 | Stop reason: HOSPADM

## 2019-12-06 RX ORDER — LISINOPRIL 20 MG/1
20 TABLET ORAL DAILY
Status: DISCONTINUED | OUTPATIENT
Start: 2019-12-07 | End: 2019-12-09

## 2019-12-06 RX ORDER — OXYCODONE HYDROCHLORIDE 5 MG/1
5 TABLET ORAL EVERY 4 HOURS PRN
Status: DISCONTINUED | OUTPATIENT
Start: 2019-12-06 | End: 2019-12-16 | Stop reason: HOSPADM

## 2019-12-06 RX ORDER — SPIRONOLACTONE 25 MG/1
25 TABLET ORAL DAILY
Status: DISCONTINUED | OUTPATIENT
Start: 2019-12-07 | End: 2019-12-06

## 2019-12-06 RX ORDER — AMLODIPINE BESYLATE 5 MG/1
5 TABLET ORAL DAILY
Status: DISCONTINUED | OUTPATIENT
Start: 2019-12-07 | End: 2019-12-12

## 2019-12-06 RX ORDER — ASPIRIN 81 MG/1
162 TABLET, CHEWABLE ORAL DAILY
Status: DISCONTINUED | OUTPATIENT
Start: 2019-12-07 | End: 2019-12-16 | Stop reason: HOSPADM

## 2019-12-06 RX ORDER — FLUTICASONE PROPIONATE 50 MCG
1 SPRAY, SUSPENSION (ML) NASAL DAILY
Status: DISCONTINUED | OUTPATIENT
Start: 2019-12-07 | End: 2019-12-16 | Stop reason: HOSPADM

## 2019-12-06 RX ORDER — HEPARIN SODIUM 1000 [USP'U]/ML
4400 INJECTION, SOLUTION INTRAVENOUS; SUBCUTANEOUS ONCE
Status: COMPLETED | OUTPATIENT
Start: 2019-12-06 | End: 2019-12-06

## 2019-12-06 RX ORDER — DOCUSATE SODIUM 100 MG/1
100 CAPSULE, LIQUID FILLED ORAL 2 TIMES DAILY
Status: DISCONTINUED | OUTPATIENT
Start: 2019-12-06 | End: 2019-12-16 | Stop reason: HOSPADM

## 2019-12-06 RX ORDER — OXYCODONE HYDROCHLORIDE 5 MG/1
10 TABLET ORAL EVERY 4 HOURS PRN
Status: DISCONTINUED | OUTPATIENT
Start: 2019-12-06 | End: 2019-12-16 | Stop reason: HOSPADM

## 2019-12-06 RX ADMIN — HEPARIN SODIUM 4400 UNITS: 1000 INJECTION, SOLUTION INTRAVENOUS; SUBCUTANEOUS at 13:00

## 2019-12-06 RX ADMIN — IOHEXOL 150 ML: 350 INJECTION, SOLUTION INTRAVENOUS at 13:33

## 2019-12-06 RX ADMIN — CARVEDILOL 12.5 MG: 12.5 TABLET, FILM COATED ORAL at 18:25

## 2019-12-06 RX ADMIN — OXYCODONE HYDROCHLORIDE 10 MG: 10 TABLET ORAL at 18:24

## 2019-12-06 RX ADMIN — HEPARIN SODIUM AND DEXTROSE 12 UNITS/KG/HR: 10000; 5 INJECTION INTRAVENOUS at 18:11

## 2019-12-06 RX ADMIN — DOCUSATE SODIUM 100 MG: 100 CAPSULE, LIQUID FILLED ORAL at 18:25

## 2019-12-06 RX ADMIN — HEPARIN SODIUM AND DEXTROSE 18 UNITS/KG/HR: 10000; 5 INJECTION INTRAVENOUS at 13:01

## 2019-12-06 NOTE — ED PROVIDER NOTES
History  Chief Complaint   Patient presents with    Leg Pain     c/o increased pain in both legs and states "I can't walk, my doctor wants to admit me, I have vascular problems"     77 y/o female with aortoocclusive disease presents with left leg pain greater than right ongoing for a few weeks worse over the past 2 weeks, sharp continuous radiating from her left hip  No trauma  Patient had evaluation for this and had severe occlusive disease with a CT angiogram leg,abdomen a few weeks ago  Seen by Dr Aparicio this morning who sent her in to the ED for further evaluation and management  Patient denies any chest pain,dyspnea, nausea,vomiting,rash, fevers or any other symptoms  also found to have a incidental mass on the spine, which Dr Aparicio wanted a neurosurgery consult  Patient denies any urinary incontinence, urinary retention, saddle anesthesia, bowel incontinence  History provided by:  Patient   used: No        Prior to Admission Medications   Prescriptions Last Dose Informant Patient Reported? Taking?    B Complex Vitamins (VITAMIN B-COMPLEX) TABS  Self Yes No   Sig: Take by mouth   Bioflavonoid Products (VITAMIN C PLUS PO)  Self Yes No   Sig: Take by mouth daily   GARLIC PO  Self Yes No   Sig: Take by mouth daily    Multiple Minerals (CALCIUM-MAGNESIUM-ZINC) TABS  Self Yes No   Sig: Take by mouth daily   Multiple Vitamins-Minerals (CENTRUM SILVER PO)  Self Yes No   Sig: Take by mouth   albuterol (VENTOLIN HFA) 90 mcg/act inhaler  Self No No   Sig: Inhale 2 puffs every 6 (six) hours as needed for wheezing   amLODIPine (NORVASC) 5 mg tablet  Self No No   Sig: Take 1 tablet (5 mg total) by mouth daily   aspirin 81 MG tablet  Self Yes No   Sig: Take 2 tablets by mouth daily   atorvastatin (LIPITOR) 80 mg tablet  Self No No   Sig: take 1 tablet by mouth once daily   carvedilol (COREG) 12 5 mg tablet  Self No No   Sig: Take 1 tablet (12 5 mg total) by mouth 2 (two) times a day with meals Patient not taking: Reported on 2019   enalapril (VASOTEC) 20 mg tablet  Self No No   Sig: Take 1 tablet (20 mg total) by mouth 2 (two) times a day   Patient not taking: Reported on 2019   fluticasone (FLONASE) 50 mcg/act nasal spray  Self No No   Si spray into each nostril daily   levothyroxine 25 mcg tablet  Self No No   Sig: Take 1 tablet (25 mcg total) by mouth daily in the early morning   magnesium oxide (MAG-OX) 400 mg  Self No No   Sig: Take 1 tablet (400 mg total) by mouth daily   nicotine (NICODERM CQ) 14 mg/24hr TD 24 hr patch  Self No No   Sig: Place 1 patch on the skin every 24 hours   potassium chloride (MICRO-K) 8 mEq CR capsule  Self Yes No   Sig: Take 8 mEq by mouth 2 (two) times a day   spironolactone (ALDACTONE) 25 mg tablet  Self No No   Sig: Take 1 tablet (25 mg total) by mouth daily   torsemide (DEMADEX) 5 MG tablet  Self No No   Sig: Take 1 tablet (5 mg total) by mouth daily   varenicline (CHANTIX STARTING MONTH ) 0 5 MG X 11 & 1 MG X 42 tablet  Self No No   Sig: Take one 0 5mg tab by mouth 1x daily for 3 days, then increase to one 0 5mg tab 2x daily for 3 days, then increase to one 1mg tab 2x daily   Patient not taking: Reported on 2019      Facility-Administered Medications: None       Past Medical History:   Diagnosis Date    Aortic stenosis     Arthritis     Benign essential hypertension     CAD (coronary artery disease)     Disease of thyroid gland     Dizziness     Edema of leg     Hyperlipidemia     Hypertension     Other disorder of circulatory system (CODE)        Past Surgical History:   Procedure Laterality Date    BREAST SURGERY      bxs,benign    COLONOSCOPY      HYSTERECTOMY      INCISIONAL BREAST BIOPSY      x5, 1964, 1965,  and        Family History   Problem Relation Age of Onset    Hypertension Father     Coronary artery disease Family     Arthritis Family      I have reviewed and agree with the history as documented  Social History     Tobacco Use    Smoking status: Current Every Day Smoker     Packs/day: 1 00    Smokeless tobacco: Never Used    Tobacco comment: last cigarette was 1 week ago   Substance Use Topics    Alcohol use: Yes     Comment: wilfrid 2-3 every day for 50 years    Drug use: No        Review of Systems   All other systems reviewed and are negative  Physical Exam  Physical Exam   Constitutional: She is oriented to person, place, and time  She appears well-developed and well-nourished  HENT:   Head: Normocephalic and atraumatic  Eyes: Pupils are equal, round, and reactive to light  EOM are normal    Neck: Normal range of motion  Neck supple  Cardiovascular: Normal rate and regular rhythm  Pulmonary/Chest: Effort normal and breath sounds normal    Abdominal: Soft  Bowel sounds are normal    Musculoskeletal: Normal range of motion  Left lower extremity: no palpable pulse noted, leg is cold, sensation intact, no blue discoloration, per patient this has been there for some time  Right lower extremity: minimal palpable pulse DP, PT pulse noted  sensation intact, no blue discoloration,  Warm lower leg    No midline tenderness noted lumbar no step offs   Neurological: She is alert and oriented to person, place, and time  Skin: Skin is warm and dry  Psychiatric: She has a normal mood and affect  Nursing note and vitals reviewed        Vital Signs  ED Triage Vitals [12/06/19 1205]   Temperature Pulse Respirations Blood Pressure SpO2   97 7 °F (36 5 °C) 69 20 148/65 98 %      Temp Source Heart Rate Source Patient Position - Orthostatic VS BP Location FiO2 (%)   Tympanic Monitor Sitting Left arm --      Pain Score       Worst Possible Pain           Vitals:    12/06/19 1205 12/06/19 1413   BP: 148/65 (!) 187/71   Pulse: 69 72   Patient Position - Orthostatic VS: Sitting Lying         Visual Acuity      ED Medications  Medications   heparin (porcine) 25,000 units in 250 mL infusion (premix) (18 Units/kg/hr × 55 kg (Order-Specific) Intravenous New Bag 12/6/19 1301)   heparin (porcine) injection 4,400 Units (4,400 Units Intravenous Given 12/6/19 1300)   iohexol (OMNIPAQUE) 350 MG/ML injection (MULTI-DOSE) 150 mL (150 mL Intravenous Given 12/6/19 1333)       Diagnostic Studies  Results Reviewed     Procedure Component Value Units Date/Time    Comprehensive metabolic panel [[id number]]  (Abnormal) Collected:  12/06/19 1231    Lab Status:  Final result Specimen:  Blood from Arm, Right Updated:  12/06/19 1253     Sodium 140 mmol/L      Potassium 4 2 mmol/L      Chloride 102 mmol/L      CO2 27 mmol/L      ANION GAP 11 mmol/L      BUN 32 mg/dL      Creatinine 0 86 mg/dL      Glucose 94 mg/dL      Calcium 9 3 mg/dL      AST 50 U/L      ALT 48 U/L      Alkaline Phosphatase 86 U/L      Total Protein 7 9 g/dL      Albumin 3 2 g/dL      Total Bilirubin 0 50 mg/dL      eGFR 64 ml/min/1 73sq m     Narrative:       [place] guidelines for Chronic Kidney Disease (CKD):     Stage 1 with normal or high GFR (GFR > 90 mL/min/1 73 square meters)    Stage 2 Mild CKD (GFR = 60-89 mL/min/1 73 square meters)    Stage 3A Moderate CKD (GFR = 45-59 mL/min/1 73 square meters)    Stage 3B Moderate CKD (GFR = 30-44 mL/min/1 73 square meters)    Stage 4 Severe CKD (GFR = 15-29 mL/min/1 73 square meters)    Stage 5 End Stage CKD (GFR <15 mL/min/1 73 square meters)  Note: GFR calculation is accurate only with a steady state creatinine    Magnesium [[id number]]  (Normal) Collected:  12/06/19 1231    Lab Status:  Final result Specimen:  Blood from Arm, Right Updated:  12/06/19 1253     Magnesium 1 8 mg/dL     Protime-INR [[id number]]  (Normal) Collected:  12/06/19 1231    Lab Status:  Final result Specimen:  Blood from Arm, Right Updated:  12/06/19 1249     Protime 11 0 seconds      INR 1 02    APTT [[id number]]  (Normal) Collected:  12/06/19 1231    Lab Status:  Final result Specimen:  Blood from Arm, Right Updated:  12/06/19 1249     PTT 24 seconds     CBC and differential [[id number]]  (Abnormal) Collected:  12/06/19 1231    Lab Status:  Final result Specimen:  Blood from Arm, Right Updated:  12/06/19 1236     WBC 8 97 Thousand/uL      RBC 2 97 Million/uL      Hemoglobin 10 0 g/dL      Hematocrit 30 5 %       fL      MCH 33 7 pg      MCHC 32 8 g/dL      RDW 15 3 %      MPV 10 7 fL      Platelets 429 Thousands/uL      nRBC 0 /100 WBCs      Neutrophils Relative 67 %      Immat GRANS % 1 %      Lymphocytes Relative 20 %      Monocytes Relative 9 %      Eosinophils Relative 2 %      Basophils Relative 1 %      Neutrophils Absolute 6 03 Thousands/µL      Immature Grans Absolute 0 10 Thousand/uL      Lymphocytes Absolute 1 75 Thousands/µL      Monocytes Absolute 0 83 Thousand/µL      Eosinophils Absolute 0 18 Thousand/µL      Basophils Absolute 0 08 Thousands/µL                  VAS lower limb arterial duplex, complete bilateral    (Results Pending)   CTA abdominal w run off w wo contrast    (Results Pending)              Procedures  Procedures         ED Course                               MDM  Number of Diagnoses or Management Options  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 [facility]):   Diagnosis management comments: Patient evaluated with labs, imaging  Spoke to Dr Diego Gambino wanted her transferred to [facility] for evaluation and management  Reviewed results discussed with patient  Patient started on heparin drip  Patient verbalized understanding of results and agreed with the plan         Amount and/or Complexity of Data Reviewed  Clinical lab tests: reviewed and ordered  Tests in the radiology section of CPT®: ordered and reviewed  Tests in the medicine section of CPT®: ordered and reviewed    Patient Progress  Patient progress: stable        Disposition  Final diagnoses:   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 ([facility] [address] )     Time reflects when diagnosis was documented in both MDM as applicable and the Disposition within this note     Time User Action Codes Description Comment    12/6/2019  1:06 PM Jessica Frazier Add [I49 [id number]] 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 [facility])       ED Disposition     ED Disposition Condition Date/Time Comment    Transfer to Another Facility-In Network  Fri Dec 6, 2019  1:06 PM Shannon Husain should be transferred out to [facility]        MD Documentation      Most Recent Value   Patient Condition  The patient has been stabilized such that within reasonable medical probability, no material deterioration of the patient condition or the condition of the unborn child(melissa) is likely to result from the transfer   Reason for Transfer  Level of Care needed not available at this facility   Benefits of Transfer  Specialized equipment and/or services available at the receiving facility (Include comment)________________________   Risks of Transfer  Potential for delay in receiving treatment   Accepting Physician  Dr Rodolfo Dsouza Name, [address] by (Company and Unit #)  Chantell Jones   Sending MD Dr Beverley Dalton   Provider Certification  General risk, such as traffic hazards, adverse weather conditions, rough terrain or turbulence, possible failure of equipment (including vehicle or aircraft), or consequences of actions of persons outside the control of the transport personnel      RN Documentation      Most [address] [facility] [address] Name, Ysabel [address]   Bed Assignment  C017   Medications Reviewed with Next Provider of Service  Yes   Transport Mode  Ambulance   Transported by (Company and Unit #)  [employer]   Level of Care  Advanced life support   Copies of Medical Records Sent  History and Physical, Progress note, Orders, Transfer form, Nursing note, Labs   Patient Belongings Disposition  Sent with family   Transfer Date  12/06/19   Transfer Time  1515      Follow-up Information    None Patient's Medications   Discharge Prescriptions    No medications on file     No discharge procedures on file      ED Provider  Electronically Signed by           Aimee Marks DO  12/06/19 3733

## 2019-12-06 NOTE — ASSESSMENT & PLAN NOTE
Acute on chronic occulusive disease  CT abd run off (done in [facility] ED) results pending  S/p ct abd run off on 10/19 revealing occulusino of Rt iliac seg and L common iliac artery stenosis  Vascular recommended transfer to [place] for further intervention  On heparin gtt  Cont supportive care, prn pain analgesics

## 2019-12-06 NOTE — EMTALA/ACUTE CARE TRANSFER
[address] [facility] [address]  [place] [address]  Dept: [phone]      EMTALA TRANSFER CONSENT    NAME Agustín Brown                                         1940                              MRN [id number]    I have been informed of my rights regarding examination, treatment, and transfer   by Dr Claude Burn, DO    Benefits: Specialized equipment and/or services available at the receiving facility (Include comment)________________________    Risks: Potential for delay in receiving treatment      Consent for Transfer:  I acknowledge that my medical condition has been evaluated and explained to me by the emergency department physician or other qualified medical person and/or my attending physician, who has recommended that I be transferred to the service of  Accepting Physician: Dr Kingston Warner at [address] [place] [address] Name, [address] : [facility]  The above potential benefits of such transfer, the potential risks associated with such transfer, and the probable risks of not being transferred have been explained to me, and I fully understand them  The doctor has explained that, in my case, the benefits of transfer outweigh the risks  I agree to be transferred  I authorize the performance of emergency medical procedures and treatments upon me in both transit and upon arrival at the receiving facility  Additionally, I authorize the release of any and all medical records to the receiving facility and request they be transported with me, if possible  I understand that the safest mode of transportation during a medical emergency is an ambulance and that the Hospital advocates the use of this mode of transport  Risks of traveling to the receiving facility by car, including absence of medical control, life sustaining equipment, such as oxygen, and medical personnel has been explained to me and I fully understand them      ([address])  [  ]  I consent to the stated transfer and to be transported by ambulance/helicopter  [  ]  I consent to the stated transfer, but refuse transportation by ambulance and accept full responsibility for my transportation by car  I understand the risks of non-ambulance transfers and I exonerate the Hospital and its staff from any deterioration in my condition that results from this refusal     X___________________________________________    DATE  19  TIME________  Signature of patient or legally responsible individual signing on patient behalf           RELATIONSHIP TO PATIENT_________________________          Provider Certification    NAME Etta Trimble                                         1940                              MRN [id number]    A medical screening exam was performed on the above named patient  Based on the examination:    Condition Necessitating Transfer The encounter diagnosis was 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 ([address] )  Patient Condition: The patient has been stabilized such that within reasonable medical probability, no material deterioration of the patient condition or the condition of the unborn child(melissa) is likely to result from the transfer    Reason for Transfer: Level of Care needed not available at this facility    Transfer Requirements: [address]   · Space available and qualified personnel available for treatment as acknowledged by    · Agreed to accept transfer and to provide appropriate medical treatment as acknowledged by       Dr Starr You  · Appropriate medical records of the examination and treatment of the patient are provided at the time of transfer   [address] _______  · Transfer will be performed by qualified personnel from    and appropriate transfer equipment as required, including the use of necessary and appropriate life support measures      Provider Certification: I have examined the patient and explained the following risks and benefits of being transferred/refusing transfer to the patient/family:  General risk, such as traffic hazards, adverse weather conditions, rough terrain or turbulence, possible failure of equipment (including vehicle or aircraft), or consequences of actions of persons outside the control of the transport personnel      Based on these reasonable risks and benefits to the patient and/or the unborn child(melissa), and based upon the information available at the time of the patients examination, I certify that the medical benefits reasonably to be expected from the provision of appropriate medical treatments at another medical facility outweigh the increasing risks, if any, to the individuals medical condition, and in the case of labor to the unborn child, from effecting the transfer      X____________________________________________ DATE 12/06/19        TIME_______      ORIGINAL - SEND TO MEDICAL RECORDS   COPY - SEND WITH PATIENT DURING TRANSFER

## 2019-12-06 NOTE — ED NOTES
Doppler done to both lower legs,with faint pulses noted to the right leg/foot,and absent pulses noted to the left leg/foot  Both feet cool to touch  Patient states when feet are touched it feels like needles/pins,but has decreased feeling in both lower extremities       Jose Bergeron RN  12/06/19 [id number]

## 2019-12-06 NOTE — ASSESSMENT & PLAN NOTE
Aortoiliac and infrainguinal arterial occlusive disease with bilateral rest pain  Patient has noticed over the past 1-2 weeks worsening of symptoms mostly on the left side to the point in which she is no longer able to walk  We discussed the possibility of acute on chronic occlusive disease and the importance of urgent evaluation and intervention  I have advised her to go to the emergency room for admission at which time she will be anticoagulated and further vascular workup will be initiated with a new CT angiogram after which further treatment recommendations will be made

## 2019-12-06 NOTE — ASSESSMENT & PLAN NOTE
Mass within the spinal canal identified on recent CT scan with differential diagnosis of bony abnormality verses meningioma  During current hospitalization we will ask Neurology/ neuro surgery to evaluate and make recommendations

## 2019-12-06 NOTE — H&P
H&P- Jose Carlos Adame 1940, 78 y o  female MRN: [id number]    Unit/Bed#: [facility] 812-01 Encounter: [id number]    Primary Care Provider: Kathia Sanchez DO   Date and time admitted to hospital: 12/6/2019  4:13 PM        * Aortoiliac occlusive disease ([facility] [address] )  Assessment & Plan  Acute on chronic occulusive disease  CT abd run off (done in [place] ED) results pending  S/p ct abd run off on 10/19 revealing occulusino of Rt iliac seg and L common iliac artery stenosis  Vascular recommended transfer to [place] for further intervention  On heparin gtt  Cont supportive care, prn pain analgesics      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 [facility])  Assessment & Plan  Care above    Mass of spine  Assessment & Plan  Incidental finding on ct abd run off revealing L1 epidural mass; ddx meningioma vs calcified herniated disc vs osteophyte  Will consult neurosurgery  May need MRI but will defer to [facility] upon their eval     Chronic diastolic heart failure ([facility] [address] )  Assessment & Plan  Wt Readings from Last 3 Encounters:   12/06/19 55 3 kg (121 lb 14 6 oz)   11/24/19 56 kg (123 lb 7 3 oz)   11/19/19 56 9 kg (125 lb 6 4 oz)     No sign of acute decompensation  Recent echo on 10/19  Chronically on torsemide and aldactone will hold for tonight given pending procedure tomorrow  May resume post op        Benign essential HTN  Assessment & Plan  Cont meds    CKD (chronic kidney disease) stage 3, GFR 30-59 ml/min ([facility])  Assessment & Plan  Cr w/in baseline range    Nicotine dependence  Assessment & Plan  Smoking cessation since a few weeks ago; trial of chantix intolerant thus on nicotine patch      VTE Prophylaxis: Heparin Drip  / reason for no mechanical VTE prophylaxis LE pain   Code Status: DNR/DNI, discussed with patient   She has living will in place  POLST: There is no POLST form on file for this patient (pre-hospital)  Discussion with family: Patient    Anticipated Length of Stay:  Patient will be admitted on an Inpatient basis with an anticipated length of stay of  > 2 midnights  Justification for Hospital Stay: Ischemic Lower extremity    Total Time for Visit, including Counseling / Coordination of Care: 1 hour  Greater than 50% of this total time spent on direct patient counseling and coordination of care  Chief Complaint:   Sent from vascular office visit due to acute on chronic occlusive disease    History of Present Illness:    Lamonte Live is a 78 y o  female medical history significant for peripheral artery disease (aortoiliac and infrainguinal arterial occlusive disease), hypertension, chronic kidney disease, chronic diastolic heart failure who had presented to her vascular surgeon follow-up visit for results of recent CT abdomen runoff  Patient reported of bilateral lower extremity numbness, like pins and needles; L > R  She reports that she had fallen twice due to loss of sensation in her legs  She denied any trauma to the head  She reports of 10/10 in b/l feet  She also noted of coloration of RT foot  She recently quit smoking a few weeks ago  Upon visit, per vascular surgery recommended patient to present to [place] [facility] for admission for further intervention  However she decided to instead present to [facility]  Upon arrival to [place] [facility], case discussed with vascular surgeon on call recommended transfer to [place] with admission to [facility] service  She is presently on heparin drip  Also noted of incidental finding of epidural mass at level of L1  She reports of some weight loss but admits that it had been intentional     S/p CT abd run off and VAS arterial has been done while at [facility] ED    Review of Systems:    Review of Systems   Constitutional: Positive for activity change  HENT: Negative  Eyes: Negative  Respiratory: Negative  Cardiovascular: Negative  Gastrointestinal: Negative  Endocrine: Negative  Genitourinary: Negative      Musculoskeletal:        B/l lower extremity pain   Skin: Positive for color change  Neurological: Negative  Hematological: Negative  Psychiatric/Behavioral: Negative  Past Medical and Surgical History:     Past Medical History:   Diagnosis Date    Aortic stenosis     Arthritis     Benign essential hypertension     CAD (coronary artery disease)     Disease of thyroid gland     Dizziness     Edema of leg     Hyperlipidemia     Hypertension     Other disorder of circulatory system (CODE)    chronic diastolic heart failure    Past Surgical History:   Procedure Laterality Date    BREAST SURGERY      bxs,benign    COLONOSCOPY      HYSTERECTOMY      INCISIONAL BREAST BIOPSY      x5, 1964, 1965, 1985 and 1990       Meds/Allergies:    Prior to Admission medications    Medication Sig Start Date End Date Taking?  Authorizing Provider   albuterol (VENTOLIN HFA) 90 mcg/act inhaler Inhale 2 puffs every 6 (six) hours as needed for wheezing 9/17/18   Clayton Clubs, DO   amLODIPine (NORVASC) 5 mg tablet Take 1 tablet (5 mg total) by mouth daily 9/11/19   Maryellen Coronado MD   aspirin 81 MG tablet Take 2 tablets by mouth daily 12/24/13   Historical Provider, MD   atorvastatin (LIPITOR) 80 mg tablet take 1 tablet by mouth once daily 5/21/19   Clayton Clubs, DO   B Complex Vitamins (VITAMIN B-COMPLEX) TABS Take by mouth    Historical Provider, MD   Bioflavonoid Products (VITAMIN C PLUS PO) Take by mouth daily    Historical Provider, MD   carvedilol (COREG) 12 5 mg tablet Take 1 tablet (12 5 mg total) by mouth 2 (two) times a day with meals  Patient not taking: Reported on 11/19/2019 10/10/19   Sid Kapoor DO   enalapril (VASOTEC) 20 mg tablet Take 1 tablet (20 mg total) by mouth 2 (two) times a day  Patient not taking: Reported on 11/24/2019 9/11/19   Maryellen Coronado MD   fluticasone Filhermes Elizondo) 50 mcg/act nasal spray 1 spray into each nostril daily 1/3/19   Clayton Clubs, DO   GARLIC PO Take by mouth daily     Historical Provider, MD   levothyroxine 25 mcg tablet Take 1 tablet (25 mcg total) by mouth daily in the early morning 10/3/19   Delores Public, DO   magnesium oxide (MAG-OX) 400 mg Take 1 tablet (400 mg total) by mouth daily 9/27/19   Gimlar Vargas MD   Multiple Minerals ([address]) TABS Take by mouth daily    Historical Provider, MD   Multiple Vitamins-Minerals (CENTRUM SILVER PO) Take by mouth    Historical Provider, MD   nicotine (NICODERM CQ) 14 mg/24hr TD 24 hr patch Place 1 patch on the skin every 24 hours 11/27/19   Thea Armstrong MD   potassium chloride (MICRO-K) 8 mEq CR capsule Take 8 mEq by mouth 2 (two) times a day    Historical Provider, MD   spironolactone (ALDACTONE) 25 mg tablet Take 1 tablet (25 mg total) by mouth daily 9/27/19   Gilmar Vargas MD   torsemide [facility]) 5 MG tablet Take 1 tablet (5 mg total) by mouth daily 9/27/19   Gilmar Vargas MD   varenicline (CHANTIX STARTING MONTH PAK) 0 5 MG X 11 & 1 MG X 42 tablet Take one 0 5mg tab by mouth 1x daily for 3 days, then increase to one 0 5mg tab 2x daily for 3 days, then increase to one 1mg tab 2x daily  Patient not taking: Reported on 11/19/2019 9/11/19   Delores Valderrama, DO   ciclopirox (LOPROX) 0 77 % cream Apply topically 2 (two) times a day for 20 days 7/12/18 12/6/19  SHEILA JesusM     I have reviewed home medications with patient personally  Allergies:    Allergies   Allergen Reactions    Thiazide-Type Diuretics      Hyponatremia       Social History:     Marital Status:    Occupation: Retired counselor  Patient Pre-hospital Living Situation: Resides independently in a senior apartment complex  Patient Pre-hospital Level of Mobility: no assisted device but recently has been using a walker  Patient Pre-hospital Diet Restrictions: none  Substance Use History:   Social History     Substance and Sexual Activity   Alcohol Use Yes    Comment: lizbethttans 2-3 every day for 50 years     Social History     Tobacco Use   Smoking Status Current Every Day Smoker    Packs/day: 1 00   Smokeless Tobacco Never Used   Tobacco Comment    last cigarette was 1 week ago     Social History     Substance and Sexual Activity   Drug Use No       Family History:    non-contributory    Physical Exam:     Vitals:   Blood Pressure: 140/97 (12/06/19 1630)  Pulse: 89 (12/06/19 1630)  Temperature: 98 5 °F (36 9 °C) (12/06/19 1630)  Respirations: 18 (12/06/19 1630)  SpO2: 99 % (12/06/19 1630)    Physical Exam   Constitutional: She is oriented to person, place, and time  She appears well-developed and well-nourished  Neck: Neck supple  Cardiovascular: Normal rate, regular rhythm, normal heart sounds and intact distal pulses  Exam reveals no gallop and no friction rub  No murmur heard  Pulmonary/Chest: Effort normal and breath sounds normal  No stridor  No respiratory distress  She has no wheezes  She has no rales  She exhibits no tenderness  Abdominal: Soft  Bowel sounds are normal  She exhibits no distension  There is no tenderness  There is no rebound and no guarding  Musculoskeletal: She exhibits tenderness  RT foot diffuse erythema, cold to touch  Lt foot, mottled, cold to touch  Non palpable pedal pulses b/l  Tender to palpation   Neurological: She is alert and oriented to person, place, and time  She displays normal reflexes  No cranial nerve deficit or sensory deficit  She exhibits normal muscle tone  Coordination normal            Additional Data:     Lab Results: I have personally reviewed pertinent reports        Results from last 7 days   Lab Units 12/06/19  1231   WBC Thousand/uL 8 97   HEMOGLOBIN g/dL 10 0*   HEMATOCRIT % 30 5*   PLATELETS Thousands/uL 369   NEUTROS PCT % 67   LYMPHS PCT % 20   MONOS PCT % 9   EOS PCT % 2     Results from last 7 days   Lab Units 12/06/19  1231   SODIUM mmol/L 140   POTASSIUM mmol/L 4 2   CHLORIDE mmol/L 102   CO2 mmol/L 27   BUN mg/dL 32*   CREATININE mg/dL 0 86   ANION GAP mmol/L 11   CALCIUM mg/dL 9 3   ALBUMIN g/dL 3 2* TOTAL BILIRUBIN mg/dL 0 50   ALK PHOS U/L 86   ALT U/L 48   AST U/L 50*   GLUCOSE RANDOM mg/dL 94     Results from last 7 days   Lab Units 12/06/19  1231   INR  1 02                   Imaging: I have personally reviewed pertinent reports  No orders to display       EKG, Pathology, and Other Studies Reviewed on Admission:   · EKG:     Allscripts / Epic Records Reviewed: Yes     ** Please Note: This note has been constructed using a voice recognition system   **

## 2019-12-06 NOTE — ASSESSMENT & PLAN NOTE
Incidental finding on ct abd run off revealing L1 epidural mass; ddx meningioma vs calcified herniated disc vs osteophyte  Will consult neurosurgery  May need MRI but will defer to [facility] upon their eval

## 2019-12-06 NOTE — PATIENT INSTRUCTIONS
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 ([address] )  Aortoiliac and infrainguinal arterial occlusive disease with bilateral rest pain  Patient has noticed over the past 1-2 weeks worsening of symptoms mostly on the left side to the point in which she is no longer able to walk  We discussed the possibility of acute on chronic occlusive disease and the importance of urgent evaluation and intervention  I have advised her to go to the emergency room for admission at which time she will be anticoagulated and further vascular workup will be initiated with a new CT angiogram after which further treatment recommendations will be made  Mass of spine    Mass within the spinal canal identified on recent CT scan with differential diagnosis of bony abnormality verses meningioma  During current hospitalization we will ask Neurology/ neuro surgery to evaluate and make recommendations

## 2019-12-06 NOTE — LETTER
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 ([address] )  Aortoiliac and infrainguinal arterial occlusive disease with bilateral rest pain  Patient has noticed over the past 1-2 weeks worsening of symptoms mostly on the left side to the point in which she is no longer able to walk  We discussed the possibility of acute on chronic occlusive disease and the importance of urgent evaluation and intervention  I have advised her to go to the emergency room for admission at which time she will be anticoagulated and further vascular workup will be initiated with a new CT angiogram after which further treatment recommendations will be made  Mass of spine    Mass within the spinal canal identified on recent CT scan with differential diagnosis of bony abnormality verses meningioma  During current hospitalization we will ask Neurology/ neuro surgery to evaluate and make recommendations  If you have questions, please do not hesitate to call me  I look forward to following Jay Rondon along with you           Sincerely,        Rell Handley MD        CC: No Recipients

## 2019-12-06 NOTE — EMTALA/ACUTE CARE TRANSFER
[address] [address]  Dept: [phone]      EMTALA TRANSFER CONSENT    NAME Leonie Maldonado                                         1940                              MRN [id number]    I have been informed of my rights regarding examination, treatment, and transfer   by Dr Jagdish De Paz DO    Benefits: Specialized equipment and/or services available at the receiving facility (Include comment)________________________    Risks: Potential for delay in receiving treatment      Transfer Request   I acknowledge that my medical condition has been evaluated and explained to me by the emergency department physician or other qualified medical person and/or my attending physician who has recommended and offered to me further medical examination and treatment  I understand the Hospital's obligation with respect to the treatment and stabilization of my emergency medical condition  I nevertheless request to be transferred  I release the Hospital, the doctor, and any other persons caring for me from all responsibility or liability for any injury or ill effects that may result from my transfer and agree to accept all responsibility for the consequences of my choice to transfer, rather than receive stabilizing treatment at the Hospital  I understand that because the transfer is my request, my insurance may not provide reimbursement for the services  The Hospital will assist and direct me and my family in how to make arrangements for transfer, but the hospital is not liable for any fees charged by the transport service  In spite of this understanding, I refuse to consent to further medical examination and treatment which has been offered to me, and request transfer to  [facility], [address] : [facility] [address]   I authorize the performance of emergency medical procedures and treatments upon me in both transit and upon arrival at the receiving facility  Additionally, I authorize the release of any and all medical records to the receiving facility and request they be transported with me, if possible  I authorize the performance of emergency medical procedures and treatments upon me in both transit and upon arrival at the receiving facility  Additionally, I authorize the release of any and all medical records to the receiving facility and request they be transported with me, if possible  I understand that the safest mode of transportation during a medical emergency is an ambulance and that the Hospital advocates the use of this mode of transport  Risks of traveling to the receiving facility by car, including absence of medical control, life sustaining equipment, such as oxygen, and medical personnel has been explained to me and I fully understand them  (GAIL CORRECT BOX BELOW)  [  ]  I consent to the stated transfer and to be transported by ambulance/helicopter  [  ]  I consent to the stated transfer, but refuse transportation by ambulance and accept full responsibility for my transportation by car  I understand the risks of non-ambulance transfers and I exonerate the Hospital and its staff from any deterioration in my condition that results from this refusal     X___________________________________________    DATE  19  TIME________  Signature of patient or legally responsible individual signing on patient behalf           RELATIONSHIP TO PATIENT_________________________          Provider Certification    NAME Lorene Wells                                        [facility] 1940                              MRN [id number]    A medical screening exam was performed on the above named patient  Based on the examination:    Condition Necessitating Transfer The encounter diagnosis was 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 ([facility] [address] )      Patient Condition: The patient has been stabilized such that within reasonable medical probability, no material deterioration of the patient condition or the condition of the unborn child(melissa) is likely to result from the transfer    Reason for Transfer: Level of Care needed not available at this facility    Transfer Requirements: [facility] Anatoly[facility]   · Space available and qualified personnel available for treatment as acknowledged by    · Agreed to accept transfer and to provide appropriate medical treatment as acknowledged by       Dr Debra Phelps  · Appropriate medical records of the examination and treatment of the patient are provided at the time of transfer   [address], [address] _______  · Transfer will be performed by qualified personnel from [facility]  and appropriate transfer equipment as required, including the use of necessary and appropriate life support measures  Provider Certification: I have examined the patient and explained the following risks and benefits of being transferred/refusing transfer to the patient/family:  General risk, such as traffic hazards, adverse weather conditions, rough terrain or turbulence, possible failure of equipment (including vehicle or aircraft), or consequences of actions of persons outside the control of the transport personnel      Based on these reasonable risks and benefits to the patient and/or the unborn child(melissa), and based upon the information available at the time of the patients examination, I certify that the medical benefits reasonably to be expected from the provision of appropriate medical treatments at another medical facility outweigh the increasing risks, if any, to the individuals medical condition, and in the case of labor to the unborn child, from effecting the transfer      X____________________________________________ DATE 12/06/19        TIME_______      ORIGINAL - SEND TO MEDICAL RECORDS   COPY - SEND WITH PATIENT DURING TRANSFER

## 2019-12-06 NOTE — ASSESSMENT & PLAN NOTE
Wt Readings from Last 3 Encounters:   12/06/19 55 3 kg (121 lb 14 6 oz)   11/24/19 56 kg (123 lb 7 3 oz)   11/19/19 56 9 kg (125 lb 6 4 oz)     No sign of acute decompensation  Recent echo on 10/19  Chronically on torsemide and aldactone will hold for tonight given pending procedure tomorrow  May resume post op

## 2019-12-06 NOTE — CONSULTS
Consultation - Vascular Surgery   Lorene Wells 78 y o  female MRN: [id number]  Unit/Bed#: [facility] 812-01 Encounter: [id number]      Assessment/Plan      Assessment:  78 y o  F w hx of Aortoiliac occlusive disease who presents with bilateral lower extremity rest pain, left worse than right    AVSS  Pulse exam: Absent DP/PT/AT/peroneal, Dopp pop and femoral bilaterally  Motor/sensory intact  CTA: 12/6 CTA: Interval development of thrombus within the infrarenal aorta beginning proximal to the ANDRAE origin, L iliofemoral segment, and persistent thrombosis of the R iliofemoral segment  Reconstitution of the bilateral CFAs and branches of the internal iliac arteries  R CFA with mild stenosis  R SFA, PFA, and pop are patent supplying 3 vessel runoff  L CFA mild stenosis  L PFA, SFA, pop are patent supplying the foot  Plan:  Patient will ultimately need an aorto-bifemoral bypass, timing TBD but likely early next week  Continue heparin gtt  Recommend cardiology consult and neurosurgery consult   Rest of care per primary team  Vascular surgery will continue to follow  History of Present Illness   Physician Requesting Consult: Diego Coelho MD  Reason for Consult / Principal Problem: bilateral lower extremity rest pain     HPI: Lorene Wells is a 78y o  year old female with hx of HTN, HLD, CAD, and aorto-iliac occlusive disease who presents with bilateral lower extremity rest pain, left worse than right  Patient states the symptoms have gotten worse over the alst 1-2 weeks  She was originally seen in the vascular surgery clinic earlier today where she was evaluated by Dr Tran Payne, who was concerned about her vascular exam  He recommended she go to the ED for a repeat CTA with runoff  She presented to the [facility] ED where she had this study performed and then was transferred to [place] as a direct admission  Patient states she has had RLE pain for several years   She has followed with Dr Tran Payne for around 5 years, getting ultrasounds every 6 months to monitor her aortoiliac occlusive disease  Her LLE pain has been present intermittently for a while as well  It became more severe and constant about 1 week ago when she was admitted to the hospital for sepsis secondary to a UTI  Since then, she has had progressively worsening rest pain in her left leg  She denies rest pain in her right leg at this time  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 which may be contributing to her BLE pain  Inpatient consult to Vascular Surgery     Date/Time 12/6/2019 6:44 PM     Performed by  Solange Brown DO     Authorized by Sandra Sheriff DO              Review of Systems   Constitutional: Negative for chills, fatigue and fever  HENT: Negative  Eyes: Negative  Respiratory: Negative for cough, chest tightness and shortness of breath  Cardiovascular: Negative for chest pain and palpitations  Gastrointestinal: Negative for abdominal pain, blood in stool, constipation, diarrhea, nausea and vomiting  Endocrine: Negative  Genitourinary: Negative  Negative for difficulty urinating  Musculoskeletal: Positive for back pain (chronic)  Negative for arthralgias  Bilateral lower extremity pain  Rest pain on left  Right leg pain with walking/   Skin: Negative  Negative for rash  Allergic/Immunologic: Negative  Neurological: Negative  Negative for dizziness and light-headedness  Hematological: Negative  Psychiatric/Behavioral: Negative          Historical Information   Past Medical History:   Diagnosis Date    Aortic stenosis     Arthritis     Benign essential hypertension     CAD (coronary artery disease)     Disease of thyroid gland     Dizziness     Edema of leg     Hyperlipidemia     Hypertension     Other disorder of circulatory system (CODE)      Past Surgical History:   Procedure Laterality Date    BREAST SURGERY      bxs,benign    COLONOSCOPY  HYSTERECTOMY      INCISIONAL BREAST BIOPSY      x5, 1964, 1965, 1985 and 1990     Social History   Social History     Substance and Sexual Activity   Alcohol Use Yes    Comment: wilfrid 2-3 every day for 50 years     Social History     Substance and Sexual Activity   Drug Use No     Social History     Tobacco Use   Smoking Status Current Every Day Smoker    Packs/day: 1 00   Smokeless Tobacco Never Used   Tobacco Comment    last cigarette was 1 week ago     Family History: non-contributory}    Meds/Allergies   all current active meds have been reviewed  Allergies   Allergen Reactions    Thiazide-Type Diuretics      Hyponatremia       Objective   Vitals: Blood pressure 140/97, pulse 89, temperature 98 5 °F (36 9 °C), resp  rate 18, SpO2 99 %  ,There is no height or weight on file to calculate BMI  No intake or output data in the 24 hours ending 12/06/19 1722  Invasive Devices     Peripheral Intravenous Line            Peripheral IV 12/06/19 Right Antecubital less than 1 day                Physical Exam   Constitutional: She is oriented to person, place, and time  She appears well-developed and well-nourished  No distress  HENT:   Head: Normocephalic and atraumatic  Eyes: Pupils are equal, round, and reactive to light  Conjunctivae and EOM are normal    Neck: Normal range of motion  Cardiovascular: Normal rate, regular rhythm and normal heart sounds  Pulse exam: Absent DP/PT/AT/peroneal, Dopp pop and femoral bilaterally   Pulmonary/Chest: Effort normal  No respiratory distress  Abdominal: Soft  She exhibits no distension  There is no tenderness  There is no rebound  Musculoskeletal: Normal range of motion  She exhibits no edema or deformity  Neurological: She is alert and oriented to person, place, and time  No cranial nerve deficit  Motor and sensation are intact  She is able to wiggle her toes bilaterally, slightly less on the left compared to the right  Skin: Skin is warm and dry   She is not diaphoretic  Left foot does feel cooler than the right  Psychiatric: She has a normal mood and affect  Her behavior is normal    Nursing note and vitals reviewed  Lab Results:   Coags:   Lab Results   Component Value Date    PTT 24 12/06/2019    INR 1 02 12/06/2019   , CBC with diff:   Lab Results   Component Value Date    WBC 8 97 12/06/2019    HGB 10 0 (L) 12/06/2019    HCT 30 5 (L) 12/06/2019     (H) 12/06/2019     12/06/2019    MCH 33 7 12/06/2019    MCHC 32 8 12/06/2019    RDW 15 3 (H) 12/06/2019    MPV 10 7 12/06/2019    NRBC 0 12/06/2019   , BMP/CMP:   Lab Results   Component Value Date    SODIUM 140 12/06/2019    K 4 2 12/06/2019     12/06/2019    CO2 27 12/06/2019    BUN 32 (H) 12/06/2019    CREATININE 0 86 12/06/2019    CALCIUM 9 3 12/06/2019    AST 50 (H) 12/06/2019    ALT 48 12/06/2019    ALKPHOS 86 12/06/2019    EGFR 64 12/06/2019     Imaging Studies: I have personally reviewed pertinent reports  EKG, Pathology, and Other Studies: I have personally reviewed pertinent reports  VTE Prophylaxis: Sequential compression device (Venodyne) , heparin gtt  Code Status: Prior  Advance Directive and Living Will:      Power of :    POLST:      Counseling / Coordination of Care  Counseling/Coordination of Care: Total floor / unit time spent today 30 minutes  Greater than 50% of total time was spent with the patient and / or family counseling and / or coordination of care   A description of the counseling / coordination of care: discussion with patient and surgical team

## 2019-12-06 NOTE — PROGRESS NOTES
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 ([address] )  Aortoiliac and infrainguinal arterial occlusive disease with bilateral rest pain  Patient has noticed over the past 1-2 weeks worsening of symptoms mostly on the left side to the point in which she is no longer able to walk  We discussed the possibility of acute on chronic occlusive disease and the importance of urgent evaluation and intervention  I have advised her to go to the emergency room for admission at which time she will be anticoagulated and further vascular workup will be initiated with a new CT angiogram after which further treatment recommendations will be made  Mass of spine    Mass within the spinal canal identified on recent CT scan with differential diagnosis of bony abnormality verses meningioma  During current hospitalization we will ask Neurology/ neuro surgery to evaluate and make recommendations  Diagnoses and all orders for this visit:    Aortoiliac occlusive disease ([address] )    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 ([address] )    Mass of spine    Other orders  -     potassium chloride (MICRO-K) 8 mEq CR capsule; Take 8 mEq by mouth 2 (two) times a day          Subjective:      Patient ID: Brittny Webster is a 78 y o  female  Patient presents in office to review the results of her CTA abd w/ run off done on 10/7/2019  Patient was seen in ED on 11/24/2019 at [facility] for leg weakness and a fall  Patient reports falling twice while using her walker due to not being able to feel her legs x 1 week  Patient reports having B/L leg numbness with L>R that occasionally goes into the pelvis area  Patient reports feeling pins and needles in both legs  Patient is taking aspirin and atorvastatin         80-year-old smoker with significant aortoiliac and infrainguinal arterial occlusive disease presents in follow-up after recent CT angiogram   This angiogram was delayed to some extent secondary to nephrology evaluation and ultimately a recent hospitalization on 11/24/2019 at which time she was diagnosed with urosepsis  On evaluation today she mostly complains of bilateral foot pain,more severe on the left as compared to the right  She states in actuality her admission 11/24/2019 was initiated from her standpoint because she fell  She fell at this time she had developed pain in her foot along with weakness and numbness  On evaluation today she states she is unable to walk and even has a hard time standing secondary to pain  She feels decreased sensation in both feet and upon touch a sharp sensation/hypersensitivity  CT angiogram 10/07/2019 with right common and external iliac artery occlusion  On the left there is significant calcific disease with multiple areas of stenoses approaching 75 percent  Incidental finding of a spinal mass with differential diagnosis of bony abnormality versus meningioma    Previous aortic and lower extremity arterial studies with occlusion of the right iliac segment and high-grade stenosis of left common iliac artery with ankle-brachial index is 0 17 on the right and 0 35 on the left with toe pressures 0 on the right in 28 on the left  I have spent 35 minutes with Patient and family today in which greater than 50% of this time was spent in counseling/coordination of care regarding Diagnostic results, Prognosis, Risks and benefits of tx options, Intructions for management, Patient and family education, Importance of tx compliance and Impressions  The following portions of the patient's history were reviewed and updated as appropriate: allergies, current medications, past family history, past medical history, past social history, past surgical history and problem list     The ROS as bellow was reviewed and changes made as indictated         Review of Systems   Constitutional: Positive for activity change, appetite change, chills, fatigue, fever and unexpected weight change (loss)  HENT: Negative  Eyes: Positive for itching  Respiratory: Positive for cough and wheezing  Cardiovascular: Negative  Gastrointestinal: Negative  Endocrine: Negative  Genitourinary: Negative  Musculoskeletal: Positive for arthralgias, back pain and gait problem  Leg pain   Skin: Negative  Allergic/Immunologic: Negative  Neurological: Negative for dizziness, light-headedness and headaches  Hematological: Negative  Psychiatric/Behavioral: Positive for agitation and sleep disturbance  Objective:      /68 (BP Location: Right arm, Patient Position: Sitting, Cuff Size: Adult)   Pulse 68   Temp (!) 97 4 °F (36 3 °C) (Tympanic)          Physical Exam   Constitutional: She is oriented to person, place, and time  She appears well-developed and well-nourished  Cardiovascular:   Pulses:       Femoral pulses are 0 on the right side, and 0 on the left side  Popliteal pulses are 0 on the right side, and 0 on the left side  Dorsalis pedis pulses are 0 on the right side, and 0 on the left side  Posterior tibial pulses are 0 on the right side, and 0 on the left side  The right foot is ruborous on dependency  There is some mottling of the left foot  Capillary refill is diminished bilaterally  Musculoskeletal: Normal range of motion  She exhibits no edema  Neurological: She is alert and oriented to person, place, and time  A sensory deficit (  Hypersensitivity of both feet to light touch) is present  Motor function does remain intact in both feet  Skin: Capillary refill takes more than 3 seconds  Both feet are cool to touch   Psychiatric: She has a normal mood and affect

## 2019-12-07 LAB
ANION GAP SERPL CALCULATED.3IONS-SCNC: 7 MMOL/L (ref 4–13)
APTT PPP: 51 SECONDS (ref 23–37)
APTT PPP: 58 SECONDS (ref 23–37)
APTT PPP: 58 SECONDS (ref 23–37)
BUN SERPL-MCNC: 31 MG/DL (ref 5–25)
CALCIUM SERPL-MCNC: 9.4 MG/DL (ref 8.3–10.1)
CHLORIDE SERPL-SCNC: 108 MMOL/L (ref 100–108)
CO2 SERPL-SCNC: 27 MMOL/L (ref 21–32)
CREAT SERPL-MCNC: 0.95 MG/DL (ref 0.6–1.3)
ERYTHROCYTE [DISTWIDTH] IN BLOOD BY AUTOMATED COUNT: 15.4 % (ref 11.6–15.1)
GFR SERPL CREATININE-BSD FRML MDRD: 57 ML/MIN/1.73SQ M
GLUCOSE SERPL-MCNC: 105 MG/DL (ref 65–140)
HCT VFR BLD AUTO: 29.4 % (ref 34.8–46.1)
HGB BLD-MCNC: 9.7 G/DL (ref 11.5–15.4)
MAGNESIUM SERPL-MCNC: 2 MG/DL (ref 1.6–2.6)
MCH RBC QN AUTO: 33.9 PG (ref 26.8–34.3)
MCHC RBC AUTO-ENTMCNC: 33 G/DL (ref 31.4–37.4)
MCV RBC AUTO: 103 FL (ref 82–98)
PHOSPHATE SERPL-MCNC: 3.9 MG/DL (ref 2.3–4.1)
PLATELET # BLD AUTO: 339 THOUSANDS/UL (ref 149–390)
PMV BLD AUTO: 10.9 FL (ref 8.9–12.7)
POTASSIUM SERPL-SCNC: 3.5 MMOL/L (ref 3.5–5.3)
RBC # BLD AUTO: 2.86 MILLION/UL (ref 3.81–5.12)
SODIUM SERPL-SCNC: 142 MMOL/L (ref 136–145)
WBC # BLD AUTO: 9.73 THOUSAND/UL (ref 4.31–10.16)

## 2019-12-07 PROCEDURE — 99223 1ST HOSP IP/OBS HIGH 75: CPT | Performed by: SURGERY

## 2019-12-07 PROCEDURE — 85730 THROMBOPLASTIN TIME PARTIAL: CPT | Performed by: INTERNAL MEDICINE

## 2019-12-07 PROCEDURE — 99233 SBSQ HOSP IP/OBS HIGH 50: CPT | Performed by: INTERNAL MEDICINE

## 2019-12-07 PROCEDURE — 83735 ASSAY OF MAGNESIUM: CPT | Performed by: INTERNAL MEDICINE

## 2019-12-07 PROCEDURE — 80048 BASIC METABOLIC PNL TOTAL CA: CPT | Performed by: INTERNAL MEDICINE

## 2019-12-07 PROCEDURE — 85027 COMPLETE CBC AUTOMATED: CPT | Performed by: INTERNAL MEDICINE

## 2019-12-07 PROCEDURE — 99223 1ST HOSP IP/OBS HIGH 75: CPT | Performed by: PHYSICIAN ASSISTANT

## 2019-12-07 PROCEDURE — 84100 ASSAY OF PHOSPHORUS: CPT | Performed by: INTERNAL MEDICINE

## 2019-12-07 PROCEDURE — 99223 1ST HOSP IP/OBS HIGH 75: CPT | Performed by: INTERNAL MEDICINE

## 2019-12-07 RX ORDER — SPIRONOLACTONE 25 MG/1
25 TABLET ORAL DAILY
Status: DISCONTINUED | OUTPATIENT
Start: 2019-12-07 | End: 2019-12-09

## 2019-12-07 RX ORDER — TORSEMIDE 5 MG/1
5 TABLET ORAL DAILY
Status: DISCONTINUED | OUTPATIENT
Start: 2019-12-07 | End: 2019-12-09

## 2019-12-07 RX ORDER — HYDROXYZINE HYDROCHLORIDE 10 MG/1
10 TABLET, FILM COATED ORAL EVERY 6 HOURS PRN
Status: DISCONTINUED | OUTPATIENT
Start: 2019-12-07 | End: 2019-12-16 | Stop reason: HOSPADM

## 2019-12-07 RX ADMIN — HEPARIN SODIUM 1650 UNITS: 1000 INJECTION INTRAVENOUS; SUBCUTANEOUS at 03:20

## 2019-12-07 RX ADMIN — HEPARIN SODIUM 1650 UNITS: 1000 INJECTION INTRAVENOUS; SUBCUTANEOUS at 19:45

## 2019-12-07 RX ADMIN — OXYCODONE HYDROCHLORIDE 10 MG: 10 TABLET ORAL at 12:56

## 2019-12-07 RX ADMIN — DOCUSATE SODIUM 100 MG: 100 CAPSULE, LIQUID FILLED ORAL at 18:01

## 2019-12-07 RX ADMIN — ATORVASTATIN CALCIUM 80 MG: 80 TABLET, FILM COATED ORAL at 08:35

## 2019-12-07 RX ADMIN — AMLODIPINE BESYLATE 5 MG: 5 TABLET ORAL at 08:35

## 2019-12-07 RX ADMIN — CARVEDILOL 12.5 MG: 12.5 TABLET, FILM COATED ORAL at 18:01

## 2019-12-07 RX ADMIN — LISINOPRIL 20 MG: 20 TABLET ORAL at 08:35

## 2019-12-07 RX ADMIN — HEPARIN SODIUM AND DEXTROSE 16 UNITS/KG/HR: 10000; 5 INJECTION INTRAVENOUS at 19:32

## 2019-12-07 RX ADMIN — OXYCODONE HYDROCHLORIDE 10 MG: 10 TABLET ORAL at 00:26

## 2019-12-07 RX ADMIN — HYDROMORPHONE HYDROCHLORIDE 0.5 MG: 1 INJECTION, SOLUTION INTRAMUSCULAR; INTRAVENOUS; SUBCUTANEOUS at 19:31

## 2019-12-07 RX ADMIN — OXYCODONE HYDROCHLORIDE 10 MG: 10 TABLET ORAL at 18:01

## 2019-12-07 RX ADMIN — OXYCODONE HYDROCHLORIDE 10 MG: 10 TABLET ORAL at 07:31

## 2019-12-07 RX ADMIN — LEVOTHYROXINE SODIUM 25 MCG: 25 TABLET ORAL at 05:35

## 2019-12-07 RX ADMIN — HEPARIN SODIUM 1650 UNITS: 1000 INJECTION INTRAVENOUS; SUBCUTANEOUS at 11:30

## 2019-12-07 NOTE — CONSULTS
Consult- Hood Buenrostro 1940, 78 y o  female MRN: [id number]    Unit/Bed#: [facility] 812-01 Encounter: [id number]    Primary Care Provider: Constantin Bro DO   Date and time admitted to hospital: 12/6/2019  4:13 PM    Inpatient consult to Neurosurgery  Consult performed by: Farzad Ritchie PA-C  Consult ordered by: France Walker DO        Mass of spine  Assessment & Plan  · Benign calcified herniated nucleus pulposus versus possible meningioma  · Would recommend MRI for further evaluation if patient is able  Unable to determine based on present CT imaging  · CT abdomen with runoff 12/06/2019: It is stable calcified ventral epidural mass at level of L1 compared to previous CT abdomen from October 2019  · Patient will require surgical intervention for aortic occlusive disease  This appears to be patients pressing issues at this time  · Currently on heparin drip  · No other current recommendations at this time  Patient does not require bracing or anticipated neurosurgical intervention acutely  · Neurosurgery will continue to follow with completion of MRI L-spine  Call with questions or concerns  * Aortoiliac occlusive disease ([address] )  Assessment & Plan  · Vascular surgery following  · Patient will likely need aorto Bi-femoral bypass likely this week timing to be determined  · Currently on heparin drip  History of Present Illness   HPI: Hood Buenrostro is a 78 y o  female with PMH including disorders of circulatory system, hypertension, hyperlipidemia, dizziness, thyroid disease, CAD, aortic stenosis who presents to the emergency department following outpatient appointment with the vascular surgeon  She was present and appointment to review imaging and was reporting bilateral lower extremity numbness pins and needles and discoloration right foot  She was noted to have incidental finding of L1 epidural mass versus calcified disc protrusion   Patient currently complains of diffuse pinprick that is sporadic in her BLE  Patients right foot is significantly discolored and cold  She is sitting at the edge of the bed and states that she has some back pain as well  Previous to this episode patient does report that she sometimes got numbness into her groin, but it was not consistent  Denies any bowel or bladder incontinence/retention past or present  No weakness other than associated with pain  Review of Systems   Constitutional: Negative for activity change, appetite change, fatigue and fever  HENT: Negative for hearing loss, trouble swallowing and voice change  Eyes: Negative for photophobia and visual disturbance  Respiratory: Negative for cough, chest tightness and shortness of breath  Cardiovascular: Negative for chest pain  Significant LE arterial disease  Cold LE extremities  Purple right foot  Gastrointestinal: Negative for abdominal pain, constipation, diarrhea, nausea and vomiting  Genitourinary: Negative for difficulty urinating  Musculoskeletal: Positive for back pain  Negative for gait problem, neck pain and neck stiffness  Skin: Negative for rash and wound  Neurological: Negative for dizziness, tremors, weakness, light-headedness and numbness  Psychiatric/Behavioral: Negative for agitation, behavioral problems and confusion         Historical Information   Past Medical History:   Diagnosis Date    Aortic stenosis     Arthritis     Benign essential hypertension     CAD (coronary artery disease)     Disease of thyroid gland     Dizziness     Edema of leg     Hyperlipidemia     Hypertension     Other disorder of circulatory system (CODE)      Past Surgical History:   Procedure Laterality Date    BREAST SURGERY      bxs,benign    COLONOSCOPY      HYSTERECTOMY      INCISIONAL BREAST BIOPSY      x5, 1964, 1965, 1985 and 1990     Social History     Substance and Sexual Activity   Alcohol Use Yes    Comment: manhattans 2-3 every day for 50 years Social History     Substance and Sexual Activity   Drug Use No     Social History     Tobacco Use   Smoking Status Current Every Day Smoker    Packs/day: 1 00   Smokeless Tobacco Never Used   Tobacco Comment    last cigarette was 1 week ago     Family History   Problem Relation Age of Onset    Hypertension Father     Coronary artery disease Family     Arthritis Family        Meds/Allergies   all current active meds have been reviewed, current meds:   Current Facility-Administered Medications   Medication Dose Route Frequency    acetaminophen (TYLENOL) tablet 650 mg  650 mg Oral Q6H PRN    amLODIPine (NORVASC) tablet 5 mg  5 mg Oral Daily    aspirin chewable tablet 162 mg  162 mg Oral Daily    atorvastatin (LIPITOR) tablet 80 mg  80 mg Oral Daily    carvedilol (COREG) tablet 12 5 mg  12 5 mg Oral BID With Meals    docusate sodium (COLACE) capsule 100 mg  100 mg Oral BID    fluticasone (FLONASE) 50 mcg/act nasal spray 1 spray  1 spray Nasal Daily    heparin (porcine) 25,000 units in 250 mL infusion (premix)  3-20 Units/kg/hr (Order-Specific) Intravenous Titrated    heparin (porcine) injection 1,650 Units  1,650 Units Intravenous PRN    heparin (porcine) injection 3,300 Units  3,300 Units Intravenous PRN    HYDROmorphone (DILAUDID) injection 0 5 mg  0 5 mg Intravenous Q3H PRN    hydrOXYzine HCL (ATARAX) tablet 10 mg  10 mg Oral Q6H PRN    levothyroxine tablet 25 mcg  25 mcg Oral Early Morning    lisinopril (ZESTRIL) tablet 20 mg  20 mg Oral Daily    magnesium oxide (MAG-OX) tablet 400 mg  400 mg Oral Daily    multivitamin stress formula tablet 1 tablet  1 tablet Oral Daily    nicotine (NICODERM CQ) 21 mg/24 hr TD 24 hr patch 1 patch  1 patch Transdermal Daily    oxyCODONE (ROXICODONE) immediate release tablet 10 mg  10 mg Oral Q4H PRN    oxyCODONE (ROXICODONE) IR tablet 5 mg  5 mg Oral Q4H PRN    senna (SENOKOT) tablet 8 6 mg  1 tablet Oral Daily    spironolactone (ALDACTONE) tablet 25 mg 25 mg Oral Daily    torsemide (DEMADEX) tablet 5 mg  5 mg Oral Daily    and PTA meds:   Prior to Admission Medications   Prescriptions Last Dose Informant Patient Reported? Taking?    B Complex Vitamins (VITAMIN B-COMPLEX) TABS  Self Yes No   Sig: Take by mouth   Bioflavonoid Products (VITAMIN C PLUS PO)  Self Yes No   Sig: Take by mouth daily   GARLIC PO  Self Yes No   Sig: Take by mouth daily    Multiple Minerals (CALCIUM-MAGNESIUM-ZINC) TABS  Self Yes No   Sig: Take by mouth daily   Multiple Vitamins-Minerals (CENTRUM SILVER PO)  Self Yes No   Sig: Take by mouth   albuterol (VENTOLIN HFA) 90 mcg/act inhaler  Self No No   Sig: Inhale 2 puffs every 6 (six) hours as needed for wheezing   amLODIPine (NORVASC) 5 mg tablet  Self No No   Sig: Take 1 tablet (5 mg total) by mouth daily   aspirin 81 MG tablet  Self Yes No   Sig: Take 2 tablets by mouth daily   atorvastatin (LIPITOR) 80 mg tablet  Self No No   Sig: take 1 tablet by mouth once daily   carvedilol (COREG) 12 5 mg tablet  Self No No   Sig: Take 1 tablet (12 5 mg total) by mouth 2 (two) times a day with meals   Patient not taking: Reported on 2019   enalapril (VASOTEC) 20 mg tablet  Self No No   Sig: Take 1 tablet (20 mg total) by mouth 2 (two) times a day   Patient not taking: Reported on 2019   fluticasone (FLONASE) 50 mcg/act nasal spray  Self No No   Si spray into each nostril daily   levothyroxine 25 mcg tablet  Self No No   Sig: Take 1 tablet (25 mcg total) by mouth daily in the early morning   magnesium oxide (MAG-OX) 400 mg  Self No No   Sig: Take 1 tablet (400 mg total) by mouth daily   nicotine (NICODERM CQ) 14 mg/24hr TD 24 hr patch  Self No No   Sig: Place 1 patch on the skin every 24 hours   potassium chloride (MICRO-K) 8 mEq CR capsule  Self Yes No   Sig: Take 8 mEq by mouth 2 (two) times a day   spironolactone (ALDACTONE) 25 mg tablet  Self No No   Sig: Take 1 tablet (25 mg total) by mouth daily   torsemide (DEMADEX) 5 MG tablet Self No No   Sig: Take 1 tablet (5 mg total) by mouth daily      Facility-Administered Medications: None     Allergies   Allergen Reactions    Thiazide-Type Diuretics      Hyponatremia       Objective   I/O       12/05 0701 - 12/06 0700 12/06 0701 - 12/07 0700 12/07 0701 - 12/08 0700    P  O    0    Total Intake(mL/kg)   0 (0)    Urine (mL/kg/hr)  800 0 (0)    Total Output  800 0    Net  -800 0                 Physical Exam   Constitutional: She is oriented to person, place, and time  She appears well-developed and well-nourished  HENT:   Head: Normocephalic and atraumatic  Eyes: Pupils are equal, round, and reactive to light  Conjunctivae and EOM are normal  No scleral icterus  Neck: Normal range of motion  Neck supple  No JVD present  No tracheal deviation present  Cardiovascular: Normal rate  Pulmonary/Chest: Effort normal  No respiratory distress  Abdominal: Soft  She exhibits no distension  Musculoskeletal: Normal range of motion  She exhibits no edema or deformity  Neurological: She is alert and oriented to person, place, and time  No cranial nerve deficit  She has a normal Finger-Nose-Finger Test    Reflex Scores:       Bicep reflexes are 2+ on the right side and 2+ on the left side  Patellar reflexes are 3+ on the right side and 2+ on the left side  Skin: Skin is dry  Patient with cold BLE  significant vascular disease readily evident  Right foot is very bluish/purple in color with pink toe nails  Psychiatric: She has a normal mood and affect  Her speech is normal and behavior is normal  Thought content normal      Neurologic Exam     Mental Status   Oriented to person, place, and time  Attention: normal  Concentration: normal    Speech: speech is normal   Level of consciousness: alert  Knowledge: good  Normal comprehension  Cranial Nerves     CN III, IV, VI   Pupils are equal, round, and reactive to light    Extraocular motions are normal    CN III: no CN III palsy  CN VI: no CN VI palsy  Nystagmus: none   Upgaze: normal  Downgaze: normal    CN V   Facial sensation intact  CN VII   Facial expression full, symmetric  CN VIII   CN VIII normal    Hearing: intact    CN XI   CN XI normal      CN XII   Tongue: not atrophic  Tongue deviation: none    Motor Exam   Muscle bulk: normal  Overall muscle tone: normal  Right arm tone: normal  Left arm tone: normal  Right arm pronator drift: absent  Left arm pronator drift: absent  Right leg tone: decreased  Left leg tone: normal    Strength   Right deltoid: 5/5  Left deltoid: 5/5  Right biceps: 5/5  Left biceps: 5/5  Right triceps: 5/5  Left triceps: 5/5  Right wrist flexion: 5/5  Left wrist flexion: 5/5  Right wrist extension: 5/5  Left wrist extension: 5/5  Right iliopsoas: 4/5  Left iliopsoas: 4/5  Right quadriceps: 5/5  Left quadriceps: 4/5  Right hamstrin/5  Left hamstrin/5  Right anterior tibial: 5/5  Left anterior tibial: 5/5  Right gastroc: 5/5  Left gastroc: 5/5    Sensory Exam   Light touch normal    Proprioception normal    Subjective paraesthesias in BLE, but sensation intact to light touch bilaterally including inner thighs and hips/outer groin area  Gait, Coordination, and Reflexes     Coordination   Finger to nose coordination: normal    Tremor   Resting tremor: absent  Action tremor: absent    Reflexes   Right biceps: 2+  Left biceps: 2+  Right patellar: 3+  Left patellar: 2+Did not check clonus  Patient states that her legs are very sensitive and was having some pain while I was testing her strength  Vitals:Blood pressure 158/60, pulse 89, temperature 98 1 °F (36 7 °C), temperature source Oral, resp  rate 16, height 5' 4" (1 626 m), weight 53 9 kg (118 lb 12 8 oz), SpO2 98 %  ,Body mass index is 20 39 kg/m²       Lab Results:   Results from last 7 days   Lab Units 19  0506 19  1850 19  1231   WBC Thousand/uL 9 73 9 10 8 97   HEMOGLOBIN g/dL 9 7* 10 6* 10 0*   HEMATOCRIT % 29 4* 31 8* 30 5*   PLATELETS Thousands/uL 339 352 369   NEUTROS PCT %  --   --  67   MONOS PCT %  --   --  9     Results from last 7 days   Lab Units 12/07/19  0506 12/06/19  1231   POTASSIUM mmol/L 3 5 4 2   CHLORIDE mmol/L 108 102   CO2 mmol/L 27 27   BUN mg/dL 31* 32*   CREATININE mg/dL 0 95 0 86   CALCIUM mg/dL 9 4 9 3   ALK PHOS U/L  --  86   ALT U/L  --  48   AST U/L  --  50*     Results from last 7 days   Lab Units 12/07/19  0506 12/06/19  1231   MAGNESIUM mg/dL 2 0 1 8     Results from last 7 days   Lab Units 12/07/19  0506   PHOSPHORUS mg/dL 3 9     Results from last 7 days   Lab Units 12/07/19  1030 12/07/19  0233 12/06/19  1850 12/06/19  1231   INR   --   --  1 14 1 02   PTT seconds 58* 51* 93* 24     No results found for: TROPONINT  ABG:No results found for: PHART, BTT1TMC, PO2ART, LVJ0SDX, P2MOCLZD, BEART, SOURCE    Imaging Studies: I have personally reviewed pertinent reports  and I have personally reviewed pertinent films in PACS    Cta Abdominal W Run Off W Wo Contrast    Result Date: 12/6/2019  Impression: Interval thrombosis of the infrarenal abdominal aorta and left iliofemoral segment with persistent thrombosis of the right iliofemoral segment  Stable bilateral three-vessel distal runoff  Diverticulosis  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  * I personally telephoned this result to [facility] on 12/6/2019 2:25 PM  Workstation performed: [id number]       EKG, Pathology, and Other Studies: I have personally reviewed pertinent reports  and I have personally reviewed pertinent films in PACS    VTE Prophylaxis: Sequential compression device (Venodyne)  and Heparin gtt    Code Status: Level 3 - DNAR and DNI  Advance Directive and Living Will:      Power of :    POLST:      Counseling / Coordination of Care  I spent 30 minutes with the patient

## 2019-12-07 NOTE — PROGRESS NOTES
Progress Note - Vascular Surgery   Isaac Burks 78 y o  female MRN: [id number]  Unit/Bed#: [facility] 812-01 Encounter: [id number]    Assessment:  78 y o  F w hx of Aortoiliac occlusive disease who presents with bilateral lower extremity rest pain, left worse than right     AVSS  Pulse exam: Absent DP/PT/AT/peroneal, Dopp pop and femoral bilaterally  Motor/sensory intact       CTA: 12/6 CTA: Interval development of thrombus within the infrarenal aorta beginning proximal to the ANDRAE origin, L iliofemoral segment, and persistent thrombosis of the R iliofemoral segment  Reconstitution of the bilateral CFAs and branches of the internal iliac arteries  R CFA with mild stenosis  R SFA, PFA, and pop are patent supplying 3 vessel runoff  L CFA mild stenosis  L PFA, SFA, pop are patent supplying the foot          Plan:  Patient will ultimately need an aorto-bifemoral bypass, timing TBD but likely early next week  Continue heparin gtt  Recommend cardiology consult and neurosurgery consult   Rest of care per primary team  Vascular surgery will continue to follow  Subjective/Objective     Subjective: No acute events overnight  Complains of persistent LLE pain, controlled on prn analgesia  Tolerating diet without nausea/vomiting  No fevers, chills  Objective:     Blood pressure 140/53, pulse 71, temperature 98 2 °F (36 8 °C), resp  rate 18, height 5' 4" (1 626 m), weight 53 9 kg (118 lb 12 8 oz), SpO2 96 %  ,Body mass index is 20 39 kg/m²        Intake/Output Summary (Last 24 hours) at 12/7/2019 0648  Last data filed at 12/7/2019 0300  Gross per 24 hour   Intake    Output 800 ml   Net -800 ml       Invasive Devices     Peripheral Intravenous Line            Peripheral IV 12/06/19 Right Antecubital less than 1 day                  NAD, alert and oriented x3  Normocephalic, atraumatic  MMM, EOMI, PERRLA  Norm resp effort on RA  RRR  Abd soft, NT/ND  No calf tenderness or peripheral edema  Motor/sensation intact in distal extremities  CN grossly intact  -rash/lesions      Lab, Imaging and other studies:  CBC:   Lab Results   Component Value Date    WBC 9 73 12/07/2019    HGB 9 7 (L) 12/07/2019    HCT 29 4 (L) 12/07/2019     (H) 12/07/2019     12/07/2019    MCH 33 9 12/07/2019    MCHC 33 0 12/07/2019    RDW 15 4 (H) 12/07/2019    MPV 10 9 12/07/2019    NRBC 0 12/06/2019   , CMP:   Lab Results   Component Value Date    SODIUM 142 12/07/2019    K 3 5 12/07/2019     12/07/2019    CO2 27 12/07/2019    BUN 31 (H) 12/07/2019    CREATININE 0 95 12/07/2019    CALCIUM 9 4 12/07/2019    AST 50 (H) 12/06/2019    ALT 48 12/06/2019    ALKPHOS 86 12/06/2019    EGFR 57 12/07/2019     VTE Pharmacologic Prophylaxis: Heparin gtt  VTE Mechanical Prophylaxis: sequential compression device

## 2019-12-07 NOTE — ASSESSMENT & PLAN NOTE
· Vascular surgery following  · Patient will likely need aorto Bi-femoral bypass likely this week timing to be determined  · Currently on heparin drip

## 2019-12-07 NOTE — ASSESSMENT & PLAN NOTE
Wt Readings from Last 3 Encounters:   12/06/19 53 9 kg (118 lb 12 8 oz)   12/06/19 55 3 kg (121 lb 14 6 oz)   11/24/19 56 kg (123 lb 7 3 oz)     No sign of acute decompensation  Recent echo on 10/19  Patient appears euvolemic at this time  Daily weights, fluid restrict, I/O's  Continue torsemide and spironolactone and Coreg

## 2019-12-07 NOTE — ASSESSMENT & PLAN NOTE
Incidental finding on ct abd run off revealing L1 epidural mass; ddx meningioma vs calcified herniated disc vs osteophyte  Consult neurosurgery, appreciate input  Plans for MRI for further eval

## 2019-12-07 NOTE — ASSESSMENT & PLAN NOTE
Acute on chronic occulusive disease  CT abd run off (done in [facility] ED) 12/6 shows interval thrombosis of the infrarenal abdominal aorta and left iliofemoral segment with persistent thrombosis of the right iliofemoral segment  Stable bilateral three-vessel distal runoff    S/p ct abd run off on 10/19 revealing occulusino of Rt iliac seg and L common iliac artery stenosis  On heparin gtt  Cont supportive care, prn pain analgesics  Vascular surgery consulted, appreciate input; plans for intervention next week  Cardiology to risk stratify, appreciate input

## 2019-12-07 NOTE — PLAN OF CARE
Problem: Prexisting or High Potential for Compromised Skin Integrity  Goal: Skin integrity is maintained or improved  Description  INTERVENTIONS:  - Identify patients at risk for skin breakdown  - Assess and monitor skin integrity  - Assess and monitor nutrition and hydration status  - Monitor labs   - Assess for incontinence   - Turn and reposition patient  - Assist with mobility/ambulation  - Relieve pressure over bony prominences  - Avoid friction and shearing  - Provide appropriate hygiene as needed including keeping skin clean and dry  - Evaluate need for skin moisturizer/barrier cream  - Collaborate with interdisciplinary team   - Patient/family teaching  - Consider wound care consult   Outcome: Progressing     Problem: Potential for Falls  Goal: Patient will remain free of falls  Description  INTERVENTIONS:  - Assess patient frequently for physical needs  -  Identify cognitive and physical deficits and behaviors that affect risk of falls    -  [place] fall precautions as indicated by assessment   - Educate patient/family on patient safety including physical limitations  - Instruct patient to call for assistance with activity based on assessment  - Modify environment to reduce risk of injury  - Consider OT/PT consult to assist with strengthening/mobility  Outcome: Progressing     Problem: PAIN - ADULT  Goal: Verbalizes/displays adequate comfort level or baseline comfort level  Description  Interventions:  - Encourage patient to monitor pain and request assistance  - Assess pain using appropriate pain scale  - Administer analgesics based on type and severity of pain and evaluate response  - Implement non-pharmacological measures as appropriate and evaluate response  - Consider cultural and social influences on pain and pain management  - Notify physician/advanced practitioner if interventions unsuccessful or patient reports new pain  Outcome: Progressing     Problem: INFECTION - ADULT  Goal: Absence or prevention of progression during hospitalization  Description  INTERVENTIONS:  - Assess and monitor for signs and symptoms of infection  - Monitor lab/diagnostic results  - Monitor all insertion sites, i e  indwelling lines, tubes, and drains  - Monitor endotracheal if appropriate and nasal secretions for changes in amount and color  - [place] appropriate cooling/warming therapies per order  - Administer medications as ordered  - Instruct and encourage patient and family to use good hand hygiene technique  - Identify and instruct in appropriate isolation precautions for identified infection/condition  Outcome: Progressing  Goal: Absence of fever/infection during neutropenic period  Description  INTERVENTIONS:  - Monitor WBC    Outcome: Progressing     Problem: SAFETY ADULT  Goal: Maintain or return to baseline ADL function  Description  INTERVENTIONS:  -  Assess patient's ability to carry out ADLs; assess patient's baseline for ADL function and identify physical deficits which impact ability to perform ADLs (bathing, care of mouth/teeth, toileting, grooming, dressing, etc )  - Assess/evaluate cause of self-care deficits   - Assess range of motion  - Assess patient's mobility; develop plan if impaired  - Assess patient's need for assistive devices and provide as appropriate  - Encourage maximum independence but intervene and supervise when necessary  - Involve family in performance of ADLs  - Assess for home care needs following discharge   - Consider OT consult to assist with ADL evaluation and planning for discharge  - Provide patient education as appropriate  Outcome: Progressing  Goal: Maintain or return mobility status to optimal level  Description  INTERVENTIONS:  - Assess patient's baseline mobility status (ambulation, transfers, stairs, etc )    - Identify cognitive and physical deficits and behaviors that affect mobility  - Identify mobility aids required to assist with transfers and/or ambulation (gait belt, sit-to-stand, lift, walker, cane, etc )  - [place] fall precautions as indicated by assessment  - Record patient progress and toleration of activity level on Mobility SBAR; progress patient to next Phase/Stage  - Instruct patient to call for assistance with activity based on assessment  - Consider rehabilitation consult to assist with strengthening/weightbearing, etc   Outcome: Progressing     Problem: DISCHARGE PLANNING  Goal: Discharge to home or other facility with appropriate resources  Description  INTERVENTIONS:  - Identify barriers to discharge w/patient and caregiver  - Arrange for needed discharge resources and transportation as appropriate  - Identify discharge learning needs (meds, wound care, etc )  - Arrange for interpretive services to assist at discharge as needed  - Refer to Case Management Department for coordinating discharge planning if the patient needs post-hospital services based on physician/advanced practitioner order or complex needs related to functional status, cognitive ability, or social support system  Outcome: Progressing     Problem: Knowledge Deficit  Goal: Patient/family/caregiver demonstrates understanding of disease process, treatment plan, medications, and discharge instructions  Description  Complete learning assessment and assess knowledge base    Interventions:  - Provide teaching at level of understanding  - Provide teaching via preferred learning methods  Outcome: Progressing     Problem: MUSCULOSKELETAL - ADULT  Goal: Maintain or return mobility to safest level of function  Description  INTERVENTIONS:  - Assess patient's ability to carry out ADLs; assess patient's baseline for ADL function and identify physical deficits which impact ability to perform ADLs (bathing, care of mouth/teeth, toileting, grooming, dressing, etc )  - Assess/evaluate cause of self-care deficits   - Assess range of motion  - Assess patient's mobility  - Assess patient's need for assistive devices and provide as appropriate  - Encourage maximum independence but intervene and supervise when necessary  - Involve family in performance of ADLs  - Assess for home care needs following discharge   - Consider OT consult to assist with ADL evaluation and planning for discharge  - Provide patient education as appropriate  Outcome: Progressing  Goal: Maintain proper alignment of affected body part  Description  INTERVENTIONS:  - Support, maintain and protect limb and body alignment  - Provide patient/ family with appropriate education  Outcome: Progressing

## 2019-12-07 NOTE — PROGRESS NOTES
Progress Note - Runell Estimable 1940, 78 y o  female MRN: [id number]    Unit/Bed#: [facility] 812-01 Encounter: [id number]    Primary Care Provider: Michael Layne DO   Date and time admitted to hospital: 12/6/2019  4:13 PM        Chronic diastolic heart failure (HCC)  Assessment & Plan  Wt Readings from Last 3 Encounters:   12/06/19 53 9 kg (118 lb 12 8 oz)   12/06/19 55 3 kg (121 lb 14 6 oz)   11/24/19 56 kg (123 lb 7 3 oz)     No sign of acute decompensation  Recent echo on 10/19  Patient appears euvolemic at this time  Daily weights, fluid restrict, I/O's  Continue torsemide and spironolactone and Coreg        Mass of spine  Assessment & Plan  Incidental finding on ct abd run off revealing L1 epidural mass; ddx meningioma vs calcified herniated disc vs osteophyte  Consult neurosurgery, appreciate input  Plans for MRI for further eval    CKD (chronic kidney disease) stage 3, GFR 30-59 ml/min ([facility])  Assessment & Plan  Cr w/in baseline range  Monitor BUN/Cr    Benign essential HTN  Assessment & Plan  Continue Coreg, lisinopril, Norvasc  Controlled    Nicotine dependence  Assessment & Plan  Counseled on cessation    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 [facility])  Assessment & Plan  Care as above    * Aortoiliac occlusive disease ([address] )  Assessment & Plan  Acute on chronic occulusive disease  CT abd run off (done in [facility] ED) 12/6 shows interval thrombosis of the infrarenal abdominal aorta and left iliofemoral segment with persistent thrombosis of the right iliofemoral segment  Stable bilateral three-vessel distal runoff    S/p ct abd run off on 10/19 revealing occulusino of Rt iliac seg and L common iliac artery stenosis  On heparin gtt  Cont supportive care, prn pain analgesics  Vascular surgery consulted, appreciate input; plans for intervention next week  Cardiology to risk stratify, appreciate input        VTE Pharmacologic Prophylaxis:   Pharmacologic: Heparin Drip  Mechanical VTE Prophylaxis in Place: Yes    Patient Centered Rounds: I have performed bedside rounds with nursing staff today  Discussions with Specialists or Other Care Team Provider: none    Education and Discussions with Family / Patient: patient    Time Spent for Care: 30 minutes  More than 50% of total time spent on counseling and coordination of care as described above  Current Length of Stay: 1 day(s)    Current Patient Status: Inpatient   Certification Statement: The patient will continue to require additional inpatient hospital stay due to req surgery    Discharge Plan:  Pending hospital course    Code Status: Level 3 - DNAR and DNI      Subjective:   No acute overnight events  Patient this morning complains of back pain due to a hospital bed  Objective:     Vitals:   Temp (24hrs), Av 2 °F (36 8 °C), Min:98 1 °F (36 7 °C), Max:98 2 °F (36 8 °C)    Temp:  [98 1 °F (36 7 °C)-98 2 °F (36 8 °C)] 98 2 °F (36 8 °C)  HR:  [] 101  Resp:  [15-18] 18  BP: (140-161)/(53-75) 161/75  SpO2:  [96 %-98 %] 97 %  Body mass index is 20 39 kg/m²  Input and Output Summary (last 24 hours): Intake/Output Summary (Last 24 hours) at 2019 1749  Last data filed at 2019 1409  Gross per 24 hour   Intake 120 ml   Output 500 ml   Net -380 ml       Physical Exam:     Physical Exam   Constitutional: She is oriented to person, place, and time  She appears well-developed and well-nourished  HENT:   Head: Normocephalic and atraumatic  Mouth/Throat: Oropharynx is clear and moist    Eyes: Pupils are equal, round, and reactive to light  Conjunctivae are normal    Neck: Neck supple  No JVD present  Cardiovascular: Normal rate, regular rhythm, normal heart sounds and intact distal pulses  Pulmonary/Chest: Effort normal and breath sounds normal    Abdominal: Soft  Bowel sounds are normal    Musculoskeletal: She exhibits no edema or tenderness  Neurological: She is alert and oriented to person, place, and time     Skin: Skin is warm and dry  Capillary refill takes less than 2 seconds  Psychiatric: She has a normal mood and affect  Her behavior is normal  Judgment and thought content normal    Nursing note and vitals reviewed  Additional Data:     Labs:    Results from last 7 days   Lab Units 12/07/19  0506  12/06/19  1231   WBC Thousand/uL 9 73   < > 8 97   HEMOGLOBIN g/dL 9 7*   < > 10 0*   HEMATOCRIT % 29 4*   < > 30 5*   PLATELETS Thousands/uL 339   < > 369   NEUTROS PCT %  --   --  67   LYMPHS PCT %  --   --  20   MONOS PCT %  --   --  9   EOS PCT %  --   --  2    < > = values in this interval not displayed  Results from last 7 days   Lab Units 12/07/19  0506 12/06/19  1231   SODIUM mmol/L 142 140   POTASSIUM mmol/L 3 5 4 2   CHLORIDE mmol/L 108 102   CO2 mmol/L 27 27   BUN mg/dL 31* 32*   CREATININE mg/dL 0 95 0 86   ANION GAP mmol/L 7 11   CALCIUM mg/dL 9 4 9 3   ALBUMIN g/dL  --  3 2*   TOTAL BILIRUBIN mg/dL  --  0 50   ALK PHOS U/L  --  86   ALT U/L  --  48   AST U/L  --  50*   GLUCOSE RANDOM mg/dL 105 94     Results from last 7 days   Lab Units 12/06/19  1850   INR  1 14                       * I Have Reviewed All Lab Data Listed Above  * Additional Pertinent Lab Tests Reviewed:  All Labs Within Last 24 Hours Reviewed    Imaging:    Imaging Reports Reviewed Today Include: CT abd  Imaging Personally Reviewed by Myself Includes:  none    Recent Cultures (last 7 days):           Last 24 Hours Medication List:     Current Facility-Administered Medications:  acetaminophen 650 mg Oral Q6H PRN Bay Fuse, DO    amLODIPine 5 mg Oral Daily Bay Fuse, DO    aspirin 162 mg Oral Daily Bay Fuse, DO    atorvastatin 80 mg Oral Daily Bay Fuse, DO    carvedilol 12 5 mg Oral BID With Meals Bay Fuse, DO    docusate sodium 100 mg Oral BID Bay Fuse, DO    fluticasone 1 spray Nasal Daily Bay Fuse, DO    heparin (porcine) 3-20 Units/kg/hr (Order-Specific) Intravenous Titrated Little Credit DO Angelo Last Rate: 12 Units/kg/hr (12/07/19 0321)   heparin (porcine) 1,650 Units Intravenous PRN Bay Fuse, DO    heparin (porcine) 3,300 Units Intravenous PRN Bay Fuse, DO    HYDROmorphone 0 5 mg Intravenous Q3H PRN Bay Fuse, DO    hydrOXYzine HCL 10 mg Oral Q6H PRN Antonette Damon MD    levothyroxine 25 mcg Oral Early Morning Bay Fuse, DO    lisinopril 20 mg Oral Daily Bay Fuse, DO    magnesium oxide 400 mg Oral Daily Bay Fuse, DO    multivitamin stress formula 1 tablet Oral Daily Richardson Knapp MD    nicotine 1 patch Transdermal Daily Bay Fuse, DO    oxyCODONE 10 mg Oral Q4H PRN Bay Fuse, DO    oxyCODONE 5 mg Oral Q4H PRN Bay Fuse, DO    senna 1 tablet Oral Daily Bay Fuse, DO    spironolactone 25 mg Oral Daily Antonette Damon MD    torsemide 5 mg Oral Daily Antonette Damon MD         Today, Patient Was Seen By: Antonette Damon MD    ** Please Note: Dictation voice to text software may have been used in the creation of this document   **

## 2019-12-07 NOTE — ASSESSMENT & PLAN NOTE
· Benign calcified herniated nucleus pulposus versus possible meningioma  · Would recommend MRI for further evaluation if patient is able  Unable to determine based on present CT imaging  · CT abdomen with runoff 12/06/2019: It is stable calcified ventral epidural mass at level of L1 compared to previous CT abdomen from October 2019  · Patient will require surgical intervention for aortic occlusive disease  This appears to be patients pressing issues at this time  · Currently on heparin drip  · No other current recommendations at this time  Patient does not require bracing or anticipated neurosurgical intervention acutely  · Neurosurgery will continue to follow with completion of MRI L-spine  Call with questions or concerns

## 2019-12-07 NOTE — UTILIZATION REVIEW
Initial Clinical Review    Admission: Date/Time/Statement: Inpatient Admission Orders (From admission, onward)     Ordered        12/06/19 1706  Inpatient Admission  Once                   Orders Placed This Encounter   Procedures    Inpatient Admission     Standing Status:   Standing     Number of Occurrences:   1     Order Specific Question:   Admitting Physician     Answer:   Leticia Person [[id number]]     Order Specific Question:   Level of Care     Answer:   Med Surg [[id number]]     Order Specific Question:   Estimated length of stay     Answer:   More than 2 Midnights     Order Specific Question:   Certification     Answer:   I certify that inpatient services are medically necessary for this patient for a duration of greater than two midnights  See H&P and MD Progress Notes for additional information about the patient's course of treatment  ED Arrival Information     Patient not seen in ED -- transfer from [address] [place] [address]                     Chief complaint:  Sent from vascular office visit due to acute on chronic occlusive disease    Assessment/Plan: 77 y/o female with PMHx for PVD (aortoiliac and infrainguinal arterial occlusive disease), HTN, CKD, chronic diastolic HF who had presented to her vascular surgeon for f/u visit for results of  CT abdomen runoff  Patient reported of b/l lower extremity numbness, like pins and needles; L > R  She reports that she had fallen twice due to loss of sensation in her legs  She reports of 10/10 pain in b/l feet with coloration of RT foot  Upon visit, per vascular surgery recommended patient to present to [place] ED for admission for further intervention  However went to [facility] ED  Transferred to [facility] for vascular surgery eval & tx  Pt with pain in B/L lower extremities at rest, L>R  Admit inpatient to M/S/Tele unit with acute on chronic occlusive disease -- tele monitoring, Hep gtt, analgesics prn, NV checks q4h,    NSx consulted for incidental finding of L1 epidural mass, MRI T-spine  Vascular planning on aortobifemoral bypass  Consult cardiology and NSx  Cardiology ok for pt to proceed with surgery  ED Triage Vitals   Temperature Pulse Respirations Blood Pressure SpO2   12/06/19 1630 12/06/19 1630 12/06/19 1630 12/06/19 1630 12/06/19 1630   98 5 °F (36 9 °C) 89 18 140/97 99 %      Temp Source Heart Rate Source Patient Position - Orthostatic VS BP Location FiO2 (%)   12/07/19 0806 12/07/19 0806 12/07/19 0806 12/07/19 0806 --   Oral Monitor Lying Right arm       Pain Score       12/06/19 1824       Worst Possible Pain        Wt Readings from Last 1 Encounters:   12/06/19 53 9 kg (118 lb 12 8 oz)     Additional Vital Signs:   Date/Time  Temp  Pulse  Resp  BP  MAP (mmHg)  SpO2  O2 Device   12/07/19 0806  98 1 °F (36 7 °C)  89  16  158/60    98 %  None (Room air)   12/07/19 07:31:42  98 1 °F (36 7 °C)  51Abnormal   15  160/56  91  96 %  None (Room air)   12/06/19 23:54:34  98 2 °F (36 8 °C)  71  18  140/53  82  96 %     12/06/19 2030              None (Room air)   12/06/19 16:30:39  98 5 °F (36 9 °C)  89  18  140/97  111  99 %     12/06/19 1630              None (Room air)       Pertinent Labs/Diagnostic Test Results:   CTA abdominal run off 12/6 -- Interval thrombosis of the infrarenal abdominal aorta and left iliofemoral segment with persistent thrombosis of the right iliofemoral segment   Stable bilateral three-vessel distal runoff  Diverticulosis  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      Results from last 7 days   Lab Units 12/07/19  0506 12/06/19  1850 12/06/19  1231   WBC Thousand/uL 9 73 9 10 8 97   HEMOGLOBIN g/dL 9 7* 10 6* 10 0*   HEMATOCRIT % 29 4* 31 8* 30 5*   PLATELETS Thousands/uL 339 352 369   NEUTROS ABS Thousands/µL  --   --  6 03     Results from last 7 days   Lab Units 12/07/19  0506 12/06/19  1231   SODIUM mmol/L 142 140   POTASSIUM mmol/L 3 5 4 2   CHLORIDE mmol/L 108 102   CO2 mmol/L 27 27   ANION GAP mmol/L 7 11   BUN mg/dL 31* 32*   CREATININE mg/dL 0 95 0 86   EGFR ml/min/1 73sq m 57 64   CALCIUM mg/dL 9 4 9 3   MAGNESIUM mg/dL 2 0 1 8   PHOSPHORUS mg/dL 3 9  --      Results from last 7 days   Lab Units 12/06/19  1231   AST U/L 50*   ALT U/L 48   ALK PHOS U/L 86   TOTAL PROTEIN g/dL 7 9   ALBUMIN g/dL 3 2*   TOTAL BILIRUBIN mg/dL 0 50     Results from last 7 days   Lab Units 12/07/19  0506 12/06/19  1231   GLUCOSE RANDOM mg/dL 105 94     Results from last 7 days   Lab Units 12/05/19  1438   PH  6 5     Results from last 7 days   Lab Units 12/07/19  1030 12/07/19  0233 12/06/19  1850 12/06/19  1231   PROTIME seconds  --   --  14 2 11 0   INR   --   --  1 14 1 02   PTT seconds 58* 51* 93* 24     Results from last 7 days   Lab Units 12/05/19  1438   COLOR UA  Yellow   SPEC GRAV UA  1 012   KETONES UA  Negative   BLOOD UA  Negative   NITRITE UA  Negative   BILIRUBIN, URINE  Negative   UROBILINOGEN UA mg/dL 0 2   LEUKOCYTES UA  Negative   WBC UA /hpf 0-5   RBC UA /hpf 0-2   BACTERIA UA  Few   EPITHELEAL /hpf 0-10   MUCUS THREADS  Present       Past Medical History:   Diagnosis Date    Aortic stenosis     Arthritis     Benign essential hypertension     CAD (coronary artery disease)     Disease of thyroid gland     Dizziness     Edema of leg     Hyperlipidemia     Hypertension     Other disorder of circulatory system (CODE)      Present on Admission:   Aortoiliac occlusive disease (HCC)   CKD (chronic kidney disease) stage 3, GFR 30-59 ml/min (HCC)   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)   Nicotine dependence   Mass of spine      Admitting Diagnosis: 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 ([facility] [facility] ) [I70 [id number]]  Age/Sex: 78 y o  female  Admission Orders:  Scheduled Medications:  Medications:  amLODIPine 5 mg Oral Daily   aspirin 162 mg Oral Daily   atorvastatin 80 mg Oral Daily   carvedilol 12 5 mg Oral BID With Meals   docusate sodium 100 mg Oral BID fluticasone 1 spray Nasal Daily   levothyroxine 25 mcg Oral Early Morning   lisinopril 20 mg Oral Daily   magnesium oxide 400 mg Oral Daily   multivitamin stress formula 1 tablet Oral Daily   nicotine 1 patch Transdermal Daily   senna 1 tablet Oral Daily   spironolactone 25 mg Oral Daily   torsemide 5 mg Oral Daily     Continuous IV Infusions:  heparin (porcine) 3-20 Units/kg/hr (Order-Specific) Intravenous Titrated     PRN Meds:  acetaminophen 650 mg Oral Q6H PRN   heparin (porcine) 1,650 Units Intravenous PRN   heparin (porcine) 3,300 Units Intravenous PRN   HYDROmorphone 0 5 mg Intravenous Q3H PRN   hydrOXYzine HCL 10 mg Oral Q6H PRN   oxyCODONE 10 mg Oral Q4H PRN x4 doses   oxyCODONE 5 mg Oral Q4H PRN       IP CONSULT TO VASCULAR SURGERY  IP CONSULT TO NEUROSURGERY  IP CONSULT TO CARDIOLOGY    Network Utilization Review Department  [email or web address]  ATTENTION: Please call with any questions or concerns to [phone] and carefully listen to the prompts so that you are directed to the right person  All voicemails are confidential   Marine Muss all requests for admission clinical reviews, approved or denied determinations and any other requests to dedicated fax number below belonging to the campus where the patient is receiving treatment   List of dedicated fax numbers for the Facilities:  FACILITY NAME UR FAX NUMBER   ADMISSION DENIALS (Administrative/Medical Necessity) [phone]   [address] (Maternity/NICU/Pediatrics) [phone]   [place] [phone]   [facility] [phone]   [place] [phone]   [place] [address] [facility] [phone]   [facility] [address] Deandre Campoverde [address] [facility] [phone]     [address] [facility] [phone]

## 2019-12-08 LAB
APTT PPP: 57 SECONDS (ref 23–37)
APTT PPP: 60 SECONDS (ref 23–37)
APTT PPP: 72 SECONDS (ref 23–37)
APTT PPP: 97 SECONDS (ref 23–37)

## 2019-12-08 PROCEDURE — 99233 SBSQ HOSP IP/OBS HIGH 50: CPT | Performed by: INTERNAL MEDICINE

## 2019-12-08 PROCEDURE — 99024 POSTOP FOLLOW-UP VISIT: CPT | Performed by: SURGERY

## 2019-12-08 PROCEDURE — 85730 THROMBOPLASTIN TIME PARTIAL: CPT | Performed by: INTERNAL MEDICINE

## 2019-12-08 RX ADMIN — LEVOTHYROXINE SODIUM 25 MCG: 25 TABLET ORAL at 05:13

## 2019-12-08 RX ADMIN — FLUTICASONE PROPIONATE 1 SPRAY: 50 SPRAY, METERED NASAL at 08:21

## 2019-12-08 RX ADMIN — NICOTINE 1 PATCH: 21 PATCH, EXTENDED RELEASE TRANSDERMAL at 08:21

## 2019-12-08 RX ADMIN — HYDROMORPHONE HYDROCHLORIDE 0.5 MG: 1 INJECTION, SOLUTION INTRAMUSCULAR; INTRAVENOUS; SUBCUTANEOUS at 20:00

## 2019-12-08 RX ADMIN — ZINC 1 TABLET: TAB ORAL at 08:23

## 2019-12-08 RX ADMIN — MAGNESIUM OXIDE TAB 400 MG (240 MG ELEMENTAL MG) 400 MG: 400 (240 MG) TAB at 08:23

## 2019-12-08 RX ADMIN — DOCUSATE SODIUM 100 MG: 100 CAPSULE, LIQUID FILLED ORAL at 17:42

## 2019-12-08 RX ADMIN — HYDROMORPHONE HYDROCHLORIDE 0.5 MG: 1 INJECTION, SOLUTION INTRAMUSCULAR; INTRAVENOUS; SUBCUTANEOUS at 13:08

## 2019-12-08 RX ADMIN — HYDROMORPHONE HYDROCHLORIDE 0.5 MG: 1 INJECTION, SOLUTION INTRAMUSCULAR; INTRAVENOUS; SUBCUTANEOUS at 05:13

## 2019-12-08 RX ADMIN — OXYCODONE HYDROCHLORIDE 10 MG: 10 TABLET ORAL at 16:39

## 2019-12-08 RX ADMIN — AMLODIPINE BESYLATE 5 MG: 5 TABLET ORAL at 08:23

## 2019-12-08 RX ADMIN — LISINOPRIL 20 MG: 20 TABLET ORAL at 08:24

## 2019-12-08 RX ADMIN — CARVEDILOL 12.5 MG: 12.5 TABLET, FILM COATED ORAL at 08:23

## 2019-12-08 RX ADMIN — ASPIRIN 81 MG 162 MG: 81 TABLET ORAL at 08:23

## 2019-12-08 RX ADMIN — DOCUSATE SODIUM 100 MG: 100 CAPSULE, LIQUID FILLED ORAL at 08:22

## 2019-12-08 RX ADMIN — ATORVASTATIN CALCIUM 80 MG: 80 TABLET, FILM COATED ORAL at 08:22

## 2019-12-08 RX ADMIN — CARVEDILOL 12.5 MG: 12.5 TABLET, FILM COATED ORAL at 17:41

## 2019-12-08 RX ADMIN — SENNOSIDES 8.6 MG: 8.6 TABLET, FILM COATED ORAL at 08:22

## 2019-12-08 RX ADMIN — TORSEMIDE 5 MG: 5 TABLET ORAL at 08:23

## 2019-12-08 RX ADMIN — OXYCODONE HYDROCHLORIDE 10 MG: 10 TABLET ORAL at 03:22

## 2019-12-08 RX ADMIN — OXYCODONE HYDROCHLORIDE 10 MG: 10 TABLET ORAL at 12:15

## 2019-12-08 RX ADMIN — OXYCODONE HYDROCHLORIDE 10 MG: 10 TABLET ORAL at 22:13

## 2019-12-08 RX ADMIN — SPIRONOLACTONE 25 MG: 25 TABLET ORAL at 08:24

## 2019-12-08 NOTE — PROGRESS NOTES
Progress Note - Benjamin Gamboa 78 y o  female MRN: [id number]    Unit/Bed#: [facility] 812-01 Encounter: [id number]      Assessment:  35-year-old female with aortoiliac occlusive disease presenting with bilateral lower extremity rest pain  VSS  Afebrile  Absent dp/pt b/l but dopplerable singh and fem b/l  Plan:  Planning aorto bi-fem bypass next week  Anxiety control  Pain control  Continue heparin gtt    Subjective:   Anxious at times  Explained that her sensation and motor have not changed since yesterday  Denied any complaints  Otherwise, Denied fever, chills, chest pain, shortness of breath, nausea, vomiting, or abdominal pain this morning  Objective:     Vitals: Blood pressure 161/59, pulse 62, temperature 98 4 °F (36 9 °C), resp  rate 16, height 5' 4" (1 626 m), weight 53 9 kg (118 lb 12 8 oz), SpO2 97 %  ,Body mass index is 20 39 kg/m²  Intake/Output Summary (Last 24 hours) at 12/8/2019 0635  Last data filed at 12/8/2019 0401  Gross per 24 hour   Intake 331 8 ml   Output 750 ml   Net -418 2 ml       Physical Exam  General: NAD  HEENT: NC/AT  MMM  Cv: RRR  Lungs: normal effort  Ab: Soft, NT/ND  Ex: lower extremities w bluish tinge  Sensation/ motor intact  dopplerable fems and pop b/l     Neuro: AAOx3      Invasive Devices     Peripheral Intravenous Line            Peripheral IV 12/06/19 Right Antecubital 1 day              Scheduled Meds:    Current Facility-Administered Medications:  acetaminophen 650 mg Oral Q6H PRN Sandra Sheriff, DO    amLODIPine 5 mg Oral Daily Sandra Sheriff, DO    aspirin 162 mg Oral Daily Sandra Sheriff, DO    atorvastatin 80 mg Oral Daily Sandra Sheriff, DO    carvedilol 12 5 mg Oral BID With Meals Sandra Sheriff, DO    docusate sodium 100 mg Oral BID Sandra Sheriff, DO    fluticasone 1 spray Nasal Daily Sandra Sheriff, DO    heparin (porcine) 3-20 Units/kg/hr (Order-Specific) Intravenous Titrated Sandra Sheriff, DO Last Rate: 14 Units/kg/hr (12/08/19 0210)   heparin (porcine) 1,650 Units Intravenous PRN Garlon Nicely, DO    heparin (porcine) 3,300 Units Intravenous PRN Garlon Nicely, DO    HYDROmorphone 0 5 mg Intravenous Q3H PRN Garlon Nicely, DO    hydrOXYzine HCL 10 mg Oral Q6H PRN Joseph Martinez MD    levothyroxine 25 mcg Oral Early Morning Garlon Nicely, DO    lisinopril 20 mg Oral Daily Garlon Nicely, DO    magnesium oxide 400 mg Oral Daily Garlon Nicely, DO    multivitamin stress formula 1 tablet Oral Daily Otto Feldman MD    nicotine 1 patch Transdermal Daily Garlon Nicely, DO    oxyCODONE 10 mg Oral Q4H PRN Garlon Nicely, DO    oxyCODONE 5 mg Oral Q4H PRN Garlon Nicely, DO    senna 1 tablet Oral Daily Garlon Nicely, DO    spironolactone 25 mg Oral Daily Joseph Martinez MD    torsemide 5 mg Oral Daily Joseph Martinez MD      Continuous Infusions:    heparin (porcine) 3-20 Units/kg/hr (Order-Specific) Last Rate: 14 Units/kg/hr (12/08/19 0210)     PRN Meds:   acetaminophen    heparin (porcine)    heparin (porcine)    HYDROmorphone    hydrOXYzine HCL    oxyCODONE    oxyCODONE      Lab, Imaging and other studies: I have personally reviewed pertinent reports      VTE Pharmacologic Prophylaxis: Heparin  VTE Mechanical Prophylaxis: reason for no mechanical VTE prophylaxis discomfort

## 2019-12-08 NOTE — PROGRESS NOTES
Progress Note - Mary Anne Barrientos 1940, 78 y o  female MRN: [id number]    Unit/Bed#: [facility] 812-01 Encounter: [id number]    Primary Care Provider: Berta Cervantes DO   Date and time admitted to hospital: 12/6/2019  4:13 PM        Chronic diastolic heart failure (HCC)  Assessment & Plan  Wt Readings from Last 3 Encounters:   12/06/19 53 9 kg (118 lb 12 8 oz)   12/08/19 53 5 kg (118 lb)   12/06/19 55 3 kg (121 lb 14 6 oz)     No sign of acute decompensation  Recent echo on 10/19  Patient appears euvolemic at this time  Daily weights, fluid restrict, I/O's  Continue torsemide, spironolactone and Coreg        Mass of spine  Assessment & Plan  Incidental finding on ct abd run off revealing L1 epidural mass; ddx meningioma vs calcified herniated disc vs osteophyte  Consult neurosurgery, appreciate input  Plans for MRI for further eval    CKD (chronic kidney disease) stage 3, GFR 30-59 ml/min ([facility])  Assessment & Plan  Cr w/in baseline range  Monitor BUN/Cr    Benign essential HTN  Assessment & Plan  Continue Coreg, lisinopril, Norvasc  Controlled    Nicotine dependence  Assessment & Plan  Counseled on cessation  Nicotine patch    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 [facility])  Assessment & Plan  Care as above    * Aortoiliac occlusive disease ([facility] [address] )  Assessment & Plan  Acute on chronic occulusive disease  CT abd run off (done in [place] ED) 12/6 shows interval thrombosis of the infrarenal abdominal aorta and left iliofemoral segment with persistent thrombosis of the right iliofemoral segment  Stable bilateral three-vessel distal runoff    S/p ct abd run off on 10/19 revealing occulusino of Rt iliac seg and L common iliac artery stenosis  On heparin gtt  Cont supportive care, prn pain analgesics  Vascular surgery consulted, appreciate input; plans for intervention next week  Cardiology to risk stratify, appreciate input        VTE Pharmacologic Prophylaxis:   Pharmacologic: Heparin Drip  Mechanical VTE Prophylaxis in Place: Yes    Patient Centered Rounds: I have performed bedside rounds with nursing staff today  Discussions with Specialists or Other Care Team Provider: none    Education and Discussions with Family / Patient: patient and friend    Time Spent for Care: 30 minutes  More than 50% of total time spent on counseling and coordination of care as described above  Current Length of Stay: 2 day(s)    Current Patient Status: Inpatient   Certification Statement: The patient will continue to require additional inpatient hospital stay due to surgical intervention    Discharge Plan: pending hospital course    Code Status: Level 3 - DNAR and DNI      Subjective:   No acute overnight events  This morning patient states she feels comfortable  Objective:     Vitals:   Temp (24hrs), Av 3 °F (36 8 °C), Min:98 1 °F (36 7 °C), Max:98 4 °F (36 9 °C)    Temp:  [98 1 °F (36 7 °C)-98 4 °F (36 9 °C)] 98 4 °F (36 9 °C)  HR:  [62] 62  Resp:  [16-20] 20  BP: (161-169)/(58-60) 169/60  SpO2:  [97 %] 97 %  Body mass index is 20 39 kg/m²  Input and Output Summary (last 24 hours): Intake/Output Summary (Last 24 hours) at 2019 1612  Last data filed at 2019 1411  Gross per 24 hour   Intake 571 8 ml   Output 1250 ml   Net -678 2 ml       Physical Exam:     Physical Exam   Constitutional: She is oriented to person, place, and time  She appears well-developed and well-nourished  HENT:   Head: Normocephalic and atraumatic  Mouth/Throat: Oropharynx is clear and moist    Eyes: Pupils are equal, round, and reactive to light  Conjunctivae are normal    Neck: Neck supple  No JVD present  Cardiovascular: Normal rate, regular rhythm, normal heart sounds and intact distal pulses  Pulmonary/Chest: Effort normal and breath sounds normal    Abdominal: Soft  Bowel sounds are normal    Musculoskeletal: She exhibits no edema or tenderness  Neurological: She is alert and oriented to person, place, and time  Skin: Skin is warm and dry  Capillary refill takes less than 2 seconds  Psychiatric: She has a normal mood and affect  Her behavior is normal  Judgment and thought content normal    Nursing note and vitals reviewed  Additional Data:     Labs:    Results from last 7 days   Lab Units 12/07/19  0506  12/06/19  1231   WBC Thousand/uL 9 73   < > 8 97   HEMOGLOBIN g/dL 9 7*   < > 10 0*   HEMATOCRIT % 29 4*   < > 30 5*   PLATELETS Thousands/uL 339   < > 369   NEUTROS PCT %  --   --  67   LYMPHS PCT %  --   --  20   MONOS PCT %  --   --  9   EOS PCT %  --   --  2    < > = values in this interval not displayed  Results from last 7 days   Lab Units 12/07/19  0506 12/06/19  1231   SODIUM mmol/L 142 140   POTASSIUM mmol/L 3 5 4 2   CHLORIDE mmol/L 108 102   CO2 mmol/L 27 27   BUN mg/dL 31* 32*   CREATININE mg/dL 0 95 0 86   ANION GAP mmol/L 7 11   CALCIUM mg/dL 9 4 9 3   ALBUMIN g/dL  --  3 2*   TOTAL BILIRUBIN mg/dL  --  0 50   ALK PHOS U/L  --  86   ALT U/L  --  48   AST U/L  --  50*   GLUCOSE RANDOM mg/dL 105 94     Results from last 7 days   Lab Units 12/06/19  1850   INR  1 14                       * I Have Reviewed All Lab Data Listed Above  * Additional Pertinent Lab Tests Reviewed:  All Labs Within Last 24 Hours Reviewed    Imaging:    Imaging Reports Reviewed Today Include: n/a  Imaging Personally Reviewed by Myself Includes:  n/a    Recent Cultures (last 7 days):           Last 24 Hours Medication List:     Current Facility-Administered Medications:  acetaminophen 650 mg Oral Q6H PRN Tri Freeze, DO    amLODIPine 5 mg Oral Daily Tri Freeze, DO    aspirin 162 mg Oral Daily Tri Freeze, DO    atorvastatin 80 mg Oral Daily Tri Freeze, DO    carvedilol 12 5 mg Oral BID With Meals Tri Freeze, DO    docusate sodium 100 mg Oral BID Tri Freeze, DO    fluticasone 1 spray Nasal Daily Tri Freeze, DO    heparin (porcine) 3-20 Units/kg/hr (Order-Specific) Intravenous Titrated Garlon Nicely, DO Last Rate: 16 Units/kg/hr (12/08/19 1006)   heparin (porcine) 1,650 Units Intravenous PRN Garlon Nicely, DO    heparin (porcine) 3,300 Units Intravenous PRN Garlon Nicely, DO    HYDROmorphone 0 5 mg Intravenous Q3H PRN Garlon Nicely, DO    hydrOXYzine HCL 10 mg Oral Q6H PRN Joseph Martinez MD    levothyroxine 25 mcg Oral Early Morning Garlon Nicely, DO    lisinopril 20 mg Oral Daily Garlon Nicely, DO    magnesium oxide 400 mg Oral Daily Garlon Nicely, DO    multivitamin stress formula 1 tablet Oral Daily Otto Feldman MD    nicotine 1 patch Transdermal Daily Garlon Nicely, DO    oxyCODONE 10 mg Oral Q4H PRN Garlon Nicely, DO    oxyCODONE 5 mg Oral Q4H PRN Garlon Nicely, DO    senna 1 tablet Oral Daily Garlon Nicely, DO    spironolactone 25 mg Oral Daily Joseph Martinez MD    torsemide 5 mg Oral Daily Joseph Martinez MD         Today, Patient Was Seen By: Joseph Martinez MD    ** Please Note: Dictation voice to text software may have been used in the creation of this document   **

## 2019-12-08 NOTE — ASSESSMENT & PLAN NOTE
Wt Readings from Last 3 Encounters:   12/06/19 53 9 kg (118 lb 12 8 oz)   12/08/19 53 5 kg (118 lb)   12/06/19 55 3 kg (121 lb 14 6 oz)     No sign of acute decompensation  Recent echo on 10/19  Patient appears euvolemic at this time  Daily weights, fluid restrict, I/O's  Continue torsemide, spironolactone and Coreg

## 2019-12-08 NOTE — ASSESSMENT & PLAN NOTE
Acute on chronic occulusive disease  CT abd run off (done in [facility] ED) 12/6 shows interval thrombosis of the infrarenal abdominal aorta and left iliofemoral segment with persistent thrombosis of the right iliofemoral segment  Stable bilateral three-vessel distal runoff    S/p ct abd run off on 10/19 revealing occulusino of Rt iliac seg and L common iliac artery stenosis  On heparin gtt  Cont supportive care, prn pain analgesics  Vascular surgery consulted, appreciate input; plans for intervention next week  Cardiology to risk stratify, appreciate input

## 2019-12-08 NOTE — SOCIAL WORK
Pt is transfer from [facility] and [facility]  CM met w/pt to discuss CM role and possible d/c needs  Pt reports she lives alone in 2nd floor apartment with elevator availability  Pt was independent in ADLs PTA  Pt has rollator for ambulation as needed  Denies HHC, STR  Pt denies hx mental health/substance abuse treatment  Preferred pharmacy is [facility]  CM following for d/c needs  Pt does not have POA  Emergency contacts are daughters Leeann Kaynigel [phone] and Vanessa Rianna [phone]    CM reviewed d/c planning process including the following: identifying help at home, patient preference for d/c planning needs, Discharge Lounge, [facility] Meds to Bed program, availability of treatment team to discuss questions or concerns patient and/or family may have regarding understanding medications and recognizing signs and symptoms once discharged  CM also encouraged patient to follow up with all recommended appointments after discharge  Patient advised of importance for patient and family to participate in managing patients medical well being

## 2019-12-09 ENCOUNTER — ANESTHESIA EVENT (INPATIENT)
Dept: PERIOP | Facility: HOSPITAL | Age: 79
DRG: 269 | End: 2019-12-09
Payer: COMMERCIAL

## 2019-12-09 ENCOUNTER — ANESTHESIA (INPATIENT)
Dept: PERIOP | Facility: HOSPITAL | Age: 79
DRG: 269 | End: 2019-12-09
Payer: COMMERCIAL

## 2019-12-09 ENCOUNTER — APPOINTMENT (INPATIENT)
Dept: RADIOLOGY | Facility: HOSPITAL | Age: 79
DRG: 269 | End: 2019-12-09
Payer: COMMERCIAL

## 2019-12-09 LAB
ABO GROUP BLD: NORMAL
ANION GAP SERPL CALCULATED.3IONS-SCNC: 9 MMOL/L (ref 4–13)
APTT PPP: 38 SECONDS (ref 23–37)
APTT PPP: 50 SECONDS (ref 23–37)
BASE EXCESS BLDA CALC-SCNC: -8 MMOL/L (ref -2–3)
BASOPHILS # BLD AUTO: 0.05 THOUSANDS/ΜL (ref 0–0.1)
BASOPHILS NFR BLD AUTO: 0 % (ref 0–1)
BLD GP AB SCN SERPL QL: NEGATIVE
BUN SERPL-MCNC: 22 MG/DL (ref 5–25)
CA-I BLD-SCNC: 1.15 MMOL/L (ref 1.12–1.32)
CALCIUM SERPL-MCNC: 7.7 MG/DL (ref 8.3–10.1)
CHLORIDE SERPL-SCNC: 111 MMOL/L (ref 100–108)
CO2 SERPL-SCNC: 24 MMOL/L (ref 21–32)
CREAT SERPL-MCNC: 0.95 MG/DL (ref 0.6–1.3)
EOSINOPHIL # BLD AUTO: 0.02 THOUSAND/ΜL (ref 0–0.61)
EOSINOPHIL NFR BLD AUTO: 0 % (ref 0–6)
ERYTHROCYTE [DISTWIDTH] IN BLOOD BY AUTOMATED COUNT: 15.4 % (ref 11.6–15.1)
GFR SERPL CREATININE-BSD FRML MDRD: 57 ML/MIN/1.73SQ M
GLUCOSE SERPL-MCNC: 145 MG/DL (ref 65–140)
GLUCOSE SERPL-MCNC: 146 MG/DL (ref 65–140)
GLUCOSE SERPL-MCNC: 173 MG/DL (ref 65–140)
HCO3 BLDA-SCNC: 19.4 MMOL/L (ref 22–28)
HCT VFR BLD AUTO: 23.2 % (ref 34.8–46.1)
HCT VFR BLD CALC: 26 % (ref 34.8–46.1)
HGB BLD-MCNC: 7.8 G/DL (ref 11.5–15.4)
HGB BLDA-MCNC: 8.8 G/DL (ref 11.5–15.4)
IMM GRANULOCYTES # BLD AUTO: 0.3 THOUSAND/UL (ref 0–0.2)
IMM GRANULOCYTES NFR BLD AUTO: 1 % (ref 0–2)
KCT BLD-ACNC: 104 SEC (ref 89–137)
LACTATE SERPL-SCNC: 1.3 MMOL/L (ref 0.5–2)
LYMPHOCYTES # BLD AUTO: 0.8 THOUSANDS/ΜL (ref 0.6–4.47)
LYMPHOCYTES NFR BLD AUTO: 4 % (ref 14–44)
MCH RBC QN AUTO: 34.7 PG (ref 26.8–34.3)
MCHC RBC AUTO-ENTMCNC: 33.6 G/DL (ref 31.4–37.4)
MCV RBC AUTO: 103 FL (ref 82–98)
MONOCYTES # BLD AUTO: 1.09 THOUSAND/ΜL (ref 0.17–1.22)
MONOCYTES NFR BLD AUTO: 5 % (ref 4–12)
NEUTROPHILS # BLD AUTO: 18.67 THOUSANDS/ΜL (ref 1.85–7.62)
NEUTS SEG NFR BLD AUTO: 90 % (ref 43–75)
NRBC BLD AUTO-RTO: 0 /100 WBCS
PCO2 BLD: 21 MMOL/L (ref 21–32)
PCO2 BLD: 45.4 MM HG (ref 36–44)
PH BLD: 7.24 [PH] (ref 7.35–7.45)
PLATELET # BLD AUTO: 200 THOUSANDS/UL (ref 149–390)
PMV BLD AUTO: 11 FL (ref 8.9–12.7)
PO2 BLD: 150 MM HG (ref 75–129)
POTASSIUM BLD-SCNC: 3.5 MMOL/L (ref 3.5–5.3)
POTASSIUM SERPL-SCNC: 3.4 MMOL/L (ref 3.5–5.3)
RBC # BLD AUTO: 2.25 MILLION/UL (ref 3.81–5.12)
RH BLD: POSITIVE
SAO2 % BLD FROM PO2: 99 % (ref 60–85)
SODIUM BLD-SCNC: 139 MMOL/L (ref 136–145)
SODIUM SERPL-SCNC: 144 MMOL/L (ref 136–145)
SPECIMEN EXPIRATION DATE: NORMAL
SPECIMEN SOURCE: ABNORMAL
SPECIMEN SOURCE: NORMAL
WBC # BLD AUTO: 20.93 THOUSAND/UL (ref 4.31–10.16)

## 2019-12-09 PROCEDURE — 86900 BLOOD TYPING SEROLOGIC ABO: CPT | Performed by: STUDENT IN AN ORGANIZED HEALTH CARE EDUCATION/TRAINING PROGRAM

## 2019-12-09 PROCEDURE — 85014 HEMATOCRIT: CPT

## 2019-12-09 PROCEDURE — 99232 SBSQ HOSP IP/OBS MODERATE 35: CPT | Performed by: SURGERY

## 2019-12-09 PROCEDURE — 99222 1ST HOSP IP/OBS MODERATE 55: CPT | Performed by: SURGERY

## 2019-12-09 PROCEDURE — 82947 ASSAY GLUCOSE BLOOD QUANT: CPT

## 2019-12-09 PROCEDURE — 04CL0ZZ EXTIRPATION OF MATTER FROM LEFT FEMORAL ARTERY, OPEN APPROACH: ICD-10-PCS | Performed by: SURGERY

## 2019-12-09 PROCEDURE — 86850 RBC ANTIBODY SCREEN: CPT | Performed by: STUDENT IN AN ORGANIZED HEALTH CARE EDUCATION/TRAINING PROGRAM

## 2019-12-09 PROCEDURE — 80048 BASIC METABOLIC PNL TOTAL CA: CPT | Performed by: SURGERY

## 2019-12-09 PROCEDURE — 85025 COMPLETE CBC W/AUTO DIFF WBC: CPT | Performed by: SURGERY

## 2019-12-09 PROCEDURE — 85347 COAGULATION TIME ACTIVATED: CPT

## 2019-12-09 PROCEDURE — 74018 RADEX ABDOMEN 1 VIEW: CPT

## 2019-12-09 PROCEDURE — 84295 ASSAY OF SERUM SODIUM: CPT

## 2019-12-09 PROCEDURE — 03HY32Z INSERTION OF MONITORING DEVICE INTO UPPER ARTERY, PERCUTANEOUS APPROACH: ICD-10-PCS | Performed by: ANESTHESIOLOGY

## 2019-12-09 PROCEDURE — C1768 GRAFT, VASCULAR: HCPCS | Performed by: SURGERY

## 2019-12-09 PROCEDURE — 04C00ZZ EXTIRPATION OF MATTER FROM ABDOMINAL AORTA, OPEN APPROACH: ICD-10-PCS | Performed by: SURGERY

## 2019-12-09 PROCEDURE — 35646 BPG AORTOBIFEMORAL: CPT | Performed by: SURGERY

## 2019-12-09 PROCEDURE — 82330 ASSAY OF CALCIUM: CPT

## 2019-12-09 PROCEDURE — 04100JK BYPASS ABDOMINAL AORTA TO BILATERAL FEMORAL ARTERIES WITH SYNTHETIC SUBSTITUTE, OPEN APPROACH: ICD-10-PCS | Performed by: SURGERY

## 2019-12-09 PROCEDURE — 84132 ASSAY OF SERUM POTASSIUM: CPT

## 2019-12-09 PROCEDURE — 86920 COMPATIBILITY TEST SPIN: CPT

## 2019-12-09 PROCEDURE — 99232 SBSQ HOSP IP/OBS MODERATE 35: CPT | Performed by: INTERNAL MEDICINE

## 2019-12-09 PROCEDURE — 85730 THROMBOPLASTIN TIME PARTIAL: CPT | Performed by: INTERNAL MEDICINE

## 2019-12-09 PROCEDURE — 82803 BLOOD GASES ANY COMBINATION: CPT

## 2019-12-09 PROCEDURE — 86901 BLOOD TYPING SEROLOGIC RH(D): CPT | Performed by: STUDENT IN AN ORGANIZED HEALTH CARE EDUCATION/TRAINING PROGRAM

## 2019-12-09 PROCEDURE — 99233 SBSQ HOSP IP/OBS HIGH 50: CPT | Performed by: INTERNAL MEDICINE

## 2019-12-09 PROCEDURE — 82948 REAGENT STRIP/BLOOD GLUCOSE: CPT

## 2019-12-09 PROCEDURE — 83605 ASSAY OF LACTIC ACID: CPT | Performed by: SURGERY

## 2019-12-09 DEVICE — IMPLANTABLE DEVICE: Type: IMPLANTABLE DEVICE | Site: AORTA | Status: FUNCTIONAL

## 2019-12-09 RX ORDER — ALBUMIN, HUMAN INJ 5% 5 %
SOLUTION INTRAVENOUS CONTINUOUS PRN
Status: DISCONTINUED | OUTPATIENT
Start: 2019-12-09 | End: 2019-12-09 | Stop reason: SURG

## 2019-12-09 RX ORDER — DEXAMETHASONE SODIUM PHOSPHATE 10 MG/ML
INJECTION, SOLUTION INTRAMUSCULAR; INTRAVENOUS AS NEEDED
Status: DISCONTINUED | OUTPATIENT
Start: 2019-12-09 | End: 2019-12-09 | Stop reason: SURG

## 2019-12-09 RX ORDER — PROPOFOL 10 MG/ML
INJECTION, EMULSION INTRAVENOUS AS NEEDED
Status: DISCONTINUED | OUTPATIENT
Start: 2019-12-09 | End: 2019-12-09 | Stop reason: SURG

## 2019-12-09 RX ORDER — SODIUM CHLORIDE, SODIUM LACTATE, POTASSIUM CHLORIDE, CALCIUM CHLORIDE 600; 310; 30; 20 MG/100ML; MG/100ML; MG/100ML; MG/100ML
50 INJECTION, SOLUTION INTRAVENOUS CONTINUOUS
Status: DISCONTINUED | OUTPATIENT
Start: 2019-12-09 | End: 2019-12-12

## 2019-12-09 RX ORDER — FENTANYL CITRATE/PF 50 MCG/ML
25 SYRINGE (ML) INJECTION
Status: COMPLETED | OUTPATIENT
Start: 2019-12-09 | End: 2019-12-09

## 2019-12-09 RX ORDER — CEFAZOLIN SODIUM 1 G/50ML
1000 SOLUTION INTRAVENOUS ONCE
Status: DISCONTINUED | OUTPATIENT
Start: 2019-12-09 | End: 2019-12-09 | Stop reason: HOSPADM

## 2019-12-09 RX ORDER — ONDANSETRON 2 MG/ML
4 INJECTION INTRAMUSCULAR; INTRAVENOUS ONCE AS NEEDED
Status: DISCONTINUED | OUTPATIENT
Start: 2019-12-09 | End: 2019-12-09 | Stop reason: HOSPADM

## 2019-12-09 RX ORDER — FENTANYL CITRATE 50 UG/ML
INJECTION, SOLUTION INTRAMUSCULAR; INTRAVENOUS AS NEEDED
Status: DISCONTINUED | OUTPATIENT
Start: 2019-12-09 | End: 2019-12-09 | Stop reason: SURG

## 2019-12-09 RX ORDER — CLOPIDOGREL BISULFATE 75 MG/1
150 TABLET ORAL DAILY
Status: DISCONTINUED | OUTPATIENT
Start: 2019-12-09 | End: 2019-12-09

## 2019-12-09 RX ORDER — MAGNESIUM HYDROXIDE 1200 MG/15ML
LIQUID ORAL AS NEEDED
Status: DISCONTINUED | OUTPATIENT
Start: 2019-12-09 | End: 2019-12-09 | Stop reason: HOSPADM

## 2019-12-09 RX ORDER — ROCURONIUM BROMIDE 10 MG/ML
INJECTION, SOLUTION INTRAVENOUS AS NEEDED
Status: DISCONTINUED | OUTPATIENT
Start: 2019-12-09 | End: 2019-12-09 | Stop reason: SURG

## 2019-12-09 RX ORDER — CHLORHEXIDINE GLUCONATE 0.12 MG/ML
15 RINSE ORAL EVERY 12 HOURS SCHEDULED
Status: DISCONTINUED | OUTPATIENT
Start: 2019-12-09 | End: 2019-12-16 | Stop reason: HOSPADM

## 2019-12-09 RX ORDER — POTASSIUM CHLORIDE 20 MEQ/1
40 TABLET, EXTENDED RELEASE ORAL ONCE
Status: COMPLETED | OUTPATIENT
Start: 2019-12-09 | End: 2019-12-09

## 2019-12-09 RX ORDER — NEOSTIGMINE METHYLSULFATE 1 MG/ML
INJECTION INTRAVENOUS AS NEEDED
Status: DISCONTINUED | OUTPATIENT
Start: 2019-12-09 | End: 2019-12-09 | Stop reason: SURG

## 2019-12-09 RX ORDER — CLOPIDOGREL BISULFATE 75 MG/1
75 TABLET ORAL DAILY
Status: DISCONTINUED | OUTPATIENT
Start: 2019-12-10 | End: 2019-12-16 | Stop reason: HOSPADM

## 2019-12-09 RX ORDER — PROPOFOL 10 MG/ML
INJECTION, EMULSION INTRAVENOUS CONTINUOUS PRN
Status: DISCONTINUED | OUTPATIENT
Start: 2019-12-09 | End: 2019-12-09 | Stop reason: SURG

## 2019-12-09 RX ORDER — SUCCINYLCHOLINE/SOD CL,ISO/PF 100 MG/5ML
SYRINGE (ML) INTRAVENOUS AS NEEDED
Status: DISCONTINUED | OUTPATIENT
Start: 2019-12-09 | End: 2019-12-09 | Stop reason: SURG

## 2019-12-09 RX ORDER — HEPARIN SODIUM 1000 [USP'U]/ML
INJECTION, SOLUTION INTRAVENOUS; SUBCUTANEOUS AS NEEDED
Status: DISCONTINUED | OUTPATIENT
Start: 2019-12-09 | End: 2019-12-09 | Stop reason: SURG

## 2019-12-09 RX ORDER — SODIUM CHLORIDE 9 MG/ML
INJECTION, SOLUTION INTRAVENOUS CONTINUOUS PRN
Status: DISCONTINUED | OUTPATIENT
Start: 2019-12-09 | End: 2019-12-09 | Stop reason: SURG

## 2019-12-09 RX ORDER — LABETALOL 20 MG/4 ML (5 MG/ML) INTRAVENOUS SYRINGE
AS NEEDED
Status: DISCONTINUED | OUTPATIENT
Start: 2019-12-09 | End: 2019-12-09 | Stop reason: SURG

## 2019-12-09 RX ORDER — CEFAZOLIN SODIUM 1 G/50ML
SOLUTION INTRAVENOUS AS NEEDED
Status: DISCONTINUED | OUTPATIENT
Start: 2019-12-09 | End: 2019-12-09 | Stop reason: SURG

## 2019-12-09 RX ORDER — GLYCOPYRROLATE 0.2 MG/ML
INJECTION INTRAMUSCULAR; INTRAVENOUS AS NEEDED
Status: DISCONTINUED | OUTPATIENT
Start: 2019-12-09 | End: 2019-12-09 | Stop reason: SURG

## 2019-12-09 RX ORDER — HYDROMORPHONE HCL/PF 1 MG/ML
SYRINGE (ML) INJECTION AS NEEDED
Status: DISCONTINUED | OUTPATIENT
Start: 2019-12-09 | End: 2019-12-09 | Stop reason: SURG

## 2019-12-09 RX ORDER — EPHEDRINE SULFATE 50 MG/ML
INJECTION INTRAVENOUS AS NEEDED
Status: DISCONTINUED | OUTPATIENT
Start: 2019-12-09 | End: 2019-12-09 | Stop reason: SURG

## 2019-12-09 RX ORDER — HEPARIN SODIUM 5000 [USP'U]/ML
5000 INJECTION, SOLUTION INTRAVENOUS; SUBCUTANEOUS EVERY 8 HOURS SCHEDULED
Status: DISCONTINUED | OUTPATIENT
Start: 2019-12-09 | End: 2019-12-09

## 2019-12-09 RX ORDER — POTASSIUM CHLORIDE 20 MEQ/1
20 TABLET, EXTENDED RELEASE ORAL ONCE
Status: COMPLETED | OUTPATIENT
Start: 2019-12-10 | End: 2019-12-10

## 2019-12-09 RX ORDER — HYDROMORPHONE HCL/PF 1 MG/ML
0.2 SYRINGE (ML) INJECTION
Status: DISCONTINUED | OUTPATIENT
Start: 2019-12-09 | End: 2019-12-09 | Stop reason: HOSPADM

## 2019-12-09 RX ORDER — HEPARIN SODIUM 5000 [USP'U]/ML
5000 INJECTION, SOLUTION INTRAVENOUS; SUBCUTANEOUS EVERY 8 HOURS SCHEDULED
Status: DISCONTINUED | OUTPATIENT
Start: 2019-12-09 | End: 2019-12-16 | Stop reason: HOSPADM

## 2019-12-09 RX ORDER — ONDANSETRON 2 MG/ML
INJECTION INTRAMUSCULAR; INTRAVENOUS AS NEEDED
Status: DISCONTINUED | OUTPATIENT
Start: 2019-12-09 | End: 2019-12-09 | Stop reason: SURG

## 2019-12-09 RX ORDER — PROTAMINE SULFATE 10 MG/ML
INJECTION, SOLUTION INTRAVENOUS AS NEEDED
Status: DISCONTINUED | OUTPATIENT
Start: 2019-12-09 | End: 2019-12-09 | Stop reason: SURG

## 2019-12-09 RX ADMIN — HYDROMORPHONE HYDROCHLORIDE 0.5 MG: 1 INJECTION, SOLUTION INTRAMUSCULAR; INTRAVENOUS; SUBCUTANEOUS at 01:14

## 2019-12-09 RX ADMIN — POTASSIUM CHLORIDE 40 MEQ: 1500 TABLET, EXTENDED RELEASE ORAL at 22:05

## 2019-12-09 RX ADMIN — CEFAZOLIN SODIUM 1000 MG: 1 SOLUTION INTRAVENOUS at 13:23

## 2019-12-09 RX ADMIN — Medication 100 MG: at 13:12

## 2019-12-09 RX ADMIN — HYDROMORPHONE HYDROCHLORIDE 0.5 MG: 1 INJECTION, SOLUTION INTRAMUSCULAR; INTRAVENOUS; SUBCUTANEOUS at 08:38

## 2019-12-09 RX ADMIN — HEPARIN SODIUM AND DEXTROSE 16 UNITS/KG/HR: 10000; 5 INJECTION INTRAVENOUS at 01:08

## 2019-12-09 RX ADMIN — SODIUM BICARBONATE 50 MEQ: 84 INJECTION, SOLUTION INTRAVENOUS at 18:19

## 2019-12-09 RX ADMIN — MAGNESIUM OXIDE TAB 400 MG (240 MG ELEMENTAL MG) 400 MG: 400 (240 MG) TAB at 08:42

## 2019-12-09 RX ADMIN — HYDROMORPHONE HYDROCHLORIDE 1 MG: 1 INJECTION, SOLUTION INTRAMUSCULAR; INTRAVENOUS; SUBCUTANEOUS at 13:51

## 2019-12-09 RX ADMIN — FENTANYL CITRATE 100 MCG: 50 INJECTION, SOLUTION INTRAMUSCULAR; INTRAVENOUS at 14:01

## 2019-12-09 RX ADMIN — GLYCOPYRROLATE 0.6 MG: 0.2 INJECTION, SOLUTION INTRAMUSCULAR; INTRAVENOUS at 19:13

## 2019-12-09 RX ADMIN — PROPOFOL 100 MCG/KG/MIN: 10 INJECTION, EMULSION INTRAVENOUS at 13:17

## 2019-12-09 RX ADMIN — ALBUMIN (HUMAN): 12.5 SOLUTION INTRAVENOUS at 18:14

## 2019-12-09 RX ADMIN — LABETALOL HYDROCHLORIDE 10 MG: 5 INJECTION, SOLUTION INTRAVENOUS at 13:45

## 2019-12-09 RX ADMIN — ONDANSETRON 4 MG: 2 INJECTION INTRAMUSCULAR; INTRAVENOUS at 16:53

## 2019-12-09 RX ADMIN — SODIUM CHLORIDE: 0.9 INJECTION, SOLUTION INTRAVENOUS at 14:30

## 2019-12-09 RX ADMIN — HEPARIN SODIUM 5000 UNITS: 1000 INJECTION INTRAVENOUS; SUBCUTANEOUS at 18:13

## 2019-12-09 RX ADMIN — ATORVASTATIN CALCIUM 80 MG: 80 TABLET, FILM COATED ORAL at 08:41

## 2019-12-09 RX ADMIN — OXYCODONE HYDROCHLORIDE 10 MG: 10 TABLET ORAL at 23:17

## 2019-12-09 RX ADMIN — TORSEMIDE 5 MG: 5 TABLET ORAL at 08:46

## 2019-12-09 RX ADMIN — ROCURONIUM BROMIDE 10 MG: 50 INJECTION, SOLUTION INTRAVENOUS at 16:48

## 2019-12-09 RX ADMIN — HEPARIN SODIUM 2000 UNITS: 1000 INJECTION INTRAVENOUS; SUBCUTANEOUS at 15:22

## 2019-12-09 RX ADMIN — EPHEDRINE SULFATE 10 MG: 50 INJECTION, SOLUTION INTRAVENOUS at 17:32

## 2019-12-09 RX ADMIN — EPHEDRINE SULFATE 10 MG: 50 INJECTION, SOLUTION INTRAVENOUS at 18:14

## 2019-12-09 RX ADMIN — SODIUM CHLORIDE, SODIUM LACTATE, POTASSIUM CHLORIDE, AND CALCIUM CHLORIDE: .6; .31; .03; .02 INJECTION, SOLUTION INTRAVENOUS at 17:13

## 2019-12-09 RX ADMIN — FLUTICASONE PROPIONATE 1 SPRAY: 50 SPRAY, METERED NASAL at 08:45

## 2019-12-09 RX ADMIN — PHENYLEPHRINE HYDROCHLORIDE 30 MCG/MIN: 10 INJECTION INTRAVENOUS at 14:39

## 2019-12-09 RX ADMIN — ALBUMIN (HUMAN): 12.5 SOLUTION INTRAVENOUS at 17:34

## 2019-12-09 RX ADMIN — LEVOTHYROXINE SODIUM 25 MCG: 25 TABLET ORAL at 05:45

## 2019-12-09 RX ADMIN — FENTANYL CITRATE 25 MCG: 50 INJECTION, SOLUTION INTRAMUSCULAR; INTRAVENOUS at 20:50

## 2019-12-09 RX ADMIN — FENTANYL CITRATE 25 MCG: 50 INJECTION, SOLUTION INTRAMUSCULAR; INTRAVENOUS at 20:59

## 2019-12-09 RX ADMIN — LISINOPRIL 20 MG: 20 TABLET ORAL at 08:41

## 2019-12-09 RX ADMIN — SPIRONOLACTONE 25 MG: 25 TABLET ORAL at 08:41

## 2019-12-09 RX ADMIN — HEPARIN SODIUM 5000 UNITS: 5000 INJECTION INTRAVENOUS; SUBCUTANEOUS at 22:05

## 2019-12-09 RX ADMIN — HEPARIN SODIUM 1000 UNITS: 1000 INJECTION INTRAVENOUS; SUBCUTANEOUS at 16:22

## 2019-12-09 RX ADMIN — HEPARIN SODIUM 1000 UNITS: 1000 INJECTION INTRAVENOUS; SUBCUTANEOUS at 15:37

## 2019-12-09 RX ADMIN — DEXAMETHASONE SODIUM PHOSPHATE 5 MG: 10 INJECTION, SOLUTION INTRAMUSCULAR; INTRAVENOUS at 16:53

## 2019-12-09 RX ADMIN — FENTANYL CITRATE 25 MCG: 50 INJECTION, SOLUTION INTRAMUSCULAR; INTRAVENOUS at 20:39

## 2019-12-09 RX ADMIN — CEFAZOLIN SODIUM 1000 MG: 1 SOLUTION INTRAVENOUS at 17:15

## 2019-12-09 RX ADMIN — PROTAMINE SULFATE 30 MG: 10 INJECTION, SOLUTION INTRAVENOUS at 17:02

## 2019-12-09 RX ADMIN — EPHEDRINE SULFATE 10 MG: 50 INJECTION, SOLUTION INTRAVENOUS at 17:12

## 2019-12-09 RX ADMIN — PROPOFOL 200 MG: 10 INJECTION, EMULSION INTRAVENOUS at 13:12

## 2019-12-09 RX ADMIN — HEPARIN SODIUM 6000 UNITS: 1000 INJECTION INTRAVENOUS; SUBCUTANEOUS at 15:12

## 2019-12-09 RX ADMIN — EPHEDRINE SULFATE 10 MG: 50 INJECTION, SOLUTION INTRAVENOUS at 13:14

## 2019-12-09 RX ADMIN — SODIUM BICARBONATE 50 MEQ: 84 INJECTION, SOLUTION INTRAVENOUS at 17:36

## 2019-12-09 RX ADMIN — HYDROMORPHONE HYDROCHLORIDE 1 MG: 1 INJECTION, SOLUTION INTRAMUSCULAR; INTRAVENOUS; SUBCUTANEOUS at 14:01

## 2019-12-09 RX ADMIN — CLOPIDOGREL BISULFATE 150 MG: 75 TABLET ORAL at 20:26

## 2019-12-09 RX ADMIN — SODIUM CHLORIDE: 0.9 INJECTION, SOLUTION INTRAVENOUS at 13:15

## 2019-12-09 RX ADMIN — SODIUM CHLORIDE, SODIUM LACTATE, POTASSIUM CHLORIDE, AND CALCIUM CHLORIDE 75 ML/HR: .6; .31; .03; .02 INJECTION, SOLUTION INTRAVENOUS at 23:17

## 2019-12-09 RX ADMIN — KETAMINE HYDROCHLORIDE 0.2 MG/KG/HR: 50 INJECTION, SOLUTION INTRAMUSCULAR; INTRAVENOUS at 14:21

## 2019-12-09 RX ADMIN — OXYCODONE HYDROCHLORIDE 10 MG: 10 TABLET ORAL at 05:45

## 2019-12-09 RX ADMIN — HEPARIN SODIUM AND DEXTROSE 18 UNITS/KG/HR: 10000; 5 INJECTION INTRAVENOUS at 08:57

## 2019-12-09 RX ADMIN — SODIUM CHLORIDE, SODIUM LACTATE, POTASSIUM CHLORIDE, AND CALCIUM CHLORIDE: .6; .31; .03; .02 INJECTION, SOLUTION INTRAVENOUS at 13:02

## 2019-12-09 RX ADMIN — NICOTINE 1 PATCH: 21 PATCH, EXTENDED RELEASE TRANSDERMAL at 08:48

## 2019-12-09 RX ADMIN — ROCURONIUM BROMIDE 20 MG: 50 INJECTION, SOLUTION INTRAVENOUS at 15:47

## 2019-12-09 RX ADMIN — FENTANYL CITRATE 25 MCG: 50 INJECTION, SOLUTION INTRAMUSCULAR; INTRAVENOUS at 20:24

## 2019-12-09 RX ADMIN — CARVEDILOL 12.5 MG: 12.5 TABLET, FILM COATED ORAL at 08:42

## 2019-12-09 RX ADMIN — ROCURONIUM BROMIDE 50 MG: 50 INJECTION, SOLUTION INTRAVENOUS at 13:23

## 2019-12-09 RX ADMIN — ASPIRIN 81 MG 162 MG: 81 TABLET ORAL at 08:41

## 2019-12-09 RX ADMIN — AMLODIPINE BESYLATE 5 MG: 5 TABLET ORAL at 08:41

## 2019-12-09 RX ADMIN — PROTAMINE SULFATE 50 MG: 10 INJECTION, SOLUTION INTRAVENOUS at 16:51

## 2019-12-09 RX ADMIN — HEPARIN SODIUM 1000 UNITS: 1000 INJECTION INTRAVENOUS; SUBCUTANEOUS at 16:06

## 2019-12-09 RX ADMIN — CHLORHEXIDINE GLUCONATE 0.12% ORAL RINSE 15 ML: 1.2 LIQUID ORAL at 22:05

## 2019-12-09 RX ADMIN — HEPARIN SODIUM 1650 UNITS: 1000 INJECTION INTRAVENOUS; SUBCUTANEOUS at 06:37

## 2019-12-09 RX ADMIN — SODIUM CHLORIDE, SODIUM LACTATE, POTASSIUM CHLORIDE, AND CALCIUM CHLORIDE: .6; .31; .03; .02 INJECTION, SOLUTION INTRAVENOUS at 19:06

## 2019-12-09 RX ADMIN — NEOSTIGMINE METHYLSULFATE 3 MG: 1 INJECTION INTRAVENOUS at 19:13

## 2019-12-09 RX ADMIN — SODIUM CHLORIDE: 0.9 INJECTION, SOLUTION INTRAVENOUS at 15:28

## 2019-12-09 RX ADMIN — OXYCODONE HYDROCHLORIDE 10 MG: 10 TABLET ORAL at 11:41

## 2019-12-09 NOTE — ASSESSMENT & PLAN NOTE
Acute on chronic occulusive disease  CT abd run off (done in [place] [facility]) 12/6 shows interval thrombosis of the infrarenal abdominal aorta and left iliofemoral segment with persistent thrombosis of the right iliofemoral segment  Stable bilateral three-vessel distal runoff    S/p ct abd run off on 10/19 revealing occulusino of Rt iliac seg and L common iliac artery stenosis  On heparin gtt  Cont supportive care, prn pain analgesics  Vascular surgery consulted, appreciate input; plans for OR this afternoon due to worsening cap refill   Cardiology to risk stratify, appreciate input  Discussed with daughter

## 2019-12-09 NOTE — PROGRESS NOTES
Progress Note - Vascular Surgery   Delano Reasons 78 y o  female MRN: [id number]  Unit/Bed#: [facility] 812-01 Encounter: [id number]    Assessment:  78 y o  F w hx of Aortoiliac occlusive disease who presents with bilateral lower extremity rest pain, left worse than right     AVSS  Change in exam: LLE motor decreased at ankle - able to dorsiflex and plantarflex, but decreased from my previous evaluation  Insensate from ankle down  Left foot is cold and mottled       CTA: 12/6 CTA: Interval development of thrombus within the infrarenal aorta beginning proximal to the ANDRAE origin, L iliofemoral segment, and persistent thrombosis of the R iliofemoral segment  Reconstitution of the bilateral CFAs and branches of the internal iliac arteries  R CFA with mild stenosis  R SFA, PFA, and pop are patent supplying 3 vessel runoff  L CFA mild stenosis  L PFA, SFA, pop are patent supplying the foot          Plan: Aortobifemoral bypass graft 12/10 with Dr Kameron Mo  Continue heparin gtt  Continue ASA/Statin   Cardiology - moderate risk -> ok to proceed  Neurosurgery -> MRI L spine ordered   Rest of care per primary team    Subjective/Objective     Subjective: States she is no longer able to feel anything in her foot and has numbness and tingling in her mid leg  This is a change in her symptomology  Objective:     Blood pressure 166/60, pulse 62, temperature 98 °F (36 7 °C), resp  rate 20, height 5' 4" (1 626 m), weight 53 9 kg (118 lb 12 8 oz), SpO2 97 %  ,Body mass index is 20 39 kg/m²        Intake/Output Summary (Last 24 hours) at 12/9/2019 0628  Last data filed at 12/9/2019 0400  Gross per 24 hour   Intake 560 86 ml   Output 600 ml   Net -39 14 ml       Invasive Devices     Peripheral Intravenous Line            Peripheral IV 12/06/19 Right Antecubital 2 days                  NAD, alert and oriented x3  Normocephalic, atraumatic  MMM, EOMI, PERRLA  Norm resp effort on RA  RRR  Abd soft, NT/ND  Pulse exam: L Absent DP/PT/AT/peroneal, Dopp pop and femoral bilaterally  LLE motor decreased at ankle - able to dorsiflex and plantarflex, but decreased from my previous evaluation  Insensate from ankle down  Left foot is cold and mottled   L Absent DP/PT/AT/peroneal, Dopp pop and femoral    RLE: Dopp femoral/pop/PT, Absent DP/AT/Peroneal motor sensory intact  CN grossly intact  -rash/lesions      Lab, Imaging and other studies:  CBC:   No results found for: WBC, HGB, HCT, MCV, PLT, ADJUSTEDWBC, MCH, MCHC, RDW, MPV, NRBC, CMP:   No results found for: SODIUM, K, CL, CO2, ANIONGAP, BUN, CREATININE, GLUCOSE, CALCIUM, AST, ALT, ALKPHOS, PROT, BILITOT, EGFR  VTE Pharmacologic Prophylaxis: Heparin gtt  VTE Mechanical Prophylaxis: sequential compression device

## 2019-12-09 NOTE — ASSESSMENT & PLAN NOTE
Wt Readings from Last 3 Encounters:   12/06/19 53 9 kg (118 lb 12 8 oz)   12/08/19 53 5 kg (118 lb)   12/06/19 55 3 kg (121 lb 14 6 oz)     No sign of acute decompensation  Recent echo on 10/19  Patient appears euvolemic at this time  Daily weights, fluid restrict, I/O's  Continue torsemide, spironolactone and Coreg  Would recommend holding torsemide and aldactone after and prior to surgery given dye and susceptibility to SERJIO thereafter

## 2019-12-09 NOTE — PROGRESS NOTES
Progress Note - Swapnil Donaldson 1940, 78 y o  female MRN: [id number]    Unit/Bed#: OR POOL Encounter: [id number]    Primary Care Provider: Thao Killian DO   Date and time admitted to hospital: 12/6/2019  4:13 PM        Chronic diastolic heart failure (HCC)  Assessment & Plan  Wt Readings from Last 3 Encounters:   12/06/19 53 9 kg (118 lb 12 8 oz)   12/08/19 53 5 kg (118 lb)   12/06/19 55 3 kg (121 lb 14 6 oz)     No sign of acute decompensation  Recent echo on 10/19  Patient appears euvolemic at this time  Daily weights, fluid restrict, I/O's  Continue torsemide, spironolactone and Coreg  Would recommend holding torsemide and aldactone after and prior to surgery given dye and susceptibility to SERJIO thereafter        Mass of spine  Assessment & Plan  Incidental finding on ct abd run off revealing L1 epidural mass; ddx meningioma vs calcified herniated disc vs osteophyte  Consult neurosurgery, appreciate input  Plans for MRI for further eval    CKD (chronic kidney disease) stage 3, GFR 30-59 ml/min ([facility])  Assessment & Plan  Cr w/in baseline range  Monitor BUN/Cr  On IVF    Benign essential HTN  Assessment & Plan  Continue Coreg, lisinopril, Norvasc  Controlled    Nicotine dependence  Assessment & Plan  Counseled on cessation  Nicotine patch    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 [facility])  Assessment & Plan  Care as above    * Aortoiliac occlusive disease ([facility] [address] )  Assessment & Plan  Acute on chronic occulusive disease  CT abd run off (done in [place] ED) 12/6 shows interval thrombosis of the infrarenal abdominal aorta and left iliofemoral segment with persistent thrombosis of the right iliofemoral segment  Stable bilateral three-vessel distal runoff    S/p ct abd run off on 10/19 revealing occulusino of Rt iliac seg and L common iliac artery stenosis  On heparin gtt  Cont supportive care, prn pain analgesics  Vascular surgery consulted, appreciate input; plans for OR this afternoon due to worsening cap refill   Cardiology to risk stratify, appreciate input  Discussed with daughter         VTE Pharmacologic Prophylaxis:   Pharmacologic: Heparin Drip  Mechanical VTE Prophylaxis in Place: Yes    Patient Centered Rounds: I have performed bedside rounds with nursing staff today  Discussions with Specialists or Other Care Team Provider: none    Education and Discussions with Family / Patient: patient and daughter over phone    Time Spent for Care: 30 minutes  More than 50% of total time spent on counseling and coordination of care as described above  Current Length of Stay: 3 day(s)    Current Patient Status: Inpatient   Certification Statement: The patient will continue to require additional inpatient hospital stay due to req surgery    Discharge Plan: pending hospital course    Code Status: Level 3 - DNAR and DNI      Subjective:   No acute overnight events  This morning vascular surgery plans for surgical intervention this afternoon  This was discussed with the patient and she has asked me to answer her daughter's questions  I have answered all her daughter's questions by phone  Objective:     Vitals:   Temp (24hrs), Av 2 °F (36 8 °C), Min:98 °F (36 7 °C), Max:98 4 °F (36 9 °C)    Temp:  [98 °F (36 7 °C)-98 4 °F (36 9 °C)] 98 3 °F (36 8 °C)  HR:  [73] 73  Resp:  [18-20] 18  BP: (115-169)/(60-61) 115/61  SpO2:  [98 %] 98 %  Body mass index is 20 39 kg/m²  Input and Output Summary (last 24 hours): Intake/Output Summary (Last 24 hours) at 2019 1314  Last data filed at 2019 1001  Gross per 24 hour   Intake 200 86 ml   Output 450 ml   Net -249 14 ml       Physical Exam:     Physical Exam   Constitutional: She is oriented to person, place, and time  She appears well-developed and well-nourished  HENT:   Head: Normocephalic and atraumatic  Mouth/Throat: Oropharynx is clear and moist    Eyes: Pupils are equal, round, and reactive to light   Conjunctivae are normal    Neck: Neck supple  No JVD present  Cardiovascular: Normal rate, regular rhythm, normal heart sounds and intact distal pulses  Pulmonary/Chest: Effort normal and breath sounds normal    Abdominal: Soft  Bowel sounds are normal    Musculoskeletal: She exhibits no edema or tenderness  Neurological: She is alert and oriented to person, place, and time  Skin: Skin is warm and dry  Capillary refill takes more than 3 seconds  Psychiatric: She has a normal mood and affect  Her behavior is normal  Judgment and thought content normal    Nursing note and vitals reviewed  Additional Data:     Labs:    Results from last 7 days   Lab Units 12/07/19  0506  12/06/19  1231   WBC Thousand/uL 9 73   < > 8 97   HEMOGLOBIN g/dL 9 7*   < > 10 0*   HEMATOCRIT % 29 4*   < > 30 5*   PLATELETS Thousands/uL 339   < > 369   NEUTROS PCT %  --   --  67   LYMPHS PCT %  --   --  20   MONOS PCT %  --   --  9   EOS PCT %  --   --  2    < > = values in this interval not displayed  Results from last 7 days   Lab Units 12/07/19  0506 12/06/19  1231   SODIUM mmol/L 142 140   POTASSIUM mmol/L 3 5 4 2   CHLORIDE mmol/L 108 102   CO2 mmol/L 27 27   BUN mg/dL 31* 32*   CREATININE mg/dL 0 95 0 86   ANION GAP mmol/L 7 11   CALCIUM mg/dL 9 4 9 3   ALBUMIN g/dL  --  3 2*   TOTAL BILIRUBIN mg/dL  --  0 50   ALK PHOS U/L  --  86   ALT U/L  --  48   AST U/L  --  50*   GLUCOSE RANDOM mg/dL 105 94     Results from last 7 days   Lab Units 12/06/19  1850   INR  1 14                       * I Have Reviewed All Lab Data Listed Above  * Additional Pertinent Lab Tests Reviewed:  All Labs Within Last 24 Hours Reviewed    Imaging:    Imaging Reports Reviewed Today Include: n/a  Imaging Personally Reviewed by Myself Includes:  n/a    Recent Cultures (last 7 days):           Last 24 Hours Medication List:     Current Facility-Administered Medications:  [MAR Hold] acetaminophen 650 mg Oral Q6H PRN Gilberto Dover DO    [facility] Hold] amLODIPine 5 mg Oral Daily [facility] aspirin 162 mg Oral Daily [facility] Hold] atorvastatin 80 mg Oral Daily [facility] Hold] carvedilol 12 5 mg Oral BID With Meals [facility]    cefazolin 1 g in sodium chloride 0 9% 1000 mL irrigation bottle  Irrigation Once Joshua Santiago MD    ceFAZolin 1,000 mg Intravenous Once Aaliyah Senior CRNA    [facility] Hold] docusate sodium 100 mg Oral BID [facility] Hold] fluticasone 1 spray Nasal Daily [facility]    heparin 2000 units and papaverine 60 mg in isolyte equivalent 500 mL irrigation bag  Irrigation Once Joshua Santiago MD    heparin (porcine) 3-20 Units/kg/hr (Order-Specific) Intravenous Titrated [facility] Last Rate: 18 Units/kg/hr (12/09/19 0857)   [MAR Hold] heparin (porcine) 1,650 Units Intravenous PRN [facility] Hold] heparin (porcine) 3,300 Units Intravenous PRN [facility] Hold] HYDROmorphone 0 5 mg Intravenous Q3H PRN [facility]    [MAR Hold] hydrOXYzine HCL 10 mg Oral Q6H PRN Christopher Francis MD    lactated ringers 75 mL/hr Intravenous Continuous Brous Josey MD    [facility] Hold] levothyroxine 25 mcg Oral Early Morning [facility] Hold] lisinopril 20 mg Oral Daily [facility] Hold] magnesium oxide 400 mg Oral Daily [facility] Hold] multivitamin stress formula 1 tablet Oral Daily Shakeel George MD    [facility] Hold] nicotine 1 patch Transdermal Daily [facility] Hold] oxyCODONE 10 mg Oral Q4H PRN [facility] Hold] oxyCODONE 5 mg Oral Q4H PRN [facility] Hold] senna 1 tablet Oral Daily [facility]    [MAR Hold] spironolactone 25 mg Oral Daily Christopher Francis MD    [facility] Hold] torsemide 5 mg Oral Daily Christopher Francis MD         Today, Patient Was Seen By: Christopher Francis MD    ** Please Note: Dictation voice to text software may have been used in the creation of this document   **

## 2019-12-09 NOTE — ANESTHESIA PREPROCEDURE EVALUATION
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     Sonographer:  DRE Dahl  Primary Physician: Mikaela Baird DO  Referring Physician:  Hai Reeves MD  Group:  [facility]  Interpreting Physician:  Jamal Reynaga MD     SUMMARY     LEFT VENTRICLE:  Systolic function was normal  Ejection fraction was estimated in the range of 55 % to 60 % to be 60 %  There were no regional wall motion abnormalities  Wall thickness was mildly to moderately increased  There was mild concentric hypertrophy  Features were consistent with a pseudonormal left ventricular filling pattern, with concomitant abnormal relaxation and increased filling pressure (grade 2 diastolic dysfunction)      LEFT ATRIUM:  The atrium was mildly to moderately dilated      RIGHT ATRIUM:  The atrium was mildly dilated      MITRAL VALVE:  There was mild annular calcification  There was mild regurgitation      TRICUSPID VALVE:  There was mild regurgitation  Estimated peak PA pressure was 48 mmHg      PULMONIC VALVE:  There was mild regurgitation      COMPARISONS:  Comparison was made with the previous study of 31-Aug-2017  Pulmonary artery pressure has increased      HISTORY: PRIOR HISTORY: Chronic kidney Disease,HTN,Murmur,Hypothyroidism,Carotid stenosis,Dyslipidemia,CAD,Hyperlipidemia      PROCEDURE: The procedure was performed in the echo lab  This was a routine study   The transthoracic approach was used  The study included complete 2D imaging, M-mode, complete spectral Doppler, and color Doppler  The heart rate was 62 bpm,  at the start of the study  Images were obtained from the parasternal, apical, subcostal, and suprasternal notch acoustic windows  Image quality was adequate      LEFT VENTRICLE: Size was normal  Systolic function was normal  Ejection fraction was estimated in the range of 55 % to 60 % to be 60 %  There were no regional wall motion abnormalities  Wall thickness was mildly to moderately increased  There was mild concentric hypertrophy  DOPPLER: Features were consistent with a pseudonormal left ventricular filling pattern, with concomitant abnormal relaxation and increased filling pressure (grade 2 diastolic dysfunction)      RIGHT VENTRICLE: The size was normal  Systolic function was normal      LEFT ATRIUM: The atrium was mildly to moderately dilated      RIGHT ATRIUM: The atrium was mildly dilated      MITRAL VALVE: There was mild annular calcification  There was normal leaflet separation  DOPPLER: The transmitral velocity was within the normal range  There was no evidence for stenosis  There was mild regurgitation      AORTIC VALVE: The valve was trileaflet  Leaflets exhibited mildly increased thickness and normal cuspal separation  DOPPLER: Transaortic velocity was within the normal range  There was no evidence for stenosis  There was no regurgitation      TRICUSPID VALVE: DOPPLER: There was mild regurgitation  Estimated peak PA pressure was 48 mmHg , Pulmonary hypertension Pulmonary  Smoker cigarette smoker  , COPD ,        GI/Hepatic            Endo/Other  History of thyroid disease ,      GYN       Hematology   Musculoskeletal    Arthritis     Neurology   Psychology                Anesthesia Plan  ASA Score- 4     Anesthesia Type- general with ASA Monitors  Additional Monitors: arterial line and central venous line  Airway Plan: ETT          Plan Factors-    Induction- intravenous  Postoperative Plan-     Informed Consent- Anesthetic plan and risks discussed with patient  I personally reviewed this patient with the CRNA  Discussed and agreed on the Anesthesia Plan with the CRNA  Maik Figueroa Pt on heparin drip  Will avoid epidural  Many notes saying Aortic stenosis but not seen on Cardiac echo  Will plan on GA via ETT with A line and CVP if needed for access

## 2019-12-09 NOTE — PLAN OF CARE
Problem: Prexisting or High Potential for Compromised Skin Integrity  Goal: Skin integrity is maintained or improved  Description  INTERVENTIONS:  - Identify patients at risk for skin breakdown  - Assess and monitor skin integrity  - Assess and monitor nutrition and hydration status  - Monitor labs   - Assess for incontinence   - Turn and reposition patient  - Assist with mobility/ambulation  - Relieve pressure over bony prominences  - Avoid friction and shearing  - Provide appropriate hygiene as needed including keeping skin clean and dry  - Evaluate need for skin moisturizer/barrier cream  - Collaborate with interdisciplinary team   - Patient/family teaching  - Consider wound care consult   Outcome: Progressing     Problem: Potential for Falls  Goal: Patient will remain free of falls  Description  INTERVENTIONS:  - Assess patient frequently for physical needs  -  Identify cognitive and physical deficits and behaviors that affect risk of falls    -  [place] fall precautions as indicated by assessment   - Educate patient/family on patient safety including physical limitations  - Instruct patient to call for assistance with activity based on assessment  - Modify environment to reduce risk of injury  - Consider OT/PT consult to assist with strengthening/mobility  Outcome: Progressing     Problem: PAIN - ADULT  Goal: Verbalizes/displays adequate comfort level or baseline comfort level  Description  Interventions:  - Encourage patient to monitor pain and request assistance  - Assess pain using appropriate pain scale  - Administer analgesics based on type and severity of pain and evaluate response  - Implement non-pharmacological measures as appropriate and evaluate response  - Consider cultural and social influences on pain and pain management  - Notify physician/advanced practitioner if interventions unsuccessful or patient reports new pain  Outcome: Progressing     Problem: INFECTION - ADULT  Goal: Absence or prevention of progression during hospitalization  Description  INTERVENTIONS:  - Assess and monitor for signs and symptoms of infection  - Monitor lab/diagnostic results  - Monitor all insertion sites, i e  indwelling lines, tubes, and drains  - Monitor endotracheal if appropriate and nasal secretions for changes in amount and color  - [place] appropriate cooling/warming therapies per order  - Administer medications as ordered  - Instruct and encourage patient and family to use good hand hygiene technique  - Identify and instruct in appropriate isolation precautions for identified infection/condition  Outcome: Progressing  Goal: Absence of fever/infection during neutropenic period  Description  INTERVENTIONS:  - Monitor WBC    Outcome: Progressing     Problem: SAFETY ADULT  Goal: Maintain or return to baseline ADL function  Description  INTERVENTIONS:  -  Assess patient's ability to carry out ADLs; assess patient's baseline for ADL function and identify physical deficits which impact ability to perform ADLs (bathing, care of mouth/teeth, toileting, grooming, dressing, etc )  - Assess/evaluate cause of self-care deficits   - Assess range of motion  - Assess patient's mobility; develop plan if impaired  - Assess patient's need for assistive devices and provide as appropriate  - Encourage maximum independence but intervene and supervise when necessary  - Involve family in performance of ADLs  - Assess for home care needs following discharge   - Consider OT consult to assist with ADL evaluation and planning for discharge  - Provide patient education as appropriate  Outcome: Progressing  Goal: Maintain or return mobility status to optimal level  Description  INTERVENTIONS:  - Assess patient's baseline mobility status (ambulation, transfers, stairs, etc )    - Identify cognitive and physical deficits and behaviors that affect mobility  - Identify mobility aids required to assist with transfers and/or ambulation (gait belt, sit-to-stand, lift, walker, cane, etc )  - [place] fall precautions as indicated by assessment  - Record patient progress and toleration of activity level on Mobility SBAR; progress patient to next Phase/Stage  - Instruct patient to call for assistance with activity based on assessment  - Consider rehabilitation consult to assist with strengthening/weightbearing, etc   Outcome: Progressing     Problem: DISCHARGE PLANNING  Goal: Discharge to home or other facility with appropriate resources  Description  INTERVENTIONS:  - Identify barriers to discharge w/patient and caregiver  - Arrange for needed discharge resources and transportation as appropriate  - Identify discharge learning needs (meds, wound care, etc )  - Arrange for interpretive services to assist at discharge as needed  - Refer to Case Management Department for coordinating discharge planning if the patient needs post-hospital services based on physician/advanced practitioner order or complex needs related to functional status, cognitive ability, or social support system  Outcome: Progressing     Problem: Knowledge Deficit  Goal: Patient/family/caregiver demonstrates understanding of disease process, treatment plan, medications, and discharge instructions  Description  Complete learning assessment and assess knowledge base    Interventions:  - Provide teaching at level of understanding  - Provide teaching via preferred learning methods  Outcome: Progressing     Problem: MUSCULOSKELETAL - ADULT  Goal: Maintain or return mobility to safest level of function  Description  INTERVENTIONS:  - Assess patient's ability to carry out ADLs; assess patient's baseline for ADL function and identify physical deficits which impact ability to perform ADLs (bathing, care of mouth/teeth, toileting, grooming, dressing, etc )  - Assess/evaluate cause of self-care deficits   - Assess range of motion  - Assess patient's mobility  - Assess patient's need for assistive devices and provide as appropriate  - Encourage maximum independence but intervene and supervise when necessary  - Involve family in performance of ADLs  - Assess for home care needs following discharge   - Consider OT consult to assist with ADL evaluation and planning for discharge  - Provide patient education as appropriate  Outcome: Progressing  Goal: Maintain proper alignment of affected body part  Description  INTERVENTIONS:  - Support, maintain and protect limb and body alignment  - Provide patient/ family with appropriate education  Outcome: Progressing     Problem: Nutrition/Hydration-ADULT  Goal: Nutrient/Hydration intake appropriate for improving, restoring or maintaining nutritional needs  Description  Monitor and assess patient's nutrition/hydration status for malnutrition  Collaborate with interdisciplinary team and initiate plan and interventions as ordered  Monitor patient's weight and dietary intake as ordered or per policy  Utilize nutrition screening tool and intervene as necessary  Determine patient's food preferences and provide high-protein, high-caloric foods as appropriate       INTERVENTIONS:  - Monitor oral intake, urinary output, labs, and treatment plans  - Assess nutrition and hydration status and recommend course of action  - Evaluate amount of meals eaten  - Assist patient with eating if necessary   - Allow adequate time for meals  - Recommend/ encourage appropriate diets, oral nutritional supplements, and vitamin/mineral supplements  - Order, calculate, and assess calorie counts as needed  - Recommend, monitor, and adjust tube feedings and TPN/PPN based on assessed needs  - Assess need for intravenous fluids  - Provide specific nutrition/hydration education as appropriate  - Include patient/family/caregiver in decisions related to nutrition  Outcome: Progressing

## 2019-12-09 NOTE — PLAN OF CARE
Problem: Nutrition/Hydration-ADULT  Goal: Nutrient/Hydration intake appropriate for improving, restoring or maintaining nutritional needs  Description  Monitor and assess patient's nutrition/hydration status for malnutrition  Collaborate with interdisciplinary team and initiate plan and interventions as ordered  Monitor patient's weight and dietary intake as ordered or per policy  Utilize nutrition screening tool and intervene as necessary  Determine patient's food preferences and provide high-protein, high-caloric foods as appropriate  INTERVENTIONS:  - Monitor oral intake, urinary output, labs, and treatment plans  - Assess nutrition and hydration status and recommend course of action  - Evaluate amount of meals eaten  - Assist patient with eating if necessary   - Allow adequate time for meals  - Recommend/ encourage appropriate diets, oral nutritional supplements, and vitamin/mineral supplements  - Order, calculate, and assess calorie counts as needed  - Recommend, monitor, and adjust tube feedings and TPN/PPN based on assessed needs  - Assess need for intravenous fluids  - Provide specific nutrition/hydration education as appropriate  - Include patient/family/caregiver in decisions related to nutrition  Outcome: Not Progressing   NPO for OR today, will monitor diet advancement/tolerance

## 2019-12-09 NOTE — UTILIZATION REVIEW
Initial Clinical Review    Admission: Date/Time/Statement:   Inpatient Admission Orders (From admission, onward)     Ordered        12/06/19 1706  Inpatient Admission  Once                   Orders Placed This Encounter   Procedures    Inpatient Admission     Standing Status:   Standing     Number of Occurrences:   1     Order Specific Question:   Admitting Physician     Answer:   Pravin Valle [[id number]]     Order Specific Question:   Level of Care     Answer:   Med Surg [[id number]]     Order Specific Question:   Estimated length of stay     Answer:   More than 2 Midnights     Order Specific Question:   Certification     Answer:   I certify that inpatient services are medically necessary for this patient for a duration of greater than two midnights  See H&P and MD Progress Notes for additional information about the patient's course of treatment  ED Arrival Information     Patient not seen in ED -- transfer from [address] [place] [address] ED                     Chief complaint:  Sent from vascular office visit due to acute on chronic occlusive disease    Assessment/Plan: 77 y/o female with PMHx for PVD (aortoiliac and infrainguinal arterial occlusive disease), HTN, CKD, chronic diastolic HF who had presented to her vascular surgeon for f/u visit for results of  CT abdomen runoff  Patient reported of b/l lower extremity numbness, like pins and needles; L > R  She reports that she had fallen twice due to loss of sensation in her legs  She reports of 10/10 pain in b/l feet with coloration of RT foot  Upon visit, per vascular surgery recommended patient to present to [place] ED for admission for further intervention  However went to [facility] ED  Transferred to [facility] for vascular surgery eval & tx  Pt with pain in B/L lower extremities at rest, L>R  Admit inpatient to M/S/Tele unit with acute on chronic occlusive disease -- tele monitoring, Hep gtt, analgesics prn, NV checks q4h,    NSx consulted for incidental finding of L1 epidural mass, MRI T-spine  Vascular planning on aortobifemoral bypass  Consult cardiology and NSx  Cardiology ok for pt to proceed with surgery  ED Triage Vitals   Temperature Pulse Respirations Blood Pressure SpO2   12/06/19 1630 12/06/19 1630 12/06/19 1630 12/06/19 1630 12/06/19 1630   98 5 °F (36 9 °C) 89 18 140/97 99 %      Temp Source Heart Rate Source Patient Position - Orthostatic VS BP Location FiO2 (%)   12/07/19 0806 12/07/19 0806 12/07/19 0806 12/07/19 0806 --   Oral Monitor Lying Right arm       Pain Score       12/06/19 1824       Worst Possible Pain          Wt Readings from Last 1 Encounters:   12/06/19 53 9 kg (118 lb 12 8 oz)     Additional Vital Signs:   Date/Time  Temp  Pulse  Resp  BP  MAP (mmHg)  SpO2  O2 Device   12/07/19 0806  98 1 °F (36 7 °C)  89  16  158/60    98 %  None (Room air)   12/07/19 07:31:42  98 1 °F (36 7 °C)  51Abnormal   15  160/56  91  96 %  None (Room air)   12/06/19 23:54:34  98 2 °F (36 8 °C)  71  18  140/53  82  96 %     12/06/19 2030              None (Room air)   12/06/19 16:30:39  98 5 °F (36 9 °C)  89  18  140/97  111  99 %     12/06/19 1630              None (Room air)       Pertinent Labs/Diagnostic Test Results:   CTA abdominal run off 12/6 -- Interval thrombosis of the infrarenal abdominal aorta and left iliofemoral segment with persistent thrombosis of the right iliofemoral segment   Stable bilateral three-vessel distal runoff  Diverticulosis  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      Results from last 7 days   Lab Units 12/07/19  0506 12/06/19  1850 12/06/19  1231   WBC Thousand/uL 9 73 9 10 8 97   HEMOGLOBIN g/dL 9 7* 10 6* 10 0*   HEMATOCRIT % 29 4* 31 8* 30 5*   PLATELETS Thousands/uL 339 352 369   NEUTROS ABS Thousands/µL  --   --  6 03     Results from last 7 days   Lab Units 12/07/19  0506 12/06/19  1231   SODIUM mmol/L 142 140   POTASSIUM mmol/L 3 5 4 2   CHLORIDE mmol/L 108 102   CO2 mmol/L 27 27   ANION GAP mmol/L 7 11   BUN mg/dL 31* 32*   CREATININE mg/dL 0 95 0 86   EGFR ml/min/1 73sq m 57 64   CALCIUM mg/dL 9 4 9 3   MAGNESIUM mg/dL 2 0 1 8   PHOSPHORUS mg/dL 3 9  --      Results from last 7 days   Lab Units 12/06/19  1231   AST U/L 50*   ALT U/L 48   ALK PHOS U/L 86   TOTAL PROTEIN g/dL 7 9   ALBUMIN g/dL 3 2*   TOTAL BILIRUBIN mg/dL 0 50     Results from last 7 days   Lab Units 12/07/19  0506 12/06/19  1231   GLUCOSE RANDOM mg/dL 105 94     Results from last 7 days   Lab Units 12/05/19  1438   PH  6 5     Results from last 7 days   Lab Units 12/09/19  0519 12/08/19  2314 12/08/19  1632  12/06/19  1850 12/06/19  1231   PROTIME seconds  --   --   --   --  14 2 11 0   INR   --   --   --   --  1 14 1 02   PTT seconds 50* 72* 60*   < > 93* 24    < > = values in this interval not displayed       Results from last 7 days   Lab Units 12/05/19  1438   COLOR UA  Yellow   SPEC GRAV UA  1 012   KETONES UA  Negative   BLOOD UA  Negative   NITRITE UA  Negative   BILIRUBIN, URINE  Negative   UROBILINOGEN UA mg/dL 0 2   LEUKOCYTES UA  Negative   WBC UA /hpf 0-5   RBC UA /hpf 0-2   BACTERIA UA  Few   EPITHELEAL /hpf 0-10   MUCUS THREADS  Present       Past Medical History:   Diagnosis Date    Aortic stenosis     Arthritis     Benign essential hypertension     CAD (coronary artery disease)     Disease of thyroid gland     Dizziness     Edema of leg     Hyperlipidemia     Hypertension     Other disorder of circulatory system (CODE)      Present on Admission:   Aortoiliac occlusive disease (HCC)   CKD (chronic kidney disease) stage 3, GFR 30-59 ml/min (HCC)   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)   Nicotine dependence   Mass of spine      Admitting Diagnosis: 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 ([facility] 75 ) [I70 [id number]]  Age/Sex: 78 y o  female  Admission Orders:  Scheduled Medications:    Medications:  amLODIPine 5 mg Oral Daily   aspirin 162 mg Oral Daily   atorvastatin 80 mg Oral Daily   carvedilol 12 5 mg Oral BID With Meals   cefazolin 1 g in sodium chloride 0 9% 1000 mL irrigation bottle  Irrigation Once   docusate sodium 100 mg Oral BID   fluticasone 1 spray Nasal Daily   heparin 2000 units and papaverine 60 mg in isolyte equivalent 500 mL irrigation bag  Irrigation Once   levothyroxine 25 mcg Oral Early Morning   lisinopril 20 mg Oral Daily   magnesium oxide 400 mg Oral Daily   multivitamin stress formula 1 tablet Oral Daily   nicotine 1 patch Transdermal Daily   senna 1 tablet Oral Daily   spironolactone 25 mg Oral Daily   torsemide 5 mg Oral Daily     Continuous IV Infusions:  heparin (porcine) 3-20 Units/kg/hr (Order-Specific) Intravenous Titrated     PRN Meds:  acetaminophen 650 mg Oral Q6H PRN   heparin (porcine) 1,650 Units Intravenous PRN   heparin (porcine) 3,300 Units Intravenous PRN   HYDROmorphone 0 5 mg Intravenous Q3H PRN   hydrOXYzine HCL 10 mg Oral Q6H PRN   oxyCODONE 10 mg Oral Q4H PRN x4 doses   oxyCODONE 5 mg Oral Q4H PRN       IP CONSULT TO VASCULAR SURGERY  IP CONSULT TO NEUROSURGERY  IP CONSULT TO CARDIOLOGY    Network Utilization Review Department  [email or web address]  ATTENTION: Please call with any questions or concerns to [phone] and carefully listen to the prompts so that you are directed to the right person  All voicemails are confidential   Jamie Elder all requests for admission clinical reviews, approved or denied determinations and any other requests to dedicated fax number below belonging to the campus where the patient is receiving treatment   List of dedicated fax numbers for the Facilities:  FACILITY NAME UR FAX NUMBER   ADMISSION DENIALS (Administrative/Medical Necessity) [phone]   [address] [place] [address] (Maternity/NICU/Pediatrics) [phone]   [facility] [address] [phone] [address] [address]86 [address] [facility] [phone]   [facility]  [phone]   [facility] [address] [phone]   [address] [place] [address] [phone]

## 2019-12-09 NOTE — PROGRESS NOTES
Cardiology Progress Note - Lev Monae 78 y o  female MRN: [id number]    Unit/Bed#: [facility] 812-01 Encounter: [id number]      Assessment/Recommendations:  1  Pre-operative risk assessment: intermediate risk with risk factors for CAD with no formal evaluation recently for ischemia  Normal LV function on echo  No further cardiac testing recommended at this time due to urgent need for surgery  Proceed with planned high risk vascular surgery without further cardiac work-up  Continue B-blocker roque-op  2   HTN: well controlled on current regimen  Continue meds roque-op  3   HLD: continued on statin  4  PAD: for aortobifemoral bypass today - continued on ASA, statin, B-blocker, and heparin gtt in the interim  5   Grade 2 diastolic dysfunction with moderate pulm HTN: continued on diuretic to keep volume status stable  Continue BP control roque-op and evaluate for volume overload roque-operatively for potential need for IV diuresis  Subjective:   Patient seen and examined  No significant events overnight   ; pertinent negatives - chest pain, chest pressure/discomfort, dyspnea, irregular heart beat and palpitations  Objective:     Vitals: Blood pressure 115/61, pulse 73, temperature 98 3 °F (36 8 °C), resp  rate 18, height 5' 4" (1 626 m), weight 53 9 kg (118 lb 12 8 oz), SpO2 98 %  , Body mass index is 20 39 kg/m² ,   Orthostatic Blood Pressures      Most Recent Value   Blood Pressure  115/61 filed at 12/09/2019 0815   Patient Position - Orthostatic VS  Lying filed at 12/07/2019 5853            Intake/Output Summary (Last 24 hours) at 12/9/2019 0933  Last data filed at 12/9/2019 0400  Gross per 24 hour   Intake 560 86 ml   Output 600 ml   Net -39 14 ml       Physical Exam:    GEN: Deena Wolff appears well, alert and oriented x 3, pleasant and cooperative   HEENT: pupils equal, round, and reactive to light; extraocular muscles intact  NECK: supple, no carotid bruits   HEART: regular rhythm, normal S1 and S2, no murmurs, clicks, gallops or rubs   LUNGS: clear to auscultation bilaterally; no wheezes, rales, or rhonchi   ABDOMEN: normal bowel sounds, soft, no tenderness, no distention  EXTREMITIES: peripheral pulses normal; no clubbing, cyanosis, or edema  NEURO: no focal findings   SKIN: normal without suspicious lesions on exposed skin    Medications:      Current Facility-Administered Medications:     acetaminophen (TYLENOL) tablet 650 mg, 650 mg, Oral, Q6H PRN, Todd Balint, DO    amLODIPine (NORVASC) tablet 5 mg, 5 mg, Oral, Daily, Todd Balint, DO, 5 mg at 12/09/19 2876    aspirin chewable tablet 162 mg, 162 mg, Oral, Daily, Todd Balint, DO, 162 mg at 12/09/19 0841    atorvastatin (LIPITOR) tablet 80 mg, 80 mg, Oral, Daily, Todd Balint, DO, 80 mg at 12/09/19 0841    carvedilol (COREG) tablet 12 5 mg, 12 5 mg, Oral, BID With Meals, Todd Balint, DO, 12 5 mg at 12/09/19 4435    docusate sodium (COLACE) capsule 100 mg, 100 mg, Oral, BID, Todd Balint, DO, 100 mg at 12/08/19 1742    fluticasone (FLONASE) 50 mcg/act nasal spray 1 spray, 1 spray, Nasal, Daily, Todd Balint, DO, 1 spray at 12/09/19 0845    heparin (porcine) 25,000 units in 250 mL infusion (premix), 3-20 Units/kg/hr (Order-Specific), Intravenous, Titrated, Todd Balint, DO, Last Rate: 9 9 mL/hr at 12/09/19 0857, 18 Units/kg/hr at 12/09/19 0857    heparin (porcine) injection 1,650 Units, 1,650 Units, Intravenous, PRN, Todd Balint, DO, 1,650 Units at 12/09/19 4550    heparin (porcine) injection 3,300 Units, 3,300 Units, Intravenous, PRN, Todd Balint, DO    HYDROmorphone (DILAUDID) injection 0 5 mg, 0 5 mg, Intravenous, Q3H PRN, Todd Balint, DO, 0 5 mg at 12/09/19 1489    hydrOXYzine HCL (ATARAX) tablet 10 mg, 10 mg, Oral, Q6H PRN, Karl Tai MD    lactated ringers infusion, 75 mL/hr, Intravenous, Continuous, Kimmy Altamirano MD    levothyroxine tablet 25 mcg, 25 mcg, Oral, Early Morning, Hennynirav Giordano, DO, 25 mcg at 12/09/19 0545    lisinopril (ZESTRIL) tablet 20 mg, 20 mg, Oral, Daily, Henny Pasquale DO, 20 mg at 12/09/19 0841    magnesium oxide (MAG-OX) tablet 400 mg, 400 mg, Oral, Daily, Henny Pasquale DO, 400 mg at 12/09/19 6289    multivitamin stress formula tablet 1 tablet, 1 tablet, Oral, Daily, Rosangela Artis MD, 1 tablet at 12/08/19 6814    nicotine (NICODERM CQ) 21 mg/24 hr TD 24 hr patch 1 patch, 1 patch, Transdermal, Daily, Henny GiordanoDO, 1 patch at 12/09/19 0848    oxyCODONE (ROXICODONE) immediate release tablet 10 mg, 10 mg, Oral, Q4H PRN, Henny PasqualeDO, 10 mg at 12/09/19 0545    oxyCODONE (ROXICODONE) IR tablet 5 mg, 5 mg, Oral, Q4H PRN, Henny ArroyoionDO    [facility]) tablet 8 6 mg, 1 tablet, Oral, Daily, Henny PasqualeDO, 8 6 mg at 12/08/19 7999    spironolactone (ALDACTONE) tablet 25 mg, 25 mg, Oral, Daily, Laura Stephenson MD, 25 mg at 12/09/19 0841    torsemide (DEMADEX) tablet 5 mg, 5 mg, Oral, Daily, Laura Stephenson MD, 5 mg at 12/09/19 0846     Labs & Results:        Results from last 7 days   Lab Units 12/07/19  0506 12/06/19  1850 12/06/19  1231   WBC Thousand/uL 9 73 9 10 8 97   HEMOGLOBIN g/dL 9 7* 10 6* 10 0*   HEMATOCRIT % 29 4* 31 8* 30 5*   PLATELETS Thousands/uL 339 352 369         Results from last 7 days   Lab Units 12/07/19  0506 12/06/19  1231   POTASSIUM mmol/L 3 5 4 2   CHLORIDE mmol/L 108 102   CO2 mmol/L 27 27   BUN mg/dL 31* 32*   CREATININE mg/dL 0 95 0 86   CALCIUM mg/dL 9 4 9 3   ALK PHOS U/L  --  86   ALT U/L  --  48   AST U/L  --  50*     Results from last 7 days   Lab Units 12/09/19  0519 12/08/19  2314 12/08/19  1632  12/06/19  1850 12/06/19  1231   INR   --   --   --   --  1 14 1 02   PTT seconds 50* 72* 60*   < > 93* 24    < > = values in this interval not displayed       Results from last 7 days   Lab Units 12/07/19  0506 12/06/19  1231   MAGNESIUM mg/dL 2 0 1 8       Echo: personally reviewed - EF 55-60%, G2DD, mild LVH, CONNIE, mild pulm HTN, mild MR, mild TR    EKG personally reviewed by Aline Mccray MD

## 2019-12-09 NOTE — ANESTHESIA PROCEDURE NOTES
Arterial Line Insertion  Performed by: Nikunj Soni CRNA  Authorized by: Shamir Squires MD   Preparation: Patient was prepped and draped in the usual sterile fashion    Indications: hemodynamic monitoring  Orientation:  Left  Location: brachial artery  Procedure Details:  Needle gauge: 20  Number of attempts: 3    Post-procedure:  Post-procedure: dressing applied  Waveform: good waveform  Patient tolerance: Patient tolerated the procedure well with no immediate complications  Comments: US guided by Dr Parviz Jang unsuccessful b/l radial attempt by Dr Parviz Jang and Nikunj Soni

## 2019-12-10 ENCOUNTER — APPOINTMENT (INPATIENT)
Dept: RADIOLOGY | Facility: HOSPITAL | Age: 79
DRG: 269 | End: 2019-12-10
Payer: COMMERCIAL

## 2019-12-10 LAB
ANION GAP SERPL CALCULATED.3IONS-SCNC: 7 MMOL/L (ref 4–13)
BASE EXCESS BLDA CALC-SCNC: -4 MMOL/L (ref -2–3)
BUN SERPL-MCNC: 22 MG/DL (ref 5–25)
CA-I BLD-SCNC: 1.23 MMOL/L (ref 1.12–1.32)
CALCIUM SERPL-MCNC: 8.2 MG/DL (ref 8.3–10.1)
CHLORIDE SERPL-SCNC: 108 MMOL/L (ref 100–108)
CO2 SERPL-SCNC: 24 MMOL/L (ref 21–32)
CREAT SERPL-MCNC: 0.9 MG/DL (ref 0.6–1.3)
ERYTHROCYTE [DISTWIDTH] IN BLOOD BY AUTOMATED COUNT: 15.6 % (ref 11.6–15.1)
GFR SERPL CREATININE-BSD FRML MDRD: 61 ML/MIN/1.73SQ M
GLUCOSE SERPL-MCNC: 144 MG/DL (ref 65–140)
GLUCOSE SERPL-MCNC: 149 MG/DL (ref 65–140)
HCO3 BLDA-SCNC: 21.6 MMOL/L (ref 22–28)
HCT VFR BLD AUTO: 24.3 % (ref 34.8–46.1)
HCT VFR BLD AUTO: 24.5 % (ref 34.8–46.1)
HCT VFR BLD CALC: 34 % (ref 34.8–46.1)
HGB BLD-MCNC: 7.9 G/DL (ref 11.5–15.4)
HGB BLD-MCNC: 8.1 G/DL (ref 11.5–15.4)
HGB BLDA-MCNC: 11.6 G/DL (ref 11.5–15.4)
KCT BLD-ACNC: 104 SEC (ref 89–137)
KCT BLD-ACNC: 206 SEC (ref 89–137)
KCT BLD-ACNC: 212 SEC (ref 89–137)
KCT BLD-ACNC: 224 SEC (ref 89–137)
MCH RBC QN AUTO: 34.3 PG (ref 26.8–34.3)
MCHC RBC AUTO-ENTMCNC: 32.5 G/DL (ref 31.4–37.4)
MCV RBC AUTO: 106 FL (ref 82–98)
PCO2 BLD: 23 MMOL/L (ref 21–32)
PCO2 BLD: 40.2 MM HG (ref 36–44)
PH BLD: 7.34 [PH] (ref 7.35–7.45)
PLATELET # BLD AUTO: 203 THOUSANDS/UL (ref 149–390)
PLATELET # BLD AUTO: 211 THOUSANDS/UL (ref 149–390)
PMV BLD AUTO: 10.6 FL (ref 8.9–12.7)
PMV BLD AUTO: 10.7 FL (ref 8.9–12.7)
PO2 BLD: 155 MM HG (ref 75–129)
POTASSIUM BLD-SCNC: 3.1 MMOL/L (ref 3.5–5.3)
POTASSIUM SERPL-SCNC: 3.6 MMOL/L (ref 3.5–5.3)
RBC # BLD AUTO: 2.3 MILLION/UL (ref 3.81–5.12)
SAO2 % BLD FROM PO2: 99 % (ref 60–85)
SODIUM BLD-SCNC: 137 MMOL/L (ref 136–145)
SODIUM SERPL-SCNC: 139 MMOL/L (ref 136–145)
SPECIMEN SOURCE: ABNORMAL
SPECIMEN SOURCE: NORMAL
WBC # BLD AUTO: 15.14 THOUSAND/UL (ref 4.31–10.16)

## 2019-12-10 PROCEDURE — 80048 BASIC METABOLIC PNL TOTAL CA: CPT | Performed by: SURGERY

## 2019-12-10 PROCEDURE — 99232 SBSQ HOSP IP/OBS MODERATE 35: CPT | Performed by: INTERNAL MEDICINE

## 2019-12-10 PROCEDURE — 85027 COMPLETE CBC AUTOMATED: CPT | Performed by: SURGERY

## 2019-12-10 PROCEDURE — 85049 AUTOMATED PLATELET COUNT: CPT | Performed by: SURGERY

## 2019-12-10 PROCEDURE — 99232 SBSQ HOSP IP/OBS MODERATE 35: CPT | Performed by: SURGERY

## 2019-12-10 PROCEDURE — 85018 HEMOGLOBIN: CPT | Performed by: SURGERY

## 2019-12-10 PROCEDURE — 85014 HEMATOCRIT: CPT | Performed by: SURGERY

## 2019-12-10 PROCEDURE — 99024 POSTOP FOLLOW-UP VISIT: CPT | Performed by: SURGERY

## 2019-12-10 RX ADMIN — DOCUSATE SODIUM 100 MG: 100 CAPSULE, LIQUID FILLED ORAL at 17:20

## 2019-12-10 RX ADMIN — AMLODIPINE BESYLATE 5 MG: 5 TABLET ORAL at 08:09

## 2019-12-10 RX ADMIN — SODIUM CHLORIDE 1000 ML: 0.9 INJECTION, SOLUTION INTRAVENOUS at 21:04

## 2019-12-10 RX ADMIN — OXYCODONE HYDROCHLORIDE 10 MG: 10 TABLET ORAL at 13:23

## 2019-12-10 RX ADMIN — HEPARIN SODIUM 5000 UNITS: 5000 INJECTION INTRAVENOUS; SUBCUTANEOUS at 05:19

## 2019-12-10 RX ADMIN — OXYCODONE HYDROCHLORIDE 10 MG: 10 TABLET ORAL at 17:44

## 2019-12-10 RX ADMIN — OXYCODONE HYDROCHLORIDE 10 MG: 10 TABLET ORAL at 03:29

## 2019-12-10 RX ADMIN — POTASSIUM CHLORIDE 20 MEQ: 1500 TABLET, EXTENDED RELEASE ORAL at 00:03

## 2019-12-10 RX ADMIN — HYDROMORPHONE HYDROCHLORIDE 0.5 MG: 1 INJECTION, SOLUTION INTRAMUSCULAR; INTRAVENOUS; SUBCUTANEOUS at 14:58

## 2019-12-10 RX ADMIN — MAGNESIUM OXIDE TAB 400 MG (240 MG ELEMENTAL MG) 400 MG: 400 (240 MG) TAB at 08:09

## 2019-12-10 RX ADMIN — FLUTICASONE PROPIONATE 1 SPRAY: 50 SPRAY, METERED NASAL at 13:31

## 2019-12-10 RX ADMIN — ASPIRIN 81 MG 162 MG: 81 TABLET ORAL at 08:09

## 2019-12-10 RX ADMIN — HEPARIN SODIUM 5000 UNITS: 5000 INJECTION INTRAVENOUS; SUBCUTANEOUS at 13:24

## 2019-12-10 RX ADMIN — DOCUSATE SODIUM 100 MG: 100 CAPSULE, LIQUID FILLED ORAL at 08:09

## 2019-12-10 RX ADMIN — SODIUM CHLORIDE, SODIUM LACTATE, POTASSIUM CHLORIDE, AND CALCIUM CHLORIDE 50 ML/HR: .6; .31; .03; .02 INJECTION, SOLUTION INTRAVENOUS at 13:15

## 2019-12-10 RX ADMIN — SENNOSIDES 8.6 MG: 8.6 TABLET, FILM COATED ORAL at 08:09

## 2019-12-10 RX ADMIN — CHLORHEXIDINE GLUCONATE 0.12% ORAL RINSE 15 ML: 1.2 LIQUID ORAL at 21:03

## 2019-12-10 RX ADMIN — HEPARIN SODIUM 5000 UNITS: 5000 INJECTION INTRAVENOUS; SUBCUTANEOUS at 21:03

## 2019-12-10 RX ADMIN — OXYCODONE HYDROCHLORIDE 10 MG: 10 TABLET ORAL at 09:10

## 2019-12-10 RX ADMIN — CHLORHEXIDINE GLUCONATE 0.12% ORAL RINSE 15 ML: 1.2 LIQUID ORAL at 08:09

## 2019-12-10 RX ADMIN — NICOTINE 1 PATCH: 21 PATCH, EXTENDED RELEASE TRANSDERMAL at 08:46

## 2019-12-10 RX ADMIN — ZINC 1 TABLET: TAB ORAL at 08:10

## 2019-12-10 RX ADMIN — ATORVASTATIN CALCIUM 80 MG: 80 TABLET, FILM COATED ORAL at 08:09

## 2019-12-10 RX ADMIN — HYDROMORPHONE HYDROCHLORIDE 0.5 MG: 1 INJECTION, SOLUTION INTRAMUSCULAR; INTRAVENOUS; SUBCUTANEOUS at 08:59

## 2019-12-10 RX ADMIN — CARVEDILOL 12.5 MG: 12.5 TABLET, FILM COATED ORAL at 17:20

## 2019-12-10 RX ADMIN — CLOPIDOGREL BISULFATE 75 MG: 75 TABLET ORAL at 08:09

## 2019-12-10 RX ADMIN — CARVEDILOL 12.5 MG: 12.5 TABLET, FILM COATED ORAL at 08:09

## 2019-12-10 RX ADMIN — OXYCODONE HYDROCHLORIDE 10 MG: 10 TABLET ORAL at 21:41

## 2019-12-10 RX ADMIN — SODIUM CHLORIDE 1000 ML: 0.9 INJECTION, SOLUTION INTRAVENOUS at 23:39

## 2019-12-10 RX ADMIN — LEVOTHYROXINE SODIUM 25 MCG: 25 TABLET ORAL at 05:19

## 2019-12-10 NOTE — PROGRESS NOTES
Progress Note - Vascular Surgery   Lorene Wells 78 y o  female MRN: [id number]  Unit/Bed#: MICU 09 Encounter: [id number]    Assessment:  78 y o  F w hx of Aortoiliac occlusive disease who presents with bilateral lower extremity rest pain  S/p 12/9 [id number]    Plan:  Clears  MIVF  Prn pain control  Continued neurovascular checks  plavix   D/c chantal  D/c means  PT/OT  Can come out of the unit to SD2    Subjective/Objective   Chief Complaint:     Subjective: LUIS EDUARDO  Pain controlled  Left leg/foot feeling better with continued motor and sensation defects  No numbness/tingling  No N/V  Objective:     Blood pressure (!) 143/47, pulse 74, temperature 97 8 °F (36 6 °C), temperature source Oral, resp  rate (!) 34, height 5' 4" (1 626 m), weight 52 2 kg (115 lb 1 3 oz), SpO2 97 %  ,Body mass index is 19 75 kg/m²  Intake/Output Summary (Last 24 hours) at 12/10/2019 8445  Last data filed at 12/10/2019 0601  Gross per 24 hour   Intake 5105 ml   Output 2095 ml   Net 3010 ml       Invasive Devices     Peripheral Intravenous Line            Peripheral IV 12/06/19 Right Antecubital 3 days    Peripheral IV 12/09/19 Left Hand less than 1 day          Arterial Line            Arterial Line 12/09/19 Left Brachial less than 1 day          Drain            Urethral Catheter Latex 16 Fr  less than 1 day                Physical Exam: NAD  Atraumatic/normocephalic  AAOX3  Normal mood and affect  Normal respiratory effort  Soft, non tender, ND, incision covered in mepelex c/d/i  Skin warm/dry  B/l feet with dopp PT/DP  Groin incisions c/d/i  B/l lower extremity compartments soft  LLE: cannot wiggle toes (same as pre-op), decreased sensation compared to right       Lab, Imaging and other studies:  I have personally reviewed pertinent lab results    , CBC:   Lab Results   Component Value Date    WBC 15 14 (H) 12/10/2019    HGB 7 9 (L) 12/10/2019    HCT 24 3 (L) 12/10/2019     (H) 12/10/2019     12/10/2019 MCH 34 3 12/10/2019    MCHC 32 5 12/10/2019    RDW 15 6 (H) 12/10/2019    MPV 10 6 12/10/2019    NRBC 0 12/09/2019   , CMP:   Lab Results   Component Value Date    SODIUM 139 12/10/2019    K 3 6 12/10/2019     12/10/2019    CO2 24 12/10/2019    CO2 21 12/09/2019    BUN 22 12/10/2019    CREATININE 0 90 12/10/2019    GLUCOSE 173 (H) 12/09/2019    CALCIUM 8 2 (L) 12/10/2019    EGFR 61 12/10/2019     VTE Pharmacologic Prophylaxis: Heparin  VTE Mechanical Prophylaxis: sequential compression device

## 2019-12-10 NOTE — CONSULTS
[address] 78 y o  female MRN: [id number]  Unit/Bed#: OR [place] Encounter: [id number]     Physician Requesting Consult: Samuel Perez MD        Inpatient consult to Surgical Critical Care     Date/Time 12/9/2019 7:49 PM     Performed by  Don Kay MD     Authorized by Masha Romero MD               Reason for Consultation / Chief Complaint: Neurovascular checks s/p aorto-bifemoral bypass     History of Present Illness:  Lakeshia Zheng is a 78 y o  female w/ hx HTN, CAD, and AIOD w/ bilateral rest pain taken to the operating room today in advance of her previously scheduled surgery for worsening motor function on examination this AM  She was urgently taken to the operating room with Vascular surgery where she underwent aorto-bifemoral bypass and left femoral thromboembolectomy  Initially, patient's left foot remained cool/pale following revascularization and therefore the left groin was reopened and the anastomosis was revised  At the conclusion of the case bilateral palpable popliteal pulses were appreciated with both feet demonstrating good capillary refill but no distal arterial Doppler signals  SCC consulted for frequent neurovascular checks and   monitoring for compartment syndrome given her extensive revascularization  History obtained from chart review       Past Medical History:  Past Medical History:   Diagnosis Date    Arthritis     Benign essential hypertension     CAD (coronary artery disease)     Disease of thyroid gland     Dizziness     Edema of leg     Hyperlipidemia     Hypertension     Other disorder of circulatory system (CODE)         Past Surgical History:  Past Surgical History:   Procedure Laterality Date    BREAST SURGERY      bxs,benign    COLONOSCOPY      HYSTERECTOMY      INCISIONAL BREAST BIOPSY      x5, 1964, 1965, 1985 and 1990        Past Family History:  Family History   Problem Relation Age of Onset    Hypertension Father    Fadumo Coronary artery disease Family     Arthritis Family         Social History:  Social History     Tobacco Use   Smoking Status Current Every Day Smoker    Packs/day: 1 00   Smokeless Tobacco Never Used   Tobacco Comment    last cigarette was 1 week ago     Social History     Substance and Sexual Activity   Alcohol Use Yes    Comment: lizbethttbeatriz 2-3 every day for 50 years     Social History     Substance and Sexual Activity   Drug Use No     Marital Status:      Home Medications:   Prior to Admission medications    Medication Sig Start Date End Date Taking?  Authorizing Provider   albuterol (VENTOLIN HFA) 90 mcg/act inhaler Inhale 2 puffs every 6 (six) hours as needed for wheezing 9/17/18   Mikaela Baird DO   amLODIPine (NORVASC) 5 mg tablet Take 1 tablet (5 mg total) by mouth daily 9/11/19   Nano Kunz MD   aspirin 81 MG tablet Take 2 tablets by mouth daily 12/24/13   Historical Provider, MD   atorvastatin (LIPITOR) 80 mg tablet take 1 tablet by mouth once daily 5/21/19   Mikaela aBird DO   B Complex Vitamins (VITAMIN B-COMPLEX) TABS Take by mouth    Historical Provider, MD   Bioflavonoid Products (VITAMIN C PLUS PO) Take by mouth daily    Historical Provider, MD   carvedilol (COREG) 12 5 mg tablet Take 1 tablet (12 5 mg total) by mouth 2 (two) times a day with meals  Patient not taking: Reported on 11/19/2019 10/10/19   Sid Kapoor DO   enalapril (VASOTEC) 20 mg tablet Take 1 tablet (20 mg total) by mouth 2 (two) times a day  Patient not taking: Reported on 11/24/2019 9/11/19   Nano Kunz MD   fluticasone [facility]) 50 mcg/act nasal spray 1 spray into each nostril daily 1/3/19   Mikaela Baird DO   GARLIC PO Take by mouth daily     Historical Provider, MD   levothyroxine 25 mcg tablet Take 1 tablet (25 mcg total) by mouth daily in the early morning 10/3/19   Mikaela Baird DO   magnesium oxide (MAG-OX) 400 mg Take 1 tablet (400 mg total) by mouth daily 9/27/19   Nano Kunz MD   Multiple Minerals (CALCIUM-MAGNESIUM-ZINC) TABS Take by mouth daily    Historical Provider, MD   Multiple Vitamins-Minerals (CENTRUM SILVER PO) Take by mouth    Historical Provider, MD   nicotine (NICODERM CQ) 14 mg/24hr TD 24 hr patch Place 1 patch on the skin every 24 hours 11/27/19   Thea Armstrong MD   potassium chloride (MICRO-K) 8 mEq CR capsule Take 8 mEq by mouth 2 (two) times a day    Historical Provider, MD   spironolactone (ALDACTONE) 25 mg tablet Take 1 tablet (25 mg total) by mouth daily 9/27/19   Brice Saleem MD   torsemide [facility]) 5 MG tablet Take 1 tablet (5 mg total) by mouth daily 9/27/19   Brice Saleem MD        Inpatient Medications:  Scheduled Meds:  Current Facility-Administered Medications:  [MAR Hold] acetaminophen 650 mg Oral Q6H PRN Peg Golas, DO    [facility] Hold] amLODIPine 5 mg Oral Daily Peg Golas, DO    [facility] Hold] aspirin 162 mg Oral Daily Peg Golas, DO    [facility] Hold] atorvastatin 80 mg Oral Daily Peg Golas, DO    [facility] Hold] carvedilol 12 5 mg Oral BID With Meals Peg Golas, DO    ceFAZolin 1,000 mg Intravenous Once Horacio Scott CRNA    [facility] Hold] docusate sodium 100 mg Oral BID Peg Golas, DO    fentaNYL 25 mcg Intravenous Q3 min PRN Candace Kawasaki, [facility] Hold] fluticasone 1 spray Nasal Daily Peg Golas, DO    heparin 2000 units and papaverine 60 mg in isolyte equivalent 500 mL irrigation bag  Irrigation Once Saravanan Webber MD    heparin (porcine) 3-20 Units/kg/hr (Order-Specific) Intravenous Titrated Peg Golas, DO Last Rate: 18 Units/kg/hr (12/09/19 0857)   [MAR Hold] heparin (porcine) 1,650 Units Intravenous PRN Peg Golas, DO    [facility] Hold] heparin (porcine) 3,300 Units Intravenous PRN Peg Golas, DO    HYDROmorphone 0 2 mg Intravenous Q5 Min PRN Candace Kawasaki, CRNA    [facility] Hold] HYDROmorphone 0 5 mg Intravenous Q3H PRN Winsome Mullins DO    [facility] Hold] hydrOXYzine HCL 10 mg Oral Q6H PRN Ruperto Courtney Ledbetter MD    lactated ringers 75 mL/hr Intravenous Continuous Michelle Aparicio MD    [facility] Hold] levothyroxine 25 mcg Oral Early Morning Huber Rodriguez, DO    [facility] Hold] magnesium oxide 400 mg Oral Daily Ngo Sabcoleen, DO    [facility] Hold] multivitamin stress formula 1 tablet Oral Daily Terry Kan MD    [facility] Hold] nicotine 1 patch Transdermal Daily Ngo Sabcoleen, DO    ondansetron 4 mg Intravenous Once PRN Mahesh Walters CRNA    [facility] Hold] oxyCODONE 10 mg Oral Q4H PRN Ngo Sabcoleen, DO    [facility] Hold] oxyCODONE 5 mg Oral Q4H PRN Ngo Sabcoleen, DO    [facility] Hold] senna 1 tablet Oral Daily Ngo Sabcoleen, DO      Continuous Infusions:  heparin (porcine) 3-20 Units/kg/hr (Order-Specific) Last Rate: 18 Units/kg/hr (12/09/19 0857)   lactated ringers 75 mL/hr      PRN Meds:    [MAR Hold] acetaminophen 650 mg Q6H PRN   fentaNYL 25 mcg Q3 min PRN   [MAR Hold] heparin (porcine) 1,650 Units PRN   [MAR Hold] heparin (porcine) 3,300 Units PRN   HYDROmorphone 0 2 mg Q5 Min PRN   [MAR Hold] HYDROmorphone 0 5 mg Q3H PRN   [MAR Hold] hydrOXYzine HCL 10 mg Q6H PRN   ondansetron 4 mg Once PRN   [MAR Hold] oxyCODONE 10 mg Q4H PRN   [MAR Hold] oxyCODONE 5 mg Q4H PRN        Allergies: Allergies   Allergen Reactions    Thiazide-Type Diuretics      Hyponatremia        ROS:   Review of Systems   Constitutional: Negative for chills and fever  HENT: Negative  Eyes: Negative  Respiratory: Negative for shortness of breath  Cardiovascular: Negative for chest pain and leg swelling  Gastrointestinal: Positive for abdominal pain  Negative for nausea and vomiting  Endocrine: Negative  Genitourinary: Negative  Musculoskeletal: Negative  Allergic/Immunologic: Negative  Neurological: Negative  Hematological: Negative  Psychiatric/Behavioral: Negative            Vitals:  Vitals:    12/08/19 2310 12/09/19 0815 12/09/19 1925 12/09/19 1927   BP: 166/60 115/61 140/56 140/56   Pulse:  73 90 90   Resp:  18 20   Temp: 98 °F (36 7 °C) 98 3 °F (36 8 °C) 97 5 °F (36 4 °C) 97 5 °F (36 4 °C)   TempSrc:    Temporal   SpO2:  98%  100%   Weight:       Height:         Temperature:   Temp (24hrs), Av 8 °F (36 6 °C), Min:97 5 °F (36 4 °C), Max:98 3 °F (36 8 °C)    Current Temperature: 97 5 °F (36 4 °C)     Weights:   IBW: 54 7 kg  Body mass index is 20 39 kg/m²  Hemodynamic Monitoring:  N/A     Non-Invasive/Invasive Ventilation Settings:  Respiratory    Lab Data (Last 4 hours)    None         O2/Vent Data (Last 4 hours)    None              No results found for: PHART, YTO4KZW, PO2ART, KWC9PEZ, K5OZLACI, BEART, SOURCE  SpO2: SpO2: 100 %     Physical Exam:  Physical Exam   Constitutional: She is oriented to person, place, and time  She appears well-developed and well-nourished  No distress  HENT:   Head: Normocephalic  Eyes: EOM are normal  Right eye exhibits no discharge  Left eye exhibits no discharge  Neck: Normal range of motion  Neck supple  No JVD present  No tracheal deviation present  Cardiovascular: Normal rate and regular rhythm  Left brachial arterial line  Palpable popliteal pulses, bilaterally  Triphasic DP/PT doppler signals bilaterally  Pulmonary/Chest: Effort normal  No respiratory distress  Abdominal: Soft  Abdomen soft, appropriately tender  Nondistended  No rebound/guarding  Mepilex dressing over midline surgical incision  C/d/i  Genitourinary:   Genitourinary Comments: Lomax catheter in place with light yellow urine in bag  Musculoskeletal: She exhibits no edema, tenderness or deformity  Bilateral lower extremity compartments soft  Neurological: She is alert and oriented to person, place, and time  Motor/sensory intact  Skin: Skin is warm and dry  She is not diaphoretic  Bilateral feet ruborous  Psychiatric: She has a normal mood and affect   Her behavior is normal          Labs:  Results from last 7 days   Lab Units 19  0506 19  1850 19  1231   WBC Thousand/uL 9 73 9 10 8 97   HEMOGLOBIN g/dL 9 7* 10 6* 10 0*   HEMATOCRIT % 29 4* 31 8* 30 5*   PLATELETS Thousands/uL 339 352 369   NEUTROS PCT %  --   --  67   MONOS PCT %  --   --  9    Results from last 7 days   Lab Units 12/07/19  0506 12/06/19  1231   SODIUM mmol/L 142 140   POTASSIUM mmol/L 3 5 4 2   CHLORIDE mmol/L 108 102   CO2 mmol/L 27 27   BUN mg/dL 31* 32*   CREATININE mg/dL 0 95 0 86   CALCIUM mg/dL 9 4 9 3   ALK PHOS U/L  --  86   ALT U/L  --  48   AST U/L  --  50*     Results from last 7 days   Lab Units 12/07/19  0506 12/06/19  1231   MAGNESIUM mg/dL 2 0 1 8     Results from last 7 days   Lab Units 12/07/19  0506   PHOSPHORUS mg/dL 3 9      Results from last 7 days   Lab Units 12/09/19  1442 12/09/19  0519 12/08/19  2314  12/06/19  1850 12/06/19  1231   INR   --   --   --   --  1 14 1 02   PTT seconds 38* 50* 72*   < > 93* 24    < > = values in this interval not displayed  0   Lab Value Date/Time    TROPONINI <0 02 12/24/2018 1400    TROPONINI <0 02 08/30/2017 1111        Imaging:  I have personally reviewed pertinent reports  and I have personally reviewed pertinent films in PACS   MRI lumbar spine w wo contrast    (Results Pending)   XR abdomen 1 view kub    (Results Pending)     EKG: This was personally reviewed by myself  Micro:  Lab Results   Component Value Date    BLOODCX No Growth After 5 Days  11/24/2019    BLOODCX Escherichia coli (A) 11/24/2019    URINECX >100,000 cfu/ml Escherichia coli (A) 11/24/2019       Assessment: Etta Trimble is a 78 y o  female w/ AIOD and bilateral rest pain taken to OR urgently ahead of her planned revascularization for worsening motor symptoms     Plan:  Neuro: Postoperative pain control  PRN PO Tylenol/Oxycodone and IV Dilaudid  #L1 epidural mass, seen on CTA  ddx meningioma vs  Calcified herniated disc vs  osteophyte  · Neurosurgery following  · MRI lumbar spine pending    CV: MAP goal > 65  Continue arterial line      #Aorto-iliac occlusive disease w/ bilateral rest pain  · Taken to OR 12/9 for aorto-bifemoral bypass w/ left femoral endarterectomy  · Continue q1h neurovascular checks  · Monitor for s/s of compartment syndrome  · Given 150mg Plavix in PACU, continue  Will start ASA 12/10 per Vascular surgery  #History CAD  Resume ASA 12/10, as above  #History of diastolic heart failure  Holding home diuretics  #Hx HTN  Continue home coreg/norvasc  Holding Lisinopril  Lung: History of COPD  Continue home albuterol/fluticasone  Maintain SpO2 > 92  GI: No acute issues  Sips of clears  FEN: LR @ 75  Replete electrolytes PRN  Continue sips of clears  : Maintain Lomax catheter for stricts I/Os  Trend UOP and monitor serum creatinine  ID: Trend WBC/monitor fever curve  Heme: Trend hgb, transfuse PRN  DVT ppx w/ SQH  Endo: Continue home levothyroxine  Msk/Skin: No acute issues  Disposition: Continue ICU level of care     VTE Pharmacologic Prophylaxis: Heparin  VTE Mechanical Prophylaxis: sequential compression device     Invasive lines and devices: Invasive Devices     Peripheral Intravenous Line            Peripheral IV 12/06/19 Right Antecubital 3 days    Peripheral IV 12/09/19 Left Hand less than 1 day          Arterial Line            Arterial Line 12/09/19 Left Brachial less than 1 day          Drain            Urethral Catheter Latex 16 Fr  less than 1 day                 Code Status: Level 3 - DNAR and DNI  POA:    POLST:       Given critical illness, patient length of stay will require greater than two midnights  Counseling / Coordination of Care  Total Critical Care time spent 30 minutes excluding procedures, teaching and family updates  Portions of the record may have been created with voice recognition software  Occasional wrong word or "sound a like" substitutions may have occurred due to the inherent limitations of voice recognition software    Read the chart carefully and recognize, using context, where substitutions have occurred          Sherryle Rideau, MD

## 2019-12-10 NOTE — PROGRESS NOTES
Daily Progress Note - Critical Care   Julio Gutierrez 78 y o  female MRN: [id number]  Unit/Bed#: MICU 09 Encounter: [id number]    ----------------------------------------------------------------------------------------  HPI/24hr events: POD#1 s/p aorto-bifemoral bypass w/ left femoral endarterectomy  Afebrile O/N      ---------------------------------------------------------------------------------------  SUBJECTIVE  Patient notes some abdominal pain this AM which she described as cramping  Denies fevers/chills  No pain/swelling in b/l LE  Denies chest pain, dyspnea  Review of Systems   Constitutional: Negative for chills and fever  HENT: Negative for congestion and rhinorrhea  Eyes: Negative for photophobia and visual disturbance  Respiratory: Negative for cough, choking, chest tightness, shortness of breath and wheezing  Cardiovascular: Negative for chest pain and leg swelling  Gastrointestinal: Positive for abdominal pain (cramping, midabdomen  )  Negative for abdominal distention, constipation, diarrhea, nausea and vomiting  Endocrine: Negative for polyuria  Genitourinary: Negative for difficulty urinating, dysuria, flank pain and hematuria  Musculoskeletal: Negative for arthralgias  Skin: Negative for rash  Neurological: Negative for seizures, syncope and light-headedness  Psychiatric/Behavioral: Negative for behavioral problems and confusion  All other systems reviewed and are negative  Review of systems was reviewed and negative unless stated above in HPI/24-hour events   ---------------------------------------------------------------------------------------  Assessment and Plan:    Neuro:    Diagnosis: No acute issues  o Plan: Pain moderately controlled w/ PRN dilaudid and roxicodone  [id number]     CV:    Diagnosis: POD#1 s/p aorto-bifemoral bypass w/ left femoral endarterectomy  o Plan: B/L LE warm  +2/4 popliteal and DP this AM  Maintain MAP >65      Pulm:   Diagnosis: No acute issues  o Plan: Maintain sat >94%  Encourage IS and pulmonary toilet  GI:    Diagnosis: No acute issues  o Plan: Continue bowel regimen  Advance diet per vascular team recommendations  :    Diagnosis: No acute issues  o Plan: Urine output adequate  Bun/Cr WNL  D/C means  F/E/N:    Plan: LR 75 cc/hr  Clear diet  Electrolytes WNL  Heme/Onc:    Diagnosis: Macrocytic Anemia  o Plan: Hb stable at 7 9  Trend H/H  Transfuse APN to maintain Hb >7  Platelets WNL  Endo:    Diagnosis: Hypothyroidism  o Plan: Continue home dose Synthroid  ID:    Diagnosis: Leukocytosis  o Plan: Leukocytosis noted this AM  Likely reactive  Afebrile O/N  Trend temp and daily CBC  MSK/Skin:    Diagnosis: No acute issues  o Plan: PT/OT  OOB as tolerated  Frequent repositioning  Disposition: Transfer to Stepdown Level 2  Code Status: Level 3 - DNAR and DNI  ---------------------------------------------------------------------------------------  ICU CORE MEASURES    Prophylaxis   VTE Pharmacologic Prophylaxis: Heparin  VTE Mechanical Prophylaxis: sequential compression device  Stress Ulcer Prophylaxis: Prophylaxis Not Indicated     ABCDE Protocol (if indicated)  Plan to perform spontaneous awakening trial today? Not applicable  Plan to perform spontaneous breathing trial today? Not applicable  Obvious barriers to extubation? Not applicable    Invasive Devices Review  Invasive Devices     Peripheral Intravenous Line            Peripheral IV 12/06/19 Right Antecubital 3 days    Peripheral IV 12/09/19 Left Hand less than 1 day          Arterial Line            Arterial Line 12/09/19 Left Brachial less than 1 day          Drain            Urethral Catheter Latex 16 Fr  less than 1 day              Can any invasive devices be discontinued today?  Yes  ---------------------------------------------------------------------------------------  OBJECTIVE    Physical Exam   Constitutional: She is oriented to person, place, and time  She appears well-developed and well-nourished  Non-toxic appearance  She does not appear ill  No distress  HENT:   Head: Normocephalic and atraumatic  Eyes: Pupils are equal, round, and reactive to light  Neck: Neck supple  Cardiovascular: Normal rate, regular rhythm and intact distal pulses  Murmur (blowing, holosystolic ) heard  Pulses:       Popliteal pulses are 2+ on the right side, and 2+ on the left side  Dorsalis pedis pulses are 2+ on the right side, and 2+ on the left side  Pulmonary/Chest: Effort normal and breath sounds normal  No accessory muscle usage or stridor  No respiratory distress  She has no decreased breath sounds  She has no wheezes  She has no rhonchi  She has no rales  Abdominal: Soft  Bowel sounds are normal  She exhibits no distension  There is no tenderness  There is no rigidity and no rebound  Midline abdominal incision C/D/I  Musculoskeletal:        Right upper leg: She exhibits no swelling and no edema  Left upper leg: She exhibits no swelling  Right lower leg: She exhibits no tenderness and no edema  Left lower leg: She exhibits no tenderness and no edema  B/l LE warm   Neurological: She is alert and oriented to person, place, and time  Skin: Capillary refill takes less than 2 seconds  No rash noted  Psychiatric: She has a normal mood and affect  Her behavior is normal    Nursing note and vitals reviewed        Vitals   Vitals:    12/10/19 0400 12/10/19 0500 12/10/19 0600 12/10/19 0700   BP:       Pulse: 74 76 74 76   Resp: 18 (!) 24 (!) 34 21   Temp: 97 8 °F (36 6 °C)      TempSrc: Oral      SpO2: 98% 97% 97% 98%   Weight:       Height:         Temp (24hrs), Av 8 °F (36 6 °C), Min:97 5 °F (36 4 °C), Max:98 3 °F (36 8 °C)  Current: Temperature: 97 8 °F (36 6 °C)  HR: 74  BP: 143/47  RR: 20  SpO2: 97%    Invasive/non-invasive ventilation settings   Respiratory    Lab Data (Last 4 hours)    None         O2/Vent Data (Last 4 hours)    None                Height and Weights   Height: 5' 4" (162 6 cm)  IBW: 54 7 kg  Body mass index is 19 75 kg/m²  Weight (last 2 days)     Date/Time   Weight    12/09/19 2148   52 2 (115 08)              Intake and Output  I/O       12/08 0701 - 12/09 0700 12/09 0701 - 12/10 0700    P  O  360 0    I V  (mL/kg) 200 9 (3 7) 4605 (88 2)    IV Piggyback  500    Total Intake(mL/kg) 560 9 (10 4) 5105 (97 8)    Urine (mL/kg/hr) 600 (0 5) 1795 (1 4)    Stool 0     Blood  300    Total Output 600 2095    Net -39 1 +3010              UOP: 1 4 ml/kg/hr     Nutrition       Diet Orders   (From admission, onward)             Start     Ordered    12/09/19 2146  Diet NPO; Sips with meds  Diet effective now     Question Answer Comment   Diet Type NPO    NPO Except: Sips with meds    RD to adjust diet per protocol?  Yes        12/09/19 2147              Laboratory and Diagnostics:  Results from last 7 days   Lab Units 12/10/19  0520 12/10/19  0002 12/09/19 1935 12/09/19  1728 12/09/19  1528 12/07/19  0506 12/06/19  1850 12/06/19  1231   WBC Thousand/uL 15 14*  --  20 93*  --   --  9 73 9 10 8 97   HEMOGLOBIN g/dL 7 9* 8 1* 7 8*  --   --  9 7* 10 6* 10 0*   I STAT HEMOGLOBIN g/dl  --   --   --  8 8* 11 6  --   --   --    HEMATOCRIT % 24 3* 24 5* 23 2*  --   --  29 4* 31 8* 30 5*   HEMATOCRIT, ISTAT %  --   --   --  26* 34*  --   --   --    PLATELETS Thousands/uL 211 203 200  --   --  339 352 369   NEUTROS PCT %  --   --  90*  --   --   --   --  67   MONOS PCT %  --   --  5  --   --   --   --  9     Results from last 7 days   Lab Units 12/10/19  0520 12/09/19 1935 12/09/19  1728 12/09/19  1528 12/07/19  0506 12/06/19  1231   SODIUM mmol/L 139 144  --   --  142 140   POTASSIUM mmol/L 3 6 3 4*  --   --  3 5 4 2   CHLORIDE mmol/L 108 111*  --   --  108 102   CO2 mmol/L 24 24  --   --  27 27   CO2, I-STAT mmol/L  --   --  21 23  --   --    ANION GAP mmol/L 7 9  --   --  7 11   BUN mg/dL 22 22  --   --  31* 32* CREATININE mg/dL 0 90 0 95  --   --  0 95 0 86   CALCIUM mg/dL 8 2* 7 7*  --   --  9 4 9 3   GLUCOSE RANDOM mg/dL 144* 145*  --   --  105 94   ALT U/L  --   --   --   --   --  48   AST U/L  --   --   --   --   --  50*   ALK PHOS U/L  --   --   --   --   --  86   ALBUMIN g/dL  --   --   --   --   --  3 2*   TOTAL BILIRUBIN mg/dL  --   --   --   --   --  0 50     Results from last 7 days   Lab Units 12/07/19  0506 12/06/19  1231   MAGNESIUM mg/dL 2 0 1 8   PHOSPHORUS mg/dL 3 9  --       Results from last 7 days   Lab Units 12/09/19  1442 12/09/19  0519 12/08/19  2314 12/08/19  1632 12/08/19  0908 12/08/19  0140 12/07/19  1741  12/06/19  1850 12/06/19  1231   INR   --   --   --   --   --   --   --   --  1 14 1 02   PTT seconds 38* 50* 72* 60* 57* 97* 58*   < > 93* 24    < > = values in this interval not displayed            Results from last 7 days   Lab Units 12/09/19  1935   LACTIC ACID mmol/L 1 3       Active Medications  Scheduled Meds:    Current Facility-Administered Medications:  acetaminophen 650 mg Oral Q6H PRN Rhiannon Cruz MD    amLODIPine 5 mg Oral Daily Rhiannon Cruz MD    aspirin 162 mg Oral Daily Rhiannon Cruz MD    atorvastatin 80 mg Oral Daily Rhiannon Cruz MD    carvedilol 12 5 mg Oral BID With Meals Rhiannon Cruz MD    chlorhexidine 15 mL Swish & Spit Q12H [facility] Toya Kim MD    clopidogrel 75 mg Oral Daily Rhiannon Cruz MD    docusate sodium 100 mg Oral BID Rhiannon Cruz MD    fluticasone 1 spray Nasal Daily Rhiannon Cruz MD    heparin (porcine) 5,000 Units Subcutaneous [facility] Toya Kim MD    HYDROmorphone 0 5 mg Intravenous Q3H PRN Rhiannon Cruz MD    hydrOXYzine HCL 10 mg Oral Q6H PRN Rhiannon Cruz MD    lactated ringers 500 mL Intravenous Once PRN Rhiannon Cruz MD    And        lactated ringers 500 mL Intravenous Once PRN Rhiannon Cruz MD    lactated ringers 75 mL/hr Intravenous Continuous Rhiannon Cruz MD Last Rate: 75 mL/hr (12/09/19 0407) levothyroxine 25 mcg Oral Early Morning Kingsley Ohara MD    magnesium oxide 400 mg Oral Daily Kingsley Ohara MD    multivitamin stress formula 1 tablet Oral Daily Kingsley Ohara MD    nicotine 1 patch Transdermal Daily Kingsley Ohara MD    oxyCODONE 10 mg Oral Q4H PRN Kingsley Ohara MD    oxyCODONE 5 mg Oral Q4H PRN Kingsley Ohara MD    senna 1 tablet Oral Daily Kingsley Ohara MD      Continuous Infusions:    lactated ringers 75 mL/hr Last Rate: 75 mL/hr (12/09/19 9569)     PRN Meds:     acetaminophen 650 mg Q6H PRN   HYDROmorphone 0 5 mg Q3H PRN   hydrOXYzine HCL 10 mg Q6H PRN   lactated ringers 500 mL Once PRN   And     lactated ringers 500 mL Once PRN   oxyCODONE 10 mg Q4H PRN   oxyCODONE 5 mg Q4H PRN       Allergies   Allergies   Allergen Reactions    Thiazide-Type Diuretics      Hyponatremia       Counseling / Coordination of Care  Total time spent today 40 minutes  Greater than 50% of total time was spent with the patient and / or family counseling and / or coordination of care  A description of the counseling / coordination of care: History and physical exam, chart review, rounding  Harshal Bullard DO    Portions of the record may have been created with voice recognition software  Occasional wrong word or "sound a like" substitutions may have occurred due to the inherent limitations of voice recognition software    Read the chart carefully and recognize, using context, where substitutions have occurred

## 2019-12-10 NOTE — OP NOTE
OPERATIVE REPORT  PATIENT NAME: Radha Partida    :  1940  MRN: [id number]  Pt Location: [facility] OR ROOM 06    SURGERY DATE: 2019    Surgeon(s) and Role:     * Silvia Gay MD - Assistant     * Mary Campos MD - Primary     * Seamus Plasencia MD - Assisting    Pre-op Diagnosis:     * Aortoiliac occlusive disease ([address] ) [I74 09]     * Critical lower limb ischemia [I99 8]    Post-Op Diagnosis Codes:     * Aortoiliac occlusive disease ([address] ) [I74 09]     * Critical lower limb ischemia [I99 8]    Procedure:  Procedure(s):  BYPASS AORTA BIFEMORAL WITH LEFT FEMORAL THROMBOEMBOLECTOMY    Specimen(s):  * No specimens in log *    Estimated Blood Loss:   200 mL    Drains:  Urethral Catheter Latex 16 Fr  (Active)   Number of days: 0       Anesthesia Type:   General    Operative Indications:  42-year-old with known aorto iliac occlusive disease was recently admitted the hospital secondary to severe exacerbation of lower extremity ischemia with acute on chronic ischemic changes more severe on the left as compared to the right  Subsequent CT angiogram showed interval occlusion of the abdominal aorta and left common iliac artery with chronic right iliac artery occlusion  Of note the patient had diminished motor function on examination this a m  which was a progression since admission thus patient was taken urgently to the operating room  Operative Findings:  Highly calcified infrarenal abdominal aorta with acute and chronic thrombus  Extensive endarterectomy had to be performed to create a proximal aorta amenable to arterial anastomosis  The distal aorta was also endarterectomized to allow over-sewing  A 14 x 7 bifurcated dacron graft was utilized in an end-to-end fashion just below the renal arteries  Cross clamp was infrarenal   Distal anastomoses were carried out to bilateral common femoral arteries with the more distal aspect of the anastomosis on the superficial femoral artery      On initial evaluation following revascularization the left foot remained cool and pale  The left groin was reopened and the anastomosis was taken down  The proximal superficial femoral artery was thrombosed  After local thrombectomy a 3 Mag catheter was passed with removal of a small amount of thrombus and restoration of backbleeding  Once this anastomosis was completed biphasic signals were noted in the deep and superficial femoral arteries  Palpable popliteal pulses were noted bilaterally  Both feet were now ruborous with immediate capillary refill  Complications:   None    Procedure and Technique:    The patient was brought to the operating room and placed on the operating table in the supine position  The patient was identified by verbal confirmation and armband identification  A qualified surgical resident was not available for this case  General anesthesia was administered  A radial A-line was placed  Central venous access was obtained  The abdomen and bilateral upper legs were prepped and draped in the normal sterile fashion with chlorhexidine prep  Ioban drapes were placed  A longitudinal incision was made from the xiphoid process to a point just above the pubic tubercle  Bovie cautery was used to maintain hemostasis  The fascia was opened on the midline  The peritoneum was opened throughout the length of the incision  The omentum was retracted  Any points of adhesion were lysed  The omentum and transverse colon were then retracted cephalad and wrapped with moist and gauze  The abdominal contents to include the right colon and small bowel were freed of any adhesions and encircled with a moistened towel and eviscerated to the right  The retroperitoneum was then opened in a longitudinal fashion by taking down the ligament of Treitz and retracting the duodenum to the right  Dissection was carried cephalad to identify the crossing left renal vein         The aorta was exposed from just above the aortic bifurcation to the point of the crossing left renal vein  The renal vein was freed of any investing tissues and retracted  Dissection was then carried out in the pararenal aorta to identify both the right and left main renal arteries  Dissection was then carried out at the level of the aortic bifurcation to expose bilateral common iliac arteries  The inferior mesenteric artery was also identified and encircled with a vessel loop  Transverse incisions were made in bilateral groins just above the inguinal creases  Bovie cautery was used for hemostasis  The inguinal ligaments retracted medially  The distal external iliac artery was dissected free to include the common femoral artery and proximal deep and superficial femoral arteries  All vessels were encircled with vessel loops bilaterally  Tunnel tract were then created and red rubber catheters were passed  IV heparin was administered  An ACT was obtained and further heparin boluses were given to maintain a therapeutic level  The iliac arteries and the inferior mesenteric artery were occluded with vessel loops and vascular clamps  An aortic clamp was placed just below the renal arteries  The distal aorta was occluded thus no distal clamp was placed  Lumbar vessels had been clipped prior to aortic cross-clamping  The aorta was then opened longitudinally on the anterior surface just below the renal arteries  Extensive endarterectomy was performed of the entire infrarenal aorta to the level of the aortic bifurcation secondary to highly calcified plaque to allow for appropriate proximal anastomosis and distal aortic closure  Of note no retrograde bleeding was identified from the inferior mesenteric artery which was clipped nor lumbar arteries which were clipped as noted above  A 14 x 7 bifurcated dacron graft was then chosen and secured proximally with a running 4 0 Prolene suture in an end-to-end fashion    Once this anastomosis was completed the aorta was flushed  Flow was then restored in the native aorta  Thrombin-soaked Surgicel was packed around the proximal anastomosis  The graft limbs were then passed through the previously created tunnel tracts  The femoral vessels were then occluded proximally and distally with vessel loops and vascular clamps  Arteriotomies were created on the anterior surface of the distal common femoral arteries and extended in the proximal superficial femoral arteries  Of note there was bulky plaque in the left common femoral artery which required endarterectomy  At this point the grafts were spatulated at the appropriate length and anastomosed with a running 6 0 Prolene suture in a standard running fashion  Once the anastomoses were nearly completed all vessels were back bled and flushed appropriately  The anastomosis were completed and then flow was initially restored to the right lower extremity with a transient drop in blood pressure which responded to appropriate fluid resuscitation  On the left flow was restored through the native circulation with a mild drop in blood pressure which normalized spontaneously  No bleeding points were encountered  Eighty mg of protamine were administered  The retroperitoneum was then explored and any residual bleeding points were controlled with clips, ties, suture or coagulation  Once hemostasis was obtained the retroperitoneum was closed with a running chromic suture  The bowel was then returned to the abdominal cavity and noted to be pink and peristaltic  The omentum was then repositioned  The fascia was then closed with a running double-stranded PDS suture in the standard fashion  The abdominal incision was irrigated with saline solution and the skin was closed with skin clips  A Mepilex dressing was placed  Both groin wounds were irrigated with antibiotic laden solution    Deep tissues were reapproximated with 2 layers of 2 0 Monocryl suture, 1 layer of 3 0 Monocryl suture and a 4 Monocryl subcuticular skin closure  On evaluation of the feet the right foot was ruborous with a palpable popliteal pulse  On the left foot remained pale with a nonpalpable popliteal pulse  The left femoral incision was reopened exposing the femoral vessels  A Doppler was used to isonate the femoral vessels  There is a good biphasic signal in the deep femoral artery and no signal in the superficial femoral artery  The superficial femoral artery was dissected free over a 3 cm segment and encircled with a vessel loop distally  The deep and common femoral arteries were also was circled with vessel loops  5000 units of IV heparin were administered  The vascular graft was occluded with vascular clamps  The deep and superficial femoral arteries were occluded with vessel loops  The previous anastomosis was taken down  A Solorzano scissors was used to extend the arteriotomy onto the superficial femoral artery  Thrombus was encountered  Local thrombectomy was performed  No backbleeding was encountered  A 3 Mag catheter was passed its entire length without difficulty  Upon return there is a small amount of thrombus removed with restoration of back bleeding  On further Mag passage there was no further thrombus removed  The vessel was irrigated with heparinized saline solution  The graft was then again spatulated at the appropriate length and anastomosed with a 6 0 Prolene suture  Once this anastomosis was nearly completed all vessels were back bled and flushed appropriately  The anastomosis was completed and flow was restored  A good biphasic signal was identified in both the deep and superficial femoral arteries  The wound was irrigated with antibiotic laden solution  The wound was reclosed with 2 layers of 2 0 Monocryl suture, 1 layer 3 0 Monocryl suture in a 4 Monocryl subcuticular skin closure  Bio glue dressing was placed      The patient was then woken from anesthesia and transferred to the recovery room  At the conclusion of the procedure the patient had palpable popliteal pulses in both feet were ruborous with immediate capillary refill         I was present for the entire procedure    Patient Disposition:  PACU     Vascular Quality Initiative - Supra-Inguinal Bypass    Urgency:  Urgent    Anesthesia: General Skin Prep: Chlorhexidine    Groin Incision:  Tangential Total Procedure time: 304min    Graft Origin:   Artery: Abdominal Aorta, End-End    Side:  Bilateral Graft Diameter:  14 x 7 mm    Graft Recipient 1:    Artery: SFA  Graft Vein Type: Dacron  Side: right   Graft Diameter:  7 mm    Concomitant Endarterectomy:  no    Graft Recipient 2:   yes  Artery: SFA  Graft Vein Type: Dacron  Side: left Graft Diameter:  7 mm    Concomitant Endarterectomy:  yes    Concomitant Infra-inguinal:   Right PVI:  no  Left PVI:  no   Right Bypass:no Left Bypass:  no     Completion Study:   Doppler: yes   Duplex: no   Arteriogram: no        SIGNATURE: Fredi Huber MD  DATE: December 9, 2019  TIME: 7:05 PM

## 2019-12-10 NOTE — ANESTHESIA POSTPROCEDURE EVALUATION
Post-Op Assessment Note    CV Status:  Stable  Pain Score: 0    Pain management: adequate     Mental Status:  Sleepy and arousable   Hydration Status:  Euvolemic   PONV Controlled:  Controlled   Airway Patency:  Patent   Post Op Vitals Reviewed: Yes      Staff: Anesthesiologist, CRNA           /56 (12/09/19 1927)    Temp 97 5 °F (36 4 °C) (12/09/19 1927)    Pulse 90 (12/09/19 1927)   Resp 20 (12/09/19 1927)    SpO2 100 % (12/09/19 1927)

## 2019-12-10 NOTE — PERIOPERATIVE NURSING NOTE
Arrived PACU supine in bed  HOB 30 deg  Resps unlabored  VSS  C/o pain in back, states it is a chronic pain, medicated  Labs drawn and sent plavix given with sips of water

## 2019-12-10 NOTE — PLAN OF CARE
Problem: Prexisting or High Potential for Compromised Skin Integrity  Goal: Skin integrity is maintained or improved  Description  INTERVENTIONS:  - Identify patients at risk for skin breakdown  - Assess and monitor skin integrity  - Assess and monitor nutrition and hydration status  - Monitor labs   - Assess for incontinence   - Turn and reposition patient  - Assist with mobility/ambulation  - Relieve pressure over bony prominences  - Avoid friction and shearing  - Provide appropriate hygiene as needed including keeping skin clean and dry  - Evaluate need for skin moisturizer/barrier cream  - Collaborate with interdisciplinary team   - Patient/family teaching  - Consider wound care consult   Outcome: Progressing     Problem: Potential for Falls  Goal: Patient will remain free of falls  Description  INTERVENTIONS:  - Assess patient frequently for physical needs  -  Identify cognitive and physical deficits and behaviors that affect risk of falls    -  [place] fall precautions as indicated by assessment   - Educate patient/family on patient safety including physical limitations  - Instruct patient to call for assistance with activity based on assessment  - Modify environment to reduce risk of injury  - Consider OT/PT consult to assist with strengthening/mobility  Outcome: Progressing     Problem: PAIN - ADULT  Goal: Verbalizes/displays adequate comfort level or baseline comfort level  Description  Interventions:  - Encourage patient to monitor pain and request assistance  - Assess pain using appropriate pain scale  - Administer analgesics based on type and severity of pain and evaluate response  - Implement non-pharmacological measures as appropriate and evaluate response  - Consider cultural and social influences on pain and pain management  - Notify physician/advanced practitioner if interventions unsuccessful or patient reports new pain  Outcome: Progressing     Problem: INFECTION - ADULT  Goal: Absence or prevention of progression during hospitalization  Description  INTERVENTIONS:  - Assess and monitor for signs and symptoms of infection  - Monitor lab/diagnostic results  - Monitor all insertion sites, i e  indwelling lines, tubes, and drains  - Monitor endotracheal if appropriate and nasal secretions for changes in amount and color  - [place] appropriate cooling/warming therapies per order  - Administer medications as ordered  - Instruct and encourage patient and family to use good hand hygiene technique  - Identify and instruct in appropriate isolation precautions for identified infection/condition  Outcome: Progressing  Goal: Absence of fever/infection during neutropenic period  Description  INTERVENTIONS:  - Monitor WBC    Outcome: Progressing     Problem: SAFETY ADULT  Goal: Maintain or return to baseline ADL function  Description  INTERVENTIONS:  -  Assess patient's ability to carry out ADLs; assess patient's baseline for ADL function and identify physical deficits which impact ability to perform ADLs (bathing, care of mouth/teeth, toileting, grooming, dressing, etc )  - Assess/evaluate cause of self-care deficits   - Assess range of motion  - Assess patient's mobility; develop plan if impaired  - Assess patient's need for assistive devices and provide as appropriate  - Encourage maximum independence but intervene and supervise when necessary  - Involve family in performance of ADLs  - Assess for home care needs following discharge   - Consider OT consult to assist with ADL evaluation and planning for discharge  - Provide patient education as appropriate  Outcome: Progressing  Goal: Maintain or return mobility status to optimal level  Description  INTERVENTIONS:  - Assess patient's baseline mobility status (ambulation, transfers, stairs, etc )    - Identify cognitive and physical deficits and behaviors that affect mobility  - Identify mobility aids required to assist with transfers and/or ambulation (gait belt, sit-to-stand, lift, walker, cane, etc )  - [place] fall precautions as indicated by assessment  - Record patient progress and toleration of activity level on Mobility SBAR; progress patient to next Phase/Stage  - Instruct patient to call for assistance with activity based on assessment  - Consider rehabilitation consult to assist with strengthening/weightbearing, etc   Outcome: Progressing     Problem: DISCHARGE PLANNING  Goal: Discharge to home or other facility with appropriate resources  Description  INTERVENTIONS:  - Identify barriers to discharge w/patient and caregiver  - Arrange for needed discharge resources and transportation as appropriate  - Identify discharge learning needs (meds, wound care, etc )  - Arrange for interpretive services to assist at discharge as needed  - Refer to Case Management Department for coordinating discharge planning if the patient needs post-hospital services based on physician/advanced practitioner order or complex needs related to functional status, cognitive ability, or social support system  Outcome: Progressing     Problem: Knowledge Deficit  Goal: Patient/family/caregiver demonstrates understanding of disease process, treatment plan, medications, and discharge instructions  Description  Complete learning assessment and assess knowledge base    Interventions:  - Provide teaching at level of understanding  - Provide teaching via preferred learning methods  Outcome: Progressing     Problem: MUSCULOSKELETAL - ADULT  Goal: Maintain or return mobility to safest level of function  Description  INTERVENTIONS:  - Assess patient's ability to carry out ADLs; assess patient's baseline for ADL function and identify physical deficits which impact ability to perform ADLs (bathing, care of mouth/teeth, toileting, grooming, dressing, etc )  - Assess/evaluate cause of self-care deficits   - Assess range of motion  - Assess patient's mobility  - Assess patient's need for assistive devices and provide as appropriate  - Encourage maximum independence but intervene and supervise when necessary  - Involve family in performance of ADLs  - Assess for home care needs following discharge   - Consider OT consult to assist with ADL evaluation and planning for discharge  - Provide patient education as appropriate  Outcome: Progressing  Goal: Maintain proper alignment of affected body part  Description  INTERVENTIONS:  - Support, maintain and protect limb and body alignment  - Provide patient/ family with appropriate education  Outcome: Progressing     Problem: Nutrition/Hydration-ADULT  Goal: Nutrient/Hydration intake appropriate for improving, restoring or maintaining nutritional needs  Description  Monitor and assess patient's nutrition/hydration status for malnutrition  Collaborate with interdisciplinary team and initiate plan and interventions as ordered  Monitor patient's weight and dietary intake as ordered or per policy  Utilize nutrition screening tool and intervene as necessary  Determine patient's food preferences and provide high-protein, high-caloric foods as appropriate       INTERVENTIONS:  - Monitor oral intake, urinary output, labs, and treatment plans  - Assess nutrition and hydration status and recommend course of action  - Evaluate amount of meals eaten  - Assist patient with eating if necessary   - Allow adequate time for meals  - Recommend/ encourage appropriate diets, oral nutritional supplements, and vitamin/mineral supplements  - Order, calculate, and assess calorie counts as needed  - Recommend, monitor, and adjust tube feedings and TPN/PPN based on assessed needs  - Assess need for intravenous fluids  - Provide specific nutrition/hydration education as appropriate  - Include patient/family/caregiver in decisions related to nutrition  Outcome: Progressing

## 2019-12-10 NOTE — PROGRESS NOTES
Cardiology Progress Note - Julio Gutierrez 78 y o  female MRN: [id number]    Unit/Bed#: MICU 09 Encounter: [id number]      Assessment/Recommendations:  1  Post-op cardiac eval: tolerated aortobifemoral bypass well, without cardiac complications  Continue B-blocker  2   HTN: well controlled on current regimen  Continue meds roque-op  3   HLD: continued on statin  4  PAD: s/p aortobifemoral bypass yesterday - continued on ASA, statin, B-blocker, and heparin gtt - as managed by vascular surgery  5   Grade 2 diastolic dysfunction with moderate pulm HTN: diuretic currently on hold - monitor volume status to keep volume status stable  Continue BP control roque-op and evaluate for volume overload roque-operatively for potential need for IV diuresis  Currently appears euvolemic  Subjective:   Patient seen and examined  No significant events overnight   ; pertinent negatives - chest pain, chest pressure/discomfort, dyspnea, irregular heart beat and palpitations  Objective:     Vitals: Blood pressure (!) 143/47, pulse 76, temperature 97 8 °F (36 6 °C), temperature source Oral, resp  rate 21, height 5' 4" (1 626 m), weight 52 2 kg (115 lb 1 3 oz), SpO2 98 %  , Body mass index is 19 75 kg/m² ,   Orthostatic Blood Pressures      Most Recent Value   Blood Pressure  (!) 143/47 filed at 12/09/2019 2030   Patient Position - Orthostatic VS  Lying filed at 12/07/2019 8259            Intake/Output Summary (Last 24 hours) at 12/10/2019 0848  Last data filed at 12/10/2019 5427  Gross per 24 hour   Intake 5225 ml   Output 2175 ml   Net 3050 ml       TELE:  SR with PACs    Physical Exam:    GEN: Deena Wolff appears well, alert and oriented x 3, pleasant and cooperative   HEENT: pupils equal, round, and reactive to light; extraocular muscles intact  NECK: supple, no carotid bruits   HEART: regular rhythm, normal S1 and S2, +systolic murmur, no clicks, gallops or rubs   LUNGS: clear to auscultation bilaterally; no wheezes, rales, or rhonchi   ABDOMEN: normal bowel sounds, soft, no tenderness, no distention  EXTREMITIES: peripheral pulses normal; no clubbing, cyanosis, or edema  NEURO: no focal findings   SKIN: normal without suspicious lesions on exposed skin    Medications:      Current Facility-Administered Medications:     acetaminophen (TYLENOL) tablet 650 mg, 650 mg, Oral, Q6H PRN, Suyapa Valles MD    amLODIPine (NORVASC) tablet 5 mg, 5 mg, Oral, Daily, Suyapa Valles MD, 5 mg at 12/10/19 0809    aspirin chewable tablet 162 mg, 162 mg, Oral, Daily, Suyapa Valles MD, 162 mg at 12/10/19 0809    atorvastatin (LIPITOR) tablet 80 mg, 80 mg, Oral, Daily, Suyapa Valles MD, 80 mg at 12/10/19 0809    carvedilol (COREG) tablet 12 5 mg, 12 5 mg, Oral, BID With Meals, Suyapa Valles MD, 12 5 mg at 12/10/19 0809    chlorhexidine (PERIDEX) 0 12 % oral rinse 15 mL, 15 mL, Swish & Delfino, Q12H [facility], Miracle Freeman MD, 15 mL at 12/10/19 0809    clopidogrel (PLAVIX) tablet 75 mg, 75 mg, Oral, Daily, Suyapa Valles MD, 75 mg at 12/10/19 0809    docusate sodium (COLACE) capsule 100 mg, 100 mg, Oral, BID, Suyapa Valles MD, 100 mg at 12/10/19 0809    fluticasone (FLONASE) 50 mcg/act nasal spray 1 spray, 1 spray, Nasal, Daily, Suyapa Valles MD, 1 spray at 12/09/19 0845    heparin (porcine) subcutaneous injection 5,000 Units, 5,000 Units, Subcutaneous, Q8H [facility], 5,000 Units at 12/10/19 0519 **AND** [COMPLETED] Platelet count, , , Once, Miracle Freeman MD    HYDROmorphone (DILAUDID) injection 0 5 mg, 0 5 mg, Intravenous, Q3H PRN, Suyapa Valles MD, 0 5 mg at 12/09/19 8522    hydrOXYzine HCL (ATARAX) tablet 10 mg, 10 mg, Oral, Q6H PRN, Suyapa Valles MD    lactated ringers bolus 500 mL, 500 mL, Intravenous, Once PRN **AND** lactated ringers bolus 500 mL, 500 mL, Intravenous, Once PRN, Suyapa Valles MD    lactated ringers infusion, 75 mL/hr, Intravenous, Continuous, Suyapa Valles MD, Last Rate: 75 mL/hr at 12/09/19 2317, 75 mL/hr at 12/09/19 2317    levothyroxine tablet 25 mcg, 25 mcg, Oral, Early Morning, Sophia Pierce MD, 25 mcg at 12/10/19 7445    magnesium oxide (MAG-OX) tablet 400 mg, 400 mg, Oral, Daily, Sophia Pierce MD, 400 mg at 12/10/19 0809    multivitamin stress formula tablet 1 tablet, 1 tablet, Oral, Daily, Sophia Pierce MD, 1 tablet at 12/10/19 0810    nicotine (NICODERM CQ) 21 mg/24 hr TD 24 hr patch 1 patch, 1 patch, Transdermal, Daily, Sophia Pierce MD, 1 patch at 12/10/19 0846    oxyCODONE (ROXICODONE) immediate release tablet 10 mg, 10 mg, Oral, Q4H PRN, oSphia Pierce MD, 10 mg at 12/10/19 0329    oxyCODONE (ROXICODONE) IR tablet 5 mg, 5 mg, Oral, Q4H PRN, Sophia Pierce MD    [facility]) tablet 8 6 mg, 1 tablet, Oral, Daily, Sophia Pierce MD, 8 6 mg at 12/10/19 0809     Labs & Results:        Results from last 7 days   Lab Units 12/10/19  0520 12/10/19  0002 12/09/19 1935 12/07/19  0506   WBC Thousand/uL 15 14*  --  20 93*  --  9 73   HEMOGLOBIN g/dL 7 9* 8 1* 7 8*  --  9 7*   I STAT HEMOGLOBIN   --   --   --    < >  --    HEMATOCRIT % 24 3* 24 5* 23 2*  --  29 4*   HEMATOCRIT, ISTAT   --   --   --    < >  --    PLATELETS Thousands/uL 211 203 200  --  339    < > = values in this interval not displayed           Results from last 7 days   Lab Units 12/10/19  0520 12/09/19  1935 12/09/19  1728 12/09/19  1528  12/07/19  0506 12/06/19  1231   POTASSIUM mmol/L 3 6 3 4*  --   --   --  3 5 4 2   CHLORIDE mmol/L 108 111*  --   --   --  108 102   CO2 mmol/L 24 24  --   --   --  27 27   CO2, I-STAT mmol/L  --   --  21 23   < >  --   --    BUN mg/dL 22 22  --   --   --  31* 32*   CREATININE mg/dL 0 90 0 95  --   --   --  0 95 0 86   CALCIUM mg/dL 8 2* 7 7*  --   --   --  9 4 9 3   ALK PHOS U/L  --   --   --   --   --   --  86   ALT U/L  --   --   --   --   --   --  48   AST U/L  --   --   --   --   --   --  50*   GLUCOSE, ISTAT mg/dl  --   --  173* 149*  --   --   --     < > = values in this interval not displayed  Results from last 7 days   Lab Units 12/09/19  1442 12/09/19  0519 12/08/19  2314  12/06/19  1850 12/06/19  1231   INR   --   --   --   --  1 14 1 02   PTT seconds 38* 50* 72*   < > 93* 24    < > = values in this interval not displayed       Results from last 7 days   Lab Units 12/07/19  0506 12/06/19  1231   MAGNESIUM mg/dL 2 0 1 8       Echo: personally reviewed - EF 55-60%, G2DD, mild LVH, CONNIE, mild pulm HTN, mild MR, mild TR    EKG personally reviewed by Anum Das MD

## 2019-12-11 PROBLEM — D62 ACUTE ON CHRONIC BLOOD LOSS ANEMIA: Status: ACTIVE | Noted: 2019-12-11

## 2019-12-11 LAB
ABO GROUP BLD BPU: NORMAL
ABO GROUP BLD BPU: NORMAL
ANION GAP SERPL CALCULATED.3IONS-SCNC: 5 MMOL/L (ref 4–13)
BPU ID: NORMAL
BPU ID: NORMAL
BUN SERPL-MCNC: 22 MG/DL (ref 5–25)
CALCIUM SERPL-MCNC: 8.6 MG/DL (ref 8.3–10.1)
CHLORIDE SERPL-SCNC: 111 MMOL/L (ref 100–108)
CO2 SERPL-SCNC: 24 MMOL/L (ref 21–32)
CREAT SERPL-MCNC: 0.96 MG/DL (ref 0.6–1.3)
CROSSMATCH: NORMAL
CROSSMATCH: NORMAL
ERYTHROCYTE [DISTWIDTH] IN BLOOD BY AUTOMATED COUNT: 16 % (ref 11.6–15.1)
GFR SERPL CREATININE-BSD FRML MDRD: 56 ML/MIN/1.73SQ M
GLUCOSE SERPL-MCNC: 92 MG/DL (ref 65–140)
HCT VFR BLD AUTO: 24.6 % (ref 34.8–46.1)
HGB BLD-MCNC: 7.9 G/DL (ref 11.5–15.4)
MCH RBC QN AUTO: 34.5 PG (ref 26.8–34.3)
MCHC RBC AUTO-ENTMCNC: 32.1 G/DL (ref 31.4–37.4)
MCV RBC AUTO: 107 FL (ref 82–98)
PLATELET # BLD AUTO: 210 THOUSANDS/UL (ref 149–390)
PMV BLD AUTO: 11.5 FL (ref 8.9–12.7)
POTASSIUM SERPL-SCNC: 3.7 MMOL/L (ref 3.5–5.3)
RBC # BLD AUTO: 2.29 MILLION/UL (ref 3.81–5.12)
SODIUM SERPL-SCNC: 140 MMOL/L (ref 136–145)
UNIT DISPENSE STATUS: NORMAL
UNIT DISPENSE STATUS: NORMAL
UNIT PRODUCT CODE: NORMAL
UNIT PRODUCT CODE: NORMAL
UNIT RH: NORMAL
UNIT RH: NORMAL
WBC # BLD AUTO: 15 THOUSAND/UL (ref 4.31–10.16)

## 2019-12-11 PROCEDURE — G8978 MOBILITY CURRENT STATUS: HCPCS

## 2019-12-11 PROCEDURE — 80048 BASIC METABOLIC PNL TOTAL CA: CPT | Performed by: PHYSICIAN ASSISTANT

## 2019-12-11 PROCEDURE — 99024 POSTOP FOLLOW-UP VISIT: CPT | Performed by: SURGERY

## 2019-12-11 PROCEDURE — 85027 COMPLETE CBC AUTOMATED: CPT | Performed by: PHYSICIAN ASSISTANT

## 2019-12-11 PROCEDURE — G8979 MOBILITY GOAL STATUS: HCPCS

## 2019-12-11 PROCEDURE — 97163 PT EVAL HIGH COMPLEX 45 MIN: CPT

## 2019-12-11 PROCEDURE — 99232 SBSQ HOSP IP/OBS MODERATE 35: CPT | Performed by: INTERNAL MEDICINE

## 2019-12-11 PROCEDURE — 97167 OT EVAL HIGH COMPLEX 60 MIN: CPT

## 2019-12-11 PROCEDURE — G8987 SELF CARE CURRENT STATUS: HCPCS

## 2019-12-11 PROCEDURE — G8988 SELF CARE GOAL STATUS: HCPCS

## 2019-12-11 RX ORDER — SODIUM CHLORIDE 9 MG/ML
75 INJECTION, SOLUTION INTRAVENOUS CONTINUOUS
Status: DISCONTINUED | OUTPATIENT
Start: 2019-12-11 | End: 2019-12-13

## 2019-12-11 RX ORDER — TORSEMIDE 5 MG/1
5 TABLET ORAL DAILY
Status: DISCONTINUED | OUTPATIENT
Start: 2019-12-11 | End: 2019-12-12

## 2019-12-11 RX ADMIN — SENNOSIDES 8.6 MG: 8.6 TABLET, FILM COATED ORAL at 08:42

## 2019-12-11 RX ADMIN — ATORVASTATIN CALCIUM 80 MG: 80 TABLET, FILM COATED ORAL at 08:42

## 2019-12-11 RX ADMIN — SODIUM CHLORIDE, SODIUM LACTATE, POTASSIUM CHLORIDE, AND CALCIUM CHLORIDE 50 ML/HR: .6; .31; .03; .02 INJECTION, SOLUTION INTRAVENOUS at 08:57

## 2019-12-11 RX ADMIN — MAGNESIUM OXIDE TAB 400 MG (240 MG ELEMENTAL MG) 400 MG: 400 (240 MG) TAB at 08:43

## 2019-12-11 RX ADMIN — OXYCODONE HYDROCHLORIDE 10 MG: 10 TABLET ORAL at 19:22

## 2019-12-11 RX ADMIN — HYDROMORPHONE HYDROCHLORIDE 0.5 MG: 1 INJECTION, SOLUTION INTRAMUSCULAR; INTRAVENOUS; SUBCUTANEOUS at 00:14

## 2019-12-11 RX ADMIN — HEPARIN SODIUM 5000 UNITS: 5000 INJECTION INTRAVENOUS; SUBCUTANEOUS at 13:58

## 2019-12-11 RX ADMIN — DOCUSATE SODIUM 100 MG: 100 CAPSULE, LIQUID FILLED ORAL at 08:43

## 2019-12-11 RX ADMIN — HEPARIN SODIUM 5000 UNITS: 5000 INJECTION INTRAVENOUS; SUBCUTANEOUS at 21:58

## 2019-12-11 RX ADMIN — AMLODIPINE BESYLATE 5 MG: 5 TABLET ORAL at 08:42

## 2019-12-11 RX ADMIN — HYDROMORPHONE HYDROCHLORIDE 0.5 MG: 1 INJECTION, SOLUTION INTRAMUSCULAR; INTRAVENOUS; SUBCUTANEOUS at 20:49

## 2019-12-11 RX ADMIN — DOCUSATE SODIUM 100 MG: 100 CAPSULE, LIQUID FILLED ORAL at 17:29

## 2019-12-11 RX ADMIN — NICOTINE 1 PATCH: 21 PATCH, EXTENDED RELEASE TRANSDERMAL at 08:42

## 2019-12-11 RX ADMIN — OXYCODONE HYDROCHLORIDE 10 MG: 10 TABLET ORAL at 04:09

## 2019-12-11 RX ADMIN — FLUTICASONE PROPIONATE 1 SPRAY: 50 SPRAY, METERED NASAL at 08:49

## 2019-12-11 RX ADMIN — CHLORHEXIDINE GLUCONATE 0.12% ORAL RINSE 15 ML: 1.2 LIQUID ORAL at 20:50

## 2019-12-11 RX ADMIN — ZINC 1 TABLET: TAB ORAL at 08:50

## 2019-12-11 RX ADMIN — LEVOTHYROXINE SODIUM 25 MCG: 25 TABLET ORAL at 05:49

## 2019-12-11 RX ADMIN — TORSEMIDE 5 MG: 5 TABLET ORAL at 11:23

## 2019-12-11 RX ADMIN — SODIUM CHLORIDE 500 ML: 0.9 INJECTION, SOLUTION INTRAVENOUS at 16:44

## 2019-12-11 RX ADMIN — CHLORHEXIDINE GLUCONATE 0.12% ORAL RINSE 15 ML: 1.2 LIQUID ORAL at 08:46

## 2019-12-11 RX ADMIN — CARVEDILOL 12.5 MG: 12.5 TABLET, FILM COATED ORAL at 08:42

## 2019-12-11 RX ADMIN — ASPIRIN 81 MG 162 MG: 81 TABLET ORAL at 08:42

## 2019-12-11 RX ADMIN — HEPARIN SODIUM 5000 UNITS: 5000 INJECTION INTRAVENOUS; SUBCUTANEOUS at 05:50

## 2019-12-11 RX ADMIN — CLOPIDOGREL BISULFATE 75 MG: 75 TABLET ORAL at 08:43

## 2019-12-11 RX ADMIN — SODIUM CHLORIDE 75 ML/HR: 0.9 INJECTION, SOLUTION INTRAVENOUS at 16:45

## 2019-12-11 NOTE — PLAN OF CARE
Problem: PHYSICAL THERAPY ADULT  Goal: Performs mobility at highest level of function for planned discharge setting  See evaluation for individualized goals  Description  Treatment/Interventions: Functional transfer training, LE strengthening/ROM, Elevations, Therapeutic exercise, Endurance training, Patient/family training, Bed mobility, Gait training  Equipment Recommended: Arun Amezquita       See flowsheet documentation for full assessment, interventions and recommendations  Note:   Prognosis: Fair  Problem List: Decreased strength, Decreased endurance, Impaired balance, Decreased mobility, Impaired judgement, Decreased safety awareness, Pain  Assessment: Pt is 78 y o  female seen for high complexity PT evaluation s/p admission to [facility] on 12/06/19 with aortic occlusive disease  Aorta bifemoral bypass with left iliofemoral thrombectomy performed on 12/9  Comorbidities affecting pt's physical performance at time of assessment include: carotid artery stenosis, spine mass, CKD stage 3, HTN, URI bronchitis, UTI, CHF, and nicotine dependence  Per NSX note, no anticipated neurosurgical intervention and no brace required for spine mass  Pt reports I with ADLS, IADLs, and functional mobility  PT normally ambulates without AD; but has been increasingly using RW  Pt resides in independent living with elevator access and 0 PATIENCE  Pt present with increased fear of mobility, decreased safety awareness, limited judgement, and limited insight into deficits  PT received seated OOB upon arrival and cleared by RN for PT session  Pt required mod A x 1 for sit to stand transfer with RW; required assistance for standing upon standing  Pt impulsively returned to seated position after standing for ~10 seconds  Pt required max encouragement to perform another standing and ambulation trial  Pt required mod A x 2 to ambulate 1 step forward and 1 step back with RW; increased fear of mobility observed by increased tremor   Deferred further ambulation 2* instability and unsafe balance  Patient's decreased mobility level and increased fall risk is secondary to deficits in fear of mobility, impulsive, judgement, safety, insight into deficits, dynamic/ambulatory balance, standing balance, abdominal pain, bilateral foot sensation, coordination, trunk control, gait, and generalized weakness  Current clinical presentation is unstable/unpredictable seen in pt's presentation of ongoing medical management, increased reliance on assistance compared to PLOF, Fall risk, impaired judgement/safety awareness, and significant PMH  Pt to benefit from continued PT tx to address deficits as defined above and maximize level of functional independent mobility and consistency  From PT/mobility standpoint, recommendation at time of d/c would be STR pending progress in order to facilitate return to PLOF  Barriers to Discharge: Decreased caregiver support(Lives alone)     Recommendation: Post acute IP rehab     PT - OK to Discharge: Yes(to rehab once medically stable)    See flowsheet documentation for full assessment

## 2019-12-11 NOTE — PHYSICAL THERAPY NOTE
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)    Nicotine dependence    Benign essential HTN    CKD (chronic kidney disease) stage 3, GFR 30-59 ml/min (HCC)    Mass of spine    Chronic diastolic heart failure (HCC)    Acute on chronic blood loss anemia       Past Medical History  Past Medical History:   Diagnosis Date    Arthritis     Benign essential hypertension     CAD (coronary artery disease)     Disease of thyroid gland     Dizziness     Edema of leg     Hyperlipidemia     Hypertension     Other disorder of circulatory system (CODE)         Past Surgical History  Past Surgical History:   Procedure Laterality Date    BREAST SURGERY      bxs,benign    COLONOSCOPY      HYSTERECTOMY      INCISIONAL BREAST BIOPSY      x5, 1964, 1965, 1985 and [address] Bilateral 12/9/2019    Procedure: BYPASS AORTA BIFEMORAL WITH LEFT FEMORAL THROMBOEMBOLECTOMY;  Surgeon: Krishan Gusman MD;  [facility]: [facility];  Service: Vascular           12/11/19 1047   Note Type   Note type Eval only   Pain Assessment   Pain Assessment 0-10   Pain Score Worst Possible Pain   Pain Type Surgical pain   Pain Location Abdomen   Pain Orientation Bilateral   Hospital Pain Intervention(s) Emotional support;Repositioned; Ambulation/increased activity   Response to Interventions Pain increased with functional movement   Home Living   Type of Home Apartment  (Elevator Acess)   Home Layout Elevator  (0 PATIENCE)   Bathroom Shower/Tub Walk-in shower   Bathroom Toilet Standard   Bathroom Equipment Grab bars in shower; Shower chair   [address]   (Rollator)   Additional Comments Normally, patient ambulates without AD however, patient increase use of RW in the past few weeks  Pt resides alone in independent living with elevator acces  Prior Function   Level of [place] Independent with ADLs and functional mobility   Lives With Alone   Receives Help From Family;Friend(s)   ADL Assistance Independent   IADLs Needs assistance   Falls in the last 6 months 5 to 10   Vocational Retired   Comments Reports local family visits almost daily   Restrictions/Precautions   Weight Bearing Precautions Per Order No   Other Precautions Multiple lines; Fall Risk;Pain;Hard of hearing   Cognition   Overall Cognitive Status WFL   Attention Difficulty attending to directions   Orientation Level Oriented X4   Memory Decreased recall of precautions;Decreased recall of recent events   Following Commands Follows one step commands without difficulty   Comments Pt with increased fear with mobility; required encourgement and education on the importance on functional mobility  Pt required VC for safety, initiation, and sequencing   RLE Assessment   RLE Assessment   (Grossly 4/5 )   LLE Assessment   LLE Assessment   (Grossly 4/5 )   Coordination   Sensation X   Light Touch   RLE Light Touch Impaired   RLE Light Touch Comments Decreased light touch sensation to plantar foot    LLE Light Touch Impaired   LLE Light Touch Comments Decreased light touch sensation to plantar foot    Bed Mobility   Additional Comments Not tested  Pt seated OOB upon arrival  End of session, pt returned back to recliner with all needs in reach  Transfers   Sit to Stand 3  Moderate assistance   Additional items Assist x 1; Increased time required   Stand to Sit 3  Moderate assistance   Additional items Assist x 1; Increased time required;Verbal cues   Additional Comments Performed with RW; Pt required force production and assistance to maintain balance upon standing with RW; Pt impulsvely returned back to recliner  Perfromed x2 trials    Fearful of mobility noted in increase tremor upon standing  Ambulation/Elevation   Gait pattern Excessively slow; Short stride; Foward flexed; Step to   Gait Assistance 3  Moderate assist   Additional items Assist x 2;Verbal cues   Assistive Device Rolling walker   Distance 1 step forward and 1 step back  (Deferred further ambulation 2* instability and safety concern)   Stair Management Assistance Not tested   Balance   Static Sitting Poor +   Dynamic Sitting Poor +   Static Standing Poor   Dynamic Standing Poor -   Ambulatory Poor -   Endurance Deficit   Endurance Deficit Yes   Endurance Deficit Description fatigue, abdominal pain   Activity Tolerance   Activity Tolerance Patient limited by fatigue;Patient limited by pain   Medical Staff Made Aware DAREN Puri CM   Nurse Made Aware RN cleared Pt for PT session   Assessment   Prognosis Fair   Problem List Decreased strength;Decreased endurance; Impaired balance;Decreased mobility; Impaired judgement;Decreased safety awareness;Pain   Assessment Pt is 78 y o  female seen for high complexity PT evaluation s/p admission to [facility] on 12/06/19 with aortic occlusive disease  Aorta bifemoral bypass with left iliofemoral thrombectomy performed on 12/9  Comorbidities affecting pt's physical performance at time of assessment include: carotid artery stenosis, spine mass, CKD stage 3, HTN, URI bronchitis, UTI, CHF, and nicotine dependence  Per NSX note, no anticipated neurosurgical intervention and no brace required for spine mass  Pt reports I with ADLS, IADLs, and functional mobility  PT normally ambulates without AD; but has been increasingly use of RW  Pt resides in independent living with elevator access and 0 PATIENCE  Pt present with increased fear of mobility, decreased safety awareness, limited judgement, and limited insight into deficits  PT received seated OOB upon arrival and cleared by RN for PT session  Pt required mod A x 1 for sit to stand transfer with RW; required assistance for standing upon standing   Pt impulsively returned to seated position after standing for ~10 seconds  Pt required max encouragement to perform another standing and ambulation trial  Pt required mod A x 2 to ambulate 1 step forward and 1 step back with RW; increased fear of mobility observed by increased tremor  Deferred further ambulation 2* instability and unsafe balance  Patient's decreased mobility level and increased fall risk is secondary to deficits in fear of mobility, impulsive, judgement, safety, insight into deficits, dynamic/ambulatory balance, standing balance, abdominal pain, bilateral foot sensation, coordination, trunk control, gait, and generalized weakness  Current clinical presentation is unstable/unpredictable seen in pt's presentation of ongoing medical management, increased reliance on assistance compared to PLOF, Fall risk, impaired judgement/safety awareness, and significant PMH  Pt to benefit from continued PT tx to address deficits as defined above and maximize level of functional independent mobility and consistency  From PT/mobility standpoint, recommendation at time of d/c would be STR pending progress in order to facilitate return to PLOF  Barriers to Discharge Decreased caregiver support  (Lives alone)   Goals   Patient Goals To decrease pain   STG Expiration Date 12/25/19   Short Term Goal #1 In 1-2 weeks, the patient will complete the following 1) Patient will perform supine <> sit independently 2) Patient will perform sit<>stand  transfer with RW at mod I level  3) Patient will ambulate >25 feet using RW at mod I level  4) Patient will demonstrate the ability to tolerate 30 minutes of activity  5) Patient will improve dynamic standing balance from poor to fair + in order to perform everyday activities  6) Patient will continue with ongoing rehab services for family education, DME, and D/C planning  Plan   Treatment/Interventions Functional transfer training;LE strengthening/ROM; Elevations; Therapeutic exercise; Endurance training;Patient/family training;Bed mobility;Gait training   PT Frequency   (3-5x/wk)   Recommendation   Recommendation Post acute IP rehab   Equipment Recommended Walker   PT - OK to Discharge Yes  (to rehab once medically stable)   Modified [place] Scale   Modified [place] Scale 4   Barthel Index   Feeding 5   Bathing 0   Grooming Score 0   Dressing Score 0   Bladder Score 5   Bowels Score 5   Toilet Use Score 5   Transfers (Bed/Chair) Score 5   Mobility (Level Surface) Score 0   Stairs Score 0   Barthel Index Score 25       Antonia Cantrell PT, DPT

## 2019-12-11 NOTE — PLAN OF CARE
Problem: OCCUPATIONAL THERAPY ADULT  Goal: Performs self-care activities at highest level of function for planned discharge setting  See evaluation for individualized goals  Description  Treatment Interventions: ADL retraining, Functional transfer training, UE strengthening/ROM, Endurance training, Cognitive reorientation, Patient/family training, Equipment evaluation/education, Compensatory technique education, Activityengagement, Energy conservation          See flowsheet documentation for full assessment, interventions and recommendations  Note:   Limitation: Decreased ADL status, Decreased UE strength, Decreased Safe judgement during ADL, Decreased cognition, Decreased endurance, Decreased high-level ADLs  Prognosis: Fair  Assessment: Pt is a 77 yo female seen for OT eval s/p adm to [facility] dx'd w/ aortoiliac occlusive disease  Pt s/p Aorta Bifemoral bypass w/ left iliofemoral thrombectomy 12/9  Comorbidities include a h/o HTN, CKD stage 3, mass of spine, carotid artery stenosis, pain in B/L feet, URI, bronchitis, nicotine dependence, UTI, CHF  Pt with active OT orders and up with assistance  orders  Pt is retired and resides alone in [address] [place] [address] apt w/ elevator access  Pt reports having supportive family and friends that can provide A as needed  Pt was I w/  ADLS and most IADLS, does not drive, & required use of DME PTA, including rollator for mobility  Pt is currently demonstrating the following occupational deficits: Min A grooming, Mod A UB bathing/dressing, Max-Total A LB bathing/dressing, Total A toileting, Mod A STS, Mod Ax2 1 step forward/backward w/ RW   These deficits that are impacting pt's baseline areas of occupation are a result of the following impairments: pain, endurance, activity tolerance, functional mobility, forward functional reach, balance, trunk control, functional standing tolerance, unsupportive home environment, decreased I w/ ADLS/IADLS, strength, decreased safety awareness, decreased insight into deficits and impaired fine motor skills  The following Occupational Performance Areas to address include: grooming, bathing/shower, toilet hygiene, dressing, health maintenance, functional mobility and clothing management  Pt scored overall 25/100 on the Barthel Index  Based on the aforementioned OT evaluation, functional performance deficits, and assessments, pt has been identified as a high complexity evaluation  Recommend STR upon D/C   Pt to continue to benefit from acute immediate OT services to address the following goals 3-5x/week to  w/in 7-10 days:      OT Discharge Recommendation: Short Term Rehab  OT - OK to Discharge: Yes(when medically cleared)

## 2019-12-11 NOTE — OCCUPATIONAL THERAPY NOTE
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)    Nicotine dependence    Benign essential HTN    CKD (chronic kidney disease) stage 3, GFR 30-59 ml/min (HCC)    Mass of spine    Chronic diastolic heart failure (HCC)    Acute on chronic blood loss anemia      Past Medical History:   Diagnosis Date    Arthritis     Benign essential hypertension     CAD (coronary artery disease)     Disease of thyroid gland     Dizziness     Edema of leg     Hyperlipidemia     Hypertension     Other disorder of circulatory system (CODE)        Past Surgical History:   Procedure Laterality Date    BREAST SURGERY      bxs,benign    COLONOSCOPY      HYSTERECTOMY      INCISIONAL BREAST BIOPSY      x5, 1964, 1965, 1985 and [address] Bilateral 12/9/2019    Procedure: BYPASS AORTA BIFEMORAL WITH LEFT FEMORAL THROMBOEMBOLECTOMY;  Surgeon: Choco Herman MD;  [facility];  Service: Vascular        12/11/19 1046   Note Type   Note type Eval/Treat   Restrictions/Precautions   Weight Bearing Precautions Per Order No   Other Precautions Multiple lines;Telemetry; Fall Risk;Pain;Hard of hearing   Pain Assessment   Pain Assessment 0-10   Pain Score Worst Possible Pain   Pain Type Surgical pain   Pain Location Abdomen   Pain Orientation Bilateral   Pain Descriptors Aching;Discomfort   Pain Frequency Constant/continuous   Pain Onset Ongoing   Clinical Progression Not changed   Effect of Pain on Daily Activities Limits standing tolerance and mobility  Patient's Stated Pain Goal No pain   Hospital Pain Intervention(s) Repositioned; Ambulation/increased activity; Distraction; Emotional support   Response to Interventions Pain increases w/ functional movement   Home Living   Type of Home Apartment  ([facility] [employer] [facility])   Home Layout One level;Performs ADLs on one level;Elevator  (2nd flr apt; elevator access)   Bathroom Shower/Tub Walk-in shower   Bathroom Toilet Standard   Bathroom Equipment Grab bars in shower; Shower chair   [address] Other (Comment)  (rollator)   Additional Comments Pt reports recent use of rollator for mobility PTA   Prior Function   Level of [place] Independent with ADLs and functional mobility; Needs assistance with IADLs   Lives With Alone   Receives Help From Family;Friend(s)   ADL Assistance Independent   IADLs Needs assistance   Falls in the last 6 months 5 to 10  ("at least 4 to 5")   Vocational Retired   Lifestyle   Autonomy Pt reports being I w/ all ADLS and most IADLS (family A w/ grocery shopping and transportation); (-) drives; utilizes rollator for mobility PTA  Reciprocal Relationships Pt lives alone  Reports having supportive family and friends that can provide A as needed  Service to Others Pt is retired  Intrinsic Gratification Pt reports enjoying being home  Psychosocial   Psychosocial (WDL) WDL   ADL   Where Assessed Chair   Eating Assistance 5  Supervision/Setup   Grooming Assistance 4  Minimal Assistance   UB Bathing Assistance 3  Moderate Assistance   LB Bathing Assistance 2  Maximal Assistance   UB Dressing Assistance 3  Moderate Assistance   LB Dressing Assistance 1  Total Assistance   Toileting Assistance  1  Total Assistance   Bed Mobility   Supine to Sit Unable to assess   Sit to Supine Unable to assess   Additional Comments Pt seated OOB in chair upon OT arrival  Pt seated in chair w/ all needs in reach s/p OT session  Transfers   Sit to Stand 3  Moderate assistance   Additional items Assist x 1; Increased time required;Verbal cues;Armrests   Stand to Sit 3  Moderate assistance   Additional items Assist x 1; Increased time required;Verbal cues;Armrests   Additional Comments Transfers w/ RW  VC and TC required for safe hand placement, sequencing, and safety  Pt highly anxious t/o transfers 2' Pt stated "I'm weak and scared of falling"   Functional Mobility   Functional Mobility 3  Moderate assistance  (x2)   Additional Comments Pt demonstrated ability to take 1 step forward/backward w/ RW w/ Mod Ax2  Pt presents highly anxious and very shakey during mobility  Pt stated she is unable to ambulate farther 2' abdominal pain, anxiety, fear of falling, and weakness  Additional items Rolling walker   Balance   Static Sitting Fair -   Dynamic Sitting Poor +   Static Standing Poor   Dynamic Standing Poor -   Ambulatory Poor -   Activity Tolerance   Activity Tolerance Patient limited by fatigue;Patient limited by pain;Treatment limited secondary to medical complications (Comment)   Medical Staff Made Aware PT Jimmy Spencer; CORBY for safe dispo planning   Nurse Made Aware RN cleared Pt for OT eval   RUE Assessment   RUE Assessment X  (AROM WFL; Gross strength 3+/5)   LUE Assessment   LUE Assessment X  (AROM WFL; Gross strength 3+/5)   Hand Function   Gross Motor Coordination Functional   Fine Motor Coordination Functional   Sensation   Light Touch Partial deficits in the RLE;Partial deficits in the LLE   Sharp/Dull No apparent deficits   Vision-Basic Assessment   Current Vision Wears glasses all the time   Cognition   Overall Cognitive Status [facility]   Arousal/Participation Alert; Cooperative;Lethargic   Attention Difficulty attending to directions   Orientation Level Oriented X4   Memory Decreased recall of precautions   Following Commands Follows one step commands with increased time or repetition   Comments Pt is pleasant and required increased education and encouragement to participate in therapy this day  Pt presents w/ decreased safety awareness and required redirection to task & emotional support 2' anxiety  Pt required VC for all safety and sequencing t/o session  Assessment   Limitation Decreased ADL status; Decreased UE strength;Decreased Safe judgement during ADL;Decreased cognition;Decreased endurance;Decreased high-level ADLs   Prognosis Fair   Assessment Pt is a 77 yo female seen for OT eval s/p adm to [facility] dx'd w/ aortoiliac occlusive disease  Pt s/p Aorta Bifemoral bypass w/ left iliofemoral thrombectomy 12/9  Per neurosx, "Patient does not require bracing or anticipated neurosurgical intervention acutely " Comorbidities include a h/o HTN, CKD stage 3, mass of spine, carotid artery stenosis, pain in B/L feet, URI, bronchitis, nicotine dependence, UTI, CHF  Pt with active OT orders and up with assistance  orders  Pt is retired and resides alone in [address] [place] [address] apt w/ elevator access  Pt reports having supportive family and friends that can provide A as needed  Pt was I w/  ADLS and most IADLS, does not drive, & required use of DME PTA, including rollator for mobility  Pt is currently demonstrating the following occupational deficits: Min A grooming, Mod A UB bathing/dressing, Max-Total A LB bathing/dressing, Total A toileting, Mod A STS, Mod Ax2 1 step forward/backward w/ RW  These deficits that are impacting pt's baseline areas of occupation are a result of the following impairments: pain, endurance, activity tolerance, functional mobility, forward functional reach, balance, trunk control, functional standing tolerance, unsupportive home environment, decreased I w/ ADLS/IADLS, strength, decreased safety awareness, decreased insight into deficits and impaired fine motor skills  The following Occupational Performance Areas to address include: grooming, bathing/shower, toilet hygiene, dressing, health maintenance, functional mobility and clothing management  Pt scored overall 25/100 on the Barthel Index  Based on the aforementioned OT evaluation, functional performance deficits, and assessments, pt has been identified as a high complexity evaluation  Recommend STR upon D/C   Pt to continue to benefit from acute immediate OT services to address the following goals 3-5x/week to  w/in 7-10 days:    Goals   Patient Goals To decrease pain   LTG Time Frame 7-10   Long Term Goal #1 Refer to goals below   Plan   Treatment Interventions ADL retraining;Functional transfer training;UE strengthening/ROM; Endurance training;Cognitive reorientation;Patient/family training;Equipment evaluation/education; Compensatory technique education; Activityengagement; Energy conservation   Goal Expiration Date 19   OT Frequency 3-5x/wk   Recommendation   OT Discharge Recommendation Short Term Rehab   OT - OK to Discharge Yes  (when medically cleared)   Barthel Index   Feeding 5   Bathing 0   Grooming Score 0   Dressing Score 0   Bladder Score 5   Bowels Score 5   Toilet Use Score 5   Transfers (Bed/Chair) Score 5   Mobility (Level Surface) Score 0   Stairs Score 0   Barthel Index Score 25   Modified [place] Scale   Modified [place] Scale 4       GOALS    1) Pt will improve activity tolerance to G for min 30 min txment sessions for increase engagement in functional tasks    2) Pt will complete UB/LB dressing/self care w/ mod I using adaptive device and DME as needed    3) Pt will complete bathing w/ Mod I w/ use of AE and DME as needed    4) Pt will complete toileting w/ mod I w/ G hygiene/thoroughness using DME as needed    5) Pt will improve functional transfers to Mod I on/off all surfaces using DME as needed w/ G balance/safety     6) Pt will improve functional mobility during ADL/IADL/leisure tasks to Mod I using DME as needed w/ G balance/safety     7) Pt will participate in simulated IADL management task to increase independence to Mod I w/ G safety and endurance    8) Pt will be attentive 100% of the time during ongoing cognitive assessment w/ G participation to assist w/ safe d/c planning/recommendations    9) Pt will demonstrate G carryover of pt/caregiver education and training as appropriate w/o cues w/ good tolerance to increase safety during functional tasks    10) Pt will demonstrate 100% carryover of energy conservation techniques t/o functional I/ADL/leisure tasks w/o cues s/p skilled education to increase endurance during functional tasks         Omer Lau, MS, OTR/L

## 2019-12-11 NOTE — NURSING NOTE
MRI called and is ready for patient to be brought down for her test  Patient is refusing to go to MRI at this time  She says she will be ready in a few days  Vascular surgery made aware

## 2019-12-11 NOTE — PLAN OF CARE
Problem: Prexisting or High Potential for Compromised Skin Integrity  Goal: Skin integrity is maintained or improved  Description  INTERVENTIONS:  - Identify patients at risk for skin breakdown  - Assess and monitor skin integrity  - Assess and monitor nutrition and hydration status  - Monitor labs   - Assess for incontinence   - Turn and reposition patient  - Assist with mobility/ambulation  - Relieve pressure over bony prominences  - Avoid friction and shearing  - Provide appropriate hygiene as needed including keeping skin clean and dry  - Evaluate need for skin moisturizer/barrier cream  - Collaborate with interdisciplinary team   - Patient/family teaching  - Consider wound care consult   Outcome: Progressing     Problem: Potential for Falls  Goal: Patient will remain free of falls  Description  INTERVENTIONS:  - Assess patient frequently for physical needs  -  Identify cognitive and physical deficits and behaviors that affect risk of falls    -  [place] fall precautions as indicated by assessment   - Educate patient/family on patient safety including physical limitations  - Instruct patient to call for assistance with activity based on assessment  - Modify environment to reduce risk of injury  - Consider OT/PT consult to assist with strengthening/mobility  Outcome: Progressing     Problem: PAIN - ADULT  Goal: Verbalizes/displays adequate comfort level or baseline comfort level  Description  Interventions:  - Encourage patient to monitor pain and request assistance  - Assess pain using appropriate pain scale  - Administer analgesics based on type and severity of pain and evaluate response  - Implement non-pharmacological measures as appropriate and evaluate response  - Consider cultural and social influences on pain and pain management  - Notify physician/advanced practitioner if interventions unsuccessful or patient reports new pain  Outcome: Progressing     Problem: INFECTION - ADULT  Goal: Absence or prevention of progression during hospitalization  Description  INTERVENTIONS:  - Assess and monitor for signs and symptoms of infection  - Monitor lab/diagnostic results  - Monitor all insertion sites, i e  indwelling lines, tubes, and drains  - Monitor endotracheal if appropriate and nasal secretions for changes in amount and color  - [place] appropriate cooling/warming therapies per order  - Administer medications as ordered  - Instruct and encourage patient and family to use good hand hygiene technique  - Identify and instruct in appropriate isolation precautions for identified infection/condition  Outcome: Progressing  Goal: Absence of fever/infection during neutropenic period  Description  INTERVENTIONS:  - Monitor WBC    Outcome: Progressing     Problem: SAFETY ADULT  Goal: Maintain or return to baseline ADL function  Description  INTERVENTIONS:  -  Assess patient's ability to carry out ADLs; assess patient's baseline for ADL function and identify physical deficits which impact ability to perform ADLs (bathing, care of mouth/teeth, toileting, grooming, dressing, etc )  - Assess/evaluate cause of self-care deficits   - Assess range of motion  - Assess patient's mobility; develop plan if impaired  - Assess patient's need for assistive devices and provide as appropriate  - Encourage maximum independence but intervene and supervise when necessary  - Involve family in performance of ADLs  - Assess for home care needs following discharge   - Consider OT consult to assist with ADL evaluation and planning for discharge  - Provide patient education as appropriate  Outcome: Progressing  Goal: Maintain or return mobility status to optimal level  Description  INTERVENTIONS:  - Assess patient's baseline mobility status (ambulation, transfers, stairs, etc )    - Identify cognitive and physical deficits and behaviors that affect mobility  - Identify mobility aids required to assist with transfers and/or ambulation (gait belt, sit-to-stand, lift, walker, cane, etc )  - [place] fall precautions as indicated by assessment  - Record patient progress and toleration of activity level on Mobility SBAR; progress patient to next Phase/Stage  - Instruct patient to call for assistance with activity based on assessment  - Consider rehabilitation consult to assist with strengthening/weightbearing, etc   Outcome: Progressing     Problem: DISCHARGE PLANNING  Goal: Discharge to home or other facility with appropriate resources  Description  INTERVENTIONS:  - Identify barriers to discharge w/patient and caregiver  - Arrange for needed discharge resources and transportation as appropriate  - Identify discharge learning needs (meds, wound care, etc )  - Arrange for interpretive services to assist at discharge as needed  - Refer to Case Management Department for coordinating discharge planning if the patient needs post-hospital services based on physician/advanced practitioner order or complex needs related to functional status, cognitive ability, or social support system  Outcome: Progressing     Problem: Knowledge Deficit  Goal: Patient/family/caregiver demonstrates understanding of disease process, treatment plan, medications, and discharge instructions  Description  Complete learning assessment and assess knowledge base    Interventions:  - Provide teaching at level of understanding  - Provide teaching via preferred learning methods  Outcome: Progressing     Problem: MUSCULOSKELETAL - ADULT  Goal: Maintain or return mobility to safest level of function  Description  INTERVENTIONS:  - Assess patient's ability to carry out ADLs; assess patient's baseline for ADL function and identify physical deficits which impact ability to perform ADLs (bathing, care of mouth/teeth, toileting, grooming, dressing, etc )  - Assess/evaluate cause of self-care deficits   - Assess range of motion  - Assess patient's mobility  - Assess patient's need for assistive devices and provide as appropriate  - Encourage maximum independence but intervene and supervise when necessary  - Involve family in performance of ADLs  - Assess for home care needs following discharge   - Consider OT consult to assist with ADL evaluation and planning for discharge  - Provide patient education as appropriate  Outcome: Progressing  Goal: Maintain proper alignment of affected body part  Description  INTERVENTIONS:  - Support, maintain and protect limb and body alignment  - Provide patient/ family with appropriate education  Outcome: Progressing     Problem: Nutrition/Hydration-ADULT  Goal: Nutrient/Hydration intake appropriate for improving, restoring or maintaining nutritional needs  Description  Monitor and assess patient's nutrition/hydration status for malnutrition  Collaborate with interdisciplinary team and initiate plan and interventions as ordered  Monitor patient's weight and dietary intake as ordered or per policy  Utilize nutrition screening tool and intervene as necessary  Determine patient's food preferences and provide high-protein, high-caloric foods as appropriate       INTERVENTIONS:  - Monitor oral intake, urinary output, labs, and treatment plans  - Assess nutrition and hydration status and recommend course of action  - Evaluate amount of meals eaten  - Assist patient with eating if necessary   - Allow adequate time for meals  - Recommend/ encourage appropriate diets, oral nutritional supplements, and vitamin/mineral supplements  - Order, calculate, and assess calorie counts as needed  - Recommend, monitor, and adjust tube feedings and TPN/PPN based on assessed needs  - Assess need for intravenous fluids  - Provide specific nutrition/hydration education as appropriate  - Include patient/family/caregiver in decisions related to nutrition  Outcome: Progressing     Problem: GENITOURINARY - ADULT  Goal: Maintains or returns to baseline urinary function  Description  INTERVENTIONS:  - Assess urinary function  - Encourage oral fluids to ensure adequate hydration if ordered  - Administer IV fluids as ordered to ensure adequate hydration  - Administer ordered medications as needed  - Offer frequent toileting  - Follow urinary retention protocol if ordered  Outcome: Progressing  Goal: Absence of urinary retention  Description  INTERVENTIONS:  - Assess patients ability to void and empty bladder  - Monitor I/O  - Bladder scan as needed  - Discuss with physician/AP medications to alleviate retention as needed  - Discuss catheterization for long term situations as appropriate  Outcome: Progressing

## 2019-12-11 NOTE — PROGRESS NOTES
Cardiology Progress Note - Shannon Husain 78 y o  female MRN: [id number]    Unit/Bed#: [facility] 524-01 Encounter: [id number]      Assessment/Recommendations:  1  Post-op cardiac eval: tolerated aortobifemoral bypass well, without cardiac complications  Continue B-blocker  2   HTN: well controlled on current regimen  Continue meds roque-op  3   HLD: continued on statin  4  PAD: s/p aortobifemoral bypass, POD#2- continued on ASA, statin, B-blocker  5   Grade 2 diastolic dysfunction with moderate pulm HTN: diuretic currently on hold - monitor volume status to keep volume status stable  Continue BP control roque-op and evaluate for volume overload roque-operatively for potential need for IV diuresis  Currently appears euvolemic  Resume low dose maintenance diuretic today  Subjective:   Patient seen and examined  No significant events overnight   ; pertinent negatives - chest pain, chest pressure/discomfort, dyspnea, irregular heart beat and palpitations  Objective:     Vitals: Blood pressure 114/58, pulse 84, temperature 98 °F (36 7 °C), temperature source Oral, resp  rate 16, height 5' 4" (1 626 m), weight 52 2 kg (115 lb 1 3 oz), SpO2 95 %  , Body mass index is 19 75 kg/m² ,   Orthostatic Blood Pressures      Most Recent Value   Blood Pressure  114/58 filed at 12/11/2019 4175   Patient Position - Orthostatic VS  Lying filed at 12/11/2019 1656            Intake/Output Summary (Last 24 hours) at 12/11/2019 0953  Last data filed at 12/11/2019 0857  Gross per 24 hour   Intake 3371 66 ml   Output 185 ml   Net 3186 66 ml       TELE:  SR with PACs    Physical Exam:    GEN: Deena Wolff appears well, alert and oriented x 3, pleasant and cooperative   HEENT: pupils equal, round, and reactive to light; extraocular muscles intact  NECK: supple, no carotid bruits   HEART: regular rhythm, normal S1 and S2, +systolic murmur, no clicks, gallops or rubs   LUNGS: clear to auscultation bilaterally; no wheezes, rales, or rhonchi   ABDOMEN: normal bowel sounds, soft, no tenderness, no distention  EXTREMITIES: peripheral pulses normal; no clubbing, cyanosis, or edema  NEURO: no focal findings   SKIN: normal without suspicious lesions on exposed skin    Medications:      Current Facility-Administered Medications:     acetaminophen (TYLENOL) tablet 650 mg, 650 mg, Oral, Q6H PRN, Radha Plough, DO    amLODIPine (NORVASC) tablet 5 mg, 5 mg, Oral, Daily, Radha Plough, DO, 5 mg at 12/11/19 1616    aspirin chewable tablet 162 mg, 162 mg, Oral, Daily, Foster Plough, DO, 162 mg at 12/11/19 1409    atorvastatin (LIPITOR) tablet 80 mg, 80 mg, Oral, Daily, Foster Plough, DO, 80 mg at 12/11/19 3248    carvedilol (COREG) tablet 12 5 mg, 12 5 mg, Oral, BID With Meals, Radha Plough, DO, 12 5 mg at 12/11/19 0842    chlorhexidine (PERIDEX) 0 12 % oral rinse 15 mL, 15 mL, Swish & Spit, Q12H [facility], Radha Plough, DO, 15 mL at 12/11/19 0846    clopidogrel (PLAVIX) tablet 75 mg, 75 mg, Oral, Daily, Foster Plough, DO, 75 mg at 12/11/19 6974    docusate sodium (COLACE) capsule 100 mg, 100 mg, Oral, BID, Radha Plough, DO, 100 mg at 12/11/19 0843    fluticasone (FLONASE) 50 mcg/act nasal spray 1 spray, 1 spray, Nasal, Daily, Radha Plough, DO, 1 spray at 12/11/19 0849    heparin (porcine) subcutaneous injection 5,000 Units, 5,000 Units, Subcutaneous, Q8H [facility], 5,000 Units at 12/11/19 0550 **AND** [COMPLETED] Platelet count, , , Once, Lary Mancera MD    HYDROmorphone (DILAUDID) injection 0 5 mg, 0 5 mg, Intravenous, Q3H PRN, Foster Plough, DO, 0 5 mg at 12/11/19 0014    hydrOXYzine HCL (ATARAX) tablet 10 mg, 10 mg, Oral, Q6H PRN, Radha Plough, DO    lactated ringers infusion, 50 mL/hr, Intravenous, Continuous, Foster Plough, DO, Last Rate: 50 mL/hr at 12/11/19 0857, 50 mL/hr at 12/11/19 0857    levothyroxine tablet 25 mcg, 25 mcg, Oral, Early Morning, Foster Plough, DO, 25 mcg at 12/11/19 0505   magnesium oxide (MAG-OX) tablet 400 mg, 400 mg, Oral, Daily, Blaine Juarez DO, 400 mg at 12/11/19 5427    multivitamin stress formula tablet 1 tablet, 1 tablet, Oral, Daily, Blaine Juarez DO, 1 tablet at 12/11/19 0850    nicotine (NICODERM CQ) 21 mg/24 hr TD 24 hr patch 1 patch, 1 patch, Transdermal, Daily, Blaine Juarez DO, 1 patch at 12/11/19 0951    oxyCODONE (ROXICODONE) immediate release tablet 10 mg, 10 mg, Oral, Q4H PRN, Blaine Juarez DO, 10 mg at 12/11/19 0409    oxyCODONE (ROXICODONE) IR tablet 5 mg, 5 mg, Oral, Q4H PRN, Blaine Juarez DO    [facility]) tablet 8 6 mg, 1 tablet, Oral, Daily, Blaine Juarez DO, 8 6 mg at 12/11/19 0725     Labs & Results:        Results from last 7 days   Lab Units 12/11/19  0536 12/10/19  0520 12/10/19  0002 12/09/19  1935   WBC Thousand/uL 15 00* 15 14*  --  20 93*   HEMOGLOBIN g/dL 7 9* 7 9* 8 1* 7 8*   HEMATOCRIT % 24 6* 24 3* 24 5* 23 2*   PLATELETS Thousands/uL 210 211 203 200         Results from last 7 days   Lab Units 12/11/19  0536 12/10/19  0520 12/09/19  1935 12/09/19  1728 12/09/19  1528  12/06/19  1231   POTASSIUM mmol/L 3 7 3 6 3 4*  --   --    < > 4 2   CHLORIDE mmol/L 111* 108 111*  --   --    < > 102   CO2 mmol/L 24 24 24  --   --    < > 27   CO2, I-STAT mmol/L  --   --   --  21 23   < >  --    BUN mg/dL 22 22 22  --   --    < > 32*   CREATININE mg/dL 0 96 0 90 0 95  --   --    < > 0 86   CALCIUM mg/dL 8 6 8 2* 7 7*  --   --    < > 9 3   ALK PHOS U/L  --   --   --   --   --   --  86   ALT U/L  --   --   --   --   --   --  48   AST U/L  --   --   --   --   --   --  50*   GLUCOSE, ISTAT mg/dl  --   --   --  173* 149*  --   --     < > = values in this interval not displayed  Results from last 7 days   Lab Units 12/09/19  1442 12/09/19  0519 12/08/19  2314  12/06/19  1850 12/06/19  1231   INR   --   --   --   --  1 14 1 02   PTT seconds 38* 50* 72*   < > 93* 24    < > = values in this interval not displayed       Results from last 7 days   Lab Units 12/07/19  0506 12/06/19  1231   MAGNESIUM mg/dL 2 0 1 8       Echo: personally reviewed - EF 55-60%, G2DD, mild LVH, CONNIE, mild pulm HTN, mild MR, mild TR    EKG personally reviewed by Delia Vazquez MD

## 2019-12-11 NOTE — APP STUDENT NOTE
Surgical resident paged  Patient was due to void at 8pm after [place] catheter was discontinued  Patient unable to void at this time and was bladder scanned for 0cc  1000ml saline bolus ordered

## 2019-12-11 NOTE — PROGRESS NOTES
Vascular Surgery    Progress Note - Marvin Jarquin 1940, 78 y o  female MRN: [id number]    Unit/Bed#: [facility] 524-01 Encounter: [id number]    Primary Care Provider: Tennille Burns DO   Date and time admitted to hospital: 12/6/2019  4:13 PM    * Aortoiliac occlusive disease [facility])  Assessment & Plan  Aortoiliac occlusive disease with rest pain    S/P Aorta Bifemoral bypass w/ left iliofemoral thrombectomy 12/9    Plan:  Advanced diet to clear liquids, will allow coffee  Continue aspirin, Plavix, Lipitor  Pain control  OOB/Amb  PT/OT  Case management for discharge planning      Acute on chronic blood loss anemia  Assessment & Plan  Acute blood loss anemia on chronic anemia    Plan:  Monitor      Subjective:  Pt doing well, feet warm but remain numb pain improved, denies N/V, no flatus/BM    Vitals:  /58 (BP Location: Left arm)   Pulse 84   Temp 98 °F (36 7 °C) (Oral)   Resp 16   Ht 5' 4" (1 626 m)   Wt 52 2 kg (115 lb 1 3 oz)   SpO2 95%   BMI 19 75 kg/m²     I/Os:  I/O last 3 completed shifts: In: 5844 2 [I V :5344 2; IV Piggyback:500]  Out: 4320 [Urine:2000; Blood:300]  I/O this shift:  In: 2305 8 [I V :305 8; IV Piggyback:2000]  Out: 60 [Urine:60]    Lab Results and Cultures:   Lab Results   Component Value Date    WBC 15 00 (H) 12/11/2019    HGB 7 9 (L) 12/11/2019    HCT 24 6 (L) 12/11/2019     (H) 12/11/2019     12/11/2019     Lab Results   Component Value Date    GLUCOSE 173 (H) 12/09/2019    CALCIUM 8 2 (L) 12/10/2019    K 3 6 12/10/2019    CO2 24 12/10/2019     12/10/2019    BUN 22 12/10/2019    CREATININE 0 90 12/10/2019     Lab Results   Component Value Date    INR 1 14 12/06/2019    INR 1 02 12/06/2019    INR 1 09 11/24/2019    PROTIME 14 2 12/06/2019    PROTIME 11 0 12/06/2019    PROTIME 11 7 11/24/2019        Blood Culture:   Lab Results   Component Value Date    BLOODCX No Growth After 5 Days   11/24/2019   ,   Urinalysis:   Lab Results   Component Value Date    COLORU Yellow 12/05/2019    COLORU Yellow 11/24/2019    CLARITYU Cloudy 11/24/2019    SPECGRAV 1 012 12/05/2019    SPECGRAV 1 020 11/24/2019    PHUR 6 0 11/24/2019    PHUR 6 0 12/24/2018    LEUKOCYTESUR Negative 12/05/2019    LEUKOCYTESUR Moderate (A) 11/24/2019    NITRITE Negative 12/05/2019    NITRITE Positive (A) 11/24/2019    GLUCOSEU Negative 11/24/2019    KETONESU Negative 12/05/2019    KETONESU Negative 11/24/2019    BILIRUBINUR Negative 12/05/2019    BILIRUBINUR Negative 11/24/2019    BLOODU Negative 12/05/2019    BLOODU Moderate (A) 11/24/2019   ,   Urine Culture:   Lab Results   Component Value Date    URINECX >100,000 cfu/ml Escherichia coli (A) 11/24/2019   ,   Wound Culure: No results found for: WOUNDCULT    Medications:  Current Facility-Administered Medications   Medication Dose Route Frequency    acetaminophen (TYLENOL) tablet 650 mg  650 mg Oral Q6H PRN    amLODIPine (NORVASC) tablet 5 mg  5 mg Oral Daily    aspirin chewable tablet 162 mg  162 mg Oral Daily    atorvastatin (LIPITOR) tablet 80 mg  80 mg Oral Daily    carvedilol (COREG) tablet 12 5 mg  12 5 mg Oral BID With Meals    chlorhexidine (PERIDEX) 0 12 % oral rinse 15 mL  15 mL Swish & Spit Q12H [facility]    clopidogrel (PLAVIX) tablet 75 mg  75 mg Oral Daily    docusate sodium (COLACE) capsule 100 mg  100 mg Oral BID    fluticasone (FLONASE) 50 mcg/act nasal spray 1 spray  1 spray Nasal Daily    heparin (porcine) subcutaneous injection 5,000 Units  5,000 Units Subcutaneous Q8H [facility]    HYDROmorphone (DILAUDID) injection 0 5 mg  0 5 mg Intravenous Q3H PRN    hydrOXYzine HCL (ATARAX) tablet 10 mg  10 mg Oral Q6H PRN    lactated ringers infusion  50 mL/hr Intravenous Continuous    levothyroxine tablet 25 mcg  25 mcg Oral Early Morning    magnesium oxide (MAG-OX) tablet 400 mg  400 mg Oral Daily    multivitamin stress formula tablet 1 tablet  1 tablet Oral Daily    nicotine (NICODERM CQ) 21 mg/24 hr TD 24 hr patch 1 patch  1 patch Transdermal Daily    oxyCODONE (ROXICODONE) immediate release tablet 10 mg  10 mg Oral Q4H PRN    oxyCODONE (ROXICODONE) IR tablet 5 mg  5 mg Oral Q4H PRN    senna (SENOKOT) tablet 8 6 mg  1 tablet Oral Daily       Physical Exam:    General appearance: alert and oriented, in no acute distress  Lungs: clear to auscultation bilaterally  Heart: regular rate and rhythm, S1, S2 normal, no murmur, click, rub or gallop  Abdomen: Hypoactive bowel sounds, soft, nondistended, mildly tender  Extremities: Bilateral feet warm, pink, motor/sensation grossly intact    Wound/Incision:  Abdominal bilateral groin incision clean, dry, and intact    Pulse exam:  DP: Right: doppler signal Left: doppler signal  PT: Right: doppler signal Left: doppler signal      Kaylynn Fall PA-C  12/11/2019

## 2019-12-11 NOTE — ASSESSMENT & PLAN NOTE
Aortoiliac occlusive disease with rest pain    S/P Aorta Bifemoral bypass w/ left iliofemoral thrombectomy 12/9    Plan:  Advanced diet to clear liquids, will allow coffee  Continue aspirin, Plavix, Lipitor  Pain control  OOB/Amb  PT/OT  Case management for discharge planning

## 2019-12-12 LAB
ANION GAP SERPL CALCULATED.3IONS-SCNC: 6 MMOL/L (ref 4–13)
BUN SERPL-MCNC: 19 MG/DL (ref 5–25)
CALCIUM SERPL-MCNC: 8.4 MG/DL (ref 8.3–10.1)
CHLORIDE SERPL-SCNC: 113 MMOL/L (ref 100–108)
CO2 SERPL-SCNC: 23 MMOL/L (ref 21–32)
CREAT SERPL-MCNC: 0.71 MG/DL (ref 0.6–1.3)
ERYTHROCYTE [DISTWIDTH] IN BLOOD BY AUTOMATED COUNT: 16 % (ref 11.6–15.1)
GFR SERPL CREATININE-BSD FRML MDRD: 81 ML/MIN/1.73SQ M
GLUCOSE SERPL-MCNC: 85 MG/DL (ref 65–140)
HCT VFR BLD AUTO: 21.8 % (ref 34.8–46.1)
HGB BLD-MCNC: 6.9 G/DL (ref 11.5–15.4)
MCH RBC QN AUTO: 34.5 PG (ref 26.8–34.3)
MCHC RBC AUTO-ENTMCNC: 31.7 G/DL (ref 31.4–37.4)
MCV RBC AUTO: 109 FL (ref 82–98)
PLATELET # BLD AUTO: 192 THOUSANDS/UL (ref 149–390)
PMV BLD AUTO: 11.2 FL (ref 8.9–12.7)
POTASSIUM SERPL-SCNC: 3.4 MMOL/L (ref 3.5–5.3)
RBC # BLD AUTO: 2 MILLION/UL (ref 3.81–5.12)
SODIUM SERPL-SCNC: 142 MMOL/L (ref 136–145)
WBC # BLD AUTO: 12.17 THOUSAND/UL (ref 4.31–10.16)

## 2019-12-12 PROCEDURE — 85027 COMPLETE CBC AUTOMATED: CPT | Performed by: PHYSICIAN ASSISTANT

## 2019-12-12 PROCEDURE — 80048 BASIC METABOLIC PNL TOTAL CA: CPT | Performed by: PHYSICIAN ASSISTANT

## 2019-12-12 PROCEDURE — 99024 POSTOP FOLLOW-UP VISIT: CPT | Performed by: SURGERY

## 2019-12-12 PROCEDURE — 99232 SBSQ HOSP IP/OBS MODERATE 35: CPT | Performed by: INTERNAL MEDICINE

## 2019-12-12 PROCEDURE — 30243N1 TRANSFUSION OF NONAUTOLOGOUS RED BLOOD CELLS INTO CENTRAL VEIN, PERCUTANEOUS APPROACH: ICD-10-PCS | Performed by: SURGERY

## 2019-12-12 RX ORDER — SODIUM CHLORIDE 9 MG/ML
500 INJECTION, SOLUTION INTRAVENOUS ONCE
Status: COMPLETED | OUTPATIENT
Start: 2019-12-12 | End: 2019-12-12

## 2019-12-12 RX ORDER — BISACODYL 10 MG
10 SUPPOSITORY, RECTAL RECTAL ONCE
Status: COMPLETED | OUTPATIENT
Start: 2019-12-12 | End: 2019-12-12

## 2019-12-12 RX ORDER — BISACODYL 10 MG
10 SUPPOSITORY, RECTAL RECTAL DAILY
Status: DISPENSED | OUTPATIENT
Start: 2019-12-12 | End: 2019-12-13

## 2019-12-12 RX ORDER — CARVEDILOL 12.5 MG/1
12.5 TABLET ORAL 2 TIMES DAILY WITH MEALS
Status: DISCONTINUED | OUTPATIENT
Start: 2019-12-12 | End: 2019-12-16 | Stop reason: HOSPADM

## 2019-12-12 RX ORDER — POTASSIUM CHLORIDE 20 MEQ/1
40 TABLET, EXTENDED RELEASE ORAL ONCE
Status: COMPLETED | OUTPATIENT
Start: 2019-12-12 | End: 2019-12-12

## 2019-12-12 RX ORDER — AMLODIPINE BESYLATE 5 MG/1
5 TABLET ORAL DAILY
Status: DISCONTINUED | OUTPATIENT
Start: 2019-12-13 | End: 2019-12-16 | Stop reason: HOSPADM

## 2019-12-12 RX ADMIN — SODIUM CHLORIDE 75 ML/HR: 0.9 INJECTION, SOLUTION INTRAVENOUS at 09:54

## 2019-12-12 RX ADMIN — ASPIRIN 81 MG 162 MG: 81 TABLET ORAL at 08:53

## 2019-12-12 RX ADMIN — LEVOTHYROXINE SODIUM 25 MCG: 25 TABLET ORAL at 05:47

## 2019-12-12 RX ADMIN — CARVEDILOL 12.5 MG: 12.5 TABLET, FILM COATED ORAL at 08:53

## 2019-12-12 RX ADMIN — SENNOSIDES 8.6 MG: 8.6 TABLET, FILM COATED ORAL at 08:54

## 2019-12-12 RX ADMIN — CLOPIDOGREL BISULFATE 75 MG: 75 TABLET ORAL at 08:54

## 2019-12-12 RX ADMIN — CHLORHEXIDINE GLUCONATE 0.12% ORAL RINSE 15 ML: 1.2 LIQUID ORAL at 08:55

## 2019-12-12 RX ADMIN — BISACODYL 10 MG: 10 SUPPOSITORY RECTAL at 17:15

## 2019-12-12 RX ADMIN — HEPARIN SODIUM 5000 UNITS: 5000 INJECTION INTRAVENOUS; SUBCUTANEOUS at 14:31

## 2019-12-12 RX ADMIN — NICOTINE 1 PATCH: 21 PATCH, EXTENDED RELEASE TRANSDERMAL at 08:56

## 2019-12-12 RX ADMIN — DOCUSATE SODIUM 100 MG: 100 CAPSULE, LIQUID FILLED ORAL at 17:14

## 2019-12-12 RX ADMIN — SODIUM CHLORIDE 500 ML/HR: 0.9 INJECTION, SOLUTION INTRAVENOUS at 07:20

## 2019-12-12 RX ADMIN — AMLODIPINE BESYLATE 5 MG: 5 TABLET ORAL at 08:54

## 2019-12-12 RX ADMIN — HEPARIN SODIUM 5000 UNITS: 5000 INJECTION INTRAVENOUS; SUBCUTANEOUS at 05:47

## 2019-12-12 RX ADMIN — DOCUSATE SODIUM 100 MG: 100 CAPSULE, LIQUID FILLED ORAL at 08:53

## 2019-12-12 RX ADMIN — MAGNESIUM OXIDE TAB 400 MG (240 MG ELEMENTAL MG) 400 MG: 400 (240 MG) TAB at 08:53

## 2019-12-12 RX ADMIN — ZINC 1 TABLET: TAB ORAL at 08:53

## 2019-12-12 RX ADMIN — ATORVASTATIN CALCIUM 80 MG: 80 TABLET, FILM COATED ORAL at 08:53

## 2019-12-12 RX ADMIN — FLUTICASONE PROPIONATE 1 SPRAY: 50 SPRAY, METERED NASAL at 08:55

## 2019-12-12 RX ADMIN — CHLORHEXIDINE GLUCONATE 0.12% ORAL RINSE 15 ML: 1.2 LIQUID ORAL at 21:57

## 2019-12-12 RX ADMIN — POTASSIUM CHLORIDE 40 MEQ: 1500 TABLET, EXTENDED RELEASE ORAL at 17:14

## 2019-12-12 RX ADMIN — OXYCODONE HYDROCHLORIDE 10 MG: 10 TABLET ORAL at 20:10

## 2019-12-12 RX ADMIN — HEPARIN SODIUM 5000 UNITS: 5000 INJECTION INTRAVENOUS; SUBCUTANEOUS at 22:00

## 2019-12-12 NOTE — SOCIAL WORK
Met with patient to discuss discuss needs  A post acute care recommendation was made by your care team for STR  Discussed Freedom of Choice with patient  List of facilities given to patient via in person  patient aware the list is custom filtered for them by zip code location and that [facility] post acute providers are designated  Patient asks CM to call her niece, Jose Paiz, [phone], sicne she works at [employer] is making arrangements  Attempted to call niece but phone # out of service or incorrect  Called daughter, Butch Kingston, [phone] to discuss discharge needs  She reports niece will NOT be making decision about rehab  Sent snf list to [place] via email, [email or web address]  She will review and speak to CM when she visits today

## 2019-12-12 NOTE — PROGRESS NOTES
Vascular Surgery    Progress Note - Haily Dominique 1940, 78 y o  female MRN: [id number]    Unit/Bed#: [facility] 524-01 Encounter: [id number]    Primary Care Provider: Delano Setting, DO   Date and time admitted to hospital: 12/6/2019  4:13 PM        * Aortoiliac occlusive disease [facility])  Assessment & Plan  Aortoiliac occlusive disease with rest pain    S/P Aorta Bifemoral bypass w/ left iliofemoral thrombectomy 12/9    Plan:  F/U AM labs  NS bolus 500ml x1 this am tachycardia and low UO overnight  Hold Demadex   Continue clear liquids, will allow coffee  Continue aspirin, Plavix, Lipitor  Pain control  OOB/Amb  PT/OT  Case management for discharge planning      Acute on chronic blood loss anemia  Assessment & Plan  Acute blood loss anemia on chronic anemia    Plan:  Monitor      Subjective:  Pt w/ sinus tachycardia and low UO overnight, pain controlled, no BM    Vitals:  /51   Pulse 86   Temp 97 6 °F (36 4 °C)   Resp 20   Ht 5' 4" (1 626 m)   Wt 56 2 kg (123 lb 14 4 oz)   SpO2 98%   BMI 21 27 kg/m²     I/Os:  I/O last 3 completed shifts: In: 3491 7 [I V :1491 7; IV [id number]:2237]  Out: 5075 [Urine:1012]  I/O this shift:  In: 243 8 [I V :243 8]  Out: 221 [Urine:221]    Lab Results and Cultures:   Lab Results   Component Value Date    WBC 15 00 (H) 12/11/2019    HGB 7 9 (L) 12/11/2019    HCT 24 6 (L) 12/11/2019     (H) 12/11/2019     12/11/2019     Lab Results   Component Value Date    GLUCOSE 173 (H) 12/09/2019    CALCIUM 8 6 12/11/2019    K 3 7 12/11/2019    CO2 24 12/11/2019     (H) 12/11/2019    BUN 22 12/11/2019    CREATININE 0 96 12/11/2019     Lab Results   Component Value Date    INR 1 14 12/06/2019    INR 1 02 12/06/2019    INR 1 09 11/24/2019    PROTIME 14 2 12/06/2019    PROTIME 11 0 12/06/2019    PROTIME 11 7 11/24/2019        Blood Culture:   Lab Results   Component Value Date    BLOODCX No Growth After 5 Days   11/24/2019   ,   Urinalysis:   Lab Results   Component Value Date    COLORU Yellow 12/05/2019    COLORU Yellow 11/24/2019    CLARITYU Cloudy 11/24/2019    SPECGRAV 1 012 12/05/2019    SPECGRAV 1 020 11/24/2019    PHUR 6 0 11/24/2019    PHUR 6 0 12/24/2018    LEUKOCYTESUR Negative 12/05/2019    LEUKOCYTESUR Moderate (A) 11/24/2019    NITRITE Negative 12/05/2019    NITRITE Positive (A) 11/24/2019    GLUCOSEU Negative 11/24/2019    KETONESU Negative 12/05/2019    KETONESU Negative 11/24/2019    BILIRUBINUR Negative 12/05/2019    BILIRUBINUR Negative 11/24/2019    BLOODU Negative 12/05/2019    BLOODU Moderate (A) 11/24/2019   ,   Urine Culture:   Lab Results   Component Value Date    URINECX >100,000 cfu/ml Escherichia coli (A) 11/24/2019   ,   Wound Culure: No results found for: WOUNDCULT    Medications:  Current Facility-Administered Medications   Medication Dose Route Frequency    acetaminophen (TYLENOL) tablet 650 mg  650 mg Oral Q6H PRN    amLODIPine (NORVASC) tablet 5 mg  5 mg Oral Daily    aspirin chewable tablet 162 mg  162 mg Oral Daily    atorvastatin (LIPITOR) tablet 80 mg  80 mg Oral Daily    carvedilol (COREG) tablet 12 5 mg  12 5 mg Oral BID With Meals    chlorhexidine (PERIDEX) 0 12 % oral rinse 15 mL  15 mL Swish & Spit Q12H [facility]    clopidogrel (PLAVIX) tablet 75 mg  75 mg Oral Daily    docusate sodium (COLACE) capsule 100 mg  100 mg Oral BID    fluticasone (FLONASE) 50 mcg/act nasal spray 1 spray  1 spray Nasal Daily    heparin (porcine) subcutaneous injection 5,000 Units  5,000 Units Subcutaneous Q8H [facility]    HYDROmorphone (DILAUDID) injection 0 5 mg  0 5 mg Intravenous Q3H PRN    hydrOXYzine HCL (ATARAX) tablet 10 mg  10 mg Oral Q6H PRN    lactated ringers infusion  50 mL/hr Intravenous Continuous    levothyroxine tablet 25 mcg  25 mcg Oral Early Morning    magnesium oxide (MAG-OX) tablet 400 mg  400 mg Oral Daily    multivitamin stress formula tablet 1 tablet  1 tablet Oral Daily    nicotine (NICODERM CQ) 21 mg/24 hr TD 24 hr patch 1 patch  1 patch Transdermal Daily    oxyCODONE (ROXICODONE) immediate release tablet 10 mg  10 mg Oral Q4H PRN    oxyCODONE (ROXICODONE) IR tablet 5 mg  5 mg Oral Q4H PRN    senna (SENOKOT) tablet 8 6 mg  1 tablet Oral Daily    sodium chloride 0 9 % infusion  75 mL/hr Intravenous Continuous    sodium chloride 0 9 % infusion  500 mL/hr Intravenous Once     Physical Exam:    General appearance: alert and oriented, in no acute distress  Lungs: clear to auscultation bilaterally  Heart: regular rate and rhythm, S1, S2 normal, no murmur, click, rub or gallop  Abdomen: Hypoactive bowel sounds, soft, nondistended, appropriately tender near incision sites  Extremities: extremities normal, warm and well-perfused; no cyanosis, clubbing, or edema    Wound/Incision:  Midline abdominal bilateral groin incision clean, dry, and intact    Pulse exam:  DP: Right: doppler signal Left: doppler signal  PT: Right: doppler signal Left: doppler signal      Lamine Howell PA-C  12/12/2019

## 2019-12-12 NOTE — PLAN OF CARE
Problem: Prexisting or High Potential for Compromised Skin Integrity  Goal: Skin integrity is maintained or improved  Description  INTERVENTIONS:  - Identify patients at risk for skin breakdown  - Assess and monitor skin integrity  - Assess and monitor nutrition and hydration status  - Monitor labs   - Assess for incontinence   - Turn and reposition patient  - Assist with mobility/ambulation  - Relieve pressure over bony prominences  - Avoid friction and shearing  - Provide appropriate hygiene as needed including keeping skin clean and dry  - Evaluate need for skin moisturizer/barrier cream  - Collaborate with interdisciplinary team   - Patient/family teaching  - Consider wound care consult   Outcome: Progressing     Problem: Potential for Falls  Goal: Patient will remain free of falls  Description  INTERVENTIONS:  - Assess patient frequently for physical needs  -  Identify cognitive and physical deficits and behaviors that affect risk of falls    -  [place] fall precautions as indicated by assessment   - Educate patient/family on patient safety including physical limitations  - Instruct patient to call for assistance with activity based on assessment  - Modify environment to reduce risk of injury  - Consider OT/PT consult to assist with strengthening/mobility  Outcome: Progressing     Problem: PAIN - ADULT  Goal: Verbalizes/displays adequate comfort level or baseline comfort level  Description  Interventions:  - Encourage patient to monitor pain and request assistance  - Assess pain using appropriate pain scale  - Administer analgesics based on type and severity of pain and evaluate response  - Implement non-pharmacological measures as appropriate and evaluate response  - Consider cultural and social influences on pain and pain management  - Notify physician/advanced practitioner if interventions unsuccessful or patient reports new pain  Outcome: Progressing     Problem: INFECTION - ADULT  Goal: Absence or prevention of progression during hospitalization  Description  INTERVENTIONS:  - Assess and monitor for signs and symptoms of infection  - Monitor lab/diagnostic results  - Monitor all insertion sites, i e  indwelling lines, tubes, and drains  - Monitor endotracheal if appropriate and nasal secretions for changes in amount and color  - [place] appropriate cooling/warming therapies per order  - Administer medications as ordered  - Instruct and encourage patient and family to use good hand hygiene technique  - Identify and instruct in appropriate isolation precautions for identified infection/condition  Outcome: Progressing  Goal: Absence of fever/infection during neutropenic period  Description  INTERVENTIONS:  - Monitor WBC    Outcome: Progressing     Problem: SAFETY ADULT  Goal: Maintain or return to baseline ADL function  Description  INTERVENTIONS:  -  Assess patient's ability to carry out ADLs; assess patient's baseline for ADL function and identify physical deficits which impact ability to perform ADLs (bathing, care of mouth/teeth, toileting, grooming, dressing, etc )  - Assess/evaluate cause of self-care deficits   - Assess range of motion  - Assess patient's mobility; develop plan if impaired  - Assess patient's need for assistive devices and provide as appropriate  - Encourage maximum independence but intervene and supervise when necessary  - Involve family in performance of ADLs  - Assess for home care needs following discharge   - Consider OT consult to assist with ADL evaluation and planning for discharge  - Provide patient education as appropriate  Outcome: Progressing  Goal: Maintain or return mobility status to optimal level  Description  INTERVENTIONS:  - Assess patient's baseline mobility status (ambulation, transfers, stairs, etc )    - Identify cognitive and physical deficits and behaviors that affect mobility  - Identify mobility aids required to assist with transfers and/or ambulation (gait belt, sit-to-stand, lift, walker, cane, etc )  - [place] fall precautions as indicated by assessment  - Record patient progress and toleration of activity level on Mobility SBAR; progress patient to next Phase/Stage  - Instruct patient to call for assistance with activity based on assessment  - Consider rehabilitation consult to assist with strengthening/weightbearing, etc   Outcome: Progressing     Problem: DISCHARGE PLANNING  Goal: Discharge to home or other facility with appropriate resources  Description  INTERVENTIONS:  - Identify barriers to discharge w/patient and caregiver  - Arrange for needed discharge resources and transportation as appropriate  - Identify discharge learning needs (meds, wound care, etc )  - Arrange for interpretive services to assist at discharge as needed  - Refer to Case Management Department for coordinating discharge planning if the patient needs post-hospital services based on physician/advanced practitioner order or complex needs related to functional status, cognitive ability, or social support system  Outcome: Progressing     Problem: Knowledge Deficit  Goal: Patient/family/caregiver demonstrates understanding of disease process, treatment plan, medications, and discharge instructions  Description  Complete learning assessment and assess knowledge base    Interventions:  - Provide teaching at level of understanding  - Provide teaching via preferred learning methods  Outcome: Progressing     Problem: MUSCULOSKELETAL - ADULT  Goal: Maintain or return mobility to safest level of function  Description  INTERVENTIONS:  - Assess patient's ability to carry out ADLs; assess patient's baseline for ADL function and identify physical deficits which impact ability to perform ADLs (bathing, care of mouth/teeth, toileting, grooming, dressing, etc )  - Assess/evaluate cause of self-care deficits   - Assess range of motion  - Assess patient's mobility  - Assess patient's need for assistive devices and provide as appropriate  - Encourage maximum independence but intervene and supervise when necessary  - Involve family in performance of ADLs  - Assess for home care needs following discharge   - Consider OT consult to assist with ADL evaluation and planning for discharge  - Provide patient education as appropriate  Outcome: Progressing  Goal: Maintain proper alignment of affected body part  Description  INTERVENTIONS:  - Support, maintain and protect limb and body alignment  - Provide patient/ family with appropriate education  Outcome: Progressing     Problem: Nutrition/Hydration-ADULT  Goal: Nutrient/Hydration intake appropriate for improving, restoring or maintaining nutritional needs  Description  Monitor and assess patient's nutrition/hydration status for malnutrition  Collaborate with interdisciplinary team and initiate plan and interventions as ordered  Monitor patient's weight and dietary intake as ordered or per policy  Utilize nutrition screening tool and intervene as necessary  Determine patient's food preferences and provide high-protein, high-caloric foods as appropriate       INTERVENTIONS:  - Monitor oral intake, urinary output, labs, and treatment plans  - Assess nutrition and hydration status and recommend course of action  - Evaluate amount of meals eaten  - Assist patient with eating if necessary   - Allow adequate time for meals  - Recommend/ encourage appropriate diets, oral nutritional supplements, and vitamin/mineral supplements  - Order, calculate, and assess calorie counts as needed  - Recommend, monitor, and adjust tube feedings and TPN/PPN based on assessed needs  - Assess need for intravenous fluids  - Provide specific nutrition/hydration education as appropriate  - Include patient/family/caregiver in decisions related to nutrition  Outcome: Progressing     Problem: GENITOURINARY - ADULT  Goal: Maintains or returns to baseline urinary function  Description  INTERVENTIONS:  - Assess urinary function  - Encourage oral fluids to ensure adequate hydration if ordered  - Administer IV fluids as ordered to ensure adequate hydration  - Administer ordered medications as needed  - Offer frequent toileting  - Follow urinary retention protocol if ordered  Outcome: Progressing  Goal: Absence of urinary retention  Description  INTERVENTIONS:  - Assess patients ability to void and empty bladder  - Monitor I/O  - Bladder scan as needed  - Discuss with physician/AP medications to alleviate retention as needed  - Discuss catheterization for long term situations as appropriate  Outcome: Progressing

## 2019-12-12 NOTE — RESTORATIVE TECHNICIAN NOTE
Restorative Specialist Mobility Note       Activity: Stand at bedside, Turn, Chair     Assistive Device: Front wheel walker        Repositioned: Sitting, Up in chair              Anti-Embolism Device On:  Bilateral, Sequential compression devices, below knee

## 2019-12-12 NOTE — PROGRESS NOTES
Cardiology Progress Note - Fausto Gar 78 y o  female MRN: [id number]    Unit/Bed#: [facility] 524-01 Encounter: [id number]      Assessment/Recommendations:  1  Post-op cardiac eval: tolerated aortobifemoral bypass well, without cardiac complications  Continue B-blocker  2   HTN: well controlled on current regimen  Continue meds roque-op  3   HLD: continued on statin  4  PAD: s/p aortobifemoral bypass, POD#3- continued on ASA, statin, B-blocker  5   Grade 2 diastolic dysfunction with moderate pulm HTN: diuretic currently on hold - monitor volume status to keep volume status stable  Continue BP control roque-op and evaluate for volume overload roque-operatively for potential need for IV diuresis  Currently appears euvolemic  Resumed on low dose maintenance diuretic and tolerated well - replete K to keep >4   6  No further cardiac recommendations, please call with questions  Outpatient follow-up recommended  Subjective:   Patient seen and examined  No significant events overnight   ; pertinent negatives - chest pain, chest pressure/discomfort, dyspnea, irregular heart beat and palpitations  Objective:     Vitals: Blood pressure 111/56, pulse 85, temperature 97 5 °F (36 4 °C), temperature source Oral, resp  rate (!) 23, height 5' 4" (1 626 m), weight 56 2 kg (123 lb 14 4 oz), SpO2 95 %  , Body mass index is 21 27 kg/m² ,   Orthostatic Blood Pressures      Most Recent Value   Blood Pressure  111/56 filed at 12/12/2019 0746   Patient Position - Orthostatic VS  Lying filed at 12/12/2019 0746            Intake/Output Summary (Last 24 hours) at 12/12/2019 0929  Last data filed at 12/12/2019 0900  Gross per 24 hour   Intake 343 75 ml   Output 968 ml   Net -624 25 ml       TELE:  SR with PACs, brief run of PAT    Physical Exam:    GEN: Isai Wolff appears well, alert and oriented x 3, pleasant and cooperative   HEENT: pupils equal, round, and reactive to light; extraocular muscles intact  NECK: supple, no carotid bruits   HEART: regular rhythm, normal S1 and S2, no murmurs, clicks, gallops or rubs   LUNGS: clear to auscultation bilaterally; no wheezes, rales, or rhonchi   ABDOMEN: normal bowel sounds, soft, no tenderness, no distention  EXTREMITIES: peripheral pulses normal; no clubbing, cyanosis, or edema  NEURO: no focal findings   SKIN: normal without suspicious lesions on exposed skin      Medications:      Current Facility-Administered Medications:     acetaminophen (TYLENOL) tablet 650 mg, 650 mg, Oral, Q6H PRN, Rochelle Niharikak, DO    amLODIPine (NORVASC) tablet 5 mg, 5 mg, Oral, Daily, Rochelle Husk, DO, 5 mg at 12/12/19 9767    aspirin chewable tablet 162 mg, 162 mg, Oral, Daily, Rochelle Niharikak, DO, 162 mg at 12/12/19 7635    atorvastatin (LIPITOR) tablet 80 mg, 80 mg, Oral, Daily, Rochelle Niharikak, DO, 80 mg at 12/12/19 8095    carvedilol (COREG) tablet 12 5 mg, 12 5 mg, Oral, BID With Meals, Rochelle Bedolla, DO, 12 5 mg at 12/12/19 0853    chlorhexidine (PERIDEX) 0 12 % oral rinse 15 mL, 15 mL, Swish & Spit, Q12H [facility], Rochelle Bedolla, DO, 15 mL at 12/12/19 1005    clopidogrel (PLAVIX) tablet 75 mg, 75 mg, Oral, Daily, Rochelle Niharikak, DO, 75 mg at 12/12/19 0854    docusate sodium (COLACE) capsule 100 mg, 100 mg, Oral, BID, Rochelle Niharikak, DO, 100 mg at 12/12/19 0853    fluticasone (FLONASE) 50 mcg/act nasal spray 1 spray, 1 spray, Nasal, Daily, Rochelle Bedolla DO, 1 spray at 12/12/19 0855    heparin (porcine) subcutaneous injection 5,000 Units, 5,000 Units, Subcutaneous, Q8H [facility], 5,000 Units at 12/12/19 0547 **AND** [COMPLETED] Platelet count, , , Once, Shira Sanches MD    HYDROmorphone (DILAUDID) injection 0 5 mg, 0 5 mg, Intravenous, Q3H PRN, Rochelle Bundyk, DO, 0 5 mg at 12/11/19 2049    hydrOXYzine HCL (ATARAX) tablet 10 mg, 10 mg, Oral, Q6H PRN, Rochelle Bundyk, DO    lactated ringers infusion, 50 mL/hr, Intravenous, Continuous, Rochelle Bedolla, DO, Stopped at 12/11/19 1404    levothyroxine tablet 25 mcg, 25 mcg, Oral, Early Morning, [place] Plough, DO, 25 mcg at 12/12/19 0547    magnesium oxide (MAG-OX) tablet 400 mg, 400 mg, Oral, Daily, [place] Plough, DO, 400 mg at 12/12/19 8482    multivitamin stress formula tablet 1 tablet, 1 tablet, Oral, Daily, Radha Plough, DO, 1 tablet at 12/12/19 9122    nicotine (NICODERM CQ) 21 mg/24 hr TD 24 hr patch 1 patch, 1 patch, Transdermal, Daily, Radha Plough, DO, 1 patch at 12/12/19 6031    oxyCODONE (ROXICODONE) immediate release tablet 10 mg, 10 mg, Oral, Q4H PRN, [place] Plough, DO, 10 mg at 12/11/19 1922    oxyCODONE (ROXICODONE) IR tablet 5 mg, 5 mg, Oral, Q4H PRN, [place] Plough, DO    [facility]) tablet 8 6 mg, 1 tablet, Oral, Daily, Radha Plough, DO, 8 6 mg at 12/12/19 0854    sodium chloride 0 9 % infusion, 75 mL/hr, Intravenous, Continuous, Nisha Albrecht PA-C, Last Rate: 75 mL/hr at 12/11/19 1645, 75 mL/hr at 12/11/19 1645     Labs & Results:        Results from last 7 days   Lab Units 12/12/19  0640 12/11/19  0536 12/10/19  0520   WBC Thousand/uL 12 17* 15 00* 15 14*   HEMOGLOBIN g/dL 6 9* 7 9* 7 9*   HEMATOCRIT % 21 8* 24 6* 24 3*   PLATELETS Thousands/uL 192 210 211         Results from last 7 days   Lab Units 12/12/19  0640 12/11/19  0536 12/10/19  0520  12/09/19  1728 12/09/19  1528  12/06/19  1231   POTASSIUM mmol/L 3 4* 3 7 3 6   < >  --   --    < > 4 2   CHLORIDE mmol/L 113* 111* 108   < >  --   --    < > 102   CO2 mmol/L 23 24 24   < >  --   --    < > 27   CO2, I-STAT mmol/L  --   --   --   --  21 23   < >  --    BUN mg/dL 19 22 22   < >  --   --    < > 32*   CREATININE mg/dL 0 71 0 96 0 90   < >  --   --    < > 0 86   CALCIUM mg/dL 8 4 8 6 8 2*   < >  --   --    < > 9 3   ALK PHOS U/L  --   --   --   --   --   --   --  86   ALT U/L  --   --   --   --   --   --   --  48   AST U/L  --   --   --   --   --   --   --  50*   GLUCOSE, ISTAT mg/dl  --   --   --   --  173* 149*  --   -- < > = values in this interval not displayed  Results from last 7 days   Lab Units 12/09/19  1442 12/09/19  0519 12/08/19  2314  12/06/19  1850 12/06/19  1231   INR   --   --   --   --  1 14 1 02   PTT seconds 38* 50* 72*   < > 93* 24    < > = values in this interval not displayed       Results from last 7 days   Lab Units 12/07/19  0506 12/06/19  1231   MAGNESIUM mg/dL 2 0 1 8       Echo: personally reviewed - EF 55-60%, G2DD, mild LVH, CONNIE, mild pulm HTN, mild MR, mild TR    EKG personally reviewed by Wilfredo Archer MD

## 2019-12-12 NOTE — ASSESSMENT & PLAN NOTE
Aortoiliac occlusive disease with rest pain    S/P Aorta Bifemoral bypass w/ left iliofemoral thrombectomy 12/9    Plan:  F/U AM labs  NS bolus 500ml x1 this am tachycardia and low UO overnight  Continue clear liquids, will allow coffee  Continue aspirin, Plavix, Lipitor  Pain control  OOB/Amb  PT/OT  Case management for discharge planning

## 2019-12-13 LAB
ANION GAP SERPL CALCULATED.3IONS-SCNC: 6 MMOL/L (ref 4–13)
BUN SERPL-MCNC: 16 MG/DL (ref 5–25)
CALCIUM SERPL-MCNC: 8.6 MG/DL (ref 8.3–10.1)
CHLORIDE SERPL-SCNC: 115 MMOL/L (ref 100–108)
CO2 SERPL-SCNC: 21 MMOL/L (ref 21–32)
CREAT SERPL-MCNC: 0.67 MG/DL (ref 0.6–1.3)
ERYTHROCYTE [DISTWIDTH] IN BLOOD BY AUTOMATED COUNT: 17 % (ref 11.6–15.1)
GFR SERPL CREATININE-BSD FRML MDRD: 84 ML/MIN/1.73SQ M
GLUCOSE SERPL-MCNC: 91 MG/DL (ref 65–140)
HCT VFR BLD AUTO: 27.4 % (ref 34.8–46.1)
HGB BLD-MCNC: 9 G/DL (ref 11.5–15.4)
MCH RBC QN AUTO: 33.8 PG (ref 26.8–34.3)
MCHC RBC AUTO-ENTMCNC: 32.8 G/DL (ref 31.4–37.4)
MCV RBC AUTO: 103 FL (ref 82–98)
PLATELET # BLD AUTO: 192 THOUSANDS/UL (ref 149–390)
PMV BLD AUTO: 11.4 FL (ref 8.9–12.7)
POTASSIUM SERPL-SCNC: 3.6 MMOL/L (ref 3.5–5.3)
RBC # BLD AUTO: 2.66 MILLION/UL (ref 3.81–5.12)
SODIUM SERPL-SCNC: 142 MMOL/L (ref 136–145)
WBC # BLD AUTO: 9.25 THOUSAND/UL (ref 4.31–10.16)

## 2019-12-13 PROCEDURE — 97530 THERAPEUTIC ACTIVITIES: CPT

## 2019-12-13 PROCEDURE — 99024 POSTOP FOLLOW-UP VISIT: CPT | Performed by: PHYSICIAN ASSISTANT

## 2019-12-13 PROCEDURE — 85027 COMPLETE CBC AUTOMATED: CPT | Performed by: PHYSICIAN ASSISTANT

## 2019-12-13 PROCEDURE — 97116 GAIT TRAINING THERAPY: CPT

## 2019-12-13 PROCEDURE — 97535 SELF CARE MNGMENT TRAINING: CPT

## 2019-12-13 PROCEDURE — 97112 NEUROMUSCULAR REEDUCATION: CPT

## 2019-12-13 PROCEDURE — 80048 BASIC METABOLIC PNL TOTAL CA: CPT | Performed by: PHYSICIAN ASSISTANT

## 2019-12-13 PROCEDURE — P9016 RBC LEUKOCYTES REDUCED: HCPCS

## 2019-12-13 RX ADMIN — FLUTICASONE PROPIONATE 1 SPRAY: 50 SPRAY, METERED NASAL at 08:34

## 2019-12-13 RX ADMIN — CHLORHEXIDINE GLUCONATE 0.12% ORAL RINSE 15 ML: 1.2 LIQUID ORAL at 20:17

## 2019-12-13 RX ADMIN — DOCUSATE SODIUM 100 MG: 100 CAPSULE, LIQUID FILLED ORAL at 08:33

## 2019-12-13 RX ADMIN — DOCUSATE SODIUM 100 MG: 100 CAPSULE, LIQUID FILLED ORAL at 16:36

## 2019-12-13 RX ADMIN — DOCUSATE SODIUM 100 MG: 100 CAPSULE, LIQUID FILLED ORAL at 17:46

## 2019-12-13 RX ADMIN — CLOPIDOGREL BISULFATE 75 MG: 75 TABLET ORAL at 08:33

## 2019-12-13 RX ADMIN — ZINC 1 TABLET: TAB ORAL at 08:35

## 2019-12-13 RX ADMIN — MAGNESIUM OXIDE TAB 400 MG (240 MG ELEMENTAL MG) 400 MG: 400 (240 MG) TAB at 08:33

## 2019-12-13 RX ADMIN — SENNOSIDES 8.6 MG: 8.6 TABLET, FILM COATED ORAL at 08:33

## 2019-12-13 RX ADMIN — ASPIRIN 81 MG 162 MG: 81 TABLET ORAL at 08:33

## 2019-12-13 RX ADMIN — HEPARIN SODIUM 5000 UNITS: 5000 INJECTION INTRAVENOUS; SUBCUTANEOUS at 13:48

## 2019-12-13 RX ADMIN — HEPARIN SODIUM 5000 UNITS: 5000 INJECTION INTRAVENOUS; SUBCUTANEOUS at 21:04

## 2019-12-13 RX ADMIN — NICOTINE 1 PATCH: 21 PATCH, EXTENDED RELEASE TRANSDERMAL at 08:35

## 2019-12-13 RX ADMIN — HEPARIN SODIUM 5000 UNITS: 5000 INJECTION INTRAVENOUS; SUBCUTANEOUS at 04:14

## 2019-12-13 RX ADMIN — OXYCODONE HYDROCHLORIDE 10 MG: 10 TABLET ORAL at 04:14

## 2019-12-13 RX ADMIN — CHLORHEXIDINE GLUCONATE 0.12% ORAL RINSE 15 ML: 1.2 LIQUID ORAL at 08:34

## 2019-12-13 RX ADMIN — OXYCODONE HYDROCHLORIDE 10 MG: 10 TABLET ORAL at 20:18

## 2019-12-13 RX ADMIN — ATORVASTATIN CALCIUM 80 MG: 80 TABLET, FILM COATED ORAL at 08:34

## 2019-12-13 RX ADMIN — LEVOTHYROXINE SODIUM 25 MCG: 25 TABLET ORAL at 04:14

## 2019-12-13 NOTE — PLAN OF CARE
Problem: Prexisting or High Potential for Compromised Skin Integrity  Goal: Skin integrity is maintained or improved  Description  INTERVENTIONS:  - Identify patients at risk for skin breakdown  - Assess and monitor skin integrity  - Assess and monitor nutrition and hydration status  - Monitor labs   - Assess for incontinence   - Turn and reposition patient  - Assist with mobility/ambulation  - Relieve pressure over bony prominences  - Avoid friction and shearing  - Provide appropriate hygiene as needed including keeping skin clean and dry  - Evaluate need for skin moisturizer/barrier cream  - Collaborate with interdisciplinary team   - Patient/family teaching  - Consider wound care consult   Outcome: Progressing     Problem: Potential for Falls  Goal: Patient will remain free of falls  Description  INTERVENTIONS:  - Assess patient frequently for physical needs  -  Identify cognitive and physical deficits and behaviors that affect risk of falls    -  [place] fall precautions as indicated by assessment   - Educate patient/family on patient safety including physical limitations  - Instruct patient to call for assistance with activity based on assessment  - Modify environment to reduce risk of injury  - Consider OT/PT consult to assist with strengthening/mobility  Outcome: Progressing     Problem: PAIN - ADULT  Goal: Verbalizes/displays adequate comfort level or baseline comfort level  Description  Interventions:  - Encourage patient to monitor pain and request assistance  - Assess pain using appropriate pain scale  - Administer analgesics based on type and severity of pain and evaluate response  - Implement non-pharmacological measures as appropriate and evaluate response  - Consider cultural and social influences on pain and pain management  - Notify physician/advanced practitioner if interventions unsuccessful or patient reports new pain  Outcome: Progressing     Problem: INFECTION - ADULT  Goal: Absence or prevention of progression during hospitalization  Description  INTERVENTIONS:  - Assess and monitor for signs and symptoms of infection  - Monitor lab/diagnostic results  - Monitor all insertion sites, i e  indwelling lines, tubes, and drains  - Monitor endotracheal if appropriate and nasal secretions for changes in amount and color  - [place] appropriate cooling/warming therapies per order  - Administer medications as ordered  - Instruct and encourage patient and family to use good hand hygiene technique  - Identify and instruct in appropriate isolation precautions for identified infection/condition  Outcome: Progressing  Goal: Absence of fever/infection during neutropenic period  Description  INTERVENTIONS:  - Monitor WBC    Outcome: Progressing     Problem: SAFETY ADULT  Goal: Maintain or return to baseline ADL function  Description  INTERVENTIONS:  -  Assess patient's ability to carry out ADLs; assess patient's baseline for ADL function and identify physical deficits which impact ability to perform ADLs (bathing, care of mouth/teeth, toileting, grooming, dressing, etc )  - Assess/evaluate cause of self-care deficits   - Assess range of motion  - Assess patient's mobility; develop plan if impaired  - Assess patient's need for assistive devices and provide as appropriate  - Encourage maximum independence but intervene and supervise when necessary  - Involve family in performance of ADLs  - Assess for home care needs following discharge   - Consider OT consult to assist with ADL evaluation and planning for discharge  - Provide patient education as appropriate  Outcome: Progressing  Goal: Maintain or return mobility status to optimal level  Description  INTERVENTIONS:  - Assess patient's baseline mobility status (ambulation, transfers, stairs, etc )    - Identify cognitive and physical deficits and behaviors that affect mobility  - Identify mobility aids required to assist with transfers and/or ambulation (gait belt, sit-to-stand, lift, walker, cane, etc )  - [place] fall precautions as indicated by assessment  - Record patient progress and toleration of activity level on Mobility SBAR; progress patient to next Phase/Stage  - Instruct patient to call for assistance with activity based on assessment  - Consider rehabilitation consult to assist with strengthening/weightbearing, etc   Outcome: Progressing     Problem: DISCHARGE PLANNING  Goal: Discharge to home or other facility with appropriate resources  Description  INTERVENTIONS:  - Identify barriers to discharge w/patient and caregiver  - Arrange for needed discharge resources and transportation as appropriate  - Identify discharge learning needs (meds, wound care, etc )  - Arrange for interpretive services to assist at discharge as needed  - Refer to Case Management Department for coordinating discharge planning if the patient needs post-hospital services based on physician/advanced practitioner order or complex needs related to functional status, cognitive ability, or social support system  Outcome: Progressing     Problem: Knowledge Deficit  Goal: Patient/family/caregiver demonstrates understanding of disease process, treatment plan, medications, and discharge instructions  Description  Complete learning assessment and assess knowledge base    Interventions:  - Provide teaching at level of understanding  - Provide teaching via preferred learning methods  Outcome: Progressing     Problem: MUSCULOSKELETAL - ADULT  Goal: Maintain or return mobility to safest level of function  Description  INTERVENTIONS:  - Assess patient's ability to carry out ADLs; assess patient's baseline for ADL function and identify physical deficits which impact ability to perform ADLs (bathing, care of mouth/teeth, toileting, grooming, dressing, etc )  - Assess/evaluate cause of self-care deficits   - Assess range of motion  - Assess patient's mobility  - Assess patient's need for assistive devices and provide as appropriate  - Encourage maximum independence but intervene and supervise when necessary  - Involve family in performance of ADLs  - Assess for home care needs following discharge   - Consider OT consult to assist with ADL evaluation and planning for discharge  - Provide patient education as appropriate  Outcome: Progressing  Goal: Maintain proper alignment of affected body part  Description  INTERVENTIONS:  - Support, maintain and protect limb and body alignment  - Provide patient/ family with appropriate education  Outcome: Progressing     Problem: Nutrition/Hydration-ADULT  Goal: Nutrient/Hydration intake appropriate for improving, restoring or maintaining nutritional needs  Description  Monitor and assess patient's nutrition/hydration status for malnutrition  Collaborate with interdisciplinary team and initiate plan and interventions as ordered  Monitor patient's weight and dietary intake as ordered or per policy  Utilize nutrition screening tool and intervene as necessary  Determine patient's food preferences and provide high-protein, high-caloric foods as appropriate       INTERVENTIONS:  - Monitor oral intake, urinary output, labs, and treatment plans  - Assess nutrition and hydration status and recommend course of action  - Evaluate amount of meals eaten  - Assist patient with eating if necessary   - Allow adequate time for meals  - Recommend/ encourage appropriate diets, oral nutritional supplements, and vitamin/mineral supplements  - Order, calculate, and assess calorie counts as needed  - Recommend, monitor, and adjust tube feedings and TPN/PPN based on assessed needs  - Assess need for intravenous fluids  - Provide specific nutrition/hydration education as appropriate  - Include patient/family/caregiver in decisions related to nutrition  Outcome: Progressing     Problem: GENITOURINARY - ADULT  Goal: Maintains or returns to baseline urinary function  Description  INTERVENTIONS:  - Assess urinary function  - Encourage oral fluids to ensure adequate hydration if ordered  - Administer IV fluids as ordered to ensure adequate hydration  - Administer ordered medications as needed  - Offer frequent toileting  - Follow urinary retention protocol if ordered  Outcome: Progressing  Goal: Absence of urinary retention  Description  INTERVENTIONS:  - Assess patients ability to void and empty bladder  - Monitor I/O  - Bladder scan as needed  - Discuss with physician/AP medications to alleviate retention as needed  - Discuss catheterization for long term situations as appropriate  Outcome: Progressing

## 2019-12-13 NOTE — PROGRESS NOTES
Vascular Surgery    Progress Note - Marvin Jarquin 1940, 78 y o  female MRN: [id number]    Unit/Bed#: [facility] 524-01 Encounter: [id number]    Primary Care Provider: Tennille Burns DO   Date and time admitted to hospital: 12/6/2019  4:13 PM    * Aortoiliac occlusive disease [facility])  Assessment & Plan  Aortoiliac occlusive disease with rest pain    S/P Aorta Bifemoral bypass w/ left iliofemoral thrombectomy 12/9    Plan:  Advanced diet to regular  Saline lock  Continue aspirin, Plavix, Lipitor  Pain control  OOB/Amb  PT/OT  Case management for discharge planning      Acute blood loss anemia  Assessment & Plan  Acute blood loss anemia on chronic anemia, s/p transfusion 1 Unit PRBC yesterday    Plan:  Monitor      Subjective:  Pt doing well, +BM, hungry today, pain controlled    Vitals:  /60 (BP Location: Left arm)   Pulse 75   Temp 98 9 °F (37 2 °C) (Oral)   Resp 18   Ht 5' 4" (1 626 m)   Wt 57 5 kg (126 lb 12 2 oz)   SpO2 96%   BMI 21 76 kg/m²     I/Os:  I/O last 3 completed shifts: In: 1627 5 [P O :460; I V :867 5; Blood:300]  Out: 1206 [Urine:1206]  No intake/output data recorded  Lab Results and Cultures:   Lab Results   Component Value Date    WBC 12 17 (H) 12/12/2019    HGB 6 9 (LL) 12/12/2019    HCT 21 8 (L) 12/12/2019     (H) 12/12/2019     12/12/2019     Lab Results   Component Value Date    GLUCOSE 173 (H) 12/09/2019    CALCIUM 8 6 12/13/2019    K 3 6 12/13/2019    CO2 21 12/13/2019     (H) 12/13/2019    BUN 16 12/13/2019    CREATININE 0 67 12/13/2019     Lab Results   Component Value Date    INR 1 14 12/06/2019    INR 1 02 12/06/2019    INR 1 09 11/24/2019    PROTIME 14 2 12/06/2019    PROTIME 11 0 12/06/2019    PROTIME 11 7 11/24/2019        Blood Culture:   Lab Results   Component Value Date    BLOODCX No Growth After 5 Days   11/24/2019   ,   Urinalysis:   Lab Results   Component Value Date    COLORU Yellow 12/05/2019    COLORU Yellow 11/24/2019    CLARITYU Cloudy 11/24/2019    SPECGRAV 1 012 12/05/2019    SPECGRAV 1 020 11/24/2019    PHUR 6 0 11/24/2019    PHUR 6 0 12/24/2018    LEUKOCYTESUR Negative 12/05/2019    LEUKOCYTESUR Moderate (A) 11/24/2019    NITRITE Negative 12/05/2019    NITRITE Positive (A) 11/24/2019    GLUCOSEU Negative 11/24/2019    KETONESU Negative 12/05/2019    KETONESU Negative 11/24/2019    BILIRUBINUR Negative 12/05/2019    BILIRUBINUR Negative 11/24/2019    BLOODU Negative 12/05/2019    BLOODU Moderate (A) 11/24/2019   ,   Urine Culture:   Lab Results   Component Value Date    URINECX >100,000 cfu/ml Escherichia coli (A) 11/24/2019   ,   Wound Culure: No results found for: WOUNDCULT    Medications:  Current Facility-Administered Medications   Medication Dose Route Frequency    acetaminophen (TYLENOL) tablet 650 mg  650 mg Oral Q6H PRN    amLODIPine (NORVASC) tablet 5 mg  5 mg Oral Daily    aspirin chewable tablet 162 mg  162 mg Oral Daily    atorvastatin (LIPITOR) tablet 80 mg  80 mg Oral Daily    bisacodyl (DULCOLAX) rectal suppository 10 mg  10 mg Rectal Daily    carvedilol (COREG) tablet 12 5 mg  12 5 mg Oral BID With Meals    chlorhexidine (PERIDEX) 0 12 % oral rinse 15 mL  15 mL Swish & Spit Q12H [address]    clopidogrel (PLAVIX) tablet 75 mg  75 mg Oral Daily    docusate sodium (COLACE) capsule 100 mg  100 mg Oral BID    fluticasone (FLONASE) 50 mcg/act nasal spray 1 spray  1 spray Nasal Daily    heparin (porcine) subcutaneous injection 5,000 Units  5,000 Units Subcutaneous Q8H [address]    HYDROmorphone (DILAUDID) injection 0 5 mg  0 5 mg Intravenous Q3H PRN    hydrOXYzine HCL (ATARAX) tablet 10 mg  10 mg Oral Q6H PRN    levothyroxine tablet 25 mcg  25 mcg Oral Early Morning    magnesium oxide (MAG-OX) tablet 400 mg  400 mg Oral Daily    multivitamin stress formula tablet 1 tablet  1 tablet Oral Daily    nicotine (NICODERM CQ) 21 mg/24 hr TD 24 hr patch 1 patch  1 patch Transdermal Daily    oxyCODONE (ROXICODONE) immediate release tablet 10 mg  10 mg Oral Q4H PRN    oxyCODONE (ROXICODONE) IR tablet 5 mg  5 mg Oral Q4H PRN    senna (SENOKOT) tablet 8 6 mg  1 tablet Oral Daily     Physical Exam:    General appearance: alert and oriented, in no acute distress  Lungs: clear to auscultation bilaterally  Heart: regular rate and rhythm, S1, S2 normal, no murmur, click, rub or gallop  Abdomen: soft, non-tender; bowel sounds normal; no masses,  no organomegaly  Extremities: extremities normal, warm and well-perfused; no cyanosis, clubbing, or edema    Wound/Incision:  Midline abdominal and bilateral groin incision clean, dry, and intact    Pulse exam:  DP: Right: doppler signal Left: doppler signal  PT: Right: doppler signal Left: doppler signal      Erica Shields PA-C  12/13/2019

## 2019-12-13 NOTE — RESTORATIVE TECHNICIAN NOTE
Restorative Specialist Mobility Note       Activity: Ambulate in room, Chair     Assistive Device: Front wheel walker        Repositioned: Sitting, Up in chair

## 2019-12-13 NOTE — SOCIAL WORK
Spoke to Russel Lambert, vascular PAC no discharge until early next week  Met with patient and daughter, Cleo Hughes  A post acute care recommendation was made by your care team for STR  Discussed Freedom of Choice with both patient and caregiver  List of facilities given to both patient and caregiver via in person  both patient and caregiver aware the list is custom filtered for them by zip code location and that [facility] post acute providers are designated  Their only choice for snf is [address] [facility] referral was made

## 2019-12-13 NOTE — PHYSICAL THERAPY NOTE
Physical Therapy Progress Note     12/13/19 0955   Pain Assessment   Pain Assessment No/denies pain   Pain Score No Pain   Restrictions/Precautions   Other Precautions Chair Alarm; Bed Alarm; Fall Risk;Pain;Telemetry;Multiple lines   Subjective   Subjective The pt  states that she is doing better today  She was happy about making some progress today  Bed Mobility   Supine to Sit 3  Moderate assistance   Additional items Assist x 2; Increased time required;Verbal cues;LE management   Transfers   Sit to Stand 3  Moderate assistance   Additional items Assist x 1; Increased time required;Verbal cues   Stand to Sit 3  Moderate assistance   Additional items Assist x 1; Increased time required;Verbal cues   Stand pivot 4  Minimal assistance   Additional items Assist x 2; Increased time required;Verbal cues   Ambulation/Elevation   Gait pattern Excessively slow; Short stride; Inconsistent cyndie;Decreased foot clearance   Gait Assistance 4  Minimal assist   Additional items Assist x 1;Verbal cues; Tactile cues   Assistive Device Rolling walker   Distance 20 feet  Balance   Static Sitting Fair   Dynamic Sitting Fair -   Static Standing Fair -   Ambulatory Poor +   Activity Tolerance   Activity Tolerance Patient tolerated treatment well;Patient limited by fatigue   Nurse Reuben Arevalo RN  Assessment   Prognosis Fair   Problem List Decreased strength;Decreased endurance; Impaired balance;Decreased mobility; Impaired judgement;Decreased safety awareness;Pain   Assessment The pt  was anxious at the start of the session, but this improved as she progressed with her mobility  She required continued assistance and instruction for technique and safety  She does fatigue easily, and she noted increased back discomfort  She continues to remain notably limited from her baseline independence, and continued physical therapy is indicated     Barriers to Discharge Inaccessible home environment;Decreased caregiver support   Goals Patient Goals To feel better  [facility] Expiration Date 12/25/19   PT Treatment Day 1   Plan   Treatment/Interventions Functional transfer training;LE strengthening/ROM; Therapeutic exercise; Endurance training;Patient/family training;Bed mobility;Gait training   Progress Progressing toward goals   PT Frequency   (3-5x a week )   Recommendation   Recommendation Post acute IP rehab   Equipment Recommended Naseem Izquierdo PTA

## 2019-12-13 NOTE — PLAN OF CARE
Problem: OCCUPATIONAL THERAPY ADULT  Goal: Performs self-care activities at highest level of function for planned discharge setting  See evaluation for individualized goals  Description  Treatment Interventions: ADL retraining, Functional transfer training, UE strengthening/ROM, Endurance training, Cognitive reorientation, Patient/family training, Equipment evaluation/education, Compensatory technique education, Activityengagement, Energy conservation          See flowsheet documentation for full assessment, interventions and recommendations  Outcome: Progressing  Note:   Limitation: Decreased ADL status, Decreased UE strength, Decreased Safe judgement during ADL, Decreased cognition, Decreased endurance, Decreased high-level ADLs  Prognosis: Fair  Assessment: Patient participated in Skilled OT session this date with interventions consisting of ADL re training with the use of correct body mechnaics, Energy Conservation techniques, deep breathing technique, safety awareness and fall prevention techniques, one handed dressing technique,  therapeutic activities to: increase activity tolerance, increase dynamic sit/ stand balance during functional activity , increase trunk control and increase OOB/ sitting tolerance   Patient agreeable to OT treatment session, upon arrival patient was found supine in bed  Pt completed bed mobility, functional transfers and mobility, all self care  Please refer to chart for functional levels  Patient requiring frequent re direction, verbal cues for safety, verbal cues for correct technique, verbal cues for pacing thru activity steps and frequent rest periods  Patient continues to be functioning below baseline level, occupational performance remains limited secondary to factors listed above and increased risk for falls and injury  From OT standpoint, recommendation at time of d/c would be Short Term Rehab     Patient to benefit from continued Occupational Therapy treatment while in the hospital to address deficits as defined above and maximize level of functional independence with ADLs and functional mobility        OT Discharge Recommendation: Short Term Rehab  OT - OK to Discharge: Yes(when medically cleared)

## 2019-12-13 NOTE — PLAN OF CARE
Problem: PHYSICAL THERAPY ADULT  Goal: Performs mobility at highest level of function for planned discharge setting  See evaluation for individualized goals  Description  Treatment/Interventions: Functional transfer training, LE strengthening/ROM, Elevations, Therapeutic exercise, Endurance training, Patient/family training, Bed mobility, Gait training  Equipment Recommended: Norma Roberts       See flowsheet documentation for full assessment, interventions and recommendations  Outcome: Progressing  Note:   Prognosis: Fair  Problem List: Decreased strength, Decreased endurance, Impaired balance, Decreased mobility, Impaired judgement, Decreased safety awareness, Pain  Assessment: The pt  was anxious at the start of the session, but this improved as she progressed with her mobility  She required continued assistance and instruction for technique and safety  She does fatigue easily, and she noted increased back discomfort  She continues to remain notably limited from her baseline independence, and continued physical therapy is indicated  Barriers to Discharge: Inaccessible home environment, Decreased caregiver support     Recommendation: Post acute IP rehab     PT - OK to Discharge: Yes(to rehab once medically stable)    See flowsheet documentation for full assessment

## 2019-12-13 NOTE — OCCUPATIONAL THERAPY NOTE
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)    Nicotine dependence    Benign essential HTN    CKD (chronic kidney disease) stage 3, GFR 30-59 ml/min (HCC)    Mass of spine    Chronic diastolic heart failure (HCC)    Acute blood loss anemia      Past Medical History:   Diagnosis Date    Arthritis     Benign essential hypertension     CAD (coronary artery disease)     Disease of thyroid gland     Dizziness     Edema of leg     Hyperlipidemia     Hypertension     Other disorder of circulatory system (CODE)        Past Surgical History:   Procedure Laterality Date    BREAST SURGERY      bxs,benign    COLONOSCOPY      HYSTERECTOMY      INCISIONAL BREAST BIOPSY      x5, 1964, 1965, 1985 and 40 Ysabel Daniels Bilateral 12/9/2019    Procedure: BYPASS AORTA BIFEMORAL WITH LEFT FEMORAL THROMBOEMBOLECTOMY;  Surgeon: Donaldo Pate MD;  Location: [facility];  Service: Vascular        12/13/19 1028   Restrictions/Precautions   Weight Bearing Precautions Per Order No   Other Precautions Chair Alarm; Bed Alarm;Multiple lines;Telemetry; Fall Risk;Pain   Lifestyle   Autonomy Pt reports being I w/ all ADLS and most IADLS (family A w/ grocery shopping and transportation); (-) drives; utilizes rollator for mobility PTA  Reciprocal Relationships Pt lives alone  Reports having supportive family and friends that can provide A as needed  Service to Others Pt is retired  Intrinsic Gratification Pt reports enjoying being home  Pain Assessment   Pain Assessment No/denies pain   Pain Score No Pain   ADL   Where Assessed Chair   Grooming Assistance 5  Supervision/Setup   Grooming Deficit Setup;Verbal cueing;Supervision/safety; Increased time to complete   Grooming Comments Pt completed washing/drying hands and face, using shower cap, brushing hair, and oral care while seated in chair at S level after set up  UB Bathing Assistance 4  Minimal Assistance   UB Bathing Deficit Setup;Verbal cueing;Supervision/safety; Increased time to complete   UB Bathing Comments Pt required A to wash back  LB Bathing Assistance 3  Moderate Assistance   LB Bathing Deficit Setup;Verbal cueing;Supervision/safety; Increased time to complete; Buttocks;Right lower leg including foot; Left lower leg including foot   LB Bathing Comments Pt required A to bathe B/L LEs and for buttock hygeien thoroughness in stance w/ unilat support of RW    UB Dressing Assistance 5  Supervision/Setup   UB Dressing Deficit Setup;Verbal cueing;Supervision/safety; Increased time to complete   UB Dressing Comments [place]/donning gown  LB Dressing Assistance 2  Maximal Assistance   LB Dressing Deficit Setup;Verbal cueing;Supervision/safety; Increased time to complete; Don/doff R sock; Don/doff L sock; Thread RLE into underwear; Thread LLE into underwear;Pull up over hips   LB Dressing Comments Pt required A to doff/don B/L socks and to don her personal underwear briefs  Toileting Assistance  4  Minimal Assistance   Toileting Deficit Setup;Verbal cueing;Supervison/safety; Increased time to complete; Bedside commode;Clothing management up;Clothing management down   Toileting Comments Pt utilized BSC and required A for brief management  Pt able to complete roque hygiene while seated on BSC  Bed Mobility   Supine to Sit 3  Moderate assistance   Additional items Assist x 2; Increased time required;LE management;Verbal cues; Bedrails;HOB elevated   Sit to Supine Unable to assess   Additional Comments Pt laying supine in bed upon OT arrival  Pt seated in chair w/ all needs in reach s/p OT session     Transfers   Sit to Stand 3  Moderate assistance   Additional items Increased time required;Verbal cues;Armrests;Assist x 1   Stand to Sit 3  Moderate assistance   Additional items Increased time required;Verbal cues;Armrests;Assist x 1   Additional Comments Transfers w/ RW  VC and TC required for safe hand placement  Functional Mobility   Functional Mobility 3  Moderate assistance   Additional Comments Pt demonstrated short distance household mobility in hallway ~20ft w/ RW at Mod Ax1 level w/ SBA of 2nd for close chair follow  Pt required x2 standing rest breaks 2' fatigue  Additional items Rolling walker   Cognition   Overall Cognitive Status WFL   Arousal/Participation Alert; Cooperative;Lethargic   Attention Attends with cues to redirect  (2' anxiety at times)   Orientation Level Oriented X4   Memory Decreased recall of precautions   Following Commands Follows one step commands with increased time or repetition   Comments Pt is pleasant and cooperative to participate in therapy this day  Pt required repeat of commands for increased follow through 2' anxiety during transfers and mobility  Activity Tolerance   Activity Tolerance Patient limited by fatigue;Treatment limited secondary to medical complications (Comment)   Medical Staff Made Aware LINSEY Keys; RN cleared Pt for OT session   Assessment   Assessment Patient participated in Skilled OT session this date with interventions consisting of ADL re training with the use of correct body mechnaics, Energy Conservation techniques, deep breathing technique, safety awareness and fall prevention techniques, one handed dressing technique,  therapeutic activities to: increase activity tolerance, increase dynamic sit/ stand balance during functional activity , increase trunk control and increase OOB/ sitting tolerance   Patient agreeable to OT treatment session, upon arrival patient was found supine in bed  Pt completed bed mobility, functional transfers and mobility, all self care  Please refer to chart for functional levels    Patient requiring frequent re direction, verbal cues for safety, verbal cues for correct technique, verbal cues for pacing thru activity steps and frequent rest periods  Patient continues to be functioning below baseline level, occupational performance remains limited secondary to factors listed above and increased risk for falls and injury  From OT standpoint, recommendation at time of d/c would be Short Term Rehab  Patient to benefit from continued Occupational Therapy treatment while in the hospital to address deficits as defined above and maximize level of functional independence with ADLs and functional mobility  Plan   Treatment Interventions ADL retraining;Functional transfer training;UE strengthening/ROM; Endurance training;Patient/family training;Equipment evaluation/education; Compensatory technique education; Activityengagement; Energy conservation   Goal Expiration Date 12/21/19   OT Treatment Day 1   OT Frequency 3-5x/wk   Recommendation   OT Discharge Recommendation Short Term Rehab   OT - OK to Discharge Yes  (when medically cleared)   Barthel Index   Feeding 5   Bathing 0   Grooming Score 0   Dressing Score 5   Bladder Score 5   Bowels Score 10   Toilet Use Score 5   Transfers (Bed/Chair) Score 5   Mobility (Level Surface) Score 0   Stairs Score 0   Barthel Index Score 35   Modified Jemma Scale   Modified Jemma Scale 4       Maria Luisa Lovelace MS, OTR/L

## 2019-12-14 LAB
ABO GROUP BLD BPU: NORMAL
ANION GAP SERPL CALCULATED.3IONS-SCNC: 6 MMOL/L (ref 4–13)
BPU ID: NORMAL
BUN SERPL-MCNC: 18 MG/DL (ref 5–25)
CALCIUM SERPL-MCNC: 8.2 MG/DL (ref 8.3–10.1)
CHLORIDE SERPL-SCNC: 115 MMOL/L (ref 100–108)
CO2 SERPL-SCNC: 21 MMOL/L (ref 21–32)
CREAT SERPL-MCNC: 0.64 MG/DL (ref 0.6–1.3)
CROSSMATCH: NORMAL
ERYTHROCYTE [DISTWIDTH] IN BLOOD BY AUTOMATED COUNT: 16.2 % (ref 11.6–15.1)
GFR SERPL CREATININE-BSD FRML MDRD: 85 ML/MIN/1.73SQ M
GLUCOSE SERPL-MCNC: 94 MG/DL (ref 65–140)
HCT VFR BLD AUTO: 26.1 % (ref 34.8–46.1)
HGB BLD-MCNC: 8.5 G/DL (ref 11.5–15.4)
MCH RBC QN AUTO: 33.7 PG (ref 26.8–34.3)
MCHC RBC AUTO-ENTMCNC: 32.6 G/DL (ref 31.4–37.4)
MCV RBC AUTO: 104 FL (ref 82–98)
PLATELET # BLD AUTO: 212 THOUSANDS/UL (ref 149–390)
PMV BLD AUTO: 11.3 FL (ref 8.9–12.7)
POTASSIUM SERPL-SCNC: 3.6 MMOL/L (ref 3.5–5.3)
RBC # BLD AUTO: 2.52 MILLION/UL (ref 3.81–5.12)
SODIUM SERPL-SCNC: 142 MMOL/L (ref 136–145)
UNIT DISPENSE STATUS: NORMAL
UNIT PRODUCT CODE: NORMAL
UNIT RH: NORMAL
WBC # BLD AUTO: 8.42 THOUSAND/UL (ref 4.31–10.16)

## 2019-12-14 PROCEDURE — 99024 POSTOP FOLLOW-UP VISIT: CPT | Performed by: PHYSICIAN ASSISTANT

## 2019-12-14 PROCEDURE — 85027 COMPLETE CBC AUTOMATED: CPT | Performed by: PHYSICIAN ASSISTANT

## 2019-12-14 PROCEDURE — 80048 BASIC METABOLIC PNL TOTAL CA: CPT | Performed by: PHYSICIAN ASSISTANT

## 2019-12-14 RX ORDER — POTASSIUM CHLORIDE 20 MEQ/1
40 TABLET, EXTENDED RELEASE ORAL ONCE
Status: COMPLETED | OUTPATIENT
Start: 2019-12-14 | End: 2019-12-14

## 2019-12-14 RX ADMIN — ACETAMINOPHEN 650 MG: 325 TABLET ORAL at 08:45

## 2019-12-14 RX ADMIN — POTASSIUM CHLORIDE 40 MEQ: 1500 TABLET, EXTENDED RELEASE ORAL at 12:28

## 2019-12-14 RX ADMIN — HEPARIN SODIUM 5000 UNITS: 5000 INJECTION INTRAVENOUS; SUBCUTANEOUS at 14:12

## 2019-12-14 RX ADMIN — CARVEDILOL 12.5 MG: 12.5 TABLET, FILM COATED ORAL at 08:46

## 2019-12-14 RX ADMIN — ZINC 1 TABLET: TAB ORAL at 08:47

## 2019-12-14 RX ADMIN — HEPARIN SODIUM 5000 UNITS: 5000 INJECTION INTRAVENOUS; SUBCUTANEOUS at 05:08

## 2019-12-14 RX ADMIN — ATORVASTATIN CALCIUM 80 MG: 80 TABLET, FILM COATED ORAL at 08:46

## 2019-12-14 RX ADMIN — ASPIRIN 81 MG 162 MG: 81 TABLET ORAL at 08:47

## 2019-12-14 RX ADMIN — HEPARIN SODIUM 5000 UNITS: 5000 INJECTION INTRAVENOUS; SUBCUTANEOUS at 21:12

## 2019-12-14 RX ADMIN — AMLODIPINE BESYLATE 5 MG: 5 TABLET ORAL at 08:47

## 2019-12-14 RX ADMIN — MAGNESIUM OXIDE TAB 400 MG (240 MG ELEMENTAL MG) 400 MG: 400 (240 MG) TAB at 08:47

## 2019-12-14 RX ADMIN — CHLORHEXIDINE GLUCONATE 0.12% ORAL RINSE 15 ML: 1.2 LIQUID ORAL at 21:12

## 2019-12-14 RX ADMIN — SENNOSIDES 8.6 MG: 8.6 TABLET, FILM COATED ORAL at 08:46

## 2019-12-14 RX ADMIN — CHLORHEXIDINE GLUCONATE 0.12% ORAL RINSE 15 ML: 1.2 LIQUID ORAL at 08:47

## 2019-12-14 RX ADMIN — DOCUSATE SODIUM 100 MG: 100 CAPSULE, LIQUID FILLED ORAL at 08:46

## 2019-12-14 RX ADMIN — LEVOTHYROXINE SODIUM 25 MCG: 25 TABLET ORAL at 05:08

## 2019-12-14 RX ADMIN — CLOPIDOGREL BISULFATE 75 MG: 75 TABLET ORAL at 08:47

## 2019-12-14 RX ADMIN — FLUTICASONE PROPIONATE 1 SPRAY: 50 SPRAY, METERED NASAL at 08:47

## 2019-12-14 NOTE — ASSESSMENT & PLAN NOTE
Aortoiliac occlusive disease with rest pain    S/P Aorta Bifemoral bypass w/ left iliofemoral thrombectomy 12/9    Plan:  Regular diet  Continue aspirin, Plavix, Lipitor  Pain control  OOB/Amb  PT/OT- IPR  Case management- referrals made

## 2019-12-14 NOTE — PLAN OF CARE
Problem: Prexisting or High Potential for Compromised Skin Integrity  Goal: Skin integrity is maintained or improved  Description  INTERVENTIONS:  - Identify patients at risk for skin breakdown  - Assess and monitor skin integrity  - Assess and monitor nutrition and hydration status  - Monitor labs   - Assess for incontinence   - Turn and reposition patient  - Assist with mobility/ambulation  - Relieve pressure over bony prominences  - Avoid friction and shearing  - Provide appropriate hygiene as needed including keeping skin clean and dry  - Evaluate need for skin moisturizer/barrier cream  - Collaborate with interdisciplinary team   - Patient/family teaching  - Consider wound care consult   Outcome: Progressing     Problem: Potential for Falls  Goal: Patient will remain free of falls  Description  INTERVENTIONS:  - Assess patient frequently for physical needs  -  Identify cognitive and physical deficits and behaviors that affect risk of falls    -  [place] fall precautions as indicated by assessment   - Educate patient/family on patient safety including physical limitations  - Instruct patient to call for assistance with activity based on assessment  - Modify environment to reduce risk of injury  - Consider OT/PT consult to assist with strengthening/mobility  Outcome: Progressing     Problem: PAIN - ADULT  Goal: Verbalizes/displays adequate comfort level or baseline comfort level  Description  Interventions:  - Encourage patient to monitor pain and request assistance  - Assess pain using appropriate pain scale  - Administer analgesics based on type and severity of pain and evaluate response  - Implement non-pharmacological measures as appropriate and evaluate response  - Consider cultural and social influences on pain and pain management  - Notify physician/advanced practitioner if interventions unsuccessful or patient reports new pain  Outcome: Progressing     Problem: INFECTION - ADULT  Goal: Absence or prevention of progression during hospitalization  Description  INTERVENTIONS:  - Assess and monitor for signs and symptoms of infection  - Monitor lab/diagnostic results  - Monitor all insertion sites, i e  indwelling lines, tubes, and drains  - Monitor endotracheal if appropriate and nasal secretions for changes in amount and color  - [place] appropriate cooling/warming therapies per order  - Administer medications as ordered  - Instruct and encourage patient and family to use good hand hygiene technique  - Identify and instruct in appropriate isolation precautions for identified infection/condition  Outcome: Progressing  Goal: Absence of fever/infection during neutropenic period  Description  INTERVENTIONS:  - Monitor WBC    Outcome: Progressing     Problem: SAFETY ADULT  Goal: Maintain or return to baseline ADL function  Description  INTERVENTIONS:  -  Assess patient's ability to carry out ADLs; assess patient's baseline for ADL function and identify physical deficits which impact ability to perform ADLs (bathing, care of mouth/teeth, toileting, grooming, dressing, etc )  - Assess/evaluate cause of self-care deficits   - Assess range of motion  - Assess patient's mobility; develop plan if impaired  - Assess patient's need for assistive devices and provide as appropriate  - Encourage maximum independence but intervene and supervise when necessary  - Involve family in performance of ADLs  - Assess for home care needs following discharge   - Consider OT consult to assist with ADL evaluation and planning for discharge  - Provide patient education as appropriate  Outcome: Progressing  Goal: Maintain or return mobility status to optimal level  Description  INTERVENTIONS:  - Assess patient's baseline mobility status (ambulation, transfers, stairs, etc )    - Identify cognitive and physical deficits and behaviors that affect mobility  - Identify mobility aids required to assist with transfers and/or ambulation (gait belt, sit-to-stand, lift, walker, cane, etc )  - [place] fall precautions as indicated by assessment  - Record patient progress and toleration of activity level on Mobility SBAR; progress patient to next Phase/Stage  - Instruct patient to call for assistance with activity based on assessment  - Consider rehabilitation consult to assist with strengthening/weightbearing, etc   Outcome: Progressing     Problem: DISCHARGE PLANNING  Goal: Discharge to home or other facility with appropriate resources  Description  INTERVENTIONS:  - Identify barriers to discharge w/patient and caregiver  - Arrange for needed discharge resources and transportation as appropriate  - Identify discharge learning needs (meds, wound care, etc )  - Arrange for interpretive services to assist at discharge as needed  - Refer to Case Management Department for coordinating discharge planning if the patient needs post-hospital services based on physician/advanced practitioner order or complex needs related to functional status, cognitive ability, or social support system  Outcome: Progressing     Problem: Knowledge Deficit  Goal: Patient/family/caregiver demonstrates understanding of disease process, treatment plan, medications, and discharge instructions  Description  Complete learning assessment and assess knowledge base    Interventions:  - Provide teaching at level of understanding  - Provide teaching via preferred learning methods  Outcome: Progressing     Problem: MUSCULOSKELETAL - ADULT  Goal: Maintain or return mobility to safest level of function  Description  INTERVENTIONS:  - Assess patient's ability to carry out ADLs; assess patient's baseline for ADL function and identify physical deficits which impact ability to perform ADLs (bathing, care of mouth/teeth, toileting, grooming, dressing, etc )  - Assess/evaluate cause of self-care deficits   - Assess range of motion  - Assess patient's mobility  - Assess patient's need for assistive devices and provide as appropriate  - Encourage maximum independence but intervene and supervise when necessary  - Involve family in performance of ADLs  - Assess for home care needs following discharge   - Consider OT consult to assist with ADL evaluation and planning for discharge  - Provide patient education as appropriate  Outcome: Progressing  Goal: Maintain proper alignment of affected body part  Description  INTERVENTIONS:  - Support, maintain and protect limb and body alignment  - Provide patient/ family with appropriate education  Outcome: Progressing     Problem: Nutrition/Hydration-ADULT  Goal: Nutrient/Hydration intake appropriate for improving, restoring or maintaining nutritional needs  Description  Monitor and assess patient's nutrition/hydration status for malnutrition  Collaborate with interdisciplinary team and initiate plan and interventions as ordered  Monitor patient's weight and dietary intake as ordered or per policy  Utilize nutrition screening tool and intervene as necessary  Determine patient's food preferences and provide high-protein, high-caloric foods as appropriate       INTERVENTIONS:  - Monitor oral intake, urinary output, labs, and treatment plans  - Assess nutrition and hydration status and recommend course of action  - Evaluate amount of meals eaten  - Assist patient with eating if necessary   - Allow adequate time for meals  - Recommend/ encourage appropriate diets, oral nutritional supplements, and vitamin/mineral supplements  - Order, calculate, and assess calorie counts as needed  - Recommend, monitor, and adjust tube feedings and TPN/PPN based on assessed needs  - Assess need for intravenous fluids  - Provide specific nutrition/hydration education as appropriate  - Include patient/family/caregiver in decisions related to nutrition  Outcome: Progressing     Problem: GENITOURINARY - ADULT  Goal: Maintains or returns to baseline urinary function  Description  INTERVENTIONS:  - Assess urinary function  - Encourage oral fluids to ensure adequate hydration if ordered  - Administer IV fluids as ordered to ensure adequate hydration  - Administer ordered medications as needed  - Offer frequent toileting  - Follow urinary retention protocol if ordered  Outcome: Progressing  Goal: Absence of urinary retention  Description  INTERVENTIONS:  - Assess patients ability to void and empty bladder  - Monitor I/O  - Bladder scan as needed  - Discuss with physician/AP medications to alleviate retention as needed  - Discuss catheterization for long term situations as appropriate  Outcome: Progressing

## 2019-12-14 NOTE — PROGRESS NOTES
Progress Note - Senia Cali 1940, 78 y o  female MRN: [id number]    Unit/Bed#: [facility] [place] 524-01 Encounter: [id number]    Primary Care Provider: Wilma Rene DO   Date and time admitted to hospital: 12/6/2019  4:13 PM        * Aortoiliac occlusive disease [facility])  Assessment & Plan  Aortoiliac occlusive disease with rest pain    S/P Aorta Bifemoral bypass w/ left iliofemoral thrombectomy 12/9    Plan:  Regular diet  Continue aspirin, Plavix, Lipitor  Pain control  OOB/Amb  PT/OT- IPR  Case management- referrals made          Subjective/Objective   Chief Complaint:     Subjective: LUIS EDUARDO  Tolerated regular diet, but not a lot as she doesn't have much of an appetite  Denies flatus/BM yet  Ambulating  Pain controlled  Objective:     Blood pressure 131/61, pulse 74, temperature 98 °F (36 7 °C), temperature source Oral, resp  rate 20, height 5' 4" (1 626 m), weight 57 5 kg (126 lb 12 2 oz), SpO2 96 %  ,Body mass index is 21 76 kg/m²  Intake/Output Summary (Last 24 hours) at 12/14/2019 0434  Last data filed at 12/13/2019 1215  Gross per 24 hour   Intake 240 ml   Output 600 ml   Net -360 ml       Invasive Devices     Peripheral Intravenous Line            Peripheral IV 12/11/19 Proximal;Right;Ventral (anterior) Forearm 2 days                Physical Exam: NAD  Atraumatic/normocephalic  AAOX3  Normal mood and affect  Normal respiratory effort  Soft, non tender, ND, incision c/d/i  Skin warm/dry  RLE: dopp PT/DP, M/S intact  LLE: dopp DP (PT covered with bandage c/d/i due to blistering), sensation intact, motor decreased and cannot wiggle toes, can move at ankle       Lab, Imaging and other studies:  I have personally reviewed pertinent lab results    , CBC:   Lab Results   Component Value Date    WBC 9 25 12/13/2019    HGB 9 0 (L) 12/13/2019    HCT 27 4 (L) 12/13/2019     (H) 12/13/2019     12/13/2019    MCH 33 8 12/13/2019    MCHC 32 8 12/13/2019    RDW 17 0 (H) 12/13/2019    MPV 11 4 12/13/2019 , CMP:   Lab Results   Component Value Date    SODIUM 142 12/13/2019    K 3 6 12/13/2019     (H) 12/13/2019    CO2 21 12/13/2019    BUN 16 12/13/2019    CREATININE 0 67 12/13/2019    CALCIUM 8 6 12/13/2019    EGFR 84 12/13/2019     VTE Pharmacologic Prophylaxis: Heparin  VTE Mechanical Prophylaxis: sequential compression device

## 2019-12-15 LAB
ANION GAP SERPL CALCULATED.3IONS-SCNC: 10 MMOL/L (ref 4–13)
BUN SERPL-MCNC: 19 MG/DL (ref 5–25)
CALCIUM SERPL-MCNC: 8.5 MG/DL (ref 8.3–10.1)
CHLORIDE SERPL-SCNC: 113 MMOL/L (ref 100–108)
CO2 SERPL-SCNC: 21 MMOL/L (ref 21–32)
CREAT SERPL-MCNC: 0.63 MG/DL (ref 0.6–1.3)
ERYTHROCYTE [DISTWIDTH] IN BLOOD BY AUTOMATED COUNT: 15.9 % (ref 11.6–15.1)
GFR SERPL CREATININE-BSD FRML MDRD: 86 ML/MIN/1.73SQ M
GLUCOSE SERPL-MCNC: 92 MG/DL (ref 65–140)
HCT VFR BLD AUTO: 26.4 % (ref 34.8–46.1)
HGB BLD-MCNC: 8.7 G/DL (ref 11.5–15.4)
MCH RBC QN AUTO: 33.7 PG (ref 26.8–34.3)
MCHC RBC AUTO-ENTMCNC: 33 G/DL (ref 31.4–37.4)
MCV RBC AUTO: 102 FL (ref 82–98)
PLATELET # BLD AUTO: 240 THOUSANDS/UL (ref 149–390)
PMV BLD AUTO: 11.2 FL (ref 8.9–12.7)
POTASSIUM SERPL-SCNC: 4 MMOL/L (ref 3.5–5.3)
RBC # BLD AUTO: 2.58 MILLION/UL (ref 3.81–5.12)
SODIUM SERPL-SCNC: 144 MMOL/L (ref 136–145)
WBC # BLD AUTO: 8.15 THOUSAND/UL (ref 4.31–10.16)

## 2019-12-15 PROCEDURE — 99024 POSTOP FOLLOW-UP VISIT: CPT | Performed by: PHYSICIAN ASSISTANT

## 2019-12-15 PROCEDURE — 80048 BASIC METABOLIC PNL TOTAL CA: CPT | Performed by: SURGERY

## 2019-12-15 PROCEDURE — 85027 COMPLETE CBC AUTOMATED: CPT | Performed by: SURGERY

## 2019-12-15 RX ORDER — BISACODYL 10 MG
10 SUPPOSITORY, RECTAL RECTAL DAILY PRN
Status: DISCONTINUED | OUTPATIENT
Start: 2019-12-15 | End: 2019-12-16 | Stop reason: HOSPADM

## 2019-12-15 RX ORDER — GABAPENTIN 100 MG/1
100 CAPSULE ORAL 3 TIMES DAILY
Status: DISCONTINUED | OUTPATIENT
Start: 2019-12-15 | End: 2019-12-16 | Stop reason: HOSPADM

## 2019-12-15 RX ADMIN — MAGNESIUM OXIDE TAB 400 MG (240 MG ELEMENTAL MG) 400 MG: 400 (240 MG) TAB at 08:43

## 2019-12-15 RX ADMIN — ATORVASTATIN CALCIUM 80 MG: 80 TABLET, FILM COATED ORAL at 08:44

## 2019-12-15 RX ADMIN — CLOPIDOGREL BISULFATE 75 MG: 75 TABLET ORAL at 08:43

## 2019-12-15 RX ADMIN — GABAPENTIN 100 MG: 100 CAPSULE ORAL at 21:46

## 2019-12-15 RX ADMIN — CARVEDILOL 12.5 MG: 12.5 TABLET, FILM COATED ORAL at 08:43

## 2019-12-15 RX ADMIN — HEPARIN SODIUM 5000 UNITS: 5000 INJECTION INTRAVENOUS; SUBCUTANEOUS at 14:58

## 2019-12-15 RX ADMIN — DOCUSATE SODIUM 100 MG: 100 CAPSULE, LIQUID FILLED ORAL at 08:44

## 2019-12-15 RX ADMIN — HEPARIN SODIUM 5000 UNITS: 5000 INJECTION INTRAVENOUS; SUBCUTANEOUS at 06:10

## 2019-12-15 RX ADMIN — LEVOTHYROXINE SODIUM 25 MCG: 25 TABLET ORAL at 06:10

## 2019-12-15 RX ADMIN — NICOTINE 1 PATCH: 21 PATCH, EXTENDED RELEASE TRANSDERMAL at 08:44

## 2019-12-15 RX ADMIN — FLUTICASONE PROPIONATE 1 SPRAY: 50 SPRAY, METERED NASAL at 08:44

## 2019-12-15 RX ADMIN — DOCUSATE SODIUM 100 MG: 100 CAPSULE, LIQUID FILLED ORAL at 17:07

## 2019-12-15 RX ADMIN — OXYCODONE HYDROCHLORIDE 10 MG: 10 TABLET ORAL at 18:47

## 2019-12-15 RX ADMIN — BISACODYL 10 MG: 10 SUPPOSITORY RECTAL at 17:07

## 2019-12-15 RX ADMIN — GABAPENTIN 100 MG: 100 CAPSULE ORAL at 17:06

## 2019-12-15 RX ADMIN — ACETAMINOPHEN 650 MG: 325 TABLET ORAL at 06:10

## 2019-12-15 RX ADMIN — ZINC 1 TABLET: TAB ORAL at 08:44

## 2019-12-15 RX ADMIN — SENNOSIDES 8.6 MG: 8.6 TABLET, FILM COATED ORAL at 08:43

## 2019-12-15 RX ADMIN — AMLODIPINE BESYLATE 5 MG: 5 TABLET ORAL at 08:44

## 2019-12-15 RX ADMIN — ASPIRIN 81 MG 162 MG: 81 TABLET ORAL at 08:43

## 2019-12-15 RX ADMIN — HEPARIN SODIUM 5000 UNITS: 5000 INJECTION INTRAVENOUS; SUBCUTANEOUS at 21:46

## 2019-12-15 RX ADMIN — GABAPENTIN 100 MG: 100 CAPSULE ORAL at 09:10

## 2019-12-15 RX ADMIN — CHLORHEXIDINE GLUCONATE 0.12% ORAL RINSE 15 ML: 1.2 LIQUID ORAL at 08:44

## 2019-12-15 NOTE — PROGRESS NOTES
Progress Note - Lamonte Cease 1940, 78 y o  female MRN: [id number]    Unit/Bed#: [facility] 524-01 Encounter: [id number]    Primary Care Provider: Justin Fothergill, DO   Date and time admitted to hospital: 12/6/2019  4:13 PM        * Aortoiliac occlusive disease [facility])  Assessment & Plan  Aortoiliac occlusive disease with rest pain    S/P Aorta Bifemoral bypass w/ left iliofemoral thrombectomy 12/9    Plan:  · Regular diet  · Encourage PO intake  · Continue aspirin, Plavix, Lipitor  · Pain control PRN  · OOB/Amb  · PT/OT- IPR  · Case management- referrals made                  Subjective/Objective     Subjective:   No acute events overnight  Still having some L foot pain, controlled on PO medication  States her appetite is slowly getting better and her PO intake is gradually improving  Objective:     Blood pressure 105/53, pulse 69, temperature 97 5 °F (36 4 °C), temperature source Oral, resp  rate 20, height 5' 4" (1 626 m), weight 59 kg (130 lb 1 1 oz), SpO2 97 %  ,Body mass index is 22 33 kg/m²        Intake/Output Summary (Last 24 hours) at 12/15/2019 0704  Last data filed at 12/14/2019 1734  Gross per 24 hour   Intake 780 ml   Output 850 ml   Net -70 ml       Invasive Devices     Peripheral Intravenous Line            Peripheral IV 12/11/19 Proximal;Right;Ventral (anterior) Forearm 3 days                Physical Exam:   Gen: NAD  CV: RRR   L: doppl DP (PT covered w/ bandage)   R: doppl DP/PT  Lungs: nl effort  Abd: soft, NT/ND, incision C/D/I   Ext: no CCE   L foot: sensation intact, motor diminished (present at ankle, minimal motor at toes)   R: M/S intact  Skin: no rashes  Neuro: A&Ox3     Lab, Imaging and other studies:CBC: No results found for: WBC, HGB, HCT, MCV, PLT, ADJUSTEDWBC, MCH, MCHC, RDW, MPV, NRBC, CMP: No results found for: SODIUM, K, CL, CO2, ANIONGAP, BUN, CREATININE, GLUCOSE, CALCIUM, AST, ALT, ALKPHOS, PROT, BILITOT, EGFR  VTE Pharmacologic Prophylaxis: Heparin  VTE Mechanical Prophylaxis: sequential compression device

## 2019-12-15 NOTE — PHYSICAL THERAPY NOTE
Physical Therapy Cancellation Note    Chart reviewed and spoke to nursing  Attempted to see pt X2 this day  Pt refusing therapy services at this time stating "It's Sunday and I just want to watch my shows" Pt extensively educated on importance of therapy services  Will continue to follow and attempt to see pt as able and appropriate      Glenys Arita, PT, DPT

## 2019-12-15 NOTE — PLAN OF CARE
Problem: Prexisting or High Potential for Compromised Skin Integrity  Goal: Skin integrity is maintained or improved  Description  INTERVENTIONS:  - Identify patients at risk for skin breakdown  - Assess and monitor skin integrity  - Assess and monitor nutrition and hydration status  - Monitor labs   - Assess for incontinence   - Turn and reposition patient  - Assist with mobility/ambulation  - Relieve pressure over bony prominences  - Avoid friction and shearing  - Provide appropriate hygiene as needed including keeping skin clean and dry  - Evaluate need for skin moisturizer/barrier cream  - Collaborate with interdisciplinary team   - Patient/family teaching  - Consider wound care consult   Outcome: Progressing     Problem: Potential for Falls  Goal: Patient will remain free of falls  Description  INTERVENTIONS:  - Assess patient frequently for physical needs  -  Identify cognitive and physical deficits and behaviors that affect risk of falls    -  [place] fall precautions as indicated by assessment   - Educate patient/family on patient safety including physical limitations  - Instruct patient to call for assistance with activity based on assessment  - Modify environment to reduce risk of injury  - Consider OT/PT consult to assist with strengthening/mobility  Outcome: Progressing     Problem: PAIN - ADULT  Goal: Verbalizes/displays adequate comfort level or baseline comfort level  Description  Interventions:  - Encourage patient to monitor pain and request assistance  - Assess pain using appropriate pain scale  - Administer analgesics based on type and severity of pain and evaluate response  - Implement non-pharmacological measures as appropriate and evaluate response  - Consider cultural and social influences on pain and pain management  - Notify physician/advanced practitioner if interventions unsuccessful or patient reports new pain  Outcome: Progressing     Problem: INFECTION - ADULT  Goal: Absence or prevention of progression during hospitalization  Description  INTERVENTIONS:  - Assess and monitor for signs and symptoms of infection  - Monitor lab/diagnostic results  - Monitor all insertion sites, i e  indwelling lines, tubes, and drains  - Monitor endotracheal if appropriate and nasal secretions for changes in amount and color  - [place] appropriate cooling/warming therapies per order  - Administer medications as ordered  - Instruct and encourage patient and family to use good hand hygiene technique  - Identify and instruct in appropriate isolation precautions for identified infection/condition  Outcome: Progressing  Goal: Absence of fever/infection during neutropenic period  Description  INTERVENTIONS:  - Monitor WBC    Outcome: Progressing     Problem: SAFETY ADULT  Goal: Maintain or return to baseline ADL function  Description  INTERVENTIONS:  -  Assess patient's ability to carry out ADLs; assess patient's baseline for ADL function and identify physical deficits which impact ability to perform ADLs (bathing, care of mouth/teeth, toileting, grooming, dressing, etc )  - Assess/evaluate cause of self-care deficits   - Assess range of motion  - Assess patient's mobility; develop plan if impaired  - Assess patient's need for assistive devices and provide as appropriate  - Encourage maximum independence but intervene and supervise when necessary  - Involve family in performance of ADLs  - Assess for home care needs following discharge   - Consider OT consult to assist with ADL evaluation and planning for discharge  - Provide patient education as appropriate  Outcome: Progressing  Goal: Maintain or return mobility status to optimal level  Description  INTERVENTIONS:  - Assess patient's baseline mobility status (ambulation, transfers, stairs, etc )    - Identify cognitive and physical deficits and behaviors that affect mobility  - Identify mobility aids required to assist with transfers and/or ambulation (gait belt, sit-to-stand, lift, walker, cane, etc )  - [place] fall precautions as indicated by assessment  - Record patient progress and toleration of activity level on Mobility SBAR; progress patient to next Phase/Stage  - Instruct patient to call for assistance with activity based on assessment  - Consider rehabilitation consult to assist with strengthening/weightbearing, etc   Outcome: Progressing     Problem: DISCHARGE PLANNING  Goal: Discharge to home or other facility with appropriate resources  Description  INTERVENTIONS:  - Identify barriers to discharge w/patient and caregiver  - Arrange for needed discharge resources and transportation as appropriate  - Identify discharge learning needs (meds, wound care, etc )  - Arrange for interpretive services to assist at discharge as needed  - Refer to Case Management Department for coordinating discharge planning if the patient needs post-hospital services based on physician/advanced practitioner order or complex needs related to functional status, cognitive ability, or social support system  Outcome: Progressing     Problem: Knowledge Deficit  Goal: Patient/family/caregiver demonstrates understanding of disease process, treatment plan, medications, and discharge instructions  Description  Complete learning assessment and assess knowledge base    Interventions:  - Provide teaching at level of understanding  - Provide teaching via preferred learning methods  Outcome: Progressing     Problem: MUSCULOSKELETAL - ADULT  Goal: Maintain or return mobility to safest level of function  Description  INTERVENTIONS:  - Assess patient's ability to carry out ADLs; assess patient's baseline for ADL function and identify physical deficits which impact ability to perform ADLs (bathing, care of mouth/teeth, toileting, grooming, dressing, etc )  - Assess/evaluate cause of self-care deficits   - Assess range of motion  - Assess patient's mobility  - Assess patient's need for assistive devices and provide as appropriate  - Encourage maximum independence but intervene and supervise when necessary  - Involve family in performance of ADLs  - Assess for home care needs following discharge   - Consider OT consult to assist with ADL evaluation and planning for discharge  - Provide patient education as appropriate  Outcome: Progressing  Goal: Maintain proper alignment of affected body part  Description  INTERVENTIONS:  - Support, maintain and protect limb and body alignment  - Provide patient/ family with appropriate education  Outcome: Progressing     Problem: Nutrition/Hydration-ADULT  Goal: Nutrient/Hydration intake appropriate for improving, restoring or maintaining nutritional needs  Description  Monitor and assess patient's nutrition/hydration status for malnutrition  Collaborate with interdisciplinary team and initiate plan and interventions as ordered  Monitor patient's weight and dietary intake as ordered or per policy  Utilize nutrition screening tool and intervene as necessary  Determine patient's food preferences and provide high-protein, high-caloric foods as appropriate       INTERVENTIONS:  - Monitor oral intake, urinary output, labs, and treatment plans  - Assess nutrition and hydration status and recommend course of action  - Evaluate amount of meals eaten  - Assist patient with eating if necessary   - Allow adequate time for meals  - Recommend/ encourage appropriate diets, oral nutritional supplements, and vitamin/mineral supplements  - Order, calculate, and assess calorie counts as needed  - Recommend, monitor, and adjust tube feedings and TPN/PPN based on assessed needs  - Assess need for intravenous fluids  - Provide specific nutrition/hydration education as appropriate  - Include patient/family/caregiver in decisions related to nutrition  Outcome: Progressing     Problem: GENITOURINARY - ADULT  Goal: Maintains or returns to baseline urinary function  Description  INTERVENTIONS:  - Assess urinary function  - Encourage oral fluids to ensure adequate hydration if ordered  - Administer IV fluids as ordered to ensure adequate hydration  - Administer ordered medications as needed  - Offer frequent toileting  - Follow urinary retention protocol if ordered  Outcome: Progressing  Goal: Absence of urinary retention  Description  INTERVENTIONS:  - Assess patients ability to void and empty bladder  - Monitor I/O  - Bladder scan as needed  - Discuss with physician/AP medications to alleviate retention as needed  - Discuss catheterization for long term situations as appropriate  Outcome: Progressing

## 2019-12-15 NOTE — OCCUPATIONAL THERAPY NOTE
Occupational Therapy Cancellation Note    Orders received and chart reviewed  OT attempted to see 2x this day, however, despite extensive education and encouragement, Pt refusing therapy services  Pt stated "Today is Sunday, I'm not getting out of bed, and I just want to watch my shows " RN notified and aware  Will continue to follow to be seen for OT treatment as appropriate/when medically cleared         Heidi Barbosa MS, OTR/L

## 2019-12-16 ENCOUNTER — TRANSITIONAL CARE MANAGEMENT (OUTPATIENT)
Dept: FAMILY MEDICINE CLINIC | Facility: CLINIC | Age: 79
End: 2019-12-16

## 2019-12-16 VITALS
TEMPERATURE: 98.2 F | HEART RATE: 72 BPM | HEIGHT: 64 IN | RESPIRATION RATE: 16 BRPM | SYSTOLIC BLOOD PRESSURE: 148 MMHG | OXYGEN SATURATION: 97 % | WEIGHT: 128.75 LBS | DIASTOLIC BLOOD PRESSURE: 67 MMHG | BODY MASS INDEX: 21.98 KG/M2

## 2019-12-16 PROCEDURE — 99024 POSTOP FOLLOW-UP VISIT: CPT | Performed by: SURGERY

## 2019-12-16 PROCEDURE — NC001 PR NO CHARGE: Performed by: SURGERY

## 2019-12-16 PROCEDURE — 97535 SELF CARE MNGMENT TRAINING: CPT

## 2019-12-16 RX ORDER — OXYCODONE HYDROCHLORIDE 5 MG/1
5 TABLET ORAL EVERY 4 HOURS PRN
Qty: 30 TABLET | Refills: 0 | Status: SHIPPED | OUTPATIENT
Start: 2019-12-16 | End: 2019-12-26

## 2019-12-16 RX ORDER — GABAPENTIN 100 MG/1
100 CAPSULE ORAL 3 TIMES DAILY
Qty: 90 CAPSULE | Refills: 0
Start: 2019-12-16 | End: 2020-02-14 | Stop reason: SDUPTHER

## 2019-12-16 RX ORDER — DOCUSATE SODIUM 100 MG/1
100 CAPSULE, LIQUID FILLED ORAL 2 TIMES DAILY
Qty: 10 CAPSULE | Refills: 0 | COMMUNITY
Start: 2019-12-16 | End: 2020-07-21

## 2019-12-16 RX ORDER — CLOPIDOGREL BISULFATE 75 MG/1
75 TABLET ORAL DAILY
Refills: 0
Start: 2019-12-17 | End: 2020-02-12 | Stop reason: SDUPTHER

## 2019-12-16 RX ORDER — ACETAMINOPHEN 325 MG/1
650 TABLET ORAL EVERY 6 HOURS PRN
Qty: 30 TABLET | Refills: 0 | COMMUNITY
Start: 2019-12-16 | End: 2021-10-20 | Stop reason: HOSPADM

## 2019-12-16 RX ADMIN — SENNOSIDES 8.6 MG: 8.6 TABLET, FILM COATED ORAL at 08:07

## 2019-12-16 RX ADMIN — ASPIRIN 81 MG 162 MG: 81 TABLET ORAL at 08:06

## 2019-12-16 RX ADMIN — MAGNESIUM OXIDE TAB 400 MG (240 MG ELEMENTAL MG) 400 MG: 400 (240 MG) TAB at 08:06

## 2019-12-16 RX ADMIN — LEVOTHYROXINE SODIUM 25 MCG: 25 TABLET ORAL at 05:13

## 2019-12-16 RX ADMIN — HEPARIN SODIUM 5000 UNITS: 5000 INJECTION INTRAVENOUS; SUBCUTANEOUS at 05:13

## 2019-12-16 RX ADMIN — CHLORHEXIDINE GLUCONATE 0.12% ORAL RINSE 15 ML: 1.2 LIQUID ORAL at 08:07

## 2019-12-16 RX ADMIN — GABAPENTIN 100 MG: 100 CAPSULE ORAL at 08:06

## 2019-12-16 RX ADMIN — NICOTINE 1 PATCH: 21 PATCH, EXTENDED RELEASE TRANSDERMAL at 08:08

## 2019-12-16 RX ADMIN — FLUTICASONE PROPIONATE 1 SPRAY: 50 SPRAY, METERED NASAL at 09:30

## 2019-12-16 RX ADMIN — CLOPIDOGREL BISULFATE 75 MG: 75 TABLET ORAL at 08:06

## 2019-12-16 RX ADMIN — AMLODIPINE BESYLATE 5 MG: 5 TABLET ORAL at 08:06

## 2019-12-16 RX ADMIN — ATORVASTATIN CALCIUM 80 MG: 80 TABLET, FILM COATED ORAL at 08:06

## 2019-12-16 RX ADMIN — ZINC 1 TABLET: TAB ORAL at 08:08

## 2019-12-16 RX ADMIN — CARVEDILOL 12.5 MG: 12.5 TABLET, FILM COATED ORAL at 08:07

## 2019-12-16 RX ADMIN — DOCUSATE SODIUM 100 MG: 100 CAPSULE, LIQUID FILLED ORAL at 08:07

## 2019-12-16 NOTE — DISCHARGE INSTR - OTHER ORDERS
Wound Care: Shower daily  Clean incisions with soap and water  Pat dry  Apply betadine paint to incision sites  Apply dry gauze dressing to bilateral groin incisions  Abdominal wound may be left open to air unless draining

## 2019-12-16 NOTE — DISCHARGE SUMMARY
Vascular Surgery    Discharge- Darío Bowen 1940, 78 y o  female MRN: [id number]    [facility] Encounter: [id number]    Primary Care Provider: Gertrude Vásquez DO   Date and time admitted to hospital: 12/6/2019  4:13 PM    * Aortoiliac occlusive disease [facility])  Assessment & Plan  Aortoiliac occlusive disease with rest pain    S/P Aorta Bifemoral bypass w/ left iliofemoral thrombectomy 12/9    Plan:  Continue aspirin, Plavix, Lipitor  PT/OT  Outpatient follow-up in the Vascular Center      Acute blood loss anemia  Assessment & Plan  Acute blood loss anemia on chronic anemia, s/p transfusion 1 Unit [facility] yesterday  Stable      Secondary Diagnosis:  Past Medical History:   Diagnosis Date    Arthritis     Benign essential hypertension     CAD (coronary artery disease)     Disease of thyroid gland     Dizziness     Edema of leg     Hyperlipidemia     Hypertension     Other disorder of circulatory system (CODE)      Past Surgical History:   Procedure Laterality Date    BREAST SURGERY      bxs,benign    COLONOSCOPY      HYSTERECTOMY      INCISIONAL BREAST BIOPSY      x5, 1964, 1965, 1985 and [address] Bilateral 12/9/2019    Procedure: BYPASS AORTA BIFEMORAL WITH LEFT FEMORAL THROMBOEMBOLECTOMY;  Surgeon: Donaldo Pate MD;  Location: [facility];  Service: Vascular        Admitting Diagnosis: 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 ([facility] [address] ) [[address] 209]    Attending: Dr Sherin Kim    Consultants:   [facility]  Surgical critical care    Procedures Performed: Aorta Bifemoral bypass w/ left iliofemoral thrombectomy 12/9- Oskin/Domer      Discharge Medications:  See after visit summary for reconciled discharge medications provided to patient and family        HPI: Darío Bowen is a 78y o  year old female with hx of HTN, HLD, CAD, and aorto-iliac occlusive disease who presents with bilateral lower extremity rest pain, left worse than right  Patient states the symptoms have gotten worse over the alst 1-2 weeks  She was originally seen in the vascular surgery clinic earlier today where she was evaluated by Dr Himanshu Aponte, who was concerned about her vascular exam  He recommended she go to the ED for a repeat CTA with runoff  She presented to the [address] [place] [address] ED where she had this study performed and then was transferred to [facility] as a direct admission      Patient states she has had RLE pain for several years  She has followed with Dr Himanshu Aponte for around 5 years, getting ultrasounds every 6 months to monitor her aortoiliac occlusive disease  Her LLE pain has been present intermittently for a while as well  It became more severe and constant about 1 week ago when she was admitted to the hospital for sepsis secondary to a UTI  Since then, she has had progressively worsening rest pain in her left leg  She denies rest pain in her right leg at this time       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 which may be contributing to her BLE pain  Hospital Course: Following presentation she was admitted to the vascular surgery service  CTA was obtained for surgical planning  She was placed on heparin drip which was continued until the time of surgery  Cardiology was consulted for preoperative risk assessment she was cleared for surgery at moderate risk  Her imaging studies had revealed epidural mass for which Neurosurgery was consulted  They suggested this could be benign calcified herniated nucleus pulposus versus possible meningioma  The patient was recommended for MRI however she ultimately refused having this done as an inpatient and prefer to delay until she was recovered as an outpatient  She will require outpatient follow-up with Neurosurgery an outpatient MRI following discharge    Following admission to the hospital the patient's left foot pain was intractable and she was noted to have worsening motor exam on 12/9/19  She was taken to the operating room urgently on that day and underwent aortobifemoral bypass with left femoral thromboembolectomy by Dr Manjula Pappas and Dr Tran Payne  She tolerated surgery well and following surgery she was transferred to the intensive care unit for monitoring  She was seen and evaluated by the surgical critical care team who assisted with monitoring her during her postop recovery course in the ICU  She was noted to have acute blood loss anemia on chronic anemia which require blood transfusion in her perioperative period  Following surgery her lower extremity pain improved however she continued to have numbness especially noted in the left foot and lower leg  Gradually her bowel function returned to normal and prior to discharge she was able to tolerate a regular diet  Her pain was well controlled  She was seen and evaluated by Physical therapy and Occupational therapy and was recommended for inpatient rehab  On 12/16/19 she was cleared for discharge and insurance authorization was obtained for rehab  The patient was discharged in the care of her daughter for transportation to her rehab facility  She was given instructions for her discharge medications, prescription for pain medication, instructions for wound care and activity limitations following her surgery  Outpatient follow-up in the vascular Center was arranged prior to her discharge  She was instructed to call with any questions concerns or changes in her condition  Condition at Discharge: stable     Provisions for Follow-Up Care:  See after visit summary for information related to follow-up care and any pertinent home health orders  Disposition: Home    Discharge instructions/Information to patient and family:   See after visit summary for information provided to patient and family  Planned Readmission: No    Discharge Statement   I spent 30 minutes discharging the patient   This time was spent on the day of discharge  I had direct contact with the patient on the day of discharge  Additional documentation is required if more than 30 minutes were spent on discharge

## 2019-12-16 NOTE — OCCUPATIONAL THERAPY NOTE
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)    Nicotine dependence    Benign essential HTN    CKD (chronic kidney disease) stage 3, GFR 30-59 ml/min (HCC)    Mass of spine    Chronic diastolic heart failure (HCC)    Acute blood loss anemia          12/16/19 1045   Restrictions/Precautions   Weight Bearing Precautions Per Order No   Other Precautions Fall Risk;Pain   General   Response to Previous Treatment Patient with no complaints from previous session   Lifestyle   Autonomy Pt reports being I w/ all ADLS and most IADLS (family A w/ grocery shopping and transportation); (-) drives; utilizes rollator for mobility PTA  Reciprocal Relationships Pt lives alone  Reports having supportive family and friends that can provide A as needed  Service to Others Pt is retired  Intrinsic Gratification Pt reports enjoying being home  Pain Assessment   Pain Assessment No/denies pain   Pain Score No Pain   ADL   Where Assessed Chair   Grooming Assistance 5  Supervision/Setup   UB Dressing Assistance 4  Minimal Assistance   UB Dressing Deficit Pull around back; Fasteners   UB Dressing Comments Donning chad   LB Dressing Assistance 3  Moderate Assistance   LB Dressing Deficit Steadying;Pull up over hips; Requires assistive device for steadying   LB Dressing Comments Pt required A to don brief  Transfers   Sit to Stand 4  Minimal assistance   Additional items Assist x 1; Increased time required;Verbal cues   Stand to Sit 4  Minimal assistance   Additional items Assist x 1; Increased time required;Verbal cues   Functional Mobility   Functional Mobility 3  Moderate assistance   Additional Comments Pt demonstrated very short household distance with 1 LOB and 1 seated rest break      Additional items Rolling walker Cognition   Overall Cognitive Status WFL   Arousal/Participation Alert; Cooperative   Attention Attends with cues to redirect   Orientation Level Oriented X4   Memory Decreased recall of precautions   Following Commands Follows one step commands with increased time or repetition   Comments Pt elton cameron pleasant and cooperative to work with therapy  Activity Tolerance   Activity Tolerance Patient limited by fatigue;Patient limited by pain   Medical Staff Made Aware RN confirmed okay to see pt    Assessment   Assessment Patient participated in Skilled OT session this date with interventions consisting of ADL re training with the use of correct body mechanics, Energy Conservation techniques and  therapeutic activities to: increase activity tolerance   Pt reports that she already completed bathing today and would not like to do it again  Patient agreeable to OT treatment session, upon arrival patient was found seated OOB to Chair  In comparison to previous session, patient with improvements in LB dressing  Patient requiring frequent rest periods and ocassional safety reminders  Patient continues to be functioning below baseline level, occupational performance remains limited secondary to factors listed above and increased risk for falls and injury  From OT standpoint, recommendation at time of d/c would be Short Term Rehab  Patient to benefit from continued Occupational Therapy treatment while in the hospital to address deficits as defined above and maximize level of functional independence with ADLs and functional mobility  Plan   Treatment Interventions ADL retraining;Functional transfer training; Endurance training;Patient/family training; Compensatory technique education;Continued evaluation; Energy conservation; Activityengagement   Goal Expiration Date 12/21/19   OT Treatment Day 2   OT Frequency 3-5x/wk   Recommendation   OT Discharge Recommendation Short Term Rehab   OT - OK to Discharge Yes  (When medically appropriate)   Barthel Index   Feeding 10   Bathing 0   Grooming Score 0   Dressing Score 5   Bladder Score 5   Bowels Score 10   Toilet Use Score 5   Transfers (Bed/Chair) Score 5   Mobility (Level Surface) Score 0   Stairs Score 0   Barthel Index Score 40   Modified [place] Scale   Modified [place] Scale 4      Lia Holliday, MOT, OTR/L

## 2019-12-16 NOTE — PROGRESS NOTES
Discharge instructions faxed to facility, telephonen report given  Daughter to be here by 1;:30- for transfer to discharge mumtaz

## 2019-12-16 NOTE — DISCHARGE INSTRUCTIONS
DISCHARGE INSTRUCTIONS                                                AORTIC  REPAIR    Following discharge from the hospital, you may have some questions about your operation, your activities or your general condition  These instructions may answer some of your questions and help you adjust during the first few weeks following your operation  ACTIVITY:   Limit your physical activity to slow walking  Avoid climbing or heavy lifting for the first six weeks after surgery  You may require help with walking or feel more secure with someone to lean on  Walking up steps and normal activities may be resumed as you feel ready  Most people tire easily for the first few weeks following aneurysm surgery  This improves as conditioning returns  You should not drive a car for at least week following discharge from the hospital   You may ride in a car for short trips  DIET:  Resume your normal diet  Try to eat low fat and low cholesterol foods  Your appetite may be poor for several weeks following abdominal surgery and you may experience some weight loss  A mild laxative or fleets enema may help regulate bowel movements in the postoperative period  INCISION:  You may include the operated area in a shower on the fifth day following surgery  Sitting in a tub is not recommended for the first week following surgery or if you have any open wounds  It is normal to have swelling or discoloration around the incision  If increasing redness or pain develops, call our office immediately  Numbness in the region of the incision may occur following the surgery  This normally resolves in six to twelve months  If any of your incisions are open and require dressing changes, you will be given instructions for your daily incision care  If you are not able to change the dressings, a visiting nurse will be arranged  FOLLOW UP STUDIES:  Doppler ultrasound studies are very important to your post-operative care    Your surgeon will arrange for them at your first postoperative visit  PLEASE CALL THE OFFICE IF YOU HAVE ANY QUESTIONS  [phone] [facility] [phone] [facility] FREE [phone]  [address] [facility] , [address], [place], [address]  [address], [address], [place], [address]  [address], [address]  [address] [facility], [address] [facility], [facility], [address]  [address], [address], [address]  [facility], [address] [facility], [address], [address]  [address] [place] [address], [address] [facility], [address] [place] [address], [address] [facility], [facility],[address],, [address]

## 2019-12-16 NOTE — PLAN OF CARE
Problem: OCCUPATIONAL THERAPY ADULT  Goal: Performs self-care activities at highest level of function for planned discharge setting  See evaluation for individualized goals  Description  Treatment Interventions: ADL retraining, Functional transfer training, UE strengthening/ROM, Endurance training, Cognitive reorientation, Patient/family training, Equipment evaluation/education, Compensatory technique education, Activityengagement, Energy conservation          See flowsheet documentation for full assessment, interventions and recommendations  Outcome: Progressing  Note:   Limitation: Decreased ADL status, Decreased UE strength, Decreased Safe judgement during ADL, Decreased cognition, Decreased endurance, Decreased high-level ADLs  Prognosis: Fair  Assessment: Patient participated in Skilled OT session this date with interventions consisting of ADL re training with the use of correct body mechanics, Energy Conservation techniques and  therapeutic activities to: increase activity tolerance   Patient agreeable to OT treatment session, upon arrival patient was found seated OOB to Chair  In comparison to previous session, patient with improvements in LB dressing  Patient requiring frequent rest periods and ocassional safety reminders  Patient continues to be functioning below baseline level, occupational performance remains limited secondary to factors listed above and increased risk for falls and injury  From OT standpoint, recommendation at time of d/c would be Short Term Rehab  Patient to benefit from continued Occupational Therapy treatment while in the hospital to address deficits as defined above and maximize level of functional independence with ADLs and functional mobility        OT Discharge Recommendation: Short Term Rehab  OT - OK to Discharge: Yes(When medically appropriate)

## 2019-12-16 NOTE — SOCIAL WORK
CM received call from vascular JOAN Alfred pt is stable for d/c  CM relayed information to [facility] who will submit for auth  Cm received call from Obed at [facility] request for SNF auth was approved effect today auth # [id number]  CM contacted daughter Max Bailey and made aware  She will be here to transport pt around 1230pm  Cm contacted Tari with vascular for d/c orders

## 2019-12-16 NOTE — PROGRESS NOTES
Vascular Surgery    Progress Note - [place] 1940, 78 y o  female MRN: [id number]    Unit/Bed#: [facility] 524-01 Encounter: [id number]    Primary Care Provider: Constantin Bro DO   Date and time admitted to hospital: 12/6/2019  4:13 PM    * Aortoiliac occlusive disease [facility])  Assessment & Plan  Aortoiliac occlusive disease with rest pain    S/P Aorta Bifemoral bypass w/ left iliofemoral thrombectomy 12/9    Plan:  Regular diet  Continue aspirin, Plavix, Lipitor  Pain control  OOB/Amb  PT/OT  Case management- dispo planning for rehab placement      Acute blood loss anemia  Assessment & Plan  Acute blood loss anemia on chronic anemia, s/p transfusion 1 Unit PRBC yesterday    Plan:  Monitor      Subjective:  Pt resting comfortably, no events overnight    Vitals:  /60 (BP Location: Right arm)   Pulse 71   Temp 98 °F (36 7 °C) (Axillary)   Resp 16   Ht 5' 4" (1 626 m)   Wt 58 4 kg (128 lb 12 oz)   SpO2 95%   BMI 22 10 kg/m²     I/Os:  I/O last 3 completed shifts: In: 4481 [P O :1288]  Out: 1300 [Urine:1300]  I/O this shift:  In: 240 [P O :240]  Out: -     Lab Results and Cultures:   Lab Results   Component Value Date    WBC 8 15 12/15/2019    HGB 8 7 (L) 12/15/2019    HCT 26 4 (L) 12/15/2019     (H) 12/15/2019     12/15/2019     Lab Results   Component Value Date    GLUCOSE 173 (H) 12/09/2019    CALCIUM 8 5 12/15/2019    K 4 0 12/15/2019    CO2 21 12/15/2019     (H) 12/15/2019    BUN 19 12/15/2019    CREATININE 0 63 12/15/2019     Lab Results   Component Value Date    INR 1 14 12/06/2019    INR 1 02 12/06/2019    INR 1 09 11/24/2019    PROTIME 14 2 12/06/2019    PROTIME 11 0 12/06/2019    PROTIME 11 7 11/24/2019        Blood Culture:   Lab Results   Component Value Date    BLOODCX No Growth After 5 Days   11/24/2019   ,   Urinalysis:   Lab Results   Component Value Date    COLORU Yellow 12/05/2019    COLORU Yellow 11/24/2019    CLARITYU Cloudy 11/24/2019    SPECGRAV 1 012 12/05/2019    SPECGRAV 1 020 11/24/2019    PHUR 6 0 11/24/2019    PHUR 6 0 12/24/2018    LEUKOCYTESUR Negative 12/05/2019    LEUKOCYTESUR Moderate (A) 11/24/2019    NITRITE Negative 12/05/2019    NITRITE Positive (A) 11/24/2019    GLUCOSEU Negative 11/24/2019    KETONESU Negative 12/05/2019    KETONESU Negative 11/24/2019    BILIRUBINUR Negative 12/05/2019    BILIRUBINUR Negative 11/24/2019    BLOODU Negative 12/05/2019    BLOODU Moderate (A) 11/24/2019   ,   Urine Culture:   Lab Results   Component Value Date    URINECX >100,000 cfu/ml Escherichia coli (A) 11/24/2019   ,   Wound Culure: No results found for: WOUNDCULT    Medications:  Current Facility-Administered Medications   Medication Dose Route Frequency    acetaminophen (TYLENOL) tablet 650 mg  650 mg Oral Q6H PRN    amLODIPine (NORVASC) tablet 5 mg  5 mg Oral Daily    aspirin chewable tablet 162 mg  162 mg Oral Daily    atorvastatin (LIPITOR) tablet 80 mg  80 mg Oral Daily    bisacodyl (DULCOLAX) rectal suppository 10 mg  10 mg Rectal Daily PRN    carvedilol (COREG) tablet 12 5 mg  12 5 mg Oral BID With Meals    chlorhexidine (PERIDEX) 0 12 % oral rinse 15 mL  15 mL Swish & Spit Q12H [address]    clopidogrel (PLAVIX) tablet 75 mg  75 mg Oral Daily    docusate sodium (COLACE) capsule 100 mg  100 mg Oral BID    fluticasone (FLONASE) 50 mcg/act nasal spray 1 spray  1 spray Nasal Daily    gabapentin (NEURONTIN) capsule 100 mg  100 mg Oral TID    heparin (porcine) subcutaneous injection 5,000 Units  5,000 Units Subcutaneous Q8H [address]    HYDROmorphone (DILAUDID) injection 0 5 mg  0 5 mg Intravenous Q3H PRN    hydrOXYzine HCL (ATARAX) tablet 10 mg  10 mg Oral Q6H PRN    levothyroxine tablet 25 mcg  25 mcg Oral Early Morning    magnesium oxide (MAG-OX) tablet 400 mg  400 mg Oral Daily    multivitamin stress formula tablet 1 tablet  1 tablet Oral Daily    nicotine (NICODERM CQ) 21 mg/24 hr TD 24 hr patch 1 patch  1 patch Transdermal Daily    oxyCODONE (ROXICODONE) immediate release tablet 10 mg  10 mg Oral Q4H PRN    oxyCODONE (ROXICODONE) IR tablet 5 mg  5 mg Oral Q4H PRN    senna (SENOKOT) tablet 8 6 mg  1 tablet Oral Daily       Physical Exam:    General appearance: alert and oriented, in no acute distress  Lungs: clear to auscultation bilaterally  Heart: regular rate and rhythm, S1, S2 normal, no murmur, click, rub or gallop  Abdomen: soft, non-tender; bowel sounds normal; no masses,  no organomegaly  Extremities: extremities normal, warm and well-perfused; no cyanosis, clubbing, or edema    Wound/Incision:  Midline abdominal bilateral groin incision clean, dry, and intact    Pulse exam:  DP: Right: doppler signal Left: doppler signal  PT: Right: doppler signal Left: doppler signal      Ashlyn Gagnon PA-C  12/16/2019

## 2019-12-16 NOTE — ASSESSMENT & PLAN NOTE
Aortoiliac occlusive disease with rest pain    S/P Aorta Bifemoral bypass w/ left iliofemoral thrombectomy 12/9    Plan:  Continue aspirin, Plavix, Lipitor  PT/OT  [facility]patient follow-[facility] in the [facility]

## 2019-12-16 NOTE — INCIDENTAL FINDINGS
The following findings require follow up:  Radiographic finding   Finding: Possible spinal meningioma, epidural mass   Follow up required: MRI   Follow up should be done within [facility] Neuro Surgery 2-4 week(s)    Please notify the following clinician to assist with the follow up:   Dr Monika Long

## 2019-12-16 NOTE — PLAN OF CARE
Problem: Prexisting or High Potential for Compromised Skin Integrity  Goal: Skin integrity is maintained or improved  Description  INTERVENTIONS:  - Identify patients at risk for skin breakdown  - Assess and monitor skin integrity  - Assess and monitor nutrition and hydration status  - Monitor labs   - Assess for incontinence   - Turn and reposition patient  - Assist with mobility/ambulation  - Relieve pressure over bony prominences  - Avoid friction and shearing  - Provide appropriate hygiene as needed including keeping skin clean and dry  - Evaluate need for skin moisturizer/barrier cream  - Collaborate with interdisciplinary team   - Patient/family teaching  - Consider wound care consult   Outcome: Progressing     Problem: Potential for Falls  Goal: Patient will remain free of falls  Description  INTERVENTIONS:  - Assess patient frequently for physical needs  -  Identify cognitive and physical deficits and behaviors that affect risk of falls    -  [place] fall precautions as indicated by assessment   - Educate patient/family on patient safety including physical limitations  - Instruct patient to call for assistance with activity based on assessment  - Modify environment to reduce risk of injury  - Consider OT/PT consult to assist with strengthening/mobility  Outcome: Progressing     Problem: PAIN - ADULT  Goal: Verbalizes/displays adequate comfort level or baseline comfort level  Description  Interventions:  - Encourage patient to monitor pain and request assistance  - Assess pain using appropriate pain scale  - Administer analgesics based on type and severity of pain and evaluate response  - Implement non-pharmacological measures as appropriate and evaluate response  - Consider cultural and social influences on pain and pain management  - Notify physician/advanced practitioner if interventions unsuccessful or patient reports new pain  Outcome: Progressing     Problem: INFECTION - ADULT  Goal: Absence or prevention of progression during hospitalization  Description  INTERVENTIONS:  - Assess and monitor for signs and symptoms of infection  - Monitor lab/diagnostic results  - Monitor all insertion sites, i e  indwelling lines, tubes, and drains  - Monitor endotracheal if appropriate and nasal secretions for changes in amount and color  - [place] appropriate cooling/warming therapies per order  - Administer medications as ordered  - Instruct and encourage patient and family to use good hand hygiene technique  - Identify and instruct in appropriate isolation precautions for identified infection/condition  Outcome: Progressing  Goal: Absence of fever/infection during neutropenic period  Description  INTERVENTIONS:  - Monitor WBC    Outcome: Progressing     Problem: SAFETY ADULT  Goal: Maintain or return to baseline ADL function  Description  INTERVENTIONS:  -  Assess patient's ability to carry out ADLs; assess patient's baseline for ADL function and identify physical deficits which impact ability to perform ADLs (bathing, care of mouth/teeth, toileting, grooming, dressing, etc )  - Assess/evaluate cause of self-care deficits   - Assess range of motion  - Assess patient's mobility; develop plan if impaired  - Assess patient's need for assistive devices and provide as appropriate  - Encourage maximum independence but intervene and supervise when necessary  - Involve family in performance of ADLs  - Assess for home care needs following discharge   - Consider OT consult to assist with ADL evaluation and planning for discharge  - Provide patient education as appropriate  Outcome: Progressing  Goal: Maintain or return mobility status to optimal level  Description  INTERVENTIONS:  - Assess patient's baseline mobility status (ambulation, transfers, stairs, etc )    - Identify cognitive and physical deficits and behaviors that affect mobility  - Identify mobility aids required to assist with transfers and/or ambulation (gait belt, sit-to-stand, lift, walker, cane, etc )  - [place] fall precautions as indicated by assessment  - Record patient progress and toleration of activity level on Mobility SBAR; progress patient to next Phase/Stage  - Instruct patient to call for assistance with activity based on assessment  - Consider rehabilitation consult to assist with strengthening/weightbearing, etc   Outcome: Progressing     Problem: DISCHARGE PLANNING  Goal: Discharge to home or other facility with appropriate resources  Description  INTERVENTIONS:  - Identify barriers to discharge w/patient and caregiver  - Arrange for needed discharge resources and transportation as appropriate  - Identify discharge learning needs (meds, wound care, etc )  - Arrange for interpretive services to assist at discharge as needed  - Refer to Case Management Department for coordinating discharge planning if the patient needs post-hospital services based on physician/advanced practitioner order or complex needs related to functional status, cognitive ability, or social support system  Outcome: Progressing     Problem: Knowledge Deficit  Goal: Patient/family/caregiver demonstrates understanding of disease process, treatment plan, medications, and discharge instructions  Description  Complete learning assessment and assess knowledge base    Interventions:  - Provide teaching at level of understanding  - Provide teaching via preferred learning methods  Outcome: Progressing     Problem: MUSCULOSKELETAL - ADULT  Goal: Maintain or return mobility to safest level of function  Description  INTERVENTIONS:  - Assess patient's ability to carry out ADLs; assess patient's baseline for ADL function and identify physical deficits which impact ability to perform ADLs (bathing, care of mouth/teeth, toileting, grooming, dressing, etc )  - Assess/evaluate cause of self-care deficits   - Assess range of motion  - Assess patient's mobility  - Assess patient's need for assistive devices and provide as appropriate  - Encourage maximum independence but intervene and supervise when necessary  - Involve family in performance of ADLs  - Assess for home care needs following discharge   - Consider OT consult to assist with ADL evaluation and planning for discharge  - Provide patient education as appropriate  Outcome: Progressing  Goal: Maintain proper alignment of affected body part  Description  INTERVENTIONS:  - Support, maintain and protect limb and body alignment  - Provide patient/ family with appropriate education  Outcome: Progressing     Problem: GENITOURINARY - ADULT  Goal: Maintains or returns to baseline urinary function  Description  INTERVENTIONS:  - Assess urinary function  - Encourage oral fluids to ensure adequate hydration if ordered  - Administer IV fluids as ordered to ensure adequate hydration  - Administer ordered medications as needed  - Offer frequent toileting  - Follow urinary retention protocol if ordered  Outcome: Progressing  Goal: Absence of urinary retention  Description  INTERVENTIONS:  - Assess patients ability to void and empty bladder  - Monitor I/O  - Bladder scan as needed  - Discuss with physician/AP medications to alleviate retention as needed  - Discuss catheterization for long term situations as appropriate  Outcome: Progressing     Problem: Nutrition/Hydration-ADULT  Goal: Nutrient/Hydration intake appropriate for improving, restoring or maintaining nutritional needs  Description  Monitor and assess patient's nutrition/hydration status for malnutrition  Collaborate with interdisciplinary team and initiate plan and interventions as ordered  Monitor patient's weight and dietary intake as ordered or per policy  Utilize nutrition screening tool and intervene as necessary  Determine patient's food preferences and provide high-protein, high-caloric foods as appropriate       INTERVENTIONS:  - Monitor oral intake, urinary output, labs, and treatment plans  - Assess nutrition and hydration status and recommend course of action  - Evaluate amount of meals eaten  - Assist patient with eating if necessary   - Allow adequate time for meals  - Recommend/ encourage appropriate diets, oral nutritional supplements, and vitamin/mineral supplements  - Order, calculate, and assess calorie counts as needed  - Recommend, monitor, and adjust tube feedings and TPN/PPN based on assessed needs  - Assess need for intravenous fluids  - Provide specific nutrition/hydration education as appropriate  - Include patient/family/caregiver in decisions related to nutrition  Outcome: Progressing

## 2019-12-16 NOTE — ASSESSMENT & PLAN NOTE
Aortoiliac occlusive disease with rest pain    S/P Aorta Bifemoral bypass w/ left iliofemoral thrombectomy 12/9    Plan:  Regular diet  Continue aspirin, Plavix, Lipitor  Pain control  OOB/Amb  PT/OT  Case management- dispo planning for rehab placement

## 2019-12-17 ENCOUNTER — PATIENT OUTREACH (OUTPATIENT)
Dept: CASE MANAGEMENT | Facility: OTHER | Age: 79
End: 2019-12-17

## 2019-12-17 DIAGNOSIS — Z71.89 COMPLEX CARE COORDINATION: Primary | ICD-10-CM

## 2019-12-17 NOTE — UTILIZATION REVIEW
Notification of Discharge  This is a Notification of Discharge from our facility [address]  Please be advised that this patient has been discharge from our facility  Below you will find the admission and discharge date and time including the patients disposition  PRESENTATION DATE: 12/6/2019  4:13 PM  OBS ADMISSION DATE:   IP ADMISSION DATE: 12/6/19 1613   DISCHARGE DATE: 12/16/2019  2:00 PM  DISPOSITION: Non [facility] SN[facility]/TCU/SNU Non [facility] SNF/TCU/SNU   Admission Orders listed below:  Admission Orders (From admission, onward)     Ordered        12/06/19 1706  Inpatient Admission  Once                   Please contact the UR Department if additional information is required to close this patient's authorization/case  [address] Utilization Review Department  Main: [phone] x carefully listen to the prompts  All voicemails are confidential   [email or web address]  Send all requests for admission clinical reviews, approved or denied determinations and any other requests to dedicated fax number below belonging to the campus where the patient is receiving treatment   List of dedicated fax numbers:  [address] DENIALS (Administrative/Medical Necessity) [phone]   [address] [place] [address] (Maternity/NICU/Pediatrics) [phone]   Ricky Barnett [phone]   Harsh Dias [phone]   Queta Phelps [phone]   145 [facility] 1525 [facility] [phone]   Courtney Graves [phone]   2203 [facility] [address]  2401 [facility] 1000 [address] [facility] [phone]

## 2019-12-19 ENCOUNTER — TELEPHONE (OUTPATIENT)
Dept: FAMILY MEDICINE CLINIC | Facility: CLINIC | Age: 79
End: 2019-12-19

## 2019-12-19 NOTE — TELEPHONE ENCOUNTER
I am not sure I understand because she was not discharged from [facility] she was discharged from [facility] and that I have no idea what they might have instructed her from there she needs to call her vascular surgeon

## 2019-12-19 NOTE — TELEPHONE ENCOUNTER
S/w Patient she was D/c from [employer] [facility] on 12/16/19 and is now in [facility] in [facility] for rehab   Patient would like a call back as she was given no information upon discharge from the Hospital   Patient would like for you to call her back at the number listed below;    [phone] Cell  [phone] Michael Cain (daughter)

## 2019-12-26 ENCOUNTER — TELEPHONE (OUTPATIENT)
Dept: VASCULAR SURGERY | Facility: CLINIC | Age: 79
End: 2019-12-26

## 2019-12-26 NOTE — TELEPHONE ENCOUNTER
Patient is s/p aorta bifemoral bypass with L femoral thromboembolectomy on 12/9/19 with Dr Carl Sahni  Rehab physician from [facility] called to ask if they can remove patient's staples  She states patient's staples are overdue to be removed and patient is not scheduled to be seen by vascular until 1/3/20  Advised doctor vascular likes to remove their own staples, and offered to transfer to [facility] to get sooner appt    Doctor transferred to [facility]

## 2019-12-27 ENCOUNTER — OFFICE VISIT (OUTPATIENT)
Dept: VASCULAR SURGERY | Facility: CLINIC | Age: 79
End: 2019-12-27

## 2019-12-27 VITALS
HEIGHT: 64 IN | WEIGHT: 122 LBS | BODY MASS INDEX: 20.83 KG/M2 | DIASTOLIC BLOOD PRESSURE: 76 MMHG | TEMPERATURE: 96.7 F | HEART RATE: 72 BPM | SYSTOLIC BLOOD PRESSURE: 122 MMHG

## 2019-12-27 DIAGNOSIS — F17.200 NICOTINE DEPENDENCE, UNCOMPLICATED, UNSPECIFIED NICOTINE PRODUCT TYPE: ICD-10-CM

## 2019-12-27 DIAGNOSIS — M89.8X8 MASS OF SPINE: Chronic | ICD-10-CM

## 2019-12-27 DIAGNOSIS — N18.30 CKD (CHRONIC KIDNEY DISEASE) STAGE 3, GFR 30-59 ML/MIN (HCC): Chronic | ICD-10-CM

## 2019-12-27 DIAGNOSIS — I74.09 AORTOILIAC OCCLUSIVE DISEASE (HCC): Primary | Chronic | ICD-10-CM

## 2019-12-27 DIAGNOSIS — I70.211 ATHEROSCLEROSIS OF NATIVE ARTERY OF RIGHT LOWER EXTREMITY WITH INTERMITTENT CLAUDICATION (HCC): ICD-10-CM

## 2019-12-27 DIAGNOSIS — E78.5 DYSLIPIDEMIA: ICD-10-CM

## 2019-12-27 PROCEDURE — 99024 POSTOP FOLLOW-UP VISIT: CPT | Performed by: PHYSICIAN ASSISTANT

## 2019-12-27 NOTE — PATIENT INSTRUCTIONS
Aortoiliac occlusive disease  Status post aortobifemoral bypass with LEFT iliofemoral thromboembolectomy 12/09/2019  Peripheral arterial disease    POD #18  -Doing well; progressing in rehab  -no foot pain at rest  -Reports L foot numbness and decreased sensation  -walking improving with walker; no leg pain with mild activity  -heel wound     The abdomen was secured with staples  It is healing as expected and nearly entirely closed  We were able to remove about half of the staples in the abdomen  We placed Steri-Strips over the abdominal incision  Both groin sites are healing as expected  There are scabs over each incision  The incision is closed at the skin with surgical glue  Abdominal and flank bruits present  Right groin soft 2+ femoral pulses  Left groin with harder, palpable tissue under the incision which should soften up over time  Both feet are warm  Capillary refill at about 2 seconds  There appears to be dependent rubor  No palpable pulses in the feet  There are good signals bilaterally (DP -monophasic to biphasic and PT biphasic )    She has a large, approximately 4 cm x 4 cm blister to the medial left heel  It appears that it has recently broken  There is also callus on the bottom of the L heel     -Local wound care for heel - triple antibiotic or Betadine with covered bandage   -local wound care to bilateral groin sites with Betadine    -Continue with activity as tolerated in rehab with slow walking    -No heavy lifting   -Follow heart healthy diet, low in fat and cholesterol   -diarrhea/constipation   Continue with bowel regimen     -follow up in 1 to 2 weeks          Also, incidental finding of spinal mass  The following findings require follow up:  Radiographic finding              Finding: Possible spinal meningioma, epidural mass              Follow up required: MRI              Follow up should be done within [facility] Neuro Surgery 2-4 week(s)     Please notify the following clinician to assist with the follow up:              Dr Flower Prajapati

## 2019-12-27 NOTE — PROGRESS NOTES
Assessment/Plan: Aortoiliac occlusive disease (HCC)    Aortoiliac occlusive disease  Status post aortobifemoral bypass with LEFT iliofemoral thromboembolectomy 12/09/2019  Peripheral arterial disease    POD #18  -Doing well and progressing in rehab  -No further foot pain at rest since surgery  -Reports L foot numbness and L foot decreased sensation  -Walking improving; using walker; no leg pain with mild activity  -Heel wound; covering with guaze per patient   -Episodes of diarrhea/constipation  -Complains of staples in abdomen  -No problems with groin sites: no dressings or wound care    Exam:  The abdomen was secured with staples  It is healing as expected and nearly entirely closed  We were able to remove about half of the staples in the abdomen  We placed Steri-Strips over the abdominal incision  Both groin sites are healing as expected  There are scabs over each incision  The incision is closed at the skin with surgical glue  Abdominal and flank bruits present  Right groin soft 2+ femoral pulses  Left groin with harder, palpable tissue under the incision which should soften up over time  Both feet are warm  Capillary refill at about 2 seconds  There appears to be dependent rubor  No palpable pulses in the feet  There are good signals bilaterally (DP -monophasic to biphasic and PT biphasic )    She has a large, LEFT heel wound approximately 4 cm x 4 cm (? Pressure wound/blister)  It appears the blister has recently broken  There is also callus on the bottom of the L heel  Plan:  -We reviewed post-operative instructions; avoid heavy lifting and take it easy x 6 weeks   -Local wound care for heel - triple antibiotic or Betadine with covered bandage   -Local wound care to bilateral groin sites with Betadine    -Continue with activity as tolerated in rehab with slow walking    -Follow heart healthy diet, low in fat and cholesterol   -Diarrhea/constipation   Continue with bowel regimen     -Follow up in 1 to 2 weeks    Also, reminded patient of incidental finding of spinal mass for which she should call [facility], Dr Josemanuel Jones  Subjective:      Patient ID: Lev Monae is a 78 y o  female  RE:  Post-op Aorta Bifemoral bypass on 12/9/2019 by Dr Justyn Cardona  Patient reports numbness in the LEFT groin (new since surgery) and numbness in the LEFT foot which radiates up the LEFT leg which was ongoing prior to surgery  No leg pain, cramping, tingling, or swelling  No problems with the abdmoninal or groin sites - no redness or drainage  No fever or chills  Patient reports doing physical therapy daily  She is taking aspirin, Plavix and atorvastatin  HPI   Etta Wolff 78 y o  F Htn, HLD, asymptomatic NANCY, AIOD, longstanding peripheral arterial disease, ongoing tobacco who presents as post-operative evaluation after ABF bypass with LEFT iliofemoral thromboembolecomy  Ms Geoff Ashford was recently hospitalized for acute on chronic lower extremity ischemia  She underwent CTA which showed an interval occlusion of the abdominal aorta and LEFT PIA with chronic GIFTY occlusion  She underwent surgery for AIOD which showed Highly calcified infrarenal abdominal aorta with acute on chronic thrombus  The ABF bypass was performed using a bifurcated Dacron graft and LEFT iliofemoral thromboembolectomy was required to clean out thrombus  There was technically a good result  POD#18  Patient reports that she is in Rehab where she had a slow start but she is now doing better and starting to walk with the walker  She complains of LEFT foot and leg numbness but this has been present prior to surgery  Bilateral L> R foot pain is resolved  She has no further leg pain  She has no abdominal or groin pain  No problems with the groin incisions  She reports that there is no dressing to the surgical sites   She tells me that she developed a heel pressure wound which is being covered with guaze and she is now using heel protection boots  The following portions of the patient's history were reviewed and updated as appropriate: allergies, current medications, past family history, past medical history, past social history, past surgical history and problem list     No chest pain, SOB  No fevers or chills  Review of Systems   Constitutional: Negative  Negative for chills and fever  HENT: Negative  Eyes: Negative  Respiratory: Negative  Cardiovascular: Negative  Negative for leg swelling  Gastrointestinal: Negative  Endocrine: Negative  Genitourinary: Negative  Musculoskeletal: Negative  Skin: Negative  Allergic/Immunologic: Negative  Neurological: Positive for numbness  Hematological: Negative  Psychiatric/Behavioral: Negative  Objective:    /76 (BP Location: Right arm, Patient Position: Sitting, Cuff Size: Adult)   Pulse 72   Temp (!) 96 7 °F (35 9 °C) (Tympanic)   Ht 5' 4" (1 626 m)   Wt 55 3 kg (122 lb)   BMI 20 94 kg/m²      Physical Exam      AA+ O  Non-toxic  Elderly female  Uses walker  Decreased BS  Resp non-labored  RRR S1S2 2/6 ADDIE, 1+ LE edema  Abd soft  BS present  Mid-line staples in place without evidence of infection  + abdominal bruits        The abdomen was secured with staples  It is healing as expected and nearly entirely closed  We were able to remove about half of the staples in the abdomen  We placed Steri-Strips over the abdominal incision  Both groin sites are healing as expected  There are scabs over each incision  The incision is closed at the skin with surgical glue  Abdominal and flank bruits present  Right groin soft 2+ femoral pulses  Left groin with harder, palpable tissue under the incision which should soften up over time  Both feet are warm  Capillary refill at about 2 seconds  There appears to be dependent rubor  No palpable pulses in the feet    There are good signals bilaterally (DP -monophasic to biphasic and PT biphasic )    She has a large, approximately 4 cm x 4 cm blister to the medial left heel  It appears that it has recently broken  There is also callus on the bottom of the L heel  I have reviewed and made appropriate changes to the review of systems input by the medical assistant      Vitals:    12/27/19 1416   BP: 122/76   BP Location: Right arm   Patient Position: Sitting   Cuff Size: Adult   Pulse: 72   Temp: (!) 96 7 °F (35 9 °C)   TempSrc: Tympanic   Weight: 55 3 kg (122 lb)   Height: 5' 4" (1 626 m)       Patient Active Problem List   Diagnosis    Aortoiliac occlusive disease ([facility] [address] )    Carotid artery stenosis    Chronic Hypertension    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)    Hypothyroidism    Nicotine dependence    Subclavian artery stenosis, left (HCC)    Aortic stenosis    Atherosclerosis 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)    Benign essential HTN    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HCC)    Dyslipidemia    Onychomycosis    Paronychia of toenail    Pain in both feet    Viral URI with cough    Simple chronic bronchitis (HCC)    CKD (chronic kidney disease) stage 3, GFR 30-59 ml/min (HCC)    Cigarette smoker    Cardiac murmur    Urinary tract infection    Gram-negative bacteremia    Mass of spine    Chronic diastolic heart failure (HCC)    Acute blood loss anemia       Past Surgical History:   Procedure Laterality Date    BREAST SURGERY      bxs,benign    COLONOSCOPY      HYSTERECTOMY      INCISIONAL BREAST BIOPSY      x5, 1964, 1965, 1985 and [address] Bilateral 12/9/2019    Procedure: BYPASS AORTA BIFEMORAL WITH LEFT FEMORAL THROMBOEMBOLECTOMY;  Surgeon: Nate Russell MD;  Location: [facility];  Service: Vascular       Family History   Problem Relation Age of Onset    Hypertension Father     Coronary artery disease Family     Arthritis Family        Social History     Socioeconomic History    Marital status:      Spouse name: Not on file    Number of children: Not on file    Years of education: Not on file    Highest education level: Not on file   Occupational History    Not on file   Social Needs    Financial resource strain: Not on file    Food insecurity:     Worry: Not on file     Inability: Not on file    Transportation needs:     Medical: Not on file     Non-medical: Not on file   Tobacco Use    Smoking status: Former Smoker     Packs/day: 1 00     Last attempt to quit: 2019     Years since quittin 0    Smokeless tobacco: Never Used    Tobacco comment: last cigarette was 1 week ago   Substance and Sexual Activity    Alcohol use: Yes     Comment: wilfrid 2-3 every day for 50 years    Drug use: No    Sexual activity: Not on file   Lifestyle    Physical activity:     Days per week: Not on file     Minutes per session: Not on file    Stress: Not on file   Relationships    Social connections:     Talks on phone: Not on file     Gets together: Not on file     Attends Confucianism service: Not on file     Active member of club or organization: Not on file     Attends meetings of clubs or organizations: Not on file     Relationship status: Not on file    Intimate partner violence:     Fear of current or ex partner: Not on file     Emotionally abused: Not on file     Physically abused: Not on file     Forced sexual activity: Not on file   Other Topics Concern    Not on file   Social History Narrative    Rarely exercises    Sleeps 6-7 hours per day       Allergies   Allergen Reactions    Thiazide-Type Diuretics      Hyponatremia         Current Outpatient Medications:     acetaminophen (TYLENOL) 325 mg tablet, Take 2 tablets (650 mg total) by mouth every 6 (six) hours as needed for mild pain, headaches or fever, Disp: 30 tablet, Rfl: 0    albuterol (VENTOLIN HFA) 90 mcg/act inhaler, Inhale 2 puffs every 6 (six) hours as needed for wheezing, Disp: 18 g, Rfl: 2    amLODIPine (NORVASC) 5 mg tablet, Take 1 tablet (5 mg total) by mouth daily, Disp: 90 tablet, Rfl: 3    aspirin 81 MG tablet, Take 2 tablets by mouth daily, Disp: , Rfl:     atorvastatin (LIPITOR) 80 mg tablet, take 1 tablet by mouth once daily, Disp: 90 tablet, Rfl: 3    B Complex Vitamins (VITAMIN B-COMPLEX) TABS, Take by mouth, Disp: , Rfl:     carvedilol (COREG) 12 5 mg tablet, Take 1 tablet (12 5 mg total) by mouth 2 (two) times a day with meals, Disp: 60 tablet, Rfl: 5    clopidogrel (PLAVIX) 75 mg tablet, Take 1 tablet (75 mg total) by mouth daily, Disp: , Rfl: 0    docusate sodium (COLACE) 100 mg capsule, Take 1 capsule (100 mg total) by mouth 2 (two) times a day, Disp: 10 capsule, Rfl: 0    enalapril (VASOTEC) 20 mg tablet, Take 1 tablet (20 mg total) by mouth 2 (two) times a day, Disp: 180 tablet, Rfl: 3    fluticasone (FLONASE) 50 mcg/act nasal spray, 1 spray into each nostril daily, Disp: 16 g, Rfl: 3    gabapentin (NEURONTIN) 100 mg capsule, Take 1 capsule (100 mg total) by mouth 3 (three) times a day, Disp: 90 capsule, Rfl: 0    levothyroxine 25 mcg tablet, Take 1 tablet (25 mcg total) by mouth daily in the early morning, Disp: 90 tablet, Rfl: 3    magnesium oxide (MAG-OX) 400 mg, Take 1 tablet (400 mg total) by mouth daily, Disp: 30 tablet, Rfl: 5    Multiple Vitamins-Minerals (CENTRUM SILVER PO), Take by mouth, Disp: , Rfl:     nicotine (NICODERM CQ) 14 mg/24hr TD 24 hr patch, Place 1 patch on the skin every 24 hours, Disp: 28 patch, Rfl: 0    potassium chloride (MICRO-K) 8 mEq CR capsule, Take 8 mEq by mouth 2 (two) times a day, Disp: , Rfl:     spironolactone (ALDACTONE) 25 mg tablet, Take 1 tablet (25 mg total) by mouth daily, Disp: 30 tablet, Rfl: 5    torsemide (DEMADEX) 5 MG tablet, Take 1 tablet (5 mg total) by mouth daily, Disp: 30 tablet, Rfl: 5    Bioflavonoid Products (VITAMIN C PLUS PO), Take by mouth daily, Disp: , Rfl:     GARLIC PO, Take by mouth daily , Disp: , Rfl:     Multiple Minerals (CALCIUM-MAGNESIUM-ZINC) TABS, Take by mouth daily, Disp: , Rfl:

## 2019-12-30 NOTE — ASSESSMENT & PLAN NOTE
Aortoiliac occlusive disease  Status post aortobifemoral bypass with LEFT iliofemoral thromboembolectomy 12/09/2019  Peripheral arterial disease    POD #18  -Doing well and progressing in rehab  -No further foot pain at rest since surgery  -Reports L foot numbness and L foot decreased sensation  -Walking improving; using walker; no leg pain with mild activity  -Heel wound; covering with guaze per patient   -Episodes of diarrhea/constipation  -Complains of staples in abdomen  -No problems with groin sites: no dressings or wound care    Exam:  The abdomen was secured with staples  It is healing as expected and nearly entirely closed  We were able to remove about half of the staples in the abdomen  We placed Steri-Strips over the abdominal incision  Both groin sites are healing as expected  There are scabs over each incision  The incision is closed at the skin with surgical glue  Abdominal and flank bruits present  Right groin soft 2+ femoral pulses  Left groin with harder, palpable tissue under the incision which should soften up over time  Both feet are warm  Capillary refill at about 2 seconds  There appears to be dependent rubor  No palpable pulses in the feet  There are good signals bilaterally (DP -monophasic to biphasic and PT biphasic )    She has a large, LEFT heel wound approximately 4 cm x 4 cm (? Pressure wound/blister)  It appears the blister has recently broken  There is also callus on the bottom of the L heel  Plan:  -We reviewed post-operative instructions; avoid heavy lifting and take it easy x 6 weeks   -Local wound care for heel - triple antibiotic or Betadine with covered bandage   -Local wound care to bilateral groin sites with Betadine    -Continue with activity as tolerated in rehab with slow walking    -Follow heart healthy diet, low in fat and cholesterol   -Continue with aspirin, Plavix and atorvastatin   -Diarrhea/constipation   Continue with bowel regimen     -Follow up in 1 to 2 weeks    Also, reminded patient of incidental finding of spinal mass for which she should call [facility], Dr Freddie Hunt

## 2020-01-03 ENCOUNTER — HOSPITAL ENCOUNTER (OUTPATIENT)
Dept: RADIOLOGY | Facility: HOSPITAL | Age: 80
Discharge: HOME | End: 2020-01-03
Payer: COMMERCIAL

## 2020-01-03 ENCOUNTER — TRANSCRIBE ORDERS (OUTPATIENT)
Dept: ADMINISTRATIVE | Facility: HOSPITAL | Age: 80
End: 2020-01-03

## 2020-01-03 DIAGNOSIS — E78.5 DYSLIPIDEMIA: ICD-10-CM

## 2020-01-03 DIAGNOSIS — F17.200 NICOTINE DEPENDENCE, UNCOMPLICATED, UNSPECIFIED NICOTINE PRODUCT TYPE: ICD-10-CM

## 2020-01-03 DIAGNOSIS — N18.30 CKD (CHRONIC KIDNEY DISEASE) STAGE 3, GFR 30-59 ML/MIN (HCC): Chronic | ICD-10-CM

## 2020-01-03 DIAGNOSIS — I74.09 AORTOILIAC OCCLUSIVE DISEASE (HCC): Chronic | ICD-10-CM

## 2020-01-03 DIAGNOSIS — I70.211 ATHEROSCLEROSIS OF NATIVE ARTERY OF RIGHT LOWER EXTREMITY WITH INTERMITTENT CLAUDICATION (HCC): ICD-10-CM

## 2020-01-03 PROCEDURE — 93923 UPR/LXTR ART STDY 3+ LVLS: CPT

## 2020-01-04 PROCEDURE — 93923 UPR/LXTR ART STDY 3+ LVLS: CPT | Performed by: SURGERY

## 2020-01-10 ENCOUNTER — OFFICE VISIT (OUTPATIENT)
Dept: VASCULAR SURGERY | Facility: CLINIC | Age: 80
End: 2020-01-10

## 2020-01-10 VITALS
DIASTOLIC BLOOD PRESSURE: 70 MMHG | RESPIRATION RATE: 16 BRPM | TEMPERATURE: 98.1 F | HEART RATE: 86 BPM | SYSTOLIC BLOOD PRESSURE: 124 MMHG | BODY MASS INDEX: 20.14 KG/M2 | WEIGHT: 118 LBS | HEIGHT: 64 IN

## 2020-01-10 DIAGNOSIS — I74.09 AORTOILIAC OCCLUSIVE DISEASE (HCC): Primary | Chronic | ICD-10-CM

## 2020-01-10 DIAGNOSIS — I77.1 STENOSIS OF ILIAC ARTERY (HCC): ICD-10-CM

## 2020-01-10 DIAGNOSIS — I70.229 ATHEROSCLEROSIS OF ARTERY OF EXTREMITY WITH REST PAIN (HCC): Chronic | ICD-10-CM

## 2020-01-10 DIAGNOSIS — N18.30 CKD (CHRONIC KIDNEY DISEASE) STAGE 3, GFR 30-59 ML/MIN (HCC): Chronic | ICD-10-CM

## 2020-01-10 DIAGNOSIS — I10 ESSENTIAL HYPERTENSION: Chronic | ICD-10-CM

## 2020-01-10 PROCEDURE — 99024 POSTOP FOLLOW-UP VISIT: CPT | Performed by: PHYSICIAN ASSISTANT

## 2020-01-10 NOTE — LETTER
January 10, 2020     Constantin Dieudonne, [address]    Patient: Hood Buenrostro   YOB: 1940   Date of Visit: 1/10/2020     Dear Dr Magda Bonner      Thank you for referring Lamoille Bondanielle to me for evaluation  Below are the relevant portions of my assessment and plan of care  If you have questions, please do not hesitate to call me  I look forward to following Jossy Adame along with you  Sincerely,        Shanita Art PA-C        CC: No Recipients    Progress Notes: Aortoiliac occlusive disease ([address] )    POD # 30 aortobifemoral bypass with LEFT iliofemoral thromboembolectomy 12/09/2019 ([facility])  Aortoiliac occlusive disease  Peripheral arterial disease  LEFT heel wound - healing well    -Discharged from subacute rehab today  -Advancing activity; will get home physical therapy  -Leg numbness is resolved  -Walking improved; decreased foot pain; LEFT foot numbness and tingling to the ankle  -No abdominal pain; still has problems with constipation - taking stool softeners  78 y o  F Htn, HLD, asymptomatic NANCY, AIOD, longstanding peripheral arterial disease, ongoing tobacco who presents as post-operative evaluation after ABF bypass with LEFT iliofemoral thromboembolecomy       POD#30  She returns for a second post-op re-evaluation  She did well in subacute rehab  She is intermittently using a walking but no longer needs it all of the time  She is advanced and discharge from rehab today  She plans to continue exercises and physical therapy  She still complains of LEFT foot numbness at the ankle and the foot  The leg numbness resolved  Her LEFT ischemic heel has actually healed  She has no abdominal pain, but still complains of constipation which may have pre-dated the surgery  Exam:  The abdomen is healed and closed  We removed the remaining stables  The bilateral groin sites are healed, closed with scabs present   The feet are warm and well-perfused  The LEFT heel is almost entirely healed  BELEN 1/3/19:   R 0 99/149/98; triphasic at ankle  L 1 02/157/111; triphasic at ankle  Bilateral LE is patent     Plan:  Doing well and advancing as expected after aortobifem bypass  Her post-op ABIs are noral  We reviewed post-operative instructions with patient and family  She will have formal vascular studies in Mar '20 and follow up in the office, unless she would have problems and need to be seen sooner      -We reviewed post-operative instructions; avoid heavy lifting and take it easy x 6 weeks  -Betadine to the bilateral groin sites    -Continue with activity and physical therapy as tolerated  -Follow heart healthy diet, low in fat and cholesterol   -No smoking    -Follow up office visit in 2 months after duplex      Also, reminded patient of incidental finding of spinal mass for which she should call [facility], Dr Abel Stephens

## 2020-01-10 NOTE — ASSESSMENT & PLAN NOTE
POD # 30 aortobifemoral bypass with LEFT iliofemoral thromboembolectomy 12/09/2019 ([facility])  Aortoiliac occlusive disease  Peripheral arterial disease  LEFT heel wound - healing well    -Discharged from subacute rehab today  -Advancing activity; will get home physical therapy  -Leg numbness is resolved  -Walking improved; decreased foot pain; LEFT foot numbness and tingling to the ankle  -No abdominal pain; still has problems with constipation - taking stool softeners  78 y o  F Htn, HLD, asymptomatic NANCY, AIOD, longstanding peripheral arterial disease, ongoing tobacco who presents as post-operative evaluation after ABF bypass with LEFT iliofemoral thromboembolecomy       POD#30  She returns for a second post-op re-evaluation  She did well in subacute rehab  She is intermittently using a walking but no longer needs it all of the time  She is advanced and discharge from rehab today  She plans to continue exercises and physical therapy  She still complains of LEFT foot numbness at the ankle and the foot  The leg numbness resolved  Her LEFT ischemic heel has actually healed  She has no abdominal pain, but still complains of constipation which may have pre-dated the surgery  Exam:  The abdomen is healed and closed  We removed the remaining stables  The bilateral groin sites are healed, closed with scabs present  The feet are warm and well-perfused  The LEFT heel is almost entirely healed  BELEN 1/3/19:   R 0 99/149/98; triphasic at ankle  L 1 02/157/111; triphasic at ankle  Bilateral LE is patent     Plan:  Doing well and advancing as expected after aortobifem bypass  Her post-op ABIs are noral  We reviewed post-operative instructions with patient and family  She will have formal vascular studies in Mar '20 and follow up in the office, unless she would have problems and need to be seen sooner      -We reviewed post-operative instructions; avoid heavy lifting and take it easy x 6 weeks    -Betadine to the bilateral groin sites    -Continue with activity and physical therapy as tolerated  -Follow heart healthy diet, low in fat and cholesterol   -No smoking    -Follow up office visit in 2 months after duplex      Also, reminded patient of incidental finding of spinal mass for which she should call [facility], Dr Romain Hodgson

## 2020-01-10 NOTE — PROGRESS NOTES
Assessment/Plan: Aortoiliac occlusive disease ([address] )    POD # 30 aortobifemoral bypass with LEFT iliofemoral thromboembolectomy 12/09/2019 ([facility])  Aortoiliac occlusive disease  Peripheral arterial disease  LEFT heel wound - healing well    -Discharged from subacute rehab today  -Advancing activity; will get home physical therapy  -Leg numbness is resolved  -Walking improved; decreased foot pain; LEFT foot numbness and tingling to the ankle  -No abdominal pain; still has problems with constipation - taking stool softeners  78 y o  F Htn, HLD, asymptomatic NANCY, AIOD, longstanding peripheral arterial disease, ongoing tobacco who presents as post-operative evaluation after ABF bypass with LEFT iliofemoral thromboembolecomy       POD#30  She returns for a second post-op re-evaluation  She did well in subacute rehab  She is intermittently using a walking but no longer needs it all of the time  She is advanced and discharge from rehab today  She plans to continue exercises and physical therapy  She still complains of LEFT foot numbness at the ankle and the foot  The leg numbness resolved  Her LEFT ischemic heel has actually healed  She has no abdominal pain, but still complains of constipation which may have pre-dated the surgery  Exam:  The abdomen is healed and closed  We removed the remaining stables  The bilateral groin sites are healed, closed with scabs present  The feet are warm and well-perfused  The LEFT heel is almost entirely healed  BELEN 1/3/19:   R 0 99/149/98; triphasic at ankle  L 1 02/157/111; triphasic at ankle  Bilateral LE is patent     Plan:  Doing well and advancing as expected after aortobifem bypass  Her post-op ABIs are noral  We reviewed post-operative instructions with patient and family   She will have formal vascular studies in Mar '20 and follow up in the office, unless she would have problems and need to be seen sooner      -We reviewed post-operative instructions; avoid heavy lifting and take it easy x 6 weeks  -Betadine to the bilateral groin sites    -Continue with activity and physical therapy as tolerated  -Follow heart healthy diet, low in fat and cholesterol   -No smoking    -Follow up office visit in 2 months after duplex      Also, reminded patient of incidental finding of spinal mass for which she should call [facility], Dr Michel Acevedo  Subjective:      Patient ID: Lamonte Live is a 78 y o  female  POD # 30 aortobifemoral bypass with LEFT iliofemoral thromboembolectomy 12/09/2019  Pt still has some left foot pain and numbness since the surgery  Pt just got released from rehabilitation today  Pt was last seen post-op 12/27/19  She had BELEN 1/3/19  Pt is currently taking ASA, Plavix and Lipitor  HPI   Kalyani Ambika  P[place] 78 y o  F Htn, HLD, asymptomatic NANCY, AIOD, longstanding peripheral arterial disease, ongoing tobacco who presents as post-operative evaluation after ABF bypass with LEFT iliofemoral thromboembolecomy  Ms Festus Naranjo was recently hospitalized for acute on chronic lower extremity ischemia  She underwent CTA which showed an interval occlusion of the abdominal aorta and LEFT PIA with chronic GIFTY occlusion  She underwent surgery for AIOD which showed highly calcified infrarenal abdominal aorta with acute on chronic thrombus  The ABF bypass was performed using a bifurcated Dacron graft and LEFT iliofemoral thromboembolectomy was required to clean out thrombus  There was technically a good result  She was initially seen 12/27/19 for first post-op visit where about half of her abdominal staples were removed       POD#30  She returns for a second post-op re-evaluation  She did well in subacute rehab  She is intermittently using a walking but no longer needs it all of the time  She is advanced and discharge from rehab today  She plans to continue exercises and physical therapy   She still complains of LEFT foot numbness at the ankle and the foot  The leg numbness resolved  Her LEFT ischemic heel has actually healed  She has no abdominal pain, but still complains of constipation which may have pre-dated the surgery  The abdomen is healed and closed  We removed the remaining stables  The bilateral groin sites are healed, closed with scabs present  The feet are warm and well-perfused  The LEFT heel is almost entirely healed  We reviewed post-operative instructions with patient and family  She will have vascular studies in Mar '20 and follow up in the office, unless she would have problems and need to be seen sooner  The following portions of the patient's history were reviewed and updated as appropriate: allergies, current medications, past family history, past medical history, past social history, past surgical history and problem list     Review of Systems   Constitutional: Negative  HENT: Negative  Eyes: Negative  Respiratory: Negative  Cardiovascular: Negative  Gastrointestinal: Negative  Endocrine: Negative  Genitourinary: Negative  Musculoskeletal: Positive for gait problem  Left foot pain   Skin: Negative  Allergic/Immunologic: Negative  Neurological: Negative for dizziness, numbness and headaches  Psychiatric/Behavioral: Negative  Objective:      /70 (BP Location: Right arm, Patient Position: Sitting, Cuff Size: Adult)   Pulse 86   Temp 98 1 °F (36 7 °C) (Tympanic)   Resp 16   Ht 5' 4" (1 626 m)   Wt 53 5 kg (118 lb)   BMI 20 25 kg/m²             Abdominal incision closed  There is no redness or drainage  We were able to remove the remaining staples from her abdomen  Abdomen is soft  NT            LEFT heel is much improved  The foot is warm and well-perfused  There is decreased sensation  Motor intact  Doppler signals present  Good cap refill  Ambulating comfortably  Large LEFT heel blister/eschar is healing  Bilateral groin wounds - closed   There are still scabs present over the groin sites  Physical Exam   Constitutional: She is oriented to person, place, and time  She appears well-developed and well-nourished  She is cooperative  HENT:   Head: Normocephalic and atraumatic  Eyes: Pupils are equal, round, and reactive to light  EOM are normal    Neck: Trachea normal  Neck supple  No JVD present  Cardiovascular: Normal rate, regular rhythm, S1 normal and S2 normal  Exam reveals no gallop and no friction rub  Murmur (2/6 ADDIE) heard  Pulses:       Carotid pulses are 2+ on the right side, and 2+ on the left side  Radial pulses are 2+ on the right side, and 2+ on the left side  LEFT warm and well-perfused  Foot decreased sensation  Motor intact  Appearance of mild dependent rubor  DP/PT signals present bilaterally  Ambulating without pain  LEFT heel almost entirely healed from large eschar       Pulmonary/Chest: Effort normal  No accessory muscle usage  No respiratory distress  She has no wheezes  She has no rales  Decreased BS   Abdominal: Soft  Bowel sounds are normal  She exhibits no distension  There is no hepatosplenomegaly  There is no tenderness  Mid-line aortobifem abdominal incision  Musculoskeletal: Normal range of motion  She exhibits edema (mild pedal edema)  She exhibits no deformity  Neurological: She is alert and oriented to person, place, and time  Grossly normal    Skin: Skin is warm and dry  No lesion and no rash noted  No cyanosis  Nails show no clubbing  Psychiatric: She has a normal mood and affect  Nursing note and vitals reviewed  I have reviewed and made appropriate changes to the review of systems input by the medical assistant      Vitals:    01/10/20 1307   BP: 124/70   BP Location: Right arm   Patient Position: Sitting   Cuff Size: Adult   Pulse: 86   Resp: 16   Temp: 98 1 °F (36 7 °C)   TempSrc: Tympanic   Weight: 53 5 kg (118 lb)   Height: 5' 4" (1 626 m)       Patient Active Problem List   Diagnosis    [address] occlusive [facility] ([facility] )    Carotid artery stenosis    Chronic Hypertension    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)    Hypothyroidism    Nicotine dependence    Subclavian artery stenosis, left (HCC)    Aortic stenosis    Atherosclerosis 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)    Benign essential HTN    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HCC)    Dyslipidemia    Onychomycosis    Paronychia of toenail    Pain in both feet    Viral URI with cough    Simple chronic bronchitis (HCC)    CKD (chronic kidney disease) stage 3, GFR 30-59 ml/min (HCC)    Cigarette smoker    Cardiac murmur    Urinary tract infection    Gram-negative bacteremia    Mass of spine    Chronic diastolic heart failure (HCC)    Acute blood loss anemia       Past Surgical History:   Procedure Laterality Date    BREAST SURGERY      bxs,benign    COLONOSCOPY      HYSTERECTOMY      INCISIONAL BREAST BIOPSY      x5, 1964, 1965, 1985 and [address] Bilateral 12/9/2019    Procedure: BYPASS AORTA BIFEMORAL WITH LEFT FEMORAL THROMBOEMBOLECTOMY;  Surgeon: Rosalinda Prado MD;  [facility];  Service: Vascular       Family History   Problem Relation Age of Onset    Hypertension Father     Coronary artery disease Family     Arthritis Family        Social History     Socioeconomic History    Marital status:      Spouse name: Not on file    Number of children: Not on file    Years of education: Not on file    Highest education level: Not on file   Occupational History    Not on file   Social Needs    Financial resource strain: Not on file    Food insecurity:     Worry: Not on file     Inability: Not on file    Transportation needs:     Medical: Not on file     Non-medical: Not on file   Tobacco Use    Smoking status: Former Smoker     Packs/day: 1 00     Last attempt to quit: 12/6/2019 Years since quittin 0    Smokeless tobacco: Never Used    Tobacco comment: last cigarette was 1 week ago   Substance and Sexual Activity    Alcohol use: Yes     Comment: lizbethstella 2-3 every day for 50 years    Drug use: No    Sexual activity: Not on file   Lifestyle    Physical activity:     Days per week: Not on file     Minutes per session: Not on file    Stress: Not on file   Relationships    Social connections:     Talks on phone: Not on file     Gets together: Not on file     Attends Synagogue service: Not on file     Active member of club or organization: Not on file     Attends meetings of clubs or organizations: Not on file     Relationship status: Not on file    Intimate partner violence:     Fear of current or ex partner: Not on file     Emotionally abused: Not on file     Physically abused: Not on file     Forced sexual activity: Not on file   Other Topics Concern    Not on file   Social History Narrative    Rarely exercises    Sleeps 6-7 hours per day       Allergies   Allergen Reactions    Thiazide-Type Diuretics      Hyponatremia         Current Outpatient Medications:     acetaminophen (TYLENOL) 325 mg tablet, Take 2 tablets (650 mg total) by mouth every 6 (six) hours as needed for mild pain, headaches or fever, Disp: 30 tablet, Rfl: 0    albuterol (VENTOLIN HFA) 90 mcg/act inhaler, Inhale 2 puffs every 6 (six) hours as needed for wheezing, Disp: 18 g, Rfl: 2    amLODIPine (NORVASC) 5 mg tablet, Take 1 tablet (5 mg total) by mouth daily, Disp: 90 tablet, Rfl: 3    aspirin 81 MG tablet, Take 2 tablets by mouth daily, Disp: , Rfl:     atorvastatin (LIPITOR) 80 mg tablet, take 1 tablet by mouth once daily, Disp: 90 tablet, Rfl: 3    B Complex Vitamins (VITAMIN B-COMPLEX) TABS, Take by mouth, Disp: , Rfl:     Bioflavonoid Products (VITAMIN C PLUS PO), Take by mouth daily, Disp: , Rfl:     carvedilol (COREG) 12 5 mg tablet, Take 1 tablet (12 5 mg total) by mouth 2 (two) times a day with meals, Disp: 60 tablet, Rfl: 5    clopidogrel (PLAVIX) 75 mg tablet, Take 1 tablet (75 mg total) by mouth daily, Disp: , Rfl: 0    docusate sodium (COLACE) 100 mg capsule, Take 1 capsule (100 mg total) by mouth 2 (two) times a day, Disp: 10 capsule, Rfl: 0    enalapril (VASOTEC) 20 mg tablet, Take 1 tablet (20 mg total) by mouth 2 (two) times a day, Disp: 180 tablet, Rfl: 3    fluticasone (FLONASE) 50 mcg/act nasal spray, 1 spray into each nostril daily, Disp: 16 g, Rfl: 3    gabapentin (NEURONTIN) 100 mg capsule, Take 1 capsule (100 mg total) by mouth 3 (three) times a day, Disp: 90 capsule, Rfl: 0    GARLIC PO, Take by mouth daily , Disp: , Rfl:     levothyroxine 25 mcg tablet, Take 1 tablet (25 mcg total) by mouth daily in the early morning, Disp: 90 tablet, Rfl: 3    magnesium oxide (MAG-OX) 400 mg, Take 1 tablet (400 mg total) by mouth daily, Disp: 30 tablet, Rfl: 5    Multiple Minerals (CALCIUM-MAGNESIUM-ZINC) TABS, Take by mouth daily, Disp: , Rfl:     Multiple Vitamins-Minerals (CENTRUM SILVER PO), Take by mouth, Disp: , Rfl:     nicotine (NICODERM CQ) 14 mg/24hr TD 24 hr patch, Place 1 patch on the skin every 24 hours, Disp: 28 patch, Rfl: 0    potassium chloride (MICRO-K) 8 mEq CR capsule, Take 8 mEq by mouth 2 (two) times a day, Disp: , Rfl:     spironolactone (ALDACTONE) 25 mg tablet, Take 1 tablet (25 mg total) by mouth daily, Disp: 30 tablet, Rfl: 5    torsemide (DEMADEX) 5 MG tablet, Take 1 tablet (5 mg total) by mouth daily, Disp: 30 tablet, Rfl: 5

## 2020-01-10 NOTE — PATIENT INSTRUCTIONS
Aortoiliac occlusive disease  Status post aortobifemoral bypass with LEFT iliofemoral thromboembolectomy 12/09/2019  Peripheral arterial disease  LEFT heel wound - healing well      -Out of rehab today  -Advancing activity; will get home physical therapy  -leg numbness is resolving  -decreased foot pain; LEFT foot numbness and tingling to the ankle  -No abdominal pain; still has problems with constipation - taking stool softeners      Plan:  -We reviewed post-operative instructions; avoid heavy lifting and take it easy x 6 weeks   -Local wound care to bilateral groin sites with Betadine    -Continue with activity as tolerated with slow walking    -Follow heart healthy diet, low in fat and cholesterol   -Follow up in 2 months after duplex     Also, reminded patient of incidental finding of spinal mass for which she should call [facility], Dr Wendy Xiao

## 2020-01-13 ENCOUNTER — OFFICE VISIT (OUTPATIENT)
Dept: CARDIOLOGY CLINIC | Facility: CLINIC | Age: 80
End: 2020-01-13
Payer: COMMERCIAL

## 2020-01-13 VITALS
OXYGEN SATURATION: 99 % | WEIGHT: 118 LBS | BODY MASS INDEX: 20.14 KG/M2 | DIASTOLIC BLOOD PRESSURE: 60 MMHG | HEIGHT: 64 IN | SYSTOLIC BLOOD PRESSURE: 118 MMHG

## 2020-01-13 DIAGNOSIS — I11.0 HYPERTENSIVE HEART DISEASE WITH CHRONIC DIASTOLIC CONGESTIVE HEART FAILURE (HCC): Primary | ICD-10-CM

## 2020-01-13 DIAGNOSIS — I50.32 CHRONIC DIASTOLIC HEART FAILURE (HCC): ICD-10-CM

## 2020-01-13 DIAGNOSIS — I50.32 HYPERTENSIVE HEART DISEASE WITH CHRONIC DIASTOLIC CONGESTIVE HEART FAILURE (HCC): Primary | ICD-10-CM

## 2020-01-13 DIAGNOSIS — I10 ESSENTIAL HYPERTENSION: Chronic | ICD-10-CM

## 2020-01-13 PROCEDURE — 99214 OFFICE O/P EST MOD 30 MIN: CPT | Performed by: INTERNAL MEDICINE

## 2020-01-13 PROCEDURE — 93000 ELECTROCARDIOGRAM COMPLETE: CPT | Performed by: INTERNAL MEDICINE

## 2020-01-17 ENCOUNTER — TELEPHONE (OUTPATIENT)
Dept: NEUROSURGERY | Facility: CLINIC | Age: 80
End: 2020-01-17

## 2020-01-17 DIAGNOSIS — G95.89 LUMBAR EPIDURAL MASS (HCC): Primary | ICD-10-CM

## 2020-01-17 NOTE — TELEPHONE ENCOUNTER
Pt reports according to clinical summary from PCP office she is to f/u with MRI, and Neurosurgery  Hospital records indicate MRI ordered but when told transport was coming she refused to go at that time  Discussed with Emily Alvarez will order study and schedule f/u to review

## 2020-01-17 NOTE — PROGRESS NOTES
Multiple attempts made by this writer to reach SW Robert Hernández and Admissions Director Vinay Ramos) from [place] [address] to confirm discharge plans for patient  Never received communication back  Upon outreach today, was informed patient discharged from facility on 1/10/20  Outside of order window for lab work and f/u appts  Removed from SERJIO/CKD

## 2020-01-17 NOTE — TELEPHONE ENCOUNTER
Pt was notified of plan and provided with central schedule number for study  Instructed her to have labs done 3-4 days prior, and once MRI scheduled, to call back to schedule f/u with PA for review

## 2020-01-18 ENCOUNTER — TELEPHONE (OUTPATIENT)
Dept: OTHER | Facility: OTHER | Age: 80
End: 2020-01-18

## 2020-01-22 ENCOUNTER — TELEPHONE (OUTPATIENT)
Dept: FAMILY MEDICINE CLINIC | Facility: CLINIC | Age: 80
End: 2020-01-22

## 2020-01-22 ENCOUNTER — APPOINTMENT (OUTPATIENT)
Dept: LAB | Facility: HOSPITAL | Age: 80
End: 2020-01-22
Payer: COMMERCIAL

## 2020-01-22 DIAGNOSIS — G95.89 LUMBAR EPIDURAL MASS (HCC): ICD-10-CM

## 2020-01-22 LAB
BUN SERPL-MCNC: 33 MG/DL (ref 5–25)
CREAT SERPL-MCNC: 1.5 MG/DL (ref 0.6–1.3)
GFR SERPL CREATININE-BSD FRML MDRD: 33 ML/MIN/1.73SQ M

## 2020-01-22 PROCEDURE — 82565 ASSAY OF CREATININE: CPT

## 2020-01-22 PROCEDURE — 84520 ASSAY OF UREA NITROGEN: CPT

## 2020-01-22 PROCEDURE — 36415 COLL VENOUS BLD VENIPUNCTURE: CPT

## 2020-01-22 NOTE — TELEPHONE ENCOUNTER
flaco rivera    Asking if She has space 2   If the doctor is ok to do  Foam dressing and hydrogel    [phone]

## 2020-01-23 ENCOUNTER — TELEPHONE (OUTPATIENT)
Dept: FAMILY MEDICINE CLINIC | Facility: CLINIC | Age: 80
End: 2020-01-23

## 2020-01-23 NOTE — TELEPHONE ENCOUNTER
This needs to go to her vascular surgeon not me I dont even know what was done and have not seen her since the surgery   I left a message with above on voice mail

## 2020-02-05 DIAGNOSIS — I74.09 AORTOILIAC OCCLUSIVE DISEASE (HCC): Chronic | ICD-10-CM

## 2020-02-06 RX ORDER — CLOPIDOGREL BISULFATE 75 MG/1
TABLET ORAL
Qty: 30 TABLET | Refills: 0 | OUTPATIENT
Start: 2020-02-06

## 2020-02-07 ENCOUNTER — OFFICE VISIT (OUTPATIENT)
Dept: FAMILY MEDICINE CLINIC | Facility: CLINIC | Age: 80
End: 2020-02-07
Payer: COMMERCIAL

## 2020-02-07 VITALS
HEIGHT: 64 IN | RESPIRATION RATE: 16 BRPM | SYSTOLIC BLOOD PRESSURE: 132 MMHG | BODY MASS INDEX: 20.49 KG/M2 | WEIGHT: 120 LBS | HEART RATE: 110 BPM | DIASTOLIC BLOOD PRESSURE: 64 MMHG | OXYGEN SATURATION: 97 %

## 2020-02-07 DIAGNOSIS — I74.09 AORTOILIAC OCCLUSIVE DISEASE (HCC): Primary | ICD-10-CM

## 2020-02-07 DIAGNOSIS — I10 ESSENTIAL HYPERTENSION: ICD-10-CM

## 2020-02-07 DIAGNOSIS — G95.89 MASS OF SPINAL CORD (HCC): ICD-10-CM

## 2020-02-07 PROCEDURE — 3075F SYST BP GE 130 - 139MM HG: CPT | Performed by: FAMILY MEDICINE

## 2020-02-07 PROCEDURE — 1160F RVW MEDS BY RX/DR IN RCRD: CPT | Performed by: FAMILY MEDICINE

## 2020-02-07 PROCEDURE — 3078F DIAST BP <80 MM HG: CPT | Performed by: FAMILY MEDICINE

## 2020-02-07 PROCEDURE — 1111F DSCHRG MED/CURRENT MED MERGE: CPT | Performed by: FAMILY MEDICINE

## 2020-02-07 PROCEDURE — 1036F TOBACCO NON-USER: CPT | Performed by: FAMILY MEDICINE

## 2020-02-07 PROCEDURE — 99213 OFFICE O/P EST LOW 20 MIN: CPT | Performed by: FAMILY MEDICINE

## 2020-02-07 PROCEDURE — 4040F PNEUMOC VAC/ADMIN/RCVD: CPT | Performed by: FAMILY MEDICINE

## 2020-02-09 NOTE — PROGRESS NOTES
Assessment/Plan:    No problem-specific Assessment & Plan notes found for this encounter  Diagnoses and all orders for this visit:    Aortoiliac occlusive disease ([address] )    Essential hypertension    Mass of spinal cord (HCC)          Subjective:      Patient ID: Darío Bowen is a 78 y o  female  Terence Dominguez is here Terence Dominguez is here for follow-up of her bilateral for follow-up of her bilateral fem-pop bypass fem-pop bypass she is recovering very well she is recovering very she does still well have some tingling in her legs she does but the terrific still pain that she have some tingling in was having is go her legs but on and tingling is getting better the terrific pain that  Of she was note they having is gone and the tingling is getting did recommend her hospitalization better to see a neurosurgeon it was unclear  To me as of note they did recommend her hospitalization to why does I was having trouble finding to see a neurosurgeon it was unclear things in the notes but apparently when to me as to why they did his CT because I was having trouble runoff scan on finding her legs things in the notes but apparently when they found a mass that they stated they did his CT had remained unchanged runoff since October     I was unaware I was unaware of this as of this as the test the test was not ordered was not by me so ordered the result did not come directly by me so to me the result did not come but it was noted directly on October scan to me but it was noted she on in October scan was supposed to she was follow-up with the neurosurgeon supposed she was then retracted to follow was do that until she saw me up with the neurosurgeon she was told and she has yet that to make appointment on discharge but for the MRI she did not want to do that we until discussed that it does look she saw me and she has yet like a benign lesion per to make appointment for the MRI CT scan we radiologist that read discussed that it does look it but in order to full differentiate like a benign lesion per what it is she should CT scan have the MRI done radiologist that and make an appointment with read it but in order to fully differentiate what the neurosurgeon that they suggested it is she should have the MRI done and make an appointment with the neurosurgeon that they suggested    She and her daughter agree to she and her daughter agree to close that loop   Her blood pressure is very good her blood pressure is very good today in the past when today he tried to low it in the past when he tried to local it she was very she was very symptomatic and could not tolerate symptomatic and could not tolerate lowering even lowering even over over a couple of weeks time a couple of weeks time at time she does feel lightheaded at time she does feel it will drop into lightheaded the teens and she does not because it will feel drop into the teens and she does not comfortable at all with that so I advised her to feel comfortable at all with that so I advised her to half her spironolactone have her spironolactone to 12 5  These were not these were not medications that make out on she had on her her list but list but she these were ones that she was sent home she these from rehab on     The following portions of the patient's history were reviewed and updated as appropriate: current medications, past family history, past medical history, past social history, past surgical history and problem list     Review of Systems   Constitutional:        See see chief complaint chief complaint   HENT: Negative  Eyes: Negative  Respiratory: Negative  Cardiovascular: Negative  Objective:      /64   Pulse (!) 110   Resp 16   Ht 5' 4" (1 626 m)   Wt 54 4 kg (120 lb)   SpO2 97%   BMI 20 60 kg/m²          Physical Exam   Constitutional: She appears well-developed and well-nourished     She looks incredible for a woman for a woman of of her age Cardiovascular: Normal rate, regular rhythm, normal heart sounds and intact distal pulses  Exam reveals no gallop and no friction rub  No murmur heard  Pulmonary/Chest: Effort normal and breath sounds normal  No stridor  No respiratory distress  She has no wheezes  She has no rales  She exhibits no tenderness         Feet feet are warm and no longer mottled are warm and no longer mottled  Scar is healing well scar

## 2020-02-12 DIAGNOSIS — I74.09 AORTOILIAC OCCLUSIVE DISEASE (HCC): Chronic | ICD-10-CM

## 2020-02-12 RX ORDER — CLOPIDOGREL BISULFATE 75 MG/1
75 TABLET ORAL DAILY
Qty: 30 TABLET | Refills: 1 | Status: SHIPPED | OUTPATIENT
Start: 2020-02-12 | End: 2020-03-17

## 2020-02-12 NOTE — TELEPHONE ENCOUNTER
Pt requesting refill- she saw you on 1/10 but I don't see in your note how long she should be on the plavix  Please refill if she should continue

## 2020-02-14 DIAGNOSIS — I74.09 AORTOILIAC OCCLUSIVE DISEASE (HCC): Chronic | ICD-10-CM

## 2020-02-14 DIAGNOSIS — E78.2 MIXED HYPERLIPIDEMIA: ICD-10-CM

## 2020-02-18 DIAGNOSIS — I74.09 AORTOILIAC OCCLUSIVE DISEASE (HCC): Chronic | ICD-10-CM

## 2020-02-18 RX ORDER — GABAPENTIN 100 MG/1
100 CAPSULE ORAL 3 TIMES DAILY
Qty: 90 CAPSULE | Refills: 0
Start: 2020-02-18 | End: 2020-02-19 | Stop reason: SDUPTHER

## 2020-02-18 RX ORDER — ATORVASTATIN CALCIUM 80 MG/1
80 TABLET, FILM COATED ORAL DAILY
Qty: 90 TABLET | Refills: 3 | Status: SHIPPED | OUTPATIENT
Start: 2020-02-18 | End: 2021-02-19 | Stop reason: SDUPTHER

## 2020-02-18 RX ORDER — GABAPENTIN 100 MG/1
100 CAPSULE ORAL 3 TIMES DAILY
Qty: 90 CAPSULE | Refills: 0 | Status: CANCELLED
Start: 2020-02-18

## 2020-02-18 RX ORDER — GABAPENTIN 100 MG/1
100 CAPSULE ORAL 3 TIMES DAILY
Qty: 90 CAPSULE | Refills: 0
Start: 2020-02-18 | End: 2020-02-18 | Stop reason: SDUPTHER

## 2020-02-19 ENCOUNTER — TELEPHONE (OUTPATIENT)
Dept: CARDIOLOGY CLINIC | Facility: CLINIC | Age: 80
End: 2020-02-19

## 2020-02-19 DIAGNOSIS — I74.09 AORTOILIAC OCCLUSIVE DISEASE (HCC): Primary | ICD-10-CM

## 2020-02-19 DIAGNOSIS — I70.229 ATHEROSCLEROSIS OF ARTERY OF EXTREMITY WITH REST PAIN (HCC): Primary | ICD-10-CM

## 2020-02-19 DIAGNOSIS — I74.09 AORTOILIAC OCCLUSIVE DISEASE (HCC): Chronic | ICD-10-CM

## 2020-02-19 RX ORDER — GABAPENTIN 100 MG/1
100 CAPSULE ORAL 3 TIMES DAILY
Qty: 180 CAPSULE | Refills: 3 | Status: CANCELLED | OUTPATIENT
Start: 2020-02-19

## 2020-02-19 RX ORDER — GABAPENTIN 100 MG/1
100 CAPSULE ORAL 3 TIMES DAILY
Qty: 90 CAPSULE | Refills: 0
Start: 2020-02-19 | End: 2020-02-19 | Stop reason: SDUPTHER

## 2020-02-19 RX ORDER — GABAPENTIN 100 MG/1
100 CAPSULE ORAL 3 TIMES DAILY
Qty: 180 CAPSULE | Refills: 0 | Status: SHIPPED | OUTPATIENT
Start: 2020-02-19 | End: 2021-05-04

## 2020-02-19 RX ORDER — GABAPENTIN 100 MG/1
100 CAPSULE ORAL 3 TIMES DAILY
Qty: 90 CAPSULE | Refills: 0 | Status: SHIPPED | OUTPATIENT
Start: 2020-02-19 | End: 2020-03-20 | Stop reason: SDUPTHER

## 2020-02-19 NOTE — TELEPHONE ENCOUNTER
Is she still having pain and difficulty walking? When I saw her she seemed to be improving  Let me know before we fill the Rx     Thanks, -RD

## 2020-02-27 ENCOUNTER — HOSPITAL ENCOUNTER (OUTPATIENT)
Dept: RADIOLOGY | Facility: HOSPITAL | Age: 80
Discharge: HOME | End: 2020-02-27
Payer: COMMERCIAL

## 2020-02-27 DIAGNOSIS — G95.89 LUMBAR EPIDURAL MASS (HCC): ICD-10-CM

## 2020-02-27 PROCEDURE — A9585 GADOBUTROL INJECTION: HCPCS | Performed by: PHYSICIAN ASSISTANT

## 2020-02-27 PROCEDURE — 72158 MRI LUMBAR SPINE W/O & W/DYE: CPT

## 2020-02-27 RX ADMIN — GADOBUTROL 5 ML: 604.72 INJECTION INTRAVENOUS at 08:38

## 2020-03-03 DIAGNOSIS — N18.30 CKD (CHRONIC KIDNEY DISEASE) STAGE 3, GFR 30-59 ML/MIN (HCC): Chronic | ICD-10-CM

## 2020-03-03 DIAGNOSIS — R01.1 CARDIAC MURMUR: ICD-10-CM

## 2020-03-03 DIAGNOSIS — I12.9 HYPERTENSIVE CHRONIC KIDNEY DISEASE WITH STAGE 1 THROUGH STAGE 4 CHRONIC KIDNEY DISEASE, OR UNSPECIFIED CHRONIC KIDNEY DISEASE: ICD-10-CM

## 2020-03-03 DIAGNOSIS — E78.5 DYSLIPIDEMIA: ICD-10-CM

## 2020-03-05 ENCOUNTER — TELEPHONE (OUTPATIENT)
Dept: FAMILY MEDICINE CLINIC | Facility: CLINIC | Age: 80
End: 2020-03-05

## 2020-03-05 DIAGNOSIS — R01.1 CARDIAC MURMUR: ICD-10-CM

## 2020-03-05 DIAGNOSIS — E78.5 DYSLIPIDEMIA: ICD-10-CM

## 2020-03-05 DIAGNOSIS — N18.30 CKD (CHRONIC KIDNEY DISEASE) STAGE 3, GFR 30-59 ML/MIN (HCC): Chronic | ICD-10-CM

## 2020-03-05 DIAGNOSIS — I12.9 HYPERTENSIVE CHRONIC KIDNEY DISEASE WITH STAGE 1 THROUGH STAGE 4 CHRONIC KIDNEY DISEASE, OR UNSPECIFIED CHRONIC KIDNEY DISEASE: ICD-10-CM

## 2020-03-05 RX ORDER — TORSEMIDE 5 MG/1
5 TABLET ORAL DAILY
Qty: 30 TABLET | Refills: 5 | Status: SHIPPED | OUTPATIENT
Start: 2020-03-05 | End: 2020-11-07 | Stop reason: SDUPTHER

## 2020-03-05 RX ORDER — TORSEMIDE 5 MG/1
TABLET ORAL
Qty: 30 TABLET | Refills: 5 | Status: SHIPPED | OUTPATIENT
Start: 2020-03-05 | End: 2020-07-21

## 2020-03-16 ENCOUNTER — HOSPITAL ENCOUNTER (OUTPATIENT)
Dept: RADIOLOGY | Facility: HOSPITAL | Age: 80
Discharge: HOME | End: 2020-03-16
Payer: COMMERCIAL

## 2020-03-16 DIAGNOSIS — I74.09 AORTOILIAC OCCLUSIVE DISEASE (HCC): Chronic | ICD-10-CM

## 2020-03-16 DIAGNOSIS — I70.211 ATHEROSCLEROSIS OF NATIVE ARTERY OF RIGHT LOWER EXTREMITY WITH INTERMITTENT CLAUDICATION (HCC): ICD-10-CM

## 2020-03-16 DIAGNOSIS — E78.5 DYSLIPIDEMIA: ICD-10-CM

## 2020-03-16 DIAGNOSIS — N18.30 CKD (CHRONIC KIDNEY DISEASE) STAGE 3, GFR 30-59 ML/MIN (HCC): Chronic | ICD-10-CM

## 2020-03-16 DIAGNOSIS — F17.200 NICOTINE DEPENDENCE, UNCOMPLICATED, UNSPECIFIED NICOTINE PRODUCT TYPE: ICD-10-CM

## 2020-03-16 PROCEDURE — 93923 UPR/LXTR ART STDY 3+ LVLS: CPT

## 2020-03-16 PROCEDURE — 93922 UPR/L XTREMITY ART 2 LEVELS: CPT | Performed by: SURGERY

## 2020-03-16 PROCEDURE — 93925 LOWER EXTREMITY STUDY: CPT

## 2020-03-16 PROCEDURE — 93925 LOWER EXTREMITY STUDY: CPT | Performed by: SURGERY

## 2020-03-16 PROCEDURE — 93978 VASCULAR STUDY: CPT

## 2020-03-17 PROCEDURE — 93978 VASCULAR STUDY: CPT | Performed by: SURGERY

## 2020-03-17 RX ORDER — CLOPIDOGREL BISULFATE 75 MG/1
TABLET ORAL
Qty: 90 TABLET | Refills: 0 | Status: SHIPPED | OUTPATIENT
Start: 2020-03-17 | End: 2020-07-08

## 2020-03-18 NOTE — TELEPHONE ENCOUNTER
I renewed the Plavix  She will likely only need Plavix and statin  She was placed on asa, Plavix and statin after surgery  She is 3 months out now  She has an ov with me which is ok, but at 3 months perhaps she should have been arranged for follow up with [facility]  I can see her again and set her up in 3- 6 months with Dr Joanie Pabon, but she was requesting to see [facility]

## 2020-03-20 ENCOUNTER — TELEPHONE (OUTPATIENT)
Dept: FAMILY MEDICINE CLINIC | Facility: CLINIC | Age: 80
End: 2020-03-20

## 2020-03-20 ENCOUNTER — TELEMEDICINE (OUTPATIENT)
Dept: VASCULAR SURGERY | Facility: CLINIC | Age: 80
End: 2020-03-20
Payer: COMMERCIAL

## 2020-03-20 DIAGNOSIS — I65.21 STENOSIS OF RIGHT CAROTID ARTERY: Chronic | ICD-10-CM

## 2020-03-20 DIAGNOSIS — I74.09 AORTOILIAC OCCLUSIVE DISEASE (HCC): Primary | Chronic | ICD-10-CM

## 2020-03-20 DIAGNOSIS — I10 ESSENTIAL HYPERTENSION: Primary | ICD-10-CM

## 2020-03-20 PROCEDURE — 3075F SYST BP GE 130 - 139MM HG: CPT | Performed by: NURSE PRACTITIONER

## 2020-03-20 PROCEDURE — 1036F TOBACCO NON-USER: CPT | Performed by: NURSE PRACTITIONER

## 2020-03-20 PROCEDURE — 4040F PNEUMOC VAC/ADMIN/RCVD: CPT | Performed by: NURSE PRACTITIONER

## 2020-03-20 PROCEDURE — G2012 BRIEF CHECK IN BY MD/QHP: HCPCS | Performed by: NURSE PRACTITIONER

## 2020-03-20 PROCEDURE — 3078F DIAST BP <80 MM HG: CPT | Performed by: NURSE PRACTITIONER

## 2020-03-20 RX ORDER — GABAPENTIN 100 MG/1
100 CAPSULE ORAL 3 TIMES DAILY
Qty: 90 CAPSULE | Refills: 0 | Status: SHIPPED | OUTPATIENT
Start: 2020-03-20 | End: 2020-07-21

## 2020-03-20 RX ORDER — SPIRONOLACTONE 25 MG/1
TABLET ORAL
Qty: 60 TABLET | Refills: 0 | Status: SHIPPED | OUTPATIENT
Start: 2020-03-20 | End: 2020-05-26 | Stop reason: SDUPTHER

## 2020-03-20 NOTE — TELEPHONE ENCOUNTER
Dr Radha Cowart    Per patiente   spironaloctone aldactone 25 mg tablet   [facility]    The pill is  25mg and pt need to take half but is to hard to cut, could you please prescribe small amount or maybe the pharmacy can cut it  If you requested      Pharmacy still have 2 more refills    [phone]

## 2020-03-20 NOTE — PROGRESS NOTES
Virtual Regular Visit    Reason for visit is To review aortoiliac and lower extremity arterial duplex 3/16/20    Encounter provider Elton Boyd, [address]  [place] [address] [place] [id number]      Recent Visits  No visits were found meeting these conditions  Showing recent visits within past 7 days and meeting all other requirements     Future Appointments  No visits were found meeting these conditions  Showing future appointments within next 150 days and meeting all other requirements        After connecting through [employer], the patient was identified by name and date of birth  Violeta Calvillo was informed that this is a telemedicine visit and that the visit is being conducted through telephone which may not be secure and therefore, might not be HIPAA-compliant  My office door was closed  No one else was in the room  She acknowledged consent and understanding of privacy and security of the video platform  The patient has agreed to participate and understands they can discontinue the visit at any time  Subjective  Violeta Calvillo is a 78 y o  female  With hypertension, hyperlipidemia, coronary artery disease,   Chronic kidney disease, Carotid stenosis,aortoiliac and peripheral arterial occlusive disease who is status post aortobifem bypass and left iliofemoral thromboembolectomy December 2019 secondary to ischemic rest pain by Nanci Hsu and Alonso Mendoza  She had postoperative  Arterial duplex 3/16/20 and requested call for will to review  Aortoiliac duplex showed a widely patent aortobifem bypass and lower extremity arterial duplex showed BELEN on the right 1 02/180/123 and on the left BELEN is 0 96/178/110     patient denies any claudication symptoms  She is ambulating without a walker  She is able to get around without any major limitation in her daily activities  She is maintained on a for neuropathy the pain of the left foot    She requests a refill for gabapentin which was given  Unfortunately she does continue to smoke half a pack or less of cigarettes per day  She has contemplating cessation though not ready to completely quit at this time  Encouraged complete cessation  She also has right carotid stenosis  Last carotid duplex 8/ 2019 which showed right ICA 50-69% stenosis    This will be  followed by duplex      Past Medical History:   Diagnosis Date    Arthritis     Benign essential hypertension     CAD (coronary artery disease)     Disease of thyroid gland     Dizziness     Edema of leg     Hyperlipidemia     Hypertension     Other disorder of circulatory system (CODE)        Past Surgical History:   Procedure Laterality Date    BREAST SURGERY      bxs,benign    COLONOSCOPY      HYSTERECTOMY      INCISIONAL BREAST BIOPSY      x5, 1964, 1965, 1985 and [address] Bilateral 12/9/2019    Procedure: BYPASS AORTA BIFEMORAL WITH LEFT FEMORAL THROMBOEMBOLECTOMY;  Surgeon: Coco Lowe MD;  [facility]: [facility];  Service: Vascular       Current Outpatient Medications   Medication Sig Dispense Refill    acetaminophen (TYLENOL) 325 mg tablet Take 2 tablets (650 mg total) by mouth every 6 (six) hours as needed for mild pain, headaches or fever 30 tablet 0    albuterol (VENTOLIN HFA) 90 mcg/act inhaler Inhale 2 puffs every 6 (six) hours as needed for wheezing 18 g 2    aspirin 81 MG tablet Take 2 tablets by mouth daily      atorvastatin (LIPITOR) 80 mg tablet Take 1 tablet (80 mg total) by mouth daily 90 tablet 3    B Complex Vitamins (VITAMIN B-COMPLEX) TABS Take by mouth      Bioflavonoid Products (VITAMIN C PLUS PO) Take by mouth daily      carvedilol (COREG) 12 5 mg tablet Take 1 tablet (12 5 mg total) by mouth 2 (two) times a day with meals 60 tablet 5    clopidogrel (PLAVIX) 75 mg tablet take 1 tablet by mouth once daily 90 tablet 0    docusate sodium (COLACE) 100 mg capsule Take 1 capsule (100 mg total) by mouth 2 (two) times a day 10 capsule 0    enalapril (VASOTEC) 20 mg tablet Take 1 tablet (20 mg total) by mouth 2 (two) times a day 180 tablet 3    fluticasone (FLONASE) 50 mcg/act nasal spray 1 spray into each nostril daily 16 g 3    gabapentin (NEURONTIN) 100 mg capsule Take 1 capsule (100 mg total) by mouth 3 (three) times a day 180 capsule 0    gabapentin (NEURONTIN) 100 mg capsule Take 1 capsule (100 mg total) by mouth 3 (three) times a day 90 capsule 0    GARLIC PO Take by mouth daily       levothyroxine 25 mcg tablet Take 1 tablet (25 mcg total) by mouth daily in the early morning 90 tablet 3    magnesium oxide (MAG-OX) 400 mg Take 1 tablet (400 mg total) by mouth daily 30 tablet 5    Multiple Minerals (CALCIUM-MAGNESIUM-ZINC) TABS Take by mouth daily      Multiple Vitamins-Minerals (CENTRUM SILVER PO) Take by mouth      nicotine (NICODERM CQ) 14 mg/24hr TD 24 hr patch Place 1 patch on the skin every 24 hours (Patient not taking: Reported on 1/13/2020) 28 patch 0    potassium chloride (MICRO-K) 8 mEq CR capsule Take 8 mEq by mouth 2 (two) times a day      spironolactone (ALDACTONE) 25 mg tablet Take 1 tablet (25 mg total) by mouth daily (Patient not taking: Reported on 1/13/2020) 30 tablet 5    torsemide (DEMADEX) 5 MG tablet Take 1 tablet (5 mg total) by mouth daily 30 tablet 5    torsemide (DEMADEX) 5 MG tablet take 1 tablet by mouth every morning 30 tablet 5     No current facility-administered medications for this visit  Allergies   Allergen Reactions    Thiazide-Type Diuretics      Hyponatremia           Physical Exam:    Vitals: There were no vitals taken for this visit  , There is no height or weight on file to calculate BMI ,   Wt Readings from Last 3 Encounters:   02/07/20 54 4 kg (120 lb)   01/13/20 53 5 kg (118 lb)   01/10/20 53 5 kg (118 lb)       No exam was done as this was a telemedicine (telephone) visit       Labs & Results:   creatinine 1 5/GFR 30 January 2020    Imaging review: aortoiliac and lower extremity arterial duplex reviewed as noted above  Carotid duplex  08/2019 reviewed      Assessment/Plan:    Patient Active Problem List    Diagnosis Date Noted    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 ) 01/13/2020    Acute blood loss anemia 12/11/2019    Mass of spine 12/06/2019    Chronic diastolic heart failure ([facility] ) 12/06/2019    Gram-negative bacteremia 11/25/2019    Urinary tract infection 11/24/2019    Cardiac murmur 09/11/2019    CKD (chronic kidney disease) stage 3, GFR 30-59 ml/min ([facility]) 08/21/2019    Cigarette smoker 08/21/2019    Simple chronic bronchitis ([facility]) 02/11/2019    Viral URI with cough 10/04/2018    Onychomycosis 07/12/2018    Paronychia of toenail 07/12/2018    Pain in both feet 07/12/2018    Dyslipidemia 05/18/2018    Subclavian artery stenosis, left ([facility] ) 04/24/2018    Hypothyroidism 08/31/2017    Nicotine dependence 08/31/2017    Aortoiliac occlusive disease (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[facility]) 08/04/2015    Aortic stenosis 06/22/2015    Benign essential HTN 09/02/2014        Assessment/Plan    1  Aortoiliac in full arterial occlusive he is   Status post aortobifem bypass   No limiting claudication symptoms  Encouraged frequent ambulation  Continue Plavix and atorvastatin   Smoking cessation   Repeat aortoiliac duplex in 6 months   Follow-up with Dr Ignacio Mina per patient request after next arterial studies  2   Right carotid stenosis  Asymptomatic from a carotid standpoint   will follow by duplex  In 6 months   Continue medical management with antiplatelet and statin therapies  Smoking cessation  3   Peripheral neuropathy   Controlled with gabapentin 100 mg t i d  Rx refilled  4  Nicotine dependency  Smoking half a pack of cigarettes or less  Counseled on complete cessation    Thank you for the opportunity to participate in the care of this patient  I spent 20 minutes with the patient during this visit      [id number]  -- Rx

## 2020-03-23 ENCOUNTER — TELEMEDICINE (OUTPATIENT)
Dept: NEUROSURGERY | Facility: CLINIC | Age: 80
End: 2020-03-23
Payer: COMMERCIAL

## 2020-03-23 DIAGNOSIS — M51.26 LUMBAR DISC HERNIATION: Primary | ICD-10-CM

## 2020-03-23 PROCEDURE — G2012 BRIEF CHECK IN BY MD/QHP: HCPCS | Performed by: PHYSICIAN ASSISTANT

## 2020-03-23 NOTE — PROGRESS NOTES
Virtual Brief Visit    Reason for visit is to go over MRI of lumbar spine that was ordered for Left lower extremity numbness and paresthesia      Encounter provider Naina Cuenca PA-C    Provider located at [address] [place] [address] [address] [place] [id number]      Recent Visits  No visits were found meeting these conditions  Showing recent visits within past 7 days and meeting all other requirements     Future Appointments  No visits were found meeting these conditions  Showing future appointments within next 150 days and meeting all other requirements        Patient agrees to participate in a virtual check in via telephone or video visit instead of presenting to the office to address urgent/immediate medical needs  Patient is aware this is a billable service  After connecting through [employer], the patient was identified by name and date of birth  Brittny Webster was informed that this was a telemedicine visit and that the visit is being conducted through telephone which may not be secure and therefore might not be HIPAA-compliant  My office door was closed  No one else was in the room  She acknowledged consent and understanding of privacy and security of the virtual check-in visit  I informed the patient that I have reviewed her record in Epic and presented the opportunity for her to ask any questions regarding the visit today  The patient initiated communication and agreed to participate  Subjective  Brittny Webster is a 78 y o  female pleasant woman with history of mild lower back pain and left leg weakness, paresthesia and numbness  Patient has history of peripheral arterial disease, s/p Aortofemoral bypass and Left iliofemoral thromboemboliectomy on December 29/2019  Patient reports remarkable improvement with her left leg pain and paresthesia  Her left ankle wound healing better after the procedure  She is able to walk with a walker  Her pain improved  She reports occasional pins and needles in the left foot and around the wound area  Denies any bowel/bladder dysfunction, except constipation  Denies saddle anesthesia  She has an episode of fall after she slipped on a wooden floor  Patient denies history of fever, chills, rigors, cough or chest pain  Patient is aware of the risks of smoking but she continued to smoke half pack per day  Patient currently taking gabapentin, acetaminophen, and doing physical therapy        Past Medical History:   Diagnosis Date    Arthritis     Benign essential hypertension     CAD (coronary artery disease)     Disease of thyroid gland     Dizziness     Edema of leg     Hyperlipidemia     Hypertension     Other disorder of circulatory system (CODE)        Past Surgical History:   Procedure Laterality Date    BREAST SURGERY      bxs,benign    COLONOSCOPY      HYSTERECTOMY      INCISIONAL BREAST BIOPSY      x5, 1964, 1965, 1985 and [address] Bilateral 12/9/2019    Procedure: BYPASS AORTA BIFEMORAL WITH LEFT FEMORAL THROMBOEMBOLECTOMY;  Surgeon: Rebeka Gilmore MD;  Location[facility];  Service: Vascular       Current Outpatient Medications   Medication Sig Dispense Refill    acetaminophen (TYLENOL) 325 mg tablet Take 2 tablets (650 mg total) by mouth every 6 (six) hours as needed for mild pain, headaches or fever 30 tablet 0    albuterol (VENTOLIN HFA) 90 mcg/act inhaler Inhale 2 puffs every 6 (six) hours as needed for wheezing 18 g 2    aspirin 81 MG tablet Take 2 tablets by mouth daily      atorvastatin (LIPITOR) 80 mg tablet Take 1 tablet (80 mg total) by mouth daily 90 tablet 3    B Complex Vitamins (VITAMIN B-COMPLEX) TABS Take by mouth      Bioflavonoid Products (VITAMIN C PLUS PO) Take by mouth daily      carvedilol (COREG) 12 5 mg tablet Take 1 tablet (12 5 mg total) by mouth 2 (two) times a day with meals 60 tablet 5    clopidogrel (PLAVIX) 75 mg tablet take 1 tablet by mouth once daily 90 tablet 0    enalapril (VASOTEC) 20 mg tablet Take 1 tablet (20 mg total) by mouth 2 (two) times a day 180 tablet 3    fluticasone (FLONASE) 50 mcg/act nasal spray 1 spray into each nostril daily 16 g 3    gabapentin (NEURONTIN) 100 mg capsule Take 1 capsule (100 mg total) by mouth 3 (three) times a day 180 capsule 0    gabapentin (NEURONTIN) 100 mg capsule Take 1 capsule (100 mg total) by mouth 3 (three) times a day 90 capsule 0    GARLIC PO Take by mouth daily       levothyroxine 25 mcg tablet Take 1 tablet (25 mcg total) by mouth daily in the early morning 90 tablet 3    magnesium oxide (MAG-OX) 400 mg Take 1 tablet (400 mg total) by mouth daily 30 tablet 5    Multiple Minerals (CALCIUM-MAGNESIUM-ZINC) TABS Take by mouth daily      potassium chloride (MICRO-K) 8 mEq CR capsule Take 8 mEq by mouth 2 (two) times a day      spironolactone (ALDACTONE) 25 mg tablet 1/2 tab daily 60 tablet 0    torsemide (DEMADEX) 5 MG tablet Take 1 tablet (5 mg total) by mouth daily 30 tablet 5    docusate sodium (COLACE) 100 mg capsule Take 1 capsule (100 mg total) by mouth 2 (two) times a day (Patient not taking: Reported on 3/23/2020) 10 capsule 0    Multiple Vitamins-Minerals (CENTRUM SILVER PO) Take by mouth      nicotine (NICODERM CQ) 14 mg/24hr TD 24 hr patch Place 1 patch on the skin every 24 hours (Patient not taking: Reported on 1/13/2020) 28 patch 0    torsemide (DEMADEX) 5 MG tablet take 1 tablet by mouth every morning (Patient not taking: Reported on 3/23/2020) 30 tablet 5     No current facility-administered medications for this visit           Allergies   Allergen Reactions    Thiazide-Type Diuretics      Hyponatremia       Assessment    Deena telephone assessment is Multilevel degenerative disease and moderate to large disc herniation at L1-2 and moderate central canal and bilateral foraminal stenosis at L4-5 with mild bilateral foraminal stenosis at L5-S1    Patient is doing walking exercise  Advised to continue walking, but caution on lifting heavy objects more than 5-10lbs, bending or twisting  Advised to continue physical therapy  Take your gabapentin and Tylenol  Also discussed with the patient the risk of smoking  Advised her to discuss with her PCP for nicotine replacement options or Chantix  Patient is aware of her smoking she tried Chantix before but had side effect of insomnia and she quitted  Discussed with the patient, history, management plan and prognosis  No neurosurgical procedure is indicated at this time, patient can follow up on p r n  Basis  Advised her to call office if her radiculopathy symptoms get worse or if there is question or concern  Patient's question and concerns were answered  Patient expressed her understandings and agreed with the plan  Disposition:    1  Follow-up on p r n  Basis  2  Call office if radiculopathy symptoms get worse  3  Fall precaution  4  Avoid lifting heavy objects, twisting or excessive bending    I spent 20 minutes with the patient during this virtual check-in visit

## 2020-04-03 ENCOUNTER — TELEPHONE (OUTPATIENT)
Dept: VASCULAR SURGERY | Facility: CLINIC | Age: 80
End: 2020-04-03

## 2020-04-06 ENCOUNTER — TELEMEDICINE (OUTPATIENT)
Dept: VASCULAR SURGERY | Facility: CLINIC | Age: 80
End: 2020-04-06
Payer: COMMERCIAL

## 2020-04-06 DIAGNOSIS — I65.21 STENOSIS OF RIGHT CAROTID ARTERY: Chronic | ICD-10-CM

## 2020-04-06 DIAGNOSIS — I70.213 ATHEROSCLEROSIS OF NATIVE ARTERY OF BOTH LOWER EXTREMITIES WITH INTERMITTENT CLAUDICATION (HCC): ICD-10-CM

## 2020-04-06 DIAGNOSIS — I74.09 AORTOILIAC OCCLUSIVE DISEASE (HCC): Primary | Chronic | ICD-10-CM

## 2020-04-06 DIAGNOSIS — R60.0 LOWER EXTREMITY EDEMA: ICD-10-CM

## 2020-04-06 DIAGNOSIS — G62.9 PERIPHERAL POLYNEUROPATHY: ICD-10-CM

## 2020-04-06 DIAGNOSIS — N18.30 CKD (CHRONIC KIDNEY DISEASE) STAGE 3, GFR 30-59 ML/MIN (HCC): Chronic | ICD-10-CM

## 2020-04-06 DIAGNOSIS — F17.210 CIGARETTE SMOKER: Chronic | ICD-10-CM

## 2020-04-06 PROCEDURE — G2012 BRIEF CHECK IN BY MD/QHP: HCPCS | Performed by: PHYSICIAN ASSISTANT

## 2020-04-06 RX ORDER — LANOLIN ALCOHOL/MO/W.PET/CERES
400 CREAM (GRAM) TOPICAL DAILY
COMMUNITY
Start: 2020-03-09 | End: 2020-12-11 | Stop reason: SDUPTHER

## 2020-04-09 PROBLEM — G62.9 PERIPHERAL NEUROPATHY: Status: ACTIVE | Noted: 2020-04-09

## 2020-04-16 ENCOUNTER — TELEMEDICINE (OUTPATIENT)
Dept: CARDIOLOGY CLINIC | Facility: CLINIC | Age: 80
End: 2020-04-16
Payer: COMMERCIAL

## 2020-04-16 VITALS — SYSTOLIC BLOOD PRESSURE: 158 MMHG | HEART RATE: 88 BPM | DIASTOLIC BLOOD PRESSURE: 81 MMHG

## 2020-04-16 DIAGNOSIS — I10 BENIGN ESSENTIAL HTN: ICD-10-CM

## 2020-04-16 DIAGNOSIS — I50.32 HYPERTENSIVE HEART DISEASE WITH CHRONIC DIASTOLIC CONGESTIVE HEART FAILURE (HCC): ICD-10-CM

## 2020-04-16 DIAGNOSIS — I35.0 NONRHEUMATIC AORTIC VALVE STENOSIS: Primary | ICD-10-CM

## 2020-04-16 DIAGNOSIS — I11.0 HYPERTENSIVE HEART DISEASE WITH CHRONIC DIASTOLIC CONGESTIVE HEART FAILURE (HCC): ICD-10-CM

## 2020-04-16 DIAGNOSIS — I50.32 CHRONIC DIASTOLIC HEART FAILURE (HCC): ICD-10-CM

## 2020-04-16 PROCEDURE — G2012 BRIEF CHECK IN BY MD/QHP: HCPCS | Performed by: INTERNAL MEDICINE

## 2020-05-26 DIAGNOSIS — N18.30 CKD (CHRONIC KIDNEY DISEASE) STAGE 3, GFR 30-59 ML/MIN (HCC): Chronic | ICD-10-CM

## 2020-05-26 DIAGNOSIS — E78.5 DYSLIPIDEMIA: ICD-10-CM

## 2020-05-26 DIAGNOSIS — I10 ESSENTIAL HYPERTENSION: ICD-10-CM

## 2020-05-26 DIAGNOSIS — I12.9 HYPERTENSIVE CHRONIC KIDNEY DISEASE WITH STAGE 1 THROUGH STAGE 4 CHRONIC KIDNEY DISEASE, OR UNSPECIFIED CHRONIC KIDNEY DISEASE: ICD-10-CM

## 2020-05-26 DIAGNOSIS — R01.1 CARDIAC MURMUR: ICD-10-CM

## 2020-05-26 RX ORDER — SPIRONOLACTONE 25 MG/1
TABLET ORAL
Qty: 60 TABLET | Refills: 0 | Status: SHIPPED | OUTPATIENT
Start: 2020-05-26 | End: 2020-07-23 | Stop reason: SDUPTHER

## 2020-05-29 ENCOUNTER — APPOINTMENT (OUTPATIENT)
Dept: LAB | Facility: CLINIC | Age: 80
End: 2020-05-29
Payer: COMMERCIAL

## 2020-05-29 LAB
ALBUMIN SERPL BCP-MCNC: 4 G/DL (ref 3.5–5)
ALP SERPL-CCNC: 80 U/L (ref 46–116)
ALT SERPL W P-5'-P-CCNC: 27 U/L (ref 12–78)
ANION GAP SERPL CALCULATED.3IONS-SCNC: 7 MMOL/L (ref 4–13)
AST SERPL W P-5'-P-CCNC: 28 U/L (ref 5–45)
BILIRUB SERPL-MCNC: 0.61 MG/DL (ref 0.2–1)
BUN SERPL-MCNC: 42 MG/DL (ref 5–25)
CALCIUM SERPL-MCNC: 9.9 MG/DL (ref 8.3–10.1)
CHLORIDE SERPL-SCNC: 105 MMOL/L (ref 100–108)
CHOLEST SERPL-MCNC: 135 MG/DL (ref 50–200)
CO2 SERPL-SCNC: 24 MMOL/L (ref 21–32)
CREAT SERPL-MCNC: 1.21 MG/DL (ref 0.6–1.3)
GFR SERPL CREATININE-BSD FRML MDRD: 43 ML/MIN/1.73SQ M
GLUCOSE P FAST SERPL-MCNC: 107 MG/DL (ref 65–99)
HDLC SERPL-MCNC: 80 MG/DL
LDLC SERPL CALC-MCNC: 44 MG/DL (ref 0–100)
MAGNESIUM SERPL-MCNC: 1.7 MG/DL (ref 1.6–2.6)
NONHDLC SERPL-MCNC: 55 MG/DL
POTASSIUM SERPL-SCNC: 4.6 MMOL/L (ref 3.5–5.3)
PROT SERPL-MCNC: 8.1 G/DL (ref 6.4–8.2)
SODIUM SERPL-SCNC: 136 MMOL/L (ref 136–145)
TRIGL SERPL-MCNC: 54 MG/DL

## 2020-05-29 PROCEDURE — 83735 ASSAY OF MAGNESIUM: CPT | Performed by: INTERNAL MEDICINE

## 2020-05-29 PROCEDURE — 80053 COMPREHEN METABOLIC PANEL: CPT | Performed by: INTERNAL MEDICINE

## 2020-05-29 PROCEDURE — 80061 LIPID PANEL: CPT | Performed by: INTERNAL MEDICINE

## 2020-05-29 PROCEDURE — 36415 COLL VENOUS BLD VENIPUNCTURE: CPT | Performed by: INTERNAL MEDICINE

## 2020-06-02 ENCOUNTER — TELEPHONE (OUTPATIENT)
Dept: CARDIOLOGY CLINIC | Facility: CLINIC | Age: 80
End: 2020-06-02

## 2020-07-07 DIAGNOSIS — I74.09 AORTOILIAC OCCLUSIVE DISEASE (HCC): Chronic | ICD-10-CM

## 2020-07-08 DIAGNOSIS — I74.09 AORTOILIAC OCCLUSIVE DISEASE (HCC): Chronic | ICD-10-CM

## 2020-07-08 RX ORDER — CLOPIDOGREL BISULFATE 75 MG/1
TABLET ORAL
Qty: 90 TABLET | Refills: 3 | Status: SHIPPED | OUTPATIENT
Start: 2020-07-08 | End: 2021-07-22 | Stop reason: SDUPTHER

## 2020-07-10 RX ORDER — CLOPIDOGREL BISULFATE 75 MG/1
75 TABLET ORAL DAILY
Qty: 90 TABLET | Refills: 3 | Status: SHIPPED | OUTPATIENT
Start: 2020-07-10 | End: 2020-07-21

## 2020-07-14 DIAGNOSIS — I15.0 RENOVASCULAR HYPERTENSION: ICD-10-CM

## 2020-07-16 RX ORDER — CARVEDILOL 12.5 MG/1
TABLET ORAL
Qty: 60 TABLET | Refills: 5 | Status: SHIPPED | OUTPATIENT
Start: 2020-07-16 | End: 2020-07-21

## 2020-07-21 ENCOUNTER — OFFICE VISIT (OUTPATIENT)
Dept: CARDIOLOGY CLINIC | Facility: CLINIC | Age: 80
End: 2020-07-21
Payer: COMMERCIAL

## 2020-07-21 VITALS
SYSTOLIC BLOOD PRESSURE: 178 MMHG | BODY MASS INDEX: 20.83 KG/M2 | TEMPERATURE: 97.8 F | DIASTOLIC BLOOD PRESSURE: 90 MMHG | HEART RATE: 77 BPM | WEIGHT: 122 LBS | OXYGEN SATURATION: 99 % | HEIGHT: 64 IN

## 2020-07-21 DIAGNOSIS — I10 BENIGN ESSENTIAL HTN: ICD-10-CM

## 2020-07-21 DIAGNOSIS — I70.229 ATHEROSCLEROSIS OF ARTERY OF EXTREMITY WITH REST PAIN (HCC): Chronic | ICD-10-CM

## 2020-07-21 DIAGNOSIS — I35.0 NONRHEUMATIC AORTIC VALVE STENOSIS: ICD-10-CM

## 2020-07-21 DIAGNOSIS — I15.0 RENOVASCULAR HYPERTENSION: ICD-10-CM

## 2020-07-21 DIAGNOSIS — I65.21 STENOSIS OF RIGHT CAROTID ARTERY: Chronic | ICD-10-CM

## 2020-07-21 DIAGNOSIS — I74.09 AORTOILIAC OCCLUSIVE DISEASE (HCC): Chronic | ICD-10-CM

## 2020-07-21 DIAGNOSIS — I10 ESSENTIAL HYPERTENSION: Primary | Chronic | ICD-10-CM

## 2020-07-21 DIAGNOSIS — Z72.0 TOBACCO ABUSE: ICD-10-CM

## 2020-07-21 DIAGNOSIS — R42 DIZZINESS: ICD-10-CM

## 2020-07-21 PROCEDURE — 1036F TOBACCO NON-USER: CPT | Performed by: INTERNAL MEDICINE

## 2020-07-21 PROCEDURE — 1160F RVW MEDS BY RX/DR IN RCRD: CPT | Performed by: INTERNAL MEDICINE

## 2020-07-21 PROCEDURE — 3077F SYST BP >= 140 MM HG: CPT | Performed by: INTERNAL MEDICINE

## 2020-07-21 PROCEDURE — 93000 ELECTROCARDIOGRAM COMPLETE: CPT | Performed by: INTERNAL MEDICINE

## 2020-07-21 PROCEDURE — 3008F BODY MASS INDEX DOCD: CPT | Performed by: INTERNAL MEDICINE

## 2020-07-21 PROCEDURE — 99407 BEHAV CHNG SMOKING > 10 MIN: CPT | Performed by: INTERNAL MEDICINE

## 2020-07-21 PROCEDURE — 99214 OFFICE O/P EST MOD 30 MIN: CPT | Performed by: INTERNAL MEDICINE

## 2020-07-21 PROCEDURE — 4040F PNEUMOC VAC/ADMIN/RCVD: CPT | Performed by: INTERNAL MEDICINE

## 2020-07-21 PROCEDURE — 3080F DIAST BP >= 90 MM HG: CPT | Performed by: INTERNAL MEDICINE

## 2020-07-21 RX ORDER — CARVEDILOL 12.5 MG/1
12.5 TABLET ORAL 2 TIMES DAILY
Qty: 60 TABLET | Refills: 5
Start: 2020-07-21 | End: 2021-01-13

## 2020-07-21 RX ORDER — NICOTINE 21 MG/24HR
1 PATCH, TRANSDERMAL 24 HOURS TRANSDERMAL EVERY 24 HOURS
Qty: 14 PATCH | Refills: 0 | Status: SHIPPED | OUTPATIENT
Start: 2020-07-21 | End: 2021-10-05 | Stop reason: ALTCHOICE

## 2020-07-21 RX ORDER — ENALAPRIL MALEATE 20 MG/1
20 TABLET ORAL 2 TIMES DAILY
Qty: 180 TABLET | Refills: 3
Start: 2020-07-21 | End: 2020-11-07 | Stop reason: SDUPTHER

## 2020-07-21 NOTE — PROGRESS NOTES
Cardiology   Oswald Contreras DO, Mckay Palumbo MD, Olimpia Huber MD, Myra Dillard MD, [facility] - [place]  -------------------------------------------------------------------  [facility] [place] [facility]  [address], [address] [facility], [address] [place] [address], [address]  [phone]    Cardiology Follow Up  Carl Almendarezs  1940  [id number]          Assessment/Plan:    1  Essential hypertension    2  Stenosis of right carotid artery    3  Dizziness    4  Nonrheumatic aortic valve stenosis    5  Benign essential HTN    6  Renovascular hypertension    7  Aortoiliac occlusive disease ([facility] [address] )    8  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 ([facility] [address] )    9  Tobacco abuse      - Reviewed patient's current medication regimen with her in detail  May discontinue torsemide and monitor daily weights  If weight increases by more than 4 pounds in 3 days, she should resume taking torsemide 5 milligrams daily  - blood pressure is elevated today but is usually quite well controlled  Recommend monitoring home blood pressure including keeping a log in a m  Prior to medications and at dinner time  She should bring log with her and will be scheduled for a nurse visit in 2 weeks to reassess blood pressure  Medication regimen may be adjusted at that time depending on readings  - discussed smoking cessation with her in detail  She is not very interested in stopping at this time but knows the importance of smoking cessation  She is willing to try nicotine patches when she is motivated to quit  Nicotine patches prescribed starting at 21 milligrams for 7 days followed by 14 milligrams for 7 days and then 7 milligrams for 1 month  - continue aspirin and Plavix  - continue enalapril and carvedilol    Her last ejection fraction was normal     I have spent 20 minutes with Patient and family today in which greater than 50% of this time was spent in counseling/coordination of care regarding Prognosis, Intructions for management, Patient and family education, Importance of tx compliance, Risk factor reductions and Impressions  Smoking cessation was discussed for 15 minutes  Interval History:     Anum Singh is [de-identified] y o  female here for followup of PVD  Since her last visit, she has been feeling well   she denies any palpitations, chest pain, shortness of breath, LE edema, orthopnea or PND  She denies any claudication of her lower extremities  Diet is overall unchanged  There has not been a significant change in weight  BP is high today but is usually well controlled  The patient has a history of hypertension and peripheral vascular disease  She had prior aortobifemoral bypass in 2019  Blood pressure is usually well controlled on current medication regimen  May 29th blood work showed a creatinine was normal   LDL 44 and HDL of 80  She continues to smoke 1 pack per day          Past Medical History:   Diagnosis Date    Arthritis     Benign essential hypertension     CAD (coronary artery disease)     Disease of thyroid gland     Dizziness     Edema of leg     Hyperlipidemia     Hypertension     Other disorder of circulatory system (CODE)      Social History     Socioeconomic History    Marital status:      Spouse name: Not on file    Number of children: Not on file    Years of education: Not on file    Highest education level: Not on file   Occupational History    Not on file   Social Needs    Financial resource strain: Not on file    Food insecurity:     Worry: Not on file     Inability: Not on file    Transportation needs:     Medical: Not on file     Non-medical: Not on file   Tobacco Use    Smoking status: Former Smoker     Packs/day: 0 50     Types: Cigarettes     Last attempt to quit: 2019     Years since quittin 6    Smokeless tobacco: Never Used   Substance and Sexual Activity    Alcohol use: Yes     Comment: wilfrid 2-3 every day for 50 years    Drug use: No    Sexual activity: Not on file   Lifestyle    Physical activity:     Days per week: Not on file     Minutes per session: Not on file    Stress: Not on file   Relationships    Social connections:     Talks on phone: Not on file     Gets together: Not on file     Attends Sabianism service: Not on file     Active member of club or organization: Not on file     Attends meetings of clubs or organizations: Not on file     Relationship status: Not on file    Intimate partner violence:     Fear of current or ex partner: Not on file     Emotionally abused: Not on file     Physically abused: Not on file     Forced sexual activity: Not on file   Other Topics Concern    Not on file   Social History Narrative    Rarely exercises    Sleeps 6-7 hours per day      Family History   Problem Relation Age of Onset    Hypertension Father     Coronary artery disease Family     Arthritis Family      Past Surgical History:   Procedure Laterality Date    BREAST SURGERY      bxs,benign    COLONOSCOPY      HYSTERECTOMY      INCISIONAL BREAST BIOPSY      x5, 1964, 1965, 1985 and 40 Ysabel Daniels Bilateral 12/9/2019    Procedure: BYPASS AORTA BIFEMORAL WITH LEFT FEMORAL THROMBOEMBOLECTOMY;  Surgeon: Tushar Jeewll MD;  Location: [facility];  Service: Vascular       Current Outpatient Medications:     acetaminophen (TYLENOL) 325 mg tablet, Take 2 tablets (650 mg total) by mouth every 6 (six) hours as needed for mild pain, headaches or fever, Disp: 30 tablet, Rfl: 0    aspirin 81 MG tablet, Take 2 tablets by mouth daily, Disp: , Rfl:     atorvastatin (LIPITOR) 80 mg tablet, Take 1 tablet (80 mg total) by mouth daily, Disp: 90 tablet, Rfl: 3    B Complex Vitamins (VITAMIN B-COMPLEX) TABS, Take by mouth, Disp: , Rfl:     Bioflavonoid Products (VITAMIN C PLUS PO), Take by mouth daily, Disp: , Rfl:     clopidogrel (PLAVIX) 75 mg tablet, take 1 tablet by mouth once daily, Disp: 90 tablet, Rfl: 3    fluticasone (FLONASE) 50 mcg/act nasal spray, 1 spray into each nostril daily, Disp: 16 g, Rfl: 3    gabapentin (NEURONTIN) 100 mg capsule, Take 1 capsule (100 mg total) by mouth 3 (three) times a day (Patient taking differently: Take 100 mg by mouth daily at bedtime ), Disp: 180 capsule, Rfl: 0    GARLIC PO, Take by mouth daily , Disp: , Rfl:     levothyroxine 25 mcg tablet, Take 1 tablet (25 mcg total) by mouth daily in the early morning, Disp: 90 tablet, Rfl: 3    magnesium oxide (MAG-OX) 400 mg, Take 1 tablet (400 mg total) by mouth daily, Disp: 30 tablet, Rfl: 5    Multiple Vitamins-Minerals (CENTRUM SILVER PO), Take by mouth, Disp: , Rfl:     POTASSIUM CHLORIDE ER PO, Take 99 mEq by mouth daily , Disp: , Rfl:     spironolactone (ALDACTONE) 25 mg tablet, 1/2 tab daily (Patient taking differently: 25 mg ), Disp: 60 tablet, Rfl: 0    torsemide (DEMADEX) 5 MG tablet, Take 1 tablet (5 mg total) by mouth daily, Disp: 30 tablet, Rfl: 5    albuterol (VENTOLIN HFA) 90 mcg/act inhaler, Inhale 2 puffs every 6 (six) hours as needed for wheezing (Patient not taking: Reported on 4/16/2020), Disp: 18 g, Rfl: 2    carvedilol (COREG) 12 5 mg tablet, take 1 tablet by mouth twice a day (Patient not taking: Reported on 7/21/2020), Disp: 60 tablet, Rfl: 5    clopidogrel (PLAVIX) 75 mg tablet, Take 1 tablet (75 mg total) by mouth daily (Patient not taking: Reported on 7/21/2020), Disp: 90 tablet, Rfl: 3    docusate sodium (COLACE) 100 mg capsule, Take 1 capsule (100 mg total) by mouth 2 (two) times a day (Patient not taking: Reported on 7/21/2020), Disp: 10 capsule, Rfl: 0    enalapril (VASOTEC) 20 mg tablet, Take 1 tablet (20 mg total) by mouth 2 (two) times a day (Patient not taking: Reported on 7/21/2020), Disp: 180 tablet, Rfl: 3    gabapentin (NEURONTIN) 100 mg capsule, Take 1 capsule (100 mg total) by mouth 3 (three) times a day (Patient not taking: Reported on 4/16/2020), Disp: 90 capsule, Rfl: 0    magnesium Oxide (MAG-OX) 400 mg TABS, Take 400 mg by mouth daily, Disp: , Rfl:     nicotine (NICODERM CQ) 14 mg/24hr TD 24 hr patch, Place 1 patch on the skin every 24 hours (Patient not taking: Reported on 1/13/2020), Disp: 28 patch, Rfl: 0    torsemide (DEMADEX) 5 MG tablet, take 1 tablet by mouth every morning (Patient not taking: Reported on 3/23/2020), Disp: 30 tablet, Rfl: 5        Review of Systems:  Review of Systems   Constitutional: Negative for chills, fatigue and fever  HENT: Negative for congestion, nosebleeds and postnasal drip  Respiratory: Negative for cough, chest tightness and shortness of breath  Cardiovascular: Negative for chest pain, palpitations and leg swelling  Gastrointestinal: Negative for abdominal distention, abdominal pain, diarrhea, nausea and vomiting  Endocrine: Negative for polydipsia, polyphagia and polyuria  Musculoskeletal: Positive for arthralgias and myalgias  Negative for gait problem  Skin: Negative for color change, pallor and rash  Allergic/Immunologic: Negative for environmental allergies, food allergies and immunocompromised state  Neurological: Negative for dizziness, seizures, syncope and light-headedness  Hematological: Negative for adenopathy  Does not bruise/bleed easily  Psychiatric/Behavioral: Negative for dysphoric mood  The patient is not nervous/anxious  Physical Exam:  Vitals:  Vitals:    07/21/20 1055   BP: (!) 178/90   BP Location: Right arm   Patient Position: Sitting   Cuff Size: Standard   Pulse: 77   Temp: 97 8 °F (36 6 °C)   TempSrc: Temporal   SpO2: 99%   Weight: 55 3 kg (122 lb)   Height: 5' 4" (1 626 m)     Physical Exam   Constitutional: She appears healthy  No distress  Eyes: Pupils are equal, round, and reactive to light  Conjunctivae are normal    Neck: Normal range of motion  Neck supple  No JVD present     Cardiovascular: Normal rate, regular rhythm and normal heart sounds  Exam reveals no gallop and no friction rub  No murmur heard  Pulmonary/Chest: Effort normal and breath sounds normal  She has no wheezes  She has no rales  Abdominal: Soft  She exhibits no distension  There is no tenderness  Musculoskeletal: She exhibits no edema  Neurological: She is alert and oriented to person, place, and time  Skin: Skin is warm and dry  Cardiographics:  EKG: Personally reviewed NSR with PACs  Last known EF: 60%    This note was completed in part utilizing [employer] Flu[employer] Direct Software  Grammatical errors, random word insertions, spelling mistakes, and incomplete sentences can be an occasional consequence of this system secondary to software limitations, ambient noise, and hardware issues  If you have any questions or concerns about the content, text, or information contained within the body of this dictation, please contact the provider for clarification

## 2020-07-23 DIAGNOSIS — I10 ESSENTIAL HYPERTENSION: ICD-10-CM

## 2020-07-23 DIAGNOSIS — J41.0 SIMPLE CHRONIC BRONCHITIS (HCC): ICD-10-CM

## 2020-07-23 RX ORDER — ALBUTEROL SULFATE 90 UG/1
2 AEROSOL, METERED RESPIRATORY (INHALATION) EVERY 6 HOURS PRN
Qty: 18 G | Refills: 2 | Status: SHIPPED | OUTPATIENT
Start: 2020-07-23 | End: 2021-10-20 | Stop reason: HOSPADM

## 2020-07-23 RX ORDER — SPIRONOLACTONE 25 MG/1
25 TABLET ORAL DAILY
Qty: 30 TABLET | Refills: 5 | Status: SHIPPED | OUTPATIENT
Start: 2020-07-23 | End: 2021-02-16 | Stop reason: SDUPTHER

## 2020-07-30 ENCOUNTER — TELEPHONE (OUTPATIENT)
Dept: CARDIOLOGY CLINIC | Facility: CLINIC | Age: 80
End: 2020-07-30

## 2020-07-30 NOTE — TELEPHONE ENCOUNTER
BP Readings Arm Cuff  107/47 Wt 121 lbs  Lightheaded/ Lt leg swollen  + diarrhea;thinks it was something she ate

## 2020-08-03 ENCOUNTER — TELEPHONE (OUTPATIENT)
Dept: CARDIOLOGY CLINIC | Facility: CLINIC | Age: 80
End: 2020-08-03

## 2020-08-10 ENCOUNTER — TELEPHONE (OUTPATIENT)
Dept: CARDIOLOGY CLINIC | Facility: CLINIC | Age: 80
End: 2020-08-10

## 2020-08-10 NOTE — TELEPHONE ENCOUNTER
Patient made aware of Torsemide instructions  Pt made aware the Robitussin DM may raise her blood pressure;she will exchange it for just Robitussin

## 2020-08-10 NOTE — TELEPHONE ENCOUNTER
Keep holding torsemide - only take it if weight goes to 125 pounds  Robitussin is fine but the DM part is tricky - it may make her BP go up

## 2020-09-08 ENCOUNTER — OFFICE VISIT (OUTPATIENT)
Dept: FAMILY MEDICINE CLINIC | Facility: CLINIC | Age: 80
End: 2020-09-08
Payer: COMMERCIAL

## 2020-09-08 VITALS
WEIGHT: 126 LBS | HEART RATE: 90 BPM | DIASTOLIC BLOOD PRESSURE: 94 MMHG | BODY MASS INDEX: 22.32 KG/M2 | TEMPERATURE: 96.4 F | SYSTOLIC BLOOD PRESSURE: 158 MMHG | RESPIRATION RATE: 14 BRPM | HEIGHT: 63 IN

## 2020-09-08 DIAGNOSIS — Z23 NEED FOR PNEUMOCOCCAL VACCINATION: ICD-10-CM

## 2020-09-08 DIAGNOSIS — E03.9 HYPOTHYROIDISM, UNSPECIFIED TYPE: ICD-10-CM

## 2020-09-08 DIAGNOSIS — Z78.0 POSTMENOPAUSAL: ICD-10-CM

## 2020-09-08 DIAGNOSIS — Z00.00 MEDICARE ANNUAL WELLNESS VISIT, INITIAL: Primary | ICD-10-CM

## 2020-09-08 PROCEDURE — G0438 PPPS, INITIAL VISIT: HCPCS | Performed by: FAMILY MEDICINE

## 2020-09-08 PROCEDURE — G0009 ADMIN PNEUMOCOCCAL VACCINE: HCPCS | Performed by: FAMILY MEDICINE

## 2020-09-08 PROCEDURE — 90732 PPSV23 VACC 2 YRS+ SUBQ/IM: CPT | Performed by: FAMILY MEDICINE

## 2020-09-08 RX ORDER — MELATONIN
5000 DAILY
COMMUNITY

## 2020-09-08 NOTE — PATIENT INSTRUCTIONS
Medicare Preventive Visit Patient Instructions  Thank you for completing your Welcome to Medicare Visit or Medicare Annual Wellness Visit today  Your next wellness visit will be due in one year (9/8/2021)  The screening/preventive services that you may require over the next 5-10 years are detailed below  Some tests may not apply to you based off risk factors and/or age  Screening tests ordered at today's visit but not completed yet may show as past due  Also, please note that scanned in results may not display below  Preventive Screenings:  Service Recommendations Previous Testing/Comments   Colorectal Cancer Screening  * Colonoscopy    * Fecal Occult Blood Test (FOBT)/Fecal Immunochemical Test (FIT)  * Fecal DNA/Cologuard Test  * Flexible Sigmoidoscopy Age: 54-65 years old   Colonoscopy: every 10 years (may be performed more frequently if at higher risk)  OR  FOBT/FIT: every 1 year  OR  Cologuard: every 3 years  OR  Sigmoidoscopy: every 5 years  Screening may be recommended earlier than age 48 if at higher risk for colorectal cancer  Also, an individualized decision between you and your healthcare provider will decide whether screening between the ages of 74-80 would be appropriate  Colonoscopy: Not on file  FOBT/FIT: Not on file  Cologuard: Not on file  Sigmoidoscopy: Not on file         Breast Cancer Screening Age: 36 years old  Frequency: every 1-2 years  Not required if history of left and right mastectomy Mammogram: Not on file       Cervical Cancer Screening Between the ages of 21-29, pap smear recommended once every 3 years  Between the ages of 33-67, can perform pap smear with HPV co-testing every 5 years     Recommendations may differ for women with a history of total hysterectomy, cervical cancer, or abnormal pap smears in past  Pap Smear: Not on file    Screening Not Indicated   Hepatitis C Screening Once for adults born between [facility]  More frequently in patients at high risk for Hepatitis C Hep C Antibody: Not on file       Diabetes Screening 1-2 times per year if you're at risk for diabetes or have pre-diabetes Fasting glucose: 107 mg/dL   A1C: No results in last 5 years    Screening Current   Cholesterol Screening Once every 5 years if you don't have a lipid disorder  May order more often based on risk factors  Lipid panel: 05/29/2020    Screening Not Indicated  History Lipid Disorder     Other Preventive Screenings Covered by Medicare:  1  Abdominal Aortic Aneurysm (AAA) Screening: covered once if your at risk  You're considered to be at risk if you have a family history of AAA  2  Lung Cancer Screening: covers low dose CT scan once per year if you meet all of the following conditions: (1) Age 50-69; (2) No signs or symptoms of lung cancer; (3) Current smoker or have quit smoking within the last 15 years; (4) You have a tobacco smoking history of at least 30 pack years (packs per day multiplied by number of years you smoked); (5) You get a written order from a healthcare provider  3  Glaucoma Screening: covered annually if you're considered high risk: (1) You have diabetes OR (2) Family history of glaucoma OR (3)  aged 48 and older OR (3)  American aged 72 and older  3  Osteoporosis Screening: covered every 2 years if you meet one of the following conditions: (1) You're estrogen deficient and at risk for osteoporosis based off medical history and other findings; (2) Have a vertebral abnormality; (3) On glucocorticoid therapy for more than 3 months; (4) Have primary hyperparathyroidism; (5) On osteoporosis medications and need to assess response to drug therapy  · Last bone density test (DXA Scan): Not on file  5  HIV Screening: covered annually if you're between the age of 12-76  Also covered annually if you are younger than 13 and older than 72 with risk factors for HIV infection   For pregnant patients, it is covered up to 3 times per pregnancy  Immunizations:  Immunization Recommendations   Influenza Vaccine Annual influenza vaccination during flu season is recommended for all persons aged >= 6 months who do not have contraindications   Pneumococcal Vaccine (Prevnar and Pneumovax)  * Prevnar = PCV13  * Pneumovax = PPSV23   Adults 25-60 years old: 1-3 doses may be recommended based on certain risk factors  Adults 72 years old: Prevnar (PCV13) vaccine recommended followed by Pneumovax (PPSV23) vaccine  If already received PPSV23 since turning 65, then PCV13 recommended at least one year after PPSV23 dose  Hepatitis B Vaccine 3 dose series if at intermediate or high risk (ex: diabetes, end stage renal disease, liver disease)   Tetanus (Td) Vaccine - COST NOT COVERED BY MEDICARE PART B Following completion of primary series, a booster dose should be given every 10 years to maintain immunity against tetanus  Td may also be given as tetanus wound prophylaxis  Tdap Vaccine - COST NOT COVERED BY MEDICARE PART B Recommended at least once for all adults  For pregnant patients, recommended with each pregnancy  Shingles Vaccine (Shingrix) - COST NOT COVERED BY MEDICARE PART B  2 shot series recommended in those aged 48 and above     Health Maintenance Due:  There are no preventive care reminders to display for this patient  Immunizations Due:      Topic Date Due    Pneumococcal Vaccine: 65+ Years (2 of 2 - PPSV23) 09/15/2018    Influenza Vaccine  07/01/2020     Advance Directives   What are advance directives? Advance directives are legal documents that state your wishes and plans for medical care  These plans are made ahead of time in case you lose your ability to make decisions for yourself  Advance directives can apply to any medical decision, such as the treatments you want, and if you want to donate organs  What are the types of advance directives?   There are many types of advance directives, and each state has rules about how to use them  You may choose a combination of any of the following:  · Living will: This is a written record of the treatment you want  You can also choose which treatments you do not want, which to limit, and which to stop at a certain time  This includes surgery, medicine, IV fluid, and tube feedings  · Durable power of  for healthcare [facility]): This is a written record that states who you want to make healthcare choices for you when you are unable to make them for yourself  This person, called a proxy, is usually a family member or a friend  You may choose more than 1 proxy  · Do not resuscitate (DNR) order:  A DNR order is used in case your heart stops beating or you stop breathing  It is a request not to have certain forms of treatment, such as CPR  A DNR order may be included in other types of advance directives  · Medical directive: This covers the care that you want if you are in a coma, near death, or unable to make decisions for yourself  You can list the treatments you want for each condition  Treatment may include pain medicine, surgery, blood transfusions, dialysis, IV or tube feedings, and a ventilator (breathing machine)  · Values history: This document has questions about your views, beliefs, and how you feel and think about life  This information can help others choose the care that you would choose  Why are advance directives important? An advance directive helps you control your care  Although spoken wishes may be used, it is better to have your wishes written down  Spoken wishes can be misunderstood, or not followed  Treatments may be given even if you do not want them  An advance directive may make it easier for your family to make difficult choices about your care  Fall Prevention    Fall prevention  includes ways to make your home and other areas safer  It also includes ways you can move more carefully to prevent a fall   Health conditions that cause changes in your blood pressure, vision, or muscle strength and coordination may increase your risk for falls  Medicines may also increase your risk for falls if they make you dizzy, weak, or sleepy  Fall prevention tips:   · Stand or sit up slowly  · Use assistive devices as directed  · Wear shoes that fit well and have soles that   · Wear a personal alarm  · Stay active  · Manage your medical conditions  Home Safety Tips:  · Add items to prevent falls in the bathroom  · Keep paths clear  · Install bright lights in your home  · Keep items you use often on shelves within reach  · Paint or place reflective tape on the edges of your stairs  Urinary Incontinence   Urinary incontinence (UI)  is when you lose control of your bladder  UI develops because your bladder cannot store or empty urine properly  The 3 most common types of UI are stress incontinence, urge incontinence, or both  Medicines:   · May be given to help strengthen your bladder control  Report any side effects of medication to your healthcare provider  Do pelvic muscle exercises often:  Your pelvic muscles help you stop urinating  Squeeze these muscles tight for 5 seconds, then relax for 5 seconds  Gradually work up to squeezing for 10 seconds  Do 3 sets of 15 repetitions a day, or as directed  This will help strengthen your pelvic muscles and improve bladder control  Train your bladder:  Go to the bathroom at set times, such as every 2 hours, even if you do not feel the urge to go  You can also try to hold your urine when you feel the urge to go  For example, hold your urine for 5 minutes when you feel the urge to go  As that becomes easier, hold your urine for 10 minutes  Self-care:   · Keep a UI record  Write down how often you leak urine and how much you leak  Make a note of what you were doing when you leaked urine  · Drink liquids as directed  You may need to limit the amount of liquid you drink to help control your urine leakage   Do not drink any liquid right before you go to bed  Limit or do not have drinks that contain caffeine or alcohol  · Prevent constipation  Eat a variety of high-fiber foods  Good examples are high-fiber cereals, beans, vegetables, and whole-grain breads  Walking is the best way to trigger your intestines to have a bowel movement  · Exercise regularly and maintain a healthy weight  Weight loss and exercise will decrease pressure on your bladder and help you control your leakage  · Use a catheter as directed  to help empty your bladder  A catheter is a tiny, plastic tube that is put into your bladder to drain your urine  · Go to behavior therapy as directed  Behavior therapy may be used to help you learn to control your urge to urinate  Cigarette Smoking and Your Health   Risks to your health if you smoke:  Nicotine and other chemicals found in tobacco damage every cell in your body  Even if you are a light smoker, you have an increased risk for cancer, heart disease, and lung disease  If you are pregnant or have diabetes, smoking increases your risk for complications  Benefits to your health if you stop smoking:   · You decrease respiratory symptoms such as coughing, wheezing, and shortness of breath  · You reduce your risk for cancers of the lung, mouth, throat, kidney, bladder, pancreas, stomach, and cervix  If you already have cancer, you increase the benefits of chemotherapy  You also reduce your risk for cancer returning or a second cancer from developing  · You reduce your risk for heart disease, blood clots, heart attack, and stroke  · You reduce your risk for lung infections, and diseases such as pneumonia, asthma, chronic bronchitis, and emphysema  · Your circulation improves  More oxygen can be delivered to your body  If you have diabetes, you lower your risk for complications, such as kidney, artery, and eye diseases  You also lower your risk for nerve damage   Nerve damage can lead to amputations, poor vision, and blindness  · You improve your body's ability to heal and to fight infections  For more information and support to stop smoking:   · [facility]  gov  Phone: 3- 751 - 589-8455  Web Address: www smoke[employer]  [address] Information is for End User's use only and may not be sold, redistributed or otherwise used for commercial purposes  All illustrations and images included in CareNotes® are the copyrighted property of [employer]  or [facility] Preventive Visit Patient Instructions  Thank you for completing your Welcome to Medicare Visit or Medicare Annual Wellness Visit today  Your next wellness visit will be due in one year (9/8/2021)  The screening/preventive services that you may require over the next 5-10 years are detailed below  Some tests may not apply to you based off risk factors and/or age  Screening tests ordered at today's visit but not completed yet may show as past due  Also, please note that scanned in results may not display below  Preventive Screenings:  Service Recommendations Previous Testing/Comments   Colorectal Cancer Screening  * Colonoscopy    * Fecal Occult Blood Test (FOBT)/Fecal Immunochemical Test (FIT)  * Fecal DNA/Cologuard Test  * Flexible Sigmoidoscopy Age: 54-65 years old   Colonoscopy: every 10 years (may be performed more frequently if at higher risk)  OR  FOBT/FIT: every 1 year  OR  Cologuard: every 3 years  OR  Sigmoidoscopy: every 5 years  Screening may be recommended earlier than age 48 if at higher risk for colorectal cancer  Also, an individualized decision between you and your healthcare provider will decide whether screening between the ages of 74-80 would be appropriate   Colonoscopy: Not on file  FOBT/FIT: Not on file  Cologuard: Not on file  Sigmoidoscopy: Not on file         Breast Cancer Screening Age: 36 years old  Frequency: every 1-2 years  Not required if history of left and right mastectomy Mammogram: Not on file       Cervical Cancer Screening Between the ages of 21-29, pap smear recommended once every 3 years  Between the ages of 33-67, can perform pap smear with HPV co-testing every 5 years  Recommendations may differ for women with a history of total hysterectomy, cervical cancer, or abnormal pap smears in past  Pap Smear: Not on file    Screening Not Indicated   Hepatitis C Screening Once for adults born between 1945 and 1965  More frequently in patients at high risk for Hepatitis C Hep C Antibody: Not on file       Diabetes Screening 1-2 times per year if you're at risk for diabetes or have pre-diabetes Fasting glucose: 107 mg/dL   A1C: No results in last 5 years    Screening Current   Cholesterol Screening Once every 5 years if you don't have a lipid disorder  May order more often based on risk factors  Lipid panel: 05/29/2020    Screening Not Indicated  History Lipid Disorder     Other Preventive Screenings Covered by Medicare:  6  Abdominal Aortic Aneurysm (AAA) Screening: covered once if your at risk  You're considered to be at risk if you have a family history of AAA  7  Lung Cancer Screening: covers low dose CT scan once per year if you meet all of the following conditions: (1) Age 50-69; (2) No signs or symptoms of lung cancer; (3) Current smoker or have quit smoking within the last 15 years; (4) You have a tobacco smoking history of at least 30 pack years (packs per day multiplied by number of years you smoked); (5) You get a written order from a healthcare provider  8  Glaucoma Screening: covered annually if you're considered high risk: (1) You have diabetes OR (2) Family history of glaucoma OR (3)  aged 48 and older OR (3)  American aged 72 and older  5   Osteoporosis Screening: covered every 2 years if you meet one of the following conditions: (1) You're estrogen deficient and at risk for osteoporosis based off medical history and other findings; (2) Have a vertebral abnormality; (3) On glucocorticoid therapy for more than 3 months; (4) Have primary hyperparathyroidism; (5) On osteoporosis medications and need to assess response to drug therapy  · Last bone density test (DXA Scan): Not on file  10  HIV Screening: covered annually if you're between the age of 12-76  Also covered annually if you are younger than 13 and older than 72 with risk factors for HIV infection  For pregnant patients, it is covered up to 3 times per pregnancy  Immunizations:  Immunization Recommendations   Influenza Vaccine Annual influenza vaccination during flu season is recommended for all persons aged >= 6 months who do not have contraindications   Pneumococcal Vaccine (Prevnar and Pneumovax)  * Prevnar = PCV13  * Pneumovax = PPSV23   Adults 25-60 years old: 1-3 doses may be recommended based on certain risk factors  Adults 72 years old: Prevnar (PCV13) vaccine recommended followed by Pneumovax (PPSV23) vaccine  If already received PPSV23 since turning 65, then PCV13 recommended at least one year after PPSV23 dose  Hepatitis B Vaccine 3 dose series if at intermediate or high risk (ex: diabetes, end stage renal disease, liver disease)   Tetanus (Td) Vaccine - COST NOT COVERED BY MEDICARE PART B Following completion of primary series, a booster dose should be given every 10 years to maintain immunity against tetanus  Td may also be given as tetanus wound prophylaxis  Tdap Vaccine - COST NOT COVERED BY MEDICARE PART B Recommended at least once for all adults  For pregnant patients, recommended with each pregnancy  Shingles Vaccine (Shingrix) - COST NOT COVERED BY MEDICARE PART B  2 shot series recommended in those aged 48 and above     Health Maintenance Due:  There are no preventive care reminders to display for this patient    Immunizations Due:      Topic Date Due    Pneumococcal Vaccine: 65+ Years (2 of 2 - PPSV23) 09/15/2018    Influenza Vaccine  07/01/2020 Advance Directives   What are advance directives? Advance directives are legal documents that state your wishes and plans for medical care  These plans are made ahead of time in case you lose your ability to make decisions for yourself  Advance directives can apply to any medical decision, such as the treatments you want, and if you want to donate organs  What are the types of advance directives? There are many types of advance directives, and each state has rules about how to use them  You may choose a combination of any of the following:  · Living will: This is a written record of the treatment you want  You can also choose which treatments you do not want, which to limit, and which to stop at a certain time  This includes surgery, medicine, IV fluid, and tube feedings  · Durable power of  for healthcare [facility]): This is a written record that states who you want to make healthcare choices for you when you are unable to make them for yourself  This person, called a proxy, is usually a family member or a friend  You may choose more than 1 proxy  · Do not resuscitate (DNR) order:  A DNR order is used in case your heart stops beating or you stop breathing  It is a request not to have certain forms of treatment, such as CPR  A DNR order may be included in other types of advance directives  · Medical directive: This covers the care that you want if you are in a coma, near death, or unable to make decisions for yourself  You can list the treatments you want for each condition  Treatment may include pain medicine, surgery, blood transfusions, dialysis, IV or tube feedings, and a ventilator (breathing machine)  · Values history: This document has questions about your views, beliefs, and how you feel and think about life  This information can help others choose the care that you would choose  Why are advance directives important? An advance directive helps you control your care   Although spoken wishes may be used, it is better to have your wishes written down  Spoken wishes can be misunderstood, or not followed  Treatments may be given even if you do not want them  An advance directive may make it easier for your family to make difficult choices about your care  Fall Prevention    Fall prevention  includes ways to make your home and other areas safer  It also includes ways you can move more carefully to prevent a fall  Health conditions that cause changes in your blood pressure, vision, or muscle strength and coordination may increase your risk for falls  Medicines may also increase your risk for falls if they make you dizzy, weak, or sleepy  Fall prevention tips:   · Stand or sit up slowly  · Use assistive devices as directed  · Wear shoes that fit well and have soles that   · Wear a personal alarm  · Stay active  · Manage your medical conditions  Home Safety Tips:  · Add items to prevent falls in the bathroom  · Keep paths clear  · Install bright lights in your home  · Keep items you use often on shelves within reach  · Paint or place reflective tape on the edges of your stairs  Urinary Incontinence   Urinary incontinence (UI)  is when you lose control of your bladder  UI develops because your bladder cannot store or empty urine properly  The 3 most common types of UI are stress incontinence, urge incontinence, or both  Medicines:   · May be given to help strengthen your bladder control  Report any side effects of medication to your healthcare provider  Do pelvic muscle exercises often:  Your pelvic muscles help you stop urinating  Squeeze these muscles tight for 5 seconds, then relax for 5 seconds  Gradually work up to squeezing for 10 seconds  Do 3 sets of 15 repetitions a day, or as directed  This will help strengthen your pelvic muscles and improve bladder control    Train your bladder:  Go to the bathroom at set times, such as every 2 hours, even if you do not feel the urge to go  You can also try to hold your urine when you feel the urge to go  For example, hold your urine for 5 minutes when you feel the urge to go  As that becomes easier, hold your urine for 10 minutes  Self-care:   · Keep a UI record  Write down how often you leak urine and how much you leak  Make a note of what you were doing when you leaked urine  · Drink liquids as directed  You may need to limit the amount of liquid you drink to help control your urine leakage  Do not drink any liquid right before you go to bed  Limit or do not have drinks that contain caffeine or alcohol  · Prevent constipation  Eat a variety of high-fiber foods  Good examples are high-fiber cereals, beans, vegetables, and whole-grain breads  Walking is the best way to trigger your intestines to have a bowel movement  · Exercise regularly and maintain a healthy weight  Weight loss and exercise will decrease pressure on your bladder and help you control your leakage  · Use a catheter as directed  to help empty your bladder  A catheter is a tiny, plastic tube that is put into your bladder to drain your urine  · Go to behavior therapy as directed  Behavior therapy may be used to help you learn to control your urge to urinate  Cigarette Smoking and Your Health   Risks to your health if you smoke:  Nicotine and other chemicals found in tobacco damage every cell in your body  Even if you are a light smoker, you have an increased risk for cancer, heart disease, and lung disease  If you are pregnant or have diabetes, smoking increases your risk for complications  Benefits to your health if you stop smoking:   · You decrease respiratory symptoms such as coughing, wheezing, and shortness of breath  · You reduce your risk for cancers of the lung, mouth, throat, kidney, bladder, pancreas, stomach, and cervix  If you already have cancer, you increase the benefits of chemotherapy   You also reduce your risk for cancer returning or a second cancer from developing  · You reduce your risk for heart disease, blood clots, heart attack, and stroke  · You reduce your risk for lung infections, and diseases such as pneumonia, asthma, chronic bronchitis, and emphysema  · Your circulation improves  More oxygen can be delivered to your body  If you have diabetes, you lower your risk for complications, such as kidney, artery, and eye diseases  You also lower your risk for nerve damage  Nerve damage can lead to amputations, poor vision, and blindness  · You improve your body's ability to heal and to fight infections  For more information and support to stop smoking:   · "[employer]  Phone: 8- 582 - 402-4083  Web Address: www [employer]   [address] Information is for End User's use only and may not be sold, redistributed or otherwise used for commercial purposes  All illustrations and images included in CareNotes® are the copyrighted property of [employer]  or [facility] Preventive Visit Patient Instructions  Thank you for completing your Welcome to Medicare Visit or Medicare Annual Wellness Visit today  Your next wellness visit will be due in one year (9/8/2021)  The screening/preventive services that you may require over the next 5-10 years are detailed below  Some tests may not apply to you based off risk factors and/or age  Screening tests ordered at today's visit but not completed yet may show as past due  Also, please note that scanned in results may not display below    Preventive Screenings:  Service Recommendations Previous Testing/Comments   Colorectal Cancer Screening  * Colonoscopy    * Fecal Occult Blood Test (FOBT)/Fecal Immunochemical Test (FIT)  * Fecal DNA/Cologuard Test  * Flexible Sigmoidoscopy Age: 54-65 years old   Colonoscopy: every 10 years (may be performed more frequently if at higher risk)  OR  FOBT/FIT: every 1 year  OR  Cologuard: every 3 years OR  Sigmoidoscopy: every 5 years  Screening may be recommended earlier than age 48 if at higher risk for colorectal cancer  Also, an individualized decision between you and your healthcare provider will decide whether screening between the ages of 74-80 would be appropriate  Colonoscopy: Not on file  FOBT/FIT: Not on file  Cologuard: Not on file  Sigmoidoscopy: Not on file    Risks and Benefits Discussed  Screening Not Indicated     Breast Cancer Screening Age: 36 years old  Frequency: every 1-2 years  Not required if history of left and right mastectomy Mammogram: Not on file    Risks and Benefits Discussed  Screening Not Indicated   Cervical Cancer Screening Between the ages of 21-29, pap smear recommended once every 3 years  Between the ages of 33-67, can perform pap smear with HPV co-testing every 5 years  Recommendations may differ for women with a history of total hysterectomy, cervical cancer, or abnormal pap smears in past  Pap Smear: Not on file    Screening Not Indicated  Screening Not Indicated   Hepatitis C Screening Once for adults born between [facility]  More frequently in patients at high risk for Hepatitis C Hep C Antibody: Not on file       Diabetes Screening 1-2 times per year if you're at risk for diabetes or have pre-diabetes Fasting glucose: 107 mg/dL   A1C: No results in last 5 years    Screening Current  Screening Current   Cholesterol Screening Once every 5 years if you don't have a lipid disorder  May order more often based on risk factors  Lipid panel: 05/29/2020    Screening Not Indicated  History Lipid Disorder  Screening Not Indicated  History Lipid Disorder     Other Preventive Screenings Covered by Medicare:  11  Abdominal Aortic Aneurysm (AAA) Screening: covered once if your at risk  You're considered to be at risk if you have a family history of AAA    12  Lung Cancer Screening: covers low dose CT scan once per year if you meet all of the following conditions: (1) Age 50-69; (2) No signs or symptoms of lung cancer; (3) Current smoker or have quit smoking within the last 15 years; (4) You have a tobacco smoking history of at least 30 pack years (packs per day multiplied by number of years you smoked); (5) You get a written order from a healthcare provider  15  Glaucoma Screening: covered annually if you're considered high risk: (1) You have diabetes OR (2) Family history of glaucoma OR (3)  aged 48 and older OR (3)  American aged 72 and older  15  Osteoporosis Screening: covered every 2 years if you meet one of the following conditions: (1) You're estrogen deficient and at risk for osteoporosis based off medical history and other findings; (2) Have a vertebral abnormality; (3) On glucocorticoid therapy for more than 3 months; (4) Have primary hyperparathyroidism; (5) On osteoporosis medications and need to assess response to drug therapy  · Last bone density test (DXA Scan): Not on file  15  HIV Screening: covered annually if you're between the age of 12-76  Also covered annually if you are younger than 13 and older than 72 with risk factors for HIV infection  For pregnant patients, it is covered up to 3 times per pregnancy  Immunizations:  Immunization Recommendations   Influenza Vaccine Annual influenza vaccination during flu season is recommended for all persons aged >= 6 months who do not have contraindications   Pneumococcal Vaccine (Prevnar and Pneumovax)  * Prevnar = PCV13  * Pneumovax = PPSV23   Adults 25-60 years old: 1-3 doses may be recommended based on certain risk factors  Adults 72 years old: Prevnar (PCV13) vaccine recommended followed by Pneumovax (PPSV23) vaccine  If already received PPSV23 since turning 65, then PCV13 recommended at least one year after PPSV23 dose     Hepatitis B Vaccine 3 dose series if at intermediate or high risk (ex: diabetes, end stage renal disease, liver disease)   Tetanus (Td) Vaccine - COST NOT COVERED BY MEDICARE PART B Following completion of primary series, a booster dose should be given every 10 years to maintain immunity against tetanus  Td may also be given as tetanus wound prophylaxis  Tdap Vaccine - COST NOT COVERED BY MEDICARE PART B Recommended at least once for all adults  For pregnant patients, recommended with each pregnancy  Shingles Vaccine (Shingrix) - COST NOT COVERED BY MEDICARE PART B  2 shot series recommended in those aged 48 and above     Health Maintenance Due:  There are no preventive care reminders to display for this patient  Immunizations Due:      Topic Date Due    Pneumococcal Vaccine: 65+ Years (2 of 2 - PPSV23) 09/15/2018    Influenza Vaccine  07/01/2020     Advance Directives   What are advance directives? Advance directives are legal documents that state your wishes and plans for medical care  These plans are made ahead of time in case you lose your ability to make decisions for yourself  Advance directives can apply to any medical decision, such as the treatments you want, and if you want to donate organs  What are the types of advance directives? There are many types of advance directives, and each state has rules about how to use them  You may choose a combination of any of the following:  · Living will: This is a written record of the treatment you want  You can also choose which treatments you do not want, which to limit, and which to stop at a certain time  This includes surgery, medicine, IV fluid, and tube feedings  · Durable power of  for healthcare [facility]): This is a written record that states who you want to make healthcare choices for you when you are unable to make them for yourself  This person, called a proxy, is usually a family member or a friend  You may choose more than 1 proxy  · Do not resuscitate (DNR) order:  A DNR order is used in case your heart stops beating or you stop breathing   It is a request not to have certain forms of treatment, such as CPR  A DNR order may be included in other types of advance directives  · Medical directive: This covers the care that you want if you are in a coma, near death, or unable to make decisions for yourself  You can list the treatments you want for each condition  Treatment may include pain medicine, surgery, blood transfusions, dialysis, IV or tube feedings, and a ventilator (breathing machine)  · Values history: This document has questions about your views, beliefs, and how you feel and think about life  This information can help others choose the care that you would choose  Why are advance directives important? An advance directive helps you control your care  Although spoken wishes may be used, it is better to have your wishes written down  Spoken wishes can be misunderstood, or not followed  Treatments may be given even if you do not want them  An advance directive may make it easier for your family to make difficult choices about your care  Fall Prevention    Fall prevention  includes ways to make your home and other areas safer  It also includes ways you can move more carefully to prevent a fall  Health conditions that cause changes in your blood pressure, vision, or muscle strength and coordination may increase your risk for falls  Medicines may also increase your risk for falls if they make you dizzy, weak, or sleepy  Fall prevention tips:   · Stand or sit up slowly  · Use assistive devices as directed  · Wear shoes that fit well and have soles that   · Wear a personal alarm  · Stay active  · Manage your medical conditions  Home Safety Tips:  · Add items to prevent falls in the bathroom  · Keep paths clear  · Install bright lights in your home  · Keep items you use often on shelves within reach  · Paint or place reflective tape on the edges of your stairs  Urinary Incontinence   Urinary incontinence (UI)  is when you lose control of your bladder   UI develops because your bladder cannot store or empty urine properly  The 3 most common types of UI are stress incontinence, urge incontinence, or both  Medicines:   · May be given to help strengthen your bladder control  Report any side effects of medication to your healthcare provider  Do pelvic muscle exercises often:  Your pelvic muscles help you stop urinating  Squeeze these muscles tight for 5 seconds, then relax for 5 seconds  Gradually work up to squeezing for 10 seconds  Do 3 sets of 15 repetitions a day, or as directed  This will help strengthen your pelvic muscles and improve bladder control  Train your bladder:  Go to the bathroom at set times, such as every 2 hours, even if you do not feel the urge to go  You can also try to hold your urine when you feel the urge to go  For example, hold your urine for 5 minutes when you feel the urge to go  As that becomes easier, hold your urine for 10 minutes  Self-care:   · Keep a UI record  Write down how often you leak urine and how much you leak  Make a note of what you were doing when you leaked urine  · Drink liquids as directed  You may need to limit the amount of liquid you drink to help control your urine leakage  Do not drink any liquid right before you go to bed  Limit or do not have drinks that contain caffeine or alcohol  · Prevent constipation  Eat a variety of high-fiber foods  Good examples are high-fiber cereals, beans, vegetables, and whole-grain breads  Walking is the best way to trigger your intestines to have a bowel movement  · Exercise regularly and maintain a healthy weight  Weight loss and exercise will decrease pressure on your bladder and help you control your leakage  · Use a catheter as directed  to help empty your bladder  A catheter is a tiny, plastic tube that is put into your bladder to drain your urine  · Go to behavior therapy as directed    Behavior therapy may be used to help you learn to control your urge to urinate  Cigarette Smoking and Your Health   Risks to your health if you smoke:  Nicotine and other chemicals found in tobacco damage every cell in your body  Even if you are a light smoker, you have an increased risk for cancer, heart disease, and lung disease  If you are pregnant or have diabetes, smoking increases your risk for complications  Benefits to your health if you stop smoking:   · You decrease respiratory symptoms such as coughing, wheezing, and shortness of breath  · You reduce your risk for cancers of the lung, mouth, throat, kidney, bladder, pancreas, stomach, and cervix  If you already have cancer, you increase the benefits of chemotherapy  You also reduce your risk for cancer returning or a second cancer from developing  · You reduce your risk for heart disease, blood clots, heart attack, and stroke  · You reduce your risk for lung infections, and diseases such as pneumonia, asthma, chronic bronchitis, and emphysema  · Your circulation improves  More oxygen can be delivered to your body  If you have diabetes, you lower your risk for complications, such as kidney, artery, and eye diseases  You also lower your risk for nerve damage  Nerve damage can lead to amputations, poor vision, and blindness  · You improve your body's ability to heal and to fight infections  For more information and support to stop smoking:   · Smokefree  gov  Phone: 5- 432 - 500-2307  Web Address: www [employer]   [employer] 2018 Information is for End User's use only and may not be sold, redistributed or otherwise used for commercial purposes   All illustrations and images included in CareNotes® are the copyrighted property of [employer]  or [address] [place] [address]

## 2020-09-08 NOTE — PROGRESS NOTES
Assessment and Plan:     Problem List Items Addressed This Visit        Endocrine    Hypothyroidism    Relevant Orders    TSH, 3rd generation with Free T4 reflex      Other Visit Diagnoses     Medicare annual wellness visit, initial    -  Primary    Postmenopausal        Relevant Orders    DXA bone density spine hip and pelvis    Need for pneumococcal vaccination        Relevant Orders    Pneumococcal polysaccharide vaccine 23-valent greater than or equal to 1yo subcutaneous/IM (Completed)          Falls Plan of Care: balance, strength, and gait training instructions were provided  Preventive health issues were discussed with patient, and age appropriate screening tests were ordered as noted in patient's After Visit Summary  Personalized health advice and appropriate referrals for health education or preventive services given if needed, as noted in patient's After Visit Summary  History of Present Illness:     Patient presents for Medicare Annual Wellness visit    Patient presents to establish care  Patient denies any concerns  Follows up with cardiology and vascular  Hypothyroid: no recent tsh check       Patient Care Team:  Sima Cates MD as PCP - General (Family Medicine)  Carito Lott DO as PCP - [address] [place], [place] [address]  Tanya Carrillo DO (Cardiology)  Bard Giorgio MD (Vascular Surgery)  Helene Dumont PA-C (Vascular Surgery)     Problem List:     Patient Active Problem List   Diagnosis    Aortoiliac occlusive disease ([address] )    Carotid artery stenosis    Chronic Hypertension    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 ([address] )    Hypothyroidism    Nicotine dependence    Subclavian artery stenosis, left ([address] )    Aortic stenosis    Atherosclerosis of native artery of extremity with intermittent claudication ([address] )    Benign essential HTN    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[address] )    Dyslipidemia    Onychomycosis    Paronychia of toenail    Pain in both feet    Viral URI with cough    Simple chronic bronchitis (HCC)    CKD (chronic kidney disease) stage 3, GFR 30-59 ml/min (HCC)    Cigarette smoker    Cardiac murmur    Urinary tract infection    Gram-negative bacteremia    Mass of spine    Chronic diastolic heart failure (HCC)    Acute blood loss anemia    Hypertensive heart disease with chronic diastolic congestive heart failure (HCC)    Lower extremity edema    Peripheral neuropathy      Past Medical and Surgical History:     Past Medical History:   Diagnosis Date    Arthritis     Benign essential hypertension     CAD (coronary artery disease)     Disease of thyroid gland     Dizziness     Edema of leg     Hyperlipidemia     Hypertension     Other disorder of circulatory system (CODE)      Past Surgical History:   Procedure Laterality Date    BREAST SURGERY      bxs,benign    COLONOSCOPY      HYSTERECTOMY      INCISIONAL BREAST BIOPSY      x5, 1964, ,  and     [place] BYPASS GRAFT OTHR,AORTOBIFEMORAL Bilateral 2019    Procedure: BYPASS AORTA BIFEMORAL WITH LEFT FEMORAL THROMBOEMBOLECTOMY;  Surgeon: Ruthie Bui MD;  [facility];  Service: Vascular      Family History:     Family History   Problem Relation Age of Onset    Hypertension Father     Coronary artery disease Family     Arthritis Family       Social History:        Social History     Socioeconomic History    Marital status:      Spouse name: None    Number of children: None    Years of education: None    Highest education level: None   Occupational History    None   Social Needs    Financial resource strain: None    Food insecurity     Worry: None     Inability: None    Transportation needs     Medical: None     Non-medical: None   Tobacco Use    Smoking status: Current Every Day Smoker     Packs/day: 0 50     Types: Cigarettes     Last attempt to quit: 2019     Years since quittin 7    Smokeless tobacco: Never Used   Substance and Sexual Activity    Alcohol use: Yes     Comment: wilfrid 2-3 every day for 50 years    Drug use: No    Sexual activity: None   Lifestyle    Physical activity     Days per week: None     Minutes per session: None    Stress: None   Relationships    Social connections     Talks on phone: None     Gets together: None     Attends Cheondoism service: None     Active member of club or organization: None     Attends meetings of clubs or organizations: None     Relationship status: None    Intimate partner violence     Fear of current or ex partner: None     Emotionally abused: None     Physically abused: None     Forced sexual activity: None   Other Topics Concern    None   Social History Narrative    Rarely exercises    Sleeps 6-7 hours per day      Medications and Allergies:     Current Outpatient Medications   Medication Sig Dispense Refill    acetaminophen (TYLENOL) 325 mg tablet Take 2 tablets (650 mg total) by mouth every 6 (six) hours as needed for mild pain, headaches or fever 30 tablet 0    albuterol (Ventolin HFA) 90 mcg/act inhaler Inhale 2 puffs every 6 (six) hours as needed for wheezing 18 g 2    aspirin 81 MG tablet Take 2 tablets by mouth daily      atorvastatin (LIPITOR) 80 mg tablet Take 1 tablet (80 mg total) by mouth daily 90 tablet 3    B Complex Vitamins (VITAMIN B-COMPLEX) TABS Take by mouth      Bioflavonoid Products (VITAMIN C PLUS PO) Take by mouth daily      carvedilol (COREG) 12 5 mg tablet Take 1 tablet (12 5 mg total) by mouth 2 (two) times a day 60 tablet 5    cholecalciferol (VITAMIN D3) 1,000 units tablet Take 1,000 Units by mouth daily      clopidogrel (PLAVIX) 75 mg tablet take 1 tablet by mouth once daily 90 tablet 3    enalapril (VASOTEC) 20 mg tablet Take 1 tablet (20 mg total) by mouth 2 (two) times a day 180 tablet 3    fluticasone (FLONASE) 50 mcg/act nasal spray 1 spray into each nostril daily 16 g 3    gabapentin (NEURONTIN) 100 mg capsule Take 1 capsule (100 mg total) by mouth 3 (three) times a day (Patient taking differently: Take 100 mg by mouth daily at bedtime ) 180 capsule 0    GARLIC PO Take by mouth daily       levothyroxine 25 mcg tablet Take 1 tablet (25 mcg total) by mouth daily in the early morning 90 tablet 3    POTASSIUM CHLORIDE ER PO Take 99 mEq by mouth daily       spironolactone (ALDACTONE) 25 mg tablet Take 1 tablet (25 mg total) by mouth daily 30 tablet 5    magnesium Oxide (MAG-OX) 400 mg TABS Take 400 mg by mouth daily      magnesium oxide (MAG-OX) 400 mg Take 1 tablet (400 mg total) by mouth daily (Patient not taking: Reported on 9/8/2020) 30 tablet 5    Multiple Vitamins-Minerals (CENTRUM SILVER PO) Take by mouth      nicotine (NICODERM CQ) 14 mg/24hr TD 24 hr patch Place 1 patch on the skin every 24 hours (Patient not taking: Reported on 9/8/2020) 14 patch 0    nicotine (NICODERM CQ) 21 mg/24 hr TD 24 hr patch Place 1 patch on the skin every 24 hours (Patient not taking: Reported on 9/8/2020) 14 patch 0    nicotine (NICODERM CQ) 7 mg/24hr TD 24 hr patch Place 1 patch on the skin every 24 hours (Patient not taking: Reported on 9/8/2020) 28 patch 0    torsemide (DEMADEX) 5 MG tablet Take 1 tablet (5 mg total) by mouth daily (Patient not taking: Reported on 9/8/2020) 30 tablet 5     No current facility-administered medications for this visit        Allergies   Allergen Reactions    Thiazide-Type Diuretics      Hyponatremia      Immunizations:     Immunization History   Administered Date(s) Administered    DTaP 5 08/07/2019    Hep B, adult 11/16/2004    Influenza Quadrivalent Preservative Free 3 years and older IM 10/19/2015    Influenza Split High Dose Preservative Free IM 10/14/2016    Influenza TIV (IM) 09/01/2014    Influenza, high dose seasonal 0 7 mL 11/29/2018    MMR 04/23/2001    Pneumococcal Conjugate 13-Valent 09/15/2017    Pneumococcal Polysaccharide PPV23 09/08/2020    Td (adult), adsorbed 11/14/2005    Tdap 12/22/2014  influenza, trivalent, adjuvanted 10/11/2019      Health Maintenance: There are no preventive care reminders to display for this patient  Topic Date Due    Pneumococcal Vaccine: 65+ Years (2 of 2 - PPSV23) 09/15/2018    Influenza Vaccine  07/01/2020      Medicare Health Risk Assessment:     /94 (BP Location: Right arm, Patient Position: Sitting, Cuff Size: Standard)   Pulse 90   Temp (!) 96 4 °F (35 8 °C) (Tympanic)   Resp 14   Ht 5' 3" (1 6 m)   Wt 57 2 kg (126 lb)   BMI 22 32 kg/m²      Jordan Grissom is here for her Initial Wellness visit  Health Risk Assessment:   Patient rates overall health as good  Patient feels that their physical health rating is same  Eyesight was rated as same  Hearing was rated as same  Patient feels that their emotional and mental health rating is same  Pain experienced in the last 7 days has been none  Patient states that she has experienced no weight loss or gain in last 6 months  Depression Screening:   PHQ-2 Score: 0      Fall Risk Screening: In the past year, patient has experienced: history of falling in past year    Number of falls: 2 or more  Injured during fall?: No    Feels unsteady when standing or walking?: Yes    Worried about falling?: Yes      Urinary Incontinence Screening:   Patient has leaked urine accidently in the last six months  Bowel movements    Home Safety:  Patient has trouble with stairs inside or outside of their home  Patient has working smoke alarms and has working carbon monoxide detector  Home safety hazards include: none  Nutrition:   Current diet is Regular  Medications:   Patient is currently taking over-the-counter supplements  OTC medications include: see medication list  Patient is able to manage medications       Activities of Daily Living (ADLs)/Instrumental Activities of Daily Living (IADLs):   Walk and transfer into and out of bed and chair?: Yes  Dress and groom yourself?: Yes    Bathe or shower yourself?: Yes Feed yourself? Yes  Do your laundry/housekeeping?: Yes  Manage your money, pay your bills and track your expenses?: Yes  Make your own meals?: Yes    Do your own shopping?: Yes    Previous Hospitalizations:   Any hospitalizations or ED visits within the last 12 months?: Yes    How many hospitalizations have you had in the last year?: 1-2    Hospitalization Comments: [facility]    Advance Care Planning:   Living will: Yes    Durable POA for healthcare: Yes    Advanced directive: Yes    Advanced directive counseling given: Yes    End of Life Decisions reviewed with patient: Yes    Provider agrees with end of life decisions: Yes      Comments: DNR/ DNI    Has a medical POA: daughter: Eugenia Piper  Will bring in copy  Cognitive Screening:   Provider or family/friend/caregiver concerned regarding cognition?: No    PREVENTIVE SCREENINGS      Cardiovascular Screening:    General: Screening Not Indicated and History Lipid Disorder      Diabetes Screening:     General: Screening Current      Colorectal Cancer Screening:     General: Screening Not Indicated      Breast Cancer Screening:     General: Screening Not Indicated      Cervical Cancer Screening:    General: Screening Not Indicated      Osteoporosis Screening:    General: Risks and Benefits Discussed    Due for: Bone Density Ultrasound      Abdominal Aortic Aneurysm (AAA) Screening:        General: Risks and Benefits Discussed      Lung Cancer Screening:     General: Screening Not Indicated      Preventive Screening Comments: Sees vascular and due in October     Other Counseling Topics:   Sunscreen and calcium and vitamin D intake and regular weightbearing exercise  Physical Exam  Constitutional:       Appearance: Normal appearance  HENT:      Head: Normocephalic and atraumatic  Right Ear: Tympanic membrane normal       Left Ear: Tympanic membrane normal       Nose: Nose normal  No congestion  Eyes:      General:         Right eye: No discharge  Left eye: No discharge  Conjunctiva/sclera: Conjunctivae normal    Cardiovascular:      Rate and Rhythm: Normal rate  Pulses: Normal pulses  Heart sounds: Normal heart sounds  No murmur  Pulmonary:      Effort: Pulmonary effort is normal       Breath sounds: Normal breath sounds  Abdominal:      General: Abdomen is flat  Bowel sounds are normal  There is no distension  Musculoskeletal: Normal range of motion  General: No swelling  Neurological:      General: No focal deficit present  Mental Status: She is alert     Psychiatric:         Mood and Affect: Mood normal          Behavior: Behavior normal          Shelley Davis MD

## 2020-09-08 NOTE — PROGRESS NOTES
Assessment and Plan:     Problem List Items Addressed This Visit     None           Preventive health issues were discussed with patient, and age appropriate screening tests were ordered as noted in patient's After Visit Summary  Personalized health advice and appropriate referrals for health education or preventive services given if needed, as noted in patient's After Visit Summary       History of Present Illness:     Patient presents for Medicare Annual Wellness visit    Patient Care Team:  Chrissie Majano MD as PCP - General (Family Medicine)  Franci Kapadia DO as PCP - [address] [place], [place] [address]  Duy Tompkins DO (Cardiology)  Jose G Marks MD (Vascular Surgery)  Duy Daly PA-C (Vascular Surgery)     Problem List:     Patient Active Problem List   Diagnosis    Aortoiliac occlusive disease ([address] )    Carotid artery stenosis    Chronic Hypertension    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 ([address] )    Hypothyroidism    Nicotine dependence    Subclavian artery stenosis, left ([address] )    Aortic stenosis    Atherosclerosis 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)    Benign essential HTN    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[address] )    Dyslipidemia    Onychomycosis    Paronychia of toenail    Pain in both feet    Viral URI with cough    Simple chronic bronchitis ([address] )    CKD (chronic kidney disease) stage 3, GFR 30-59 ml/min (HCC)    Cigarette smoker    Cardiac murmur    Urinary tract infection    Gram-negative bacteremia    Mass of spine    Chronic diastolic heart failure ([address] )    Acute blood loss anemia    Hypertensive heart disease with chronic diastolic congestive heart failure (HCC)    Lower extremity edema    Peripheral neuropathy      Past Medical and Surgical History:     Past Medical History:   Diagnosis Date    Arthritis     Benign essential hypertension     CAD (coronary artery disease)     Disease of thyroid gland     Dizziness     Edema of leg     Hyperlipidemia     Hypertension     Other disorder of circulatory system (CODE)      Past Surgical History:   Procedure Laterality Date    BREAST SURGERY      bxs,benign    COLONOSCOPY      HYSTERECTOMY      INCISIONAL BREAST BIOPSY      x5, 1964, 1965, 1985 and [address] Bilateral 2019    Procedure: BYPASS AORTA BIFEMORAL WITH LEFT FEMORAL THROMBOEMBOLECTOMY;  Surgeon: Columba Cloud MD;  [facility];  Service: Vascular      Family History:     Family History   Problem Relation Age of Onset    Hypertension Father     Coronary artery disease Family     Arthritis Family       Social History:        Social History     Socioeconomic History    Marital status:      Spouse name: None    Number of children: None    Years of education: None    Highest education level: None   Occupational History    None   Social Needs    Financial resource strain: None    Food insecurity     Worry: None     Inability: None    Transportation needs     Medical: None     Non-medical: None   Tobacco Use    Smoking status: Current Every Day Smoker     Packs/day: 0 50     Types: Cigarettes     Last attempt to quit: 2019     Years since quittin 7    Smokeless tobacco: Never Used   Substance and Sexual Activity    Alcohol use: Yes     Comment: manhattans 2-3 every day for 50 years    Drug use: No    Sexual activity: None   Lifestyle    Physical activity     Days per week: None     Minutes per session: None    Stress: None   Relationships    Social connections     Talks on phone: None     Gets together: None     Attends Episcopal service: None     Active member of club or organization: None     Attends meetings of clubs or organizations: None     Relationship status: None    Intimate partner violence     Fear of current or ex partner: None     Emotionally abused: None     Physically abused: None     Forced sexual activity: None   Other Topics Concern    None   Social History Narrative    Rarely exercises    Sleeps 6-7 hours per day      Medications and Allergies:     Current Outpatient Medications   Medication Sig Dispense Refill    acetaminophen (TYLENOL) 325 mg tablet Take 2 tablets (650 mg total) by mouth every 6 (six) hours as needed for mild pain, headaches or fever 30 tablet 0    albuterol (Ventolin HFA) 90 mcg/act inhaler Inhale 2 puffs every 6 (six) hours as needed for wheezing 18 g 2    aspirin 81 MG tablet Take 2 tablets by mouth daily      atorvastatin (LIPITOR) 80 mg tablet Take 1 tablet (80 mg total) by mouth daily 90 tablet 3    B Complex Vitamins (VITAMIN B-COMPLEX) TABS Take by mouth      Bioflavonoid Products (VITAMIN C PLUS PO) Take by mouth daily      carvedilol (COREG) 12 5 mg tablet Take 1 tablet (12 5 mg total) by mouth 2 (two) times a day 60 tablet 5    cholecalciferol (VITAMIN D3) 1,000 units tablet Take 1,000 Units by mouth daily      clopidogrel (PLAVIX) 75 mg tablet take 1 tablet by mouth once daily 90 tablet 3    enalapril (VASOTEC) 20 mg tablet Take 1 tablet (20 mg total) by mouth 2 (two) times a day 180 tablet 3    fluticasone (FLONASE) 50 mcg/act nasal spray 1 spray into each nostril daily 16 g 3    gabapentin (NEURONTIN) 100 mg capsule Take 1 capsule (100 mg total) by mouth 3 (three) times a day (Patient taking differently: Take 100 mg by mouth daily at bedtime ) 180 capsule 0    GARLIC PO Take by mouth daily       levothyroxine 25 mcg tablet Take 1 tablet (25 mcg total) by mouth daily in the early morning 90 tablet 3    POTASSIUM CHLORIDE ER PO Take 99 mEq by mouth daily       spironolactone (ALDACTONE) 25 mg tablet Take 1 tablet (25 mg total) by mouth daily 30 tablet 5    magnesium Oxide (MAG-OX) 400 mg TABS Take 400 mg by mouth daily      magnesium oxide (MAG-OX) 400 mg Take 1 tablet (400 mg total) by mouth daily (Patient not taking: Reported on 9/8/2020) 30 tablet 5    Multiple Vitamins-Minerals (CENTRUM SILVER PO) Take by mouth      nicotine (NICODERM CQ) 14 mg/24hr TD 24 hr patch Place 1 patch on the skin every 24 hours (Patient not taking: Reported on 9/8/2020) 14 patch 0    nicotine (NICODERM CQ) 21 mg/24 hr TD 24 hr patch Place 1 patch on the skin every 24 hours (Patient not taking: Reported on 9/8/2020) 14 patch 0    nicotine (NICODERM CQ) 7 mg/24hr TD 24 hr patch Place 1 patch on the skin every 24 hours (Patient not taking: Reported on 9/8/2020) 28 patch 0    torsemide (DEMADEX) 5 MG tablet Take 1 tablet (5 mg total) by mouth daily (Patient not taking: Reported on 9/8/2020) 30 tablet 5     No current facility-administered medications for this visit  Allergies   Allergen Reactions    Thiazide-Type Diuretics      Hyponatremia      Immunizations:     Immunization History   Administered Date(s) Administered    DTaP 5 08/07/2019    Hep B, adult 11/16/2004    Influenza Quadrivalent Preservative Free 3 years and older IM 10/19/2015    Influenza Split High Dose Preservative Free IM 10/14/2016    Influenza TIV (IM) 09/01/2014    Influenza, high dose seasonal 0 7 mL 11/29/2018    MMR 04/23/2001    Pneumococcal Conjugate 13-Valent 09/15/2017    Td (adult), adsorbed 11/14/2005    Tdap 12/22/2014    influenza, trivalent, adjuvanted 10/11/2019      Health Maintenance: There are no preventive care reminders to display for this patient  Topic Date Due    Pneumococcal Vaccine: 65+ Years (2 of 2 - PPSV23) 09/15/2018    Influenza Vaccine  07/01/2020      Medicare Health Risk Assessment:     /94 (BP Location: Right arm, Patient Position: Sitting, Cuff Size: Standard)   Pulse 90   Temp (!) 96 4 °F (35 8 °C) (Tympanic)   Resp 14   Ht 5' 3" (1 6 m)   Wt 57 2 kg (126 lb)   BMI 22 32 kg/m²      Kaleb Vigil is here for her Initial Wellness visit  Health Risk Assessment:   Patient rates overall health as good  Patient feels that their physical health rating is same   Eyesight was rated as same  Hearing was rated as same  Patient feels that their emotional and mental health rating is same  Pain experienced in the last 7 days has been none  Patient states that she has experienced no weight loss or gain in last 6 months  Depression Screening:   PHQ-2 Score: 0      Fall Risk Screening: In the past year, patient has experienced: history of falling in past year    Number of falls: 2 or more  Injured during fall?: No    Feels unsteady when standing or walking?: Yes    Worried about falling?: Yes      Urinary Incontinence Screening:   Patient has leaked urine accidently in the last six months  Loss of Bowel movements     Home Safety:  Patient has trouble with stairs inside or outside of their home  Patient has working smoke alarms and has working carbon monoxide detector  Home safety hazards include: none  Nutrition:   Current diet is Regular  Medications:   Patient is currently taking over-the-counter supplements  OTC medications include: see medication list  Patient is able to manage medications  Activities of Daily Living (ADLs)/Instrumental Activities of Daily Living (IADLs):   Walk and transfer into and out of bed and chair?: Yes  Dress and groom yourself?: Yes    Bathe or shower yourself?: Yes    Feed yourself? Yes  Do your laundry/housekeeping?: Yes  Manage your money, pay your bills and track your expenses?: Yes  Make your own meals?: Yes    Do your own shopping?: Yes    Previous Hospitalizations:   Any hospitalizations or ED visits within the last 12 months?: Yes    How many hospitalizations have you had in the last year?: 1-2    Hospitalization Comments: Surgery bipass    Advance Care Planning:   Living will: Yes    Durable POA for healthcare:  Yes    Advanced directive: Yes    Advanced directive counseling given: Yes    End of Life Decisions reviewed with patient: Yes    Provider agrees with end of life decisions: Yes      Comments: POA: Daughter: Kade Heart DNR/DNi    Cognitive Screening:   Provider or family/friend/caregiver concerned regarding cognition?: No    PREVENTIVE SCREENINGS      Cardiovascular Screening:    General: Screening Not Indicated and History Lipid Disorder      Diabetes Screening:     General: Screening Current      Colorectal Cancer Screening:     General: Risks and Benefits Discussed and Screening Not Indicated      Breast Cancer Screening:     General: Risks and Benefits Discussed and Screening Not Indicated      Cervical Cancer Screening:    General: Screening Not Indicated      Osteoporosis Screening:    General: Risks and Benefits Discussed    Due for: Bone Density Ultrasound      Lung Cancer Screening:     General: Screening Not Indicated      Zahida Levi MD  Virtual AWV Consent    Reason for visit is ***    Encounter provider Zahida Levi MD    Provider located at [address] [place] [id number]      Recent Visits  No visits were found meeting these conditions  Showing recent visits within past 7 days and meeting all other requirements     Today's Visits  Date Type Provider Dept   09/08/20 Office Visit Zahida Levi  [facility] today's visits and meeting all other requirements     Future Appointments  No visits were found meeting these conditions  Showing future appointments within next 150 days and meeting all other requirements        After connecting through [employer], the patient was identified by name and date of birth  Lor Bucio was informed that this is a telemedicine visit and that the visit is being conducted through {AMB CORONAVIRUS VISIT GTMVLJ:72176}  {Telemedicine confidentiality :92996} {Telemedicine participants:54001}  She acknowledged consent and understanding of privacy and security of the video platform   The patient has agreed to participate and understands they can discontinue the visit at any time    Patient is aware this is a billable service

## 2020-09-15 ENCOUNTER — TELEPHONE (OUTPATIENT)
Dept: CARDIOLOGY CLINIC | Facility: CLINIC | Age: 80
End: 2020-09-15

## 2020-09-16 ENCOUNTER — TELEPHONE (OUTPATIENT)
Dept: CARDIOLOGY CLINIC | Facility: CLINIC | Age: 80
End: 2020-09-16

## 2020-09-16 NOTE — TELEPHONE ENCOUNTER
DENTIST OFFICE CALLED TO ASK IF IT WAS OK TO DO A FILLING AND DENTAL CLEANING  THEY WILL FAX US THE CLEARANCE TODAY TO OUR OFFICE

## 2020-09-23 ENCOUNTER — TELEPHONE (OUTPATIENT)
Dept: CARDIOLOGY CLINIC | Facility: CLINIC | Age: 80
End: 2020-09-23

## 2020-09-29 ENCOUNTER — TELEPHONE (OUTPATIENT)
Dept: CARDIOLOGY CLINIC | Facility: CLINIC | Age: 80
End: 2020-09-29

## 2020-09-29 NOTE — TELEPHONE ENCOUNTER
B/L Leg swelling since Friday, elevating & icing legs - pt has been monitoring her weight - 121 to 125 lbs (last 3 months) - does not think she has gained much - states mild swelling - extra tired, no dizziness, SOB, CP - mild lightheadedness - next appt 10/18th - cooks, cleans, shops, etc  - legs are better today - on aldactone - no BP monitoring at home - advise

## 2020-09-29 NOTE — TELEPHONE ENCOUNTER
Per Dr Yokasta Munoz - pt will take her torsemide daily until the swelling is resolved - pt made aware and understands

## 2020-10-07 ENCOUNTER — HOSPITAL ENCOUNTER (OUTPATIENT)
Dept: RADIOLOGY | Facility: HOSPITAL | Age: 80
Discharge: HOME | End: 2020-10-07
Attending: FAMILY MEDICINE
Payer: COMMERCIAL

## 2020-10-07 DIAGNOSIS — Z78.0 POSTMENOPAUSAL: ICD-10-CM

## 2020-10-07 PROCEDURE — 77080 DXA BONE DENSITY AXIAL: CPT

## 2020-10-14 ENCOUNTER — APPOINTMENT (OUTPATIENT)
Dept: LAB | Facility: CLINIC | Age: 80
End: 2020-10-14
Payer: COMMERCIAL

## 2020-10-14 ENCOUNTER — OFFICE VISIT (OUTPATIENT)
Dept: CARDIOLOGY CLINIC | Facility: CLINIC | Age: 80
End: 2020-10-14
Payer: COMMERCIAL

## 2020-10-14 VITALS
DIASTOLIC BLOOD PRESSURE: 88 MMHG | TEMPERATURE: 98.4 F | SYSTOLIC BLOOD PRESSURE: 144 MMHG | WEIGHT: 126 LBS | HEART RATE: 72 BPM | HEIGHT: 63 IN | BODY MASS INDEX: 22.32 KG/M2 | OXYGEN SATURATION: 98 %

## 2020-10-14 DIAGNOSIS — I70.229 ATHEROSCLEROSIS OF ARTERY OF EXTREMITY WITH REST PAIN (HCC): ICD-10-CM

## 2020-10-14 DIAGNOSIS — I10 ESSENTIAL HYPERTENSION: Primary | ICD-10-CM

## 2020-10-14 DIAGNOSIS — R01.1 CARDIAC MURMUR: ICD-10-CM

## 2020-10-14 DIAGNOSIS — I50.32 CHRONIC DIASTOLIC HEART FAILURE (HCC): ICD-10-CM

## 2020-10-14 DIAGNOSIS — E78.5 DYSLIPIDEMIA: ICD-10-CM

## 2020-10-14 DIAGNOSIS — I35.0 NONRHEUMATIC AORTIC VALVE STENOSIS: ICD-10-CM

## 2020-10-14 DIAGNOSIS — I65.21 STENOSIS OF RIGHT CAROTID ARTERY: ICD-10-CM

## 2020-10-14 DIAGNOSIS — E03.9 HYPOTHYROIDISM, UNSPECIFIED TYPE: ICD-10-CM

## 2020-10-14 DIAGNOSIS — I15.0 RENOVASCULAR HYPERTENSION: ICD-10-CM

## 2020-10-14 DIAGNOSIS — I10 BENIGN ESSENTIAL HTN: ICD-10-CM

## 2020-10-14 DIAGNOSIS — I74.09 AORTOILIAC OCCLUSIVE DISEASE (HCC): ICD-10-CM

## 2020-10-14 DIAGNOSIS — I50.32 HYPERTENSIVE HEART DISEASE WITH CHRONIC DIASTOLIC CONGESTIVE HEART FAILURE (HCC): ICD-10-CM

## 2020-10-14 DIAGNOSIS — I11.0 HYPERTENSIVE HEART DISEASE WITH CHRONIC DIASTOLIC CONGESTIVE HEART FAILURE (HCC): ICD-10-CM

## 2020-10-14 LAB — TSH SERPL DL<=0.05 MIU/L-ACNC: 1.24 UIU/ML (ref 0.36–3.74)

## 2020-10-14 PROCEDURE — 99214 OFFICE O/P EST MOD 30 MIN: CPT | Performed by: INTERNAL MEDICINE

## 2020-10-14 PROCEDURE — 84443 ASSAY THYROID STIM HORMONE: CPT

## 2020-10-14 PROCEDURE — 99407 BEHAV CHNG SMOKING > 10 MIN: CPT | Performed by: INTERNAL MEDICINE

## 2020-10-14 PROCEDURE — 36415 COLL VENOUS BLD VENIPUNCTURE: CPT

## 2020-11-07 DIAGNOSIS — N18.30 CKD (CHRONIC KIDNEY DISEASE) STAGE 3, GFR 30-59 ML/MIN (HCC): Chronic | ICD-10-CM

## 2020-11-07 DIAGNOSIS — R01.1 CARDIAC MURMUR: ICD-10-CM

## 2020-11-07 DIAGNOSIS — E78.5 DYSLIPIDEMIA: ICD-10-CM

## 2020-11-07 DIAGNOSIS — I12.9 HYPERTENSIVE CHRONIC KIDNEY DISEASE WITH STAGE 1 THROUGH STAGE 4 CHRONIC KIDNEY DISEASE, OR UNSPECIFIED CHRONIC KIDNEY DISEASE: ICD-10-CM

## 2020-11-07 DIAGNOSIS — I10 ESSENTIAL HYPERTENSION: Chronic | ICD-10-CM

## 2020-11-07 DIAGNOSIS — E78.2 MIXED HYPERLIPIDEMIA: ICD-10-CM

## 2020-11-09 ENCOUNTER — TELEMEDICINE (OUTPATIENT)
Dept: FAMILY MEDICINE CLINIC | Facility: CLINIC | Age: 80
End: 2020-11-09
Payer: COMMERCIAL

## 2020-11-09 DIAGNOSIS — R63.0 LOSS OF APPETITE: ICD-10-CM

## 2020-11-09 DIAGNOSIS — R52 BODY ACHES: Primary | ICD-10-CM

## 2020-11-09 PROCEDURE — 99441 PR PHYS/QHP TELEPHONE EVALUATION 5-10 MIN: CPT | Performed by: FAMILY MEDICINE

## 2020-11-09 RX ORDER — FLUTICASONE PROPIONATE 50 MCG
1 SPRAY, SUSPENSION (ML) NASAL DAILY
Qty: 16 G | Refills: 0 | Status: SHIPPED | OUTPATIENT
Start: 2020-11-09 | End: 2021-02-19 | Stop reason: SDUPTHER

## 2020-11-09 RX ORDER — TORSEMIDE 5 MG/1
5 TABLET ORAL DAILY
Qty: 30 TABLET | Refills: 0 | Status: SHIPPED | OUTPATIENT
Start: 2020-11-09 | End: 2020-12-07 | Stop reason: SDUPTHER

## 2020-11-09 RX ORDER — ENALAPRIL MALEATE 20 MG/1
20 TABLET ORAL 2 TIMES DAILY
Qty: 180 TABLET | Refills: 0
Start: 2020-11-09 | End: 2021-02-04 | Stop reason: SDUPTHER

## 2020-11-11 DIAGNOSIS — R63.0 LOSS OF APPETITE: ICD-10-CM

## 2020-11-11 DIAGNOSIS — R52 BODY ACHES: ICD-10-CM

## 2020-11-11 PROCEDURE — U0003 INFECTIOUS AGENT DETECTION BY NUCLEIC ACID (DNA OR RNA); SEVERE ACUTE RESPIRATORY SYNDROME CORONAVIRUS 2 (SARS-COV-2) (CORONAVIRUS DISEASE [COVID-19]), AMPLIFIED PROBE TECHNIQUE, MAKING USE OF HIGH THROUGHPUT TECHNOLOGIES AS DESCRIBED BY CMS-2020-01-R: HCPCS | Performed by: FAMILY MEDICINE

## 2020-11-13 LAB — SARS-COV-2 RNA SPEC QL NAA+PROBE: NOT DETECTED

## 2020-11-16 ENCOUNTER — TELEPHONE (OUTPATIENT)
Dept: FAMILY MEDICINE CLINIC | Facility: CLINIC | Age: 80
End: 2020-11-16

## 2020-11-23 ENCOUNTER — TELEPHONE (OUTPATIENT)
Dept: VASCULAR SURGERY | Facility: CLINIC | Age: 80
End: 2020-11-23

## 2020-11-23 DIAGNOSIS — E03.9 HYPOTHYROIDISM, UNSPECIFIED TYPE: ICD-10-CM

## 2020-12-01 DIAGNOSIS — E03.9 HYPOTHYROIDISM, UNSPECIFIED TYPE: ICD-10-CM

## 2020-12-01 RX ORDER — LEVOTHYROXINE SODIUM 0.03 MG/1
25 TABLET ORAL
Qty: 90 TABLET | Refills: 3 | Status: SHIPPED | OUTPATIENT
Start: 2020-12-01 | End: 2021-11-26 | Stop reason: SDUPTHER

## 2020-12-07 DIAGNOSIS — R01.1 CARDIAC MURMUR: ICD-10-CM

## 2020-12-07 DIAGNOSIS — I12.9 HYPERTENSIVE CHRONIC KIDNEY DISEASE WITH STAGE 1 THROUGH STAGE 4 CHRONIC KIDNEY DISEASE, OR UNSPECIFIED CHRONIC KIDNEY DISEASE: ICD-10-CM

## 2020-12-07 DIAGNOSIS — E78.5 DYSLIPIDEMIA: ICD-10-CM

## 2020-12-07 DIAGNOSIS — N18.30 CKD (CHRONIC KIDNEY DISEASE) STAGE 3, GFR 30-59 ML/MIN (HCC): Chronic | ICD-10-CM

## 2020-12-08 RX ORDER — TORSEMIDE 5 MG/1
5 TABLET ORAL DAILY
Qty: 30 TABLET | Refills: 3 | Status: SHIPPED | OUTPATIENT
Start: 2020-12-08 | End: 2020-12-09 | Stop reason: SDUPTHER

## 2020-12-09 ENCOUNTER — HOSPITAL ENCOUNTER (OUTPATIENT)
Dept: RADIOLOGY | Facility: HOSPITAL | Age: 80
Discharge: HOME | End: 2020-12-09
Attending: SURGERY
Payer: COMMERCIAL

## 2020-12-09 ENCOUNTER — TELEPHONE (OUTPATIENT)
Dept: FAMILY MEDICINE CLINIC | Facility: CLINIC | Age: 80
End: 2020-12-09

## 2020-12-09 ENCOUNTER — HOSPITAL ENCOUNTER (OUTPATIENT)
Dept: RADIOLOGY | Facility: HOSPITAL | Age: 80
Discharge: HOME | End: 2020-12-09
Payer: COMMERCIAL

## 2020-12-09 DIAGNOSIS — I74.09 AORTOILIAC OCCLUSIVE DISEASE (HCC): Chronic | ICD-10-CM

## 2020-12-09 DIAGNOSIS — I65.21 STENOSIS OF RIGHT CAROTID ARTERY: Chronic | ICD-10-CM

## 2020-12-09 PROCEDURE — 93925 LOWER EXTREMITY STUDY: CPT | Performed by: SURGERY

## 2020-12-09 PROCEDURE — 93923 UPR/LXTR ART STDY 3+ LVLS: CPT

## 2020-12-09 PROCEDURE — 93978 VASCULAR STUDY: CPT

## 2020-12-09 PROCEDURE — 93978 VASCULAR STUDY: CPT | Performed by: SURGERY

## 2020-12-09 PROCEDURE — 93925 LOWER EXTREMITY STUDY: CPT

## 2020-12-09 PROCEDURE — 93922 UPR/L XTREMITY ART 2 LEVELS: CPT | Performed by: SURGERY

## 2020-12-09 PROCEDURE — 93880 EXTRACRANIAL BILAT STUDY: CPT | Performed by: SURGERY

## 2020-12-09 PROCEDURE — 93880 EXTRACRANIAL BILAT STUDY: CPT

## 2020-12-09 RX ORDER — TORSEMIDE 5 MG/1
5 TABLET ORAL DAILY
Qty: 30 TABLET | Refills: 3 | Status: CANCELLED | OUTPATIENT
Start: 2020-12-09

## 2020-12-10 RX ORDER — TORSEMIDE 5 MG/1
5 TABLET ORAL DAILY
Qty: 30 TABLET | Refills: 5 | Status: SHIPPED | OUTPATIENT
Start: 2020-12-10 | End: 2021-01-08 | Stop reason: SDUPTHER

## 2020-12-11 ENCOUNTER — TELEMEDICINE (OUTPATIENT)
Dept: VASCULAR SURGERY | Facility: CLINIC | Age: 80
End: 2020-12-11
Payer: COMMERCIAL

## 2020-12-11 VITALS — WEIGHT: 125 LBS | BODY MASS INDEX: 22.15 KG/M2 | HEIGHT: 63 IN

## 2020-12-11 DIAGNOSIS — I74.09 AORTOILIAC OCCLUSIVE DISEASE (HCC): Chronic | ICD-10-CM

## 2020-12-11 DIAGNOSIS — I65.23 BILATERAL CAROTID ARTERY STENOSIS: Primary | Chronic | ICD-10-CM

## 2020-12-11 PROCEDURE — 99442 PR PHYS/QHP TELEPHONE EVALUATION 11-20 MIN: CPT | Performed by: SURGERY

## 2020-12-23 RX ORDER — LEVOTHYROXINE SODIUM 0.03 MG/1
TABLET ORAL
Qty: 90 TABLET | Refills: 3 | OUTPATIENT
Start: 2020-12-23

## 2021-01-08 DIAGNOSIS — E78.5 DYSLIPIDEMIA: ICD-10-CM

## 2021-01-08 DIAGNOSIS — I12.9 HYPERTENSIVE CHRONIC KIDNEY DISEASE WITH STAGE 1 THROUGH STAGE 4 CHRONIC KIDNEY DISEASE, OR UNSPECIFIED CHRONIC KIDNEY DISEASE: ICD-10-CM

## 2021-01-08 DIAGNOSIS — R01.1 CARDIAC MURMUR: ICD-10-CM

## 2021-01-08 DIAGNOSIS — N18.30 CKD (CHRONIC KIDNEY DISEASE) STAGE 3, GFR 30-59 ML/MIN (HCC): Chronic | ICD-10-CM

## 2021-01-08 RX ORDER — TORSEMIDE 5 MG/1
5 TABLET ORAL DAILY
Qty: 90 TABLET | Refills: 3 | Status: ON HOLD | OUTPATIENT
Start: 2021-01-08 | End: 2021-10-20 | Stop reason: SDUPTHER

## 2021-01-11 ENCOUNTER — TELEMEDICINE (OUTPATIENT)
Dept: FAMILY MEDICINE CLINIC | Facility: CLINIC | Age: 81
End: 2021-01-11
Payer: COMMERCIAL

## 2021-01-11 DIAGNOSIS — R53.83 OTHER FATIGUE: ICD-10-CM

## 2021-01-11 DIAGNOSIS — R06.02 SHORTNESS OF BREATH: ICD-10-CM

## 2021-01-11 DIAGNOSIS — R05.9 COUGH: Primary | ICD-10-CM

## 2021-01-11 PROCEDURE — 99441 PR PHYS/QHP TELEPHONE EVALUATION 5-10 MIN: CPT | Performed by: FAMILY MEDICINE

## 2021-01-11 NOTE — PROGRESS NOTES
COVID-19 Virtual Visit     Assessment/Plan:    Problem List Items Addressed This Visit     None      Visit Diagnoses     Cough    -  Primary    Relevant Orders    Novel Coronavirus (COVID-19), PCR [facility] - Collected at [facility] or [facility]    Shortness of breath        Relevant Orders    Novel Coronavirus (COVID-19), PCR [facility] - Collected at [facility] or [facility]    Other fatigue        Relevant Orders    Novel Coronavirus (COVID-19), PCR [facility] - Collected at [facility] or [facility]         Disposition:     I referred patient to one of our centralized sites for a COVID-19 swab  Patient is worried for covid and would like to get tested  Advised she is suppose to go for bloodwork prior to cat scan and should not go until results given concern  Patient agrees  Monitor for any worsening symptoms  Precautions reviewed  I have spent 10 minutes directly with the patient  Encounter provider Virginia Marie MD    Provider located at [address] [place] [id number]    Recent Visits  No visits were found meeting these conditions  Showing recent visits within past 7 days and meeting all other requirements     Today's Visits  Date Type Provider Dept   01/11/21 Telemedicine Virginia Marie MD [address] [place] [address] today's visits and meeting all other requirements     Future Appointments  No visits were found meeting these conditions  Showing future appointments within next 150 days and meeting all other requirements        Patient agrees to participate in a virtual check in via telephone or video visit instead of presenting to the office to address urgent/immediate medical needs  Patient is aware this is a billable service  After connecting through Telephone, the patient was identified by name and date of birth   Swapnil Donaldson was informed that this was a telemedicine visit and that the exam was being conducted confidentially over secure lines  My office door was closed  No one else was in the room  Lorene Wells acknowledged consent and understanding of privacy and security of the telemedicine visit  I informed the patient that I have reviewed her record in Epic and presented the opportunity for her to ask any questions regarding the visit today  The patient agreed to participate  It was my intent to perform this visit via video technology but the patient was not able to do a video connection so the visit was completed via audio telephone only  Subjective:   Lorene Wells is a [de-identified] y o  female who is concerned about COVID-19  Patient's symptoms include fatigue, sore throat, anosmia, cough, shortness of breath and nausea  Patient denies fever, chills, abdominal pain, vomiting, diarrhea, myalgias and headaches  Date of symptom onset: 1/8/2020    Exposure:   Contact with a person who is under investigation (PUI) for or who is positive for COVID-19 within the last 14 days?: No    Hospitalized recently for fever and/or lower respiratory symptoms?: No      Currently a healthcare worker that is involved in direct patient care?: No      Works in a special setting where the risk of COVID-19 transmission may be high? (this may include long-term care, correctional and long-term facilities; homeless shelters; assisted-living facilities and group homes ): No      Resident in a special setting where the risk of COVID-19 transmission may be high? (this may include long-term care, correctional and long-term facilities; homeless shelters; assisted-living facilities and group homes ): No      Patient symptoms started with cough and became worse dry cough today and feeling winded       Lab Results   Component Value Date    SARSCOV2 Not Detected 11/11/2020     Past Medical History:   Diagnosis Date    Arthritis     Benign essential hypertension     CAD (coronary artery disease)     Disease of thyroid gland     Dizziness     Edema of leg     Hyperlipidemia     Hypertension     Other disorder of circulatory system (CODE)      Past Surgical History:   Procedure Laterality Date    BREAST SURGERY      bxs,benign    COLONOSCOPY      HYSTERECTOMY      INCISIONAL BREAST BIOPSY      x5, 1964, 1965, 1985 and [address] Bilateral 12/9/2019    Procedure: BYPASS AORTA BIFEMORAL WITH LEFT FEMORAL THROMBOEMBOLECTOMY;  Surgeon: Shari iFnk MD;  [facility];  Service: Vascular     Current Outpatient Medications   Medication Sig Dispense Refill    acetaminophen (TYLENOL) 325 mg tablet Take 2 tablets (650 mg total) by mouth every 6 (six) hours as needed for mild pain, headaches or fever 30 tablet 0    albuterol (Ventolin HFA) 90 mcg/act inhaler Inhale 2 puffs every 6 (six) hours as needed for wheezing 18 g 2    aspirin 81 MG tablet Take 2 tablets by mouth daily      atorvastatin (LIPITOR) 80 mg tablet Take 1 tablet (80 mg total) by mouth daily 90 tablet 3    B Complex Vitamins (VITAMIN B-COMPLEX) TABS Take by mouth      Bioflavonoid Products (VITAMIN C PLUS PO) Take by mouth daily      carvedilol (COREG) 12 5 mg tablet Take 1 tablet (12 5 mg total) by mouth 2 (two) times a day 60 tablet 5    cholecalciferol (VITAMIN D3) 1,000 units tablet Take 4,000 Units by mouth daily       clopidogrel (PLAVIX) 75 mg tablet take 1 tablet by mouth once daily 90 tablet 3    enalapril (VASOTEC) 20 mg tablet Take 1 tablet (20 mg total) by mouth 2 (two) times a day 180 tablet 0    fluticasone (FLONASE) 50 mcg/act nasal spray 1 spray into each nostril daily 16 g 0    gabapentin (NEURONTIN) 100 mg capsule Take 1 capsule (100 mg total) by mouth 3 (three) times a day (Patient taking differently: Take 100 mg by mouth daily at bedtime ) 180 capsule 0    GARLIC PO Take by mouth daily       levothyroxine 25 mcg tablet Take 1 tablet (25 mcg total) by mouth daily in the early morning 90 tablet 3    magnesium oxide (MAG-OX) 400 mg Take 1 tablet (400 mg total) by mouth daily 30 tablet 5    nicotine (NICODERM CQ) 14 mg/24hr TD 24 hr patch Place 1 patch on the skin every 24 hours (Patient not taking: Reported on 9/8/2020) 14 patch 0    nicotine (NICODERM CQ) 21 mg/24 hr TD 24 hr patch Place 1 patch on the skin every 24 hours (Patient not taking: Reported on 9/8/2020) 14 patch 0    nicotine (NICODERM CQ) 7 mg/24hr TD 24 hr patch Place 1 patch on the skin every 24 hours (Patient not taking: Reported on 9/8/2020) 28 patch 0    POTASSIUM CHLORIDE ER PO Take 99 mEq by mouth daily       spironolactone (ALDACTONE) 25 mg tablet Take 1 tablet (25 mg total) by mouth daily 30 tablet 5    torsemide (DEMADEX) 5 MG tablet Take 1 tablet (5 mg total) by mouth daily 90 tablet 3     No current facility-administered medications for this visit  Allergies   Allergen Reactions    Thiazide-Type Diuretics      Hyponatremia       Review of Systems   Constitutional: Positive for fatigue  Negative for chills and fever  HENT: Positive for sore throat  Respiratory: Positive for cough and shortness of breath  Gastrointestinal: Positive for nausea  Negative for abdominal pain, diarrhea and vomiting  Musculoskeletal: Negative for myalgias  Neurological: Negative for headaches  Objective: There were no vitals filed for this visit  Physical Exam  VIRTUAL VISIT DISCLAIMER    Ga Wolff acknowledges that she has consented to an online visit or consultation  She understands that the online visit is based solely on information provided by her, and that, in the absence of a face-to-face physical evaluation by the physician, the diagnosis she receives is both limited and provisional in terms of accuracy and completeness  This is not intended to replace a full medical face-to-face evaluation by the physician  Shannon Husain understands and accepts these terms

## 2021-01-12 DIAGNOSIS — R05.9 COUGH: ICD-10-CM

## 2021-01-12 DIAGNOSIS — I15.0 RENOVASCULAR HYPERTENSION: ICD-10-CM

## 2021-01-12 DIAGNOSIS — R53.83 OTHER FATIGUE: ICD-10-CM

## 2021-01-12 DIAGNOSIS — R06.02 SHORTNESS OF BREATH: ICD-10-CM

## 2021-01-12 PROCEDURE — U0005 INFEC AGEN DETEC AMPLI PROBE: HCPCS | Performed by: FAMILY MEDICINE

## 2021-01-12 PROCEDURE — U0003 INFECTIOUS AGENT DETECTION BY NUCLEIC ACID (DNA OR RNA); SEVERE ACUTE RESPIRATORY SYNDROME CORONAVIRUS 2 (SARS-COV-2) (CORONAVIRUS DISEASE [COVID-19]), AMPLIFIED PROBE TECHNIQUE, MAKING USE OF HIGH THROUGHPUT TECHNOLOGIES AS DESCRIBED BY CMS-2020-01-R: HCPCS | Performed by: FAMILY MEDICINE

## 2021-01-13 ENCOUNTER — TELEPHONE (OUTPATIENT)
Dept: CARDIOLOGY CLINIC | Facility: CLINIC | Age: 81
End: 2021-01-13

## 2021-01-13 RX ORDER — CARVEDILOL 12.5 MG/1
TABLET ORAL
Qty: 60 TABLET | Refills: 5 | Status: SHIPPED | OUTPATIENT
Start: 2021-01-13 | End: 2021-05-10 | Stop reason: SDUPTHER

## 2021-01-14 LAB — SARS-COV-2 RNA SPEC QL NAA+PROBE: NOT DETECTED

## 2021-01-15 ENCOUNTER — TELEPHONE (OUTPATIENT)
Dept: FAMILY MEDICINE CLINIC | Facility: CLINIC | Age: 81
End: 2021-01-15

## 2021-02-04 DIAGNOSIS — I10 ESSENTIAL HYPERTENSION: Chronic | ICD-10-CM

## 2021-02-04 RX ORDER — ENALAPRIL MALEATE 20 MG/1
20 TABLET ORAL 2 TIMES DAILY
Qty: 180 TABLET | Refills: 3
Start: 2021-02-04 | End: 2021-02-09

## 2021-02-06 DIAGNOSIS — I10 ESSENTIAL HYPERTENSION: Chronic | ICD-10-CM

## 2021-02-09 ENCOUNTER — OFFICE VISIT (OUTPATIENT)
Dept: CARDIOLOGY CLINIC | Facility: CLINIC | Age: 81
End: 2021-02-09
Payer: COMMERCIAL

## 2021-02-09 VITALS
OXYGEN SATURATION: 98 % | BODY MASS INDEX: 21.26 KG/M2 | DIASTOLIC BLOOD PRESSURE: 90 MMHG | HEART RATE: 65 BPM | SYSTOLIC BLOOD PRESSURE: 138 MMHG | HEIGHT: 63 IN | WEIGHT: 120 LBS | TEMPERATURE: 98.4 F

## 2021-02-09 DIAGNOSIS — I10 ESSENTIAL HYPERTENSION: Primary | Chronic | ICD-10-CM

## 2021-02-09 DIAGNOSIS — I35.0 NONRHEUMATIC AORTIC VALVE STENOSIS: ICD-10-CM

## 2021-02-09 DIAGNOSIS — I65.21 STENOSIS OF RIGHT CAROTID ARTERY: ICD-10-CM

## 2021-02-09 DIAGNOSIS — I50.32 CHRONIC DIASTOLIC HEART FAILURE (HCC): ICD-10-CM

## 2021-02-09 DIAGNOSIS — Z72.0 TOBACCO ABUSE: ICD-10-CM

## 2021-02-09 DIAGNOSIS — E78.5 DYSLIPIDEMIA: ICD-10-CM

## 2021-02-09 DIAGNOSIS — I15.0 RENOVASCULAR HYPERTENSION: ICD-10-CM

## 2021-02-09 DIAGNOSIS — I10 BENIGN ESSENTIAL HTN: ICD-10-CM

## 2021-02-09 PROCEDURE — 99214 OFFICE O/P EST MOD 30 MIN: CPT | Performed by: INTERNAL MEDICINE

## 2021-02-09 PROCEDURE — 93000 ELECTROCARDIOGRAM COMPLETE: CPT | Performed by: INTERNAL MEDICINE

## 2021-02-09 RX ORDER — ENALAPRIL MALEATE 20 MG/1
TABLET ORAL
Qty: 180 TABLET | Refills: 3 | Status: SHIPPED | OUTPATIENT
Start: 2021-02-09 | End: 2021-05-10 | Stop reason: SDUPTHER

## 2021-02-09 NOTE — PROGRESS NOTES
Cardiology   Heriberto Hendrix DO, Collette Infante MD, Melinda Mcqueen MD, Tia Lima MD, [facility] - [place]  -------------------------------------------------------------------  [facility]  [address], [address] [facility] [place] [address], [address]  [phone]    Cardiology Follow Up  Marvin Jarquin  1940  [id number]          Assessment/Plan:    1  Essential hypertension    2  Dyslipidemia    3  Nonrheumatic aortic valve stenosis    4  Chronic diastolic heart failure ([address] )    5  Benign essential HTN    6  Renovascular hypertension    7  Tobacco abuse    8  Stenosis of right carotid artery      - Continue torsemide 5 mg daily    - Home BP monitoring  - discussed smoking cessation with her in detail  Nicotine patches prescribed starting at 21 milligrams for 7 days followed by 14 milligrams for 7 days and then 7 milligrams for 1 month  Patient would like patches from [address] [place] [address] [employer] (brand used in [facility])   - continue aspirin and Plavix  - continue enalapril and carvedilol  Her last ejection fraction was normal   BP elevated today because enalapril was missed  Refill sent to pharmacy  - Will obtain lipid panel  Interval History:     Marvin Jarquin is [de-identified] y o  female here for followup of hypertension and CHF  Since her last visit, she has been feeling well   she denies any palpitations, chest pain, shortness of breath, LE edema, orthopnea or PND  Diet is overall unchanged  There has not been a significant change in weight  BP has been elevated but she has missed enalapril for past 2 days  She is schedule for CTA of neck to follow up carotid disease  Previously, torsemide was stopped but was restarted because edema developed  Has been controlled since then  Continues to smoke but is cutting down  Has gotten nicotine patches  Bought an exercise bike which she has been using regularly       The patient has a history of hypertension and peripheral vascular disease  She had prior aortobifemoral bypass in 2019  Blood pressure is usually well controlled on current medication regimen  May 29th blood work showed a creatinine was normal   LDL 44 and HDL of 80         Past Medical History:   Diagnosis Date    Arthritis     Benign essential hypertension     CAD (coronary artery disease)     Disease of thyroid gland     Dizziness     Edema of leg     Hyperlipidemia     Hypertension     Other disorder of circulatory system (CODE)      Social History     Socioeconomic History    Marital status:      Spouse name: Not on file    Number of children: Not on file    Years of education: Not on file    Highest education level: Not on file   Occupational History    Not on file   Social Needs    Financial resource strain: Not on file    Food insecurity     Worry: Not on file     Inability: Not on file    Transportation needs     Medical: Not on file     Non-medical: Not on file   Tobacco Use    Smoking status: Current Every Day Smoker     Packs/day: 0 50     Types: Cigarettes     Last attempt to quit: 2019     Years since quittin 1    Smokeless tobacco: Never Used   Substance and Sexual Activity    Alcohol use: Yes     Frequency: 4 or more times a week     Comment: wilfrid 2-3 every day for 50 years    Drug use: No    Sexual activity: Not on file   Lifestyle    Physical activity     Days per week: Not on file     Minutes per session: Not on file    Stress: Not on file   Relationships    Social connections     Talks on phone: Not on file     Gets together: Not on file     Attends Mu-ism service: Not on file     Active member of club or organization: Not on file     Attends meetings of clubs or organizations: Not on file     Relationship status: Not on file    Intimate partner violence     Fear of current or ex partner: Not on file     Emotionally abused: Not on file     Physically abused: Not on file     Forced sexual activity: Not on file   Other Topics Concern    Not on file   Social History Narrative    Rarely exercises    Sleeps 6-7 hours per day      Family History   Problem Relation Age of Onset    Hypertension Father     Coronary artery disease Family     Arthritis Family      Past Surgical History:   Procedure Laterality Date    BREAST SURGERY      bxs,benign    COLONOSCOPY      HYSTERECTOMY      INCISIONAL BREAST BIOPSY      x5, 1964, 1965, 1985 and [address] Bilateral 12/9/2019    Procedure: BYPASS AORTA BIFEMORAL WITH LEFT FEMORAL THROMBOEMBOLECTOMY;  Surgeon: Krishan Gusman MD;  [facility];  Service: Vascular       Current Outpatient Medications:     acetaminophen (TYLENOL) 325 mg tablet, Take 2 tablets (650 mg total) by mouth every 6 (six) hours as needed for mild pain, headaches or fever, Disp: 30 tablet, Rfl: 0    albuterol (Ventolin HFA) 90 mcg/act inhaler, Inhale 2 puffs every 6 (six) hours as needed for wheezing, Disp: 18 g, Rfl: 2    aspirin 81 MG tablet, Take 2 tablets by mouth daily, Disp: , Rfl:     atorvastatin (LIPITOR) 80 mg tablet, Take 1 tablet (80 mg total) by mouth daily, Disp: 90 tablet, Rfl: 3    B Complex Vitamins (VITAMIN B-COMPLEX) TABS, Take by mouth, Disp: , Rfl:     Bioflavonoid Products (VITAMIN C PLUS PO), Take by mouth daily, Disp: , Rfl:     carvedilol (COREG) 12 5 mg tablet, take 1 tablet by mouth twice a day, Disp: 60 tablet, Rfl: 5    cholecalciferol (VITAMIN D3) 1,000 units tablet, Take 4,000 Units by mouth daily , Disp: , Rfl:     clopidogrel (PLAVIX) 75 mg tablet, take 1 tablet by mouth once daily, Disp: 90 tablet, Rfl: 3    fluticasone (FLONASE) 50 mcg/act nasal spray, 1 spray into each nostril daily, Disp: 16 g, Rfl: 0    GARLIC PO, Take by mouth daily , Disp: , Rfl:     levothyroxine 25 mcg tablet, Take 1 tablet (25 mcg total) by mouth daily in the early morning, Disp: 90 tablet, Rfl: 3    magnesium oxide (MAG-OX) 400 mg, Take 1 tablet (400 mg total) by mouth daily, Disp: 30 tablet, Rfl: 5    nicotine (NICODERM CQ) 14 mg/24hr TD 24 hr patch, Place 1 patch on the skin every 24 hours, Disp: 14 patch, Rfl: 0    POTASSIUM CHLORIDE ER PO, Take 99 mEq by mouth daily , Disp: , Rfl:     spironolactone (ALDACTONE) 25 mg tablet, Take 1 tablet (25 mg total) by mouth daily, Disp: 30 tablet, Rfl: 5    torsemide (DEMADEX) 5 MG tablet, Take 1 tablet (5 mg total) by mouth daily, Disp: 90 tablet, Rfl: 3    enalapril (VASOTEC) 20 mg tablet, take 1 tablet by mouth twice a day, Disp: 180 tablet, Rfl: 3    gabapentin (NEURONTIN) 100 mg capsule, Take 1 capsule (100 mg total) by mouth 3 (three) times a day (Patient not taking: Reported on 2/9/2021), Disp: 180 capsule, Rfl: 0    nicotine (NICODERM CQ) 21 mg/24 hr TD 24 hr patch, Place 1 patch on the skin every 24 hours (Patient not taking: Reported on 9/8/2020), Disp: 14 patch, Rfl: 0    nicotine (NICODERM CQ) 7 mg/24hr TD 24 hr patch, Place 1 patch on the skin every 24 hours (Patient not taking: Reported on 9/8/2020), Disp: 28 patch, Rfl: 0        Review of Systems:  Review of Systems   Constitutional: Negative for chills and fever  HENT: Negative for ear pain and sore throat  Eyes: Negative for pain and visual disturbance  Respiratory: Negative for cough and shortness of breath  Cardiovascular: Negative for chest pain and palpitations  Gastrointestinal: Negative for abdominal pain and vomiting  Genitourinary: Negative for dysuria and hematuria  Musculoskeletal: Positive for arthralgias  Negative for back pain  Skin: Negative for color change and rash  Neurological: Negative for seizures and syncope  All other systems reviewed and are negative          Physical Exam:  Vitals:  Vitals:    02/09/21 1101   BP: 138/90   BP Location: Right arm   Patient Position: Sitting   Cuff Size: Standard   Pulse: 65   Temp: 98 4 °F (36 9 °C) TempSrc: Temporal   SpO2: 98%   Weight: 54 4 kg (120 lb)   Height: 5' 3" (1 6 m)     Physical Exam   Constitutional: She appears healthy  No distress  Eyes: Pupils are equal, round, and reactive to light  Conjunctivae are normal    Neck: Normal range of motion  Neck supple  No JVD present  Cardiovascular: Normal rate, regular rhythm and normal heart sounds  Exam reveals no gallop and no friction rub  No murmur heard  Pulmonary/Chest: Effort normal and breath sounds normal  She has no wheezes  She has no rales  Musculoskeletal:         General: No tenderness, deformity or edema  Neurological: She is alert and oriented to person, place, and time  Skin: Skin is warm and dry  Cardiographics:  EKG: Personally reviewed NSR with PACs and LVH  Last known EF: 60%    This note was completed in part utilizing M-Modal Fluency Direct Software  Grammatical errors, random word insertions, spelling mistakes, and incomplete sentences can be an occasional consequence of this system secondary to software limitations, ambient noise, and hardware issues  If you have any questions or concerns about the content, text, or information contained within the body of this dictation, please contact the provider for clarification

## 2021-02-16 ENCOUNTER — LAB (OUTPATIENT)
Dept: LAB | Facility: CLINIC | Age: 81
End: 2021-02-16
Payer: COMMERCIAL

## 2021-02-16 DIAGNOSIS — I10 ESSENTIAL HYPERTENSION: ICD-10-CM

## 2021-02-16 DIAGNOSIS — E78.2 MIXED HYPERLIPIDEMIA: ICD-10-CM

## 2021-02-16 DIAGNOSIS — I65.23 BILATERAL CAROTID ARTERY STENOSIS: Chronic | ICD-10-CM

## 2021-02-16 DIAGNOSIS — I74.09 AORTOILIAC OCCLUSIVE DISEASE (HCC): Chronic | ICD-10-CM

## 2021-02-16 LAB
ANION GAP SERPL CALCULATED.3IONS-SCNC: 5 MMOL/L (ref 4–13)
BUN SERPL-MCNC: 43 MG/DL (ref 5–25)
CALCIUM SERPL-MCNC: 9.9 MG/DL (ref 8.3–10.1)
CHLORIDE SERPL-SCNC: 111 MMOL/L (ref 100–108)
CHOLEST SERPL-MCNC: 128 MG/DL (ref 50–200)
CO2 SERPL-SCNC: 25 MMOL/L (ref 21–32)
CREAT SERPL-MCNC: 1.42 MG/DL (ref 0.6–1.3)
GFR SERPL CREATININE-BSD FRML MDRD: 35 ML/MIN/1.73SQ M
GLUCOSE P FAST SERPL-MCNC: 107 MG/DL (ref 65–99)
HDLC SERPL-MCNC: 86 MG/DL
LDLC SERPL CALC-MCNC: 37 MG/DL (ref 0–100)
NONHDLC SERPL-MCNC: 42 MG/DL
POTASSIUM SERPL-SCNC: 4.9 MMOL/L (ref 3.5–5.3)
SODIUM SERPL-SCNC: 141 MMOL/L (ref 136–145)
TRIGL SERPL-MCNC: 23 MG/DL

## 2021-02-16 PROCEDURE — 36415 COLL VENOUS BLD VENIPUNCTURE: CPT

## 2021-02-16 PROCEDURE — 80061 LIPID PANEL: CPT | Performed by: INTERNAL MEDICINE

## 2021-02-16 PROCEDURE — 80048 BASIC METABOLIC PNL TOTAL CA: CPT

## 2021-02-19 DIAGNOSIS — E78.2 MIXED HYPERLIPIDEMIA: ICD-10-CM

## 2021-02-19 RX ORDER — FLUTICASONE PROPIONATE 50 MCG
1 SPRAY, SUSPENSION (ML) NASAL DAILY
Qty: 16 G | Refills: 0 | Status: SHIPPED | OUTPATIENT
Start: 2021-02-19 | End: 2021-05-24

## 2021-02-21 RX ORDER — SPIRONOLACTONE 25 MG/1
25 TABLET ORAL DAILY
Qty: 90 TABLET | Refills: 3 | Status: SHIPPED | OUTPATIENT
Start: 2021-02-21 | End: 2021-11-16 | Stop reason: SDUPTHER

## 2021-02-21 RX ORDER — ATORVASTATIN CALCIUM 80 MG/1
80 TABLET, FILM COATED ORAL DAILY
Qty: 90 TABLET | Refills: 3 | Status: SHIPPED | OUTPATIENT
Start: 2021-02-21 | End: 2022-03-09 | Stop reason: SDUPTHER

## 2021-02-22 ENCOUNTER — TELEPHONE (OUTPATIENT)
Dept: CARDIOLOGY CLINIC | Facility: CLINIC | Age: 81
End: 2021-02-22

## 2021-02-22 NOTE — TELEPHONE ENCOUNTER
----- Message from Barbra Wallace DO sent at 2/21/2021  1:05 PM EST -----  Can you please let the patient know cholesterol test looks good  Little lower than last time

## 2021-02-25 ENCOUNTER — IMMUNIZATIONS (OUTPATIENT)
Dept: FAMILY MEDICINE CLINIC | Facility: HOSPITAL | Age: 81
End: 2021-02-25

## 2021-02-25 DIAGNOSIS — Z23 ENCOUNTER FOR IMMUNIZATION: Primary | ICD-10-CM

## 2021-02-25 PROCEDURE — 0011A SARS-COV-2 / COVID-19 MRNA VACCINE (MODERNA) 100 MCG: CPT

## 2021-02-25 PROCEDURE — 91301 SARS-COV-2 / COVID-19 MRNA VACCINE (MODERNA) 100 MCG: CPT

## 2021-03-22 ENCOUNTER — HOSPITAL ENCOUNTER (OUTPATIENT)
Dept: RADIOLOGY | Facility: HOSPITAL | Age: 81
Discharge: HOME | End: 2021-03-22
Attending: SURGERY
Payer: COMMERCIAL

## 2021-03-22 DIAGNOSIS — I74.09 AORTOILIAC OCCLUSIVE DISEASE (HCC): Chronic | ICD-10-CM

## 2021-03-22 DIAGNOSIS — I65.23 BILATERAL CAROTID ARTERY STENOSIS: Chronic | ICD-10-CM

## 2021-03-22 PROCEDURE — 70496 CT ANGIOGRAPHY HEAD: CPT

## 2021-03-22 PROCEDURE — G1004 CDSM NDSC: HCPCS

## 2021-03-22 PROCEDURE — 70498 CT ANGIOGRAPHY NECK: CPT

## 2021-03-22 RX ADMIN — IOHEXOL 85 ML: 350 INJECTION, SOLUTION INTRAVENOUS at 12:33

## 2021-03-26 ENCOUNTER — IMMUNIZATIONS (OUTPATIENT)
Dept: FAMILY MEDICINE CLINIC | Facility: HOSPITAL | Age: 81
End: 2021-03-26

## 2021-03-26 DIAGNOSIS — Z23 ENCOUNTER FOR IMMUNIZATION: Primary | ICD-10-CM

## 2021-03-26 PROCEDURE — 91301 SARS-COV-2 / COVID-19 MRNA VACCINE (MODERNA) 100 MCG: CPT

## 2021-03-26 PROCEDURE — 0012A SARS-COV-2 / COVID-19 MRNA VACCINE (MODERNA) 100 MCG: CPT

## 2021-03-30 ENCOUNTER — TELEPHONE (OUTPATIENT)
Dept: FAMILY MEDICINE CLINIC | Facility: CLINIC | Age: 81
End: 2021-03-30

## 2021-03-30 NOTE — TELEPHONE ENCOUNTER
She had the CT performed with Dr Shaneka Rodríguez, therefore please have her contact his office for information

## 2021-04-09 ENCOUNTER — OFFICE VISIT (OUTPATIENT)
Dept: VASCULAR SURGERY | Facility: CLINIC | Age: 81
End: 2021-04-09
Payer: COMMERCIAL

## 2021-04-09 ENCOUNTER — TELEPHONE (OUTPATIENT)
Dept: ADMINISTRATIVE | Facility: HOSPITAL | Age: 81
End: 2021-04-09

## 2021-04-09 VITALS
TEMPERATURE: 97.5 F | HEART RATE: 61 BPM | BODY MASS INDEX: 21.44 KG/M2 | DIASTOLIC BLOOD PRESSURE: 82 MMHG | WEIGHT: 121 LBS | SYSTOLIC BLOOD PRESSURE: 116 MMHG | HEIGHT: 63 IN

## 2021-04-09 DIAGNOSIS — G62.9 PERIPHERAL POLYNEUROPATHY: ICD-10-CM

## 2021-04-09 DIAGNOSIS — I11.0 HYPERTENSIVE HEART DISEASE WITH CHRONIC DIASTOLIC CONGESTIVE HEART FAILURE (HCC): ICD-10-CM

## 2021-04-09 DIAGNOSIS — I77.1 SUBCLAVIAN ARTERY STENOSIS, LEFT (HCC): Chronic | ICD-10-CM

## 2021-04-09 DIAGNOSIS — I50.32 HYPERTENSIVE HEART DISEASE WITH CHRONIC DIASTOLIC CONGESTIVE HEART FAILURE (HCC): ICD-10-CM

## 2021-04-09 DIAGNOSIS — I50.32 CHRONIC DIASTOLIC HEART FAILURE (HCC): ICD-10-CM

## 2021-04-09 DIAGNOSIS — I74.09 AORTOILIAC OCCLUSIVE DISEASE (HCC): Chronic | ICD-10-CM

## 2021-04-09 DIAGNOSIS — I65.23 BILATERAL CAROTID ARTERY STENOSIS: Chronic | ICD-10-CM

## 2021-04-09 DIAGNOSIS — I10 ESSENTIAL HYPERTENSION: Chronic | ICD-10-CM

## 2021-04-09 DIAGNOSIS — E03.9 HYPOTHYROIDISM, UNSPECIFIED TYPE: Primary | ICD-10-CM

## 2021-04-09 DIAGNOSIS — F17.200 NICOTINE DEPENDENCE, UNCOMPLICATED, UNSPECIFIED NICOTINE PRODUCT TYPE: ICD-10-CM

## 2021-04-09 DIAGNOSIS — I35.0 NONRHEUMATIC AORTIC VALVE STENOSIS: ICD-10-CM

## 2021-04-09 PROBLEM — I70.229 ATHEROSCLEROSIS OF ARTERY OF EXTREMITY WITH REST PAIN (HCC): Chronic | Status: RESOLVED | Noted: 2017-08-30 | Resolved: 2021-04-09

## 2021-04-09 PROCEDURE — 99214 OFFICE O/P EST MOD 30 MIN: CPT | Performed by: SURGERY

## 2021-04-09 RX ORDER — CHLORHEXIDINE GLUCONATE 0.12 MG/ML
15 RINSE ORAL ONCE
Status: CANCELLED | OUTPATIENT
Start: 2021-04-09 | End: 2021-04-09

## 2021-04-09 RX ORDER — CHLORHEXIDINE GLUCONATE 0.12 MG/ML
15 RINSE ORAL EVERY 12 HOURS SCHEDULED
Status: CANCELLED | OUTPATIENT
Start: 2021-04-09

## 2021-04-09 RX ORDER — CEFAZOLIN SODIUM 1 G/50ML
1000 SOLUTION INTRAVENOUS ONCE
Status: CANCELLED | OUTPATIENT
Start: 2021-04-09 | End: 2021-04-09

## 2021-04-09 NOTE — ASSESSMENT & PLAN NOTE
Wt Readings from Last 3 Encounters:   04/09/21 54 9 kg (121 lb)   02/09/21 54 4 kg (120 lb)   12/11/20 56 7 kg (125 lb)   Patient denies chest pain or shortness of breath  However will obtain cardiology risk assessment prior to undergoing carotid endarterectomy

## 2021-04-09 NOTE — TELEPHONE ENCOUNTER
REMINDER: Under Reason For Call, comments MUST be formatted as:   (Surgeon's Initials) / (Procedure)  Patient Prefers [facility] Prefers to be performed in May or June 2021  Physician / Bakari Magdaleno: Angelique Acosta (NPI: [id number]) / Sancho (Tax: [id number] / NPI: [id number])    Procedure: RIGHT CAROTID ENDARTERECTOMY    Level: 4 - Route clearance(s) to [facility] Surgery Coordinator    Rep: No    Assistant Surgeon: No    Allergies: Thiazide-type diuretics    Instructions Given: NO Bowel Prep General Instructions    Blood Thinners / Medication Hold: Do not hold Rx - Plavix (Clopidogrel)   Do not hold Aspirin  Hydration Required: Patient does not require hydration  Dialysis: Patient is not on dialysis  Consent: I certify that patient has signed, printed, timed, and dated their surgery consent  I certify that BOTH sides of the completed surgery consent have been scanned into the patient's Epic chart by myself on 4/9/2021  Yes, I have LABELED the consent in Epic as Consent for Vascular Procedure  Clearances     Levels   1-3 ROUTE this encounter to [facility] Clearance Pool   AND   SEND Clearance Form(s) to Vascular Nursing e-mail group   Level   4 ROUTE this encounter to [facility] Surgery Coordinator  AND   SEND Clearance Form(s) to Vascular Surgery Schedulers e-mail group     Patient requires Cardiology  clearance  Spoke with, Radha Vo, at Dr Della Cowan  Patient's appointment with Dr Juma Acosta  has been scheduled for [address] [place] [address] 9, 2021 at am   Office contact information P: [phone] - F: [phone]      Yes, I have ROUTED this encounter to [facility] Surgery Coordinator and/or [facility] Clearance Pool

## 2021-04-09 NOTE — PROGRESS NOTES
Assessment/Plan: Aortoiliac occlusive disease (HCC)  Severe aortoiliac occlusive disease which has now been treated with aortobifemoral bypass graft  Patient no longer has rest pain or claudication as was previously present  Carotid artery stenosis  Severe greater than 80% stenosis of right ICA by CTA  Patient shows profound calcified plaque burden at the bifurcation  There is also calcified plaque in the mid common carotid artery  Discussed risks versus benefits of surgery  Patient wishes to undergo carotid endarterectomy  Patient will require cardiology risk assessment prior to undergoing surgery  In this asymptomatic patient if she is a prohibitive cardiac risk we will continue to monitor  This was discussed with patient  Patient does not need to hold Plavix or aspirin for the surgery  Hypertensive heart disease with chronic diastolic congestive heart failure (HCC)  Wt Readings from Last 3 Encounters:   04/09/21 54 9 kg (121 lb)   02/09/21 54 4 kg (120 lb)   12/11/20 56 7 kg (125 lb)   Patient denies chest pain or shortness of breath  However will obtain cardiology risk assessment prior to undergoing carotid endarterectomy  Diagnoses and all orders for this visit:    Hypothyroidism, unspecified type    Subclavian artery stenosis, left (HCC)    Hypertensive heart disease with chronic diastolic congestive heart failure (HCC)    Essential hypertension    Chronic diastolic heart failure (HCC)    Bilateral carotid artery stenosis    Nonrheumatic aortic valve stenosis    Peripheral polyneuropathy    Nicotine dependence, uncomplicated, unspecified nicotine product type    Aortoiliac occlusive disease (HCC)          Subjective:      Patient ID: Diaz Dove is a [de-identified] y o  female  Patient is here to rev CTA of head/ neck done on 3/22/21  CTA was performed after carotid duplex showed significant velocity elevations within the right ICA    Velocities were noted to be 402/82 with an ICA/CCA ratio of 2 99  The CTA shows greater than 80% stenosis of the right ICA  There is profound calcified plaque at the bifurcation  Of note there is approximately 40% stenosis of the proximal common carotid artery  There is calcified plaque at the mid common carotid artery which does not cause significant stenosis however, will be risk for embolization at the time of proximal clamping  I have discussed these findings with the patient and have recommended carotid endarterectomy  We discussed the risks of the procedure which include infection, stroke, bleeding, and cranial nerve injury  Patient expressed understanding and wishes me to proceed  She will require cardiology risk assessment prior to undergoing surgery  She sees Dr Yaquelin Edwards  Patient denies TIA/ Stroke symptoms  Patient is on ASA 81 mg, Lipitor, and Plavix  The following portions of the patient's history were reviewed and updated as appropriate: allergies, current medications, past family history, past medical history, past social history, past surgical history and problem list     Review of Systems   Constitutional: Negative  HENT: Negative  Eyes: Negative  Respiratory: Negative  Cardiovascular: Negative  Gastrointestinal: Negative  Endocrine: Negative  Genitourinary: Negative  Musculoskeletal: Negative  Skin: Negative  Allergic/Immunologic: Negative  Neurological: Negative  Hematological: Negative  Psychiatric/Behavioral: Negative  Objective:      /82 (BP Location: Right arm, Patient Position: Sitting, Cuff Size: Standard)   Pulse 61   Temp 97 5 °F (36 4 °C) (Tympanic)   Ht 5' 3" (1 6 m)   Wt 54 9 kg (121 lb)   BMI 21 43 kg/m²          Physical Exam  Vitals signs and nursing note reviewed  Constitutional:       Appearance: She is well-developed  HENT:      Head: Normocephalic and atraumatic     Eyes:      Conjunctiva/sclera: Conjunctivae normal       Pupils: Pupils are equal, round, and reactive to light  Neck:      Musculoskeletal: Normal range of motion and neck supple  Vascular: Carotid bruit present  No JVD  Cardiovascular:      Rate and Rhythm: Normal rate and regular rhythm  Heart sounds: Normal heart sounds  Comments: Palpable femoral pulses bilaterally  Pulmonary:      Effort: Pulmonary effort is normal  No respiratory distress  Breath sounds: Normal breath sounds  No stridor  No wheezing or rales  Chest:      Chest wall: No tenderness  Abdominal:      General: There is no distension  Palpations: Abdomen is soft  There is no mass  Tenderness: There is no abdominal tenderness  There is no guarding or rebound  Comments: No ventral hernia noted   Musculoskeletal: Normal range of motion  General: No tenderness or deformity  Skin:     General: Skin is warm and dry  Findings: No erythema or rash  Neurological:      Mental Status: She is alert and oriented to person, place, and time  Psychiatric:         Behavior: Behavior normal          Thought Content:  Thought content normal        /  Operative Scheduling Information:    Hospital:  [facility]    Physician:  Me    Surgery: RCEA with EEG    Urgency:  Standard    Case Length:  Normal    Post-op Bed:  ICU    OR Table:  Standard    Equipment Needs:  None    Medication Instructions:  Aspirin:   Continue (do not hold)  Plavix:  Continue (do not hold)    Hydration:  No

## 2021-04-09 NOTE — ASSESSMENT & PLAN NOTE
Severe greater than 80% stenosis of right ICA by CTA  Patient shows profound calcified plaque burden at the bifurcation  There is also calcified plaque in the mid common carotid artery  Discussed risks versus benefits of surgery  Patient wishes to undergo carotid endarterectomy  Patient will require cardiology risk assessment prior to undergoing surgery  In this asymptomatic patient if she is a prohibitive cardiac risk we will continue to monitor  This was discussed with patient  Patient does not need to hold Plavix or aspirin for the surgery

## 2021-04-09 NOTE — LETTER
April 9, 2021     Kasia Leon, [address]    Patient: Sana Sosa   YOB: 1940   Date of Visit: 4/9/2021       Dear Dr Darren Kline: Thank you for referring Renetta Braxton to me for evaluation  Below are the relevant portions of my assessment and plan of care  Patient is here to rev CTA of head/ neck done on 3/22/21  CTA was performed after carotid duplex showed significant velocity elevations within the right ICA  Velocities were noted to be 402/82 with an ICA/CCA ratio of 2 99  The CTA shows greater than 80% stenosis of the right ICA  There is profound calcified plaque at the bifurcation  Of note there is approximately 40% stenosis of the proximal common carotid artery  There is calcified plaque at the mid common carotid artery which does not cause significant stenosis however, will be risk for embolization at the time of proximal clamping  I have discussed these findings with the patient and have recommended carotid endarterectomy  We discussed the risks of the procedure which include infection, stroke, bleeding, and cranial nerve injury  Patient expressed understanding and wishes me to proceed  She will require cardiology risk assessment prior to undergoing surgery  She sees Dr Dashawn Krishnan  Patient denies TIA/ Stroke symptoms  Patient is on ASA 81 mg, Lipitor, and Plavix  If you have questions, please do not hesitate to call me  I look forward to following Mary Douglas along with you           Sincerely,        Sobia Wynne MD        CC: Paulino Medrano DO

## 2021-04-09 NOTE — ASSESSMENT & PLAN NOTE
Severe aortoiliac occlusive disease which has now been treated with aortobifemoral bypass graft  Patient no longer has rest pain or claudication as was previously present

## 2021-04-12 ENCOUNTER — TELEPHONE (OUTPATIENT)
Dept: FAMILY MEDICINE CLINIC | Facility: CLINIC | Age: 81
End: 2021-04-12

## 2021-04-12 NOTE — TELEPHONE ENCOUNTER
Pt would like to discuss upcoming surgery   She is wondering if it would be possible to schedule with Dr Bruna Jacobsen rather than Dr Barbara Harris only because he has done many of her surgeries

## 2021-04-13 NOTE — TELEPHONE ENCOUNTER
Spoke with patient that she should call the vascular office and discuss it with them if she would want dr Sarkis Mitchell  Did discuss that it would be in [place] if she would chose that

## 2021-04-14 NOTE — TELEPHONE ENCOUNTER
I received call from patient and she is requesting Dr Ubaldo Schafer do her surgery because she had him before  I told her she would have to make an appt to see him

## 2021-04-22 ENCOUNTER — OFFICE VISIT (OUTPATIENT)
Dept: VASCULAR SURGERY | Facility: CLINIC | Age: 81
End: 2021-04-22
Payer: COMMERCIAL

## 2021-04-22 VITALS
HEIGHT: 63 IN | WEIGHT: 118.8 LBS | DIASTOLIC BLOOD PRESSURE: 94 MMHG | SYSTOLIC BLOOD PRESSURE: 198 MMHG | BODY MASS INDEX: 21.05 KG/M2 | HEART RATE: 100 BPM

## 2021-04-22 DIAGNOSIS — I65.23 BILATERAL CAROTID ARTERY STENOSIS: Primary | Chronic | ICD-10-CM

## 2021-04-22 PROCEDURE — 99214 OFFICE O/P EST MOD 30 MIN: CPT | Performed by: SURGERY

## 2021-04-22 NOTE — LETTER
April 22, 2021     Griselda العراقي, [address] [facility]    Patient: Krysta Taylor   YOB: 1940   Date of Visit: 4/22/2021       Dear Dr Madison Quan: Thank you for referring Jamesginny Josse to me for evaluation  Below are the relevant portions of my assessment and plan of care  Carotid artery stenosis    Asymptomatic high-grade greater than 80% right carotid artery stenosis identified on both carotid duplex and CT angiogram   We discussed the pathophysiology of carotid occlusive disease, the indications for treatment and the treatment options available with their associated risks and benefits  We will plan on proceeding with right carotid endarterectomy in the near future  We will ask that she hold her Plavix therapy for a total 5 days preop  If you have questions, please do not hesitate to call me  I look forward to following Lashaun Anderson along with you           Sincerely,        Av Ulloa MD        CC: No Recipients

## 2021-04-22 NOTE — TELEPHONE ENCOUNTER
Patient was seen in the office today by Dr Heidi Rios for a 2nd opinion  She would like Dr Heidi Rios to perform her procedure  LMOM for patient to call back and confirm 5/18/21 [facility]/OR  Per Dr Heidi Rios, patient should hold Plavix for 5 days  Last dose should be 5/12/21  Will do the surgery wrap up after speaking w/patient

## 2021-04-22 NOTE — PATIENT INSTRUCTIONS
Carotid artery stenosis    Asymptomatic high-grade greater than 80% right carotid artery stenosis identified on both carotid duplex and CT angiogram   We discussed the pathophysiology of carotid occlusive disease, the indications for treatment and the treatment options available with their associated risks and benefits  We will plan on proceeding with right carotid endarterectomy in the near future  We will ask that she hold her Plavix therapy for a total 5 days preop

## 2021-04-22 NOTE — PROGRESS NOTES
Assessment/Plan:    Carotid artery stenosis    Asymptomatic high-grade greater than 80% right carotid artery stenosis identified on both carotid duplex and CT angiogram   We discussed the pathophysiology of carotid occlusive disease, the indications for treatment and the treatment options available with their associated risks and benefits  We will plan on proceeding with right carotid endarterectomy in the near future  We will ask that she hold her Plavix therapy for a total 5 days preop  Diagnoses and all orders for this visit:    Bilateral carotid artery stenosis          Subjective:      Patient ID: Jacob Hudson is a [de-identified] y o  female  Patient presents today for a 2nd opinion for carotid endarterectomy  Patient previously saw Dr Viki Meek  Patient denies TIA and stroke symptoms  She had CTA head and neck done 3/22/21  Patient takes ASA 81mg, Plavix, and Atorvastatin  44-year-old with long history of severe peripheral arterial occlusive disease was found to have progression of right carotid artery stenosis on recent duplex and confirmed on CT angiogram   She was advised to undergo right carotid endarterectomy by Dr Viki Meek  She agrees and would like to proceed with endarterectomy but has asked for the surgery to be done by myself  On discussion today she denies any focal neurologic symptoms which would be consistent with TIA, CVA or amaurosis fugax  She has undergone evaluation her cardiologist and has been cleared for surgery  Carotid duplex with flow velocity on the right of 402/82 centimeters/second  CT angiogram 03/22/2021 with highly calcified eccentric plaque creating a greater than 80% stenosis of the proximal right internal carotid artery  On the left there is a distal common carotid artery stenosis of approximately 50%        The following portions of the patient's history were reviewed and updated as appropriate: allergies, current medications, past family history, past medical history, past social history, past surgical history and problem list     I have reviewed and made appropriate changes to the review of systems input by the medical assistant      Vitals:    04/22/21 1613   BP: (!) 198/94   BP Location: Right arm   Patient Position: Sitting   Pulse: 100   Weight: 53 9 kg (118 lb 12 8 oz)   Height: 5' 3" (1 6 m)       Patient Active Problem List   Diagnosis    Aortoiliac occlusive disease ([address] )    Carotid artery stenosis    Chronic Hypertension    Hypothyroidism    Nicotine dependence    Subclavian artery stenosis, left (HCC)    Aortic stenosis    Benign essential HTN    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HCC)    Dyslipidemia    Onychomycosis    Paronychia of toenail    Pain in both feet    Viral URI with cough    Simple chronic bronchitis (HCC)    CKD (chronic kidney disease) stage 3, GFR 30-59 ml/min (HCC)    Cigarette smoker    Cardiac murmur    Urinary tract infection    Gram-negative bacteremia    Mass of spine    Chronic diastolic heart failure (HCC)    Acute blood loss anemia    Hypertensive heart disease with chronic diastolic congestive heart failure (HCC)    Lower extremity edema    Peripheral neuropathy       Past Surgical History:   Procedure Laterality Date    BREAST SURGERY      bxs,benign    COLONOSCOPY      HYSTERECTOMY      INCISIONAL BREAST BIOPSY      x5, 1964, 1965, 1985 and [address] Bilateral 12/9/2019    Procedure: BYPASS AORTA BIFEMORAL WITH LEFT FEMORAL THROMBOEMBOLECTOMY;  Surgeon: Tushar Jewell MD;  Location: [facility];  Service: Vascular       Family History   Problem Relation Age of Onset    Hypertension Father     Coronary artery disease Family     Arthritis Family        Social History     Socioeconomic History    Marital status:      Spouse name: Not on file    Number of children: Not on file    Years of education: Not on file    Highest education level: Not on file   Occupational History    Not on file   Social Needs    Financial resource strain: Not on file    Food insecurity     Worry: Not on file     Inability: Not on file    Transportation needs     Medical: Not on file     Non-medical: Not on file   Tobacco Use    Smoking status: Current Every Day Smoker     Packs/day: 0 50     Types: Cigarettes     Last attempt to quit: 2019     Years since quittin 3    Smokeless tobacco: Never Used   Substance and Sexual Activity    Alcohol use: Yes     Frequency: 4 or more times a week     Comment: wilfrid 2-3 every day for 50 years    Drug use: No    Sexual activity: Not on file   Lifestyle    Physical activity     Days per week: Not on file     Minutes per session: Not on file    Stress: Not on file   Relationships    Social connections     Talks on phone: Not on file     Gets together: Not on file     Attends Worship service: Not on file     Active member of club or organization: Not on file     Attends meetings of clubs or organizations: Not on file     Relationship status: Not on file    Intimate partner violence     Fear of current or ex partner: Not on file     Emotionally abused: Not on file     Physically abused: Not on file     Forced sexual activity: Not on file   Other Topics Concern    Not on file   Social History Narrative    Rarely exercises    Sleeps 6-7 hours per day       Allergies   Allergen Reactions    Thiazide-Type Diuretics      Hyponatremia         Current Outpatient Medications:     acetaminophen (TYLENOL) 325 mg tablet, Take 2 tablets (650 mg total) by mouth every 6 (six) hours as needed for mild pain, headaches or fever, Disp: 30 tablet, Rfl: 0    albuterol (Ventolin HFA) 90 mcg/act inhaler, Inhale 2 puffs every 6 (six) hours as needed for wheezing, Disp: 18 g, Rfl: 2    aspirin 81 MG tablet, Take 2 tablets by mouth daily, Disp: , Rfl:     atorvastatin (LIPITOR) 80 mg tablet, Take 1 tablet (80 mg total) by mouth daily, Disp: 90 tablet, Rfl: 3    B Complex Vitamins (VITAMIN B-COMPLEX) TABS, Take by mouth, Disp: , Rfl:     Bioflavonoid Products (VITAMIN C PLUS PO), Take by mouth daily, Disp: , Rfl:     carvedilol (COREG) 12 5 mg tablet, take 1 tablet by mouth twice a day, Disp: 60 tablet, Rfl: 5    cholecalciferol (VITAMIN D3) 1,000 units tablet, Take 4,000 Units by mouth daily , Disp: , Rfl:     clopidogrel (PLAVIX) 75 mg tablet, take 1 tablet by mouth once daily, Disp: 90 tablet, Rfl: 3    enalapril (VASOTEC) 20 mg tablet, take 1 tablet by mouth twice a day, Disp: 180 tablet, Rfl: 3    fluticasone (FLONASE) 50 mcg/act nasal spray, 1 spray into each nostril daily, Disp: 16 g, Rfl: 0    gabapentin (NEURONTIN) 100 mg capsule, Take 1 capsule (100 mg total) by mouth 3 (three) times a day, Disp: 180 capsule, Rfl: 0    GARLIC PO, Take by mouth daily , Disp: , Rfl:     levothyroxine 25 mcg tablet, Take 1 tablet (25 mcg total) by mouth daily in the early morning, Disp: 90 tablet, Rfl: 3    magnesium oxide (MAG-OX) 400 mg, Take 1 tablet (400 mg total) by mouth daily, Disp: 30 tablet, Rfl: 5    nicotine (NICODERM CQ) 21 mg/24 hr TD 24 hr patch, Place 1 patch on the skin every 24 hours, Disp: 14 patch, Rfl: 0    POTASSIUM CHLORIDE ER PO, Take 99 mEq by mouth daily , Disp: , Rfl:     spironolactone (ALDACTONE) 25 mg tablet, Take 1 tablet (25 mg total) by mouth daily, Disp: 90 tablet, Rfl: 3    torsemide (DEMADEX) 5 MG tablet, Take 1 tablet (5 mg total) by mouth daily, Disp: 90 tablet, Rfl: 3    nicotine (NICODERM CQ) 14 mg/24hr TD 24 hr patch, Place 1 patch on the skin every 24 hours (Patient not taking: Reported on 4/9/2021), Disp: 14 patch, Rfl: 0    nicotine (NICODERM CQ) 7 mg/24hr TD 24 hr patch, Place 1 patch on the skin every 24 hours (Patient not taking: Reported on 9/8/2020), Disp: 28 patch, Rfl: 0    Review of Systems   Constitutional: Negative  HENT: Negative  Eyes: Negative  Respiratory: Negative  Cardiovascular: Negative  Gastrointestinal: Negative  Endocrine: Negative  Genitourinary: Negative  Musculoskeletal: Negative  Skin: Negative  Allergic/Immunologic: Negative  Neurological: Negative  Hematological: Negative  Psychiatric/Behavioral: Negative  Objective:      BP (!) 198/94 (BP Location: Right arm, Patient Position: Sitting)   Pulse 100   Ht 5' 3" (1 6 m)   Wt 53 9 kg (118 lb 12 8 oz)   BMI 21 04 kg/m²          Physical Exam  Constitutional:       Appearance: She is well-developed  HENT:      Head: Normocephalic and atraumatic  Eyes:      Pupils: Pupils are equal, round, and reactive to light  Neck:      Musculoskeletal: Normal range of motion and neck supple  Vascular: Carotid bruit (  Bilateral) present  No JVD  Cardiovascular:      Rate and Rhythm: Normal rate and regular rhythm  Pulses:           Carotid pulses are 2+ on the right side with bruit and 2+ on the left side with bruit  Radial pulses are 2+ on the right side and 2+ on the left side  Heart sounds: Murmur present  Pulmonary:      Effort: Pulmonary effort is normal  No respiratory distress  Breath sounds: Normal breath sounds  Musculoskeletal: Normal range of motion  General: No tenderness  Skin:     General: Skin is warm and dry  Neurological:      Mental Status: She is alert and oriented to person, place, and time  Sensory: No sensory deficit

## 2021-04-22 NOTE — ASSESSMENT & PLAN NOTE
Asymptomatic high-grade greater than 80% right carotid artery stenosis identified on both carotid duplex and CT angiogram   We discussed the pathophysiology of carotid occlusive disease, the indications for treatment and the treatment options available with their associated risks and benefits  We will plan on proceeding with right carotid endarterectomy in the near future  We will ask that she hold her Plavix therapy for a total 5 days preop  She requests that her surgery be performed at [facility]

## 2021-04-23 ENCOUNTER — PREP FOR PROCEDURE (OUTPATIENT)
Dept: VASCULAR SURGERY | Facility: CLINIC | Age: 81
End: 2021-04-23

## 2021-04-23 ENCOUNTER — TELEPHONE (OUTPATIENT)
Dept: CARDIOLOGY CLINIC | Facility: CLINIC | Age: 81
End: 2021-04-23

## 2021-04-23 ENCOUNTER — TELEPHONE (OUTPATIENT)
Dept: VASCULAR SURGERY | Facility: CLINIC | Age: 81
End: 2021-04-23

## 2021-04-23 NOTE — LETTER
Cardiology Pre Operative Clearance      PRE OPERATIVE CARDIAC RISK ASSESSMENT    04/26/21    Nadeen Lever  1940  [id number]    Date of Surgery: 5/18/21    Type of Surgery: Right Carotid Endarterectomy    Surgeon: Dr Neelam Alejandre    No Cardiac Contraindication for Planned Surgical Procedures    Anticoagulation: Patient should hold Clopidogrel for 5 days prior to procedure unless directed otherwise by Dr Neelam Alejandre  Physician Comment: Patient was last seen in February 2021  Based on assessment done that visit, she may proceed to OR as planned  If any new symptoms have developed, she should be evaluated prior to surgery       Electronically Signed: Torres Parker DO

## 2021-04-23 NOTE — TELEPHONE ENCOUNTER
Spoke to [address] [place] [address] at patients cardiologist office and asked if there was a clearance for patient I do not see anything in the chart and Dr Hershel Hamman said to fax over form and he will fill it out [facility]

## 2021-04-23 NOTE — TELEPHONE ENCOUNTER
Vascular dept is following up on cardiac clearance for carotid endarterectomy scheduled for 5/18/21  They are refaxing clearance form   Please assist

## 2021-04-23 NOTE — TELEPHONE ENCOUNTER
Is patient requesting a call when authorization has been obtained? Patient did not request a call  Surgery Date: 5-18-21  Primary Surgeon: Rosario Humphrey (NPI: [id number])  Assisting Surgeon: Not Applicable (N/A)  Facility: [facility] (Tax: [id number] / NPI: [id number])  Inpatient / Outpatient: Inpatient  Level: 4    Clearance Received: Yes, Cardiology   Consent Received: Yes, scanned into Epic on 4-22-21  Medication Hold / Last Dose: Yes Hold plavix 5 days prior last dose 5-12-21  VQI Spreadsheet: Yes  IR Notified: Not Applicable (N/A)  Rep  Notified: Not Applicable (N/A)  Equipment Needs: Not Applicable (N/A)  Vas Lab Requested: Yes  Patient Contacted: 4-23-21    Diagnosis: I65 23  Procedure/ CPT Code(s): (CEA) Carotid Endarterectomy / Patch Angioplasty // CPT: 59970    For varicose vein related procedures, last LEVDR? Not Applicable (N/A)    Post Operative Date/ Time: 5-27-21 , 12:00 Ben with Rosario Humphrey (NPI: [id number])     *Please review with patient med hold, PATs, and check H&P *  PATIENT WAS MAILED SURGERY/SHOWERING/DISCHARGE/COVID INSTRUCTIONS AFTER REVIEWING WITH HER VIA PHONE CALL       Spoke to patient to schedule her surgery patient will go for blood work before the surgery patient has had her covid vaccine with [facility]

## 2021-04-26 ENCOUNTER — PREP FOR PROCEDURE (OUTPATIENT)
Dept: VASCULAR SURGERY | Facility: CLINIC | Age: 81
End: 2021-04-26

## 2021-04-26 NOTE — TELEPHONE ENCOUNTER
Authorization requirements reviewed  Please refer to [address] / [place] Favian number [id number] for case updates

## 2021-04-27 ENCOUNTER — APPOINTMENT (OUTPATIENT)
Dept: LAB | Facility: CLINIC | Age: 81
End: 2021-04-27
Payer: COMMERCIAL

## 2021-04-27 ENCOUNTER — LAB REQUISITION (OUTPATIENT)
Dept: LAB | Facility: HOSPITAL | Age: 81
End: 2021-04-27
Payer: COMMERCIAL

## 2021-04-27 DIAGNOSIS — I65.23 BILATERAL CAROTID ARTERY STENOSIS: Chronic | ICD-10-CM

## 2021-04-27 DIAGNOSIS — I65.23 OCCLUSION AND STENOSIS OF BILATERAL CAROTID ARTERIES: ICD-10-CM

## 2021-04-27 DIAGNOSIS — Z01.818 PRE-OP TESTING: ICD-10-CM

## 2021-04-27 DIAGNOSIS — Z91.89 AT RISK FOR INFECTION: ICD-10-CM

## 2021-04-27 LAB
ANION GAP SERPL CALCULATED.3IONS-SCNC: 5 MMOL/L (ref 4–13)
APTT PPP: 30 SECONDS (ref 23–37)
BUN SERPL-MCNC: 50 MG/DL (ref 5–25)
CALCIUM SERPL-MCNC: 9.4 MG/DL (ref 8.3–10.1)
CHLORIDE SERPL-SCNC: 111 MMOL/L (ref 100–108)
CO2 SERPL-SCNC: 23 MMOL/L (ref 21–32)
CREAT SERPL-MCNC: 1.79 MG/DL (ref 0.6–1.3)
ERYTHROCYTE [DISTWIDTH] IN BLOOD BY AUTOMATED COUNT: 18.6 % (ref 11.6–15.1)
EST. AVERAGE GLUCOSE BLD GHB EST-MCNC: 100 MG/DL
GFR SERPL CREATININE-BSD FRML MDRD: 26 ML/MIN/1.73SQ M
GLUCOSE P FAST SERPL-MCNC: 111 MG/DL (ref 65–99)
HBA1C MFR BLD: 5.1 %
HCT VFR BLD AUTO: 25.3 % (ref 34.8–46.1)
HGB BLD-MCNC: 7.7 G/DL (ref 11.5–15.4)
INR PPP: 1.13 (ref 0.84–1.19)
MCH RBC QN AUTO: 26.6 PG (ref 26.8–34.3)
MCHC RBC AUTO-ENTMCNC: 30.4 G/DL (ref 31.4–37.4)
MCV RBC AUTO: 87 FL (ref 82–98)
PLATELET # BLD AUTO: 285 THOUSANDS/UL (ref 149–390)
PMV BLD AUTO: 11.6 FL (ref 8.9–12.7)
POTASSIUM SERPL-SCNC: 5.5 MMOL/L (ref 3.5–5.3)
PROTHROMBIN TIME: 14.6 SECONDS (ref 11.6–14.5)
RBC # BLD AUTO: 2.9 MILLION/UL (ref 3.81–5.12)
SODIUM SERPL-SCNC: 139 MMOL/L (ref 136–145)
WBC # BLD AUTO: 9.11 THOUSAND/UL (ref 4.31–10.16)

## 2021-04-27 PROCEDURE — 86901 BLOOD TYPING SEROLOGIC RH(D): CPT | Performed by: SURGERY

## 2021-04-27 PROCEDURE — 83036 HEMOGLOBIN GLYCOSYLATED A1C: CPT

## 2021-04-27 PROCEDURE — 80048 BASIC METABOLIC PNL TOTAL CA: CPT

## 2021-04-27 PROCEDURE — 85730 THROMBOPLASTIN TIME PARTIAL: CPT

## 2021-04-27 PROCEDURE — 86850 RBC ANTIBODY SCREEN: CPT | Performed by: SURGERY

## 2021-04-27 PROCEDURE — 36415 COLL VENOUS BLD VENIPUNCTURE: CPT

## 2021-04-27 PROCEDURE — 86900 BLOOD TYPING SEROLOGIC ABO: CPT | Performed by: SURGERY

## 2021-04-27 PROCEDURE — 85027 COMPLETE CBC AUTOMATED: CPT

## 2021-04-27 PROCEDURE — 85610 PROTHROMBIN TIME: CPT

## 2021-04-28 LAB
ABO GROUP BLD: NORMAL
BLD GP AB SCN SERPL QL: NEGATIVE
RH BLD: POSITIVE
SPECIMEN EXPIRATION DATE: NORMAL

## 2021-04-30 ENCOUNTER — TELEPHONE (OUTPATIENT)
Dept: FAMILY MEDICINE CLINIC | Facility: CLINIC | Age: 81
End: 2021-04-30

## 2021-04-30 ENCOUNTER — OFFICE VISIT (OUTPATIENT)
Dept: FAMILY MEDICINE CLINIC | Facility: CLINIC | Age: 81
End: 2021-04-30
Payer: COMMERCIAL

## 2021-04-30 VITALS
BODY MASS INDEX: 20.94 KG/M2 | HEART RATE: 69 BPM | OXYGEN SATURATION: 100 % | SYSTOLIC BLOOD PRESSURE: 190 MMHG | TEMPERATURE: 97.8 F | WEIGHT: 118.2 LBS | RESPIRATION RATE: 16 BRPM | HEIGHT: 63 IN | DIASTOLIC BLOOD PRESSURE: 86 MMHG

## 2021-04-30 DIAGNOSIS — I65.23 BILATERAL CAROTID ARTERY STENOSIS: Chronic | ICD-10-CM

## 2021-04-30 DIAGNOSIS — I10 MALIGNANT HYPERTENSION: ICD-10-CM

## 2021-04-30 DIAGNOSIS — D64.9 ANEMIA, UNSPECIFIED TYPE: ICD-10-CM

## 2021-04-30 DIAGNOSIS — R79.89 ELEVATED SERUM CREATININE: Primary | ICD-10-CM

## 2021-04-30 PROCEDURE — 99214 OFFICE O/P EST MOD 30 MIN: CPT | Performed by: FAMILY MEDICINE

## 2021-04-30 RX ORDER — AMLODIPINE BESYLATE 5 MG/1
5 TABLET ORAL DAILY
Qty: 90 TABLET | Refills: 0 | Status: SHIPPED | OUTPATIENT
Start: 2021-04-30 | End: 2021-07-22 | Stop reason: SDUPTHER

## 2021-04-30 NOTE — PATIENT INSTRUCTIONS
1  Starting amlodipine for bp  Side effects: edema, constipation  2  Testing for renal/kidney: ultrasound & doppler   3  Iron supplement -take with vitamin c    4  Colace twice a day  5  Kidney specialists: schedule   6  Schedule hematology  7  REPEAT BMP in 1 week  8  CARDIOLOGY APPOINTMENT with 1 week  9  Hold potassium

## 2021-04-30 NOTE — PROGRESS NOTES
Assessment/Plan:    1  Elevated serum creatinine  -     Ambulatory referral to Nephrology; Future  -     UA w Reflex to Microscopic w Reflex to Culture - Clinic Collect  -     Protein / creatinine ratio, urine  -     US kidney and bladder; Future; Expected date: 04/30/2021  -     VAS renal artery complete; Future; Expected date: 04/30/2021  -     Basic metabolic panel; Future    2  Anemia, unspecified type  -     Ambulatory referral to Hematology / Oncology; Future    3  Malignant hypertension  -     Ambulatory referral to Nephrology; Future  -     UA w Reflex to Microscopic w Reflex to Culture - Clinic Collect  -     Protein / creatinine ratio, urine  -     US kidney and bladder; Future; Expected date: 04/30/2021  -     VAS renal artery complete; Future; Expected date: 04/30/2021  -     amLODIPine (NORVASC) 5 mg tablet; Take 1 tablet (5 mg total) by mouth daily  -     Basic metabolic panel; Future    4  Bilateral carotid artery stenosis     HTN:  Patient's blood pressure in office is 190/86  Spoke with Cardiology who recommended patient get a kidney ultrasound along with vast renal artery Doppler  Added amlodipine 5 mg to blood pressure medications  Patient should take all medications and monitor blood pressure twice a day  Cardiology would like to see our on next Friday  Given that patient's potassium is elevated will have patient hold at this time and repeat BMP in 1 week  Precautions reviewed with patient in detail  If any chest pain, headache visual disturbance or dizziness patient needs to go to the ER  Recent CBC shows a hemoglobin of 7 7  Advised to see Hematology  Creatinine function increased from 1 3-1 7 from Feb to May  Discussion on patient  Nephrology  Precautions reviewed  Follow-up with vascular  One week patient should go to get blood pressure check  And evaluation with Cardiology  Patient Instructions     1  Starting amlodipine for bp   Side effects: edema, constipation  2  Testing for renal/kidney: ultrasound & doppler   3  Iron supplement -take with vitamin c    4  Colace twice a day  5  Kidney specialists: schedule   6  Schedule hematology  7  REPEAT BMP in 1 week  8  CARDIOLOGY APPOINTMENT with 1 week  9  Hold potassium  Return in about 1 week (around 5/7/2021)  Subjective:      Patient ID: Shayy Jensen is a [de-identified] y o  female  Chief Complaint   Patient presents with    Fatigue     post nasal drip, cough, wheez        HPI  72-year-old female presents for follow-up for chronic conditions  Patient states that she is supposed to have carotid endarterectomy done on the 18th   As CTA from March 22nd shows severe atherosclerotic calcification of the right carotid bifurcation     Patient had recent blood work done that shows a hemoglobin of 7 7 and creatinine that has increased from February to May from 1 3-1 7  Patient denies any chest pain or shortness of breath at this time  The following portions of the patient's history were reviewed and updated as appropriate: allergies, current medications, past family history, past medical history, past social history, past surgical history and problem list     Review of Systems   Constitutional: Negative for appetite change and fever  HENT: Negative for ear pain and sore throat  Eyes: Negative for visual disturbance  Respiratory: Negative for shortness of breath  Cardiovascular: Negative for chest pain and leg swelling  Gastrointestinal: Negative for abdominal pain, diarrhea, nausea and vomiting  Musculoskeletal: Negative for arthralgias  Skin: Negative for color change  Neurological: Negative for dizziness, tremors, light-headedness and headaches  Psychiatric/Behavioral: Negative for agitation and behavioral problems           Current Outpatient Medications   Medication Sig Dispense Refill    acetaminophen (TYLENOL) 325 mg tablet Take 2 tablets (650 mg total) by mouth every 6 (six) hours as needed for mild pain, headaches or fever 30 tablet 0    albuterol (Ventolin HFA) 90 mcg/act inhaler Inhale 2 puffs every 6 (six) hours as needed for wheezing 18 g 2    aspirin 81 MG tablet Take 2 tablets by mouth daily      atorvastatin (LIPITOR) 80 mg tablet Take 1 tablet (80 mg total) by mouth daily (Patient taking differently: Take 80 mg by mouth daily at bedtime ) 90 tablet 3    B Complex Vitamins (VITAMIN B-COMPLEX) TABS Take by mouth daily       Bioflavonoid Products (VITAMIN C PLUS PO) Take by mouth daily      carvedilol (COREG) 12 5 mg tablet take 1 tablet by mouth twice a day 60 tablet 5    cholecalciferol (VITAMIN D3) 1,000 units tablet Take 4,000 Units by mouth daily       clopidogrel (PLAVIX) 75 mg tablet take 1 tablet by mouth once daily 90 tablet 3    enalapril (VASOTEC) 20 mg tablet take 1 tablet by mouth twice a day 180 tablet 3    fluticasone (FLONASE) 50 mcg/act nasal spray 1 spray into each nostril daily 16 g 0    GARLIC PO Take by mouth daily       levothyroxine 25 mcg tablet Take 1 tablet (25 mcg total) by mouth daily in the early morning 90 tablet 3    magnesium oxide (MAG-OX) 400 mg Take 1 tablet (400 mg total) by mouth daily 30 tablet 5    POTASSIUM CHLORIDE ER PO Take 90 mEq by mouth daily       spironolactone (ALDACTONE) 25 mg tablet Take 1 tablet (25 mg total) by mouth daily 90 tablet 3    torsemide (DEMADEX) 5 MG tablet Take 1 tablet (5 mg total) by mouth daily 90 tablet 3    amLODIPine (NORVASC) 5 mg tablet Take 1 tablet (5 mg total) by mouth daily 90 tablet 0    docusate sodium (COLACE) 100 mg capsule Take 100 mg by mouth 2 (two) times a day      Ferrous Sulfate (IRON PO) Take by mouth daily      gabapentin (NEURONTIN) 100 mg capsule Take 1 capsule (100 mg total) by mouth 3 (three) times a day (Patient not taking: Reported on 4/30/2021) 180 capsule 0    nicotine (NICODERM CQ) 14 mg/24hr TD 24 hr patch Place 1 patch on the skin every 24 hours (Patient not taking: Reported on 4/9/2021) 14 patch 0    nicotine (NICODERM CQ) 21 mg/24 hr TD 24 hr patch Place 1 patch on the skin every 24 hours 14 patch 0    nicotine (NICODERM CQ) 7 mg/24hr TD 24 hr patch Place 1 patch on the skin every 24 hours (Patient not taking: Reported on 9/8/2020) 28 patch 0     No current facility-administered medications for this visit  Objective:    BP (!) 190/86 (BP Location: Right arm, Patient Position: Sitting, Cuff Size: Standard)   Pulse 69   Temp 97 8 °F (36 6 °C) (Temporal)   Resp 16   Ht 5' 3" (1 6 m)   Wt 53 6 kg (118 lb 3 2 oz)   SpO2 100%   BMI 20 94 kg/m²        Physical Exam  Constitutional:       General: She is not in acute distress  Appearance: She is well-developed  HENT:      Head: Normocephalic and atraumatic  Right Ear: Tympanic membrane and external ear normal       Left Ear: Tympanic membrane and external ear normal       Nose: Nose normal       Mouth/Throat:      Pharynx: Oropharynx is clear  No oropharyngeal exudate  Eyes:      General:         Right eye: No discharge  Left eye: No discharge  Cardiovascular:      Rate and Rhythm: Normal rate and regular rhythm  Pulses: Normal pulses  Heart sounds: Normal heart sounds  No murmur  Pulmonary:      Effort: Pulmonary effort is normal  No respiratory distress  Breath sounds: Normal breath sounds  Abdominal:      General: Bowel sounds are normal  There is no distension  Palpations: Abdomen is soft  Tenderness: There is no abdominal tenderness  Musculoskeletal: Normal range of motion  Lymphadenopathy:      Cervical: No cervical adenopathy  Skin:     General: Skin is warm  Neurological:      Mental Status: She is alert  Mental status is at baseline     Psychiatric:         Behavior: Behavior normal                 Jazmín Whitehead MD

## 2021-05-03 ENCOUNTER — TELEPHONE (OUTPATIENT)
Dept: NEPHROLOGY | Facility: CLINIC | Age: 81
End: 2021-05-03

## 2021-05-03 ENCOUNTER — TELEPHONE (OUTPATIENT)
Dept: SURGICAL ONCOLOGY | Facility: CLINIC | Age: 81
End: 2021-05-03

## 2021-05-03 ENCOUNTER — TELEPHONE (OUTPATIENT)
Dept: FAMILY MEDICINE CLINIC | Facility: CLINIC | Age: 81
End: 2021-05-03

## 2021-05-03 PROBLEM — D63.1 ANEMIA IN STAGE 3 CHRONIC KIDNEY DISEASE (HCC): Status: ACTIVE | Noted: 2021-05-03

## 2021-05-03 PROBLEM — I12.9 PARENCHYMAL RENAL HYPERTENSION: Status: ACTIVE | Noted: 2021-05-03

## 2021-05-03 PROBLEM — N17.9 AKI (ACUTE KIDNEY INJURY) (HCC): Status: ACTIVE | Noted: 2021-05-03

## 2021-05-03 PROBLEM — E87.5 HYPERKALEMIA: Status: ACTIVE | Noted: 2021-05-03

## 2021-05-03 PROBLEM — N18.30 ANEMIA IN STAGE 3 CHRONIC KIDNEY DISEASE (HCC): Status: ACTIVE | Noted: 2021-05-03

## 2021-05-03 NOTE — TELEPHONE ENCOUNTER
It is both  For the surgery and evaluation because creatinine jumped from 1 3 to 1 7 from feb to may

## 2021-05-03 NOTE — TELEPHONE ENCOUNTER
Patient called to schedule a consult appointment  I called patient's PCP's office to confirm this appointment is a clearance for patient's surgery on 5/18/21  The office told me they would find out and will call back

## 2021-05-03 NOTE — TELEPHONE ENCOUNTER
New Patient Encounter    New Patient Intake Form   Patient Details:  Laura Mathew  1940  [id number]    Background Information:  [address] starts by opening a telephone encounter and gathering the following information   Who is calling to schedule? If not self, relationship to patient? self   Referring Provider Miroslava Esquivel   What is the diagnosis? anemia   Is this diagnosis confirmed? Yes   When was the diagnosis? 5/3   Is there a confirmed diagnosis from a biopsy/tissue reviewed by pathology? na   Were outside slides requested? No   Is patient aware of diagnosis? Yes   Is there a personal history and what kind? No   Is there a family history and what kind? No   Reason for visit? New Diagnosis   Have you had any imaging or labs done? If so: when, where? yes  [facility]   Are records in [facility]? yes   If patient has a prior history of breast cancer were old records obtained? NA   Was the patient told to bring a disk? No   Does the patient smoke or Vape? No   If yes, how many packs or cartridges per day? na   Scheduling Information:   Preferred [facility]:  [facility]     Are there any dates/time the patient cannot be seen? na   Miscellaneous: na   After completing the above information, please route to Financial Counselor and the appropriate Nurse Navigator for review

## 2021-05-03 NOTE — TELEPHONE ENCOUNTER
Kalyani Palacio from [facility] nephrology wantin to know if Dr Pradeep Coleman pre-op for 5/18 procedure

## 2021-05-03 NOTE — PROGRESS NOTES
Consultation - Nephrology 5/4/2021        History of Present Illness   Reason for Consult / Principal Problem:  CKD stage 3/preoperative clearance    HPI: Greg Villa is a [de-identified]y o  year old female with a history of CAD/CHF/CKD/hypothyroidism/dyslipidemia/hypertension/asthma/arthritis  Apparently the patient is being evaluated for carotid endarterectomy May 15th; CT angiography free March 22nd showed severe right carotid artery atherosclerotic disease (greater than 80% identify both on carotid duplex and CT angiography)  She was also found to have recent significant hypertension of 190/86 and has had her medications adjusted  Patient denies any current dysuria hematuria voiding symptoms or foamy urine  She denies any NSAID use  No history kidney stones/ frequent urinary tract infections or bladder problem  Mild edema on and off, she is currently on torsemide 5 mg daily which controls swelling  History of hypertension for about 20 years, very labile  I had done a evaluation about 2 years:  Negative workup please see below  There is no medication she cannot tolerate  Current antihypertensive medications:  · Torsemide 5 mg daily  · Spironolactone 25 mg daily  · Magnesium oxide 400 mg daily  · Vasotec 20 mg twice a day  · Carvedilol 12 5 mg twice a day  · Amlodipine 5 mg daily in the a m , just started 04/30/2021  She used to take her blood pressure readings at home but she stopped doing it because it was labile and cause some anxiety        Pertinent labs:  · Creatinine 0 63  as of December 15, 2019  · Creatinine 1 50 as of 01/22/2020  · Creatinine 1 42 as of 02/15/2021  · Lipid profile:  LDL 37/HDL 86/triglycerides 23  · Creatinine 1 79 as of 04/27/2021  · Electrolytes: Potassium 5 5  · Calcium 9 4  · CBC: Hemoglobin 7 7 with normal WBC and platelet count    Review of systems:    General:  No fevers chills or recent colds but does have a chronic cough with some phlegm production, poor appetite in the last few months and lost 10 lb last couple months; very poor energy at this time  Cardiovascular:  No chest pain, she does get some dyspnea on exertion on and off in particular associated with the weather  Respiratory:  See HPI and see above  Gastrointestinal:  She denies any history of bright red blood per rectum/melanotic type stools, 1 rare episode of nausea vomiting recently otherwise negative; on and off constipation/diarrhea; no significant abdominal pain  Genitourinary:  See HPI  Neurology:  No headaches, some dizziness/lightheadedness but this could be in the sitting position as well add with associated more so with sudden head movement in the last several weeks in particular  All other systems were reviewed and are negative    Historical Information   Past Medical History:   Diagnosis Date    Anemia     Arthritis     Asthma     Benign essential hypertension     CAD (coronary artery disease)     CHF [facility]congestive heart failure) ([facility] [address] [facility]     Chronic kidney disease     Coronary artery disease     Disease of thyroid gland     Dizziness     Edema of leg     Hyperlipidemia     Hypertension     Other disorder of circulatory system (CODE)    · Hypothyroidism  · No history of colon liver disease/cancer/diabetes mellitus/CVA or seizures      Past Surgical History:   Procedure Laterality Date    BREAST SURGERY      bxs,benign    COLONOSCOPY      HYSTERECTOMY      INCISIONAL BREAST BIOPSY      x5, 1964, 1965, 1985 and [address] Bilateral 12/9/2019    Procedure: BYPASS AORTA BIFEMORAL WITH LEFT FEMORAL THROMBOEMBOLECTOMY;  Surgeon: Cinthia Park MD;  Location: [facility];  Service: Vascular     Social History   Social History     Substance and Sexual Activity   Alcohol Use Yes    Frequency: 4 or more times a week    Drinks per session: 1 or 2    Comment: wilfrid 2-3 every day for 50 years     Social History     Substance and Sexual Activity   Drug Use No     Social History     Tobacco Use   Smoking Status Current Every Day Smoker    Packs/day: 0 50    Types: Cigarettes    Last attempt to quit: 2019    Years since quittin 4   Smokeless Tobacco Never Used   · Cigarette smoking:  Possibly 60 pack years still on off smoking  · Alcohol:  1-2 drinks a day  · No illicit drug use  · Sodium intake:  Try so avoid but occasionally uses  · Does try to exercise 2-3 days a week    Family History   Problem Relation Age of Onset    Hypertension Father     Coronary artery disease Family     Arthritis Family    · Father with hypertension  · No kidney disease in the family    Meds/Allergies   all current active meds have been reviewed, current meds:   Current Outpatient Medications:     acetaminophen (TYLENOL) 325 mg tablet, Take 2 tablets (650 mg total) by mouth every 6 (six) hours as needed for mild pain, headaches or fever, Disp: 30 tablet, Rfl: 0    albuterol (Ventolin HFA) 90 mcg/act inhaler, Inhale 2 puffs every 6 (six) hours as needed for wheezing, Disp: 18 g, Rfl: 2    amLODIPine (NORVASC) 5 mg tablet, Take 1 tablet (5 mg total) by mouth daily, Disp: 90 tablet, Rfl: 0    aspirin 81 MG tablet, Take 2 tablets by mouth daily, Disp: , Rfl:     atorvastatin (LIPITOR) 80 mg tablet, Take 1 tablet (80 mg total) by mouth daily (Patient taking differently: Take 80 mg by mouth daily at bedtime ), Disp: 90 tablet, Rfl: 3    B Complex Vitamins (VITAMIN B-COMPLEX) TABS, Take by mouth daily , Disp: , Rfl:     Bioflavonoid Products (VITAMIN C PLUS PO), Take by mouth daily, Disp: , Rfl:     carvedilol (COREG) 12 5 mg tablet, take 1 tablet by mouth twice a day, Disp: 60 tablet, Rfl: 5    cholecalciferol (VITAMIN D3) 1,000 units tablet, Take 4,000 Units by mouth daily , Disp: , Rfl:     clopidogrel (PLAVIX) 75 mg tablet, take 1 tablet by mouth once daily, Disp: 90 tablet, Rfl: 3    docusate sodium (COLACE) 100 mg capsule, Take 100 mg by mouth 2 (two) times a day, Disp: , Rfl:    enalapril (VASOTEC) 20 mg tablet, take 1 tablet by mouth twice a day, Disp: 180 tablet, Rfl: 3    Ferrous Sulfate (IRON PO), Take by mouth daily, Disp: , Rfl:     fluticasone (FLONASE) 50 mcg/act nasal spray, 1 spray into each nostril daily, Disp: 16 g, Rfl: 0    GARLIC PO, Take by mouth daily , Disp: , Rfl:     levothyroxine 25 mcg tablet, Take 1 tablet (25 mcg total) by mouth daily in the early morning, Disp: 90 tablet, Rfl: 3    magnesium oxide (MAG-OX) 400 mg, Take 1 tablet (400 mg total) by mouth daily, Disp: 30 tablet, Rfl: 5    spironolactone (ALDACTONE) 25 mg tablet, Take 1 tablet (25 mg total) by mouth daily, Disp: 90 tablet, Rfl: 3    torsemide (DEMADEX) 5 MG tablet, Take 1 tablet (5 mg total) by mouth daily, Disp: 90 tablet, Rfl: 3    nicotine (NICODERM CQ) 14 mg/24hr TD 24 hr patch, Place 1 patch on the skin every 24 hours (Patient not taking: Reported on 4/9/2021), Disp: 14 patch, Rfl: 0    nicotine (NICODERM CQ) 21 mg/24 hr TD 24 hr patch, Place 1 patch on the skin every 24 hours, Disp: 14 patch, Rfl: 0    nicotine (NICODERM CQ) 7 mg/24hr TD 24 hr patch, Place 1 patch on the skin every 24 hours (Patient not taking: Reported on 9/8/2020), Disp: 28 patch, Rfl: 0    Allergies   Allergen Reactions    Thiazide-Type Diuretics      Hyponatremia       Objective   BP sitting on right:  158/58 with a heart rate of 64 and slightly irregular  BP sitting on left:  126/54  BP standing on RIGHT:  170/68 with a heart rate of 64 and slightly irregular  Body mass index is 20 73 kg/m²      General:  Well-developed /thin-appearing, no acute distress  Skin:  No acute rash  Eyes:  No scleral icterus, noninjected, conjunctiva appear normal, no discharge from the eyes  ENT:  Normocephalic/atraumatic, mucous membranes moist, tongue appears normal size  Neck:  Supple, no jugular venous distention, 1+ carotid upstroke, bilateral carotid bruits; trachea is midline, no thyromegaly; no lymphadenopathy  Back:  No CVA tenderness, spine appears midline without any overt abnormalities  Chest:  Clear to auscultation and percussion, good respiratory effort, no use of accessory respiratory muscles  CVS:  Regular rate and rhythm without a murmur rub or gallops appreciable  Abdomen/gastrointestinal:  Normal bowel sounds, nontender and nondistended without hepatosplenomegaly or bruits; no masses appreciable  Extremities:  No clubbing, no cyanosis, no edema, 1+ posterior tibialis pulses, poor dorsalis pedis pulses; bilateral femoral bruits, no arthritic changes and full range of motion  Neuro:  No gross focality  Psych:  Alert, oriented and appropriate    Current Weight:   Weight (last 2 days)     Date/Time   Weight    05/04/21 0800   53 1 (117)                Lab Results:  I have personally reviewed pertinent labs  Results for orders placed or performed in visit on 04/27/21   Type and screen   Result Value Ref Range    ABO Grouping O     Rh Factor Positive     Antibody Screen Negative     Specimen Expiration Date [id number]                ASSESSMENT AND PLAN:  [de-identified]y o  year old female with a history of CAD/CHF/CKD/hypothyroidism/dyslipidemia/hypertension/asthma/arthritis  Apparently the patient is being evaluated for carotid endarterectomy May 15th; CT angiography free March 22nd showed severe right carotid artery atherosclerotic disease (greater than 80% identify both on carotid duplex and CT angiography)  She was also found to have recent significant hypertension of 190/86 and has had her medications adjusted  We are seeing her for CKD stage 3/AK I/hypertension/preoperative clearance    1   CKD stage 3B:  Possible acute component with an elevated creatinine from 1 42-1 79 2 months later   Etiology:  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 Atheroembolization in the past    Baseline creatinine:  1 4-1 5  Recommend:   Workup:  o Follow-up chemistry  o UA with microscopic  o Urine protein creatinine ratio  o Urine for eosinophil  o C3/C4  o Mineral bone disorder evaluation from CKD including magnesium/phosphorus/PTH intact level/vitamin-D level  o Anemia evaluation: This is been going on at least since 2019  o Repeat CBC  o Iron studies  o Rule out multiple myeloma  o Rule out GI bleeding with stool for occult blood x3  o Hematology oncology consultation has been placed  o Possible follow-up with GI  o Lipid profile  o Renal ultrasound with PVR  o Renal artery duplex to rule out renal artery stenosis (based on a study December 9, 2020 both renal arteries were patent however I would reorder another study since it has been approximately 6 months just to ensure no evidence of renal artery disease):  Also CT angiography December 2019 was negative for renal artery disease   Treatment:  o Treat hypertension, please see below for recommendations  o Treat dyslipidemia  o Avoid nephrotoxic agents such as NSAIDs, and proton pump inhibitors as able; patient counseled as such  o Good overall health recommendations including weight loss as appropriate, isotonic exercise as able, and avoidance of salt; patient counseled as such    Further workup and treatment recommendations will be forthcoming depending upon the above results and course    2   Hypertension:  · Given significant hypertension I would rule out secondary causes including:  · Renal artery disease: Obtain renal artery duplex  · Primary aldosterone state negative workup 2019  · Thyroid profile evaluation:  Normal October 14, 2020 recheck at this time  · Pheochromocytoma:  Negative plasma free metanephrines 2019  · Renal evaluation is outlined above  · ALAN:  No history of heavy snoring or apneic episodes   Goal:  Less than 130/80 given CKD   Push nonmedical regimen as outlined above   Medication changes today:   Please stop the  Potassium supplement    Decrease enalapril to 10 mg twice a day given recent acute component   Continue amlodipine 5 mg daily   Next option potentially would be hydralazine   Check blood pressures at home    3  Other problems:   Arterial disease:   Aortoiliac occlusive disease:  Status post bypass graft of aortobifemoral with left femoral thrombo embolectomy   Carotid artery disease in particular right side   Left subclavian artery stenosis   Iliac artery stenosis  · Aortic stenosis  · Chronic diastolic CHF  · Hypothyroidism  · Dyslipidemia  · Lower extremity edema  · Peripheral neuropathy  · Tobacco abuse    At this juncture I would like to do the above evaluation and make sure the renal function comes closer to baseline before giving formal clearance  We will see her back in a few weeks and then hopefully kidney function is at baseline and blood pressure is stabilized and then we will make the formal clearance  Also recommend evaluation per primary physician/hematology possibly GI regarding the anemia to make sure this is stable before surgery as well  Will discuss with vascular surgery as well as primary physician  Discussed fully with the patient and answered all questions to her satisfaction  Patient Instructions   1  Medication changes today:   Please decrease Vasotec/enalapril to 1/2 dose or 10 mg twice a day    2  Please go for non fasting  lab work 1-week after making the above medication change  3  Please go for an ultrasound of your kidneys at this time, we will help to arrange for this study  4  Please go for an ultrasound of your kidney arteries at this time we will also help to arrange for this study as well     5  Please purchase an [employer] blood pressure machine that you can get through your drug store or medical supply:   The higher numbered series is better for example 5 or 7  Please take 1 week a blood pressure readings  2 weeks after making the above medication change   Please use your right arm only for blood pressure checks    AS FOLLOWS  MORNING AND EVENING, SITTING AND STANDING as follows:  · TAKE THE MORNING READINGS BEFORE ANY MEDICATIONS AND WHEN YOU ARE RELAXED FOR SEVERAL MINUTES  · TAKE THE EVENING READINGS:  BETWEEN 7-10 P M ; PRIOR TO ANY MEDICATIONS; AT LEAST IN OUR  FROM DINNER; AND CERTAINLY AFTER RELAXING FOR A FEW MINUTES  · PLEASE INCLUDE HEART RATE WITH YOUR BLOOD PRESSURE READINGS  · When taking standing readings, keep your arm supported at heart level and not dangling  · Make sure you are sitting with your back supported and feet on the ground and do not cross your legs or feet  · Make sure you have not taken any coffee or caffeine products or exercised or smoke cigarettes at least 30 minutes before taking your blood pressure  Then please bring in your blood pressure machine along with your blood pressure readings to see 1 of my advanced practitioner's in the next 3-4 weeks    6  Follow-up in 3  months   Please bring in 1 week a blood pressure readings morning evening, sitting and standing is outlined above   Please go for fasting lab work 1-2 weeks prior to your appointment      7  General instructions:   AVOID SALT BUT NOT ADDING AN READING LABELS TO MAKE SURE THERE IS LOW-SALT IN THE FOOD THAT YOU ARE EATING  o Goal is less than 2 g of sodium intake or less than 5 g of sodium chloride intake per day     Avoid nonsteroidal anti-inflammatory drugs such as Naprosyn, ibuprofen, Aleve, Advil, Celebrex, Meloxicam (Mobic) etc   You can use Tylenol as needed if you do not have any liver condition to be concerned about     Avoid medications such as Sudafed or decongestants and antihistamines that contained the D component which is the decongestant  You can take antihistamines without the decongestant or D component       Try to avoid medications such as pantoprazole or  Protonix/Nexium or Esomeprazole)/Prilosec or omeprazole/Prevacid or lansoprazole/AcipHex or Rabeprazole  If you are able to, use Pepcid as this is safer for your kidneys   Try to exercise at least 30 minutes 3 days a week to begin with with an ultimate goal of 5 days a week for at least 30 minutes     Try to lose 5-10 lb by your next visit     Please do not drink more than 2 glasses of alcohol/wine on a daily basis as this may contribute to your high blood pressure  Portions of the record may have been created with voice recognition software  Occasional wrong word or "sound a like" substitutions may have occurred due to the inherent limitations of voice recognition software  Read the chart carefully and recognize, using context, where substitutions have occurred      Irish Diaz MD

## 2021-05-03 NOTE — TELEPHONE ENCOUNTER
I called patient about a possible sooner appointment  Patient told me that she will call our office back to confirm tomorrow is ok

## 2021-05-03 NOTE — TELEPHONE ENCOUNTER
Called nephrology [phone] relayed Dr Amairani Rucker message, pt is scheduled I their office tomorrow with Dr Betsy Rubio

## 2021-05-04 ENCOUNTER — CONSULT (OUTPATIENT)
Dept: NEPHROLOGY | Facility: CLINIC | Age: 81
End: 2021-05-04
Payer: COMMERCIAL

## 2021-05-04 VITALS — HEIGHT: 63 IN | WEIGHT: 117 LBS | BODY MASS INDEX: 20.73 KG/M2

## 2021-05-04 DIAGNOSIS — I10 MALIGNANT HYPERTENSION: ICD-10-CM

## 2021-05-04 DIAGNOSIS — D63.1 ANEMIA IN STAGE 3 CHRONIC KIDNEY DISEASE, UNSPECIFIED WHETHER STAGE 3A OR 3B CKD (HCC): ICD-10-CM

## 2021-05-04 DIAGNOSIS — R63.4 WEIGHT LOSS: ICD-10-CM

## 2021-05-04 DIAGNOSIS — N18.32 STAGE 3B CHRONIC KIDNEY DISEASE (HCC): Primary | Chronic | ICD-10-CM

## 2021-05-04 DIAGNOSIS — N18.30 ANEMIA IN STAGE 3 CHRONIC KIDNEY DISEASE, UNSPECIFIED WHETHER STAGE 3A OR 3B CKD (HCC): ICD-10-CM

## 2021-05-04 DIAGNOSIS — E78.5 DYSLIPIDEMIA: ICD-10-CM

## 2021-05-04 DIAGNOSIS — R79.89 ELEVATED SERUM CREATININE: ICD-10-CM

## 2021-05-04 DIAGNOSIS — R60.0 LOWER EXTREMITY EDEMA: ICD-10-CM

## 2021-05-04 DIAGNOSIS — N17.9 AKI (ACUTE KIDNEY INJURY) (HCC): ICD-10-CM

## 2021-05-04 DIAGNOSIS — E87.5 HYPERKALEMIA: ICD-10-CM

## 2021-05-04 PROCEDURE — 99215 OFFICE O/P EST HI 40 MIN: CPT | Performed by: INTERNAL MEDICINE

## 2021-05-04 NOTE — TELEPHONE ENCOUNTER
Case has been cancelled  Pt not cleared by Nephrology  I s/w pt and she is aware  Dr Pelon Meyer will let us know when we can proceed with rescheduling her

## 2021-05-04 NOTE — TELEPHONE ENCOUNTER
MD Sheron Yanes; [facility] [address] refer to the message from Dr Kaleb Ann and my response in which I copied yourselves   Looks like we need to put this procedure on hold until he and Hematology give the okay/clearence

## 2021-05-04 NOTE — LETTER
May 4, 2021     Fritz Tinsley, [address]    Patient: Carlyn Ramachandran   YOB: 1940   Date of Visit: 5/4/2021       Dear Dr Arnold Ralph: Thank you for referring Kiley Campos to me for evaluation  Below are my notes for this consultation  If you have questions, please do not hesitate to call me  I look forward to following your patient along with you  Sincerely,        Dakota Lemus MD        CC: MD Marisol De Leon MD Juvenal Caffey, MD  5/4/2021  9:19 AM  Sign when Signing Visit  Consultation - Nephrology 5/4/2021        History of Present Illness   Reason for Consult / Principal Problem:  CKD stage 3/preoperative clearance    HPI: Carlyn Ramachandran is a [de-identified]y o  year old female with a history of CAD/CHF/CKD/hypothyroidism/dyslipidemia/hypertension/asthma/arthritis  Apparently the patient is being evaluated for carotid endarterectomy May 15th; CT angiography free March 22nd showed severe right carotid artery atherosclerotic disease (greater than 80% identify both on carotid duplex and CT angiography)  She was also found to have recent significant hypertension of 190/86 and has had her medications adjusted  Patient denies any current dysuria hematuria voiding symptoms or foamy urine  She denies any NSAID use  No history kidney stones/ frequent urinary tract infections or bladder problem  Mild edema on and off, she is currently on torsemide 5 mg daily which controls swelling  History of hypertension for about 20 years, very labile  I had done a evaluation about 2 years:  Negative workup please see below  There is no medication she cannot tolerate    Current antihypertensive medications:  · Torsemide 5 mg daily  · Spironolactone 25 mg daily  · Magnesium oxide 400 mg daily  · Vasotec 20 mg twice a day  · Carvedilol 12 5 mg twice a day  · Amlodipine 5 mg daily in the a m , just started 04/30/2021  She used to take her blood pressure readings at home but she stopped doing it because it was labile and cause some anxiety        Pertinent labs:  · Creatinine 0 63  as of December 15, 2019  · Creatinine 1 50 as of 01/22/2020  · Creatinine 1 42 as of 02/15/2021  · Lipid profile:  LDL 37/HDL 86/triglycerides 23  · Creatinine 1 79 as of 04/27/2021  · Electrolytes: Potassium 5 5  · Calcium 9 4  · CBC: Hemoglobin 7 7 with normal WBC and platelet count    Review of systems:    General:  No fevers chills or recent colds but does have a chronic cough with some phlegm production, poor appetite in the last few months and lost 10 lb last couple months; very poor energy at this time  Cardiovascular:  No chest pain, she does get some dyspnea on exertion on and off in particular associated with the weather  Respiratory:  See HPI and see above  Gastrointestinal:  She denies any history of bright red blood per rectum/melanotic type stools, 1 rare episode of nausea vomiting recently otherwise negative; on and off constipation/diarrhea; no significant abdominal pain  Genitourinary:  See HPI  Neurology:  No headaches, some dizziness/lightheadedness but this could be in the sitting position as well add with associated more so with sudden head movement in the last several weeks in particular  All other systems were reviewed and are negative    Historical Information   Past Medical History:   Diagnosis Date    Anemia     Arthritis     Asthma     Benign essential hypertension     CAD (coronary artery disease)     CHF [facility]congestive heart failure) ([facility] [address] [facility]     Chronic kidney disease     Coronary artery disease     Disease of thyroid gland     Dizziness     Edema of leg     Hyperlipidemia     Hypertension     Other disorder of circulatory system (CODE)    · Hypothyroidism  · No history of colon liver disease/cancer/diabetes mellitus/CVA or seizures      Past Surgical History:   Procedure Laterality Date    BREAST SURGERY      bxs,benign    COLONOSCOPY      HYSTERECTOMY      INCISIONAL BREAST BIOPSY      x5, 1964, 1965, 1985 and 40 Rujaquan Daniels Bilateral 2019    Procedure: BYPASS AORTA BIFEMORAL WITH LEFT FEMORAL THROMBOEMBOLECTOMY;  Surgeon: Chucho Ruggiero MD;  Location: [facility];  Service: Vascular     Social History   Social History     Substance and Sexual Activity   Alcohol Use Yes    Frequency: 4 or more times a week    Drinks per session: 1 or 2    Comment: manjose 2-3 every day for 50 years     Social History     Substance and Sexual Activity   Drug Use No     Social History     Tobacco Use   Smoking Status Current Every Day Smoker    Packs/day: 0 50    Types: Cigarettes    Last attempt to quit: 2019    Years since quittin 4   Smokeless Tobacco Never Used   · Cigarette smoking:  Possibly 60 pack years still on off smoking  · Alcohol:  1-2 drinks a day  · No illicit drug use  · Sodium intake:  Try so avoid but occasionally uses  · Does try to exercise 2-3 days a week    Family History   Problem Relation Age of Onset    Hypertension Father     Coronary artery disease Family     Arthritis Family    · Father with hypertension  · No kidney disease in the family    Meds/Allergies   all current active meds have been reviewed, current meds:   Current Outpatient Medications:     acetaminophen (TYLENOL) 325 mg tablet, Take 2 tablets (650 mg total) by mouth every 6 (six) hours as needed for mild pain, headaches or fever, Disp: 30 tablet, Rfl: 0    albuterol (Ventolin HFA) 90 mcg/act inhaler, Inhale 2 puffs every 6 (six) hours as needed for wheezing, Disp: 18 g, Rfl: 2    amLODIPine (NORVASC) 5 mg tablet, Take 1 tablet (5 mg total) by mouth daily, Disp: 90 tablet, Rfl: 0    aspirin 81 MG tablet, Take 2 tablets by mouth daily, Disp: , Rfl:     atorvastatin (LIPITOR) 80 mg tablet, Take 1 tablet (80 mg total) by mouth daily (Patient taking differently: Take 80 mg by mouth daily at bedtime ), Disp: 90 tablet, Rfl: 3    B Complex Vitamins (VITAMIN B-COMPLEX) TABS, Take by mouth daily , Disp: , Rfl:     Bioflavonoid Products (VITAMIN C PLUS PO), Take by mouth daily, Disp: , Rfl:     carvedilol (COREG) 12 5 mg tablet, take 1 tablet by mouth twice a day, Disp: 60 tablet, Rfl: 5    cholecalciferol (VITAMIN D3) 1,000 units tablet, Take 4,000 Units by mouth daily , Disp: , Rfl:     clopidogrel (PLAVIX) 75 mg tablet, take 1 tablet by mouth once daily, Disp: 90 tablet, Rfl: 3    docusate sodium (COLACE) 100 mg capsule, Take 100 mg by mouth 2 (two) times a day, Disp: , Rfl:     enalapril (VASOTEC) 20 mg tablet, take 1 tablet by mouth twice a day, Disp: 180 tablet, Rfl: 3    Ferrous Sulfate (IRON PO), Take by mouth daily, Disp: , Rfl:     fluticasone (FLONASE) 50 mcg/act nasal spray, 1 spray into each nostril daily, Disp: 16 g, Rfl: 0    GARLIC PO, Take by mouth daily , Disp: , Rfl:     levothyroxine 25 mcg tablet, Take 1 tablet (25 mcg total) by mouth daily in the early morning, Disp: 90 tablet, Rfl: 3    magnesium oxide (MAG-OX) 400 mg, Take 1 tablet (400 mg total) by mouth daily, Disp: 30 tablet, Rfl: 5    spironolactone (ALDACTONE) 25 mg tablet, Take 1 tablet (25 mg total) by mouth daily, Disp: 90 tablet, Rfl: 3    torsemide (DEMADEX) 5 MG tablet, Take 1 tablet (5 mg total) by mouth daily, Disp: 90 tablet, Rfl: 3    nicotine (NICODERM CQ) 14 mg/24hr TD 24 hr patch, Place 1 patch on the skin every 24 hours (Patient not taking: Reported on 4/9/2021), Disp: 14 patch, Rfl: 0    nicotine (NICODERM CQ) 21 mg/24 hr TD 24 hr patch, Place 1 patch on the skin every 24 hours, Disp: 14 patch, Rfl: 0    nicotine (NICODERM CQ) 7 mg/24hr TD 24 hr patch, Place 1 patch on the skin every 24 hours (Patient not taking: Reported on 9/8/2020), Disp: 28 patch, Rfl: 0    Allergies   Allergen Reactions    Thiazide-Type Diuretics      Hyponatremia       Objective   BP sitting on right:  158/58 with a heart rate of 64 and slightly irregular  BP sitting on left:  126/54  BP standing on RIGHT:  170/68 with a heart rate of 64 and slightly irregular  Body mass index is 20 73 kg/m²  General:  Well-developed /thin-appearing, no acute distress  Skin:  No acute rash  Eyes:  No scleral icterus, noninjected, conjunctiva appear normal, no discharge from the eyes  ENT:  Normocephalic/atraumatic, mucous membranes moist, tongue appears normal size  Neck:  Supple, no jugular venous distention, 1+ carotid upstroke, bilateral carotid bruits; trachea is midline, no thyromegaly; no lymphadenopathy  Back:  No CVA tenderness, spine appears midline without any overt abnormalities  Chest:  Clear to auscultation and percussion, good respiratory effort, no use of accessory respiratory muscles  CVS:  Regular rate and rhythm without a murmur rub or gallops appreciable  Abdomen/gastrointestinal:  Normal bowel sounds, nontender and nondistended without hepatosplenomegaly or bruits; no masses appreciable  Extremities:  No clubbing, no cyanosis, no edema, 1+ posterior tibialis pulses, poor dorsalis pedis pulses; bilateral femoral bruits, no arthritic changes and full range of motion  Neuro:  No gross focality  Psych:  Alert, oriented and appropriate    Current Weight:   Weight (last 2 days)     Date/Time   Weight    05/04/21 0800   53 1 (117)                Lab Results:  I have personally reviewed pertinent labs  Results for orders placed or performed in visit on 04/27/21   Type and screen   Result Value Ref Range    ABO Grouping O     Rh Factor Positive     Antibody Screen Negative     Specimen Expiration Date [id number]                ASSESSMENT AND PLAN:  [de-identified]y o  year old female with a history of CAD/CHF/CKD/hypothyroidism/dyslipidemia/hypertension/asthma/arthritis    Apparently the patient is being evaluated for carotid endarterectomy May 15th; CT angiography free March 22nd showed severe right carotid artery atherosclerotic disease (greater than 80% identify both on carotid duplex and CT angiography)  She was also found to have recent significant hypertension of 190/86 and has had her medications adjusted  We are seeing her for CKD stage 3/AK I/hypertension/preoperative clearance    1  CKD stage 3B:  Possible acute component with an elevated creatinine from 1 42-1 79 2 months later   Etiology:  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 Atheroembolization in the past    Baseline creatinine:  1 4-1 5  Recommend:   Workup:  o Follow-up chemistry  o UA with microscopic  o Urine protein creatinine ratio  o Urine for eosinophil  o C3/C4  o Mineral bone disorder evaluation from CKD including magnesium/phosphorus/PTH intact level/vitamin-D level  o Anemia evaluation: This is been going on at least since 2019  o Repeat CBC  o Iron studies  o Rule out multiple myeloma  o Rule out GI bleeding with stool for occult blood x3  o Hematology oncology consultation has been placed  o Possible follow-up with GI  o Lipid profile  o Renal ultrasound with PVR  o Renal artery duplex to rule out renal artery stenosis (based on a study December 9, 2020 both renal arteries were patent however I would reorder another study since it has been approximately 6 months just to ensure no evidence of renal artery disease):  Also CT angiography December 2019 was negative for renal artery disease       Treatment:  o Treat hypertension, please see below for recommendations  o Treat dyslipidemia  o Avoid nephrotoxic agents such as NSAIDs, and proton pump inhibitors as able; patient counseled as such  o Good overall health recommendations including weight loss as appropriate, isotonic exercise as able, and avoidance of salt; patient counseled as such    Further workup and treatment recommendations will be forthcoming depending upon the above results and course    2  Hypertension:  · Given significant hypertension I would rule out secondary causes including:  · Renal artery disease: Obtain renal artery duplex  · Primary aldosterone state negative workup 2019  · Thyroid profile evaluation:  Normal October 14, 2020 recheck at this time  · Pheochromocytoma:  Negative plasma free metanephrines 2019  · Renal evaluation is outlined above  · ALAN:***   Goal:  Less than 130/80 given CKD   Push nonmedical regimen as outlined above   Medication changes today:   Please stop the  Potassium supplement    Decrease enalapril to 10 mg twice a day given recent acute component   Continue amlodipine 5 mg daily   Next option potentially would be hydralazine   Check blood pressures at home    3  Other problems:   Arterial disease:   Aortoiliac occlusive disease:  Status post bypass graft of aortobifemoral with left femoral thrombo embolectomy   Carotid artery disease in particular right side   Left subclavian artery stenosis   Iliac artery stenosis  · Aortic stenosis  · Chronic diastolic CHF  · Hypothyroidism  · Dyslipidemia  · Lower extremity edema  · Peripheral neuropathy  · Tobacco abuse    At this juncture I would like to do the above evaluation and make sure the renal function comes closer to baseline before giving formal clearance  We will see her back in a few weeks and then hopefully kidney function is at baseline and blood pressure is stabilized and then we will make the formal clearance  Also recommend evaluation per primary physician/hematology possibly GI regarding the anemia to make sure this is stable before surgery as well  Will discuss with vascular surgery as well as primary physician  Discussed fully with the patient and answered all questions to her satisfaction  Patient Instructions   1  Medication changes today:   Please decrease Vasotec/enalapril to 1/2 dose or 10 mg twice a day    2   Please go for non fasting  lab work 1-week after making the above medication change  3  Please go for an ultrasound of your kidneys at this time, we will help to arrange for this study  4  Please go for an ultrasound of your kidney arteries at this time we will also help to arrange for this study as well     5  Please purchase an [employer] blood pressure machine that you can get through your drug store or medical supply: The higher numbered series is better for example 5 or 7  Please take 1 week a blood pressure readings  2 weeks after making the above medication change   Please use your right arm only for blood pressure checks    AS FOLLOWS  MORNING AND EVENING, SITTING AND STANDING as follows:  · TAKE THE MORNING READINGS BEFORE ANY MEDICATIONS AND WHEN YOU ARE RELAXED FOR SEVERAL MINUTES  · TAKE THE EVENING READINGS:  BETWEEN 7-10 P M ; PRIOR TO ANY MEDICATIONS; AT LEAST IN OUR  FROM DINNER; AND CERTAINLY AFTER RELAXING FOR A FEW MINUTES  · PLEASE INCLUDE HEART RATE WITH YOUR BLOOD PRESSURE READINGS  · When taking standing readings, keep your arm supported at heart level and not dangling  · Make sure you are sitting with your back supported and feet on the ground and do not cross your legs or feet  · Make sure you have not taken any coffee or caffeine products or exercised or smoke cigarettes at least 30 minutes before taking your blood pressure  Then please bring in your blood pressure machine along with your blood pressure readings to see 1 of my advanced practitioner's in the next 3-4 weeks    6  Follow-up in 3  months   Please bring in 1 week a blood pressure readings morning evening, sitting and standing is outlined above   Please go for fasting lab work 1-2 weeks prior to your appointment      7   General instructions:   AVOID SALT BUT NOT ADDING AN READING LABELS TO MAKE SURE THERE IS LOW-SALT IN THE FOOD THAT YOU ARE EATING  o Goal is less than 2 g of sodium intake or less than 5 g of sodium chloride intake per day     Avoid nonsteroidal anti-inflammatory drugs such as Naprosyn, ibuprofen, Aleve, Advil, Celebrex, Meloxicam (Mobic) etc   You can use Tylenol as needed if you do not have any liver condition to be concerned about     Avoid medications such as Sudafed or decongestants and antihistamines that contained the D component which is the decongestant  You can take antihistamines without the decongestant or D component   Try to avoid medications such as pantoprazole or  Protonix/Nexium or Esomeprazole)/Prilosec or omeprazole/Prevacid or lansoprazole/AcipHex or Rabeprazole  If you are able to, use Pepcid as this is safer for your kidneys   Try to exercise at least 30 minutes 3 days a week to begin with with an ultimate goal of 5 days a week for at least 30 minutes     Try to lose 5-10 lb by your next visit     Please do not drink more than 2 glasses of alcohol/wine on a daily basis as this may contribute to your high blood pressure  Portions of the record may have been created with voice recognition software  Occasional wrong word or "sound a like" substitutions may have occurred due to the inherent limitations of voice recognition software  Read the chart carefully and recognize, using context, where substitutions have occurred      Edward Sanchez MD

## 2021-05-04 NOTE — PATIENT INSTRUCTIONS
1  Medication changes today:   Please decrease Vasotec/enalapril to 1/2 dose or 10 mg twice a day    2  Please go for non fasting  lab work 1-week after making the above medication change  3  Please go for an ultrasound of your kidneys at this time, we will help to arrange for this study  4  Please go for an ultrasound of your kidney arteries at this time we will also help to arrange for this study as well     5  Please purchase an [employer] blood pressure machine that you can get through your drug store or medical supply: The higher numbered series is better for example 5 or 7  Please take 1 week a blood pressure readings  2 weeks after making the above medication change   Please use your right arm only for blood pressure checks    AS FOLLOWS  MORNING AND EVENING, SITTING AND STANDING as follows:  · TAKE THE MORNING READINGS BEFORE ANY MEDICATIONS AND WHEN YOU ARE RELAXED FOR SEVERAL MINUTES  · TAKE THE EVENING READINGS:  BETWEEN 7-10 P M ; PRIOR TO ANY MEDICATIONS; AT LEAST IN OUR  FROM DINNER; AND CERTAINLY AFTER RELAXING FOR A FEW MINUTES  · PLEASE INCLUDE HEART RATE WITH YOUR BLOOD PRESSURE READINGS  · When taking standing readings, keep your arm supported at heart level and not dangling  · Make sure you are sitting with your back supported and feet on the ground and do not cross your legs or feet  · Make sure you have not taken any coffee or caffeine products or exercised or smoke cigarettes at least 30 minutes before taking your blood pressure  Then please bring in your blood pressure machine along with your blood pressure readings to see 1 of my advanced practitioner's in the next 3-4 weeks    6  Follow-up in 3  months   Please bring in 1 week a blood pressure readings morning evening, sitting and standing is outlined above   Please go for fasting lab work 1-2 weeks prior to your appointment      7   General instructions:   AVOID SALT BUT NOT ADDING AN READING LABELS TO MAKE SURE THERE IS LOW-SALT IN THE FOOD THAT YOU ARE EATING  o Goal is less than 2 g of sodium intake or less than 5 g of sodium chloride intake per day     Avoid nonsteroidal anti-inflammatory drugs such as Naprosyn, ibuprofen, Aleve, Advil, Celebrex, Meloxicam (Mobic) etc   You can use Tylenol as needed if you do not have any liver condition to be concerned about     Avoid medications such as Sudafed or decongestants and antihistamines that contained the D component which is the decongestant  You can take antihistamines without the decongestant or D component   Try to avoid medications such as pantoprazole or  Protonix/Nexium or Esomeprazole)/Prilosec or omeprazole/Prevacid or lansoprazole/AcipHex or Rabeprazole  If you are able to, use Pepcid as this is safer for your kidneys   Try to exercise at least 30 minutes 3 days a week to begin with with an ultimate goal of 5 days a week for at least 30 minutes     Try to lose 5-10 lb by your next visit     Please do not drink more than 2 glasses of alcohol/wine on a daily basis as this may contribute to your high blood pressure

## 2021-05-04 NOTE — LETTER
May 4, 2021     Alina Murphy, [address]    Patient: Jose Frances   YOB: 1940   Date of Visit: 5/4/2021       Dear Dr Scooter Vázquez: Thank you for referring Nathalie Davila to me for evaluation  Below are my notes for this consultation  If you have questions, please do not hesitate to call me  I look forward to following your patient along with you  Sincerely,        Herman Lacey MD        CC: MD Yarely Gonsalez MD Harry Spine, MD  5/4/2021  9:21 AM  Sign when Signing Visit  Consultation - Nephrology 5/4/2021        History of Present Illness   Reason for Consult / Principal Problem:  CKD stage 3/preoperative clearance    HPI: Jose Frances is a [de-identified]y o  year old female with a history of CAD/CHF/CKD/hypothyroidism/dyslipidemia/hypertension/asthma/arthritis  Apparently the patient is being evaluated for carotid endarterectomy May 15th; CT angiography free March 22nd showed severe right carotid artery atherosclerotic disease (greater than 80% identify both on carotid duplex and CT angiography)  She was also found to have recent significant hypertension of 190/86 and has had her medications adjusted  Patient denies any current dysuria hematuria voiding symptoms or foamy urine  She denies any NSAID use  No history kidney stones/ frequent urinary tract infections or bladder problem  Mild edema on and off, she is currently on torsemide 5 mg daily which controls swelling  History of hypertension for about 20 years, very labile  I had done a evaluation about 2 years:  Negative workup please see below  There is no medication she cannot tolerate    Current antihypertensive medications:  · Torsemide 5 mg daily  · Spironolactone 25 mg daily  · Magnesium oxide 400 mg daily  · Vasotec 20 mg twice a day  · Carvedilol 12 5 mg twice a day  · Amlodipine 5 mg daily in the a m , just started 04/30/2021  She used to take her blood pressure readings at home but she stopped doing it because it was labile and cause some anxiety        Pertinent labs:  · Creatinine 0 63  as of December 15, 2019  · Creatinine 1 50 as of 01/22/2020  · Creatinine 1 42 as of 02/15/2021  · Lipid profile:  LDL 37/HDL 86/triglycerides 23  · Creatinine 1 79 as of 04/27/2021  · Electrolytes: Potassium 5 5  · Calcium 9 4  · CBC: Hemoglobin 7 7 with normal WBC and platelet count    Review of systems:    General:  No fevers chills or recent colds but does have a chronic cough with some phlegm production, poor appetite in the last few months and lost 10 lb last couple months; very poor energy at this time  Cardiovascular:  No chest pain, she does get some dyspnea on exertion on and off in particular associated with the weather  Respiratory:  See HPI and see above  Gastrointestinal:  She denies any history of bright red blood per rectum/melanotic type stools, 1 rare episode of nausea vomiting recently otherwise negative; on and off constipation/diarrhea; no significant abdominal pain  Genitourinary:  See HPI  Neurology:  No headaches, some dizziness/lightheadedness but this could be in the sitting position as well add with associated more so with sudden head movement in the last several weeks in particular  All other systems were reviewed and are negative    Historical Information   Past Medical History:   Diagnosis Date    Anemia     Arthritis     Asthma     Benign essential hypertension     CAD (coronary artery disease)     CHF (congestive heart failure) ([facility] [address] )     Chronic kidney disease     Coronary artery disease     Disease of thyroid gland     Dizziness     Edema of leg     Hyperlipidemia     Hypertension     Other disorder of circulatory system (CODE)    · Hypothyroidism  · No history of colon liver disease/cancer/diabetes mellitus/CVA or seizures      Past Surgical History:   Procedure Laterality Date    BREAST SURGERY      bxs,benign    COLONOSCOPY      HYSTERECTOMY      INCISIONAL BREAST BIOPSY      x5, 1964, 1965, 1985 and [address] Bilateral 2019    Procedure: BYPASS AORTA BIFEMORAL WITH LEFT FEMORAL THROMBOEMBOLECTOMY;  Surgeon: Cinthai Park MD;  Location: [facility];  Service: Vascular     Social History   Social History     Substance and Sexual Activity   Alcohol Use Yes    Frequency: 4 or more times a week    Drinks per session: 1 or 2    Comment: wilfrid 2-3 every day for 50 years     Social History     Substance and Sexual Activity   Drug Use No     Social History     Tobacco Use   Smoking Status Current Every Day Smoker    Packs/day: 0 50    Types: Cigarettes    Last attempt to quit: 2019    Years since quittin 4   Smokeless Tobacco Never Used   · Cigarette smoking:  Possibly 60 pack years still on off smoking  · Alcohol:  1-2 drinks a day  · No illicit drug use  · Sodium intake:  Try so avoid but occasionally uses  · Does try to exercise 2-3 days a week    Family History   Problem Relation Age of Onset    Hypertension Father     Coronary artery disease Family     Arthritis Family    · Father with hypertension  · No kidney disease in the family    Meds/Allergies   all current active meds have been reviewed, current meds:   Current Outpatient Medications:     acetaminophen (TYLENOL) 325 mg tablet, Take 2 tablets (650 mg total) by mouth every 6 (six) hours as needed for mild pain, headaches or fever, Disp: 30 tablet, Rfl: 0    albuterol (Ventolin HFA) 90 mcg/act inhaler, Inhale 2 puffs every 6 (six) hours as needed for wheezing, Disp: 18 g, Rfl: 2    amLODIPine (NORVASC) 5 mg tablet, Take 1 tablet (5 mg total) by mouth daily, Disp: 90 tablet, Rfl: 0    aspirin 81 MG tablet, Take 2 tablets by mouth daily, Disp: , Rfl:     atorvastatin (LIPITOR) 80 mg tablet, Take 1 tablet (80 mg total) by mouth daily (Patient taking differently: Take 80 mg by mouth daily at bedtime ), Disp: 90 tablet, Rfl: 3    B Complex Vitamins (VITAMIN B-COMPLEX) TABS, Take by mouth daily , Disp: , Rfl:     Bioflavonoid Products (VITAMIN C PLUS PO), Take by mouth daily, Disp: , Rfl:     carvedilol (COREG) 12 5 mg tablet, take 1 tablet by mouth twice a day, Disp: 60 tablet, Rfl: 5    cholecalciferol (VITAMIN D3) 1,000 units tablet, Take 4,000 Units by mouth daily , Disp: , Rfl:     clopidogrel (PLAVIX) 75 mg tablet, take 1 tablet by mouth once daily, Disp: 90 tablet, Rfl: 3    docusate sodium (COLACE) 100 mg capsule, Take 100 mg by mouth 2 (two) times a day, Disp: , Rfl:     enalapril (VASOTEC) 20 mg tablet, take 1 tablet by mouth twice a day, Disp: 180 tablet, Rfl: 3    Ferrous Sulfate (IRON PO), Take by mouth daily, Disp: , Rfl:     fluticasone (FLONASE) 50 mcg/act nasal spray, 1 spray into each nostril daily, Disp: 16 g, Rfl: 0    GARLIC PO, Take by mouth daily , Disp: , Rfl:     levothyroxine 25 mcg tablet, Take 1 tablet (25 mcg total) by mouth daily in the early morning, Disp: 90 tablet, Rfl: 3    magnesium oxide (MAG-OX) 400 mg, Take 1 tablet (400 mg total) by mouth daily, Disp: 30 tablet, Rfl: 5    spironolactone (ALDACTONE) 25 mg tablet, Take 1 tablet (25 mg total) by mouth daily, Disp: 90 tablet, Rfl: 3    torsemide (DEMADEX) 5 MG tablet, Take 1 tablet (5 mg total) by mouth daily, Disp: 90 tablet, Rfl: 3    nicotine (NICODERM CQ) 14 mg/24hr TD 24 hr patch, Place 1 patch on the skin every 24 hours (Patient not taking: Reported on 4/9/2021), Disp: 14 patch, Rfl: 0    nicotine (NICODERM CQ) 21 mg/24 hr TD 24 hr patch, Place 1 patch on the skin every 24 hours, Disp: 14 patch, Rfl: 0    nicotine (NICODERM CQ) 7 mg/24hr TD 24 hr patch, Place 1 patch on the skin every 24 hours (Patient not taking: Reported on 9/8/2020), Disp: 28 patch, Rfl: 0    Allergies   Allergen Reactions    Thiazide-Type Diuretics      Hyponatremia       Objective   BP sitting on right:  158/58 with a heart rate of 64 and slightly irregular  BP sitting on left:  126/54  BP standing on RIGHT:  170/68 with a heart rate of 64 and slightly irregular  Body mass index is 20 73 kg/m²  General:  Well-developed /thin-appearing, no acute distress  Skin:  No acute rash  Eyes:  No scleral icterus, noninjected, conjunctiva appear normal, no discharge from the eyes  ENT:  Normocephalic/atraumatic, mucous membranes moist, tongue appears normal size  Neck:  Supple, no jugular venous distention, 1+ carotid upstroke, bilateral carotid bruits; trachea is midline, no thyromegaly; no lymphadenopathy  Back:  No CVA tenderness, spine appears midline without any overt abnormalities  Chest:  Clear to auscultation and percussion, good respiratory effort, no use of accessory respiratory muscles  CVS:  Regular rate and rhythm without a murmur rub or gallops appreciable  Abdomen/gastrointestinal:  Normal bowel sounds, nontender and nondistended without hepatosplenomegaly or bruits; no masses appreciable  Extremities:  No clubbing, no cyanosis, no edema, 1+ posterior tibialis pulses, poor dorsalis pedis pulses; bilateral femoral bruits, no arthritic changes and full range of motion  Neuro:  No gross focality  Psych:  Alert, oriented and appropriate    Current Weight:   Weight (last 2 days)     Date/Time   Weight    05/04/21 0800   53 1 (117)                Lab Results:  I have personally reviewed pertinent labs  Results for orders placed or performed in visit on 04/27/21   Type and screen   Result Value Ref Range    ABO Grouping O     Rh Factor Positive     Antibody Screen Negative     Specimen Expiration Date [id number]                ASSESSMENT AND PLAN:  [de-identified]y o  year old female with a history of CAD/CHF/CKD/hypothyroidism/dyslipidemia/hypertension/asthma/arthritis    Apparently the patient is being evaluated for carotid endarterectomy May 15th; CT angiography free March 22nd showed severe right carotid artery atherosclerotic disease (greater than 80% identify both on carotid duplex and CT angiography)  She was also found to have recent significant hypertension of 190/86 and has had her medications adjusted  We are seeing her for CKD stage 3/AK I/hypertension/preoperative clearance    1  CKD stage 3B:  Possible acute component with an elevated creatinine from 1 42-1 79 2 months later   Etiology:  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 Atheroembolization in the past    Baseline creatinine:  1 4-1 5  Recommend:   Workup:  o Follow-up chemistry  o UA with microscopic  o Urine protein creatinine ratio  o Urine for eosinophil  o C3/C4  o Mineral bone disorder evaluation from CKD including magnesium/phosphorus/PTH intact level/vitamin-D level  o Anemia evaluation: This is been going on at least since 2019  o Repeat CBC  o Iron studies  o Rule out multiple myeloma  o Rule out GI bleeding with stool for occult blood x3  o Hematology oncology consultation has been placed  o Possible follow-up with GI  o Lipid profile  o Renal ultrasound with PVR  o Renal artery duplex to rule out renal artery stenosis (based on a study December 9, 2020 both renal arteries were patent however I would reorder another study since it has been approximately 6 months just to ensure no evidence of renal artery disease):  Also CT angiography December 2019 was negative for renal artery disease       Treatment:  o Treat hypertension, please see below for recommendations  o Treat dyslipidemia  o Avoid nephrotoxic agents such as NSAIDs, and proton pump inhibitors as able; patient counseled as such  o Good overall health recommendations including weight loss as appropriate, isotonic exercise as able, and avoidance of salt; patient counseled as such    Further workup and treatment recommendations will be forthcoming depending upon the above results and course    2  Hypertension:  · Given significant hypertension I would rule out secondary causes including:  · Renal artery disease: Obtain renal artery duplex  · Primary aldosterone state negative workup 2019  · Thyroid profile evaluation:  Normal October 14, 2020 recheck at this time  · Pheochromocytoma:  Negative plasma free metanephrines 2019  · Renal evaluation is outlined above  · ALAN:  No history of heavy snoring or apneic episodes   Goal:  Less than 130/80 given CKD   Push nonmedical regimen as outlined above   Medication changes today:   Please stop the  Potassium supplement    Decrease enalapril to 10 mg twice a day given recent acute component   Continue amlodipine 5 mg daily   Next option potentially would be hydralazine   Check blood pressures at home    3  Other problems:   Arterial disease:   Aortoiliac occlusive disease:  Status post bypass graft of aortobifemoral with left femoral thrombo embolectomy   Carotid artery disease in particular right side   Left subclavian artery stenosis   Iliac artery stenosis  · Aortic stenosis  · Chronic diastolic CHF  · Hypothyroidism  · Dyslipidemia  · Lower extremity edema  · Peripheral neuropathy  · Tobacco abuse    At this juncture I would like to do the above evaluation and make sure the renal function comes closer to baseline before giving formal clearance  We will see her back in a few weeks and then hopefully kidney function is at baseline and blood pressure is stabilized and then we will make the formal clearance  Also recommend evaluation per primary physician/hematology possibly GI regarding the anemia to make sure this is stable before surgery as well  Will discuss with vascular surgery as well as primary physician  Discussed fully with the patient and answered all questions to her satisfaction  Patient Instructions   1   Medication changes today:   Please decrease Vasotec/enalapril to 1/2 dose or 10 mg twice a day    2  Please go for non fasting  lab work 1-week after making the above medication change  3  Please go for an ultrasound of your kidneys at this time, we will help to arrange for this study  4  Please go for an ultrasound of your kidney arteries at this time we will also help to arrange for this study as well     5  Please purchase an [employer] blood pressure machine that you can get through your drug store or medical supply: The higher numbered series is better for example 5 or 7  Please take 1 week a blood pressure readings  2 weeks after making the above medication change   Please use your right arm only for blood pressure checks    AS FOLLOWS  MORNING AND EVENING, SITTING AND STANDING as follows:  · TAKE THE MORNING READINGS BEFORE ANY MEDICATIONS AND WHEN YOU ARE RELAXED FOR SEVERAL MINUTES  · TAKE THE EVENING READINGS:  BETWEEN 7-10 P M ; PRIOR TO ANY MEDICATIONS; AT LEAST IN OUR  FROM DINNER; AND CERTAINLY AFTER RELAXING FOR A FEW MINUTES  · PLEASE INCLUDE HEART RATE WITH YOUR BLOOD PRESSURE READINGS  · When taking standing readings, keep your arm supported at heart level and not dangling  · Make sure you are sitting with your back supported and feet on the ground and do not cross your legs or feet  · Make sure you have not taken any coffee or caffeine products or exercised or smoke cigarettes at least 30 minutes before taking your blood pressure  Then please bring in your blood pressure machine along with your blood pressure readings to see 1 of my advanced practitioner's in the next 3-4 weeks    6  Follow-up in 3  months   Please bring in 1 week a blood pressure readings morning evening, sitting and standing is outlined above   Please go for fasting lab work 1-2 weeks prior to your appointment      7   General instructions:   AVOID SALT BUT NOT ADDING AN READING LABELS TO MAKE SURE THERE IS LOW-SALT IN THE FOOD THAT YOU ARE EATING  o Goal is less than 2 g of sodium intake or less than 5 g of sodium chloride intake per day     Avoid nonsteroidal anti-inflammatory drugs such as Naprosyn, ibuprofen, Aleve, Advil, Celebrex, Meloxicam (Mobic) etc   You can use Tylenol as needed if you do not have any liver condition to be concerned about     Avoid medications such as Sudafed or decongestants and antihistamines that contained the D component which is the decongestant  You can take antihistamines without the decongestant or D component   Try to avoid medications such as pantoprazole or  Protonix/Nexium or Esomeprazole)/Prilosec or omeprazole/Prevacid or lansoprazole/AcipHex or Rabeprazole  If you are able to, use Pepcid as this is safer for your kidneys   Try to exercise at least 30 minutes 3 days a week to begin with with an ultimate goal of 5 days a week for at least 30 minutes     Try to lose 5-10 lb by your next visit     Please do not drink more than 2 glasses of alcohol/wine on a daily basis as this may contribute to your high blood pressure  Portions of the record may have been created with voice recognition software  Occasional wrong word or "sound a like" substitutions may have occurred due to the inherent limitations of voice recognition software  Read the chart carefully and recognize, using context, where substitutions have occurred      Marshall Catherine MD

## 2021-05-04 NOTE — TELEPHONE ENCOUNTER
MD Donovan Soto MD; [facility] [address]  [facility] is an asymptomatic thus elective carotid endarterectomy that we can hold off on until everything is straightened out   I will ask you to let us know when she is okay from your standpoint to proceed  Thanks     Israel Luevano    Previous Messages    ----- Message -----   From: Donovan Conley MD   Sent: 5/4/2021   9:17 AM EDT   To: Kade Haney MD, Felipa Sandoval MD     Good morning Israel Luevano,   I am seeing Mauri Eller back in follow-up  I am concerned about 3 things:     1  Her creatinine is higher than usual and I am going to work on this to make sure there is no other cause and get it maximized     2  I want to get her blood pressure under good control prior to surgery as well   3  She has significant anemia and she is going to be seeing Hematology as well as GI  I probably would not recommend surgery until these things are somewhat clarified and stable if you would agree  Dejah Greer if this is an emergency needs to be done soon let me know and I will do what I can to get her prepared   Otherwise we will keep her closely followed     Many thanks   [address] [place] [address],[address] let me know what I can do

## 2021-05-05 NOTE — TELEPHONE ENCOUNTER
Advised pt to see hematology before starting iron, she said she already started but will stop, also did Dr Omer Fernandez contact Dr Sandra Nicholson, Dr Omer Fernandez said something about cutting her analopril in half ?  Wondering if you tow talked

## 2021-05-06 NOTE — TELEPHONE ENCOUNTER
Hematology appt 5/10/21, Nephrology appt 6/22/21    Per nephrology office note:     Also recommend evaluation per primary physician/hematology possibly GI regarding the anemia to make sure this is stable before surgery as well

## 2021-05-07 DIAGNOSIS — I15.0 RENOVASCULAR HYPERTENSION: ICD-10-CM

## 2021-05-10 ENCOUNTER — TELEPHONE (OUTPATIENT)
Dept: HEMATOLOGY ONCOLOGY | Facility: CLINIC | Age: 81
End: 2021-05-10

## 2021-05-10 ENCOUNTER — CONSULT (OUTPATIENT)
Dept: HEMATOLOGY ONCOLOGY | Facility: MEDICAL CENTER | Age: 81
End: 2021-05-10
Payer: COMMERCIAL

## 2021-05-10 VITALS
HEIGHT: 63 IN | WEIGHT: 118.6 LBS | HEART RATE: 82 BPM | RESPIRATION RATE: 16 BRPM | SYSTOLIC BLOOD PRESSURE: 142 MMHG | TEMPERATURE: 97 F | DIASTOLIC BLOOD PRESSURE: 72 MMHG | BODY MASS INDEX: 21.02 KG/M2 | OXYGEN SATURATION: 98 %

## 2021-05-10 DIAGNOSIS — I10 ESSENTIAL HYPERTENSION: Chronic | ICD-10-CM

## 2021-05-10 DIAGNOSIS — D64.9 ANEMIA, UNSPECIFIED TYPE: ICD-10-CM

## 2021-05-10 PROCEDURE — 99204 OFFICE O/P NEW MOD 45 MIN: CPT | Performed by: INTERNAL MEDICINE

## 2021-05-10 RX ORDER — ENALAPRIL MALEATE 10 MG/1
10 TABLET ORAL 2 TIMES DAILY
Qty: 60 TABLET | Refills: 5 | Status: SHIPPED | OUTPATIENT
Start: 2021-05-10 | End: 2021-11-26 | Stop reason: SDUPTHER

## 2021-05-10 NOTE — PROGRESS NOTES
Tad Ortega [facility]  1940  [facility] [place] [address] [address] [facility] [address]  HEMATOLOGY/ONCOLOGY CONSULTATION REPORT    DISCUSSION/SUMMARY:    27-year-old female referred for anemia  Mrs Wolff is symptomatic  The etiology for the respiratory issues may be more than just the anemia (long tobacco history)  The most recent CBC is from approximately 2-3 weeks ago  Patient is symptomatic  We discussed options  Mrs Wolff is to go for a complete anemia workup now  Results will dictate further workup and treatment  Differential diagnosis includes anemia of chronic renal insufficiency although I have trended the CBC parameters out going all the way back to December 2019  Patient has had a times significant anemia with macrocytosis and an elevated white count  There is always the possibility of a primary bone marrow disorder and a bone marrow biopsy may be necessary in the future  Patient has a long tobacco history; there is also the possibility of an occult malignancy  Patient is being scheduled for right carotid endarterectomy  Unless emergent, it may be better for the hematology issues to be better defined and treated so that patient will have a higher/better hemoglobin level for surgery  Patient has also been referred to GI - weight loss and alternating constipation/diarrhea etiology not clear  Patient is to return in 3 weeks with the laboratory test results  Mrs Wolff knows to call the hematology/oncology office if there are any other questions or concerns  Carefully review your medication list and verify that the list is accurate and up-to-date  Please call the hematology/oncology office if there are medications missing from the list, medications on the list that you are not currently taking or if there is a dosage or instruction that is different from how you're taking that medication      Patient goals and areas of care: Anemia workup  Barriers to care: none  Patient is able to self-care   ______________________________________________________________________________________    Chief Complaint   Patient presents with    Consult     Anemia     History of Present Illness:  55-year-old female referred for evaluation of anemia  Mrs Wolff states that she was anemic when she was much younger, patient was found to be iron deficient and was treated with IV iron - effective  Presently patient states losing approximately 10 lb over the past 6 months  Patient denies any GI,  or gyn bleeding  Mrs Wolff recently had 1 or 2 episodes of epistaxis but believes this was due to a sinus infection  No oral bleeding  Appetite has been +/- and patient has had problems with alternating constipation and diarrhea recently  No headaches, blurred vision or dizziness  No syncopal episodes  Patient has smoked for approximately 60 years, no shortness of breath at rest but patient has dyspnea on exertion  Mrs Wolff states that she can only walk about 48 yd without having to stop and rest     Review of Systems   Constitutional: Positive for fatigue and unexpected weight change  HENT: Negative  Eyes: Negative  Respiratory: Negative          +dyspnea on exertion   Cardiovascular: Negative  Gastrointestinal: Negative  Endocrine: Negative  Genitourinary: Negative  Musculoskeletal: Negative  Skin: Negative  Allergic/Immunologic: Negative  Neurological: Negative  Hematological: Negative  Psychiatric/Behavioral: Negative  All other systems reviewed and are negative      Patient Active Problem List   Diagnosis    Aortoiliac occlusive disease ([facility] )    Carotid artery stenosis    Chronic Hypertension    Hypothyroidism    Nicotine dependence    Subclavian artery stenosis, left (HCC)    Aortic stenosis    Benign essential HTN    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[facility] )    Dyslipidemia    Onychomycosis    Paronychia of toenail    Pain in both feet    Viral URI with cough    Simple chronic bronchitis (HCC)    CKD (chronic kidney disease) stage 3, GFR 30-59 ml/min ([facility])    Cigarette smoker    Cardiac murmur    Urinary tract infection    Gram-negative bacteremia    Mass of spine    Chronic diastolic heart failure (HCC)    Acute blood loss anemia    Hypertensive heart disease with chronic diastolic congestive heart failure (HCC)    Lower extremity edema    Peripheral neuropathy    Parenchymal renal hypertension    Anemia in stage 3 chronic kidney disease (HCC)    SERJIO (acute kidney injury) ([facility]    Hyperkalemia     Past Medical History:   Diagnosis Date    Anemia     Arthritis     Asthma     Benign essential hypertension     CAD (coronary artery disease)     CHF (congestive heart failure) (HCC)     Chronic kidney disease     Coronary artery disease     Disease of thyroid gland     Dizziness     Edema of leg     Hyperlipidemia     Hypertension     Other disorder of circulatory system (CODE)      Past Surgical History:   Procedure Laterality Date    BREAST SURGERY      bxs,benign    COLONOSCOPY      HYSTERECTOMY      INCISIONAL BREAST BIOPSY      x5, 1964, 1965,  and     MO BYPASS GRAFT OTHR,AORTOBIFEMORAL Bilateral 2019    Procedure: BYPASS AORTA BIFEMORAL WITH LEFT FEMORAL THROMBOEMBOLECTOMY;  Surgeon: Clayton Paige MD;  Location: [facility];  Service: Vascular   Past surgical history:  + blood transfusions many years ago after the birth of 1 child (NOS)    Ob gyn: No postmenopausal bleeding, no breast issues, no recent mammograms    Family History   Problem Relation Age of Onset    Hypertension Father     Coronary artery disease Family     Arthritis Family    Family history:  No known familial or genetic diseases, 2 daughters in good general health, 3rd daughter  (NOS)    Social History     Socioeconomic History    Marital status:      Spouse name: Not on file    Number of children: Not on file    Years of education: Not on file    Highest education level: Not on file   Occupational History    Not on file   Social Needs    Financial resource strain: Not on file    Food insecurity     Worry: Not on file     Inability: Not on file    Transportation needs     Medical: Not on file     Non-medical: Not on file   Tobacco Use    Smoking status: Current Every Day Smoker     Packs/day: 0 50     Types: Cigarettes     Last attempt to quit: 2019     Years since quittin 4    Smokeless tobacco: Never Used   Substance and Sexual Activity    Alcohol use: Yes     Frequency: 4 or more times a week     Drinks per session: 1 or 2     Comment: wilfrid 2-3 every day for 50 years    Drug use: No    Sexual activity: Yes     Partners: Male   Lifestyle    Physical activity     Days per week: Not on file     Minutes per session: Not on file    Stress: Not on file   Relationships    Social connections     Talks on phone: Not on file     Gets together: Not on file     Attends Yazidism service: Not on file     Active member of club or organization: Not on file     Attends meetings of clubs or organizations: Not on file     Relationship status: Not on file    Intimate partner violence     Fear of current or ex partner: Not on file     Emotionally abused: Not on file     Physically abused: Not on file     Forced sexual activity: Not on file   Other Topics Concern    Not on file   Social History Narrative    Rarely exercises    Sleeps 6-7 hours per day   Social history: 1/2 pack per day for 60 years, no drug or alcohol abuse, no toxic exposure    Current Outpatient Medications:     acetaminophen (TYLENOL) 325 mg tablet, Take 2 tablets (650 mg total) by mouth every 6 (six) hours as needed for mild pain, headaches or fever, Disp: 30 tablet, Rfl: 0    albuterol (Ventolin HFA) 90 mcg/act inhaler, Inhale 2 puffs every 6 (six) hours as needed for wheezing, Disp: 18 g, Rfl: 2    amLODIPine (NORVASC) 5 mg tablet, Take 1 tablet (5 mg total) by mouth daily, Disp: 90 tablet, Rfl: 0    aspirin 81 MG tablet, Take 2 tablets by mouth daily, Disp: , Rfl:     atorvastatin (LIPITOR) 80 mg tablet, Take 1 tablet (80 mg total) by mouth daily (Patient taking differently: Take 80 mg by mouth daily at bedtime ), Disp: 90 tablet, Rfl: 3    B Complex Vitamins (VITAMIN B-COMPLEX) TABS, Take by mouth daily , Disp: , Rfl:     Bioflavonoid Products (VITAMIN C PLUS PO), Take by mouth daily, Disp: , Rfl:     carvedilol (COREG) 12 5 mg tablet, take 1 tablet by mouth twice a day, Disp: 60 tablet, Rfl: 5    cholecalciferol (VITAMIN D3) 1,000 units tablet, Take 4,000 Units by mouth daily , Disp: , Rfl:     clopidogrel (PLAVIX) 75 mg tablet, take 1 tablet by mouth once daily, Disp: 90 tablet, Rfl: 3    docusate sodium (COLACE) 100 mg capsule, Take 100 mg by mouth 2 (two) times a day, Disp: , Rfl:     enalapril (VASOTEC) 20 mg tablet, take 1 tablet by mouth twice a day (Patient taking differently: 20 mg Take 10 mg twice daily as directed), Disp: 180 tablet, Rfl: 3    Ferrous Sulfate (IRON PO), Take by mouth daily, Disp: , Rfl:     fluticasone (FLONASE) 50 mcg/act nasal spray, 1 spray into each nostril daily, Disp: 16 g, Rfl: 0    GARLIC PO, Take by mouth daily , Disp: , Rfl:     levothyroxine 25 mcg tablet, Take 1 tablet (25 mcg total) by mouth daily in the early morning, Disp: 90 tablet, Rfl: 3    magnesium oxide (MAG-OX) 400 mg, Take 1 tablet (400 mg total) by mouth daily, Disp: 30 tablet, Rfl: 5    nicotine (NICODERM CQ) 21 mg/24 hr TD 24 hr patch, Place 1 patch on the skin every 24 hours, Disp: 14 patch, Rfl: 0    spironolactone (ALDACTONE) 25 mg tablet, Take 1 tablet (25 mg total) by mouth daily, Disp: 90 tablet, Rfl: 3    torsemide (DEMADEX) 5 MG tablet, Take 1 tablet (5 mg total) by mouth daily, Disp: 90 tablet, Rfl: 3    nicotine (NICODERM CQ) 14 mg/24hr TD 24 hr patch, Place 1 patch on the skin every 24 hours (Patient not taking: Reported on 4/9/2021), Disp: 14 patch, Rfl: 0    nicotine (NICODERM CQ) 7 mg/24hr TD 24 hr patch, Place 1 patch on the skin every 24 hours (Patient not taking: Reported on 9/8/2020), Disp: 28 patch, Rfl: 0    Allergies   Allergen Reactions    Thiazide-Type Diuretics      Hyponatremia       Vitals:    05/10/21 1010   BP: 142/72   Pulse: 82   Resp: 16   Temp: (!) 97 °F (36 1 °C)   SpO2: 98%     Physical Exam  Constitutional:       Appearance: She is well-developed  Comments: Thin but relatively well-nourished female, no respiratory distress   HENT:      Head: Normocephalic and atraumatic  Right Ear: External ear normal       Left Ear: External ear normal    Eyes:      Conjunctiva/sclera: Conjunctivae normal       Pupils: Pupils are equal, round, and reactive to light  Neck:      Musculoskeletal: Normal range of motion and neck supple  Cardiovascular:      Rate and Rhythm: Normal rate and regular rhythm  Heart sounds: Normal heart sounds  Pulmonary:      Effort: Pulmonary effort is normal       Breath sounds: Normal breath sounds  Comments: Fair entry bilaterally, scattered bilateral rhonchi  Abdominal:      General: Bowel sounds are normal       Palpations: Abdomen is soft  Musculoskeletal: Normal range of motion  Skin:     General: Skin is warm  Comments: Slightly pale, warm, moist, upper extremity purpura bilateral, few scabs   Neurological:      Mental Status: She is alert and oriented to person, place, and time  Deep Tendon Reflexes: Reflexes are normal and symmetric  Psychiatric:         Behavior: Behavior normal          Thought Content:  Thought content normal          Judgment: Judgment normal      Extremities:  No lower extremity edema bilaterally, no cords, pulses are decreased, extremities are cool  Lymphatics:  No adenopathy in the neck, supraclavicular region and axilla bilaterally    Labs    04/27/2021 WBC = 9 1 hemoglobin = 7 7 hematocrit = 25 3 MCV = 87 RDW = 19 platelet = 882 BUN = 50 creatinine = 1 79 GFR = 26 PT = 14 6 INR = 1 13 PTT = 30

## 2021-05-10 NOTE — TELEPHONE ENCOUNTER
Patient is calling in to confirm address to her upcoming appt, I have provided her with     [address] [place] [address], [place], [place], [id number]     Patient voiced understanding

## 2021-05-11 RX ORDER — CARVEDILOL 12.5 MG/1
12.5 TABLET ORAL 2 TIMES DAILY
Qty: 90 TABLET | Refills: 3 | Status: SHIPPED | OUTPATIENT
Start: 2021-05-11 | End: 2021-11-16 | Stop reason: SDUPTHER

## 2021-05-12 ENCOUNTER — HOSPITAL ENCOUNTER (OUTPATIENT)
Dept: RADIOLOGY | Facility: HOSPITAL | Age: 81
Discharge: HOME | End: 2021-05-12
Attending: FAMILY MEDICINE
Payer: COMMERCIAL

## 2021-05-12 DIAGNOSIS — R79.89 ELEVATED SERUM CREATININE: ICD-10-CM

## 2021-05-12 DIAGNOSIS — I10 MALIGNANT HYPERTENSION: ICD-10-CM

## 2021-05-12 PROCEDURE — 93975 VASCULAR STUDY: CPT

## 2021-05-12 PROCEDURE — 93975 VASCULAR STUDY: CPT | Performed by: SURGERY

## 2021-05-12 PROCEDURE — 76770 US EXAM ABDO BACK WALL COMP: CPT

## 2021-05-13 ENCOUNTER — OFFICE VISIT (OUTPATIENT)
Dept: GASTROENTEROLOGY | Facility: CLINIC | Age: 81
End: 2021-05-13
Payer: COMMERCIAL

## 2021-05-13 ENCOUNTER — APPOINTMENT (OUTPATIENT)
Dept: LAB | Facility: CLINIC | Age: 81
End: 2021-05-13
Payer: COMMERCIAL

## 2021-05-13 ENCOUNTER — TELEPHONE (OUTPATIENT)
Dept: GASTROENTEROLOGY | Facility: CLINIC | Age: 81
End: 2021-05-13

## 2021-05-13 VITALS
BODY MASS INDEX: 20.55 KG/M2 | WEIGHT: 116 LBS | TEMPERATURE: 97.6 F | HEART RATE: 92 BPM | HEIGHT: 63 IN | DIASTOLIC BLOOD PRESSURE: 78 MMHG | SYSTOLIC BLOOD PRESSURE: 130 MMHG

## 2021-05-13 DIAGNOSIS — R60.0 LOWER EXTREMITY EDEMA: ICD-10-CM

## 2021-05-13 DIAGNOSIS — R79.89 ELEVATED SERUM CREATININE: ICD-10-CM

## 2021-05-13 DIAGNOSIS — R13.10 PILL DYSPHAGIA: ICD-10-CM

## 2021-05-13 DIAGNOSIS — N17.9 AKI (ACUTE KIDNEY INJURY) (HCC): ICD-10-CM

## 2021-05-13 DIAGNOSIS — D63.1 ANEMIA IN STAGE 3 CHRONIC KIDNEY DISEASE, UNSPECIFIED WHETHER STAGE 3A OR 3B CKD (HCC): ICD-10-CM

## 2021-05-13 DIAGNOSIS — I10 MALIGNANT HYPERTENSION: ICD-10-CM

## 2021-05-13 DIAGNOSIS — K59.09 OTHER CONSTIPATION: ICD-10-CM

## 2021-05-13 DIAGNOSIS — N18.30 ANEMIA IN STAGE 3 CHRONIC KIDNEY DISEASE, UNSPECIFIED WHETHER STAGE 3A OR 3B CKD (HCC): ICD-10-CM

## 2021-05-13 DIAGNOSIS — E78.5 DYSLIPIDEMIA: ICD-10-CM

## 2021-05-13 DIAGNOSIS — E87.5 HYPERKALEMIA: ICD-10-CM

## 2021-05-13 DIAGNOSIS — D64.9 ACUTE ON CHRONIC ANEMIA: Primary | ICD-10-CM

## 2021-05-13 DIAGNOSIS — N18.32 STAGE 3B CHRONIC KIDNEY DISEASE (HCC): Chronic | ICD-10-CM

## 2021-05-13 DIAGNOSIS — R63.4 WEIGHT LOSS: ICD-10-CM

## 2021-05-13 DIAGNOSIS — D64.9 ANEMIA, UNSPECIFIED TYPE: ICD-10-CM

## 2021-05-13 DIAGNOSIS — N18.32 STAGE 3B CHRONIC KIDNEY DISEASE (HCC): ICD-10-CM

## 2021-05-13 LAB
25(OH)D3 SERPL-MCNC: 78.3 NG/ML (ref 30–100)
ALBUMIN SERPL BCP-MCNC: 4.1 G/DL (ref 3.5–5)
ALP SERPL-CCNC: 56 U/L (ref 46–116)
ALT SERPL W P-5'-P-CCNC: 28 U/L (ref 12–78)
ANION GAP SERPL CALCULATED.3IONS-SCNC: 7 MMOL/L (ref 4–13)
AST SERPL W P-5'-P-CCNC: 24 U/L (ref 5–45)
BASOPHILS # BLD AUTO: 0.06 THOUSANDS/ΜL (ref 0–0.1)
BASOPHILS NFR BLD AUTO: 1 % (ref 0–1)
BILIRUB SERPL-MCNC: 0.38 MG/DL (ref 0.2–1)
BILIRUB UR QL STRIP: NEGATIVE
BUN SERPL-MCNC: 69 MG/DL (ref 5–25)
C3 SERPL-MCNC: 92.5 MG/DL (ref 90–180)
C4 SERPL-MCNC: 26 MG/DL (ref 10–40)
CALCIUM SERPL-MCNC: 9.4 MG/DL (ref 8.3–10.1)
CHLORIDE SERPL-SCNC: 111 MMOL/L (ref 100–108)
CHOLEST SERPL-MCNC: 103 MG/DL (ref 50–200)
CLARITY UR: CLEAR
CO2 SERPL-SCNC: 22 MMOL/L (ref 21–32)
COLOR UR: YELLOW
CREAT SERPL-MCNC: 1.53 MG/DL (ref 0.6–1.3)
CREAT UR-MCNC: 36.4 MG/DL
DAT POLY-SP REAG RBC QL: NEGATIVE
EOSINOPHIL # BLD AUTO: 0.44 THOUSAND/ΜL (ref 0–0.61)
EOSINOPHIL NFR BLD AUTO: 6 % (ref 0–6)
ERYTHROCYTE [DISTWIDTH] IN BLOOD BY AUTOMATED COUNT: 21.1 % (ref 11.6–15.1)
ERYTHROCYTE [SEDIMENTATION RATE] IN BLOOD: 26 MM/HOUR (ref 0–29)
FERRITIN SERPL-MCNC: 20 NG/ML (ref 8–388)
FOLATE SERPL-MCNC: >20 NG/ML (ref 3.1–17.5)
GFR SERPL CREATININE-BSD FRML MDRD: 32 ML/MIN/1.73SQ M
GLUCOSE P FAST SERPL-MCNC: 108 MG/DL (ref 65–99)
GLUCOSE UR STRIP-MCNC: NEGATIVE MG/DL
HCT VFR BLD AUTO: 26.1 % (ref 34.8–46.1)
HDLC SERPL-MCNC: 72 MG/DL
HGB BLD-MCNC: 7.7 G/DL (ref 11.5–15.4)
HGB UR QL STRIP.AUTO: NEGATIVE
IMM GRANULOCYTES # BLD AUTO: 0.02 THOUSAND/UL (ref 0–0.2)
IMM GRANULOCYTES NFR BLD AUTO: 0 % (ref 0–2)
IRON SATN MFR SERPL: 10 %
IRON SERPL-MCNC: 49 UG/DL (ref 50–170)
KETONES UR STRIP-MCNC: NEGATIVE MG/DL
LDH SERPL-CCNC: 228 U/L (ref 81–234)
LDLC SERPL CALC-MCNC: 28 MG/DL (ref 0–100)
LEUKOCYTE ESTERASE UR QL STRIP: NEGATIVE
LYMPHOCYTES # BLD AUTO: 1.5 THOUSANDS/ΜL (ref 0.6–4.47)
LYMPHOCYTES NFR BLD AUTO: 19 % (ref 14–44)
MAGNESIUM SERPL-MCNC: 2.4 MG/DL (ref 1.6–2.6)
MCH RBC QN AUTO: 26.5 PG (ref 26.8–34.3)
MCHC RBC AUTO-ENTMCNC: 29.5 G/DL (ref 31.4–37.4)
MCV RBC AUTO: 90 FL (ref 82–98)
MONOCYTES # BLD AUTO: 0.78 THOUSAND/ΜL (ref 0.17–1.22)
MONOCYTES NFR BLD AUTO: 10 % (ref 4–12)
NEUTROPHILS # BLD AUTO: 4.93 THOUSANDS/ΜL (ref 1.85–7.62)
NEUTS SEG NFR BLD AUTO: 64 % (ref 43–75)
NITRITE UR QL STRIP: NEGATIVE
NRBC BLD AUTO-RTO: 0 /100 WBCS
PH UR STRIP.AUTO: 6 [PH]
PHOSPHATE SERPL-MCNC: 4.9 MG/DL (ref 2.3–4.1)
PLATELET # BLD AUTO: 269 THOUSANDS/UL (ref 149–390)
PMV BLD AUTO: 12.2 FL (ref 8.9–12.7)
POTASSIUM SERPL-SCNC: 5 MMOL/L (ref 3.5–5.3)
PROT SERPL-MCNC: 7.7 G/DL (ref 6.4–8.2)
PROT UR STRIP-MCNC: NEGATIVE MG/DL
PROT UR-MCNC: <6 MG/DL
PROT/CREAT UR: <0.16 MG/G{CREAT} (ref 0–0.1)
PTH-INTACT SERPL-MCNC: 41.7 PG/ML (ref 18.4–80.1)
RBC # BLD AUTO: 2.91 MILLION/UL (ref 3.81–5.12)
RETICS # AUTO: ABNORMAL 10*3/UL (ref 14097–95744)
RETICS # CALC: 2.27 % (ref 0.37–1.87)
SODIUM SERPL-SCNC: 140 MMOL/L (ref 136–145)
SP GR UR STRIP.AUTO: 1.01 (ref 1–1.03)
TIBC SERPL-MCNC: 495 UG/DL (ref 250–450)
TRIGL SERPL-MCNC: 16 MG/DL
TSH SERPL DL<=0.05 MIU/L-ACNC: 1.7 UIU/ML (ref 0.36–3.74)
UROBILINOGEN UR QL STRIP.AUTO: 0.2 E.U./DL
VIT B12 SERPL-MCNC: 553 PG/ML (ref 100–900)
WBC # BLD AUTO: 7.73 THOUSAND/UL (ref 4.31–10.16)

## 2021-05-13 PROCEDURE — 82668 ASSAY OF ERYTHROPOIETIN: CPT

## 2021-05-13 PROCEDURE — 83735 ASSAY OF MAGNESIUM: CPT

## 2021-05-13 PROCEDURE — 85652 RBC SED RATE AUTOMATED: CPT

## 2021-05-13 PROCEDURE — 84166 PROTEIN E-PHORESIS/URINE/CSF: CPT | Performed by: INTERNAL MEDICINE

## 2021-05-13 PROCEDURE — 86334 IMMUNOFIX E-PHORESIS SERUM: CPT

## 2021-05-13 PROCEDURE — 82746 ASSAY OF FOLIC ACID SERUM: CPT

## 2021-05-13 PROCEDURE — 84166 PROTEIN E-PHORESIS/URINE/CSF: CPT | Performed by: PATHOLOGY

## 2021-05-13 PROCEDURE — 84443 ASSAY THYROID STIM HORMONE: CPT

## 2021-05-13 PROCEDURE — 86038 ANTINUCLEAR ANTIBODIES: CPT

## 2021-05-13 PROCEDURE — 80053 COMPREHEN METABOLIC PANEL: CPT

## 2021-05-13 PROCEDURE — 84156 ASSAY OF PROTEIN URINE: CPT | Performed by: FAMILY MEDICINE

## 2021-05-13 PROCEDURE — 83540 ASSAY OF IRON: CPT

## 2021-05-13 PROCEDURE — 84165 PROTEIN E-PHORESIS SERUM: CPT

## 2021-05-13 PROCEDURE — 80061 LIPID PANEL: CPT

## 2021-05-13 PROCEDURE — 99214 OFFICE O/P EST MOD 30 MIN: CPT | Performed by: PHYSICIAN ASSISTANT

## 2021-05-13 PROCEDURE — 84165 PROTEIN E-PHORESIS SERUM: CPT | Performed by: PATHOLOGY

## 2021-05-13 PROCEDURE — 86880 COOMBS TEST DIRECT: CPT

## 2021-05-13 PROCEDURE — 81003 URINALYSIS AUTO W/O SCOPE: CPT | Performed by: FAMILY MEDICINE

## 2021-05-13 PROCEDURE — 86160 COMPLEMENT ANTIGEN: CPT

## 2021-05-13 PROCEDURE — 36415 COLL VENOUS BLD VENIPUNCTURE: CPT

## 2021-05-13 PROCEDURE — 83010 ASSAY OF HAPTOGLOBIN QUANT: CPT

## 2021-05-13 PROCEDURE — 82607 VITAMIN B-12: CPT

## 2021-05-13 PROCEDURE — 85025 COMPLETE CBC W/AUTO DIFF WBC: CPT

## 2021-05-13 PROCEDURE — 83970 ASSAY OF PARATHORMONE: CPT

## 2021-05-13 PROCEDURE — 82570 ASSAY OF URINE CREATININE: CPT | Performed by: FAMILY MEDICINE

## 2021-05-13 PROCEDURE — 82306 VITAMIN D 25 HYDROXY: CPT

## 2021-05-13 PROCEDURE — 84100 ASSAY OF PHOSPHORUS: CPT

## 2021-05-13 PROCEDURE — 83883 ASSAY NEPHELOMETRY NOT SPEC: CPT

## 2021-05-13 PROCEDURE — 83550 IRON BINDING TEST: CPT

## 2021-05-13 PROCEDURE — 82728 ASSAY OF FERRITIN: CPT

## 2021-05-13 PROCEDURE — 83615 LACTATE (LD) (LDH) ENZYME: CPT

## 2021-05-13 PROCEDURE — 86334 IMMUNOFIX E-PHORESIS SERUM: CPT | Performed by: PATHOLOGY

## 2021-05-13 PROCEDURE — 85045 AUTOMATED RETICULOCYTE COUNT: CPT

## 2021-05-13 NOTE — PROGRESS NOTES
Assessment and Plan    #1  Acute on chronic anemia with weight loss: patient with chronic anemia for a few years but with worsening recently, hgb 7 7 on 4/27  Some SOB with exertion and fatigue  Iron panel and occult blood have not been checked but have been ordered  Has lost about 30 pounds since 2018 but only 6 of those were since the end of 2019  -discussed EGD and colonoscopy with the patient in detail including risks such as perforation due to age  Discussed that we would be assessing for sources of GI blood loss and most importantly and malignancy  -patient is hesitant but agreeable at this time  I did advise that if other hematology work up indicated a different source that the procedures can always be cancelled but that it is a good idea to rule out any GI issues as a source of anemia with her weight loss and constipation  -check iron per hematology  -miralax/dulc prep due to kidney disease  -will get clearance to hold plavix from Dr Steve Calderon    #2  Constipation: reports history of constipation for a long time, currently taking a stool softener daily with normal bowel movements  -continue stool softener  -colonoscopy as above     #3  Dysphagia to pills: food and liquid is fine, likely functional, no GERD  -EGD as above   -swallow pills one by one with good amount of water, cut large pills in half if able     --------------------------------------------------------------------------------------------------------------------    Chief Complaint:     HPI: Rima Lamb is a [de-identified] y o  female With a history of hypothyroidism, bronchitis, aortic stenosis, hypertension, carotid artery stenosis, CHF, hypertension, neuropathy, CKD, and hyperlipidemia who presents today for follow up  Anemia  Patient has longstanding anemia for many years however this has acutely worsened recently  Hemoglobin was 7 7 on labs in April    Patient recently seen by Hematology who ordered extensive serological workup which has yet to be performed  There is no evidence of iron studies or occult stools prior to this in the chart  Patient reports that her PCP did put her on iron for a short period of time but then stopped this due to her workup  Patient denies any heartburn, nausea, vomiting  Reports her appetite has improved and she has been eating well  She does report occasional dysphagia to pills but denies any dysphagia to food or liquids  She never has to regurgitate  She denies any abdominal pain  She reports that she has chronically had issues with her bowels  She reports that most commonly the constipation  She reports starting Colace few weeks ago and reports that her stools have been regular since that time  Denies any blood in the stool or black tarry stools  She is on Plavix for issues with carotid artery stenosis  Upon looking at her chart, she was approximately 144 lb back in 2018  As of the end of 2019 she was 122 lb  Today she weighs 116 lb  She reports her period of time that she had stopped eating red meat but is now consuming red meat again  Her last colonoscopy she believes was about 10 years ago  She has never had an upper endoscopy in the past       She had been seen back in 2019 for alternating diarrhea and constipation and was recommended to have a colonoscopy at that time to assess for colon polyps and lesions but this was never performed  She reports associated fatigue and some shortness of breath with exertion recently  Denies any chest pain      Review of Systems:   General: negative for fever, night sweats; +fatigue, weight loss  Psychological: negative for anxiety or depression  Ophthalmic: negative for blurry vision or scleral icterus  ENT: negative for headaches, oral lesions, sore throat, vocal changes or dysphagia  Hematological and Lymphatic: negative for pallor or swollen lymph nodes  Respiratory: negative for wheezing; +chronic cough, SOB with exertion  Cardiovascular: negative for chest pain, edema or murmur  Gastrointestinal: as mentioned in HPI  Genito-Urinary: negative for dysuria or incontinence  Musculoskeletal: negative for joint pain, joint stiffness or joint swelling  Dermatological: negative for pruritus, rash, or jaundice    Current Medications  Current Outpatient Medications   Medication Sig Dispense Refill    acetaminophen (TYLENOL) 325 mg tablet Take 2 tablets (650 mg total) by mouth every 6 (six) hours as needed for mild pain, headaches or fever 30 tablet 0    albuterol (Ventolin HFA) 90 mcg/act inhaler Inhale 2 puffs every 6 (six) hours as needed for wheezing 18 g 2    amLODIPine (NORVASC) 5 mg tablet Take 1 tablet (5 mg total) by mouth daily 90 tablet 0    aspirin 81 MG tablet Take 2 tablets by mouth daily      atorvastatin (LIPITOR) 80 mg tablet Take 1 tablet (80 mg total) by mouth daily (Patient taking differently: Take 80 mg by mouth daily at bedtime ) 90 tablet 3    B Complex Vitamins (VITAMIN B-COMPLEX) TABS Take by mouth daily       Bioflavonoid Products (VITAMIN C PLUS PO) Take by mouth daily      carvedilol (COREG) 12 5 mg tablet Take 1 tablet (12 5 mg total) by mouth 2 (two) times a day 90 tablet 3    cholecalciferol (VITAMIN D3) 1,000 units tablet Take 4,000 Units by mouth daily       clopidogrel (PLAVIX) 75 mg tablet take 1 tablet by mouth once daily 90 tablet 3    docusate sodium (COLACE) 100 mg capsule Take 100 mg by mouth 2 (two) times a day      enalapril (VASOTEC) 10 mg tablet Take 1 tablet (10 mg total) by mouth 2 (two) times a day 60 tablet 5    fluticasone (FLONASE) 50 mcg/act nasal spray 1 spray into each nostril daily 16 g 0    GARLIC PO Take by mouth daily       levothyroxine 25 mcg tablet Take 1 tablet (25 mcg total) by mouth daily in the early morning 90 tablet 3    magnesium oxide (MAG-OX) 400 mg Take 1 tablet (400 mg total) by mouth daily 30 tablet 5    nicotine (NICODERM CQ) 21 mg/24 hr TD 24 hr patch Place 1 patch on the skin every 24 hours 14 patch 0    spironolactone (ALDACTONE) 25 mg tablet Take 1 tablet (25 mg total) by mouth daily 90 tablet 3    torsemide (DEMADEX) 5 MG tablet Take 1 tablet (5 mg total) by mouth daily 90 tablet 3    Ferrous Sulfate (IRON PO) Take by mouth daily      nicotine (NICODERM CQ) 14 mg/24hr TD 24 hr patch Place 1 patch on the skin every 24 hours (Patient not taking: Reported on 4/9/2021) 14 patch 0    nicotine (NICODERM CQ) 7 mg/24hr TD 24 hr patch Place 1 patch on the skin every 24 hours (Patient not taking: Reported on 9/8/2020) 28 patch 0     No current facility-administered medications for this visit          Past Medical History  Past Medical History:   Diagnosis Date    Anemia     Arthritis     Asthma     Benign essential hypertension     CAD (coronary artery disease)     CHF (congestive heart failure) (HCC)     Chronic kidney disease     Coronary artery disease     Disease of thyroid gland     Dizziness     Edema of leg     Hyperlipidemia     Hypertension     Other disorder of circulatory system (CODE)        Past Surgical History  Past Surgical History:   Procedure Laterality Date    BREAST SURGERY      bxs,benign    COLONOSCOPY      HYSTERECTOMY      INCISIONAL BREAST BIOPSY      x5, 1964, 1965, 1985 and [address] Bilateral 12/9/2019    Procedure: BYPASS AORTA BIFEMORAL WITH LEFT FEMORAL THROMBOEMBOLECTOMY;  Surgeon: Chucho Ruggiero MD;  Location: [facility];  Service: Vascular       Past Social History   Social History     Socioeconomic History    Marital status:      Spouse name: None    Number of children: None    Years of education: None    Highest education level: None   Occupational History    None   Social Needs    Financial resource strain: None    Food insecurity     Worry: None     Inability: None    Transportation needs     Medical: None     Non-medical: None   Tobacco Use    Smoking status: Current Every Day Smoker Packs/day: 0 25     Types: Cigarettes     Last attempt to quit: 2019     Years since quittin 4    Smokeless tobacco: Never Used   Substance and Sexual Activity    Alcohol use: Yes     Frequency: 4 or more times a week     Drinks per session: 1 or 2     Comment: wilfrid 2-3 every day for 50 years    Drug use: No    Sexual activity: Yes     Partners: Male   Lifestyle    Physical activity     Days per week: None     Minutes per session: None    Stress: None   Relationships    Social connections     Talks on phone: None     Gets together: None     Attends Zoroastrianism service: None     Active member of club or organization: None     Attends meetings of clubs or organizations: None     Relationship status: None    Intimate partner violence     Fear of current or ex partner: None     Emotionally abused: None     Physically abused: None     Forced sexual activity: None   Other Topics Concern    None   Social History Narrative    Rarely exercises    Sleeps 6-7 hours per day       The following portions of the patient's history were reviewed and updated as appropriate: allergies, current medications, past family history, past medical history, past social history, past surgical history and problem list     Vital Signs  Vitals:    21 0903   BP: 130/78   BP Location: Right arm   Patient Position: Sitting   Cuff Size: Standard   Pulse: 92   Temp: 97 6 °F (36 4 °C)   Weight: 52 6 kg (116 lb)   Height: 5' 3" (1 6 m)       Physical Exam:  General appearance: alert, cooperative, no distress; thin elderly female  HEENT: normocephalic, anicteric, no eye erythema or discharge, no oropharyngeal thrush  Neck: supple, trachea midline, no adenopathy  Lungs: scattered rhonchi b/l, no rales or wheezing, unlabored respirations  Heart: RRR, no murmur, rubs, or gallops  Abdomen: soft, non-tender, non-distended, normal bowel sounds, no masses or organomegaly  Rectal: deferred  Extremities: no cyanosis, clubbing, or edema  Musculoskeletal: normal gait  Skin: color and texture normal, no jaundice, no rashes or lesions  Psychiatric: alert and oriented, normal affect and behavior

## 2021-05-13 NOTE — TELEPHONE ENCOUNTER
Patient is scheduled for COLONOSCOPY/EGD on June 14 , 2021 at [address] [place] [address] with Jermaine Rosenbaum MD  Patient is aware of pre-procedure prep of Miralax/Dulcolax and they will be called the day prior between 2 and 6 pm for time to report for procedure  Patient is aware she needs to covid test 06/08/21  5 day hold of Plavix request faxed to Dr Wakefield Herb

## 2021-05-14 ENCOUNTER — TELEPHONE (OUTPATIENT)
Dept: NEPHROLOGY | Facility: CLINIC | Age: 81
End: 2021-05-14

## 2021-05-14 LAB
EPO SERPL-ACNC: 29.2 MIU/ML (ref 2.6–18.5)
HAPTOGLOB SERPL-MCNC: 171 MG/DL (ref 42–346)
KAPPA LC FREE SER-MCNC: 74.4 MG/L (ref 3.3–19.4)
KAPPA LC FREE/LAMBDA FREE SER: 1.88 {RATIO} (ref 0.26–1.65)
LAMBDA LC FREE SERPL-MCNC: 39.6 MG/L (ref 5.7–26.3)
RYE IGE QN: NEGATIVE

## 2021-05-14 NOTE — TELEPHONE ENCOUNTER
----- Message from Dakota Lemus MD sent at 5/14/2021  7:16 AM EDT -----  I believe hematology is handling the iron/anemia  Thank you

## 2021-05-15 LAB
ALBUMIN SERPL ELPH-MCNC: 4.52 G/DL (ref 3.5–5)
ALBUMIN SERPL ELPH-MCNC: 57.2 % (ref 52–65)
ALBUMIN UR ELPH-MCNC: 100 %
ALPHA1 GLOB MFR UR ELPH: 0 %
ALPHA1 GLOB SERPL ELPH-MCNC: 0.32 G/DL (ref 0.1–0.4)
ALPHA1 GLOB SERPL ELPH-MCNC: 4.1 % (ref 2.5–5)
ALPHA2 GLOB MFR UR ELPH: 0 %
ALPHA2 GLOB SERPL ELPH-MCNC: 0.87 G/DL (ref 0.4–1.2)
ALPHA2 GLOB SERPL ELPH-MCNC: 11 % (ref 7–13)
B-GLOBULIN MFR UR ELPH: 0 %
BETA GLOB ABNORMAL SERPL ELPH-MCNC: 0.56 G/DL (ref 0.4–0.8)
BETA1 GLOB SERPL ELPH-MCNC: 7.1 % (ref 5–13)
BETA2 GLOB SERPL ELPH-MCNC: 4.4 % (ref 2–8)
BETA2+GAMMA GLOB SERPL ELPH-MCNC: 0.35 G/DL (ref 0.2–0.5)
GAMMA GLOB ABNORMAL SERPL ELPH-MCNC: 1.28 G/DL (ref 0.5–1.6)
GAMMA GLOB MFR UR ELPH: 0 %
GAMMA GLOB SERPL ELPH-MCNC: 16.2 % (ref 12–22)
IGG/ALB SER: 1.34 {RATIO} (ref 1.1–1.8)
INTERPRETATION UR IFE-IMP: NORMAL
M PROTEIN 1 MFR SERPL ELPH: 3.4 %
M PROTEIN 1 SERPL ELPH-MCNC: 0.27 G/DL
PROT PATTERN SERPL ELPH-IMP: NORMAL
PROT PATTERN UR ELPH-IMP: NORMAL
PROT SERPL-MCNC: 7.9 G/DL (ref 6.4–8.2)
PROT UR-MCNC: 6 MG/DL

## 2021-05-23 DIAGNOSIS — E78.2 MIXED HYPERLIPIDEMIA: ICD-10-CM

## 2021-05-24 ENCOUNTER — TRANSCRIBE ORDERS (OUTPATIENT)
Dept: LAB | Facility: CLINIC | Age: 81
End: 2021-05-24

## 2021-05-24 ENCOUNTER — TELEPHONE (OUTPATIENT)
Dept: HEMATOLOGY ONCOLOGY | Facility: CLINIC | Age: 81
End: 2021-05-24

## 2021-05-24 RX ORDER — FLUTICASONE PROPIONATE 50 MCG
SPRAY, SUSPENSION (ML) NASAL
Qty: 16 G | Refills: 0 | Status: SHIPPED | OUTPATIENT
Start: 2021-05-24 | End: 2021-10-20 | Stop reason: HOSPADM

## 2021-05-24 NOTE — TELEPHONE ENCOUNTER
Patient is calling in to confirm that she has blood work orders in her chart, I have confirmed that she does and patient voiced understanding

## 2021-06-01 ENCOUNTER — TELEPHONE (OUTPATIENT)
Dept: HEMATOLOGY ONCOLOGY | Facility: CLINIC | Age: 81
End: 2021-06-01

## 2021-06-01 ENCOUNTER — TELEPHONE (OUTPATIENT)
Dept: FAMILY MEDICINE CLINIC | Facility: CLINIC | Age: 81
End: 2021-06-01

## 2021-06-01 ENCOUNTER — OFFICE VISIT (OUTPATIENT)
Dept: HEMATOLOGY ONCOLOGY | Facility: MEDICAL CENTER | Age: 81
End: 2021-06-01
Payer: COMMERCIAL

## 2021-06-01 VITALS
WEIGHT: 117.9 LBS | BODY MASS INDEX: 20.89 KG/M2 | SYSTOLIC BLOOD PRESSURE: 170 MMHG | RESPIRATION RATE: 18 BRPM | TEMPERATURE: 98.2 F | HEIGHT: 63 IN | HEART RATE: 69 BPM | DIASTOLIC BLOOD PRESSURE: 68 MMHG | OXYGEN SATURATION: 97 %

## 2021-06-01 DIAGNOSIS — N18.30 ANEMIA IN STAGE 3 CHRONIC KIDNEY DISEASE, UNSPECIFIED WHETHER STAGE 3A OR 3B CKD (HCC): Primary | ICD-10-CM

## 2021-06-01 DIAGNOSIS — D63.1 ANEMIA IN STAGE 3 CHRONIC KIDNEY DISEASE, UNSPECIFIED WHETHER STAGE 3A OR 3B CKD (HCC): Primary | ICD-10-CM

## 2021-06-01 PROCEDURE — 99214 OFFICE O/P EST MOD 30 MIN: CPT | Performed by: INTERNAL MEDICINE

## 2021-06-01 NOTE — TELEPHONE ENCOUNTER
Patients daughter Barry Fernandes would like a call back from Dr Liz Hadley, patient obtained lab work today  Barry Fernandes could be reached at [phone]

## 2021-06-01 NOTE — PROGRESS NOTES
Yesi Romelia Wolff  1940  [facility] [place] [address] [address] [facility] [id number]  HEMATOLOGY/ONCOLOGY CONSULTATION REPORT    DISCUSSION/SUMMARY:    06/01/2021 5:00 p m  I discussed the case with the patient's daughter Lesley Min ([phone]) this afternoon  We discussed the iron deficiency anemia, the anemia of chronic renal insufficiency and the concerns about MGUS and an erythroid stimulating agent  Daughter will discuss options again with her mother and call the office within the next day or 2 to tell us what they have decided  28-year-old female previously referred for anemia  Mrs Wolff is symptomatic  The etiology for the respiratory issues likely is due to other issues besides the anemia  The recent anemia workup demonstrated somewhat complicated results  Patient may have an element of iron deficiency anemia  This can be treated with IV iron - patient agrees  Patient also likely has anemia of chronic renal insufficiency  Treatment would be an erythroid stimulating agent  Unfortunately immunofixation demonstrated a monoclonal gammopathy  Calcium is within normal limits, LDH is not elevated, no uric acid checked  Both the kappa and lambda are elevated but the ratio is close to normal   These elevations are likely due to the renal insufficiency  The concern is starting and an TERRY and driving an early malignancy (MGUS) forward  The above was discussed at length with the patient  Mrs Wolff understands that she has more than 1 reason for the anemia (patient has a chronic cough; always the concern of an occult malignancy)  Patient would like to go home and discuss options with her family  Patient's daughter Selin Dori reportedly will call the office so that I can discuss this with her also  At this time, patient is to return in 1 month but as above, I will wait for Amina's call    Afterwards the IV iron can be ordered; to try and eliminate 1 cause for the anemia  Patient will follow up with renal and vascular as directed  Patient also has an upper and lower endoscopy pending  I have told the patient she should have her hemoglobin level checked before any invasive procedure or surgery - patient may need another transfusion  Patient is to return in 4 weeks but this may change depending upon the above  Mrs Wolff knows to call the hematology/oncology office if there are any other questions or concerns  Carefully review your medication list and verify that the list is accurate and up-to-date  Please call the hematology/oncology office if there are medications missing from the list, medications on the list that you are not currently taking or if there is a dosage or instruction that is different from how you're taking that medication  Patient goals and areas of care: Make decisions regarding anemia treatment  Barriers to care: none  Patient is able to self-care   ______________________________________________________________________________________    Chief Complaint   Patient presents with    Follow-up     Anemia     History of Present Illness:  25-year-old female referred for evaluation of anemia  Patient returns for follow-up  Mrs Wolff states that she was anemic when she was much younger, patient was found to be iron deficient and was treated with IV iron - effective  Presently patient states losing approximately 10 lb over the past 6 months  Patient denies any GI,  or gyn bleeding  Mrs Wolff recently had 1 or 2 episodes of epistaxis but believes this was due to a sinus infection  Patient states feeling +/-, about the same as before  Still with sinus congestion problems but no epistaxis  Appetite is okay, weight is stable  Patient still has a chronic cough, no sputum or hemoptysis  No headaches, blurred vision, dizziness or syncopal episodes    Activities are limited but the same as before  No other GI,  or gyn issues  No pain control issues  As before, patient can only walk approximately 48 yd without having to rest     Review of Systems   Constitutional: Positive for fatigue  Negative for unexpected weight change  HENT: Negative  Eyes: Negative  Respiratory: Negative          +dyspnea on exertion   Cardiovascular: Negative  Gastrointestinal: Negative  Endocrine: Negative  Genitourinary: Negative  Musculoskeletal: Negative  Skin: Negative  Allergic/Immunologic: Negative  Neurological: Negative  Hematological: Negative  Psychiatric/Behavioral: Negative  All other systems reviewed and are negative      Patient Active Problem List   Diagnosis    Aortoiliac occlusive disease ([facility] [address] )    Carotid artery stenosis    Chronic Hypertension    Hypothyroidism    Nicotine dependence    Subclavian artery stenosis, left (HCC)    Aortic stenosis    Benign essential HTN    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[facility]    Dyslipidemia    Onychomycosis    Paronychia of toenail    Pain in both feet    Viral URI with cough    Simple chronic bronchitis ([facility])    CKD (chronic kidney disease) stage 3, GFR 30-59 ml/min ([facility])    Cigarette smoker    Cardiac murmur    Urinary tract infection    Gram-negative bacteremia    Mass of spine    Chronic diastolic heart failure ([facility])    Acute blood loss anemia    Hypertensive heart disease with chronic diastolic congestive heart failure (HCC)    Lower extremity edema    Peripheral neuropathy    Parenchymal renal hypertension    Anemia in stage 3 chronic kidney disease (HCC)    SERJIO (acute kidney injury) ([facility])    Hyperkalemia    Anemia, unspecified     Past Medical History:   Diagnosis Date    Anemia     Arthritis     Asthma     Benign essential hypertension     CAD (coronary artery disease)     CHF (congestive heart failure) (HCC)     Chronic kidney disease     Coronary artery disease     Disease of thyroid gland  Dizziness     Edema of leg     Hyperlipidemia     Hypertension     Other disorder of circulatory system (CODE)      Past Surgical History:   Procedure Laterality Date    BREAST SURGERY      bxs,benign    COLONOSCOPY      HYSTERECTOMY      INCISIONAL BREAST BIOPSY      x5, 1964, 1965, 1985 and [address] Bilateral 2019    Procedure: BYPASS AORTA BIFEMORAL WITH LEFT FEMORAL THROMBOEMBOLECTOMY;  Surgeon: Chucho Ruggiero MD;  Location: [facility];  Service: Vascular   Past surgical history:  + blood transfusions many years ago after the birth of 1 child (NOS)    Ob gyn: No postmenopausal bleeding, no breast issues, no recent mammograms    Family History   Problem Relation Age of Onset    Hypertension Father     Coronary artery disease Family     Arthritis Family    Family history:  No known familial or genetic diseases, 2 daughters in good general health, 2rd daughter  (NOS)    Social History     Socioeconomic History    Marital status:      Spouse name: Not on file    Number of children: Not on file    Years of education: Not on file    Highest education level: Not on file   Occupational History    Not on file   Social Needs    Financial resource strain: Not on file    Food insecurity     Worry: Not on file     Inability: Not on file    Transportation needs     Medical: Not on file     Non-medical: Not on file   Tobacco Use    Smoking status: Current Every Day Smoker     Packs/day: 0 25     Types: Cigarettes     Last attempt to quit: 2019     Years since quittin 4    Smokeless tobacco: Never Used   Substance and Sexual Activity    Alcohol use: Yes     Frequency: 4 or more times a week     Drinks per session: 1 or 2     Comment: wilfrid 2-3 every day for 50 years    Drug use: No    Sexual activity: Yes     Partners: Male   Lifestyle    Physical activity     Days per week: Not on file     Minutes per session: Not on file    Stress: Not on file   Relationships    Social connections     Talks on phone: Not on file     Gets together: Not on file     Attends Bahai service: Not on file     Active member of club or organization: Not on file     Attends meetings of clubs or organizations: Not on file     Relationship status: Not on file    Intimate partner violence     Fear of current or ex partner: Not on file     Emotionally abused: Not on file     Physically abused: Not on file     Forced sexual activity: Not on file   Other Topics Concern    Not on file   Social History Narrative    Rarely exercises    Sleeps 6-7 hours per day   Social history: 1/2 pack per day for 60 years, no drug or alcohol abuse, no toxic exposure    Current Outpatient Medications:     acetaminophen (TYLENOL) 325 mg tablet, Take 2 tablets (650 mg total) by mouth every 6 (six) hours as needed for mild pain, headaches or fever, Disp: 30 tablet, Rfl: 0    albuterol (Ventolin HFA) 90 mcg/act inhaler, Inhale 2 puffs every 6 (six) hours as needed for wheezing, Disp: 18 g, Rfl: 2    amLODIPine (NORVASC) 5 mg tablet, Take 1 tablet (5 mg total) by mouth daily, Disp: 90 tablet, Rfl: 0    aspirin 81 MG tablet, Take 2 tablets by mouth daily, Disp: , Rfl:     atorvastatin (LIPITOR) 80 mg tablet, Take 1 tablet (80 mg total) by mouth daily (Patient taking differently: Take 80 mg by mouth daily at bedtime ), Disp: 90 tablet, Rfl: 3    B Complex Vitamins (VITAMIN B-COMPLEX) TABS, Take by mouth daily , Disp: , Rfl:     Bioflavonoid Products (VITAMIN C PLUS PO), Take by mouth daily, Disp: , Rfl:     carvedilol (COREG) 12 5 mg tablet, Take 1 tablet (12 5 mg total) by mouth 2 (two) times a day, Disp: 90 tablet, Rfl: 3    cholecalciferol (VITAMIN D3) 1,000 units tablet, Take 4,000 Units by mouth daily , Disp: , Rfl:     clopidogrel (PLAVIX) 75 mg tablet, take 1 tablet by mouth once daily, Disp: 90 tablet, Rfl: 3    docusate sodium (COLACE) 100 mg capsule, Take 100 mg by mouth 2 (two) times a day, Disp: , Rfl:     enalapril (VASOTEC) 10 mg tablet, Take 1 tablet (10 mg total) by mouth 2 (two) times a day, Disp: 60 tablet, Rfl: 5    fluticasone (FLONASE) 50 mcg/act nasal spray, instill 1 spray into each nostril once daily, Disp: 16 g, Rfl: 0    GARLIC PO, Take by mouth daily , Disp: , Rfl:     levothyroxine 25 mcg tablet, Take 1 tablet (25 mcg total) by mouth daily in the early morning, Disp: 90 tablet, Rfl: 3    magnesium oxide (MAG-OX) 400 mg, Take 1 tablet (400 mg total) by mouth daily, Disp: 30 tablet, Rfl: 5    nicotine (NICODERM CQ) 21 mg/24 hr TD 24 hr patch, Place 1 patch on the skin every 24 hours, Disp: 14 patch, Rfl: 0    spironolactone (ALDACTONE) 25 mg tablet, Take 1 tablet (25 mg total) by mouth daily, Disp: 90 tablet, Rfl: 3    torsemide (DEMADEX) 5 MG tablet, Take 1 tablet (5 mg total) by mouth daily, Disp: 90 tablet, Rfl: 3    Ferrous Sulfate (IRON PO), Take by mouth daily, Disp: , Rfl:     nicotine (NICODERM CQ) 14 mg/24hr TD 24 hr patch, Place 1 patch on the skin every 24 hours (Patient not taking: Reported on 4/9/2021), Disp: 14 patch, Rfl: 0    nicotine (NICODERM CQ) 7 mg/24hr TD 24 hr patch, Place 1 patch on the skin every 24 hours (Patient not taking: Reported on 9/8/2020), Disp: 28 patch, Rfl: 0    Allergies   Allergen Reactions    Thiazide-Type Diuretics      Hyponatremia       Vitals:    06/01/21 1159   BP: 170/68   Pulse: 69   Resp: 18   Temp: 98 2 °F (36 8 °C)   SpO2: 97%     Physical Exam  Constitutional:       Appearance: She is well-developed  Comments: Thin but relatively well-nourished female, no respiratory distress   HENT:      Head: Normocephalic and atraumatic  Right Ear: External ear normal       Left Ear: External ear normal    Eyes:      Conjunctiva/sclera: Conjunctivae normal       Pupils: Pupils are equal, round, and reactive to light  Neck:      Musculoskeletal: Normal range of motion and neck supple     Cardiovascular: Rate and Rhythm: Normal rate and regular rhythm  Heart sounds: Normal heart sounds  Pulmonary:      Effort: Pulmonary effort is normal       Breath sounds: Normal breath sounds  Comments: Fair entry bilaterally, scattered bilateral rhonchi  Abdominal:      General: Bowel sounds are normal       Palpations: Abdomen is soft  Musculoskeletal: Normal range of motion  Skin:     General: Skin is warm  Comments: Slightly pale, warm, moist, upper extremity purpura bilateral, few scabs   Neurological:      Mental Status: She is alert and oriented to person, place, and time  Deep Tendon Reflexes: Reflexes are normal and symmetric  Psychiatric:         Behavior: Behavior normal          Thought Content:  Thought content normal          Judgment: Judgment normal      Extremities:  No lower extremity edema bilaterally, no cords, pulses are decreased, extremities are cool  Lymphatics:  No adenopathy in the neck, supraclavicular region and axilla bilaterally    Labs    05/13/2021 WBC = 7 7 hemoglobin = 7 7 hematocrit = 26 1 MCV = 90 RDW = 21 1 platelet = 296 neutrophil = 64% lymphocytes = 19% monocyte = 10% eosinophil = 6% BUN = 69 creatinine = 1 53 GFR = 32 calcium = 9 4 AST = 24 ALT = 28 alkaline phosphatase = 56 total protein = 7 7 total bilirubin = 0 38 free kappa = 74 4 = elevated free lambda = 39 6 = elevated kappa/lambda ratio = 1 88 = slightly elevated TSH = 1 700 folate> 20 LDH = 228 haptoglobin = 171 direct Genie = negative erythropoietin = 29 2 ISABEL = negative ESR = 26 reticulocyte count = 2 27%, = elevated iron = 49 TIBC = 495 iron saturation = 10% ferritin = 20    05/13/2021 SPEP demonstrated a monoclonal peak in the gamma region  05/13/2021 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    04/27/2021 WBC = 9 1 hemoglobin = 7 7 hematocrit = 25 3 MCV = 87 RDW = 19 platelet = 300 BUN = 50 creatinine = 1 79 GFR = 26 PT = 14 6 INR = 1 13 PTT = 27

## 2021-06-04 ENCOUNTER — OFFICE VISIT (OUTPATIENT)
Dept: VASCULAR SURGERY | Facility: CLINIC | Age: 81
End: 2021-06-04
Payer: COMMERCIAL

## 2021-06-04 VITALS
SYSTOLIC BLOOD PRESSURE: 196 MMHG | BODY MASS INDEX: 20.73 KG/M2 | WEIGHT: 117 LBS | DIASTOLIC BLOOD PRESSURE: 94 MMHG | HEART RATE: 76 BPM | HEIGHT: 63 IN | TEMPERATURE: 97.1 F

## 2021-06-04 DIAGNOSIS — R60.0 LOWER EXTREMITY EDEMA: ICD-10-CM

## 2021-06-04 DIAGNOSIS — I11.0 HYPERTENSIVE HEART DISEASE WITH CHRONIC DIASTOLIC CONGESTIVE HEART FAILURE (HCC): ICD-10-CM

## 2021-06-04 DIAGNOSIS — I74.09 AORTOILIAC OCCLUSIVE DISEASE (HCC): Primary | Chronic | ICD-10-CM

## 2021-06-04 DIAGNOSIS — D64.9 ANEMIA, UNSPECIFIED TYPE: ICD-10-CM

## 2021-06-04 DIAGNOSIS — N18.32 STAGE 3B CHRONIC KIDNEY DISEASE (HCC): Chronic | ICD-10-CM

## 2021-06-04 DIAGNOSIS — I65.23 BILATERAL CAROTID ARTERY STENOSIS: Chronic | ICD-10-CM

## 2021-06-04 DIAGNOSIS — I70.1 RENAL ARTERY STENOSIS (HCC): ICD-10-CM

## 2021-06-04 DIAGNOSIS — I50.32 HYPERTENSIVE HEART DISEASE WITH CHRONIC DIASTOLIC CONGESTIVE HEART FAILURE (HCC): ICD-10-CM

## 2021-06-04 DIAGNOSIS — F17.210 CIGARETTE SMOKER: Chronic | ICD-10-CM

## 2021-06-04 PROCEDURE — 99215 OFFICE O/P EST HI 40 MIN: CPT | Performed by: PHYSICIAN ASSISTANT

## 2021-06-04 NOTE — PROGRESS NOTES
Assessment/Plan:      Renal artery stenosis (HCC)  -     VAS renal artery complete; Future    Renovascular hypertension    Stage 3b chronic kidney disease ([facility] [address] )      -VAS renal 5/12/21: R > 60% stenosis 263/24/3/65; 9 3 cm; L patent 11/22/1 65, 9 9 cm    -CTA abd w run off 12/6/19: Diffuse atherosclerosis in the abdominal aorta  Cialic stenosis  SMA and ANDRAE patent  Single renal arteries patent with calcified plaque  Distal aorta occlusion with collaterals at B CFA and IIA branches      -Scr 1 4-1 8; working up for MGUS by hematology    -Resistant hypertension on 5 medications: arvedilol 12 5 bid, enalapril 10 bid, torsemide 5, spironolactone 25, amlodipine 5    Discussion:  Patient with severe R carotid artery stenosis who had been set up for carotid surgery which is delayed due to severe anemia  She is now seeing GI with plan for EGD and colonoscopy  However, to get her through surgery, they apparently offered her pRBCs  Anemia work up also with suggestion of MGUS for which she saw heme-onc  In the meantime, she presents back to the vascular clinic to review renal duplex study ordered by nephology for hypertension  She is very frustrated and would like to move forward with carotid surgery prior to GI and hypertension work up  Her blood pressure in the office today is quite elevated at 196/94 despite 5 medications for blood pressure  We discussed that this blood pressure is not ideal going into a carotid surgery  She tells me that she isn't checking her blood pressure at home as often as instructed due to anxiety but it normally runs 150s-170s  Further, I explained to her that we would rely on the recommendations of GI, but it generally would be prudent to undergo her anemia work up prior to surgery as she will be committed to antiplatelet therapies       I recommend that she follows up with GI and nephrology regarding current issues and then see Dr Flora Tavares next month to review everything and decide upon timing of carotid surgery  -review prior CTA abd/pelvis and runoff to evaluate renal arteries  -work up and treatment for anemia (plan for pRBC's) as per hematology  -already on 5 medications for blood pressure control  -recommend stability in blood pressure prior to R CEA  -follow up with nephrology regarding blood pressure control, renal function and recommendations  -pending nephrology recommendations, may need to consider renal angiogram in the future given uncontrolled htn  -optimize medical and therapeutic lifestyle changes for hypertension  -check renal duplex in 6 months  -follow office visit with Dr Heidi Rios          Other problems:    Hypertensive heart disease with chronic diastolic congestive heart failure ([facility] [address] )  -     VAS renal artery complete; Future          Carotid artery stenosis    Aortoiliac occlusive disease ([facility] [address] )  -s/p aorto-bi CFA bypass 12/9/2020    -AOIL 121/9/20: Patent ABF bypass w/ 50-75% stenosis distal R limb anastomosis, elevated L limb velocities  R 0 96, L 0 88      Tobacco  -smoking cessation    Lower extremity edema            Subjective:      Patient ID: Clarissa Noriega is a [de-identified] y o  female  HPI     Trevor Wolff [de-identified] y/oF Htn, HLD, asymptomatic NANCY (severe on R), CKD 3, AIOD,  s/p ABF bypass with LEFT iliofemoral thromboembolectomy ([facility] 12/9/19), peripheral arterial disease, neuropathy, ongoing tobacco who presents to review renal duplex  Patient with uncontrolled hypertension presents today to review renal artery duplex  Taking 4-5 BP medications  She states that Bps normally run 150-170 but she isn't checking them as often as requested  She is SOB on exertion after walking 80'    Also she is found to have anemia     -anemia  -hypertension  -abnormal creat; kidney sizes normal            Vas renal 5/12/21:  FINDINGS:     Unilateral     PSV (cm/s)  EDV (cm/s)    Sup-Dolly Ao             72                Celiac                363          50    Px Inf-Raphael Ao          90 Ds Inf-Raphael Ao          72                Prox  SMA             518                   Right         Impression  PSV (cm/s)  EDV (cm/s)   RAR    RI  Kidney (cm)    Ostial Renal                     237          27  3 29                       Prox Renal    60 - 99%           263          24  3 65                       Mid Renal                         90          11  1 25                       Dist Renal                        99          16  1 38                       Kidney                                                               9 30    Hilum 1                           27           6        0 78                 Mid Pole                          21           6        0 71                 Hilum 3                           47           7        0 85                 Renal         Patent                                                            Left          Impression  PSV (cm/s)  EDV (cm/s)   RAR    RI  Kidney (cm)    Prox Renal                       119          22  1 65                       Mid Renal                         89          16  1 23                       Dist Renal                        97          16  1 38                       Kidney                                                               9 90    Hilum 1                           17           4        0 76                 Mid Pole                          18           5        0 72                 Hilum 3                           18           5        0 72                 Renal         Patent                                                                  CONCLUSION:     Impression  The abdominal aorta graft is widely patent  RIGHT RENAL:  There is a >60 % stenosis in proximal renal artery  Patent renal vein  Renovascular resistive index of 0 85  Renal/Aorta Ratio: 3 65  The Kidney measures 9 3cm, Prior  none  LEFT RENAL:  No evidence of significant arterial occlusive disease in the main renal artery    Patent renal vein  Renovascular resistive index of 0 76  Renal/Aorta Ratio: 1 65   The Kidney measures 9 9cm, Prior none  MESENTERIC:  Celiac and superior mesenteric arteries have a known stenosis >70%  No prior studies are available for comparison  The following portions of the patient's history were reviewed and updated as appropriate: allergies, current medications, past family history, past medical history, past social history, past surgical history and problem list     Review of Systems   Constitutional: Negative  HENT: Negative  Eyes: Negative  Respiratory: Positive for cough and shortness of breath  Cardiovascular: Negative  Gastrointestinal: Negative  Endocrine: Negative  Genitourinary: Negative  Musculoskeletal: Negative  Skin: Negative  Allergic/Immunologic: Negative  Neurological: Positive for dizziness and light-headedness  Hematological: Negative  Psychiatric/Behavioral: Negative  Objective:      BP (!) 196/94 (BP Location: Right arm, Patient Position: Sitting)   Pulse 76   Temp (!) 97 1 °F (36 2 °C) (Tympanic)   Ht 5' 3" (1 6 m)   Wt 53 1 kg (117 lb)   BMI 20 73 kg/m²      bilateral carotid artery bruits    3/6 systolic ejection murmur       Physical Exam  Vitals signs and nursing note reviewed  Constitutional:       Appearance: She is well-developed  HENT:      Head: Normocephalic and atraumatic  Eyes:      Pupils: Pupils are equal, round, and reactive to light  Neck:      Musculoskeletal: Neck supple  Thyroid: No thyromegaly  Vascular: Carotid bruit present  No JVD  Trachea: Trachea normal    Cardiovascular:      Rate and Rhythm: Normal rate and regular rhythm  Pulses:           Carotid pulses are 2+ on the right side and 2+ on the left side  Radial pulses are 2+ on the right side and 2+ on the left side  Heart sounds: S1 normal and S2 normal  Murmur present  Systolic murmur present with a grade of 3/6  No friction rub  No gallop  Pulmonary:      Effort: Pulmonary effort is normal  No accessory muscle usage or respiratory distress  Breath sounds: Normal breath sounds  No wheezing or rales  Abdominal:      General: Bowel sounds are normal  There is no distension  Palpations: Abdomen is soft  Tenderness: There is no abdominal tenderness  Comments: Non-tender   Musculoskeletal: Normal range of motion  General: No deformity  Skin:     General: Skin is warm and dry  Findings: No lesion or rash  Nails: There is no clubbing  Neurological:      Mental Status: She is alert and oriented to person, place, and time  Comments: Grossly normal    Psychiatric:         Behavior: Behavior is cooperative  I have reviewed and made appropriate changes to the review of systems input by the medical assistant      Vitals:    06/04/21 1502   BP: (!) 196/94   BP Location: Right arm   Patient Position: Sitting   Pulse: 76   Temp: (!) 97 1 °F (36 2 °C)   TempSrc: Tympanic   Weight: 53 1 kg (117 lb)   Height: 5' 3" (1 6 m)       Patient Active Problem List   Diagnosis    Aortoiliac occlusive disease ([facility] [address] )    Carotid artery stenosis    Chronic Hypertension    Hypothyroidism    Nicotine dependence    Subclavian artery stenosis, left (HCC)    Aortic stenosis    Benign essential HTN    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[facility])    Dyslipidemia    Onychomycosis    Paronychia of toenail    Pain in both feet    Viral URI with cough    Simple chronic bronchitis ([facility])    CKD (chronic kidney disease) stage 3, GFR 30-59 ml/min ([facility])    Cigarette smoker    Cardiac murmur    Urinary tract infection    Gram-negative bacteremia    Mass of spine    Chronic diastolic heart failure (HCC)    Acute blood loss anemia    Hypertensive heart disease with chronic diastolic congestive heart failure ([facility])    Lower extremity edema    Peripheral neuropathy    Parenchymal renal hypertension    Anemia in stage 3 chronic kidney disease (HCC)    SERJIO (acute kidney injury) ([facility] [address] )    Hyperkalemia    Anemia, unspecified       Past Surgical History:   Procedure Laterality Date    BREAST SURGERY      bxs,benign    COLONOSCOPY      HYSTERECTOMY      INCISIONAL BREAST BIOPSY      x5, 1964, 1965, 1985 and [address] Bilateral 2019    Procedure: BYPASS AORTA BIFEMORAL WITH LEFT FEMORAL THROMBOEMBOLECTOMY;  Surgeon: Sobia Wynne MD;  Location: [facility];  Service: Vascular       Family History   Problem Relation Age of Onset    Hypertension Father     Coronary artery disease Family     Arthritis Family        Social History     Socioeconomic History    Marital status:      Spouse name: Not on file    Number of children: Not on file    Years of education: Not on file    Highest education level: Not on file   Occupational History    Not on file   Social Needs    Financial resource strain: Not on file    Food insecurity     Worry: Not on file     Inability: Not on file    Transportation needs     Medical: Not on file     Non-medical: Not on file   Tobacco Use    Smoking status: Current Every Day Smoker     Packs/day: 0 50     Types: Cigarettes     Last attempt to quit: 2019     Years since quittin 4    Smokeless tobacco: Never Used   Substance and Sexual Activity    Alcohol use: Yes     Frequency: 4 or more times a week     Drinks per session: 1 or 2     Comment: wilfrid 2-3 every day for 50 years    Drug use: No    Sexual activity: Yes     Partners: Male   Lifestyle    Physical activity     Days per week: Not on file     Minutes per session: Not on file    Stress: Not on file   Relationships    Social connections     Talks on phone: Not on file     Gets together: Not on file     Attends Nondenominational service: Not on file     Active member of club or organization: Not on file     Attends meetings of clubs or organizations: Not on file Relationship status: Not on file    Intimate partner violence     Fear of current or ex partner: Not on file     Emotionally abused: Not on file     Physically abused: Not on file     Forced sexual activity: Not on file   Other Topics Concern    Not on file   Social History Narrative    Rarely exercises    Sleeps 6-7 hours per day       Allergies   Allergen Reactions    Thiazide-Type Diuretics      Hyponatremia         Current Outpatient Medications:     acetaminophen (TYLENOL) 325 mg tablet, Take 2 tablets (650 mg total) by mouth every 6 (six) hours as needed for mild pain, headaches or fever, Disp: 30 tablet, Rfl: 0    albuterol (Ventolin HFA) 90 mcg/act inhaler, Inhale 2 puffs every 6 (six) hours as needed for wheezing, Disp: 18 g, Rfl: 2    amLODIPine (NORVASC) 5 mg tablet, Take 1 tablet (5 mg total) by mouth daily, Disp: 90 tablet, Rfl: 0    aspirin 81 MG tablet, Take 2 tablets by mouth daily, Disp: , Rfl:     atorvastatin (LIPITOR) 80 mg tablet, Take 1 tablet (80 mg total) by mouth daily (Patient taking differently: Take 80 mg by mouth daily at bedtime ), Disp: 90 tablet, Rfl: 3    B Complex Vitamins (VITAMIN B-COMPLEX) TABS, Take by mouth daily , Disp: , Rfl:     Bioflavonoid Products (VITAMIN C PLUS PO), Take by mouth daily, Disp: , Rfl:     carvedilol (COREG) 12 5 mg tablet, Take 1 tablet (12 5 mg total) by mouth 2 (two) times a day, Disp: 90 tablet, Rfl: 3    cholecalciferol (VITAMIN D3) 1,000 units tablet, Take 4,000 Units by mouth daily , Disp: , Rfl:     clopidogrel (PLAVIX) 75 mg tablet, take 1 tablet by mouth once daily, Disp: 90 tablet, Rfl: 3    docusate sodium (COLACE) 100 mg capsule, Take 100 mg by mouth 2 (two) times a day, Disp: , Rfl:     enalapril (VASOTEC) 10 mg tablet, Take 1 tablet (10 mg total) by mouth 2 (two) times a day, Disp: 60 tablet, Rfl: 5    fluticasone (FLONASE) 50 mcg/act nasal spray, instill 1 spray into each nostril once daily, Disp: 16 g, Rfl: 0    GARLIC PO, Take by mouth daily , Disp: , Rfl:     levothyroxine 25 mcg tablet, Take 1 tablet (25 mcg total) by mouth daily in the early morning, Disp: 90 tablet, Rfl: 3    magnesium oxide (MAG-OX) 400 mg, Take 1 tablet (400 mg total) by mouth daily, Disp: 30 tablet, Rfl: 5    nicotine (NICODERM CQ) 14 mg/24hr TD 24 hr patch, Place 1 patch on the skin every 24 hours, Disp: 14 patch, Rfl: 0    nicotine (NICODERM CQ) 21 mg/24 hr TD 24 hr patch, Place 1 patch on the skin every 24 hours, Disp: 14 patch, Rfl: 0    nicotine (NICODERM CQ) 7 mg/24hr TD 24 hr patch, Place 1 patch on the skin every 24 hours, Disp: 28 patch, Rfl: 0    torsemide (DEMADEX) 5 MG tablet, Take 1 tablet (5 mg total) by mouth daily, Disp: 90 tablet, Rfl: 3    Ferrous Sulfate (IRON PO), Take by mouth daily, Disp: , Rfl:     spironolactone (ALDACTONE) 25 mg tablet, Take 1 tablet (25 mg total) by mouth daily, Disp: 90 tablet, Rfl: 3

## 2021-06-04 NOTE — PATIENT INSTRUCTIONS
Renal artery stenosis (HCC)  -     VAS renal artery complete;  Future    -recheck blood pressure in both arms in the office     -check prior CTA abd/pelvis and runoff to evaluate renal arteries  -already on 5 medications for blood pressure control  -recommend stability in blood pressure prior to R CEA  -follow up with nephrology regarding blood pressure control and recommendations  -work up and treatment for anemia (plan for pRBC's) as per hematology  -check renal duplex in 6 months and office visit with Dr Mckay

## 2021-06-04 NOTE — TELEPHONE ENCOUNTER
Spoke with patient  She wanted to discuss appointment with dr Emilee Pickering   Has an appointment with vascular today

## 2021-06-07 ENCOUNTER — TELEPHONE (OUTPATIENT)
Dept: PREADMISSION TESTING | Facility: HOSPITAL | Age: 81
End: 2021-06-07

## 2021-06-07 ENCOUNTER — TELEPHONE (OUTPATIENT)
Dept: GASTROENTEROLOGY | Facility: CLINIC | Age: 81
End: 2021-06-07

## 2021-06-07 ENCOUNTER — TELEPHONE (OUTPATIENT)
Dept: FAMILY MEDICINE CLINIC | Facility: CLINIC | Age: 81
End: 2021-06-07

## 2021-06-07 NOTE — TELEPHONE ENCOUNTER
Dr Dariela Hicks:    Patient has colonoscopy scheduled for Monday and is unsure if she should go thru with it because of her lab work results  She would like your opinion if she should reschedule the procedure?

## 2021-06-07 NOTE — TELEPHONE ENCOUNTER
Tried calling patient regarding COVID testing but no answer and no voicemail  I do not see this patient has gone for one yet  Colonoscopy is scheduled in 2 days

## 2021-06-08 ENCOUNTER — TELEPHONE (OUTPATIENT)
Dept: VASCULAR SURGERY | Facility: CLINIC | Age: 81
End: 2021-06-08

## 2021-06-08 ENCOUNTER — TELEPHONE (OUTPATIENT)
Dept: PREADMISSION TESTING | Facility: HOSPITAL | Age: 81
End: 2021-06-08

## 2021-06-08 NOTE — TELEPHONE ENCOUNTER
Chart reviewed    May hold Plavix as requested and restart after endoscopy when Jerica Distad with GI

## 2021-06-08 NOTE — TELEPHONE ENCOUNTER
Received fax from GI- pt is scheduled for EGD/colonoscopy 6/14/21 w/ Dr Akua Tapia and they want to hold plavix for 5 days  Pt has severe R carotid stenosis and was being set up for carotid surgery, which is delayed due to severe anemia  Please advise

## 2021-06-09 NOTE — TELEPHONE ENCOUNTER
Pt wanted to know why she had to do vas doppler  Pt advised it is ordered for 6 months from now  Pt would like to cancel colonoscopy  Discussion on benefits given anemia  Pt  Talked to daughter and daughter spoke with Dr Bryson Chilel and pt and daughter agree that she wants to get her sinus taken care of and not feeling her self prior to getting colonsocopy  Discussion on benefits and risks and answers for anemia  Pt states she is not ready

## 2021-06-10 ENCOUNTER — TELEPHONE (OUTPATIENT)
Dept: GASTROENTEROLOGY | Facility: AMBULARY SURGERY CENTER | Age: 81
End: 2021-06-10

## 2021-06-10 ENCOUNTER — TELEPHONE (OUTPATIENT)
Dept: HEMATOLOGY ONCOLOGY | Facility: CLINIC | Age: 81
End: 2021-06-10

## 2021-06-10 NOTE — TELEPHONE ENCOUNTER
Patient and daughter never contacted us to review their decision regarding treatment as discussed at visit and via phone with Dr Shelton Vazquez  Reviewing chart notes, it looks like patient has cancelled all of her upcoming procedures (colonoscopy and cardiac)  IV iron was offered at visit, and can be re-addressed at f/u if she wishes  Thank you

## 2021-06-10 NOTE — TELEPHONE ENCOUNTER
Attempted to leave voice message for pt on both phone #'s provided in pt's chart/per Susana Handler in pre admission testing in [place] wants to reschedule her egd/colonoscopy on Monday w/ dr Tommie Tipton at [facility] 6/14/2021-could not leave a message   FYI

## 2021-06-10 NOTE — TELEPHONE ENCOUNTER
Returned call from patient  Informed patient that Dr Jonny Urias will address treatment plan at follow up visit  Patient verbalizes understanding    FYI to Dr Paul Gomez RN

## 2021-06-10 NOTE — TELEPHONE ENCOUNTER
Patient called back  She stated that she did cancel her colonoscopy  Patient will have her labs drawn prior to her 7/8/21 follow up appointment  Active order for CBCD noted in Epic  Does Dr Derick Raymundo want her to repeat her iron panel? Patient is questioning if she should take PO iron? She thinks that someone called her 5 days after her visit and told her not to take oral iron, but she doesn't know who  I did not see that documentation in Epic  Please review with Dr Derick Raymundo  Will forward to dr Gonzales Child RN to notify MD of above

## 2021-06-10 NOTE — TELEPHONE ENCOUNTER
Patient calling in to advise that she will not be having the colonoscopy on 06/14/2021  She feels she is not ready at this time  Patient would like a call back to determine when she should get labs done   She can be reached at [phone]

## 2021-06-18 ENCOUNTER — TELEPHONE (OUTPATIENT)
Dept: HEMATOLOGY ONCOLOGY | Facility: CLINIC | Age: 81
End: 2021-06-18

## 2021-06-18 NOTE — TELEPHONE ENCOUNTER
Appointment Confirmation     Appointment with Ruddy Maguire    Appointment Date and Time  07/08/2021 at 2 pm   Location [facility]   Patient verbilized Understanding  Understood

## 2021-06-24 ENCOUNTER — OFFICE VISIT (OUTPATIENT)
Dept: OTOLARYNGOLOGY | Facility: CLINIC | Age: 81
End: 2021-06-24
Payer: COMMERCIAL

## 2021-06-24 ENCOUNTER — APPOINTMENT (OUTPATIENT)
Dept: LAB | Facility: CLINIC | Age: 81
End: 2021-06-24
Payer: COMMERCIAL

## 2021-06-24 VITALS — WEIGHT: 118 LBS | BODY MASS INDEX: 20.91 KG/M2 | TEMPERATURE: 97.5 F | HEIGHT: 63 IN

## 2021-06-24 DIAGNOSIS — D63.1 ANEMIA IN STAGE 3 CHRONIC KIDNEY DISEASE, UNSPECIFIED WHETHER STAGE 3A OR 3B CKD (HCC): ICD-10-CM

## 2021-06-24 DIAGNOSIS — K21.9 LARYNGOPHARYNGEAL REFLUX (LPR): ICD-10-CM

## 2021-06-24 DIAGNOSIS — N18.30 ANEMIA IN STAGE 3 CHRONIC KIDNEY DISEASE, UNSPECIFIED WHETHER STAGE 3A OR 3B CKD (HCC): ICD-10-CM

## 2021-06-24 DIAGNOSIS — J31.0 NONALLERGIC RHINITIS: Primary | ICD-10-CM

## 2021-06-24 LAB
BASOPHILS # BLD AUTO: 0.07 THOUSANDS/ΜL (ref 0–0.1)
BASOPHILS NFR BLD AUTO: 1 % (ref 0–1)
EOSINOPHIL # BLD AUTO: 0.26 THOUSAND/ΜL (ref 0–0.61)
EOSINOPHIL NFR BLD AUTO: 5 % (ref 0–6)
ERYTHROCYTE [DISTWIDTH] IN BLOOD BY AUTOMATED COUNT: 21.5 % (ref 11.6–15.1)
HCT VFR BLD AUTO: 24.4 % (ref 34.8–46.1)
HGB BLD-MCNC: 7.5 G/DL (ref 11.5–15.4)
IMM GRANULOCYTES # BLD AUTO: 0.01 THOUSAND/UL (ref 0–0.2)
IMM GRANULOCYTES NFR BLD AUTO: 0 % (ref 0–2)
LYMPHOCYTES # BLD AUTO: 1.42 THOUSANDS/ΜL (ref 0.6–4.47)
LYMPHOCYTES NFR BLD AUTO: 25 % (ref 14–44)
MCH RBC QN AUTO: 27.7 PG (ref 26.8–34.3)
MCHC RBC AUTO-ENTMCNC: 30.7 G/DL (ref 31.4–37.4)
MCV RBC AUTO: 90 FL (ref 82–98)
MONOCYTES # BLD AUTO: 0.95 THOUSAND/ΜL (ref 0.17–1.22)
MONOCYTES NFR BLD AUTO: 17 % (ref 4–12)
NEUTROPHILS # BLD AUTO: 3.06 THOUSANDS/ΜL (ref 1.85–7.62)
NEUTS SEG NFR BLD AUTO: 52 % (ref 43–75)
NRBC BLD AUTO-RTO: 0 /100 WBCS
PLATELET # BLD AUTO: 279 THOUSANDS/UL (ref 149–390)
PMV BLD AUTO: 11.6 FL (ref 8.9–12.7)
RBC # BLD AUTO: 2.71 MILLION/UL (ref 3.81–5.12)
WBC # BLD AUTO: 5.77 THOUSAND/UL (ref 4.31–10.16)

## 2021-06-24 PROCEDURE — 99204 OFFICE O/P NEW MOD 45 MIN: CPT | Performed by: OTOLARYNGOLOGY

## 2021-06-24 PROCEDURE — 85025 COMPLETE CBC W/AUTO DIFF WBC: CPT

## 2021-06-24 PROCEDURE — 31575 DIAGNOSTIC LARYNGOSCOPY: CPT | Performed by: OTOLARYNGOLOGY

## 2021-06-24 PROCEDURE — 36415 COLL VENOUS BLD VENIPUNCTURE: CPT

## 2021-06-24 RX ORDER — FAMOTIDINE 40 MG/1
40 TABLET, FILM COATED ORAL
Qty: 30 TABLET | Refills: 3 | Status: ON HOLD | OUTPATIENT
Start: 2021-06-24 | End: 2021-10-01

## 2021-06-24 RX ORDER — OMEPRAZOLE 40 MG/1
40 CAPSULE, DELAYED RELEASE ORAL DAILY
Qty: 30 CAPSULE | Refills: 3 | Status: ON HOLD | OUTPATIENT
Start: 2021-06-24 | End: 2021-10-01

## 2021-06-24 RX ORDER — IPRATROPIUM BROMIDE 21 UG/1
2 SPRAY, METERED NASAL 3 TIMES DAILY PRN
Qty: 30 ML | Refills: 3 | Status: SHIPPED | OUTPATIENT
Start: 2021-06-24 | End: 2022-04-22 | Stop reason: SDUPTHER

## 2021-06-24 NOTE — PATIENT INSTRUCTIONS
Laryngopharyngeal Reflux (LPR): What you should know    What is LPR? LPR is when stomach acid backflows up and onto the larynx (voice box) and the pharynx (base of the throat and back of the nose)  The tissues of the nose, throat and voicebox are easily damaged by stomach acid when this backflow occurs more than occasionally  This condition is also called Silent Reflux and Atypical Reflux   LPR is related to Gastroesophageal Reflux (GERD), but it is not the same condition  People with GERD typically have heartburn as their main symptom  Many people with LPR may or may not have heartburn  Signs of LPR include:   Voice changes or hoarseness   Difficulty swallowing   Frequent coughing or choking   A feeling of something stuck in the back of the throat     Excessive throat mucus   Postnasal Drip   Heartburn   Bitter taste in the mouth      Risk factors for LPR:   Tobacco use   Alcohol use   Being overweight   Stress   Eating close to bedtime   Eating large meals   Lying down or exercising soon after eating   Wearing tight clothing   Drinking carbonated, caffeinated and/or citrus based beverages   Eating fatty, greasy or spicy foods            Treatment  It is important that you take the medicine prescribed by your doctor every day  You may not see results until after 30 to 45 days of treatment  You should take PPI medications 30 minutes or more before eating      Examples of PPI medications include:   Omeprazole (Prilosec)   Esomeprazole (Nexium)   Pantoprazole (Protonix)   Lansoprazole (Prevacid)   Rabeprazole (Aciphex)    Examples of histamine blockers include:   Ranitidine (Zantac)   Famotidine (Pepcid)   Cimetidine (Tagamet)    Lifestyle changes    In addition to your medication, you should strive to make the following lifestyle changes:     Eat smaller, more frequent meals   Drink plenty of water   Limit your consumption of the following food:  o Alcohol  o Caffeinated beverages  o Carbonated beverages  o Citrus based beverages  o Citrus fruits  o Fatty foods  o Spicy foods  o Tomato based foods (especially sauce)  o Chocolate, licorice or mint   Avoid  o Wearing tight clothing  o Exercising or singing after a meal  o Lying down within 3 hours after eating  o Eating within 3 hours of going to bed    To learn more about this condition and how you can improve your LPR-related quality of life, you should consider reading the book:  Dropping acid:  The reflux diet cookbook and cure by Dr Starr Blanca MD

## 2021-06-24 NOTE — PROGRESS NOTES
[facility]  [facility] [address] [facility]      Otolaryngology -- Head and Neck Surgery New Patient Visit    Scarlett Hawkins is a 80 y o  who presents with a chief complaint of cough    Pertinent elements of the history include:  She presents with a chronic cough since last November  Associated post nasal drip  Associated throat clearing, decreased sense of smell  No heartburn or reflux  Frequent congestion, bilaterally  Clear rhinorrhea  Alternates from side to side  Uses Flonase  Tried cough medicine as well  This helped  Never tried ATrovent  Never allergy tested  Hermilo Edith has helped with Fall allergies  Now x 2 years, symptoms worse in the spring  Review of systems: Pertinent review of systems documented in the HPI  10 point ROS documented in a separate note, as necessary  Results reviewed; images from any scan have been personally reviewed: The past medical, surgical, social and family history have been reviewed as documented in today's record      Past Medical History:   Diagnosis Date    Anemia     Arthritis     Asthma     Benign essential hypertension     CAD (coronary artery disease)     CHF (congestive heart failure) (HCC)     Chronic kidney disease     Coronary artery disease     Disease of thyroid gland     Dizziness     Edema of leg     Hyperlipidemia     Hypertension     Other disorder of circulatory system (CODE)        Past Surgical History:   Procedure Laterality Date    BREAST SURGERY      bxs,benign    COLONOSCOPY      HYSTERECTOMY      INCISIONAL BREAST BIOPSY      x5, 1964, 1965, 1985 and [address] Bilateral 12/9/2019    Procedure: BYPASS AORTA BIFEMORAL WITH LEFT FEMORAL THROMBOEMBOLECTOMY;  Surgeon: Jessica Anderson MD;  [facility];  Service: Vascular       Family History   Problem Relation Age of Onset    Hypertension Father     Coronary artery disease Family     Arthritis Family        Social History     Socioeconomic History    Marital status:      Spouse name: Not on file    Number of children: Not on file    Years of education: Not on file    Highest education level: Not on file   Occupational History    Not on file   Tobacco Use    Smoking status: Current Every Day Smoker     Packs/day: 0 50     Types: Cigarettes    Smokeless tobacco: Never Used   Vaping Use    Vaping Use: Never used   Substance and Sexual Activity    Alcohol use: Yes     Comment: wilfrid 2-3 every day for 50 years    Drug use: No    Sexual activity: Yes     Partners: Male   Other Topics Concern    Not on file   Social History Narrative    Rarely exercises    Sleeps 6-7 hours per day     Social Determinants of Health     Financial Resource Strain:     Difficulty of Paying Living Expenses:    Food Insecurity:     Worried About Running Out of Food in the Last Year:     Ran Out of Food in the Last Year:    Transportation Needs:     Lack of Transportation (Medical):      Lack of Transportation (Non-Medical):    Physical Activity:     Days of Exercise per Week:     Minutes of Exercise per Session:    Stress:     Feeling of Stress :    Social Connections:     Frequency of Communication with Friends and Family:     Frequency of Social Gatherings with Friends and Family:     Attends Religion Services:     Active Member of Clubs or Organizations:     Attends Club or Organization Meetings:     Marital Status:    Intimate Partner Violence:     Fear of Current or Ex-Partner:     Emotionally Abused:     Physically Abused:     Sexually Abused:        Current Outpatient Medications on File Prior to Visit   Medication Sig Dispense Refill    acetaminophen (TYLENOL) 325 mg tablet Take 2 tablets (650 mg total) by mouth every 6 (six) hours as needed for mild pain, headaches or fever 30 tablet 0    albuterol (Ventolin HFA) 90 mcg/act inhaler Inhale 2 puffs every 6 (six) hours as needed for wheezing 18 g 2    amLODIPine (NORVASC) 5 mg tablet Take 1 tablet (5 mg total) by mouth daily 90 tablet 0    aspirin 81 MG tablet Take 2 tablets by mouth daily      atorvastatin (LIPITOR) 80 mg tablet Take 1 tablet (80 mg total) by mouth daily (Patient taking differently: Take 80 mg by mouth daily at bedtime ) 90 tablet 3    B Complex Vitamins (VITAMIN B-COMPLEX) TABS Take by mouth daily       Bioflavonoid Products (VITAMIN C PLUS PO) Take by mouth daily      carvedilol (COREG) 12 5 mg tablet Take 1 tablet (12 5 mg total) by mouth 2 (two) times a day 90 tablet 3    cholecalciferol (VITAMIN D3) 1,000 units tablet Take 4,000 Units by mouth daily       clopidogrel (PLAVIX) 75 mg tablet take 1 tablet by mouth once daily 90 tablet 3    docusate sodium (COLACE) 100 mg capsule Take 100 mg by mouth 2 (two) times a day      enalapril (VASOTEC) 10 mg tablet Take 1 tablet (10 mg total) by mouth 2 (two) times a day 60 tablet 5    fluticasone (FLONASE) 50 mcg/act nasal spray instill 1 spray into each nostril once daily 16 g 0    GARLIC PO Take by mouth daily       levothyroxine 25 mcg tablet Take 1 tablet (25 mcg total) by mouth daily in the early morning 90 tablet 3    magnesium oxide (MAG-OX) 400 mg Take 1 tablet (400 mg total) by mouth daily 30 tablet 5    nicotine (NICODERM CQ) 14 mg/24hr TD 24 hr patch Place 1 patch on the skin every 24 hours 14 patch 0    torsemide (DEMADEX) 5 MG tablet Take 1 tablet (5 mg total) by mouth daily 90 tablet 3    Ferrous Sulfate (IRON PO) Take by mouth daily (Patient not taking: Reported on 6/24/2021)      nicotine (NICODERM CQ) 21 mg/24 hr TD 24 hr patch Place 1 patch on the skin every 24 hours (Patient not taking: Reported on 6/24/2021) 14 patch 0    nicotine (NICODERM CQ) 7 mg/24hr TD 24 hr patch Place 1 patch on the skin every 24 hours (Patient not taking: Reported on 6/24/2021) 28 patch 0    spironolactone (ALDACTONE) 25 mg tablet Take 1 tablet (25 mg total) by mouth daily (Patient not taking: Reported on 6/24/2021) 90 tablet 3     No current facility-administered medications on file prior to visit  Physical exam:   Temp 97 5 °F (36 4 °C) (Temporal)   Ht 5' 3" (1 6 m)   Wt 53 5 kg (118 lb)   BMI 20 90 kg/m²     Constitutional:  Well developed, well nourished and groomed, in no acute distress  Eyes:  Extra-ocular movements intact, pupils equally round and reactive to light and accommodation, the lids and conjunctivae are normal in appearance  Head: Atraumatic, normocephalic, no visible scalp lesions, bony palpation unremarkable without stepoffs, parotid and submandibular salivary glands non-tender to palpation and without masses bilaterally  Ears:  Auricles normal in appearance bilaterally, mastoid prominence non-tender, external auditory canals clear bilaterally  Tympanic membranes intact  Normal appearing ossicles  Nose/Sinuses:  External appearance unremarkable, no maxillary or frontal sinus tenderness to palpation bilaterally  Anterior rhinoscopy reveals: septal deviation, mucosal edema, nonallergic appearing     Oral Cavity:  Moist mucus membranes, gums and dentition unremarkable, no oral mucosal masses or lesions, floor of mouth soft, tongue mobile without masses or lesions  Oropharynx:  Base of tongue soft and without masses, tonsils bilaterally unremarkable, soft palate mucosa unremarkable  Neck:  No visible or palpable cervical lesions or lymphadenopathy, thyroid gland is normal in size and symmetry and without masses, normal laryngeal elevation with swallowing  Cardiovascular:  Normal rate and rhythm, no palpable thrills, no jugulovenous distension observed  Respiratory:  Normal respiratory effort without evidence of retractions or use of accessory muscles  Integument:  Normal appearing without observed masses or lesions  Neurologic:  Cranial nerves II-XII intact bilaterally    Psychiatric:  Alert and oriented to time, place and person, normal affect  Procedures  Flexible nasopharyngolaryngoscopy was performed after nasal topicalization with lidocaine and oxymetazoline  Findings demonstrate:    Nasal cavity:  Clear, no masses, exudates or polyps  Nasopharynx:  Clear, fossae of Rosenmuller clear bilaterally  Base of tongue:  Clear  Vallecula:   Empty  Epiglottis:  Crisp  Arytenoids/folds: Moderate mucosal erythema and edema    Interaryntenoid: Moderate mucosal erythema and edema    Postcricoid:  Moderate mucosal erythema and edema    Glottis:  Normal vocal fold motion, no laryngeal masses  Piriform sinuses: Clear        Assessment:   1  Nonallergic rhinitis  ipratropium (ATROVENT) 0 03 % nasal spray   2  Laryngopharyngeal reflux (LPR)  omeprazole (PriLOSEC) 40 MG capsule    famotidine (PEPCID) 40 MG tablet       Orders  No orders of the defined types were placed in this encounter  Discussion/Plan:    1  She has symptoms and exam findings consistent with both nonallergic rhinitis as well as laryngopharyngeal reflux  Reflux recommendations given  Reflux medications prescribed  Follow-up in 6 weeks to assess improvement  I suspect this is likely the source of her postnasal drip and cough  However she also experiences chronic nasal congestion and watery rhinorrhea that I believe is related to nonallergic rhinitis  I prescribed Atrovent for this  I recommended stopping Flonase  If improved, she would benefit from the Clarifix procedure

## 2021-07-08 ENCOUNTER — TELEPHONE (OUTPATIENT)
Dept: HEMATOLOGY ONCOLOGY | Facility: CLINIC | Age: 81
End: 2021-07-08

## 2021-07-08 NOTE — TELEPHONE ENCOUNTER
Appointment Cancellation Or Reschedule     Person calling in Patient     Provider Sonali Alvarez    Office Visit Date and Time 7/8   Office Visit Location [facility]    Did patient want to reschedule their office appointment? If so, when was it scheduled to? Yes 8/13 10am    Is this patient calling to reschedule an infusion appointment? no   When is their next infusion appointment? N/a    Is this patient a Chemo patient? No    Reason for Cancellation or Reschedule Prefers to see Dr Melvin Benitez      If the patient is a treatment patient, please route this to the office nurse  If the patient is not on treatment, please route to the office MA

## 2021-07-22 DIAGNOSIS — I10 MALIGNANT HYPERTENSION: ICD-10-CM

## 2021-07-22 DIAGNOSIS — I74.09 AORTOILIAC OCCLUSIVE DISEASE (HCC): Chronic | ICD-10-CM

## 2021-07-22 RX ORDER — CLOPIDOGREL BISULFATE 75 MG/1
75 TABLET ORAL DAILY
Qty: 30 TABLET | Refills: 1 | Status: SHIPPED | OUTPATIENT
Start: 2021-07-22 | End: 2021-08-19

## 2021-07-22 RX ORDER — AMLODIPINE BESYLATE 5 MG/1
5 TABLET ORAL DAILY
Qty: 30 TABLET | Refills: 1 | Status: SHIPPED | OUTPATIENT
Start: 2021-07-22 | End: 2021-08-19

## 2021-07-22 NOTE — TELEPHONE ENCOUNTER
Please move appointment up to sept ->AWV, establish care  I will refill meds  For 2 months  She is due for AWV in sept

## 2021-07-22 NOTE — TELEPHONE ENCOUNTER
Former Dr Faith Cuevas patient is requesting refill amlodipine and plavix sent to [facility]  I scheduled appointment to establish with Gayatri Barajas on 10/11

## 2021-08-13 ENCOUNTER — OFFICE VISIT (OUTPATIENT)
Dept: HEMATOLOGY ONCOLOGY | Facility: MEDICAL CENTER | Age: 81
End: 2021-08-13
Payer: COMMERCIAL

## 2021-08-13 VITALS
HEIGHT: 63 IN | WEIGHT: 118 LBS | OXYGEN SATURATION: 98 % | BODY MASS INDEX: 20.91 KG/M2 | HEART RATE: 88 BPM | SYSTOLIC BLOOD PRESSURE: 130 MMHG | RESPIRATION RATE: 16 BRPM | TEMPERATURE: 97.5 F | DIASTOLIC BLOOD PRESSURE: 64 MMHG

## 2021-08-13 DIAGNOSIS — N18.4 ANEMIA DUE TO STAGE 4 CHRONIC KIDNEY DISEASE (HCC): ICD-10-CM

## 2021-08-13 DIAGNOSIS — D63.1 ANEMIA DUE TO STAGE 4 CHRONIC KIDNEY DISEASE (HCC): ICD-10-CM

## 2021-08-13 DIAGNOSIS — N18.30 ANEMIA IN STAGE 3 CHRONIC KIDNEY DISEASE, UNSPECIFIED WHETHER STAGE 3A OR 3B CKD (HCC): Primary | ICD-10-CM

## 2021-08-13 DIAGNOSIS — I74.09 AORTOILIAC OCCLUSIVE DISEASE (HCC): ICD-10-CM

## 2021-08-13 DIAGNOSIS — D63.1 ANEMIA IN STAGE 3 CHRONIC KIDNEY DISEASE, UNSPECIFIED WHETHER STAGE 3A OR 3B CKD (HCC): Primary | ICD-10-CM

## 2021-08-13 PROCEDURE — 99214 OFFICE O/P EST MOD 30 MIN: CPT | Performed by: INTERNAL MEDICINE

## 2021-08-13 NOTE — PROGRESS NOTES
Herrera Wolff  1940  [facility] [address] [address]  HEMATOLOGY/ONCOLOGY CONSULTATION REPORT    DISCUSSION/SUMMARY:    41-year-old female previously referred for anemia  Mrs Wolff continues to be symptomatic  Anemia workup is listed below  As discussed on the last office visit, I discussed the patient's case with her daughter Tricia Mccarthy ([phone]) at the patient's request   We discussed the anemia and treatment options  We specifically discussed the anemia of chronic renal insufficiency and the MGUS and how an erythroid stimulating agent may possibly push the disease forward  Because Mrs Wolff is so symptomatic, patient and daughter agree to begin the TERRY  The plan is to start Procrit 51570 units weekly and monitor the hemoglobin level  MGUS parameters will also be monitored  Patient will monitor her fatigue, respiratory status etc   Mrs Wolff can return in 4-5 weeks after she has received a month of Procrit  Mrs Wolff knows to call the hematology/oncology office if there are any other questions or concerns  Carefully review your medication list and verify that the list is accurate and up-to-date  Please call the hematology/oncology office if there are medications missing from the list, medications on the list that you are not currently taking or if there is a dosage or instruction that is different from how you're taking that medication  Patient goals and areas of care:  Begin Procrit  Barriers to care: none  Patient is able to self-care   ______________________________________________________________________________________    Chief Complaint   Patient presents with    Follow-up     Anemia     History of Present Illness:  41-year-old female previously referred for evaluation of anemia  Patient returns for follow-up  Mrs Wolff states feeling okay, about the same as before    No fevers, chills or sweats  No GI,  or gyn bleeding  Activities are baseline  Fatigue is about the same as before  No shortness of breath or dyspnea on exertion  Review of Systems   Constitutional: Positive for fatigue  Negative for unexpected weight change  HENT: Negative  Eyes: Negative  Respiratory: Negative  Cardiovascular: Negative  Gastrointestinal: Negative  Endocrine: Negative  Genitourinary: Negative  Musculoskeletal: Negative  Skin: Negative  Allergic/Immunologic: Negative  Neurological: Negative  Hematological: Negative  Psychiatric/Behavioral: Negative  All other systems reviewed and are negative      Patient Active Problem List   Diagnosis    Aortoiliac occlusive disease ([facility] )    Carotid artery stenosis    Chronic Hypertension    Hypothyroidism    Nicotine dependence    Subclavian artery stenosis, left ([facility])    Aortic stenosis    Benign essential HTN    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[facility])    Dyslipidemia    Onychomycosis    Paronychia of toenail    Pain in both feet    Viral URI with cough    Simple chronic bronchitis ([facility])    CKD (chronic kidney disease) stage 3, GFR 30-59 ml/min ([facility])    Cigarette smoker    Cardiac murmur    Urinary tract infection    Gram-negative bacteremia    Mass of spine    Chronic diastolic heart failure ([facility])    Acute blood loss anemia    Hypertensive heart disease with chronic diastolic congestive heart failure ([facility])    Lower extremity edema    Peripheral neuropathy    Parenchymal renal hypertension    Anemia in stage 3 chronic kidney disease (HCC)    SERJIO (acute kidney injury) ([facility])    Hyperkalemia    Anemia, unspecified    Renal artery stenosis ([facility])     Past Medical History:   Diagnosis Date    Anemia     Arthritis     Asthma     Benign essential hypertension     CAD (coronary artery disease)     CHF (congestive heart failure) ([facility])     Chronic kidney disease     Coronary artery disease     Disease of thyroid gland     Dizziness     Edema of leg     Hyperlipidemia     Hypertension     Other disorder of circulatory system (CODE)      Past Surgical History:   Procedure Laterality Date    BREAST SURGERY      bxs,benign    COLONOSCOPY      HYSTERECTOMY      INCISIONAL BREAST BIOPSY      x5, 1964, 1965, 1985 and [address] Bilateral 2019    Procedure: BYPASS AORTA BIFEMORAL WITH LEFT FEMORAL THROMBOEMBOLECTOMY;  Surgeon: Bony Cates MD;  [facility];  Service: Vascular   Past surgical history:  + blood transfusions many years ago after the birth of 1 child (NOS)    Ob gyn: No postmenopausal bleeding, no breast issues, no recent mammograms    Family History   Problem Relation Age of Onset    Hypertension Father     Coronary artery disease Family     Arthritis Family    Family history:  No known familial or genetic diseases, 2 daughters in good general health, 2rd daughter  (NOS)    Social History     Socioeconomic History    Marital status:      Spouse name: Not on file    Number of children: Not on file    Years of education: Not on file    Highest education level: Not on file   Occupational History    Not on file   Tobacco Use    Smoking status: Current Every Day Smoker     Packs/day: 0 50     Types: Cigarettes    Smokeless tobacco: Never Used   Vaping Use    Vaping Use: Never used   Substance and Sexual Activity    Alcohol use: Yes     Comment: wilfrid 2-3 every day for 50 years    Drug use: No    Sexual activity: Yes     Partners: Male   Other Topics Concern    Not on file   Social History Narrative    Rarely exercises    Sleeps 6-7 hours per day     Social Determinants of Health     Financial Resource Strain:     Difficulty of Paying Living Expenses:    Food Insecurity:     Worried About Running Out of Food in the Last Year:     Josh of Food in the Last Year:    Transportation Needs:     Lack of Transportation (Medical):      Lack of Transportation (Non-Medical):    Physical Activity:     Days of Exercise per Week:     Minutes of Exercise per Session:    Stress:     Feeling of Stress :    Social Connections:     Frequency of Communication with Friends and Family:     Frequency of Social Gatherings with Friends and Family:     Attends Synagogue Services:     Active Member of Clubs or Organizations:     Attends Club or Organization Meetings:     Marital Status:    Intimate Partner Violence:     Fear of Current or Ex-Partner:     Emotionally Abused:     Physically Abused:     Sexually Abused:    Social history: 1/2 pack per day for 60 years, no drug or alcohol abuse, no toxic exposure    Current Outpatient Medications:     acetaminophen (TYLENOL) 325 mg tablet, Take 2 tablets (650 mg total) by mouth every 6 (six) hours as needed for mild pain, headaches or fever, Disp: 30 tablet, Rfl: 0    albuterol (Ventolin HFA) 90 mcg/act inhaler, Inhale 2 puffs every 6 (six) hours as needed for wheezing, Disp: 18 g, Rfl: 2    amLODIPine (NORVASC) 5 mg tablet, Take 1 tablet (5 mg total) by mouth daily, Disp: 30 tablet, Rfl: 1    aspirin 81 MG tablet, Take 2 tablets by mouth daily, Disp: , Rfl:     atorvastatin (LIPITOR) 80 mg tablet, Take 1 tablet (80 mg total) by mouth daily (Patient taking differently: Take 80 mg by mouth daily at bedtime ), Disp: 90 tablet, Rfl: 3    B Complex Vitamins (VITAMIN B-COMPLEX) TABS, Take by mouth daily , Disp: , Rfl:     Bioflavonoid Products (VITAMIN C PLUS PO), Take by mouth daily, Disp: , Rfl:     carvedilol (COREG) 12 5 mg tablet, Take 1 tablet (12 5 mg total) by mouth 2 (two) times a day, Disp: 90 tablet, Rfl: 3    cholecalciferol (VITAMIN D3) 1,000 units tablet, Take 4,000 Units by mouth daily , Disp: , Rfl:     clopidogrel (PLAVIX) 75 mg tablet, Take 1 tablet (75 mg total) by mouth daily, Disp: 30 tablet, Rfl: 1    docusate sodium (COLACE) 100 mg capsule, Take 100 mg by mouth 2 (two) times a day, Disp: , Rfl:     enalapril (VASOTEC) 10 mg tablet, Take 1 tablet (10 mg total) by mouth 2 (two) times a day, Disp: 60 tablet, Rfl: 5    famotidine (PEPCID) 40 MG tablet, Take 1 tablet (40 mg total) by mouth daily at bedtime, Disp: 30 tablet, Rfl: 3    Ferrous Sulfate (IRON PO), Take by mouth daily (Patient not taking: Reported on 6/24/2021), Disp: , Rfl:     fluticasone (FLONASE) 50 mcg/act nasal spray, instill 1 spray into each nostril once daily, Disp: 16 g, Rfl: 0    GARLIC PO, Take by mouth daily , Disp: , Rfl:     ipratropium (ATROVENT) 0 03 % nasal spray, 2 sprays into each nostril 3 (three) times a day as needed for rhinitis, Disp: 30 mL, Rfl: 3    levothyroxine 25 mcg tablet, Take 1 tablet (25 mcg total) by mouth daily in the early morning, Disp: 90 tablet, Rfl: 3    magnesium oxide (MAG-OX) 400 mg, Take 1 tablet (400 mg total) by mouth daily, Disp: 30 tablet, Rfl: 5    nicotine (NICODERM CQ) 14 mg/24hr TD 24 hr patch, Place 1 patch on the skin every 24 hours, Disp: 14 patch, Rfl: 0    nicotine (NICODERM CQ) 21 mg/24 hr TD 24 hr patch, Place 1 patch on the skin every 24 hours (Patient not taking: Reported on 6/24/2021), Disp: 14 patch, Rfl: 0    nicotine (NICODERM CQ) 7 mg/24hr TD 24 hr patch, Place 1 patch on the skin every 24 hours (Patient not taking: Reported on 6/24/2021), Disp: 28 patch, Rfl: 0    omeprazole (PriLOSEC) 40 MG capsule, Take 1 capsule (40 mg total) by mouth daily 30 min or more prior to eating or drinking in the morning, Disp: 30 capsule, Rfl: 3    spironolactone (ALDACTONE) 25 mg tablet, Take 1 tablet (25 mg total) by mouth daily (Patient not taking: Reported on 6/24/2021), Disp: 90 tablet, Rfl: 3    torsemide (DEMADEX) 5 MG tablet, Take 1 tablet (5 mg total) by mouth daily, Disp: 90 tablet, Rfl: 3    Allergies   Allergen Reactions    Thiazide-Type Diuretics      Hyponatremia       Vitals:    08/13/21 1115   BP: 130/64   Pulse: 88 Resp: 16   Temp: 97 5 °F (36 4 °C)   SpO2: 98%     Physical Exam  Constitutional:       Appearance: She is well-developed  Comments: Thin but relatively well-nourished female, no respiratory distress   HENT:      Head: Normocephalic and atraumatic  Right Ear: External ear normal       Left Ear: External ear normal    Eyes:      Conjunctiva/sclera: Conjunctivae normal       Pupils: Pupils are equal, round, and reactive to light  Cardiovascular:      Rate and Rhythm: Normal rate and regular rhythm  Heart sounds: Normal heart sounds  Pulmonary:      Effort: Pulmonary effort is normal       Breath sounds: Normal breath sounds  Comments: Fair entry bilaterally, scattered bilateral rhonchi  Abdominal:      General: Bowel sounds are normal       Palpations: Abdomen is soft  Musculoskeletal:         General: Normal range of motion  Cervical back: Normal range of motion and neck supple  Skin:     General: Skin is warm  Comments: Skin color difficult to ascertain - patient is very tanned, no petechiae or ecchymoses   Neurological:      Mental Status: She is alert and oriented to person, place, and time  Deep Tendon Reflexes: Reflexes are normal and symmetric  Psychiatric:         Behavior: Behavior normal          Thought Content:  Thought content normal          Judgment: Judgment normal      Extremities:  No lower extremity edema bilaterally, no cords, pulses are decreased, extremities are cool  Lymphatics:  No adenopathy in the neck, supraclavicular region and axilla bilaterally    Labs    06/24/2021 WBC = 5 7 hemoglobin = 7 5 hematocrit = 24 4 MCV = 90 platelet = 659    57/92/7408 WBC = 7 7 hemoglobin = 7 7 hematocrit = 26 1 MCV = 90 RDW = 21 1 platelet = 207 neutrophil = 64% lymphocytes = 19% monocyte = 10% eosinophil = 6% BUN = 69 creatinine = 1 53 GFR = 32 calcium = 9 4 AST = 24 ALT = 28 alkaline phosphatase = 56 total protein = 7 7 total bilirubin = 0 38 free kappa = 74 4 = elevated free lambda = 39 6 = elevated kappa/lambda ratio = 1 88 = slightly elevated TSH = 1 700 folate> 20 LDH = 228 haptoglobin = 171 direct Genie = negative erythropoietin = 29 2 ISABEL = negative ESR = 26 reticulocyte count = 2 27%, = elevated iron = 49 TIBC = 495 iron saturation = 10% ferritin = 20    05/13/2021 SPEP demonstrated a monoclonal peak in the gamma region  05/13/2021 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

## 2021-08-30 ENCOUNTER — TELEPHONE (OUTPATIENT)
Dept: VASCULAR SURGERY | Facility: CLINIC | Age: 81
End: 2021-08-30

## 2021-08-30 ENCOUNTER — OFFICE VISIT (OUTPATIENT)
Dept: VASCULAR SURGERY | Facility: CLINIC | Age: 81
End: 2021-08-30
Payer: COMMERCIAL

## 2021-08-30 VITALS
TEMPERATURE: 98.1 F | HEIGHT: 63 IN | BODY MASS INDEX: 20.91 KG/M2 | WEIGHT: 118 LBS | SYSTOLIC BLOOD PRESSURE: 164 MMHG | DIASTOLIC BLOOD PRESSURE: 84 MMHG | HEART RATE: 68 BPM

## 2021-08-30 DIAGNOSIS — I65.23 BILATERAL CAROTID ARTERY STENOSIS: Primary | Chronic | ICD-10-CM

## 2021-08-30 DIAGNOSIS — I77.1 SUBCLAVIAN ARTERY STENOSIS, LEFT (HCC): Chronic | ICD-10-CM

## 2021-08-30 DIAGNOSIS — I70.1 RENAL ARTERY STENOSIS (HCC): ICD-10-CM

## 2021-08-30 PROCEDURE — 99214 OFFICE O/P EST MOD 30 MIN: CPT | Performed by: SURGERY

## 2021-08-30 NOTE — PATIENT INSTRUCTIONS
Carotid artery stenosis   1  High-grade asymptomatic right internal carotid artery stenosis  We again discussed the findings on duplex and CT scan along with the indications for further evaluation treatment  We will plan on proceeding with right carotid endarterectomy following cardiac clearance  2  Asymptomatic moderate left internal carotid artery stenosis  This will be followed conservatively with follow-up duplex imaging after above right carotid endarterectomy  She will continue her current medical management with antiplatelet and statin therapy  We will ask that she hold her Plavix therapy 5 days preop  We did discuss her underlying anemia and hypertension  For which she needs continued management  I have encouraged her to continue follow-up with Nephrology for her hypertension but at her current [phone] systolic pressure I feel comfortable with proceeding with endarterectomy  In addition her current anemia with chronic hemoglobin level of approximately 8 is adequate to proceed with surgery but I did inform her she is of increased likelihood of needing postoperative transfusion  Renal artery stenosis (HCC)    Right renal artery stenosis identified on duplex imaging with evidence of renovascular hypertension  Kidney size appears to be symmetric and renal function is stable thus I would advise optimal medical management  Patient has previously been asked to follow up with Nephrology but evidently canceled her last appointment  I have asked her to renew that appointment  Subclavian artery stenosis, left (HCC)    Mild/moderate subclavian stenosis bilaterally  Though the noninvasive imaging is suggestive of high-grade stenosis of the subclavian artery and innominate artery on the right, review of the CT angiogram does not define any significant innominate stenosis and only mild less than 50% subclavian artery stenosis

## 2021-08-30 NOTE — ASSESSMENT & PLAN NOTE
Right renal artery stenosis identified on duplex imaging with evidence of renovascular hypertension  Kidney size appears to be symmetric and renal function is stable thus I would advise optimal medical management  Patient has previously been asked to follow up with Nephrology but evidently canceled her last appointment  I have asked her to renew that appointment

## 2021-08-30 NOTE — LETTER
August 30, 2021     Sherlyn Orourke, [address]    Patient: Liza Welch   YOB: 1940   Date of Visit: 8/30/2021       Dear Dr Brian Granados: Thank you for referring Carolyn Silva to me for evaluation  Below are the relevant portions of my assessment and plan of care  Carotid artery stenosis   1  High-grade asymptomatic right internal carotid artery stenosis  We again discussed the findings on duplex and CT scan along with the indications for further evaluation treatment  We will plan on proceeding with right carotid endarterectomy following cardiac clearance  2  Asymptomatic moderate left internal carotid artery stenosis  This will be followed conservatively with follow-up duplex imaging after above right carotid endarterectomy  She will continue her current medical management with antiplatelet and statin therapy  We will ask that she hold her Plavix therapy 5 days preop  We did discuss her underlying anemia and hypertension  For which she needs continued management  I have encouraged her to continue follow-up with Nephrology for her hypertension but at her current 258-164 systolic pressure I feel comfortable with proceeding with endarterectomy  In addition her current anemia with chronic hemoglobin level of approximately 8 is adequate to proceed with surgery but I did inform her she is of increased likelihood of needing postoperative transfusion  Renal artery stenosis (HCC)    Right renal artery stenosis identified on duplex imaging with evidence of renovascular hypertension  Kidney size appears to be symmetric and renal function is stable thus I would advise optimal medical management  Patient has previously been asked to follow up with Nephrology but evidently canceled her last appointment  I have asked her to renew that appointment  Subclavian artery stenosis, left (HCC)    Mild/moderate subclavian stenosis bilaterally    Though the noninvasive imaging is suggestive of high-grade stenosis of the subclavian artery and innominate artery on the right, review of the CT angiogram does not define any significant innominate stenosis and only mild less than 50% subclavian artery stenosis  If you have questions, please do not hesitate to call me  I look forward to following Sara Goldsmith along with you           Sincerely,        Deng Noyola MD        CC: DO Opal Cihcas MD

## 2021-08-30 NOTE — PROGRESS NOTES
Assessment/Plan:    Carotid artery stenosis   1  High-grade asymptomatic right internal carotid artery stenosis  We again discussed the findings on duplex and CT scan along with the indications for further evaluation treatment  We will plan on proceeding with right carotid endarterectomy following cardiac clearance  2  Asymptomatic moderate left internal carotid artery stenosis  This will be followed conservatively with follow-up duplex imaging after above right carotid endarterectomy  She will continue her current medical management with antiplatelet and statin therapy  We will ask that she hold her Plavix therapy 5 days preop  We did discuss her underlying anemia and hypertension  For which she needs continued management  I have encouraged her to continue follow-up with Nephrology for her hypertension but at her current [phone] systolic pressure I feel comfortable with proceeding with endarterectomy  In addition her current anemia with chronic hemoglobin level of approximately 8 is adequate to proceed with surgery but I did inform her she is of increased likelihood of needing postoperative transfusion  Renal artery stenosis (HCC)    Right renal artery stenosis identified on duplex imaging with evidence of renovascular hypertension  Kidney size appears to be symmetric and renal function is stable thus I would advise optimal medical management  Patient has previously been asked to follow up with Nephrology but evidently canceled her last appointment  I have asked her to renew that appointment  Subclavian artery stenosis, left (HCC)    Mild/moderate subclavian stenosis bilaterally  Though the noninvasive imaging is suggestive of high-grade stenosis of the subclavian artery and innominate artery on the right, review of the CT angiogram does not define any significant innominate stenosis and only mild less than 50% subclavian artery stenosis         Diagnoses and all orders for this visit:    Bilateral carotid artery stenosis    Subclavian artery stenosis, left (HCC)    Renal artery stenosis (HCC)          Subjective:      Patient ID: Dinah Mercer is a 80 y o  female  Patient presents today to discuss carotid surgery that was previously postponed d/t severe anemia and elevated BP        80year-old former smoker with 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 presents for follow-up in regards to her carotid occlusive disease  Again she remains asymptomatic with no evidence of focal neurologic symptoms consistent with TIA, CVA or amaurosis fugax  She previously been undergoing scheduling for right carotid endarterectomy but this was put on hold by her medical team secondary to anemia and hypertension  Her hemoglobin has been in the 7-8 range and she has undergone hematology workup  She is currently on 4 blood pressure medications and a diuretic with blood pressure the she states ranges in the 120-160 range at home and most recently in the office 130-160  On evaluation today she is hopeful that she can proceed with her carotid endarterectomy  Of note she recently canceled her nephrology follow-up  Carotid duplex  12/09/2020 with high-grade right internal carotid artery stenosis of greater than 70% with flow velocity of 402/82 centimeters/second with severe calcification  On the left there is a moderate stenosis with flow velocity 169/59 centimeters/second  There is evidence of subclavian stenosis  CT angiogram 03/22/2031 with high-grade right internal carotid artery stenosis secondary to calcified atherosclerotic plaque  This stenosis is estimated greater than 80%  On the left there is a stenosis approximately 50% the internal carotid artery  Of note the innominate artery is widely patent with some calcific disease of the origin of the subclavian artery with less than 50% stenosis    The left subclavian artery also has some atherosclerotic disease with less than 50% stenosis  Renal duplex with evidence of right renal artery stenosis  Renal size is symmetric at 9 3 cm on the right and 9 9 cm on the left  The following portions of the patient's history were reviewed and updated as appropriate: allergies, current medications, past family history, past medical history, past social history, past surgical history and problem list     I have reviewed and made appropriate changes to the review of systems input by the medical assistant      Vitals:    08/30/21 1406   BP: 164/84   BP Location: Right arm   Patient Position: Sitting   Pulse: 68   Temp: 98 1 °F (36 7 °C)   TempSrc: Tympanic   Weight: 53 5 kg (118 lb)   Height: 5' 3" (1 6 m)       Patient Active Problem List   Diagnosis    Aortoiliac occlusive disease ([facility] [address] )    Carotid artery stenosis    Chronic Hypertension    Hypothyroidism    Nicotine dependence    Subclavian artery stenosis, left (HCC)    Aortic stenosis    Benign essential HTN    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[facility])    Dyslipidemia    Onychomycosis    Paronychia of toenail    Pain in both feet    Viral URI with cough    Simple chronic bronchitis ([facility])    CKD (chronic kidney disease) stage 3, GFR 30-59 ml/min ([facility])    Cigarette smoker    Cardiac murmur    Urinary tract infection    Gram-negative bacteremia    Mass of spine    Chronic diastolic heart failure ([facility])    Acute blood loss anemia    Hypertensive heart disease with chronic diastolic congestive heart failure ([facility])    Lower extremity edema    Peripheral neuropathy    Parenchymal renal hypertension    Anemia in stage 3 chronic kidney disease ([facility])    SERJIO (acute kidney injury) ([facility])    Hyperkalemia    Anemia, unspecified    Renal artery stenosis ([address] )       Past Surgical History:   Procedure Laterality Date    BREAST SURGERY      bxs,benign    COLONOSCOPY      HYSTERECTOMY      INCISIONAL BREAST BIOPSY      x5, 1964, 1965, 1985 and 1990  VA BYPASS GRAFT OTHR,AORTOBIFEMORAL Bilateral 12/9/2019    Procedure: BYPASS AORTA BIFEMORAL WITH LEFT FEMORAL THROMBOEMBOLECTOMY;  Surgeon: Terrance Klinefelter, MD;  Location: [facility];  Service: Vascular       Family History   Problem Relation Age of Onset    Hypertension Father     Coronary artery disease Family     Arthritis Family        Social History     Socioeconomic History    Marital status:      Spouse name: Not on file    Number of children: Not on file    Years of education: Not on file    Highest education level: Not on file   Occupational History    Not on file   Tobacco Use    Smoking status: Current Every Day Smoker     Packs/day: 1 00     Types: Cigarettes    Smokeless tobacco: Never Used   Vaping Use    Vaping Use: Never used   Substance and Sexual Activity    Alcohol use: Yes     Comment: manhattans 2-3 every day for 50 years    Drug use: No    Sexual activity: Yes     Partners: Male   Other Topics Concern    Not on file   Social History Narrative    Rarely exercises    Sleeps 6-7 hours per day     Social Determinants of Health     Financial Resource Strain:     Difficulty of Paying Living Expenses:    Food Insecurity:     Worried About Running Out of Food in the Last Year:     Ran Out of Food in the Last Year:    Transportation Needs:     Lack of Transportation (Medical):      Lack of Transportation (Non-Medical):    Physical Activity:     Days of Exercise per Week:     Minutes of Exercise per Session:    Stress:     Feeling of Stress :    Social Connections:     Frequency of Communication with Friends and Family:     Frequency of Social Gatherings with Friends and Family:     Attends Sikhism Services:     Active Member of Clubs or Organizations:     Attends Club or Organization Meetings:     Marital Status:    Intimate Partner Violence:     Fear of Current or Ex-Partner:     Emotionally Abused:     Physically Abused:     Sexually Abused: Allergies   Allergen Reactions    Thiazide-Type Diuretics      Hyponatremia         Current Outpatient Medications:     acetaminophen (TYLENOL) 325 mg tablet, Take 2 tablets (650 mg total) by mouth every 6 (six) hours as needed for mild pain, headaches or fever, Disp: 30 tablet, Rfl: 0    albuterol (Ventolin HFA) 90 mcg/act inhaler, Inhale 2 puffs every 6 (six) hours as needed for wheezing, Disp: 18 g, Rfl: 2    amLODIPine (NORVASC) 5 mg tablet, TAKE 1 TABLET BY MOUTH ONCE DAILY, Disp: 30 tablet, Rfl: 0    aspirin 81 MG tablet, Take 2 tablets by mouth daily, Disp: , Rfl:     atorvastatin (LIPITOR) 80 mg tablet, Take 1 tablet (80 mg total) by mouth daily (Patient taking differently: Take 80 mg by mouth daily at bedtime ), Disp: 90 tablet, Rfl: 3    B Complex Vitamins (VITAMIN B-COMPLEX) TABS, Take by mouth daily , Disp: , Rfl:     Bioflavonoid Products (VITAMIN C PLUS PO), Take by mouth daily, Disp: , Rfl:     carvedilol (COREG) 12 5 mg tablet, Take 1 tablet (12 5 mg total) by mouth 2 (two) times a day, Disp: 90 tablet, Rfl: 3    cholecalciferol (VITAMIN D3) 1,000 units tablet, Take 4,000 Units by mouth daily , Disp: , Rfl:     clopidogrel (PLAVIX) 75 mg tablet, TAKE 1 TABLET BY MOUTH ONCE DAILY, Disp: 30 tablet, Rfl: 0    docusate sodium (COLACE) 100 mg capsule, Take 100 mg by mouth 2 (two) times a day, Disp: , Rfl:     enalapril (VASOTEC) 10 mg tablet, Take 1 tablet (10 mg total) by mouth 2 (two) times a day, Disp: 60 tablet, Rfl: 5    famotidine (PEPCID) 40 MG tablet, Take 1 tablet (40 mg total) by mouth daily at bedtime, Disp: 30 tablet, Rfl: 3    GARLIC PO, Take by mouth daily , Disp: , Rfl:     ipratropium (ATROVENT) 0 03 % nasal spray, 2 sprays into each nostril 3 (three) times a day as needed for rhinitis, Disp: 30 mL, Rfl: 3    levothyroxine 25 mcg tablet, Take 1 tablet (25 mcg total) by mouth daily in the early morning, Disp: 90 tablet, Rfl: 3    magnesium oxide (MAG-OX) 400 mg, Take 1 tablet (400 mg total) by mouth daily, Disp: 30 tablet, Rfl: 5    omeprazole (PriLOSEC) 40 MG capsule, Take 1 capsule (40 mg total) by mouth daily 30 min or more prior to eating or drinking in the morning, Disp: 30 capsule, Rfl: 3    torsemide (DEMADEX) 5 MG tablet, Take 1 tablet (5 mg total) by mouth daily, Disp: 90 tablet, Rfl: 3    Ferrous Sulfate (IRON PO), Take by mouth daily (Patient not taking: Reported on 6/24/2021), Disp: , Rfl:     fluticasone (FLONASE) 50 mcg/act nasal spray, instill 1 spray into each nostril once daily, Disp: 16 g, Rfl: 0    nicotine (NICODERM CQ) 14 mg/24hr TD 24 hr patch, Place 1 patch on the skin every 24 hours (Patient not taking: Reported on 8/30/2021), Disp: 14 patch, Rfl: 0    nicotine (NICODERM CQ) 21 mg/24 hr TD 24 hr patch, Place 1 patch on the skin every 24 hours (Patient not taking: Reported on 6/24/2021), Disp: 14 patch, Rfl: 0    nicotine (NICODERM CQ) 7 mg/24hr TD 24 hr patch, Place 1 patch on the skin every 24 hours (Patient not taking: Reported on 6/24/2021), Disp: 28 patch, Rfl: 0    spironolactone (ALDACTONE) 25 mg tablet, Take 1 tablet (25 mg total) by mouth daily (Patient not taking: Reported on 6/24/2021), Disp: 90 tablet, Rfl: 3    Review of Systems   Constitutional: Negative  HENT: Negative  Eyes: Negative  Respiratory: Positive for cough and shortness of breath  Cardiovascular: Negative  Gastrointestinal: Negative  Endocrine: Positive for cold intolerance  Genitourinary: Negative  Musculoskeletal: Positive for arthralgias  Skin: Negative  Allergic/Immunologic: Negative  Neurological: Negative  Hematological: Bruises/bleeds easily  Psychiatric/Behavioral: Negative            Objective:      /84 (BP Location: Right arm, Patient Position: Sitting)   Pulse 68   Temp 98 1 °F (36 7 °C) (Tympanic)   Ht 5' 3" (1 6 m)   Wt 53 5 kg (118 lb)   BMI 20 90 kg/m²          Physical Exam  Constitutional:       Appearance: She is well-developed  HENT:      Head: Normocephalic and atraumatic  Eyes:      Pupils: Pupils are equal, round, and reactive to light  Neck:      Vascular: Carotid bruit ( bilateral) present  No JVD  Cardiovascular:      Rate and Rhythm: Normal rate and regular rhythm  Pulses:           Carotid pulses are 2+ on the right side with bruit and 2+ on the left side with bruit  Radial pulses are 2+ on the right side and 2+ on the left side  Heart sounds: No murmur heard  Pulmonary:      Effort: Pulmonary effort is normal  No respiratory distress  Breath sounds: Normal breath sounds  Musculoskeletal:         General: No tenderness  Normal range of motion  Cervical back: Normal range of motion and neck supple  Skin:     General: Skin is warm and dry  Comments: Skin on bilateral forearms is thin and fragile with areas of bruising and skin tear on the left forearm   Neurological:      General: No focal deficit present  Mental Status: She is alert and oriented to person, place, and time  Sensory: No sensory deficit  Motor: No weakness        Gait: Gait normal    Psychiatric:         Mood and Affect: Mood normal          Behavior: Behavior normal          Operative Scheduling Information:    Hospital:  [facility] OR    Physician:  Natalie Jose    Surgery:  Right carotid endarterectomy    Urgency:  Standard    Level:  Level 3: Outpatients to be scheduled for elective surgery than can be delayed up to 4 weeks without reasonable expectation of detriment to patient    Case Length:  Normal    Post-op Bed:  Stepdown    OR Table:  Standard    Equipment Needs:  Vascular technologist    Medication Instructions:  Plavix:  Hold for 5 days prior to procedure    Hydration:  No

## 2021-08-30 NOTE — ASSESSMENT & PLAN NOTE
1  High-grade asymptomatic right internal carotid artery stenosis  We again discussed the findings on duplex and CT scan along with the indications for further evaluation treatment  We will plan on proceeding with right carotid endarterectomy following cardiac clearance  2  Asymptomatic moderate left internal carotid artery stenosis  This will be followed conservatively with follow-up duplex imaging after above right carotid endarterectomy  She will continue her current medical management with antiplatelet and statin therapy  We will ask that she hold her Plavix therapy 5 days preop  We did discuss her underlying anemia and hypertension  For which she needs continued management  I have encouraged her to continue follow-up with Nephrology for her hypertension but at her current 527-292 systolic pressure I feel comfortable with proceeding with endarterectomy  In addition her current anemia with chronic hemoglobin level of approximately 8 is adequate to proceed with surgery but I did inform her she is of increased likelihood of needing postoperative transfusion

## 2021-08-30 NOTE — TELEPHONE ENCOUNTER
REMINDER: Under Reason For Call, comments MUST be formatted as:   (Surgeon's Initials) / (Procedure)    Physician / Balbir Young: Sami Escobar (NPI: [id number]) / Sancho (Tax: [id number] / NPI: [id number])    Procedure: Right Carotid Endarterectomy    Level: 3 - Route clearance(s) to [facility] Clearance Pool     Equipment / Rep Needs: Unknown    Assistant Surgeon: No    Allergies: Thiazide-type diuretics    Instructions Given: NO Bowel Prep General Instructions     Blood Thinners / Medication Hold: Hold Rx - Plavix (Clopidogrel) , 5 days prior to procedure  and Do not hold Aspirin (ASA)   Hydration Required: Patient does not require hydration  Dialysis: Patient is not on dialysis  Consent: I certify that patient has signed, printed, timed, and dated their surgery consent  I certify that BOTH sides of the completed surgery consent have been scanned into the patient's Epic chart by myself on 8/30/2021  Yes, I have LABELED the consent in Epic as Consent for Vascular Procedure  Clearances     Levels   1-3 ROUTE this encounter to [facility] Clearance Pool   AND   SEND Clearance Form(s) to Vascular Nursing e-mail group   Level   4 ROUTE this encounter to [facility] Surgery Coordinator Pool  AND   SEND Clearance Form(s) to Vascular Surgery Schedulers e-mail group     (1) ONE CLEARANCE NEEDED - Patient requires Cardiology  clearance  Spoke with, Komal Frazier, at [address] [place] [address]  Patient's appointment with Dr Kyra Lambert has been scheduled for 09/10/21 at 3:40PM     Office contact information P: [phone] - F: [phone]    Yes, I have ROUTED this encounter to [facility] Surgery Coordinator and/or [facility] Clearance Pool

## 2021-08-30 NOTE — ASSESSMENT & PLAN NOTE
Mild/moderate subclavian stenosis bilaterally  Though the noninvasive imaging is suggestive of high-grade stenosis of the subclavian artery and innominate artery on the right, review of the CT angiogram does not define any significant innominate stenosis and only mild less than 50% subclavian artery stenosis

## 2021-09-10 ENCOUNTER — TELEPHONE (OUTPATIENT)
Dept: HEMATOLOGY ONCOLOGY | Facility: CLINIC | Age: 81
End: 2021-09-10

## 2021-09-10 ENCOUNTER — OFFICE VISIT (OUTPATIENT)
Dept: CARDIOLOGY CLINIC | Facility: CLINIC | Age: 81
End: 2021-09-10
Payer: COMMERCIAL

## 2021-09-10 VITALS
TEMPERATURE: 97.7 F | OXYGEN SATURATION: 96 % | HEART RATE: 65 BPM | SYSTOLIC BLOOD PRESSURE: 158 MMHG | WEIGHT: 117 LBS | HEIGHT: 63 IN | BODY MASS INDEX: 20.73 KG/M2 | DIASTOLIC BLOOD PRESSURE: 70 MMHG

## 2021-09-10 DIAGNOSIS — I65.21 STENOSIS OF RIGHT CAROTID ARTERY: ICD-10-CM

## 2021-09-10 DIAGNOSIS — I12.9 HYPERTENSIVE CHRONIC KIDNEY DISEASE WITH STAGE 1 THROUGH STAGE 4 CHRONIC KIDNEY DISEASE, OR UNSPECIFIED CHRONIC KIDNEY DISEASE: ICD-10-CM

## 2021-09-10 DIAGNOSIS — I70.229 ATHEROSCLEROSIS OF ARTERY OF EXTREMITY WITH REST PAIN (HCC): ICD-10-CM

## 2021-09-10 DIAGNOSIS — E78.2 MIXED HYPERLIPIDEMIA: ICD-10-CM

## 2021-09-10 DIAGNOSIS — I50.32 CHRONIC DIASTOLIC HEART FAILURE (HCC): ICD-10-CM

## 2021-09-10 DIAGNOSIS — I74.09 AORTOILIAC OCCLUSIVE DISEASE (HCC): ICD-10-CM

## 2021-09-10 DIAGNOSIS — I10 ESSENTIAL HYPERTENSION: Primary | ICD-10-CM

## 2021-09-10 DIAGNOSIS — I35.0 NONRHEUMATIC AORTIC VALVE STENOSIS: ICD-10-CM

## 2021-09-10 DIAGNOSIS — I15.0 RENOVASCULAR HYPERTENSION: ICD-10-CM

## 2021-09-10 PROCEDURE — 99214 OFFICE O/P EST MOD 30 MIN: CPT | Performed by: INTERNAL MEDICINE

## 2021-09-10 NOTE — TELEPHONE ENCOUNTER
Per August OV appt: The plan is to start Procrit 23968 units weekly and monitor the hemoglobin level  MGUS parameters will also be monitored  Patient will monitor her fatigue, respiratory status etc   Mrs Wolff can return in 4-5 weeks after she has received a month of Procrit  Patient never started procrit as discussed above  Is appt with PA required for Monday?   Will send to Dr ARRIOLA [facility] team

## 2021-09-10 NOTE — TELEPHONE ENCOUNTER
Patient should be scheduled for procrit as ordered, and then rescheduled for f/u in the office after receiving a month of procrit injections  Can you help coordinate?

## 2021-09-10 NOTE — TELEPHONE ENCOUNTER
Patient calling to inquire about iron treatment  She advises if they are not moving forward then she would like to cancel appt with Refugio Boone on 09/13/2021   She can be reached at [phone]

## 2021-09-10 NOTE — PROGRESS NOTES
Subjective:     Annette Pacheco is a 80 y o  female  who presents to the office today for a preoperative consultation at the request of surgeon Dr Viky La who plans on performing carotid endarterectomy with surgical date TBD  Planned anesthesia is general  The patient has no known anesthesia issues  Most recent surgery was Aortobifemoral bypass in December 2019  There were no complications with procedure  she is able to ambulate 4 blocks on level ground  without stopping   (>4 METs)  She denies any chest pain or shortness of breath  She denies any LE edema, orthopnea or PND  The patient has a history of hypertension and peripheral vascular disease  She had prior aortobifemoral bypass in December 2019  Blood pressure is usually well controlled on current medication regimen  The following portions of the patient's history were reviewed and updated as appropriate: allergies, current medications, past family history, past medical history, past social history, past surgical history and problem list     Review of Systems  Review of Systems   Respiratory: Negative for shortness of breath  Cardiovascular: Negative for chest pain, palpitations and leg swelling  Musculoskeletal: Positive for arthralgias and myalgias  All other systems reviewed and are negative  Objective:      Physical Exam  /70 (BP Location: Right arm, Patient Position: Sitting, Cuff Size: Standard)   Pulse 65   Temp 97 7 °F (36 5 °C)   Ht 5' 3" (1 6 m)   Wt 53 1 kg (117 lb)   SpO2 96%   BMI 20 73 kg/m²    Physical Exam   Constitutional: She appears healthy  No distress  Eyes: Pupils are equal, round, and reactive to light  Conjunctivae are normal    Neck: No JVD present  Cardiovascular: Normal rate, regular rhythm and normal heart sounds  Exam reveals no gallop and no friction rub  No murmur heard  Pulmonary/Chest: Effort normal and breath sounds normal  She has no wheezes  She has no rales  Musculoskeletal:         General: No tenderness, deformity or edema  Cervical back: Normal range of motion and neck supple  Neurological: She is alert and oriented to person, place, and time  Skin: Skin is warm and dry  Cardiographics  ECG:  Sinus rhythm with premature atrial contractions and LVH  Echocardiogram: October 2019 - normal EF with grade 2 diastolic dysfunction and mild pulmonary hypertension      Lab Review   Lab Results   Component Value Date    K 5 0 05/13/2021     (H) 05/13/2021    CO2 22 05/13/2021    CO2 21 12/09/2019    BUN 69 (H) 05/13/2021    CREATININE 1 53 (H) 05/13/2021    GLUCOSE 173 (H) 12/09/2019    CALCIUM 9 4 05/13/2021     Lab Results   Component Value Date    WBC 5 77 06/24/2021    HGB 7 5 (L) 06/24/2021    HCT 24 4 (L) 06/24/2021    MCV 90 06/24/2021     06/24/2021     Lab Results   Component Value Date    CHOL 151 09/09/2014    TRIG 16 05/13/2021    TRIG 58 09/09/2014    HDL 72 05/13/2021    HDL 72 09/09/2014    LDLDIRECT 43 04/17/2018    LDLDIRECT 67 09/09/2014          Assessment:     1  Essential hypertension    2  Renovascular hypertension    3  Mixed hyperlipidemia    4  Nonrheumatic aortic valve stenosis    5  Chronic diastolic heart failure (HCC)    6  Stenosis of right carotid artery    7  Hypertensive chronic kidney disease with stage 1 through stage 4 chronic kidney disease, or unspecified chronic kidney disease    8  Aortoiliac occlusive disease ([facility] [address] )    9  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 [facility])           58 y o  female  with planned surgery as above  Known risk factors for perioperative complications: Anemia  Coronary disease  Congestive heart failure  Chronic pulmonary disease with ongoing cigarette use  Cardiac Risk Estimation: per the Revised Cardiac Risk Index, the patient has a score of 1 placing her at low-intermediate risk of major cardiac event (5 9%),  and may proceed to OR as planned    No further cardiac workup is indicated at this time  Plan:      1  Preoperative workup as follows none  2  Change in medication regimen before surgery: Hold Plavix for 5 days prior to procedure  3  Prophylaxis for cardiac events with perioperative beta-blockers: Patient is currently on beta blocker therapy, which she should continue on morning of procedure  4  Invasive hemodynamic monitoring perioperatively: at the discretion of anesthesiologist   5  Deep vein thrombosis prophylaxis postoperatively:regimen to be chosen by surgical team   6  Other measures: Discussed smoking cessation  7  BP is elevated today  She has been following with nephrology  8  Continue atorvastatin 80 mg daily

## 2021-09-11 PROCEDURE — 93000 ELECTROCARDIOGRAM COMPLETE: CPT | Performed by: INTERNAL MEDICINE

## 2021-09-13 DIAGNOSIS — I65.23 BILATERAL CAROTID ARTERY STENOSIS: Primary | ICD-10-CM

## 2021-09-13 NOTE — TELEPHONE ENCOUNTER
Lm for pt on home phone to call me back to schedule her R CEA with Dr Alexander  I called her cell phone but there was no answer and her vm was full

## 2021-09-14 ENCOUNTER — PREP FOR PROCEDURE (OUTPATIENT)
Dept: VASCULAR SURGERY | Facility: CLINIC | Age: 81
End: 2021-09-14

## 2021-09-14 NOTE — TELEPHONE ENCOUNTER
Is patient requesting a call when authorization has been obtained? Patient did not request a call  Surgery Date: 10/1/21  Primary Surgeon: Ashu Estevez (NPI: [id number])  Assisting Surgeon: Not Applicable (N/A)  Facility: [facility] (Tax: [id number] / NPI: [id number])  Inpatient / Outpatient: Inpatient  Level: 3    Clearance Received: Yes, Cardiology   Consent Received: Yes, scanned into Epic on 9/14/21  Medication Hold / Last Dose: Hold on Plavix 5 days prior  Last dose 9/25/21  VQI Spreadsheet: 9/14/21  IR Notified: Not Applicable (N/A)  Rep  Notified: Not Applicable (N/A)  Equipment Needs: Not Applicable (N/A)  [facility] Lab Requested: 9/14/21  Patient Contacted: 9/13/21    Diagnosis: I65 23  Procedure/ CPT Code(s): (CEA) Carotid Endarterectomy / Patch Angioplasty // CPT: 80726    For varicose vein related procedures, last LEVDR? Not Applicable (N/A)    Post Operative Date/ Time: 10/11/21 , 11:30am Ben with Ashu Estevez (NPI: [id number])     Pt will have her blood work done at [facility]  Ekg done 9/10/21  Pt has been vaccinated for covid  Pt cleared by Dr Chasidy Rios 9/10/21

## 2021-09-17 ENCOUNTER — ANESTHESIA EVENT (INPATIENT)
Dept: PERIOP | Facility: HOSPITAL | Age: 81
DRG: 038 | End: 2021-09-17
Payer: COMMERCIAL

## 2021-09-21 ENCOUNTER — LAB REQUISITION (OUTPATIENT)
Dept: LAB | Facility: HOSPITAL | Age: 81
End: 2021-09-21
Payer: COMMERCIAL

## 2021-09-21 ENCOUNTER — APPOINTMENT (OUTPATIENT)
Dept: LAB | Facility: CLINIC | Age: 81
End: 2021-09-21
Payer: COMMERCIAL

## 2021-09-21 DIAGNOSIS — I65.23 OCCLUSION AND STENOSIS OF BILATERAL CAROTID ARTERIES: ICD-10-CM

## 2021-09-21 DIAGNOSIS — I65.23 BILATERAL CAROTID ARTERY STENOSIS: ICD-10-CM

## 2021-09-21 LAB
ABO GROUP BLD: NORMAL
ANION GAP SERPL CALCULATED.3IONS-SCNC: 4 MMOL/L (ref 4–13)
BLD GP AB SCN SERPL QL: NEGATIVE
BUN SERPL-MCNC: 48 MG/DL (ref 5–25)
CALCIUM SERPL-MCNC: 9.2 MG/DL (ref 8.3–10.1)
CHLORIDE SERPL-SCNC: 108 MMOL/L (ref 100–108)
CO2 SERPL-SCNC: 25 MMOL/L (ref 21–32)
CREAT SERPL-MCNC: 1.46 MG/DL (ref 0.6–1.3)
ERYTHROCYTE [DISTWIDTH] IN BLOOD BY AUTOMATED COUNT: 20.1 % (ref 11.6–15.1)
EST. AVERAGE GLUCOSE BLD GHB EST-MCNC: 105 MG/DL
GFR SERPL CREATININE-BSD FRML MDRD: 34 ML/MIN/1.73SQ M
GLUCOSE P FAST SERPL-MCNC: 106 MG/DL (ref 65–99)
HBA1C MFR BLD: 5.3 %
HCT VFR BLD AUTO: 26.1 % (ref 34.8–46.1)
HGB BLD-MCNC: 7.8 G/DL (ref 11.5–15.4)
INR PPP: 1.08 (ref 0.84–1.19)
MCH RBC QN AUTO: 26.1 PG (ref 26.8–34.3)
MCHC RBC AUTO-ENTMCNC: 29.9 G/DL (ref 31.4–37.4)
MCV RBC AUTO: 87 FL (ref 82–98)
PLATELET # BLD AUTO: 329 THOUSANDS/UL (ref 149–390)
PMV BLD AUTO: 10.8 FL (ref 8.9–12.7)
POTASSIUM SERPL-SCNC: 5.3 MMOL/L (ref 3.5–5.3)
PROTHROMBIN TIME: 13.6 SECONDS (ref 11.6–14.5)
RBC # BLD AUTO: 2.99 MILLION/UL (ref 3.81–5.12)
RH BLD: POSITIVE
SODIUM SERPL-SCNC: 137 MMOL/L (ref 136–145)
SPECIMEN EXPIRATION DATE: NORMAL
WBC # BLD AUTO: 7.61 THOUSAND/UL (ref 4.31–10.16)

## 2021-09-21 PROCEDURE — 86900 BLOOD TYPING SEROLOGIC ABO: CPT | Performed by: SURGERY

## 2021-09-21 PROCEDURE — 85027 COMPLETE CBC AUTOMATED: CPT

## 2021-09-21 PROCEDURE — 85610 PROTHROMBIN TIME: CPT

## 2021-09-21 PROCEDURE — 83036 HEMOGLOBIN GLYCOSYLATED A1C: CPT

## 2021-09-21 PROCEDURE — 36415 COLL VENOUS BLD VENIPUNCTURE: CPT

## 2021-09-21 PROCEDURE — 86850 RBC ANTIBODY SCREEN: CPT | Performed by: SURGERY

## 2021-09-21 PROCEDURE — 86901 BLOOD TYPING SEROLOGIC RH(D): CPT | Performed by: SURGERY

## 2021-09-21 PROCEDURE — 80048 BASIC METABOLIC PNL TOTAL CA: CPT

## 2021-09-22 DIAGNOSIS — I74.09 AORTOILIAC OCCLUSIVE DISEASE (HCC): Chronic | ICD-10-CM

## 2021-09-22 DIAGNOSIS — I10 MALIGNANT HYPERTENSION: ICD-10-CM

## 2021-09-22 NOTE — TELEPHONE ENCOUNTER
Pt called to make sure it was okay to take Allegra and nose spray leading up to her surgery with Dr Aurora Valderrama  I checked with Ayala Arreola  in nursing and she said these OTC medications are okay to take  I advised pt and she confirmed and understood

## 2021-09-23 RX ORDER — AMLODIPINE BESYLATE 5 MG/1
TABLET ORAL
Qty: 30 TABLET | Refills: 0 | Status: ON HOLD | OUTPATIENT
Start: 2021-09-23 | End: 2021-10-20 | Stop reason: SDUPTHER

## 2021-09-23 RX ORDER — CLOPIDOGREL BISULFATE 75 MG/1
TABLET ORAL
Qty: 30 TABLET | Refills: 0 | Status: SHIPPED | OUTPATIENT
Start: 2021-09-23 | End: 2021-11-08

## 2021-09-24 NOTE — TELEPHONE ENCOUNTER
Spoke to patient to remind her that tomorrow  is her last dose of Plavix  prior to her procedure   Patient confirmed and understood the instructions

## 2021-09-28 RX ORDER — FEXOFENADINE HCL 180 MG/1
180 TABLET ORAL EVERY 12 HOURS
COMMUNITY
End: 2021-10-20 | Stop reason: HOSPADM

## 2021-10-01 ENCOUNTER — HOSPITAL ENCOUNTER (INPATIENT)
Facility: HOSPITAL | Age: 81
LOS: 3 days | Discharge: HOME | DRG: 038 | End: 2021-10-04
Attending: SURGERY | Admitting: SURGERY
Payer: COMMERCIAL

## 2021-10-01 ENCOUNTER — ANESTHESIA (INPATIENT)
Dept: PERIOP | Facility: HOSPITAL | Age: 81
DRG: 038 | End: 2021-10-01
Payer: COMMERCIAL

## 2021-10-01 ENCOUNTER — APPOINTMENT (OUTPATIENT)
Dept: NON INVASIVE DIAGNOSTICS | Facility: HOSPITAL | Age: 81
DRG: 038 | End: 2021-10-01
Payer: COMMERCIAL

## 2021-10-01 DIAGNOSIS — E78.2 MIXED HYPERLIPIDEMIA: ICD-10-CM

## 2021-10-01 DIAGNOSIS — N18.32 STAGE 3B CHRONIC KIDNEY DISEASE (HCC): Primary | ICD-10-CM

## 2021-10-01 DIAGNOSIS — N18.30 ANEMIA IN STAGE 3 CHRONIC KIDNEY DISEASE, UNSPECIFIED WHETHER STAGE 3A OR 3B CKD (HCC): ICD-10-CM

## 2021-10-01 DIAGNOSIS — I65.21 STENOSIS OF RIGHT INTERNAL CAROTID ARTERY: ICD-10-CM

## 2021-10-01 DIAGNOSIS — D63.1 ANEMIA IN STAGE 3 CHRONIC KIDNEY DISEASE, UNSPECIFIED WHETHER STAGE 3A OR 3B CKD (HCC): ICD-10-CM

## 2021-10-01 PROBLEM — Z90.710 HISTORY OF HYSTERECTOMY: Status: ACTIVE | Noted: 2021-10-01

## 2021-10-01 LAB
GLUCOSE SERPL-MCNC: 122 MG/DL (ref 65–140)
KCT BLD-ACNC: 195 SEC (ref 89–137)
KCT BLD-ACNC: 219 SEC (ref 89–137)
KCT BLD-ACNC: 231 SEC (ref 89–137)
KCT BLD-ACNC: 237 SEC (ref 89–137)
PLATELET # BLD AUTO: 224 THOUSANDS/UL (ref 149–390)
PMV BLD AUTO: 10.8 FL (ref 8.9–12.7)
SPECIMEN SOURCE: ABNORMAL

## 2021-10-01 PROCEDURE — 82948 REAGENT STRIP/BLOOD GLUCOSE: CPT

## 2021-10-01 PROCEDURE — C1781 MESH (IMPLANTABLE): HCPCS | Performed by: SURGERY

## 2021-10-01 PROCEDURE — 85347 COAGULATION TIME ACTIVATED: CPT

## 2021-10-01 PROCEDURE — 03UH0KZ SUPPLEMENT RIGHT COMMON CAROTID ARTERY WITH NONAUTOLOGOUS TISSUE SUBSTITUTE, OPEN APPROACH: ICD-10-PCS | Performed by: SURGERY

## 2021-10-01 PROCEDURE — 86923 COMPATIBILITY TEST ELECTRIC: CPT

## 2021-10-01 PROCEDURE — 35301 RECHANNELING OF ARTERY: CPT | Performed by: SURGERY

## 2021-10-01 PROCEDURE — 03CH0ZZ EXTIRPATION OF MATTER FROM RIGHT COMMON CAROTID ARTERY, OPEN APPROACH: ICD-10-PCS | Performed by: SURGERY

## 2021-10-01 PROCEDURE — 93882 EXTRACRANIAL UNI/LTD STUDY: CPT

## 2021-10-01 PROCEDURE — 85049 AUTOMATED PLATELET COUNT: CPT | Performed by: SURGERY

## 2021-10-01 PROCEDURE — 99222 1ST HOSP IP/OBS MODERATE 55: CPT | Performed by: NURSE PRACTITIONER

## 2021-10-01 DEVICE — XENOSURE BIOLOGIC PATCH, 0.8CM X 8CM, EIFU
Type: IMPLANTABLE DEVICE | Site: CAROTID | Status: FUNCTIONAL
Brand: XENOSURE BIOLOGIC PATCH

## 2021-10-01 RX ORDER — HEPARIN SODIUM 1000 [USP'U]/ML
INJECTION, SOLUTION INTRAVENOUS; SUBCUTANEOUS AS NEEDED
Status: DISCONTINUED | OUTPATIENT
Start: 2021-10-01 | End: 2021-10-01

## 2021-10-01 RX ORDER — ROCURONIUM BROMIDE 10 MG/ML
INJECTION, SOLUTION INTRAVENOUS AS NEEDED
Status: DISCONTINUED | OUTPATIENT
Start: 2021-10-01 | End: 2021-10-01

## 2021-10-01 RX ORDER — LABETALOL 20 MG/4 ML (5 MG/ML) INTRAVENOUS SYRINGE
5
Status: COMPLETED | OUTPATIENT
Start: 2021-10-01 | End: 2021-10-03

## 2021-10-01 RX ORDER — DIPHENHYDRAMINE HYDROCHLORIDE 50 MG/ML
12.5 INJECTION INTRAMUSCULAR; INTRAVENOUS
Status: DISCONTINUED | OUTPATIENT
Start: 2021-10-01 | End: 2021-10-01 | Stop reason: HOSPADM

## 2021-10-01 RX ORDER — SODIUM CHLORIDE 9 MG/ML
INJECTION, SOLUTION INTRAVENOUS CONTINUOUS PRN
Status: DISCONTINUED | OUTPATIENT
Start: 2021-10-01 | End: 2021-10-01

## 2021-10-01 RX ORDER — GLYCOPYRROLATE 0.2 MG/ML
INJECTION INTRAMUSCULAR; INTRAVENOUS AS NEEDED
Status: DISCONTINUED | OUTPATIENT
Start: 2021-10-01 | End: 2021-10-01

## 2021-10-01 RX ORDER — IPRATROPIUM BROMIDE 21 UG/1
2 SPRAY, METERED NASAL 3 TIMES DAILY PRN
Status: DISCONTINUED | OUTPATIENT
Start: 2021-10-01 | End: 2021-10-04 | Stop reason: HOSPADM

## 2021-10-01 RX ORDER — ONDANSETRON 2 MG/ML
4 INJECTION INTRAMUSCULAR; INTRAVENOUS ONCE AS NEEDED
Status: DISCONTINUED | OUTPATIENT
Start: 2021-10-01 | End: 2021-10-01 | Stop reason: HOSPADM

## 2021-10-01 RX ORDER — AMLODIPINE BESYLATE 5 MG/1
5 TABLET ORAL DAILY
Status: CANCELLED | OUTPATIENT
Start: 2021-10-01

## 2021-10-01 RX ORDER — ACETAMINOPHEN 650 MG/1
650 SUPPOSITORY RECTAL EVERY 6 HOURS PRN
Status: DISCONTINUED | OUTPATIENT
Start: 2021-10-01 | End: 2021-10-02

## 2021-10-01 RX ORDER — CEFAZOLIN SODIUM 1 G/3ML
INJECTION, POWDER, FOR SOLUTION INTRAMUSCULAR; INTRAVENOUS AS NEEDED
Status: DISCONTINUED | OUTPATIENT
Start: 2021-10-01 | End: 2021-10-01

## 2021-10-01 RX ORDER — ONDANSETRON 2 MG/ML
INJECTION INTRAMUSCULAR; INTRAVENOUS AS NEEDED
Status: DISCONTINUED | OUTPATIENT
Start: 2021-10-01 | End: 2021-10-01

## 2021-10-01 RX ORDER — SODIUM CHLORIDE 9 MG/ML
75 INJECTION, SOLUTION INTRAVENOUS CONTINUOUS
Status: DISCONTINUED | OUTPATIENT
Start: 2021-10-01 | End: 2021-10-01

## 2021-10-01 RX ORDER — PROTAMINE SULFATE 10 MG/ML
INJECTION, SOLUTION INTRAVENOUS AS NEEDED
Status: DISCONTINUED | OUTPATIENT
Start: 2021-10-01 | End: 2021-10-01

## 2021-10-01 RX ORDER — FENTANYL CITRATE 50 UG/ML
INJECTION, SOLUTION INTRAMUSCULAR; INTRAVENOUS AS NEEDED
Status: DISCONTINUED | OUTPATIENT
Start: 2021-10-01 | End: 2021-10-01

## 2021-10-01 RX ORDER — PROPOFOL 10 MG/ML
INJECTION, EMULSION INTRAVENOUS AS NEEDED
Status: DISCONTINUED | OUTPATIENT
Start: 2021-10-01 | End: 2021-10-01

## 2021-10-01 RX ORDER — LABETALOL 20 MG/4 ML (5 MG/ML) INTRAVENOUS SYRINGE
5
Status: DISCONTINUED | OUTPATIENT
Start: 2021-10-01 | End: 2021-10-01

## 2021-10-01 RX ORDER — FLUTICASONE PROPIONATE 50 MCG
1 SPRAY, SUSPENSION (ML) NASAL DAILY
Status: DISCONTINUED | OUTPATIENT
Start: 2021-10-01 | End: 2021-10-04 | Stop reason: HOSPADM

## 2021-10-01 RX ORDER — ALBUTEROL SULFATE 90 UG/1
2 AEROSOL, METERED RESPIRATORY (INHALATION) EVERY 6 HOURS PRN
Status: DISCONTINUED | OUTPATIENT
Start: 2021-10-01 | End: 2021-10-04 | Stop reason: HOSPADM

## 2021-10-01 RX ORDER — LEVOTHYROXINE SODIUM 0.03 MG/1
25 TABLET ORAL
Status: DISCONTINUED | OUTPATIENT
Start: 2021-10-02 | End: 2021-10-04 | Stop reason: HOSPADM

## 2021-10-01 RX ORDER — HYDRALAZINE HYDROCHLORIDE 20 MG/ML
15 INJECTION INTRAMUSCULAR; INTRAVENOUS
Status: DISCONTINUED | OUTPATIENT
Start: 2021-10-01 | End: 2021-10-01

## 2021-10-01 RX ORDER — FENTANYL CITRATE/PF 50 MCG/ML
25 SYRINGE (ML) INJECTION
Status: DISCONTINUED | OUTPATIENT
Start: 2021-10-01 | End: 2021-10-01 | Stop reason: HOSPADM

## 2021-10-01 RX ORDER — OXYCODONE HYDROCHLORIDE 5 MG/1
5 TABLET ORAL EVERY 4 HOURS PRN
Status: DISCONTINUED | OUTPATIENT
Start: 2021-10-01 | End: 2021-10-04 | Stop reason: HOSPADM

## 2021-10-01 RX ORDER — ASPIRIN 81 MG/1
81 TABLET, CHEWABLE ORAL DAILY
Status: DISCONTINUED | OUTPATIENT
Start: 2021-10-02 | End: 2021-10-04 | Stop reason: HOSPADM

## 2021-10-01 RX ORDER — OXYCODONE HYDROCHLORIDE 5 MG/1
2.5 TABLET ORAL EVERY 4 HOURS PRN
Status: DISCONTINUED | OUTPATIENT
Start: 2021-10-01 | End: 2021-10-04 | Stop reason: HOSPADM

## 2021-10-01 RX ORDER — CARVEDILOL 12.5 MG/1
12.5 TABLET ORAL 2 TIMES DAILY
Status: CANCELLED | OUTPATIENT
Start: 2021-10-01

## 2021-10-01 RX ORDER — CLOPIDOGREL BISULFATE 75 MG/1
75 TABLET ORAL DAILY
Status: DISCONTINUED | OUTPATIENT
Start: 2021-10-01 | End: 2021-10-04 | Stop reason: HOSPADM

## 2021-10-01 RX ORDER — ONDANSETRON 2 MG/ML
4 INJECTION INTRAMUSCULAR; INTRAVENOUS EVERY 6 HOURS PRN
Status: DISCONTINUED | OUTPATIENT
Start: 2021-10-01 | End: 2021-10-04 | Stop reason: HOSPADM

## 2021-10-01 RX ORDER — LIDOCAINE HYDROCHLORIDE 10 MG/ML
INJECTION, SOLUTION EPIDURAL; INFILTRATION; INTRACAUDAL; PERINEURAL AS NEEDED
Status: DISCONTINUED | OUTPATIENT
Start: 2021-10-01 | End: 2021-10-01

## 2021-10-01 RX ORDER — SODIUM CHLORIDE, SODIUM LACTATE, POTASSIUM CHLORIDE, CALCIUM CHLORIDE 600; 310; 30; 20 MG/100ML; MG/100ML; MG/100ML; MG/100ML
INJECTION, SOLUTION INTRAVENOUS CONTINUOUS PRN
Status: DISCONTINUED | OUTPATIENT
Start: 2021-10-01 | End: 2021-10-01

## 2021-10-01 RX ORDER — SODIUM CHLORIDE, SODIUM LACTATE, POTASSIUM CHLORIDE, CALCIUM CHLORIDE 600; 310; 30; 20 MG/100ML; MG/100ML; MG/100ML; MG/100ML
50 INJECTION, SOLUTION INTRAVENOUS CONTINUOUS
Status: DISCONTINUED | OUTPATIENT
Start: 2021-10-01 | End: 2021-10-01

## 2021-10-01 RX ORDER — CHLORHEXIDINE GLUCONATE 0.12 MG/ML
15 RINSE ORAL ONCE
Status: COMPLETED | OUTPATIENT
Start: 2021-10-01 | End: 2021-10-01

## 2021-10-01 RX ORDER — NICOTINE 21 MG/24HR
1 PATCH, TRANSDERMAL 24 HOURS TRANSDERMAL EVERY 24 HOURS
Status: DISCONTINUED | OUTPATIENT
Start: 2021-10-01 | End: 2021-10-03

## 2021-10-01 RX ORDER — ATORVASTATIN CALCIUM 80 MG/1
80 TABLET, FILM COATED ORAL
Status: DISCONTINUED | OUTPATIENT
Start: 2021-10-01 | End: 2021-10-04 | Stop reason: HOSPADM

## 2021-10-01 RX ORDER — DEXAMETHASONE SODIUM PHOSPHATE 10 MG/ML
INJECTION, SOLUTION INTRAMUSCULAR; INTRAVENOUS AS NEEDED
Status: DISCONTINUED | OUTPATIENT
Start: 2021-10-01 | End: 2021-10-01

## 2021-10-01 RX ORDER — SODIUM CHLORIDE 9 MG/ML
50 INJECTION, SOLUTION INTRAVENOUS CONTINUOUS
Status: DISCONTINUED | OUTPATIENT
Start: 2021-10-01 | End: 2021-10-02

## 2021-10-01 RX ORDER — MUSCLE RUB CREAM 100; 150 MG/G; MG/G
CREAM TOPICAL 4 TIMES DAILY PRN
Status: DISCONTINUED | OUTPATIENT
Start: 2021-10-01 | End: 2021-10-04 | Stop reason: HOSPADM

## 2021-10-01 RX ORDER — METHOCARBAMOL 500 MG/1
500 TABLET, FILM COATED ORAL EVERY 6 HOURS PRN
Status: DISCONTINUED | OUTPATIENT
Start: 2021-10-01 | End: 2021-10-04 | Stop reason: HOSPADM

## 2021-10-01 RX ORDER — HYDRALAZINE HYDROCHLORIDE 20 MG/ML
15 INJECTION INTRAMUSCULAR; INTRAVENOUS
Status: DISCONTINUED | OUTPATIENT
Start: 2021-10-01 | End: 2021-10-03

## 2021-10-01 RX ORDER — ACETAMINOPHEN 325 MG/1
650 TABLET ORAL EVERY 6 HOURS PRN
Status: DISCONTINUED | OUTPATIENT
Start: 2021-10-01 | End: 2021-10-04 | Stop reason: HOSPADM

## 2021-10-01 RX ORDER — HEPARIN SODIUM 5000 [USP'U]/ML
5000 INJECTION, SOLUTION INTRAVENOUS; SUBCUTANEOUS EVERY 8 HOURS SCHEDULED
Status: DISCONTINUED | OUTPATIENT
Start: 2021-10-02 | End: 2021-10-04 | Stop reason: HOSPADM

## 2021-10-01 RX ADMIN — PROPOFOL 100 MG: 10 INJECTION, EMULSION INTRAVENOUS at 07:56

## 2021-10-01 RX ADMIN — SODIUM CHLORIDE, SODIUM LACTATE, POTASSIUM CHLORIDE, AND CALCIUM CHLORIDE: .6; .31; .03; .02 INJECTION, SOLUTION INTRAVENOUS at 07:39

## 2021-10-01 RX ADMIN — ROCURONIUM BROMIDE 30 MG: 50 INJECTION, SOLUTION INTRAVENOUS at 07:59

## 2021-10-01 RX ADMIN — HEPARIN SODIUM 3000 UNITS: 1000 INJECTION INTRAVENOUS; SUBCUTANEOUS at 09:19

## 2021-10-01 RX ADMIN — OXYCODONE HYDROCHLORIDE 5 MG: 5 TABLET ORAL at 21:18

## 2021-10-01 RX ADMIN — GLYCOPYRROLATE 0.2 MG: 0.2 INJECTION, SOLUTION INTRAMUSCULAR; INTRAVENOUS at 10:41

## 2021-10-01 RX ADMIN — FENTANYL CITRATE 100 MCG: 50 INJECTION INTRAMUSCULAR; INTRAVENOUS at 07:56

## 2021-10-01 RX ADMIN — PROTAMINE SULFATE 50 MG: 10 INJECTION, SOLUTION INTRAVENOUS at 10:34

## 2021-10-01 RX ADMIN — SODIUM CHLORIDE 500 ML: 0.9 INJECTION, SOLUTION INTRAVENOUS at 12:08

## 2021-10-01 RX ADMIN — CHLORHEXIDINE GLUCONATE 15 ML: 1.2 SOLUTION ORAL at 06:43

## 2021-10-01 RX ADMIN — HEPARIN SODIUM 7000 UNITS: 1000 INJECTION INTRAVENOUS; SUBCUTANEOUS at 08:56

## 2021-10-01 RX ADMIN — PHENYLEPHRINE HYDROCHLORIDE 25 MCG/MIN: 10 INJECTION INTRAVENOUS at 12:37

## 2021-10-01 RX ADMIN — ACETAMINOPHEN 650 MG: 325 TABLET, FILM COATED ORAL at 23:50

## 2021-10-01 RX ADMIN — SODIUM CHLORIDE 75 ML/HR: 0.9 INJECTION, SOLUTION INTRAVENOUS at 14:37

## 2021-10-01 RX ADMIN — GLYCOPYRROLATE 0.2 MG: 0.2 INJECTION, SOLUTION INTRAMUSCULAR; INTRAVENOUS at 08:48

## 2021-10-01 RX ADMIN — ONDANSETRON 4 MG: 2 INJECTION INTRAMUSCULAR; INTRAVENOUS at 09:55

## 2021-10-01 RX ADMIN — DEXAMETHASONE SODIUM PHOSPHATE 10 MG: 10 INJECTION, SOLUTION INTRAMUSCULAR; INTRAVENOUS at 08:37

## 2021-10-01 RX ADMIN — DEXMEDETOMIDINE 0.5 MCG/KG/HR: 100 INJECTION, SOLUTION, CONCENTRATE INTRAVENOUS at 08:17

## 2021-10-01 RX ADMIN — NICOTINE 1 PATCH: 14 PATCH, EXTENDED RELEASE TRANSDERMAL at 16:22

## 2021-10-01 RX ADMIN — ATORVASTATIN CALCIUM 80 MG: 80 TABLET, FILM COATED ORAL at 21:18

## 2021-10-01 RX ADMIN — MENTHOL, METHYL SALICYLATE: 10; 15 CREAM TOPICAL at 22:46

## 2021-10-01 RX ADMIN — SUGAMMADEX 200 MG: 100 INJECTION, SOLUTION INTRAVENOUS at 11:08

## 2021-10-01 RX ADMIN — ROCURONIUM BROMIDE 30 MG: 50 INJECTION, SOLUTION INTRAVENOUS at 07:56

## 2021-10-01 RX ADMIN — PHENYLEPHRINE HYDROCHLORIDE 20 MCG/MIN: 10 INJECTION INTRAVENOUS at 08:17

## 2021-10-01 RX ADMIN — SODIUM CHLORIDE 50 ML/HR: 0.9 INJECTION, SOLUTION INTRAVENOUS at 18:27

## 2021-10-01 RX ADMIN — SODIUM CHLORIDE: 0.9 INJECTION, SOLUTION INTRAVENOUS at 07:58

## 2021-10-01 RX ADMIN — HEPARIN SODIUM 3000 UNITS: 1000 INJECTION INTRAVENOUS; SUBCUTANEOUS at 09:06

## 2021-10-01 RX ADMIN — CEFAZOLIN 1000 MG: 1 INJECTION, POWDER, FOR SOLUTION INTRAMUSCULAR; INTRAVENOUS at 08:15

## 2021-10-01 RX ADMIN — LIDOCAINE HYDROCHLORIDE 50 MG: 10 INJECTION, SOLUTION EPIDURAL; INFILTRATION; INTRACAUDAL; PERINEURAL at 07:56

## 2021-10-02 LAB
ANION GAP SERPL CALCULATED.3IONS-SCNC: 6 MMOL/L (ref 4–13)
APTT PPP: 29 SECONDS (ref 23–37)
BUN SERPL-MCNC: 49 MG/DL (ref 5–25)
CALCIUM SERPL-MCNC: 7.9 MG/DL (ref 8.3–10.1)
CHLORIDE SERPL-SCNC: 113 MMOL/L (ref 100–108)
CO2 SERPL-SCNC: 20 MMOL/L (ref 21–32)
CREAT SERPL-MCNC: 1.3 MG/DL (ref 0.6–1.3)
ERYTHROCYTE [DISTWIDTH] IN BLOOD BY AUTOMATED COUNT: 18.6 % (ref 11.6–15.1)
ERYTHROCYTE [DISTWIDTH] IN BLOOD BY AUTOMATED COUNT: 19.9 % (ref 11.6–15.1)
GFR SERPL CREATININE-BSD FRML MDRD: 39 ML/MIN/1.73SQ M
GLUCOSE SERPL-MCNC: 140 MG/DL (ref 65–140)
HCT VFR BLD AUTO: 20.5 % (ref 34.8–46.1)
HCT VFR BLD AUTO: 23.4 % (ref 34.8–46.1)
HGB BLD-MCNC: 6.1 G/DL (ref 11.5–15.4)
HGB BLD-MCNC: 7.5 G/DL (ref 11.5–15.4)
INR PPP: 1.15 (ref 0.84–1.19)
MCH RBC QN AUTO: 25.8 PG (ref 26.8–34.3)
MCH RBC QN AUTO: 27.5 PG (ref 26.8–34.3)
MCHC RBC AUTO-ENTMCNC: 29.8 G/DL (ref 31.4–37.4)
MCHC RBC AUTO-ENTMCNC: 32.1 G/DL (ref 31.4–37.4)
MCV RBC AUTO: 86 FL (ref 82–98)
MCV RBC AUTO: 87 FL (ref 82–98)
PLATELET # BLD AUTO: 204 THOUSANDS/UL (ref 149–390)
PLATELET # BLD AUTO: 214 THOUSANDS/UL (ref 149–390)
PMV BLD AUTO: 10.9 FL (ref 8.9–12.7)
PMV BLD AUTO: 11 FL (ref 8.9–12.7)
POTASSIUM SERPL-SCNC: 4.4 MMOL/L (ref 3.5–5.3)
PROTHROMBIN TIME: 14.2 SECONDS (ref 11.6–14.5)
RBC # BLD AUTO: 2.36 MILLION/UL (ref 3.81–5.12)
RBC # BLD AUTO: 2.73 MILLION/UL (ref 3.81–5.12)
SODIUM SERPL-SCNC: 139 MMOL/L (ref 136–145)
WBC # BLD AUTO: 10.66 THOUSAND/UL (ref 4.31–10.16)
WBC # BLD AUTO: 11.34 THOUSAND/UL (ref 4.31–10.16)

## 2021-10-02 PROCEDURE — 93005 ELECTROCARDIOGRAM TRACING: CPT

## 2021-10-02 PROCEDURE — 99232 SBSQ HOSP IP/OBS MODERATE 35: CPT | Performed by: INTERNAL MEDICINE

## 2021-10-02 PROCEDURE — 85730 THROMBOPLASTIN TIME PARTIAL: CPT | Performed by: SURGERY

## 2021-10-02 PROCEDURE — 99222 1ST HOSP IP/OBS MODERATE 55: CPT | Performed by: INTERNAL MEDICINE

## 2021-10-02 PROCEDURE — 80048 BASIC METABOLIC PNL TOTAL CA: CPT | Performed by: SURGERY

## 2021-10-02 PROCEDURE — 97162 PT EVAL MOD COMPLEX 30 MIN: CPT

## 2021-10-02 PROCEDURE — 85610 PROTHROMBIN TIME: CPT | Performed by: SURGERY

## 2021-10-02 PROCEDURE — 85027 COMPLETE CBC AUTOMATED: CPT | Performed by: SURGERY

## 2021-10-02 PROCEDURE — 30233N1 TRANSFUSION OF NONAUTOLOGOUS RED BLOOD CELLS INTO PERIPHERAL VEIN, PERCUTANEOUS APPROACH: ICD-10-PCS | Performed by: SURGERY

## 2021-10-02 PROCEDURE — 99024 POSTOP FOLLOW-UP VISIT: CPT | Performed by: SURGERY

## 2021-10-02 PROCEDURE — P9016 RBC LEUKOCYTES REDUCED: HCPCS

## 2021-10-02 RX ORDER — ACETAMINOPHEN 650 MG/1
650 SUPPOSITORY RECTAL EVERY 6 HOURS PRN
Status: DISCONTINUED | OUTPATIENT
Start: 2021-10-02 | End: 2021-10-04 | Stop reason: HOSPADM

## 2021-10-02 RX ORDER — NICOTINE 21 MG/24HR
21 PATCH, TRANSDERMAL 24 HOURS TRANSDERMAL DAILY
Status: DISCONTINUED | OUTPATIENT
Start: 2021-10-02 | End: 2021-10-04 | Stop reason: HOSPADM

## 2021-10-02 RX ADMIN — NICOTINE 1 PATCH: 14 PATCH, EXTENDED RELEASE TRANSDERMAL at 14:54

## 2021-10-02 RX ADMIN — HEPARIN SODIUM 5000 UNITS: 5000 INJECTION INTRAVENOUS; SUBCUTANEOUS at 21:30

## 2021-10-02 RX ADMIN — LEVOTHYROXINE SODIUM 25 MCG: 25 TABLET ORAL at 05:15

## 2021-10-02 RX ADMIN — HEPARIN SODIUM 5000 UNITS: 5000 INJECTION INTRAVENOUS; SUBCUTANEOUS at 14:55

## 2021-10-02 RX ADMIN — ASPIRIN 81 MG CHEWABLE TABLET 81 MG: 81 TABLET CHEWABLE at 08:21

## 2021-10-02 RX ADMIN — OXYCODONE HYDROCHLORIDE 5 MG: 5 TABLET ORAL at 21:33

## 2021-10-02 RX ADMIN — ATORVASTATIN CALCIUM 80 MG: 80 TABLET, FILM COATED ORAL at 21:30

## 2021-10-02 RX ADMIN — CLOPIDOGREL BISULFATE 75 MG: 75 TABLET ORAL at 08:21

## 2021-10-02 RX ADMIN — ALBUTEROL SULFATE 2 PUFF: 90 AEROSOL, METERED RESPIRATORY (INHALATION) at 09:36

## 2021-10-03 ENCOUNTER — APPOINTMENT (INPATIENT)
Dept: RADIOLOGY | Facility: HOSPITAL | Age: 81
DRG: 038 | End: 2021-10-03
Payer: COMMERCIAL

## 2021-10-03 PROBLEM — R07.9 CHEST PAIN: Status: ACTIVE | Noted: 2021-10-03

## 2021-10-03 LAB
ABO GROUP BLD BPU: NORMAL
ANION GAP SERPL CALCULATED.3IONS-SCNC: 4 MMOL/L (ref 4–13)
ANION GAP SERPL CALCULATED.3IONS-SCNC: 5 MMOL/L (ref 4–13)
BLD SMEAR INTERP: NORMAL
BPU ID: NORMAL
BUN SERPL-MCNC: 33 MG/DL (ref 5–25)
BUN SERPL-MCNC: 40 MG/DL (ref 5–25)
CALCIUM SERPL-MCNC: 8.4 MG/DL (ref 8.3–10.1)
CALCIUM SERPL-MCNC: 9 MG/DL (ref 8.3–10.1)
CHLORIDE SERPL-SCNC: 109 MMOL/L (ref 100–108)
CHLORIDE SERPL-SCNC: 112 MMOL/L (ref 100–108)
CO2 SERPL-SCNC: 22 MMOL/L (ref 21–32)
CO2 SERPL-SCNC: 24 MMOL/L (ref 21–32)
CREAT SERPL-MCNC: 0.89 MG/DL (ref 0.6–1.3)
CREAT SERPL-MCNC: 1.01 MG/DL (ref 0.6–1.3)
CROSSMATCH: NORMAL
DAT POLY-SP REAG RBC QL: NEGATIVE
ERYTHROCYTE [DISTWIDTH] IN BLOOD BY AUTOMATED COUNT: 18.3 % (ref 11.6–15.1)
ERYTHROCYTE [DISTWIDTH] IN BLOOD BY AUTOMATED COUNT: 18.8 % (ref 11.6–15.1)
FERRITIN SERPL-MCNC: 31 NG/ML (ref 8–388)
FOLATE SERPL-MCNC: >20 NG/ML (ref 3.1–17.5)
GFR SERPL CREATININE-BSD FRML MDRD: 52 ML/MIN/1.73SQ M
GFR SERPL CREATININE-BSD FRML MDRD: 61 ML/MIN/1.73SQ M
GLUCOSE SERPL-MCNC: 93 MG/DL (ref 65–140)
GLUCOSE SERPL-MCNC: 96 MG/DL (ref 65–140)
HCT VFR BLD AUTO: 22 % (ref 34.8–46.1)
HCT VFR BLD AUTO: 29.6 % (ref 34.8–46.1)
HEMOCCULT STL QL: NEGATIVE
HEMOCCULT STL QL: NORMAL
HEMOCCULT STL QL: NORMAL
HGB BLD-MCNC: 6.9 G/DL (ref 11.5–15.4)
HGB BLD-MCNC: 9.6 G/DL (ref 11.5–15.4)
IGA SERPL-MCNC: 149 MG/DL (ref 70–400)
IGG SERPL-MCNC: 1000 MG/DL (ref 700–1600)
IGM SERPL-MCNC: 55 MG/DL (ref 40–230)
IRON SATN MFR SERPL: 25 % (ref 15–50)
IRON SERPL-MCNC: 115 UG/DL (ref 50–170)
MCH RBC QN AUTO: 26.8 PG (ref 26.8–34.3)
MCH RBC QN AUTO: 28.2 PG (ref 26.8–34.3)
MCHC RBC AUTO-ENTMCNC: 31.4 G/DL (ref 31.4–37.4)
MCHC RBC AUTO-ENTMCNC: 32.4 G/DL (ref 31.4–37.4)
MCV RBC AUTO: 86 FL (ref 82–98)
MCV RBC AUTO: 87 FL (ref 82–98)
PLATELET # BLD AUTO: 188 THOUSANDS/UL (ref 149–390)
PLATELET # BLD AUTO: 209 THOUSANDS/UL (ref 149–390)
PMV BLD AUTO: 10.9 FL (ref 8.9–12.7)
PMV BLD AUTO: 11 FL (ref 8.9–12.7)
POTASSIUM SERPL-SCNC: 3.9 MMOL/L (ref 3.5–5.3)
POTASSIUM SERPL-SCNC: 3.9 MMOL/L (ref 3.5–5.3)
RBC # BLD AUTO: 2.57 MILLION/UL (ref 3.81–5.12)
RBC # BLD AUTO: 3.41 MILLION/UL (ref 3.81–5.12)
SODIUM SERPL-SCNC: 138 MMOL/L (ref 136–145)
SODIUM SERPL-SCNC: 138 MMOL/L (ref 136–145)
TIBC SERPL-MCNC: 459 UG/DL (ref 250–450)
TROPONIN I SERPL-MCNC: <0.02 NG/ML
TROPONIN I SERPL-MCNC: <0.02 NG/ML
UNIT DISPENSE STATUS: NORMAL
UNIT PRODUCT CODE: NORMAL
UNIT PRODUCT VOLUME: 350 ML
UNIT RH: NORMAL
VIT B12 SERPL-MCNC: 421 PG/ML (ref 100–900)
WBC # BLD AUTO: 11.89 THOUSAND/UL (ref 4.31–10.16)
WBC # BLD AUTO: 14.06 THOUSAND/UL (ref 4.31–10.16)

## 2021-10-03 PROCEDURE — 82784 ASSAY IGA/IGD/IGG/IGM EACH: CPT | Performed by: INTERNAL MEDICINE

## 2021-10-03 PROCEDURE — 85027 COMPLETE CBC AUTOMATED: CPT | Performed by: INTERNAL MEDICINE

## 2021-10-03 PROCEDURE — 80048 BASIC METABOLIC PNL TOTAL CA: CPT | Performed by: INTERNAL MEDICINE

## 2021-10-03 PROCEDURE — 80048 BASIC METABOLIC PNL TOTAL CA: CPT | Performed by: SURGERY

## 2021-10-03 PROCEDURE — 30233N1 TRANSFUSION OF NONAUTOLOGOUS RED BLOOD CELLS INTO PERIPHERAL VEIN, PERCUTANEOUS APPROACH: ICD-10-PCS | Performed by: SURGERY

## 2021-10-03 PROCEDURE — 82272 OCCULT BLD FECES 1-3 TESTS: CPT | Performed by: SURGERY

## 2021-10-03 PROCEDURE — 83010 ASSAY OF HAPTOGLOBIN QUANT: CPT | Performed by: INTERNAL MEDICINE

## 2021-10-03 PROCEDURE — 85027 COMPLETE CBC AUTOMATED: CPT | Performed by: SURGERY

## 2021-10-03 PROCEDURE — 99232 SBSQ HOSP IP/OBS MODERATE 35: CPT | Performed by: INTERNAL MEDICINE

## 2021-10-03 PROCEDURE — 93005 ELECTROCARDIOGRAM TRACING: CPT

## 2021-10-03 PROCEDURE — 99222 1ST HOSP IP/OBS MODERATE 55: CPT | Performed by: INTERNAL MEDICINE

## 2021-10-03 PROCEDURE — 97167 OT EVAL HIGH COMPLEX 60 MIN: CPT

## 2021-10-03 PROCEDURE — 84484 ASSAY OF TROPONIN QUANT: CPT | Performed by: SURGERY

## 2021-10-03 PROCEDURE — 99024 POSTOP FOLLOW-UP VISIT: CPT | Performed by: SURGERY

## 2021-10-03 PROCEDURE — 86880 COOMBS TEST DIRECT: CPT | Performed by: INTERNAL MEDICINE

## 2021-10-03 PROCEDURE — 83550 IRON BINDING TEST: CPT | Performed by: INTERNAL MEDICINE

## 2021-10-03 PROCEDURE — 71045 X-RAY EXAM CHEST 1 VIEW: CPT

## 2021-10-03 PROCEDURE — P9016 RBC LEUKOCYTES REDUCED: HCPCS

## 2021-10-03 PROCEDURE — 83540 ASSAY OF IRON: CPT | Performed by: INTERNAL MEDICINE

## 2021-10-03 PROCEDURE — 83883 ASSAY NEPHELOMETRY NOT SPEC: CPT | Performed by: INTERNAL MEDICINE

## 2021-10-03 PROCEDURE — 82607 VITAMIN B-12: CPT | Performed by: INTERNAL MEDICINE

## 2021-10-03 PROCEDURE — 82728 ASSAY OF FERRITIN: CPT | Performed by: INTERNAL MEDICINE

## 2021-10-03 PROCEDURE — 82746 ASSAY OF FOLIC ACID SERUM: CPT | Performed by: INTERNAL MEDICINE

## 2021-10-03 RX ORDER — TORSEMIDE 5 MG/1
5 TABLET ORAL DAILY
Status: DISCONTINUED | OUTPATIENT
Start: 2021-10-03 | End: 2021-10-04 | Stop reason: HOSPADM

## 2021-10-03 RX ORDER — BISACODYL 10 MG
10 SUPPOSITORY, RECTAL RECTAL ONCE
Status: DISCONTINUED | OUTPATIENT
Start: 2021-10-03 | End: 2021-10-04 | Stop reason: HOSPADM

## 2021-10-03 RX ORDER — LISINOPRIL 20 MG/1
20 TABLET ORAL DAILY
Refills: 5 | Status: DISCONTINUED | OUTPATIENT
Start: 2021-10-03 | End: 2021-10-03

## 2021-10-03 RX ORDER — AMLODIPINE BESYLATE 5 MG/1
5 TABLET ORAL ONCE
Status: COMPLETED | OUTPATIENT
Start: 2021-10-03 | End: 2021-10-03

## 2021-10-03 RX ORDER — LISINOPRIL 20 MG/1
20 TABLET ORAL DAILY
Status: DISCONTINUED | OUTPATIENT
Start: 2021-10-04 | End: 2021-10-04 | Stop reason: HOSPADM

## 2021-10-03 RX ORDER — AMLODIPINE BESYLATE 10 MG/1
10 TABLET ORAL DAILY
Status: DISCONTINUED | OUTPATIENT
Start: 2021-10-04 | End: 2021-10-04

## 2021-10-03 RX ORDER — AMLODIPINE BESYLATE 5 MG/1
5 TABLET ORAL DAILY
Status: DISCONTINUED | OUTPATIENT
Start: 2021-10-04 | End: 2021-10-03

## 2021-10-03 RX ORDER — CARVEDILOL 12.5 MG/1
12.5 TABLET ORAL 2 TIMES DAILY
Status: DISCONTINUED | OUTPATIENT
Start: 2021-10-03 | End: 2021-10-04 | Stop reason: HOSPADM

## 2021-10-03 RX ORDER — TORSEMIDE 5 MG/1
5 TABLET ORAL DAILY
Status: DISCONTINUED | OUTPATIENT
Start: 2021-10-03 | End: 2021-10-03

## 2021-10-03 RX ORDER — AMLODIPINE BESYLATE 5 MG/1
5 TABLET ORAL DAILY
Status: DISCONTINUED | OUTPATIENT
Start: 2021-10-03 | End: 2021-10-03

## 2021-10-03 RX ORDER — HYDRALAZINE HYDROCHLORIDE 20 MG/ML
10 INJECTION INTRAMUSCULAR; INTRAVENOUS EVERY 4 HOURS PRN
Status: DISCONTINUED | OUTPATIENT
Start: 2021-10-03 | End: 2021-10-04 | Stop reason: HOSPADM

## 2021-10-03 RX ADMIN — HYDRALAZINE HYDROCHLORIDE 10 MG: 20 INJECTION INTRAMUSCULAR; INTRAVENOUS at 16:37

## 2021-10-03 RX ADMIN — NICOTINE 21 MG: 21 PATCH, EXTENDED RELEASE TRANSDERMAL at 08:20

## 2021-10-03 RX ADMIN — LISINOPRIL 20 MG: 20 TABLET ORAL at 08:44

## 2021-10-03 RX ADMIN — LEVOTHYROXINE SODIUM 25 MCG: 25 TABLET ORAL at 05:21

## 2021-10-03 RX ADMIN — HEPARIN SODIUM 5000 UNITS: 5000 INJECTION INTRAVENOUS; SUBCUTANEOUS at 21:25

## 2021-10-03 RX ADMIN — ASPIRIN 81 MG CHEWABLE TABLET 81 MG: 81 TABLET CHEWABLE at 08:21

## 2021-10-03 RX ADMIN — HEPARIN SODIUM 5000 UNITS: 5000 INJECTION INTRAVENOUS; SUBCUTANEOUS at 13:55

## 2021-10-03 RX ADMIN — AMLODIPINE BESYLATE 5 MG: 5 TABLET ORAL at 16:38

## 2021-10-03 RX ADMIN — ACETAMINOPHEN 650 MG: 325 TABLET, FILM COATED ORAL at 22:31

## 2021-10-03 RX ADMIN — TORSEMIDE 5 MG: 5 TABLET ORAL at 10:28

## 2021-10-03 RX ADMIN — CLOPIDOGREL BISULFATE 75 MG: 75 TABLET ORAL at 08:21

## 2021-10-03 RX ADMIN — LABETALOL 20 MG/4 ML (5 MG/ML) INTRAVENOUS SYRINGE 5 MG: at 06:05

## 2021-10-03 RX ADMIN — ACETAMINOPHEN 650 MG: 325 TABLET, FILM COATED ORAL at 00:18

## 2021-10-03 RX ADMIN — CARVEDILOL 12.5 MG: 12.5 TABLET, FILM COATED ORAL at 08:44

## 2021-10-03 RX ADMIN — FLUTICASONE PROPIONATE 1 SPRAY: 50 SPRAY, METERED NASAL at 08:21

## 2021-10-03 RX ADMIN — HEPARIN SODIUM 5000 UNITS: 5000 INJECTION INTRAVENOUS; SUBCUTANEOUS at 05:21

## 2021-10-03 RX ADMIN — HYDRALAZINE HYDROCHLORIDE 15 MG: 20 INJECTION INTRAMUSCULAR; INTRAVENOUS at 12:23

## 2021-10-03 RX ADMIN — AMLODIPINE BESYLATE 5 MG: 5 TABLET ORAL at 08:44

## 2021-10-03 RX ADMIN — CARVEDILOL 12.5 MG: 12.5 TABLET, FILM COATED ORAL at 17:22

## 2021-10-03 RX ADMIN — ATORVASTATIN CALCIUM 80 MG: 80 TABLET, FILM COATED ORAL at 21:25

## 2021-10-03 RX ADMIN — LABETALOL 20 MG/4 ML (5 MG/ML) INTRAVENOUS SYRINGE 5 MG: at 11:45

## 2021-10-04 ENCOUNTER — TELEPHONE (OUTPATIENT)
Dept: NEPHROLOGY | Facility: CLINIC | Age: 81
End: 2021-10-04

## 2021-10-04 VITALS
RESPIRATION RATE: 17 BRPM | TEMPERATURE: 97.5 F | HEART RATE: 60 BPM | WEIGHT: 118 LBS | BODY MASS INDEX: 20.91 KG/M2 | HEIGHT: 63 IN | OXYGEN SATURATION: 97 % | DIASTOLIC BLOOD PRESSURE: 62 MMHG | SYSTOLIC BLOOD PRESSURE: 127 MMHG

## 2021-10-04 PROBLEM — R07.9 CHEST PAIN: Status: RESOLVED | Noted: 2021-10-03 | Resolved: 2021-10-04

## 2021-10-04 PROBLEM — Z90.710 HISTORY OF HYSTERECTOMY: Status: RESOLVED | Noted: 2021-10-01 | Resolved: 2021-10-04

## 2021-10-04 PROBLEM — I65.29 CAROTID ARTERY STENOSIS: Chronic | Status: RESOLVED | Noted: 2017-08-30 | Resolved: 2021-10-04

## 2021-10-04 LAB
ABO GROUP BLD BPU: NORMAL
ANION GAP SERPL CALCULATED.3IONS-SCNC: 6 MMOL/L (ref 4–13)
ATRIAL RATE: 63 BPM
ATRIAL RATE: 63 BPM
ATRIAL RATE: 74 BPM
BPU ID: NORMAL
BUN SERPL-MCNC: 24 MG/DL (ref 5–25)
CALCIUM SERPL-MCNC: 8.5 MG/DL (ref 8.3–10.1)
CHLORIDE SERPL-SCNC: 109 MMOL/L (ref 100–108)
CO2 SERPL-SCNC: 24 MMOL/L (ref 21–32)
CREAT SERPL-MCNC: 0.77 MG/DL (ref 0.6–1.3)
CROSSMATCH: NORMAL
ERYTHROCYTE [DISTWIDTH] IN BLOOD BY AUTOMATED COUNT: 18.4 % (ref 11.6–15.1)
GFR SERPL CREATININE-BSD FRML MDRD: 73 ML/MIN/1.73SQ M
GLUCOSE SERPL-MCNC: 91 MG/DL (ref 65–140)
HAPTOGLOB SERPL-MCNC: 162 MG/DL (ref 41–333)
HCT VFR BLD AUTO: 26.7 % (ref 34.8–46.1)
HCT VFR BLD AUTO: 29.3 % (ref 34.8–46.1)
HGB BLD-MCNC: 8.6 G/DL (ref 11.5–15.4)
HGB BLD-MCNC: 9.9 G/DL (ref 11.5–15.4)
MCH RBC QN AUTO: 27.9 PG (ref 26.8–34.3)
MCHC RBC AUTO-ENTMCNC: 32.2 G/DL (ref 31.4–37.4)
MCV RBC AUTO: 87 FL (ref 82–98)
P AXIS: 75 DEGREES
P AXIS: 80 DEGREES
P AXIS: 84 DEGREES
PLATELET # BLD AUTO: 191 THOUSANDS/UL (ref 149–390)
PMV BLD AUTO: 10.3 FL (ref 8.9–12.7)
POTASSIUM SERPL-SCNC: 3.2 MMOL/L (ref 3.5–5.3)
PR INTERVAL: 138 MS
PR INTERVAL: 164 MS
PR INTERVAL: 176 MS
QRS AXIS: 66 DEGREES
QRS AXIS: 73 DEGREES
QRS AXIS: 75 DEGREES
QRSD INTERVAL: 84 MS
QRSD INTERVAL: 84 MS
QRSD INTERVAL: 86 MS
QT INTERVAL: 374 MS
QT INTERVAL: 390 MS
QT INTERVAL: 392 MS
QTC INTERVAL: 401 MS
QTC INTERVAL: 415 MS
QTC INTERVAL: 432 MS
RBC # BLD AUTO: 3.08 MILLION/UL (ref 3.81–5.12)
SODIUM SERPL-SCNC: 139 MMOL/L (ref 136–145)
T WAVE AXIS: 49 DEGREES
T WAVE AXIS: 61 DEGREES
T WAVE AXIS: 64 DEGREES
UNIT DISPENSE STATUS: NORMAL
UNIT PRODUCT CODE: NORMAL
UNIT PRODUCT VOLUME: 350 ML
UNIT RH: NORMAL
VENTRICULAR RATE: 63 BPM
VENTRICULAR RATE: 74 BPM
VENTRICULAR RATE: 74 BPM
WBC # BLD AUTO: 10.58 THOUSAND/UL (ref 4.31–10.16)

## 2021-10-04 PROCEDURE — 85014 HEMATOCRIT: CPT | Performed by: SURGERY

## 2021-10-04 PROCEDURE — 85018 HEMOGLOBIN: CPT | Performed by: SURGERY

## 2021-10-04 PROCEDURE — 80048 BASIC METABOLIC PNL TOTAL CA: CPT | Performed by: SURGERY

## 2021-10-04 PROCEDURE — 99024 POSTOP FOLLOW-UP VISIT: CPT | Performed by: SURGERY

## 2021-10-04 PROCEDURE — 93010 ELECTROCARDIOGRAM REPORT: CPT | Performed by: INTERNAL MEDICINE

## 2021-10-04 PROCEDURE — 99232 SBSQ HOSP IP/OBS MODERATE 35: CPT | Performed by: INTERNAL MEDICINE

## 2021-10-04 PROCEDURE — 85027 COMPLETE CBC AUTOMATED: CPT | Performed by: SURGERY

## 2021-10-04 PROCEDURE — NC001 PR NO CHARGE: Performed by: PHYSICIAN ASSISTANT

## 2021-10-04 RX ORDER — AMLODIPINE BESYLATE 5 MG/1
5 TABLET ORAL DAILY
Status: DISCONTINUED | OUTPATIENT
Start: 2021-10-05 | End: 2021-10-04 | Stop reason: HOSPADM

## 2021-10-04 RX ORDER — POTASSIUM CHLORIDE 20 MEQ/1
40 TABLET, EXTENDED RELEASE ORAL
Status: COMPLETED | OUTPATIENT
Start: 2021-10-04 | End: 2021-10-04

## 2021-10-04 RX ORDER — ACETAMINOPHEN 325 MG/1
650 TABLET ORAL EVERY 6 HOURS PRN
Status: CANCELLED | COMMUNITY
Start: 2021-10-04

## 2021-10-04 RX ADMIN — ASPIRIN 81 MG CHEWABLE TABLET 81 MG: 81 TABLET CHEWABLE at 08:01

## 2021-10-04 RX ADMIN — CLOPIDOGREL BISULFATE 75 MG: 75 TABLET ORAL at 08:01

## 2021-10-04 RX ADMIN — LISINOPRIL 20 MG: 20 TABLET ORAL at 08:04

## 2021-10-04 RX ADMIN — HEPARIN SODIUM 5000 UNITS: 5000 INJECTION INTRAVENOUS; SUBCUTANEOUS at 06:24

## 2021-10-04 RX ADMIN — AMLODIPINE BESYLATE 10 MG: 10 TABLET ORAL at 08:04

## 2021-10-04 RX ADMIN — POTASSIUM CHLORIDE 40 MEQ: 1500 TABLET, EXTENDED RELEASE ORAL at 06:43

## 2021-10-04 RX ADMIN — POTASSIUM CHLORIDE 40 MEQ: 1500 TABLET, EXTENDED RELEASE ORAL at 11:32

## 2021-10-04 RX ADMIN — LEVOTHYROXINE SODIUM 25 MCG: 25 TABLET ORAL at 06:24

## 2021-10-04 RX ADMIN — CARVEDILOL 12.5 MG: 12.5 TABLET, FILM COATED ORAL at 08:01

## 2021-10-04 RX ADMIN — NICOTINE 21 MG: 21 PATCH, EXTENDED RELEASE TRANSDERMAL at 08:02

## 2021-10-04 RX ADMIN — TORSEMIDE 5 MG: 5 TABLET ORAL at 08:19

## 2021-10-05 ENCOUNTER — TELEPHONE (OUTPATIENT)
Dept: VASCULAR SURGERY | Facility: CLINIC | Age: 81
End: 2021-10-05

## 2021-10-05 ENCOUNTER — TELEPHONE (OUTPATIENT)
Dept: NEPHROLOGY | Facility: CLINIC | Age: 81
End: 2021-10-05

## 2021-10-05 DIAGNOSIS — F17.218 CIGARETTE NICOTINE DEPENDENCE WITH OTHER NICOTINE-INDUCED DISORDER: Primary | ICD-10-CM

## 2021-10-05 LAB
KAPPA LC FREE SER-MCNC: 33 MG/L (ref 3.3–19.4)
KAPPA LC FREE/LAMBDA FREE SER: 1.43 {RATIO} (ref 0.26–1.65)
LAMBDA LC FREE SERPL-MCNC: 23.1 MG/L (ref 5.7–26.3)

## 2021-10-05 RX ORDER — NICOTINE 21 MG/24HR
1 PATCH, TRANSDERMAL 24 HOURS TRANSDERMAL EVERY 24 HOURS
Qty: 28 PATCH | Refills: 0 | Status: SHIPPED | OUTPATIENT
Start: 2021-10-05

## 2021-10-05 RX ORDER — NICOTINE 21 MG/24HR
1 PATCH, TRANSDERMAL 24 HOURS TRANSDERMAL EVERY 24 HOURS
Qty: 28 PATCH | Refills: 0 | Status: SHIPPED | OUTPATIENT
Start: 2021-10-05 | End: 2021-10-05 | Stop reason: CLARIF

## 2021-10-06 ENCOUNTER — TELEPHONE (OUTPATIENT)
Dept: VASCULAR SURGERY | Facility: CLINIC | Age: 81
End: 2021-10-06

## 2021-10-06 ENCOUNTER — TRANSITIONAL CARE MANAGEMENT (OUTPATIENT)
Dept: FAMILY MEDICINE CLINIC | Facility: CLINIC | Age: 81
End: 2021-10-06

## 2021-10-11 ENCOUNTER — OFFICE VISIT (OUTPATIENT)
Dept: VASCULAR SURGERY | Facility: CLINIC | Age: 81
End: 2021-10-11

## 2021-10-11 VITALS
WEIGHT: 121 LBS | BODY MASS INDEX: 21.44 KG/M2 | SYSTOLIC BLOOD PRESSURE: 200 MMHG | DIASTOLIC BLOOD PRESSURE: 90 MMHG | HEIGHT: 63 IN | HEART RATE: 56 BPM

## 2021-10-11 DIAGNOSIS — I65.23 BILATERAL CAROTID ARTERY STENOSIS: Primary | Chronic | ICD-10-CM

## 2021-10-11 PROCEDURE — 99024 POSTOP FOLLOW-UP VISIT: CPT | Performed by: SURGERY

## 2021-10-13 ENCOUNTER — TELEPHONE (OUTPATIENT)
Dept: CARDIOLOGY CLINIC | Facility: CLINIC | Age: 81
End: 2021-10-13

## 2021-10-15 ENCOUNTER — APPOINTMENT (EMERGENCY)
Dept: RADIOLOGY | Facility: HOSPITAL | Age: 81
DRG: 393 | End: 2021-10-15
Payer: COMMERCIAL

## 2021-10-15 ENCOUNTER — HOSPITAL ENCOUNTER (INPATIENT)
Facility: HOSPITAL | Age: 81
LOS: 5 days | Discharge: HOME | DRG: 393 | End: 2021-10-20
Attending: EMERGENCY MEDICINE | Admitting: INTERNAL MEDICINE
Payer: COMMERCIAL

## 2021-10-15 DIAGNOSIS — R01.1 CARDIAC MURMUR: ICD-10-CM

## 2021-10-15 DIAGNOSIS — N18.30 ANEMIA IN STAGE 3 CHRONIC KIDNEY DISEASE (HCC): ICD-10-CM

## 2021-10-15 DIAGNOSIS — E87.5 HYPERKALEMIA: ICD-10-CM

## 2021-10-15 DIAGNOSIS — K52.9 COLITIS: ICD-10-CM

## 2021-10-15 DIAGNOSIS — N18.30 CHRONIC KIDNEY DISEASE, STAGE 3 (HCC): ICD-10-CM

## 2021-10-15 DIAGNOSIS — K92.2 GI BLEED: Primary | ICD-10-CM

## 2021-10-15 DIAGNOSIS — N18.30 CKD (CHRONIC KIDNEY DISEASE) STAGE 3, GFR 30-59 ML/MIN (HCC): Chronic | ICD-10-CM

## 2021-10-15 DIAGNOSIS — D63.1 ANEMIA IN STAGE 3 CHRONIC KIDNEY DISEASE (HCC): ICD-10-CM

## 2021-10-15 DIAGNOSIS — I74.09 AORTOILIAC OCCLUSIVE DISEASE (HCC): ICD-10-CM

## 2021-10-15 DIAGNOSIS — E78.5 DYSLIPIDEMIA: ICD-10-CM

## 2021-10-15 DIAGNOSIS — I10 MALIGNANT HYPERTENSION: ICD-10-CM

## 2021-10-15 DIAGNOSIS — K92.2 ACUTE LOWER GI BLEEDING: ICD-10-CM

## 2021-10-15 DIAGNOSIS — I12.9 HYPERTENSIVE CHRONIC KIDNEY DISEASE WITH STAGE 1 THROUGH STAGE 4 CHRONIC KIDNEY DISEASE, OR UNSPECIFIED CHRONIC KIDNEY DISEASE: ICD-10-CM

## 2021-10-15 PROBLEM — N18.9 ACUTE ON CHRONIC KIDNEY FAILURE (HCC): Status: ACTIVE | Noted: 2021-05-03

## 2021-10-15 LAB
ABO GROUP BLD: NORMAL
ALBUMIN SERPL BCP-MCNC: 3.5 G/DL (ref 3.5–5)
ALP SERPL-CCNC: 56 U/L (ref 46–116)
ALT SERPL W P-5'-P-CCNC: 26 U/L (ref 12–78)
ANION GAP SERPL CALCULATED.3IONS-SCNC: 12 MMOL/L (ref 4–13)
APTT PPP: 31 SECONDS (ref 23–37)
AST SERPL W P-5'-P-CCNC: 40 U/L (ref 5–45)
ATRIAL RATE: 82 BPM
BASOPHILS # BLD AUTO: 0.09 THOUSANDS/ΜL (ref 0–0.1)
BASOPHILS NFR BLD AUTO: 1 % (ref 0–1)
BILIRUB SERPL-MCNC: 0.53 MG/DL (ref 0.2–1)
BLD GP AB SCN SERPL QL: NEGATIVE
BUN SERPL-MCNC: 47 MG/DL (ref 5–25)
CALCIUM SERPL-MCNC: 8.7 MG/DL (ref 8.3–10.1)
CHLORIDE SERPL-SCNC: 107 MMOL/L (ref 100–108)
CO2 SERPL-SCNC: 20 MMOL/L (ref 21–32)
CREAT SERPL-MCNC: 1.95 MG/DL (ref 0.6–1.3)
EOSINOPHIL # BLD AUTO: 0.27 THOUSAND/ΜL (ref 0–0.61)
EOSINOPHIL NFR BLD AUTO: 2 % (ref 0–6)
ERYTHROCYTE [DISTWIDTH] IN BLOOD BY AUTOMATED COUNT: 20.8 % (ref 11.6–15.1)
FLUAV RNA RESP QL NAA+PROBE: NEGATIVE
FLUBV RNA RESP QL NAA+PROBE: NEGATIVE
GFR SERPL CREATININE-BSD FRML MDRD: 24 ML/MIN/1.73SQ M
GLUCOSE SERPL-MCNC: 118 MG/DL (ref 65–140)
HCT VFR BLD AUTO: 32.9 % (ref 34.8–46.1)
HCT VFR BLD AUTO: 33.5 % (ref 34.8–46.1)
HCT VFR BLD AUTO: 35.9 % (ref 34.8–46.1)
HGB BLD-MCNC: 10.1 G/DL (ref 11.5–15.4)
HGB BLD-MCNC: 10.3 G/DL (ref 11.5–15.4)
HGB BLD-MCNC: 10.4 G/DL (ref 11.5–15.4)
IMM GRANULOCYTES # BLD AUTO: 0.09 THOUSAND/UL (ref 0–0.2)
IMM GRANULOCYTES NFR BLD AUTO: 1 % (ref 0–2)
INR PPP: 1.51 (ref 0.84–1.19)
LYMPHOCYTES # BLD AUTO: 1.34 THOUSANDS/ΜL (ref 0.6–4.47)
LYMPHOCYTES NFR BLD AUTO: 11 % (ref 14–44)
MAGNESIUM SERPL-MCNC: 2 MG/DL (ref 1.6–2.6)
MCH RBC QN AUTO: 28.7 PG (ref 26.8–34.3)
MCHC RBC AUTO-ENTMCNC: 31 G/DL (ref 31.4–37.4)
MCV RBC AUTO: 93 FL (ref 82–98)
MONOCYTES # BLD AUTO: 1.08 THOUSAND/ΜL (ref 0.17–1.22)
MONOCYTES NFR BLD AUTO: 9 % (ref 4–12)
NEUTROPHILS # BLD AUTO: 9.67 THOUSANDS/ΜL (ref 1.85–7.62)
NEUTS SEG NFR BLD AUTO: 76 % (ref 43–75)
NRBC BLD AUTO-RTO: 0 /100 WBCS
P AXIS: 81 DEGREES
PLATELET # BLD AUTO: 233 THOUSANDS/UL (ref 149–390)
PMV BLD AUTO: 11.7 FL (ref 8.9–12.7)
POTASSIUM SERPL-SCNC: 5.4 MMOL/L (ref 3.5–5.3)
PR INTERVAL: 184 MS
PROT SERPL-MCNC: 6.8 G/DL (ref 6.4–8.2)
PROTHROMBIN TIME: 17.8 SECONDS (ref 11.6–14.5)
QRS AXIS: 76 DEGREES
QRSD INTERVAL: 86 MS
QT INTERVAL: 390 MS
QTC INTERVAL: 455 MS
RBC # BLD AUTO: 3.62 MILLION/UL (ref 3.81–5.12)
RH BLD: POSITIVE
RSV RNA RESP QL NAA+PROBE: NEGATIVE
SARS-COV-2 RNA RESP QL NAA+PROBE: NEGATIVE
SODIUM SERPL-SCNC: 139 MMOL/L (ref 136–145)
SPECIMEN EXPIRATION DATE: NORMAL
T WAVE AXIS: 54 DEGREES
TROPONIN I SERPL-MCNC: <0.02 NG/ML
VENTRICULAR RATE: 82 BPM
WBC # BLD AUTO: 12.54 THOUSAND/UL (ref 4.31–10.16)

## 2021-10-15 PROCEDURE — 85610 PROTHROMBIN TIME: CPT | Performed by: EMERGENCY MEDICINE

## 2021-10-15 PROCEDURE — 96361 HYDRATE IV INFUSION ADD-ON: CPT

## 2021-10-15 PROCEDURE — 85730 THROMBOPLASTIN TIME PARTIAL: CPT | Performed by: EMERGENCY MEDICINE

## 2021-10-15 PROCEDURE — 36415 COLL VENOUS BLD VENIPUNCTURE: CPT | Performed by: EMERGENCY MEDICINE

## 2021-10-15 PROCEDURE — 99291 CRITICAL CARE FIRST HOUR: CPT | Performed by: EMERGENCY MEDICINE

## 2021-10-15 PROCEDURE — 0241U HB NFCT DS VIR RESP RNA 4 TRGT: CPT | Performed by: EMERGENCY MEDICINE

## 2021-10-15 PROCEDURE — 74178 CT ABD&PLV WO CNTR FLWD CNTR: CPT

## 2021-10-15 PROCEDURE — G1004 CDSM NDSC: HCPCS

## 2021-10-15 PROCEDURE — 85014 HEMATOCRIT: CPT | Performed by: PHYSICIAN ASSISTANT

## 2021-10-15 PROCEDURE — 96365 THER/PROPH/DIAG IV INF INIT: CPT

## 2021-10-15 PROCEDURE — 85014 HEMATOCRIT: CPT | Performed by: EMERGENCY MEDICINE

## 2021-10-15 PROCEDURE — 87081 CULTURE SCREEN ONLY: CPT | Performed by: INTERNAL MEDICINE

## 2021-10-15 PROCEDURE — 93010 ELECTROCARDIOGRAM REPORT: CPT | Performed by: INTERNAL MEDICINE

## 2021-10-15 PROCEDURE — 85018 HEMOGLOBIN: CPT | Performed by: PHYSICIAN ASSISTANT

## 2021-10-15 PROCEDURE — 86850 RBC ANTIBODY SCREEN: CPT | Performed by: EMERGENCY MEDICINE

## 2021-10-15 PROCEDURE — 84484 ASSAY OF TROPONIN QUANT: CPT | Performed by: EMERGENCY MEDICINE

## 2021-10-15 PROCEDURE — 83735 ASSAY OF MAGNESIUM: CPT | Performed by: EMERGENCY MEDICINE

## 2021-10-15 PROCEDURE — 80053 COMPREHEN METABOLIC PANEL: CPT | Performed by: EMERGENCY MEDICINE

## 2021-10-15 PROCEDURE — 99291 CRITICAL CARE FIRST HOUR: CPT | Performed by: PHYSICIAN ASSISTANT

## 2021-10-15 PROCEDURE — 99223 1ST HOSP IP/OBS HIGH 75: CPT | Performed by: NURSE PRACTITIONER

## 2021-10-15 PROCEDURE — 86900 BLOOD TYPING SEROLOGIC ABO: CPT | Performed by: EMERGENCY MEDICINE

## 2021-10-15 PROCEDURE — 86901 BLOOD TYPING SEROLOGIC RH(D): CPT | Performed by: EMERGENCY MEDICINE

## 2021-10-15 PROCEDURE — 93005 ELECTROCARDIOGRAM TRACING: CPT

## 2021-10-15 PROCEDURE — 96374 THER/PROPH/DIAG INJ IV PUSH: CPT

## 2021-10-15 PROCEDURE — C9113 INJ PANTOPRAZOLE SODIUM, VIA: HCPCS | Performed by: EMERGENCY MEDICINE

## 2021-10-15 PROCEDURE — 71045 X-RAY EXAM CHEST 1 VIEW: CPT

## 2021-10-15 PROCEDURE — 85025 COMPLETE CBC W/AUTO DIFF WBC: CPT | Performed by: EMERGENCY MEDICINE

## 2021-10-15 PROCEDURE — 70450 CT HEAD/BRAIN W/O DYE: CPT

## 2021-10-15 PROCEDURE — 85018 HEMOGLOBIN: CPT | Performed by: EMERGENCY MEDICINE

## 2021-10-15 PROCEDURE — 99292 CRITICAL CARE ADDL 30 MIN: CPT | Performed by: INTERNAL MEDICINE

## 2021-10-15 PROCEDURE — 99285 EMERGENCY DEPT VISIT HI MDM: CPT

## 2021-10-15 RX ORDER — CEFTRIAXONE 1 G/50ML
1000 INJECTION, SOLUTION INTRAVENOUS EVERY 24 HOURS
Status: DISCONTINUED | OUTPATIENT
Start: 2021-10-15 | End: 2021-10-20 | Stop reason: HOSPADM

## 2021-10-15 RX ORDER — ONDANSETRON 2 MG/ML
4 INJECTION INTRAMUSCULAR; INTRAVENOUS ONCE
Status: COMPLETED | OUTPATIENT
Start: 2021-10-15 | End: 2021-10-15

## 2021-10-15 RX ADMIN — CEFTRIAXONE 1000 MG: 1 INJECTION, SOLUTION INTRAVENOUS at 21:15

## 2021-10-15 RX ADMIN — SODIUM CHLORIDE 80 MG: 9 INJECTION, SOLUTION INTRAVENOUS at 16:14

## 2021-10-15 RX ADMIN — SODIUM CHLORIDE 8 MG/HR: 9 INJECTION, SOLUTION INTRAVENOUS at 16:27

## 2021-10-15 RX ADMIN — POLYETHYLENE GLYCOL 3350, SODIUM SULFATE ANHYDROUS, SODIUM BICARBONATE, SODIUM CHLORIDE, POTASSIUM CHLORIDE 4000 ML: 236; 22.74; 6.74; 5.86; 2.97 POWDER, FOR SOLUTION ORAL at 21:03

## 2021-10-15 RX ADMIN — ONDANSETRON 4 MG: 2 INJECTION INTRAMUSCULAR; INTRAVENOUS at 15:31

## 2021-10-15 RX ADMIN — SODIUM CHLORIDE 1000 ML: 0.9 INJECTION, SOLUTION INTRAVENOUS at 16:45

## 2021-10-15 RX ADMIN — SODIUM CHLORIDE 1000 ML: 0.9 INJECTION, SOLUTION INTRAVENOUS at 15:26

## 2021-10-15 RX ADMIN — IOHEXOL 100 ML: 350 INJECTION, SOLUTION INTRAVENOUS at 15:57

## 2021-10-16 ENCOUNTER — APPOINTMENT (INPATIENT)
Dept: PERIOP | Facility: HOSPITAL | Age: 81
DRG: 393 | End: 2021-10-16
Payer: COMMERCIAL

## 2021-10-16 ENCOUNTER — ANESTHESIA EVENT (INPATIENT)
Dept: PERIOP | Facility: HOSPITAL | Age: 81
DRG: 393 | End: 2021-10-16
Payer: COMMERCIAL

## 2021-10-16 ENCOUNTER — APPOINTMENT (INPATIENT)
Dept: NON INVASIVE DIAGNOSTICS | Facility: HOSPITAL | Age: 81
DRG: 393 | End: 2021-10-16
Payer: COMMERCIAL

## 2021-10-16 ENCOUNTER — ANESTHESIA (INPATIENT)
Dept: PERIOP | Facility: HOSPITAL | Age: 81
DRG: 393 | End: 2021-10-16
Payer: COMMERCIAL

## 2021-10-16 PROBLEM — R78.81 GRAM-NEGATIVE BACTEREMIA: Status: RESOLVED | Noted: 2019-11-25 | Resolved: 2021-10-16

## 2021-10-16 PROBLEM — E87.5 HYPERKALEMIA: Status: RESOLVED | Noted: 2021-05-03 | Resolved: 2021-10-16

## 2021-10-16 PROBLEM — E03.9 HYPOTHYROIDISM: Chronic | Status: ACTIVE | Noted: 2017-08-31

## 2021-10-16 PROBLEM — M79.672 PAIN IN BOTH FEET: Status: RESOLVED | Noted: 2018-07-12 | Resolved: 2021-10-16

## 2021-10-16 PROBLEM — N39.0 URINARY TRACT INFECTION: Status: RESOLVED | Noted: 2019-11-24 | Resolved: 2021-10-16

## 2021-10-16 PROBLEM — F17.200 NICOTINE DEPENDENCE: Chronic | Status: ACTIVE | Noted: 2017-08-31

## 2021-10-16 PROBLEM — E78.5 HYPERLIPIDEMIA: Chronic | Status: ACTIVE | Noted: 2018-05-18

## 2021-10-16 PROBLEM — I50.32 CHRONIC DIASTOLIC HEART FAILURE (HCC): Chronic | Status: ACTIVE | Noted: 2019-12-06

## 2021-10-16 PROBLEM — N18.30 ACUTE RENAL FAILURE SUPERIMPOSED ON STAGE 3 CHRONIC KIDNEY DISEASE (HCC): Status: ACTIVE | Noted: 2021-05-03

## 2021-10-16 PROBLEM — M79.671 PAIN IN BOTH FEET: Status: RESOLVED | Noted: 2018-07-12 | Resolved: 2021-10-16

## 2021-10-16 PROBLEM — I70.1 RENAL ARTERY STENOSIS (HCC): Chronic | Status: ACTIVE | Noted: 2021-06-04

## 2021-10-16 PROBLEM — N18.30 ANEMIA IN STAGE 3 CHRONIC KIDNEY DISEASE (HCC): Chronic | Status: ACTIVE | Noted: 2021-05-03

## 2021-10-16 PROBLEM — D64.9 CHRONIC ANEMIA: Chronic | Status: ACTIVE | Noted: 2021-05-10

## 2021-10-16 PROBLEM — J06.9 VIRAL URI WITH COUGH: Status: RESOLVED | Noted: 2018-10-04 | Resolved: 2021-10-16

## 2021-10-16 PROBLEM — D63.1 ANEMIA IN STAGE 3 CHRONIC KIDNEY DISEASE (HCC): Chronic | Status: ACTIVE | Noted: 2021-05-03

## 2021-10-16 LAB
ANION GAP SERPL CALCULATED.3IONS-SCNC: 14 MMOL/L (ref 4–13)
ANION GAP SERPL CALCULATED.3IONS-SCNC: 14 MMOL/L (ref 4–13)
AORTIC ROOT: 2.7 CM
APICAL FOUR CHAMBER EJECTION FRACTION: 55 %
AV LVOT PEAK GRADIENT: 5 MMHG
AV PEAK GRADIENT: 9 MMHG
BUN SERPL-MCNC: 42 MG/DL (ref 5–25)
BUN SERPL-MCNC: 43 MG/DL (ref 5–25)
CALCIUM SERPL-MCNC: 8.3 MG/DL (ref 8.3–10.1)
CALCIUM SERPL-MCNC: 8.3 MG/DL (ref 8.3–10.1)
CHLORIDE SERPL-SCNC: 107 MMOL/L (ref 100–108)
CHLORIDE SERPL-SCNC: 109 MMOL/L (ref 100–108)
CO2 SERPL-SCNC: 18 MMOL/L (ref 21–32)
CO2 SERPL-SCNC: 20 MMOL/L (ref 21–32)
CREAT SERPL-MCNC: 1.29 MG/DL (ref 0.6–1.3)
CREAT SERPL-MCNC: 1.56 MG/DL (ref 0.6–1.3)
DOP CALC LVOT AREA: 2.83 CM2
DOP CALC LVOT DIAMETER: 1.9 CM
E WAVE DECELERATION TIME: 159 MS
ERYTHROCYTE [DISTWIDTH] IN BLOOD BY AUTOMATED COUNT: 20.9 % (ref 11.6–15.1)
FRACTIONAL SHORTENING: 32 % (ref 28–44)
GFR SERPL CREATININE-BSD FRML MDRD: 31 ML/MIN/1.73SQ M
GFR SERPL CREATININE-BSD FRML MDRD: 39 ML/MIN/1.73SQ M
GLUCOSE SERPL-MCNC: 81 MG/DL (ref 65–140)
GLUCOSE SERPL-MCNC: 94 MG/DL (ref 65–140)
HCT VFR BLD AUTO: 29.1 % (ref 34.8–46.1)
HCT VFR BLD AUTO: 31.1 % (ref 34.8–46.1)
HGB BLD-MCNC: 9.2 G/DL (ref 11.5–15.4)
HGB BLD-MCNC: 9.6 G/DL (ref 11.5–15.4)
INR PPP: 1.15 (ref 0.84–1.19)
INTERVENTRICULAR SEPTUM IN DIASTOLE (PARASTERNAL SHORT AXIS VIEW): 1.1 CM
LEFT INTERNAL DIMENSION IN SYSTOLE: 2.3 CM (ref 2.1–4)
LEFT VENTRICULAR INTERNAL DIMENSION IN DIASTOLE: 3.4 CM (ref 3.64–5.42)
LEFT VENTRICULAR POSTERIOR WALL IN END DIASTOLE: 0.9 CM
LEFT VENTRICULAR STROKE VOLUME: 31 ML
LV EF: 55 %
MCH RBC QN AUTO: 29 PG (ref 26.8–34.3)
MCHC RBC AUTO-ENTMCNC: 31.6 G/DL (ref 31.4–37.4)
MCV RBC AUTO: 92 FL (ref 82–98)
MV E'TISSUE VEL-LAT: 11 CM/S
MV E'TISSUE VEL-SEP: 9 CM/S
MV PEAK A VEL: 1.01 M/S
MV PEAK E VEL: 108 CM/S
MV STENOSIS PRESSURE HALF TIME: 0 MS
PLATELET # BLD AUTO: 211 THOUSANDS/UL (ref 149–390)
PMV BLD AUTO: 11.5 FL (ref 8.9–12.7)
POTASSIUM SERPL-SCNC: 4.1 MMOL/L (ref 3.5–5.3)
POTASSIUM SERPL-SCNC: 4.3 MMOL/L (ref 3.5–5.3)
PROTHROMBIN TIME: 14.4 SECONDS (ref 11.6–14.5)
PV PEAK GRADIENT: 4 MMHG
RBC # BLD AUTO: 3.17 MILLION/UL (ref 3.81–5.12)
RIGHT VENTRICLE ID DIMENSION: 3.2 CM
SL CV LV EF: 60
SL CV PED ECHO LEFT VENTRICLE DIASTOLIC VOLUME (MOD BIPLANE) 2D: 49 ML
SL CV PED ECHO LEFT VENTRICLE SYSTOLIC VOLUME (MOD BIPLANE) 2D: 18 ML
SODIUM SERPL-SCNC: 141 MMOL/L (ref 136–145)
SODIUM SERPL-SCNC: 141 MMOL/L (ref 136–145)
TR PEAK VELOCITY: 2.8 M/S
TRICUSPID VALVE PEAK REGURGITATION VELOCITY: 2.76 M/S
TRICUSPID VALVE S': 1.1 CM/S
TV PEAK GRADIENT: 30 MMHG
WBC # BLD AUTO: 14.67 THOUSAND/UL (ref 4.31–10.16)
Z-SCORE OF LEFT VENTRICULAR DIMENSION IN END SYSTOLE: -2.67

## 2021-10-16 PROCEDURE — 85018 HEMOGLOBIN: CPT | Performed by: NURSE PRACTITIONER

## 2021-10-16 PROCEDURE — 0DBN8ZX EXCISION OF SIGMOID COLON, VIA NATURAL OR ARTIFICIAL OPENING ENDOSCOPIC, DIAGNOSTIC: ICD-10-PCS | Performed by: INTERNAL MEDICINE

## 2021-10-16 PROCEDURE — 88305 TISSUE EXAM BY PATHOLOGIST: CPT | Performed by: PATHOLOGY

## 2021-10-16 PROCEDURE — 80048 BASIC METABOLIC PNL TOTAL CA: CPT | Performed by: PHYSICIAN ASSISTANT

## 2021-10-16 PROCEDURE — 0DBM8ZX EXCISION OF DESCENDING COLON, VIA NATURAL OR ARTIFICIAL OPENING ENDOSCOPIC, DIAGNOSTIC: ICD-10-PCS | Performed by: INTERNAL MEDICINE

## 2021-10-16 PROCEDURE — 93306 TTE W/DOPPLER COMPLETE: CPT

## 2021-10-16 PROCEDURE — 0W3P8ZZ CONTROL BLEEDING IN GASTROINTESTINAL TRACT, VIA NATURAL OR ARTIFICIAL OPENING ENDOSCOPIC: ICD-10-PCS | Performed by: INTERNAL MEDICINE

## 2021-10-16 PROCEDURE — 85014 HEMATOCRIT: CPT | Performed by: NURSE PRACTITIONER

## 2021-10-16 PROCEDURE — 43270 EGD LESION ABLATION: CPT | Performed by: INTERNAL MEDICINE

## 2021-10-16 PROCEDURE — 99232 SBSQ HOSP IP/OBS MODERATE 35: CPT | Performed by: INTERNAL MEDICINE

## 2021-10-16 PROCEDURE — 85027 COMPLETE CBC AUTOMATED: CPT | Performed by: NURSE PRACTITIONER

## 2021-10-16 PROCEDURE — C9113 INJ PANTOPRAZOLE SODIUM, VIA: HCPCS | Performed by: NURSE PRACTITIONER

## 2021-10-16 PROCEDURE — 45380 COLONOSCOPY AND BIOPSY: CPT | Performed by: INTERNAL MEDICINE

## 2021-10-16 PROCEDURE — 85610 PROTHROMBIN TIME: CPT | Performed by: PHYSICIAN ASSISTANT

## 2021-10-16 PROCEDURE — 93306 TTE W/DOPPLER COMPLETE: CPT | Performed by: INTERNAL MEDICINE

## 2021-10-16 PROCEDURE — 99291 CRITICAL CARE FIRST HOUR: CPT | Performed by: PHYSICIAN ASSISTANT

## 2021-10-16 RX ORDER — ATORVASTATIN CALCIUM 80 MG/1
80 TABLET, FILM COATED ORAL
Status: DISCONTINUED | OUTPATIENT
Start: 2021-10-16 | End: 2021-10-20 | Stop reason: HOSPADM

## 2021-10-16 RX ORDER — LEVOTHYROXINE SODIUM 0.03 MG/1
25 TABLET ORAL
Status: DISCONTINUED | OUTPATIENT
Start: 2021-10-16 | End: 2021-10-20 | Stop reason: HOSPADM

## 2021-10-16 RX ORDER — NICOTINE 21 MG/24HR
1 PATCH, TRANSDERMAL 24 HOURS TRANSDERMAL EVERY 24 HOURS
Status: DISCONTINUED | OUTPATIENT
Start: 2021-10-16 | End: 2021-10-20 | Stop reason: HOSPADM

## 2021-10-16 RX ORDER — PROPOFOL 10 MG/ML
INJECTION, EMULSION INTRAVENOUS AS NEEDED
Status: DISCONTINUED | OUTPATIENT
Start: 2021-10-16 | End: 2021-10-16

## 2021-10-16 RX ORDER — SODIUM CHLORIDE, SODIUM LACTATE, POTASSIUM CHLORIDE, CALCIUM CHLORIDE 600; 310; 30; 20 MG/100ML; MG/100ML; MG/100ML; MG/100ML
INJECTION, SOLUTION INTRAVENOUS CONTINUOUS PRN
Status: DISCONTINUED | OUTPATIENT
Start: 2021-10-16 | End: 2021-10-16

## 2021-10-16 RX ORDER — PANTOPRAZOLE SODIUM 40 MG/1
40 INJECTION, POWDER, FOR SOLUTION INTRAVENOUS EVERY 12 HOURS SCHEDULED
Status: DISCONTINUED | OUTPATIENT
Start: 2021-10-16 | End: 2021-10-17

## 2021-10-16 RX ADMIN — PROPOFOL 20 MG: 10 INJECTION, EMULSION INTRAVENOUS at 11:35

## 2021-10-16 RX ADMIN — NICOTINE 1 PATCH: 21 PATCH, EXTENDED RELEASE TRANSDERMAL at 07:49

## 2021-10-16 RX ADMIN — SODIUM CHLORIDE, SODIUM LACTATE, POTASSIUM CHLORIDE, AND CALCIUM CHLORIDE: .6; .31; .03; .02 INJECTION, SOLUTION INTRAVENOUS at 11:14

## 2021-10-16 RX ADMIN — ATORVASTATIN CALCIUM 80 MG: 80 TABLET ORAL at 21:20

## 2021-10-16 RX ADMIN — PROPOFOL 30 MG: 10 INJECTION, EMULSION INTRAVENOUS at 11:24

## 2021-10-16 RX ADMIN — PANTOPRAZOLE SODIUM 40 MG: 40 INJECTION, POWDER, FOR SOLUTION INTRAVENOUS at 21:21

## 2021-10-16 RX ADMIN — CEFTRIAXONE 1000 MG: 1 INJECTION, SOLUTION INTRAVENOUS at 20:36

## 2021-10-16 RX ADMIN — PROPOFOL 80 MG: 10 INJECTION, EMULSION INTRAVENOUS at 11:18

## 2021-10-16 RX ADMIN — PROPOFOL 20 MG: 10 INJECTION, EMULSION INTRAVENOUS at 11:32

## 2021-10-16 RX ADMIN — PROPOFOL 30 MG: 10 INJECTION, EMULSION INTRAVENOUS at 11:29

## 2021-10-16 RX ADMIN — PROPOFOL 20 MG: 10 INJECTION, EMULSION INTRAVENOUS at 11:21

## 2021-10-16 RX ADMIN — LEVOTHYROXINE SODIUM 25 MCG: 25 TABLET ORAL at 07:49

## 2021-10-17 LAB
ANION GAP SERPL CALCULATED.3IONS-SCNC: 13 MMOL/L (ref 4–13)
BASOPHILS # BLD AUTO: 0.06 THOUSANDS/ΜL (ref 0–0.1)
BASOPHILS NFR BLD AUTO: 1 % (ref 0–1)
BUN SERPL-MCNC: 23 MG/DL (ref 5–25)
CALCIUM SERPL-MCNC: 8.6 MG/DL (ref 8.3–10.1)
CHLORIDE SERPL-SCNC: 109 MMOL/L (ref 100–108)
CO2 SERPL-SCNC: 20 MMOL/L (ref 21–32)
CREAT SERPL-MCNC: 1.03 MG/DL (ref 0.6–1.3)
EOSINOPHIL # BLD AUTO: 0.33 THOUSAND/ΜL (ref 0–0.61)
EOSINOPHIL NFR BLD AUTO: 3 % (ref 0–6)
ERYTHROCYTE [DISTWIDTH] IN BLOOD BY AUTOMATED COUNT: 21 % (ref 11.6–15.1)
GFR SERPL CREATININE-BSD FRML MDRD: 51 ML/MIN/1.73SQ M
GLUCOSE SERPL-MCNC: 90 MG/DL (ref 65–140)
HCT VFR BLD AUTO: 28.1 % (ref 34.8–46.1)
HGB BLD-MCNC: 8.7 G/DL (ref 11.5–15.4)
IMM GRANULOCYTES # BLD AUTO: 0.04 THOUSAND/UL (ref 0–0.2)
IMM GRANULOCYTES NFR BLD AUTO: 0 % (ref 0–2)
LYMPHOCYTES # BLD AUTO: 1.23 THOUSANDS/ΜL (ref 0.6–4.47)
LYMPHOCYTES NFR BLD AUTO: 12 % (ref 14–44)
MAGNESIUM SERPL-MCNC: 1.6 MG/DL (ref 1.6–2.6)
MCH RBC QN AUTO: 28.4 PG (ref 26.8–34.3)
MCHC RBC AUTO-ENTMCNC: 31 G/DL (ref 31.4–37.4)
MCV RBC AUTO: 92 FL (ref 82–98)
MONOCYTES # BLD AUTO: 0.88 THOUSAND/ΜL (ref 0.17–1.22)
MONOCYTES NFR BLD AUTO: 9 % (ref 4–12)
MRSA NOSE QL CULT: NORMAL
NEUTROPHILS # BLD AUTO: 7.64 THOUSANDS/ΜL (ref 1.85–7.62)
NEUTS SEG NFR BLD AUTO: 75 % (ref 43–75)
NRBC BLD AUTO-RTO: 0 /100 WBCS
PLATELET # BLD AUTO: 202 THOUSANDS/UL (ref 149–390)
PMV BLD AUTO: 11 FL (ref 8.9–12.7)
POTASSIUM SERPL-SCNC: 3.7 MMOL/L (ref 3.5–5.3)
RBC # BLD AUTO: 3.06 MILLION/UL (ref 3.81–5.12)
SODIUM SERPL-SCNC: 142 MMOL/L (ref 136–145)
WBC # BLD AUTO: 10.18 THOUSAND/UL (ref 4.31–10.16)

## 2021-10-17 PROCEDURE — 80048 BASIC METABOLIC PNL TOTAL CA: CPT | Performed by: NURSE PRACTITIONER

## 2021-10-17 PROCEDURE — C9113 INJ PANTOPRAZOLE SODIUM, VIA: HCPCS | Performed by: NURSE PRACTITIONER

## 2021-10-17 PROCEDURE — 99232 SBSQ HOSP IP/OBS MODERATE 35: CPT | Performed by: INTERNAL MEDICINE

## 2021-10-17 PROCEDURE — 85025 COMPLETE CBC W/AUTO DIFF WBC: CPT | Performed by: NURSE PRACTITIONER

## 2021-10-17 PROCEDURE — G0008 ADMIN INFLUENZA VIRUS VAC: HCPCS | Performed by: INTERNAL MEDICINE

## 2021-10-17 PROCEDURE — 90662 IIV NO PRSV INCREASED AG IM: CPT | Performed by: INTERNAL MEDICINE

## 2021-10-17 PROCEDURE — 83735 ASSAY OF MAGNESIUM: CPT | Performed by: NURSE PRACTITIONER

## 2021-10-17 RX ORDER — CARVEDILOL 12.5 MG/1
12.5 TABLET ORAL 2 TIMES DAILY
Status: DISCONTINUED | OUTPATIENT
Start: 2021-10-17 | End: 2021-10-20 | Stop reason: HOSPADM

## 2021-10-17 RX ORDER — LANOLIN ALCOHOL/MO/W.PET/CERES
6 CREAM (GRAM) TOPICAL
Status: DISCONTINUED | OUTPATIENT
Start: 2021-10-17 | End: 2021-10-20 | Stop reason: HOSPADM

## 2021-10-17 RX ORDER — MAGNESIUM SULFATE HEPTAHYDRATE 40 MG/ML
4 INJECTION, SOLUTION INTRAVENOUS ONCE
Status: COMPLETED | OUTPATIENT
Start: 2021-10-17 | End: 2021-10-17

## 2021-10-17 RX ORDER — AMLODIPINE BESYLATE 5 MG/1
5 TABLET ORAL 2 TIMES DAILY
Status: DISCONTINUED | OUTPATIENT
Start: 2021-10-17 | End: 2021-10-20 | Stop reason: HOSPADM

## 2021-10-17 RX ORDER — PANTOPRAZOLE SODIUM 40 MG/1
40 TABLET, DELAYED RELEASE ORAL
Status: DISCONTINUED | OUTPATIENT
Start: 2021-10-17 | End: 2021-10-20 | Stop reason: HOSPADM

## 2021-10-17 RX ADMIN — PANTOPRAZOLE SODIUM 40 MG: 40 INJECTION, POWDER, FOR SOLUTION INTRAVENOUS at 07:54

## 2021-10-17 RX ADMIN — AMLODIPINE BESYLATE 5 MG: 5 TABLET ORAL at 15:38

## 2021-10-17 RX ADMIN — METRONIDAZOLE 500 MG: 500 INJECTION, SOLUTION INTRAVENOUS at 15:39

## 2021-10-17 RX ADMIN — NICOTINE 1 PATCH: 21 PATCH, EXTENDED RELEASE TRANSDERMAL at 07:52

## 2021-10-17 RX ADMIN — CARVEDILOL 12.5 MG: 12.5 TABLET, FILM COATED ORAL at 17:23

## 2021-10-17 RX ADMIN — ATORVASTATIN CALCIUM 80 MG: 80 TABLET ORAL at 22:06

## 2021-10-17 RX ADMIN — INFLUENZA A VIRUS A/VICTORIA/2570/2019 IVR-215 (H1N1) ANTIGEN (FORMALDEHYDE INACTIVATED), INFLUENZA A VIRUS A/TASMANIA/503/2020 IVR-221 (H3N2) ANTIGEN (FORMALDEHYDE INACTIVATED), INFLUENZA B VIRUS B/PHUKET/3073/2013 ANTIGEN (FORMALDEHYDE INACTIVATED), AND INFLUENZA B VIRUS B/WASHINGTON/02/2019 ANTIGEN (FORMALDEHYDE INACTIVATED) 0.7 ML: 60; 60; 60; 60 INJECTION, SUSPENSION INTRAMUSCULAR at 12:09

## 2021-10-17 RX ADMIN — PANTOPRAZOLE SODIUM 40 MG: 40 TABLET, DELAYED RELEASE ORAL at 18:20

## 2021-10-17 RX ADMIN — METRONIDAZOLE 500 MG: 500 INJECTION, SOLUTION INTRAVENOUS at 23:27

## 2021-10-17 RX ADMIN — LEVOTHYROXINE SODIUM 25 MCG: 25 TABLET ORAL at 05:34

## 2021-10-17 RX ADMIN — Medication 6 MG: at 22:06

## 2021-10-17 RX ADMIN — CEFTRIAXONE 1000 MG: 1 INJECTION, SOLUTION INTRAVENOUS at 20:34

## 2021-10-17 RX ADMIN — MAGNESIUM SULFATE HEPTAHYDRATE 4 G: 40 INJECTION, SOLUTION INTRAVENOUS at 07:58

## 2021-10-18 LAB
ANION GAP SERPL CALCULATED.3IONS-SCNC: 10 MMOL/L (ref 4–13)
BUN SERPL-MCNC: 18 MG/DL (ref 5–25)
CALCIUM SERPL-MCNC: 9 MG/DL (ref 8.3–10.1)
CHLORIDE SERPL-SCNC: 106 MMOL/L (ref 100–108)
CO2 SERPL-SCNC: 25 MMOL/L (ref 21–32)
CREAT SERPL-MCNC: 0.93 MG/DL (ref 0.6–1.3)
ERYTHROCYTE [DISTWIDTH] IN BLOOD BY AUTOMATED COUNT: 20.9 % (ref 11.6–15.1)
GFR SERPL CREATININE-BSD FRML MDRD: 58 ML/MIN/1.73SQ M
GLUCOSE SERPL-MCNC: 100 MG/DL (ref 65–140)
HCT VFR BLD AUTO: 29.2 % (ref 34.8–46.1)
HGB BLD-MCNC: 8.9 G/DL (ref 11.5–15.4)
MAGNESIUM SERPL-MCNC: 2 MG/DL (ref 1.6–2.6)
MCH RBC QN AUTO: 28.2 PG (ref 26.8–34.3)
MCHC RBC AUTO-ENTMCNC: 30.5 G/DL (ref 31.4–37.4)
MCV RBC AUTO: 92 FL (ref 82–98)
PLATELET # BLD AUTO: 220 THOUSANDS/UL (ref 149–390)
PMV BLD AUTO: 11.7 FL (ref 8.9–12.7)
POTASSIUM SERPL-SCNC: 3.7 MMOL/L (ref 3.5–5.3)
RBC # BLD AUTO: 3.16 MILLION/UL (ref 3.81–5.12)
SODIUM SERPL-SCNC: 141 MMOL/L (ref 136–145)
WBC # BLD AUTO: 7.13 THOUSAND/UL (ref 4.31–10.16)

## 2021-10-18 PROCEDURE — 97163 PT EVAL HIGH COMPLEX 45 MIN: CPT

## 2021-10-18 PROCEDURE — 85027 COMPLETE CBC AUTOMATED: CPT | Performed by: NURSE PRACTITIONER

## 2021-10-18 PROCEDURE — 83735 ASSAY OF MAGNESIUM: CPT | Performed by: NURSE PRACTITIONER

## 2021-10-18 PROCEDURE — 99232 SBSQ HOSP IP/OBS MODERATE 35: CPT | Performed by: INTERNAL MEDICINE

## 2021-10-18 PROCEDURE — 80048 BASIC METABOLIC PNL TOTAL CA: CPT | Performed by: NURSE PRACTITIONER

## 2021-10-18 PROCEDURE — 97530 THERAPEUTIC ACTIVITIES: CPT

## 2021-10-18 RX ORDER — HYDRALAZINE HYDROCHLORIDE 20 MG/ML
10 INJECTION INTRAMUSCULAR; INTRAVENOUS EVERY 6 HOURS PRN
Status: DISCONTINUED | OUTPATIENT
Start: 2021-10-18 | End: 2021-10-19

## 2021-10-18 RX ORDER — ASPIRIN 81 MG/1
81 TABLET ORAL DAILY
Status: DISCONTINUED | OUTPATIENT
Start: 2021-10-18 | End: 2021-10-20 | Stop reason: HOSPADM

## 2021-10-18 RX ORDER — CLOPIDOGREL BISULFATE 75 MG/1
75 TABLET ORAL DAILY
Status: DISCONTINUED | OUTPATIENT
Start: 2021-10-18 | End: 2021-10-20 | Stop reason: HOSPADM

## 2021-10-18 RX ADMIN — CARVEDILOL 12.5 MG: 12.5 TABLET, FILM COATED ORAL at 18:22

## 2021-10-18 RX ADMIN — AMLODIPINE BESYLATE 5 MG: 5 TABLET ORAL at 21:02

## 2021-10-18 RX ADMIN — CEFTRIAXONE 1000 MG: 1 INJECTION, SOLUTION INTRAVENOUS at 21:02

## 2021-10-18 RX ADMIN — NICOTINE 1 PATCH: 21 PATCH, EXTENDED RELEASE TRANSDERMAL at 08:12

## 2021-10-18 RX ADMIN — METRONIDAZOLE 500 MG: 500 INJECTION, SOLUTION INTRAVENOUS at 15:12

## 2021-10-18 RX ADMIN — CLOPIDOGREL BISULFATE 75 MG: 75 TABLET ORAL at 15:06

## 2021-10-18 RX ADMIN — ASPIRIN 81 MG: 81 TABLET, COATED ORAL at 15:06

## 2021-10-18 RX ADMIN — PANTOPRAZOLE SODIUM 40 MG: 40 TABLET, DELAYED RELEASE ORAL at 16:33

## 2021-10-18 RX ADMIN — ATORVASTATIN CALCIUM 80 MG: 80 TABLET ORAL at 21:02

## 2021-10-18 RX ADMIN — METRONIDAZOLE 500 MG: 500 INJECTION, SOLUTION INTRAVENOUS at 23:07

## 2021-10-18 RX ADMIN — Medication 6 MG: at 21:02

## 2021-10-18 RX ADMIN — PANTOPRAZOLE SODIUM 40 MG: 40 TABLET, DELAYED RELEASE ORAL at 06:27

## 2021-10-18 RX ADMIN — LEVOTHYROXINE SODIUM 25 MCG: 25 TABLET ORAL at 05:37

## 2021-10-18 RX ADMIN — METRONIDAZOLE 500 MG: 500 INJECTION, SOLUTION INTRAVENOUS at 08:12

## 2021-10-18 RX ADMIN — AMLODIPINE BESYLATE 5 MG: 5 TABLET ORAL at 08:12

## 2021-10-18 RX ADMIN — CARVEDILOL 12.5 MG: 12.5 TABLET, FILM COATED ORAL at 08:12

## 2021-10-18 RX ADMIN — HYDRALAZINE HYDROCHLORIDE 10 MG: 20 INJECTION INTRAMUSCULAR; INTRAVENOUS at 15:30

## 2021-10-19 ENCOUNTER — APPOINTMENT (INPATIENT)
Dept: RADIOLOGY | Facility: HOSPITAL | Age: 81
DRG: 393 | End: 2021-10-19
Payer: COMMERCIAL

## 2021-10-19 ENCOUNTER — TELEPHONE (OUTPATIENT)
Dept: HEMATOLOGY ONCOLOGY | Facility: CLINIC | Age: 81
End: 2021-10-19

## 2021-10-19 LAB
ANION GAP SERPL CALCULATED.3IONS-SCNC: 10 MMOL/L (ref 4–13)
BUN SERPL-MCNC: 21 MG/DL (ref 5–25)
CALCIUM SERPL-MCNC: 8.8 MG/DL (ref 8.3–10.1)
CHLORIDE SERPL-SCNC: 109 MMOL/L (ref 100–108)
CO2 SERPL-SCNC: 25 MMOL/L (ref 21–32)
CREAT SERPL-MCNC: 1.1 MG/DL (ref 0.6–1.3)
ERYTHROCYTE [DISTWIDTH] IN BLOOD BY AUTOMATED COUNT: 20.9 % (ref 11.6–15.1)
GFR SERPL CREATININE-BSD FRML MDRD: 47 ML/MIN/1.73SQ M
GLUCOSE SERPL-MCNC: 97 MG/DL (ref 65–140)
HCT VFR BLD AUTO: 29.7 % (ref 34.8–46.1)
HGB BLD-MCNC: 9.5 G/DL (ref 11.5–15.4)
MCH RBC QN AUTO: 29.2 PG (ref 26.8–34.3)
MCHC RBC AUTO-ENTMCNC: 32 G/DL (ref 31.4–37.4)
MCV RBC AUTO: 91 FL (ref 82–98)
PLATELET # BLD AUTO: 236 THOUSANDS/UL (ref 149–390)
PMV BLD AUTO: 11.9 FL (ref 8.9–12.7)
POTASSIUM SERPL-SCNC: 3.8 MMOL/L (ref 3.5–5.3)
RBC # BLD AUTO: 3.25 MILLION/UL (ref 3.81–5.12)
SODIUM SERPL-SCNC: 144 MMOL/L (ref 136–145)
WBC # BLD AUTO: 5.31 THOUSAND/UL (ref 4.31–10.16)

## 2021-10-19 PROCEDURE — 99232 SBSQ HOSP IP/OBS MODERATE 35: CPT | Performed by: FAMILY MEDICINE

## 2021-10-19 PROCEDURE — 99232 SBSQ HOSP IP/OBS MODERATE 35: CPT | Performed by: PHYSICIAN ASSISTANT

## 2021-10-19 PROCEDURE — 93975 VASCULAR STUDY: CPT | Performed by: SURGERY

## 2021-10-19 PROCEDURE — 93975 VASCULAR STUDY: CPT

## 2021-10-19 PROCEDURE — 80048 BASIC METABOLIC PNL TOTAL CA: CPT | Performed by: INTERNAL MEDICINE

## 2021-10-19 PROCEDURE — 85027 COMPLETE CBC AUTOMATED: CPT | Performed by: INTERNAL MEDICINE

## 2021-10-19 RX ORDER — SPIRONOLACTONE 25 MG/1
25 TABLET ORAL DAILY
Status: DISCONTINUED | OUTPATIENT
Start: 2021-10-20 | End: 2021-10-20 | Stop reason: HOSPADM

## 2021-10-19 RX ORDER — LISINOPRIL 20 MG/1
20 TABLET ORAL 2 TIMES DAILY
Status: DISCONTINUED | OUTPATIENT
Start: 2021-10-19 | End: 2021-10-20 | Stop reason: HOSPADM

## 2021-10-19 RX ADMIN — Medication 6 MG: at 22:26

## 2021-10-19 RX ADMIN — METRONIDAZOLE 500 MG: 500 INJECTION, SOLUTION INTRAVENOUS at 18:12

## 2021-10-19 RX ADMIN — CEFTRIAXONE 1000 MG: 1 INJECTION, SOLUTION INTRAVENOUS at 22:26

## 2021-10-19 RX ADMIN — AMLODIPINE BESYLATE 5 MG: 5 TABLET ORAL at 22:33

## 2021-10-19 RX ADMIN — LISINOPRIL 20 MG: 20 TABLET ORAL at 22:33

## 2021-10-19 RX ADMIN — LEVOTHYROXINE SODIUM 25 MCG: 25 TABLET ORAL at 06:34

## 2021-10-19 RX ADMIN — CLOPIDOGREL BISULFATE 75 MG: 75 TABLET ORAL at 10:53

## 2021-10-19 RX ADMIN — ASPIRIN 81 MG: 81 TABLET, COATED ORAL at 10:53

## 2021-10-19 RX ADMIN — CARVEDILOL 12.5 MG: 12.5 TABLET, FILM COATED ORAL at 10:53

## 2021-10-19 RX ADMIN — CARVEDILOL 12.5 MG: 12.5 TABLET, FILM COATED ORAL at 18:12

## 2021-10-19 RX ADMIN — AMLODIPINE BESYLATE 5 MG: 5 TABLET ORAL at 10:53

## 2021-10-19 RX ADMIN — HYDRALAZINE HYDROCHLORIDE 10 MG: 20 INJECTION INTRAMUSCULAR; INTRAVENOUS at 10:52

## 2021-10-19 RX ADMIN — PANTOPRAZOLE SODIUM 40 MG: 40 TABLET, DELAYED RELEASE ORAL at 18:12

## 2021-10-19 RX ADMIN — NICOTINE 1 PATCH: 21 PATCH, EXTENDED RELEASE TRANSDERMAL at 10:52

## 2021-10-19 RX ADMIN — METRONIDAZOLE 500 MG: 500 INJECTION, SOLUTION INTRAVENOUS at 10:54

## 2021-10-19 RX ADMIN — PANTOPRAZOLE SODIUM 40 MG: 40 TABLET, DELAYED RELEASE ORAL at 06:34

## 2021-10-19 RX ADMIN — ATORVASTATIN CALCIUM 80 MG: 80 TABLET ORAL at 22:26

## 2021-10-20 ENCOUNTER — TRANSITIONAL CARE MANAGEMENT (OUTPATIENT)
Dept: FAMILY MEDICINE CLINIC | Facility: CLINIC | Age: 81
End: 2021-10-20

## 2021-10-20 VITALS
RESPIRATION RATE: 18 BRPM | WEIGHT: 117.5 LBS | OXYGEN SATURATION: 99 % | SYSTOLIC BLOOD PRESSURE: 174 MMHG | HEIGHT: 63 IN | DIASTOLIC BLOOD PRESSURE: 72 MMHG | TEMPERATURE: 98.3 F | BODY MASS INDEX: 20.82 KG/M2 | HEART RATE: 72 BPM

## 2021-10-20 PROBLEM — K92.2 ACUTE LOWER GI BLEEDING: Status: RESOLVED | Noted: 2021-10-15 | Resolved: 2021-10-20

## 2021-10-20 LAB
ANION GAP SERPL CALCULATED.3IONS-SCNC: 13 MMOL/L (ref 4–13)
BUN SERPL-MCNC: 24 MG/DL (ref 5–25)
CALCIUM SERPL-MCNC: 8.6 MG/DL (ref 8.3–10.1)
CHLORIDE SERPL-SCNC: 107 MMOL/L (ref 100–108)
CO2 SERPL-SCNC: 23 MMOL/L (ref 21–32)
CREAT SERPL-MCNC: 1.04 MG/DL (ref 0.6–1.3)
GFR SERPL CREATININE-BSD FRML MDRD: 51 ML/MIN/1.73SQ M
GLUCOSE SERPL-MCNC: 93 MG/DL (ref 65–140)
HCT VFR BLD AUTO: 31.3 % (ref 34.8–46.1)
HGB BLD-MCNC: 9.4 G/DL (ref 11.5–15.4)
POTASSIUM SERPL-SCNC: 3.7 MMOL/L (ref 3.5–5.3)
SODIUM SERPL-SCNC: 143 MMOL/L (ref 136–145)

## 2021-10-20 PROCEDURE — 85018 HEMOGLOBIN: CPT | Performed by: FAMILY MEDICINE

## 2021-10-20 PROCEDURE — 85014 HEMATOCRIT: CPT | Performed by: FAMILY MEDICINE

## 2021-10-20 PROCEDURE — 80048 BASIC METABOLIC PNL TOTAL CA: CPT | Performed by: FAMILY MEDICINE

## 2021-10-20 PROCEDURE — 99239 HOSP IP/OBS DSCHRG MGMT >30: CPT | Performed by: FAMILY MEDICINE

## 2021-10-20 RX ORDER — ASPIRIN 81 MG/1
81 TABLET ORAL DAILY
Refills: 0
Start: 2021-10-21 | End: 2022-05-24

## 2021-10-20 RX ORDER — CEFDINIR 300 MG/1
300 CAPSULE ORAL EVERY 12 HOURS SCHEDULED
Qty: 4 CAPSULE | Refills: 0 | Status: SHIPPED | OUTPATIENT
Start: 2021-10-20 | End: 2021-10-22

## 2021-10-20 RX ORDER — AMLODIPINE BESYLATE 5 MG/1
5 TABLET ORAL 2 TIMES DAILY
Qty: 30 TABLET | Refills: 0
Start: 2021-10-20 | End: 2021-10-22

## 2021-10-20 RX ORDER — TORSEMIDE 5 MG/1
5 TABLET ORAL DAILY
Qty: 90 TABLET | Refills: 0
Start: 2021-10-21 | End: 2022-01-05 | Stop reason: SDUPTHER

## 2021-10-20 RX ORDER — PANTOPRAZOLE SODIUM 40 MG/1
40 TABLET, DELAYED RELEASE ORAL DAILY
Qty: 30 TABLET | Refills: 0 | Status: SHIPPED | OUTPATIENT
Start: 2021-10-20 | End: 2021-10-27

## 2021-10-20 RX ORDER — METRONIDAZOLE 500 MG/1
500 TABLET ORAL EVERY 8 HOURS SCHEDULED
Qty: 12 TABLET | Refills: 0 | Status: SHIPPED | OUTPATIENT
Start: 2021-10-20 | End: 2021-10-24

## 2021-10-20 RX ADMIN — LEVOTHYROXINE SODIUM 25 MCG: 25 TABLET ORAL at 06:25

## 2021-10-20 RX ADMIN — NICOTINE 1 PATCH: 21 PATCH, EXTENDED RELEASE TRANSDERMAL at 08:36

## 2021-10-20 RX ADMIN — METRONIDAZOLE 500 MG: 500 INJECTION, SOLUTION INTRAVENOUS at 03:41

## 2021-10-20 RX ADMIN — CLOPIDOGREL BISULFATE 75 MG: 75 TABLET ORAL at 08:35

## 2021-10-20 RX ADMIN — CARVEDILOL 12.5 MG: 12.5 TABLET, FILM COATED ORAL at 08:35

## 2021-10-20 RX ADMIN — LISINOPRIL 20 MG: 20 TABLET ORAL at 08:35

## 2021-10-20 RX ADMIN — ASPIRIN 81 MG: 81 TABLET, COATED ORAL at 08:35

## 2021-10-20 RX ADMIN — PANTOPRAZOLE SODIUM 40 MG: 40 TABLET, DELAYED RELEASE ORAL at 06:25

## 2021-10-20 RX ADMIN — SPIRONOLACTONE 25 MG: 25 TABLET ORAL at 08:36

## 2021-10-20 RX ADMIN — AMLODIPINE BESYLATE 5 MG: 5 TABLET ORAL at 08:35

## 2021-10-20 RX ADMIN — METRONIDAZOLE 500 MG: 500 INJECTION, SOLUTION INTRAVENOUS at 11:24

## 2021-10-22 ENCOUNTER — TELEPHONE (OUTPATIENT)
Dept: CARDIOLOGY CLINIC | Facility: CLINIC | Age: 81
End: 2021-10-22

## 2021-10-22 ENCOUNTER — TELEPHONE (OUTPATIENT)
Dept: GASTROENTEROLOGY | Facility: CLINIC | Age: 81
End: 2021-10-22

## 2021-10-25 ENCOUNTER — TELEPHONE (OUTPATIENT)
Dept: CARDIOLOGY CLINIC | Facility: CLINIC | Age: 81
End: 2021-10-25

## 2021-10-27 ENCOUNTER — TELEPHONE (OUTPATIENT)
Dept: GASTROENTEROLOGY | Facility: CLINIC | Age: 81
End: 2021-10-27

## 2021-10-27 DIAGNOSIS — K29.70 GASTRITIS WITHOUT BLEEDING, UNSPECIFIED CHRONICITY, UNSPECIFIED GASTRITIS TYPE: Primary | ICD-10-CM

## 2021-10-27 RX ORDER — PANTOPRAZOLE SODIUM 40 MG/1
40 TABLET, DELAYED RELEASE ORAL DAILY
Qty: 90 TABLET | Refills: 0 | Status: SHIPPED | OUTPATIENT
Start: 2021-10-27 | End: 2021-11-24

## 2021-10-28 ENCOUNTER — APPOINTMENT (OUTPATIENT)
Dept: LAB | Facility: CLINIC | Age: 81
End: 2021-10-28
Payer: COMMERCIAL

## 2021-10-28 DIAGNOSIS — E87.5 HYPERKALEMIA: ICD-10-CM

## 2021-10-28 DIAGNOSIS — N18.30 ANEMIA IN STAGE 3 CHRONIC KIDNEY DISEASE (HCC): ICD-10-CM

## 2021-10-28 DIAGNOSIS — K92.2 ACUTE LOWER GI BLEEDING: ICD-10-CM

## 2021-10-28 DIAGNOSIS — N18.30 CHRONIC KIDNEY DISEASE, STAGE 3 (HCC): ICD-10-CM

## 2021-10-28 DIAGNOSIS — D63.1 ANEMIA IN STAGE 3 CHRONIC KIDNEY DISEASE (HCC): ICD-10-CM

## 2021-10-28 LAB
ANION GAP SERPL CALCULATED.3IONS-SCNC: 3 MMOL/L (ref 4–13)
BUN SERPL-MCNC: 43 MG/DL (ref 5–25)
CALCIUM SERPL-MCNC: 10.2 MG/DL (ref 8.3–10.1)
CHLORIDE SERPL-SCNC: 106 MMOL/L (ref 100–108)
CO2 SERPL-SCNC: 26 MMOL/L (ref 21–32)
CREAT SERPL-MCNC: 1.45 MG/DL (ref 0.6–1.3)
ERYTHROCYTE [DISTWIDTH] IN BLOOD BY AUTOMATED COUNT: 21.2 % (ref 11.6–15.1)
GFR SERPL CREATININE-BSD FRML MDRD: 34 ML/MIN/1.73SQ M
GLUCOSE P FAST SERPL-MCNC: 111 MG/DL (ref 65–99)
HCT VFR BLD AUTO: 33.3 % (ref 34.8–46.1)
HGB BLD-MCNC: 10.7 G/DL (ref 11.5–15.4)
MCH RBC QN AUTO: 29.1 PG (ref 26.8–34.3)
MCHC RBC AUTO-ENTMCNC: 32.1 G/DL (ref 31.4–37.4)
MCV RBC AUTO: 91 FL (ref 82–98)
PLATELET # BLD AUTO: 215 THOUSANDS/UL (ref 149–390)
PMV BLD AUTO: 12.8 FL (ref 8.9–12.7)
POTASSIUM SERPL-SCNC: 4.7 MMOL/L (ref 3.5–5.3)
RBC # BLD AUTO: 3.68 MILLION/UL (ref 3.81–5.12)
SODIUM SERPL-SCNC: 135 MMOL/L (ref 136–145)
WBC # BLD AUTO: 6.12 THOUSAND/UL (ref 4.31–10.16)

## 2021-10-28 PROCEDURE — 80048 BASIC METABOLIC PNL TOTAL CA: CPT

## 2021-10-28 PROCEDURE — 36415 COLL VENOUS BLD VENIPUNCTURE: CPT

## 2021-10-28 PROCEDURE — 85027 COMPLETE CBC AUTOMATED: CPT

## 2021-10-29 ENCOUNTER — CLINICAL SUPPORT (OUTPATIENT)
Dept: CARDIOLOGY CLINIC | Facility: CLINIC | Age: 81
End: 2021-10-29
Payer: COMMERCIAL

## 2021-10-29 VITALS
WEIGHT: 114 LBS | HEIGHT: 63 IN | HEART RATE: 44 BPM | BODY MASS INDEX: 20.2 KG/M2 | OXYGEN SATURATION: 100 % | SYSTOLIC BLOOD PRESSURE: 124 MMHG | DIASTOLIC BLOOD PRESSURE: 62 MMHG | TEMPERATURE: 98.6 F

## 2021-10-29 DIAGNOSIS — I10 HYPERTENSION, UNSPECIFIED TYPE: Primary | ICD-10-CM

## 2021-10-29 PROCEDURE — 99212 OFFICE O/P EST SF 10 MIN: CPT

## 2021-11-02 ENCOUNTER — OFFICE VISIT (OUTPATIENT)
Dept: HEMATOLOGY ONCOLOGY | Facility: MEDICAL CENTER | Age: 81
End: 2021-11-02
Payer: COMMERCIAL

## 2021-11-02 VITALS
WEIGHT: 113 LBS | DIASTOLIC BLOOD PRESSURE: 62 MMHG | RESPIRATION RATE: 16 BRPM | BODY MASS INDEX: 20.02 KG/M2 | OXYGEN SATURATION: 93 % | HEIGHT: 63 IN | TEMPERATURE: 97.2 F | HEART RATE: 61 BPM | SYSTOLIC BLOOD PRESSURE: 162 MMHG

## 2021-11-02 DIAGNOSIS — D63.1 ANEMIA IN STAGE 3 CHRONIC KIDNEY DISEASE, UNSPECIFIED WHETHER STAGE 3A OR 3B CKD (HCC): Primary | Chronic | ICD-10-CM

## 2021-11-02 DIAGNOSIS — N18.30 ANEMIA IN STAGE 3 CHRONIC KIDNEY DISEASE, UNSPECIFIED WHETHER STAGE 3A OR 3B CKD (HCC): Primary | Chronic | ICD-10-CM

## 2021-11-02 DIAGNOSIS — D62 ACUTE BLOOD LOSS ANEMIA: ICD-10-CM

## 2021-11-02 DIAGNOSIS — N18.30 STAGE 3 CHRONIC KIDNEY DISEASE, UNSPECIFIED WHETHER STAGE 3A OR 3B CKD (HCC): ICD-10-CM

## 2021-11-02 PROCEDURE — 99215 OFFICE O/P EST HI 40 MIN: CPT | Performed by: INTERNAL MEDICINE

## 2021-11-03 ENCOUNTER — TELEPHONE (OUTPATIENT)
Dept: GASTROENTEROLOGY | Facility: CLINIC | Age: 81
End: 2021-11-03

## 2021-11-08 DIAGNOSIS — I74.09 AORTOILIAC OCCLUSIVE DISEASE (HCC): Chronic | ICD-10-CM

## 2021-11-08 RX ORDER — CLOPIDOGREL BISULFATE 75 MG/1
TABLET ORAL
Qty: 90 TABLET | Refills: 1 | Status: SHIPPED | OUTPATIENT
Start: 2021-11-08 | End: 2022-06-06 | Stop reason: SDUPTHER

## 2021-11-09 ENCOUNTER — HOSPITAL ENCOUNTER (OUTPATIENT)
Dept: RADIOLOGY | Facility: HOSPITAL | Age: 81
Discharge: HOME | End: 2021-11-09
Payer: COMMERCIAL

## 2021-11-09 DIAGNOSIS — I70.1 RENAL ARTERY STENOSIS (HCC): ICD-10-CM

## 2021-11-09 DIAGNOSIS — I50.32 HYPERTENSIVE HEART DISEASE WITH CHRONIC DIASTOLIC CONGESTIVE HEART FAILURE (HCC): ICD-10-CM

## 2021-11-09 DIAGNOSIS — I11.0 HYPERTENSIVE HEART DISEASE WITH CHRONIC DIASTOLIC CONGESTIVE HEART FAILURE (HCC): ICD-10-CM

## 2021-11-09 PROCEDURE — 93975 VASCULAR STUDY: CPT

## 2021-11-09 PROCEDURE — 93975 VASCULAR STUDY: CPT | Performed by: SURGERY

## 2021-11-11 ENCOUNTER — APPOINTMENT (OUTPATIENT)
Dept: LAB | Facility: CLINIC | Age: 81
End: 2021-11-11
Payer: COMMERCIAL

## 2021-11-16 DIAGNOSIS — I10 ESSENTIAL HYPERTENSION: ICD-10-CM

## 2021-11-16 DIAGNOSIS — I15.0 RENOVASCULAR HYPERTENSION: ICD-10-CM

## 2021-11-17 RX ORDER — CARVEDILOL 12.5 MG/1
12.5 TABLET ORAL 2 TIMES DAILY
Qty: 180 TABLET | Refills: 3 | Status: SHIPPED | OUTPATIENT
Start: 2021-11-17

## 2021-11-17 RX ORDER — SPIRONOLACTONE 25 MG/1
25 TABLET ORAL DAILY
Qty: 90 TABLET | Refills: 3 | Status: SHIPPED | OUTPATIENT
Start: 2021-11-17

## 2021-11-18 ENCOUNTER — OFFICE VISIT (OUTPATIENT)
Dept: FAMILY MEDICINE CLINIC | Facility: CLINIC | Age: 81
End: 2021-11-18
Payer: COMMERCIAL

## 2021-11-18 ENCOUNTER — APPOINTMENT (OUTPATIENT)
Dept: LAB | Facility: CLINIC | Age: 81
End: 2021-11-18
Payer: COMMERCIAL

## 2021-11-18 VITALS
DIASTOLIC BLOOD PRESSURE: 92 MMHG | WEIGHT: 116.8 LBS | HEART RATE: 64 BPM | BODY MASS INDEX: 20.7 KG/M2 | RESPIRATION RATE: 16 BRPM | OXYGEN SATURATION: 100 % | HEIGHT: 63 IN | SYSTOLIC BLOOD PRESSURE: 158 MMHG | TEMPERATURE: 97 F

## 2021-11-18 DIAGNOSIS — I50.32 CHRONIC DIASTOLIC HEART FAILURE (HCC): Chronic | ICD-10-CM

## 2021-11-18 DIAGNOSIS — E87.6 HYPOKALEMIA: ICD-10-CM

## 2021-11-18 DIAGNOSIS — N18.30 ANEMIA IN STAGE 3 CHRONIC KIDNEY DISEASE, UNSPECIFIED WHETHER STAGE 3A OR 3B CKD (HCC): Chronic | ICD-10-CM

## 2021-11-18 DIAGNOSIS — I77.1 SUBCLAVIAN ARTERY STENOSIS, LEFT (HCC): Chronic | ICD-10-CM

## 2021-11-18 DIAGNOSIS — Z00.00 MEDICARE ANNUAL WELLNESS VISIT, SUBSEQUENT: ICD-10-CM

## 2021-11-18 DIAGNOSIS — Z76.89 ENCOUNTER TO ESTABLISH CARE: Primary | ICD-10-CM

## 2021-11-18 DIAGNOSIS — I77.1 STENOSIS OF ILIAC ARTERY (HCC): ICD-10-CM

## 2021-11-18 DIAGNOSIS — N18.30 STAGE 3 CHRONIC KIDNEY DISEASE, UNSPECIFIED WHETHER STAGE 3A OR 3B CKD (HCC): ICD-10-CM

## 2021-11-18 DIAGNOSIS — E83.42 HYPOMAGNESEMIA: ICD-10-CM

## 2021-11-18 DIAGNOSIS — D63.1 ANEMIA IN STAGE 3 CHRONIC KIDNEY DISEASE, UNSPECIFIED WHETHER STAGE 3A OR 3B CKD (HCC): Chronic | ICD-10-CM

## 2021-11-18 DIAGNOSIS — I10 BENIGN ESSENTIAL HTN: ICD-10-CM

## 2021-11-18 DIAGNOSIS — I65.23 BILATERAL CAROTID ARTERY STENOSIS: Chronic | ICD-10-CM

## 2021-11-18 DIAGNOSIS — I50.32 HYPERTENSIVE HEART DISEASE WITH CHRONIC DIASTOLIC CONGESTIVE HEART FAILURE (HCC): ICD-10-CM

## 2021-11-18 DIAGNOSIS — I74.09 AORTOILIAC OCCLUSIVE DISEASE (HCC): Chronic | ICD-10-CM

## 2021-11-18 DIAGNOSIS — I11.0 HYPERTENSIVE HEART DISEASE WITH CHRONIC DIASTOLIC CONGESTIVE HEART FAILURE (HCC): ICD-10-CM

## 2021-11-18 DIAGNOSIS — Z09 HOSPITAL DISCHARGE FOLLOW-UP: ICD-10-CM

## 2021-11-18 PROCEDURE — G0439 PPPS, SUBSEQ VISIT: HCPCS | Performed by: NURSE PRACTITIONER

## 2021-11-18 PROCEDURE — 99214 OFFICE O/P EST MOD 30 MIN: CPT | Performed by: NURSE PRACTITIONER

## 2021-11-22 NOTE — TELEPHONE ENCOUNTER
ALSO, pt want to refill atorvastatin 80 mg        aslo will like these medication today if is possible     Please, thanks Adria

## 2021-11-24 ENCOUNTER — OFFICE VISIT (OUTPATIENT)
Dept: GASTROENTEROLOGY | Facility: CLINIC | Age: 81
End: 2021-11-24
Payer: COMMERCIAL

## 2021-11-24 ENCOUNTER — APPOINTMENT (OUTPATIENT)
Dept: LAB | Facility: CLINIC | Age: 81
End: 2021-11-24
Payer: COMMERCIAL

## 2021-11-24 VITALS
HEART RATE: 70 BPM | DIASTOLIC BLOOD PRESSURE: 80 MMHG | WEIGHT: 114 LBS | SYSTOLIC BLOOD PRESSURE: 200 MMHG | BODY MASS INDEX: 20.2 KG/M2 | HEIGHT: 63 IN

## 2021-11-24 DIAGNOSIS — K29.70 GASTRITIS WITHOUT BLEEDING, UNSPECIFIED CHRONICITY, UNSPECIFIED GASTRITIS TYPE: ICD-10-CM

## 2021-11-24 DIAGNOSIS — Z87.19 HISTORY OF ISCHEMIC COLITIS: Primary | ICD-10-CM

## 2021-11-24 DIAGNOSIS — Q27.30 ARTERIOVENOUS MALFORMATION (AVM): ICD-10-CM

## 2021-11-24 PROCEDURE — 99213 OFFICE O/P EST LOW 20 MIN: CPT | Performed by: NURSE PRACTITIONER

## 2021-11-26 DIAGNOSIS — I10 ESSENTIAL HYPERTENSION: Chronic | ICD-10-CM

## 2021-11-26 DIAGNOSIS — E03.9 HYPOTHYROIDISM, UNSPECIFIED TYPE: ICD-10-CM

## 2021-11-26 RX ORDER — LEVOTHYROXINE SODIUM 0.03 MG/1
25 TABLET ORAL
Qty: 90 TABLET | Refills: 3 | Status: SHIPPED | OUTPATIENT
Start: 2021-11-26 | End: 2022-05-24 | Stop reason: SDUPTHER

## 2021-11-29 RX ORDER — ENALAPRIL MALEATE 10 MG/1
10 TABLET ORAL 2 TIMES DAILY
Qty: 60 TABLET | Refills: 5 | Status: SHIPPED | OUTPATIENT
Start: 2021-11-29 | End: 2022-04-22 | Stop reason: SDUPTHER

## 2021-12-01 ENCOUNTER — APPOINTMENT (OUTPATIENT)
Dept: LAB | Facility: CLINIC | Age: 81
End: 2021-12-01
Payer: COMMERCIAL

## 2021-12-01 ENCOUNTER — TELEPHONE (OUTPATIENT)
Dept: HEMATOLOGY ONCOLOGY | Facility: MEDICAL CENTER | Age: 81
End: 2021-12-01

## 2021-12-01 DIAGNOSIS — I74.09 AORTOILIAC OCCLUSIVE DISEASE (HCC): ICD-10-CM

## 2021-12-01 DIAGNOSIS — N18.30 ANEMIA IN STAGE 3 CHRONIC KIDNEY DISEASE, UNSPECIFIED WHETHER STAGE 3A OR 3B CKD (HCC): ICD-10-CM

## 2021-12-01 DIAGNOSIS — D63.1 ANEMIA IN STAGE 3 CHRONIC KIDNEY DISEASE, UNSPECIFIED WHETHER STAGE 3A OR 3B CKD (HCC): ICD-10-CM

## 2021-12-01 LAB
BASOPHILS # BLD AUTO: 0.06 THOUSANDS/ΜL (ref 0–0.1)
BASOPHILS NFR BLD AUTO: 1 % (ref 0–1)
EOSINOPHIL # BLD AUTO: 0.27 THOUSAND/ΜL (ref 0–0.61)
EOSINOPHIL NFR BLD AUTO: 5 % (ref 0–6)
ERYTHROCYTE [DISTWIDTH] IN BLOOD BY AUTOMATED COUNT: 21.7 % (ref 11.6–15.1)
HCT VFR BLD AUTO: 31.8 % (ref 34.8–46.1)
HGB BLD-MCNC: 10.2 G/DL (ref 11.5–15.4)
IMM GRANULOCYTES # BLD AUTO: 0.02 THOUSAND/UL (ref 0–0.2)
IMM GRANULOCYTES NFR BLD AUTO: 0 % (ref 0–2)
LYMPHOCYTES # BLD AUTO: 1.42 THOUSANDS/ΜL (ref 0.6–4.47)
LYMPHOCYTES NFR BLD AUTO: 24 % (ref 14–44)
MCH RBC QN AUTO: 31.2 PG (ref 26.8–34.3)
MCHC RBC AUTO-ENTMCNC: 32.1 G/DL (ref 31.4–37.4)
MCV RBC AUTO: 97 FL (ref 82–98)
MONOCYTES # BLD AUTO: 0.82 THOUSAND/ΜL (ref 0.17–1.22)
MONOCYTES NFR BLD AUTO: 14 % (ref 4–12)
NEUTROPHILS # BLD AUTO: 3.23 THOUSANDS/ΜL (ref 1.85–7.62)
NEUTS SEG NFR BLD AUTO: 56 % (ref 43–75)
NRBC BLD AUTO-RTO: 0 /100 WBCS
PLATELET # BLD AUTO: 251 THOUSANDS/UL (ref 149–390)
PMV BLD AUTO: 11.9 FL (ref 8.9–12.7)
RBC # BLD AUTO: 3.27 MILLION/UL (ref 3.81–5.12)
WBC # BLD AUTO: 5.82 THOUSAND/UL (ref 4.31–10.16)

## 2021-12-01 PROCEDURE — 36415 COLL VENOUS BLD VENIPUNCTURE: CPT | Performed by: INTERNAL MEDICINE

## 2021-12-01 PROCEDURE — 85025 COMPLETE CBC W/AUTO DIFF WBC: CPT | Performed by: INTERNAL MEDICINE

## 2021-12-07 ENCOUNTER — TELEPHONE (OUTPATIENT)
Dept: VASCULAR SURGERY | Facility: CLINIC | Age: 81
End: 2021-12-07

## 2021-12-15 ENCOUNTER — OFFICE VISIT (OUTPATIENT)
Dept: HEMATOLOGY ONCOLOGY | Facility: MEDICAL CENTER | Age: 81
End: 2021-12-15
Payer: COMMERCIAL

## 2021-12-15 VITALS
SYSTOLIC BLOOD PRESSURE: 106 MMHG | BODY MASS INDEX: 21.09 KG/M2 | TEMPERATURE: 97.7 F | DIASTOLIC BLOOD PRESSURE: 72 MMHG | HEIGHT: 63 IN | RESPIRATION RATE: 16 BRPM | OXYGEN SATURATION: 99 % | HEART RATE: 79 BPM | WEIGHT: 119 LBS

## 2021-12-15 DIAGNOSIS — D63.1 ANEMIA IN STAGE 3 CHRONIC KIDNEY DISEASE, UNSPECIFIED WHETHER STAGE 3A OR 3B CKD (HCC): Primary | Chronic | ICD-10-CM

## 2021-12-15 DIAGNOSIS — N18.30 ANEMIA IN STAGE 3 CHRONIC KIDNEY DISEASE, UNSPECIFIED WHETHER STAGE 3A OR 3B CKD (HCC): Primary | Chronic | ICD-10-CM

## 2021-12-15 PROCEDURE — 99214 OFFICE O/P EST MOD 30 MIN: CPT | Performed by: INTERNAL MEDICINE

## 2022-01-05 ENCOUNTER — OFFICE VISIT (OUTPATIENT)
Dept: CARDIOLOGY CLINIC | Facility: CLINIC | Age: 82
End: 2022-01-05
Payer: COMMERCIAL

## 2022-01-05 ENCOUNTER — APPOINTMENT (OUTPATIENT)
Dept: LAB | Facility: CLINIC | Age: 82
End: 2022-01-05
Payer: COMMERCIAL

## 2022-01-05 VITALS
TEMPERATURE: 98.5 F | HEIGHT: 63 IN | BODY MASS INDEX: 20.2 KG/M2 | DIASTOLIC BLOOD PRESSURE: 88 MMHG | WEIGHT: 114 LBS | HEART RATE: 87 BPM | SYSTOLIC BLOOD PRESSURE: 160 MMHG | OXYGEN SATURATION: 98 %

## 2022-01-05 DIAGNOSIS — I12.9 HYPERTENSIVE CHRONIC KIDNEY DISEASE WITH STAGE 1 THROUGH STAGE 4 CHRONIC KIDNEY DISEASE, OR UNSPECIFIED CHRONIC KIDNEY DISEASE: ICD-10-CM

## 2022-01-05 DIAGNOSIS — R01.1 CARDIAC MURMUR: ICD-10-CM

## 2022-01-05 DIAGNOSIS — E78.5 DYSLIPIDEMIA: ICD-10-CM

## 2022-01-05 DIAGNOSIS — N18.30 CKD (CHRONIC KIDNEY DISEASE) STAGE 3, GFR 30-59 ML/MIN (HCC): Chronic | ICD-10-CM

## 2022-01-05 LAB — MAGNESIUM SERPL-MCNC: 1.7 MG/DL (ref 1.6–2.6)

## 2022-01-05 PROCEDURE — 36415 COLL VENOUS BLD VENIPUNCTURE: CPT | Performed by: NURSE PRACTITIONER

## 2022-01-05 PROCEDURE — 99214 OFFICE O/P EST MOD 30 MIN: CPT | Performed by: INTERNAL MEDICINE

## 2022-01-05 PROCEDURE — 83735 ASSAY OF MAGNESIUM: CPT | Performed by: NURSE PRACTITIONER

## 2022-01-05 RX ORDER — TORSEMIDE 5 MG/1
5 TABLET ORAL DAILY
Qty: 90 TABLET | Refills: 3 | Status: SHIPPED | OUTPATIENT
Start: 2022-01-05

## 2022-01-05 NOTE — PROGRESS NOTES
Cardiology   Olivia Briscoe DO, Carlos Watts MD, aJxon Gaines MD, Lupe Luna MD, [facility] [place]  -------------------------------------------------------------------  [facility]  [address], [address] [facility]  [place], [address] [place] [address], [address] [place] [address]  [phone]    Cardiology Follow Up  Diaz Dove  1940  [id number]          Assessment/Plan:    1  Hypertensive chronic kidney disease with stage 1 through stage 4 chronic kidney disease, or unspecified chronic kidney disease    2  CKD (chronic kidney disease) stage 3, GFR 30-59 ml/min ([facility])    3  Dyslipidemia    4  Cardiac murmur      - Continue torsemide 5 mg daily    - Home BP monitoring  - discussed smoking cessation with her in detail       - continue aspirin and Plavix  - continue enalapril and carvedilol  Her last ejection fraction was normal       Interval History:     Diaz Dove is 80 y o  female here for followup of hypertension and CHF  Since her last visit, she underwent right carotid endarterectomy  She developed a GI bleed afterwards and required transfusion  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  She follows with vascular surgery  Since hospital discharge, she has been feeling well   she denies any palpitations, chest pain, shortness of breath, LE edema, orthopnea or PND  No abdominal pain  Able to eat without pain  BP has been elevated  Continues to smoke half a ppd  The patient has a history of hypertension and peripheral vascular disease  She had prior aortobifemoral bypass in December 2019  Blood pressure is usually well controlled on current medication regimen  May 29th blood work showed a creatinine was normal   LDL 44 and HDL of 80         Past Medical History:   Diagnosis Date    Anemia     Arthritis     Asthma     Benign essential hypertension     CAD (coronary artery disease)     Carotid stenosis, bilateral     CHF (congestive heart failure) (HCC)     Chronic kidney disease     Coronary artery disease     Disease of thyroid gland     Dizziness     Edema of leg     Hyperkalemia 5/3/2021    Hyperlipidemia     Hypertension     Other disorder of circulatory system (CODE)     Renal artery stenosis (HCC)     Right    Subclavian arterial stenosis (HCC)     B/L     Social History     Socioeconomic History    Marital status:      Spouse name: Not on file    Number of children: Not on file    Years of education: Not on file    Highest education level: Not on file   Occupational History    Not on file   Tobacco Use    Smoking status: Current Some Day Smoker     Packs/day: 1 00     Years: 60 00     Pack years: 60 00     Types: Cigarettes    Smokeless tobacco: Never Used   Vaping Use    Vaping Use: Never used   Substance and Sexual Activity    Alcohol use: Yes     Comment: wilfrid 2-3 every day for 50 years    Drug use: No    Sexual activity: Yes     Partners: Male   Other Topics Concern    Not on file   Social History Narrative    Rarely exercises    Sleeps 6-7 hours per day     Social Determinants of Health     Financial Resource Strain: Not on file   Food Insecurity: Not on file   Transportation Needs: No Transportation Needs    Lack of Transportation (Medical): No    Lack of Transportation (Non-Medical):  No   Physical Activity: Not on file   Stress: Not on file   Social Connections: Not on file   Intimate Partner Violence: Not on file   Housing Stability: Not on file      Family History   Problem Relation Age of Onset    Hypertension Father     Coronary artery disease Family     Arthritis Family      Past Surgical History:   Procedure Laterality Date    BREAST SURGERY      bxs,benign    COLONOSCOPY      HYSTERECTOMY      INCISIONAL BREAST BIOPSY      x5, 1964, 1965, 1985 and 40 Ysabel Daniels Bilateral 12/9/2019    Procedure: BYPASS AORTA BIFEMORAL WITH LEFT FEMORAL THROMBOEMBOLECTOMY;  Surgeon: Osiel Munguia MD;  Location: [facility];  Service: Vascular    AL THROMBOENDARTECTMY [employer] [place] Right 10/1/2021    Procedure: ENDARTERECTOMY ARTERY [address], INTROP DUPLEX;  Surgeon: Josie Wang MD;  Location: [facility];  Service: Vascular       Current Outpatient Medications:     amLODIPine (NORVASC) 5 mg tablet, take 1 tablet by mouth once daily, Disp: 90 tablet, Rfl: 1    aspirin (ECOTRIN LOW STRENGTH) 81 mg EC tablet, Take 1 tablet (81 mg total) by mouth daily, Disp: , Rfl: 0    atorvastatin (LIPITOR) 80 mg tablet, Take 1 tablet (80 mg total) by mouth daily (Patient taking differently: Take 80 mg by mouth daily at bedtime ), Disp: 90 tablet, Rfl: 3    B Complex Vitamins (VITAMIN B-COMPLEX) TABS, Take by mouth daily , Disp: , Rfl:     Bioflavonoid Products (VITAMIN C PLUS PO), Take by mouth daily, Disp: , Rfl:     carvedilol (COREG) 12 5 mg tablet, Take 1 tablet (12 5 mg total) by mouth 2 (two) times a day, Disp: 180 tablet, Rfl: 3    cholecalciferol (VITAMIN D3) 1,000 units tablet, Take 5,000 Units by mouth daily , Disp: , Rfl:     clopidogrel (PLAVIX) 75 mg tablet, take 1 tablet by mouth once daily, Disp: 90 tablet, Rfl: 1    enalapril (VASOTEC) 10 mg tablet, Take 1 tablet (10 mg total) by mouth 2 (two) times a day, Disp: 60 tablet, Rfl: 5    ipratropium (ATROVENT) 0 03 % nasal spray, 2 sprays into each nostril 3 (three) times a day as needed for rhinitis, Disp: 30 mL, Rfl: 3    levothyroxine 25 mcg tablet, Take 1 tablet (25 mcg total) by mouth daily in the early morning, Disp: 90 tablet, Rfl: 3    spironolactone (ALDACTONE) 25 mg tablet, Take 1 tablet (25 mg total) by mouth daily, Disp: 90 tablet, Rfl: 3    torsemide (DEMADEX) 5 MG tablet, Take 1 tablet (5 mg total) by mouth daily, Disp: 90 tablet, Rfl: 3    nicotine (NICODERM CQ) 21 mg/24 hr TD 24 hr patch, Place 1 patch on the skin every 24 hours (Patient not taking: Reported on 1/5/2022 ), Disp: 28 patch, Rfl: 0        Review of Systems:  Review of Systems   Respiratory: Negative for shortness of breath  Cardiovascular: Negative for chest pain, palpitations and leg swelling  Musculoskeletal: Positive for arthralgias  All other systems reviewed and are negative  Physical Exam:  Vitals:  Vitals:    01/05/22 1126   BP: 160/88   BP Location: Left arm   Patient Position: Sitting   Cuff Size: Standard   Pulse: 87   Temp: 98 5 °F (36 9 °C)   TempSrc: Temporal   SpO2: 98%   Weight: 51 7 kg (114 lb)   Height: 5' 3" (1 6 m)     Physical Exam   Constitutional: She appears healthy  No distress  Eyes: Pupils are equal, round, and reactive to light  Conjunctivae are normal    Neck: No JVD present  Cardiovascular: Normal rate, regular rhythm and normal heart sounds  Exam reveals no gallop and no friction rub  No murmur heard  Pulmonary/Chest: Effort normal and breath sounds normal  She has no wheezes  She has no rales  Musculoskeletal:         General: No tenderness, deformity or edema  Cervical back: Normal range of motion and neck supple  Neurological: She is alert and oriented to person, place, and time  Skin: Skin is warm and dry  Cardiographics:  EKG: Personally reviewed NSR with LVH  Last known EF: 60%    This note was completed in part utilizing M-Modal Fluency Direct Software  Grammatical errors, random word insertions, spelling mistakes, and incomplete sentences can be an occasional consequence of this system secondary to software limitations, ambient noise, and hardware issues  If you have any questions or concerns about the content, text, or information contained within the body of this dictation, please contact the provider for clarification

## 2022-01-06 ENCOUNTER — OFFICE VISIT (OUTPATIENT)
Dept: FAMILY MEDICINE CLINIC | Facility: CLINIC | Age: 82
End: 2022-01-06
Payer: COMMERCIAL

## 2022-01-06 VITALS
TEMPERATURE: 95.5 F | HEART RATE: 62 BPM | WEIGHT: 115.2 LBS | BODY MASS INDEX: 20.41 KG/M2 | DIASTOLIC BLOOD PRESSURE: 102 MMHG | RESPIRATION RATE: 16 BRPM | HEIGHT: 63 IN | SYSTOLIC BLOOD PRESSURE: 180 MMHG

## 2022-01-06 DIAGNOSIS — S20.01XA CONTUSION OF RIGHT BREAST, INITIAL ENCOUNTER: Primary | ICD-10-CM

## 2022-01-06 DIAGNOSIS — N63.12 LUMP IN UPPER INNER QUADRANT OF RIGHT BREAST: ICD-10-CM

## 2022-01-06 DIAGNOSIS — N64.4 BREAST PAIN, LEFT: ICD-10-CM

## 2022-01-06 PROCEDURE — 99214 OFFICE O/P EST MOD 30 MIN: CPT | Performed by: FAMILY MEDICINE

## 2022-01-06 NOTE — PROGRESS NOTES
Chief Complaint   Patient presents with    Breast Problem     right breast bruise and lump, left breast sore         Patient ID: Diana Lee is a 80 y o  female  HPI  Pt is seeing for L breast pain and R breast "lump" with a bruise noticed 1 wk ago -  denies any injury - last mammogram 6 y ago - on blood thinners  -  Under cardiologist care for HTN -  BP fluctuates at home as per pt -  Was seen by cardiologist yesterday -  No meds were changed      The following portions of the patient's history were reviewed and updated as appropriate: allergies, current medications, past family history, past medical history, past social history, past surgical history and problem list     Review of Systems   Constitutional: Negative  Cardiovascular: Negative  Gastrointestinal: Negative  Neurological: Negative          Current Outpatient Medications   Medication Sig Dispense Refill    amLODIPine (NORVASC) 5 mg tablet take 1 tablet by mouth once daily 90 tablet 1    aspirin (ECOTRIN LOW STRENGTH) 81 mg EC tablet Take 1 tablet (81 mg total) by mouth daily  0    atorvastatin (LIPITOR) 80 mg tablet Take 1 tablet (80 mg total) by mouth daily (Patient taking differently: Take 80 mg by mouth daily at bedtime ) 90 tablet 3    B Complex Vitamins (VITAMIN B-COMPLEX) TABS Take by mouth daily       Bioflavonoid Products (VITAMIN C PLUS PO) Take by mouth daily      carvedilol (COREG) 12 5 mg tablet Take 1 tablet (12 5 mg total) by mouth 2 (two) times a day 180 tablet 3    cholecalciferol (VITAMIN D3) 1,000 units tablet Take 5,000 Units by mouth daily       clopidogrel (PLAVIX) 75 mg tablet take 1 tablet by mouth once daily 90 tablet 1    enalapril (VASOTEC) 10 mg tablet Take 1 tablet (10 mg total) by mouth 2 (two) times a day 60 tablet 5    ipratropium (ATROVENT) 0 03 % nasal spray 2 sprays into each nostril 3 (three) times a day as needed for rhinitis 30 mL 3    levothyroxine 25 mcg tablet Take 1 tablet (25 mcg total) by mouth daily in the early morning 90 tablet 3    nicotine (NICODERM CQ) 21 mg/24 hr TD 24 hr patch Place 1 patch on the skin every 24 hours 28 patch 0    spironolactone (ALDACTONE) 25 mg tablet Take 1 tablet (25 mg total) by mouth daily 90 tablet 3    torsemide (DEMADEX) 5 MG tablet Take 1 tablet (5 mg total) by mouth daily 90 tablet 3     No current facility-administered medications for this visit  Objective:    BP (!) 180/102 (BP Location: Left arm, Patient Position: Sitting, Cuff Size: Standard)   Pulse 62   Temp (!) 95 5 °F (35 3 °C)   Resp 16   Ht 5' 3" (1 6 m)   Wt 52 3 kg (115 lb 3 2 oz)   BMI 20 41 kg/m²        Physical Exam  Constitutional:       Appearance: She is not ill-appearing  Chest:   Breasts:      Right: Skin change present  No swelling, inverted nipple, mass, nipple discharge, tenderness or axillary adenopathy  Left: Normal  No swelling, inverted nipple, mass, nipple discharge, skin change, tenderness or axillary adenopathy  Lymphadenopathy:      Upper Body:      Right upper body: No axillary adenopathy  Left upper body: No axillary adenopathy  Neurological:      Mental Status: She is alert  Assessment/Plan:         Diagnoses and all orders for this visit:    Contusion of right breast, initial encounter    Lump in upper inner quadrant of right breast  -     US breast right limited (diagnostic); Future  -     Mammo diagnostic bilateral w cad; Future  Likely hematoma   Breast pain, left  -     Mammo diagnostic bilateral w cad; Future  -     US breast left limited (diagnostic);  Future        Was advised to call cardiologist to report high BP today - resistant to my advise about uncontrolled BP   rto prn                   Eliceo Adams MD

## 2022-01-11 ENCOUNTER — HOSPITAL ENCOUNTER (OUTPATIENT)
Dept: RADIOLOGY | Facility: HOSPITAL | Age: 82
Discharge: HOME | End: 2022-01-11
Attending: SURGERY
Payer: COMMERCIAL

## 2022-01-11 DIAGNOSIS — I65.23 BILATERAL CAROTID ARTERY STENOSIS: Chronic | ICD-10-CM

## 2022-01-11 PROCEDURE — 93880 EXTRACRANIAL BILAT STUDY: CPT | Performed by: SURGERY

## 2022-01-11 PROCEDURE — 93880 EXTRACRANIAL BILAT STUDY: CPT

## 2022-01-25 ENCOUNTER — TELEMEDICINE (OUTPATIENT)
Dept: FAMILY MEDICINE CLINIC | Facility: CLINIC | Age: 82
End: 2022-01-25
Payer: COMMERCIAL
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
## 2022-01-25 DIAGNOSIS — M53.3 COCCYODYNIA: ICD-10-CM

## 2022-01-25 DIAGNOSIS — K92.1 BLOOD IN STOOL: ICD-10-CM

## 2022-01-25 PROCEDURE — 99213 OFFICE O/P EST LOW 20 MIN: CPT | Performed by: NURSE PRACTITIONER

## 2022-01-25 NOTE — PROGRESS NOTES
Virtual Regular Visit    Verification of patient location:    Patient is located in the following state in which I hold an active license [place]      Assessment/Plan:    Problem List Items Addressed This Visit     None      Visit Diagnoses     Fall, initial encounter    -  Primary    Relevant Orders    XR sacrum and coccyx    Coccyodynia        Relevant Orders    XR sacrum and coccyx    Blood in stool          The case discussed with patient using patient centered shared decision making  The patient was counseled regarding instructions for management,-- risk factor reductions,-- prognosis,-- impressions,-- risks and benefits of treatment options,-- importance of compliance with treatment  I have reviewed the instructions with the patient, answering all questions to her satisfaction  Advised to contact her gastro ([facility]) to report episode  Check cbc to r/o drop hgb  Check coccyx xr r/o fracture  Advised er if rectal bleeding returns  Will follow closely         Reason for visit is   Chief Complaint   Patient presents with    Virtual Regular Visit        Encounter provider SIRENA Renteria    Provider located at [address] [place] [address]      Recent Visits  No visits were found meeting these conditions  Showing recent visits within past 7 days and meeting all other requirements  Today's Visits  Date Type Provider Dept   01/25/22 Telemedicine Pao Renteria 50 today's visits and meeting all other requirements  Future Appointments  No visits were found meeting these conditions  Showing future appointments within next 150 days and meeting all other requirements       The patient was identified by name and date of birth  Jorge Liriano was informed that this is a telemedicine visit and that the visit is being conducted through Telephone  My office door was closed  No one else was in the room    She acknowledged consent and understanding of privacy and security of the video platform  The patient has agreed to participate and understands they can discontinue the visit at any time  It was my intent to perform this visit via video technology but the patient was not able to do a video connection so the visit was completed via audio telephone only  Patient is aware this is a billable service  Subjective        Spoke with patient via phone-unable to do video visit    She has h/o gi bleed in fall 2021, was hospitalized  She reports that she had 2 episode of bloody stool over weekend  This has resolved and she had a normal stool with NO evidence of blood this am    She advises that current episode very different from GI bleed hospitalization    She is due for Hgb per heme    Additionally she reports fall on Sunday  Slipped when going to sit on chair landed on her bottom   Reports coccyx pain        Past Medical History:   Diagnosis Date    Anemia     Arthritis     Asthma     Benign essential hypertension     CAD (coronary artery disease)     Carotid stenosis, bilateral     CHF (congestive heart failure) (HCC)     Chronic kidney disease     Coronary artery disease     Disease of thyroid gland     Dizziness     Edema of leg     Hyperkalemia 5/3/2021    Hyperlipidemia     Hypertension     Other disorder of circulatory system (CODE)     Renal artery stenosis (HCC)     Right    Subclavian arterial stenosis (HCC)     B/L       Past Surgical History:   Procedure Laterality Date    BREAST SURGERY      bxs,benign    COLONOSCOPY      HYSTERECTOMY      INCISIONAL BREAST BIOPSY      x5, 1964, 1965, 1985 and [address] Bilateral 12/9/2019    Procedure: BYPASS AORTA BIFEMORAL WITH LEFT FEMORAL THROMBOEMBOLECTOMY;  Surgeon: Ron Coleman MD;  [facility];  Service: Vascular    HI THROMBOENDARTECTMY Amaris Innocent INCIS Right 10/1/2021    Procedure: [address], INTROP DUPLEX;  Surgeon: Jose G Marks MD;  [facility];  Service: Vascular       Current Outpatient Medications   Medication Sig Dispense Refill    amLODIPine (NORVASC) 5 mg tablet take 1 tablet by mouth once daily 90 tablet 1    aspirin (ECOTRIN LOW STRENGTH) 81 mg EC tablet Take 1 tablet (81 mg total) by mouth daily  0    atorvastatin (LIPITOR) 80 mg tablet Take 1 tablet (80 mg total) by mouth daily (Patient taking differently: Take 80 mg by mouth daily at bedtime ) 90 tablet 3    B Complex Vitamins (VITAMIN B-COMPLEX) TABS Take by mouth daily       Bioflavonoid Products (VITAMIN C PLUS PO) Take by mouth daily      carvedilol (COREG) 12 5 mg tablet Take 1 tablet (12 5 mg total) by mouth 2 (two) times a day 180 tablet 3    cholecalciferol (VITAMIN D3) 1,000 units tablet Take 5,000 Units by mouth daily       clopidogrel (PLAVIX) 75 mg tablet take 1 tablet by mouth once daily 90 tablet 1    enalapril (VASOTEC) 10 mg tablet Take 1 tablet (10 mg total) by mouth 2 (two) times a day 60 tablet 5    ipratropium (ATROVENT) 0 03 % nasal spray 2 sprays into each nostril 3 (three) times a day as needed for rhinitis 30 mL 3    levothyroxine 25 mcg tablet Take 1 tablet (25 mcg total) by mouth daily in the early morning 90 tablet 3    nicotine (NICODERM CQ) 21 mg/24 hr TD 24 hr patch Place 1 patch on the skin every 24 hours 28 patch 0    spironolactone (ALDACTONE) 25 mg tablet Take 1 tablet (25 mg total) by mouth daily 90 tablet 3    torsemide (DEMADEX) 5 MG tablet Take 1 tablet (5 mg total) by mouth daily 90 tablet 3     No current facility-administered medications for this visit  No Known Allergies    Review of Systems   Constitutional: Negative for fatigue and fever  Gastrointestinal: Negative for abdominal pain  Per hpi   Musculoskeletal: Positive for back pain  Neurological: Negative          Video Exam    There were no vitals filed for this visit  Physical Exam     I spent 15 minutes directly with the patient during this visit    VIRTUAL VISIT 60 Elva Washington verbally agrees to participate in [employer]  Pt is aware that Virtual Care Services could be limited without vital signs or the ability to perform a full hands-on physical exam  Deena Wolff understands she or the provider may request at any time to terminate the video visit and request the patient to seek care or treatment in person

## 2022-01-26 ENCOUNTER — HOSPITAL ENCOUNTER (OUTPATIENT)
Dept: RADIOLOGY | Facility: HOSPITAL | Age: 82
Discharge: HOME | End: 2022-01-26
Payer: COMMERCIAL

## 2022-01-26 ENCOUNTER — HOSPITAL ENCOUNTER (OUTPATIENT)
Dept: RADIOLOGY | Facility: HOSPITAL | Age: 82
Discharge: HOME | End: 2022-01-26
Attending: FAMILY MEDICINE
Payer: COMMERCIAL

## 2022-01-26 VITALS — HEIGHT: 63 IN | WEIGHT: 114 LBS | BODY MASS INDEX: 20.2 KG/M2
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
## 2022-01-26 DIAGNOSIS — N63.12 LUMP IN UPPER INNER QUADRANT OF RIGHT BREAST: ICD-10-CM

## 2022-01-26 DIAGNOSIS — N64.4 BREAST PAIN, LEFT: ICD-10-CM

## 2022-01-26 DIAGNOSIS — M53.3 COCCYODYNIA: ICD-10-CM

## 2022-01-26 PROCEDURE — 72220 X-RAY EXAM SACRUM TAILBONE: CPT

## 2022-01-26 PROCEDURE — 76642 ULTRASOUND BREAST LIMITED: CPT

## 2022-01-26 PROCEDURE — G0279 TOMOSYNTHESIS, MAMMO: HCPCS

## 2022-01-26 PROCEDURE — 77066 DX MAMMO INCL CAD BI: CPT

## 2022-01-31 ENCOUNTER — TELEPHONE (OUTPATIENT)
Dept: VASCULAR SURGERY | Facility: CLINIC | Age: 82
End: 2022-01-31

## 2022-01-31 NOTE — TELEPHONE ENCOUNTER
Nursing received a message from patient in regards to her appointment that she missed on Friday 1/28/2022  Patient LM on the nursing line wanting to reschedule her appointment  Called patients house phone number and it just rang about 30 times before I hung up  Then tried calling patients mobile phone and someone answered I said hello 3 times and got no response so I hung up

## 2022-02-02 ENCOUNTER — APPOINTMENT (OUTPATIENT)
Dept: LAB | Facility: CLINIC | Age: 82
End: 2022-02-02
Payer: COMMERCIAL

## 2022-02-16 ENCOUNTER — TELEMEDICINE (OUTPATIENT)
Dept: HEMATOLOGY ONCOLOGY | Facility: MEDICAL CENTER | Age: 82
End: 2022-02-16
Payer: COMMERCIAL

## 2022-02-16 DIAGNOSIS — D63.1 ANEMIA IN STAGE 3 CHRONIC KIDNEY DISEASE, UNSPECIFIED WHETHER STAGE 3A OR 3B CKD (HCC): Primary | Chronic | ICD-10-CM

## 2022-02-16 DIAGNOSIS — N18.30 ANEMIA IN STAGE 3 CHRONIC KIDNEY DISEASE, UNSPECIFIED WHETHER STAGE 3A OR 3B CKD (HCC): Primary | Chronic | ICD-10-CM

## 2022-02-16 PROCEDURE — 99442 PR PHYS/QHP TELEPHONE EVALUATION 11-20 MIN: CPT | Performed by: INTERNAL MEDICINE

## 2022-02-16 NOTE — PROGRESS NOTES
Virtual Brief Visit    Patient is located in the following state in which I hold an active license [place]    Assessment/Plan:    80-year-old female previously found to have anemia associated with chronic renal insufficiency  Mrs Wolff feels well; patient denies any significant issues with fatigue, shortness of breath etc   CBC parameters are trended below  The hemoglobin level has been stable -  runs approximately 9 5-10 5 g/dL  White count, platelet count and differential are within normal limits  We rediscussed options  The plan is to continue with surveillance  We rediscussed what to monitor for as far as progressive fatigue, pallor, bleeding, respiratory issues, decreased activities  On the next office visit, laboratory test will be ordered to recheck for any evidence for MGUS progression  Mrs Wolff is to return in 4 months with a CBC before  Patient knows to call if she has any other hematology questions or concerns  Carefully review your medication list and verify that the list is accurate and up-to-date  Please call the hematology/oncology office if there are medications missing from the list, medications on the list that you are not currently taking or if there is a dosage or instruction that is different from how you're taking that medication      Patient goals and areas of care:  Monitor CBC parameters  Barriers to care: none  Patient is able to self-care  Problem List Items Addressed This Visit     None        Recent Visits  No visits were found meeting these conditions  Showing recent visits within past 7 days and meeting all other requirements  Today's Visits  Date Type Provider Dept   02/16/22 [address], MD [facility]c Sharad Aragon   Showing today's visits and meeting all other requirements  Future Appointments  No visits were found meeting these conditions    Showing future appointments within next 150 days and meeting all other requirements     HPI: 28-year-old female previously evaluated for anemia  Workup was consistent with anemia of chronic renal insufficiency  Patient was also 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

## 2022-02-28 ENCOUNTER — OFFICE VISIT (OUTPATIENT)
Dept: VASCULAR SURGERY | Facility: CLINIC | Age: 82
End: 2022-02-28
Payer: COMMERCIAL

## 2022-02-28 VITALS
DIASTOLIC BLOOD PRESSURE: 90 MMHG | HEART RATE: 103 BPM | SYSTOLIC BLOOD PRESSURE: 162 MMHG | HEIGHT: 63 IN | WEIGHT: 117.4 LBS | BODY MASS INDEX: 20.8 KG/M2

## 2022-02-28 DIAGNOSIS — K55.1 MESENTERIC ARTERY STENOSIS (HCC): ICD-10-CM

## 2022-02-28 DIAGNOSIS — I70.1 RENAL ARTERY STENOSIS (HCC): Chronic | ICD-10-CM

## 2022-02-28 DIAGNOSIS — I74.09 AORTOILIAC OCCLUSIVE DISEASE (HCC): Chronic | ICD-10-CM

## 2022-02-28 DIAGNOSIS — I65.23 BILATERAL CAROTID ARTERY STENOSIS: Primary | Chronic | ICD-10-CM

## 2022-02-28 DIAGNOSIS — Z87.19 HISTORY OF ISCHEMIC COLITIS: ICD-10-CM

## 2022-02-28 PROCEDURE — 99214 OFFICE O/P EST MOD 30 MIN: CPT | Performed by: SURGERY

## 2022-02-28 NOTE — PATIENT INSTRUCTIONS
Carotid artery stenosis  1  History of carotid occlusive disease status post right carotid endarterectomy 10/01/2021  She is doing well in this regard  We will plan standard follow-up in 6 months  She will continue her current antiplatelet and statin therapy  2  Asymptomatic moderate left internal carotid artery stenosis  This will be followed as noted above  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HCC)  Mesenteric occlusive disease with recent admission with ischemic colitis  At this point she is asymptomatic  Specifically she denies postprandial pain and actually has gained weight  She is due for follow-up colonoscopy and mesenteric duplex in 4-6 months  She will return to the office following this for further recommendations  Aortoiliac occlusive disease (HCC)  Aortoiliac and infrainguinal arterial occlusive disease  Her current symptoms are more consistent with neuropathy  She is overdue for arterial follow-up with us arterial imaging will be performed to assure there has been no significant progression of disease  At this point we will continue her current antiplatelet and statin therapy

## 2022-02-28 NOTE — ASSESSMENT & PLAN NOTE
1  History of carotid occlusive disease status post right carotid endarterectomy 10/01/2021  She is doing well in this regard  We will plan standard follow-up in 6 months  She will continue her current antiplatelet and statin therapy  2  Asymptomatic moderate left internal carotid artery stenosis  This will be followed as noted above

## 2022-02-28 NOTE — ASSESSMENT & PLAN NOTE
Aortoiliac and infrainguinal arterial occlusive disease  Her current symptoms are more consistent with neuropathy  She is overdue for arterial follow-up with us arterial imaging will be performed to assure there has been no significant progression of disease  At this point we will continue her current antiplatelet and statin therapy

## 2022-02-28 NOTE — LETTER
February 28, 2022     Kelton Howard, [address]    Patient: Melanie Ortiz   YOB: 1940   Date of Visit: 2/28/2022       Dear Dr Cristhian Beal: Thank you for referring Moon Velazquez to me for evaluation  Below are the relevant portions of my assessment and plan of care  Carotid artery stenosis  1  History of carotid occlusive disease status post right carotid endarterectomy 10/01/2021  She is doing well in this regard  We will plan standard follow-up in 6 months  She will continue her current antiplatelet and statin therapy  2  Asymptomatic moderate left internal carotid artery stenosis  This will be followed as noted above  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HCC)  Mesenteric occlusive disease with recent admission with ischemic colitis  At this point she is asymptomatic  Specifically she denies postprandial pain and actually has gained weight  She is due for follow-up colonoscopy and mesenteric duplex in 4-6 months  She will return to the office following this for further recommendations  Aortoiliac occlusive disease (HCC)  Aortoiliac and infrainguinal arterial occlusive disease  Her current symptoms are more consistent with neuropathy  She is overdue for arterial follow-up with us arterial imaging will be performed to assure there has been no significant progression of disease  At this point we will continue her current antiplatelet and statin therapy  If you have questions, please do not hesitate to call me  I look forward to following Silvia Connell along with you           Sincerely,        Tanya Lennox, MD        CC: No Recipients

## 2022-02-28 NOTE — PROGRESS NOTES
Assessment/Plan:    Carotid artery stenosis  1  History of carotid occlusive disease status post right carotid endarterectomy 10/01/2021  She is doing well in this regard  We will plan standard follow-up in 6 months  She will continue her current antiplatelet and statin therapy  2  Asymptomatic moderate left internal carotid artery stenosis  This will be followed as noted above  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HCC)  Mesenteric occlusive disease with recent admission with ischemic colitis  At this point she is asymptomatic  Specifically she denies postprandial pain and actually has gained weight  She is due for follow-up colonoscopy and mesenteric duplex in 4-6 months  She will return to the office following this for further recommendations  Aortoiliac occlusive disease (HCC)  Aortoiliac and infrainguinal arterial occlusive disease  Her current symptoms are more consistent with neuropathy  She is overdue for arterial follow-up with us arterial imaging will be performed to assure there has been no significant progression of disease  At this point we will continue her current antiplatelet and statin therapy  Diagnoses and all orders for this visit:    Bilateral carotid artery stenosis  -     VAS carotid complete study; Future    Aortoiliac occlusive disease (HCC)  -     VAS lower limb arterial duplex, complete bilateral; Future    History of ischemic colitis  -     VAS celiac and/or mesenteric duplex; complete study; Future    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HCC)  -     VAS celiac and/or mesenteric duplex; complete study; Future    Renal artery stenosis (HCC)          Subjective:      Patient ID: Sana Sosa is a 80 y o  female  Patient present for a post op CEA done on 10/1/21 and CV done on 1/11/22  Patient denies any hoarseness, trouble swallowing, trouble speaking, fever, chills, TIA CVA symptoms  Patient is currently taking ASA, Atorvastatin and Plavix      80year-old smoker presents in follow-up of carotid occlusive disease  She underwent carotid endarterectomy on 10/01/2021  Since that hospitalization she was hospitalized with ischemic colitis and now presents in follow-up of all the above  On evaluation today she states she is doing well and recovering in general   She denies any focal neurologic symptoms which would be consistent with TIA, CVA or amaurosis fugax  She denies any postprandial pain or ongoing abdominal symptoms  She notes periodic diarrhea  She initially had some weight loss but states she has gained 4 pounds recently  She also complains of some bilateral leg numbness and tingling which is present with standing and does not follow a pattern consistent with claudication  Carotid duplex 01/11/2022  The right internal carotid artery is widely patent following endarterectomy  There is a moderate stenosis of left internal carotid artery flow velocity 223/43 centimeters/second  Renal artery duplex 11/09/2021 with greater than 60 percent right renal artery stenosis  The left renal artery is widely patent  Renal size is normal and symmetric at 10 2 centimeters  Mesenteric duplex 10/19/2021 with greater than 70 percent stenosis of both the  superior mesenteric and celiac arteries        The following portions of the patient's history were reviewed and updated as appropriate: allergies, current medications, past family history, past medical history, past social history, past surgical history and problem list     Past Medical History:  Past Medical History:   Diagnosis Date    Anemia     Arthritis     Asthma     Benign essential hypertension     CAD (coronary artery disease)     Carotid stenosis, bilateral     CHF (congestive heart failure) (HCC)     Chronic kidney disease     Coronary artery disease     Disease of thyroid gland     Dizziness     Edema of leg     Hyperkalemia 5/3/2021    Hyperlipidemia     Hypertension     Other disorder of circulatory system (CODE)     Renal artery stenosis (HCC)     Right    Subclavian arterial stenosis (HCC)     B/L       Past Surgical History:  Past Surgical History:   Procedure Laterality Date    BREAST SURGERY      bxs,benign    COLONOSCOPY      HYSTERECTOMY  1983    37    INCISIONAL BREAST BIOPSY      x5, 1964, 1965, 1985 and [address] Bilateral 12/9/2019    Procedure: BYPASS AORTA BIFEMORAL WITH LEFT FEMORAL THROMBOEMBOLECTOMY;  Surgeon: Tenisha Chu MD;  [facility];  Service: Vascular    DC THROMBOENDARTECTMY Atul Mccray Right 10/1/2021    Procedure: [address] INTR[address] DUPLEX;  Surgeon: Romina Mabry MD;  [facility];  Service: Vascular       Social History:  Social History     Substance and Sexual Activity   Alcohol Use Yes    Comment: lizbethttbeatriz 2-3 every day for 50 years     Social History     Substance and Sexual Activity   Drug Use No     Social History     Tobacco Use   Smoking Status Current Some Day Smoker    Packs/day: 1 00    Years: 60 00    Pack years: 60 00    Types: Cigarettes   Smokeless Tobacco Never Used       Family History:  Family History   Problem Relation Age of Onset    Hypertension Father     Coronary artery disease Family     Arthritis Family     Skin cancer Daughter     Cancer Paternal Aunt     No Known Problems Maternal Aunt     No Known Problems Maternal Aunt     No Known Problems Maternal Aunt        Allergies:  No Known Allergies    Medications:    Current Outpatient Medications:     amLODIPine (NORVASC) 5 mg tablet, take 1 tablet by mouth once daily, Disp: 90 tablet, Rfl: 1    aspirin (ECOTRIN LOW STRENGTH) 81 mg EC tablet, Take 1 tablet (81 mg total) by mouth daily, Disp: , Rfl: 0    atorvastatin (LIPITOR) 80 mg tablet, Take 1 tablet (80 mg total) by mouth daily (Patient taking differently: Take 80 mg by mouth daily at bedtime ), Disp: 90 tablet, Rfl: 3    B Complex Vitamins (VITAMIN B-COMPLEX) TABS, Take by mouth daily , Disp: , Rfl:     Bioflavonoid Products (VITAMIN C PLUS PO), Take by mouth daily, Disp: , Rfl:     carvedilol (COREG) 12 5 mg tablet, Take 1 tablet (12 5 mg total) by mouth 2 (two) times a day, Disp: 180 tablet, Rfl: 3    cholecalciferol (VITAMIN D3) 1,000 units tablet, Take 5,000 Units by mouth daily , Disp: , Rfl:     clopidogrel (PLAVIX) 75 mg tablet, take 1 tablet by mouth once daily, Disp: 90 tablet, Rfl: 1    enalapril (VASOTEC) 10 mg tablet, Take 1 tablet (10 mg total) by mouth 2 (two) times a day, Disp: 60 tablet, Rfl: 5    ipratropium (ATROVENT) 0 03 % nasal spray, 2 sprays into each nostril 3 (three) times a day as needed for rhinitis, Disp: 30 mL, Rfl: 3    levothyroxine 25 mcg tablet, Take 1 tablet (25 mcg total) by mouth daily in the early morning, Disp: 90 tablet, Rfl: 3    nicotine (NICODERM CQ) 21 mg/24 hr TD 24 hr patch, Place 1 patch on the skin every 24 hours, Disp: 28 patch, Rfl: 0    spironolactone (ALDACTONE) 25 mg tablet, Take 1 tablet (25 mg total) by mouth daily, Disp: 90 tablet, Rfl: 3    torsemide (DEMADEX) 5 MG tablet, Take 1 tablet (5 mg total) by mouth daily, Disp: 90 tablet, Rfl: 3    Vitals:  /90 (02/28/22 1512)    Temp      Pulse 103 (02/28/22 1512)   Resp      SpO2        Lab Results and Cultures:   CBC with diff:   Lab Results   Component Value Date    WBC 8 38 02/02/2022    HGB 9 8 (L) 02/02/2022    HCT 30 7 (L) 02/02/2022     (H) 02/02/2022     02/02/2022    ADJUSTEDWBC 9 20 08/26/2016    MCH 32 2 02/02/2022    MCHC 31 9 02/02/2022    RDW 16 8 (H) 02/02/2022    MPV 12 1 02/02/2022    NRBC 0 02/02/2022   ,   BMP/CMP:  Lab Results   Component Value Date    K 4 7 10/28/2021     10/28/2021    CO2 26 10/28/2021    CO2 21 12/09/2019    BUN 43 (H) 10/28/2021    CREATININE 1 45 (H) 10/28/2021    GLUCOSE 173 (H) 12/09/2019    CALCIUM 10 2 (H) 10/28/2021    AST 40 10/15/2021    ALT 26 10/15/2021 ALKPHOS 56 10/15/2021    EGFR 34 10/28/2021   ,   Lipid Panel:   Lab Results   Component Value Date    CHOL 151 09/09/2014   ,   Coags:   Lab Results   Component Value Date    PTT 31 10/15/2021    INR 1 15 10/16/2021   ,       Review of Systems   Constitutional: Negative  HENT: Positive for congestion  Eyes: Negative  Respiratory: Negative  Cardiovascular: Positive for leg swelling (L foot)  Gastrointestinal: Negative  Endocrine: Negative  Genitourinary: Negative  Musculoskeletal: Negative  Skin: Negative  Allergic/Immunologic: Negative  Neurological: Negative  Hematological: Negative  Psychiatric/Behavioral: Negative  Objective:      /90 (BP Location: Right arm, Patient Position: Sitting, Cuff Size: Adult)   Pulse 103   Ht 5' 3" (1 6 m)   Wt 53 3 kg (117 lb 6 4 oz)   BMI 20 80 kg/m²          Physical Exam  Constitutional:       Appearance: Normal appearance  She is well-developed  HENT:      Head: Normocephalic and atraumatic  Eyes:      Conjunctiva/sclera: Conjunctivae normal    Neck:      Vascular: Carotid bruit (Left bruit) present  No JVD  Comments: Well-healed right cervical incision status post carotid endarterectomy  Cardiovascular:      Rate and Rhythm: Normal rate and regular rhythm  Pulses:           Carotid pulses are 2+ on the right side and 2+ on the left side  Radial pulses are 2+ on the right side and 2+ on the left side  Femoral pulses are 2+ on the right side and 2+ on the left side  Popliteal pulses are 2+ on the right side and 2+ on the left side  Heart sounds: Normal heart sounds, S1 normal and S2 normal  No murmur heard  Pulmonary:      Effort: Pulmonary effort is normal       Breath sounds: Normal breath sounds  Abdominal:      General: There is no abdominal bruit  Palpations: Abdomen is soft  There is no pulsatile mass  Tenderness: There is no abdominal tenderness        Hernia: No hernia is present  Musculoskeletal:         General: No tenderness or deformity  Normal range of motion  Cervical back: Normal range of motion and neck supple  Skin:     General: Skin is warm and dry  Coloration: Skin is not pale  Neurological:      General: No focal deficit present  Mental Status: She is alert and oriented to person, place, and time  Sensory: No sensory deficit  Motor: No weakness        Gait: Gait normal    Psychiatric:         Mood and Affect: Mood normal          Speech: Speech normal          Behavior: Behavior normal

## 2022-02-28 NOTE — ASSESSMENT & PLAN NOTE
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

## 2022-03-01 ENCOUNTER — TELEPHONE (OUTPATIENT)
Dept: GASTROENTEROLOGY | Facility: CLINIC | Age: 82
End: 2022-03-01

## 2022-03-01 NOTE — LETTER
Cardiology Pre Operative Clearance      PRE OPERATIVE CARDIAC RISK ASSESSMENT    03/28/22    Jim Can  1940  [id number]    Date of Surgery: 04/27/2022    Type of Surgery: colonoscopy    Surgeon: Meg Elder MD    No Cardiac Contraindication for Planned Surgical Procedures    Anticoagulation: Hold Plavix for 5 days prior to procedure    Electronically Signed: Akash Hyman DO

## 2022-03-01 NOTE — TELEPHONE ENCOUNTER
Scheduled date of colonoscopy (as of today): 4/27/22  Physician performing colonoscopy:Dr Kiersten Lynn  Location of colonoscopy:[facility]  Bowel prep reviewed with patient:MIralax w/ dul   Instructions reviewed with patient by:ls  Clearances: Will fax cardiac clearance to Dr Rick Rubio one month prior to procedure

## 2022-03-09 DIAGNOSIS — E78.2 MIXED HYPERLIPIDEMIA: ICD-10-CM

## 2022-03-10 RX ORDER — ATORVASTATIN CALCIUM 80 MG/1
80 TABLET, FILM COATED ORAL
Qty: 90 TABLET | Refills: 3 | Status: SHIPPED | OUTPATIENT
Start: 2022-03-10

## 2022-03-11 ENCOUNTER — HOSPITAL ENCOUNTER (EMERGENCY)
Facility: HOSPITAL | Age: 82
Discharge: HOME | End: 2022-03-12
Attending: EMERGENCY MEDICINE | Admitting: EMERGENCY MEDICINE
Payer: COMMERCIAL

## 2022-03-11 VITALS
WEIGHT: 117 LBS | TEMPERATURE: 97.3 F | RESPIRATION RATE: 18 BRPM | HEART RATE: 64 BPM | BODY MASS INDEX: 20.73 KG/M2 | DIASTOLIC BLOOD PRESSURE: 64 MMHG | SYSTOLIC BLOOD PRESSURE: 150 MMHG | OXYGEN SATURATION: 100 %

## 2022-03-11 DIAGNOSIS — M79.672 LEFT FOOT PAIN: Primary | ICD-10-CM

## 2022-03-11 PROCEDURE — 99282 EMERGENCY DEPT VISIT SF MDM: CPT | Performed by: EMERGENCY MEDICINE

## 2022-03-11 PROCEDURE — 99283 EMERGENCY DEPT VISIT LOW MDM: CPT

## 2022-03-12 ENCOUNTER — NURSE TRIAGE (OUTPATIENT)
Dept: OTHER | Facility: OTHER | Age: 82
End: 2022-03-12

## 2022-03-12 NOTE — TELEPHONE ENCOUNTER
Patient in ED yesterday for left foot pain and was sent home and told to call back if worse  She was stable with good pulses  Pain today is worse as well as hot touch and swollen  Advised patient to be seen in ED for possible cellulitis  Patient may not go tonight but will go tomorrow  She felt she sat in the ER yesterday for a while and did not have a full work up  She is scheduled for a VAS duplex 3/16  Reason for Disposition   [1] SEVERE pain (e g , excruciating, unable to do any normal activities) AND [2] not improved after 2 hours of pain medicine    Answer Assessment - Initial Assessment Questions  1  ONSET: "When did the pain start?"       Yesterday     2  LOCATION: "Where is the pain located?"       Left foot     3  PAIN: "How bad is the pain?"    (Scale 1-10; or mild, moderate, severe)   - MILD (1-3): doesn't interfere with normal activities  - MODERATE (4-7): interferes with normal activities (e g , work or school) or awakens from sleep, limping     - SEVERE (8-10): excruciating pain, unable to do any normal activities, unable to walk  Moderate to severe at times  4  WORK OR EXERCISE: "Has there been any recent work or exercise that involved this part of the body?"       No     5  CAUSE: "What do you think is causing the foot pain?"      Have a lot of vascular problems  6  OTHER SYMPTOMS: "Do you have any other symptoms?" (e g , leg pain, rash, fever, numbness)      Intermittent numbness, redness, hot to touch and swollen      Protocols used: FOOT PAIN-ADULT-

## 2022-03-12 NOTE — TELEPHONE ENCOUNTER
Regarding: pain in foot and leg  ----- Message from Carlos Hunt sent at 3/12/2022  4:57 PM EST -----  "I was in the emergency room last night and released and still having pain foot and goes threw my leg and I am taking Tylenol and not really helping"

## 2022-03-12 NOTE — ED PROVIDER NOTES
History  Chief Complaint   Patient presents with    Foot Pain     Pt reported that she has L foot started today at 3 am, pt reported that she has vascular problems to BLEs  L foot noted slight swollen and redden  40-year-old female presents stating that she started having left foot pain around 0300 hours today she states that she has had multiple vascular surgeries in her legs with Dr Oseguera in [place]  patient is due for further workup later this next week with vascular duplex Rosalina Galvan  She states that she just needs some relief from the pain but feels that the leg and foot is okay  Prior to Admission Medications   Prescriptions Last Dose Informant Patient Reported? Taking?    B Complex Vitamins (VITAMIN B-COMPLEX) TABS  Self Yes No   Sig: Take by mouth daily    Bioflavonoid Products (VITAMIN C PLUS PO)  Self Yes No   Sig: Take by mouth daily   amLODIPine (NORVASC) 5 mg tablet  Self No No   Sig: take 1 tablet by mouth once daily   aspirin (ECOTRIN LOW STRENGTH) 81 mg EC tablet  Self No No   Sig: Take 1 tablet (81 mg total) by mouth daily   atorvastatin (LIPITOR) 80 mg tablet   No No   Sig: Take 1 tablet (80 mg total) by mouth daily at bedtime   carvedilol (COREG) 12 5 mg tablet  Self No No   Sig: Take 1 tablet (12 5 mg total) by mouth 2 (two) times a day   cholecalciferol (VITAMIN D3) 1,000 units tablet  Self Yes No   Sig: Take 5,000 Units by mouth daily    clopidogrel (PLAVIX) 75 mg tablet  Self No No   Sig: take 1 tablet by mouth once daily   enalapril (VASOTEC) 10 mg tablet  Self No No   Sig: Take 1 tablet (10 mg total) by mouth 2 (two) times a day   ipratropium (ATROVENT) 0 03 % nasal spray  Self No No   Si sprays into each nostril 3 (three) times a day as needed for rhinitis   levothyroxine 25 mcg tablet  Self No No   Sig: Take 1 tablet (25 mcg total) by mouth daily in the early morning   nicotine (NICODERM CQ) 21 mg/24 hr TD 24 hr patch  Self No No   Sig: Place 1 patch on the skin every 24 hours   spironolactone (ALDACTONE) 25 mg tablet  Self No No   Sig: Take 1 tablet (25 mg total) by mouth daily   torsemide (DEMADEX) 5 MG tablet   No No   Sig: Take 1 tablet (5 mg total) by mouth daily      Facility-Administered Medications: None       Past Medical History:   Diagnosis Date    Anemia     Arthritis     Asthma     Benign essential hypertension     CAD (coronary artery disease)     Carotid stenosis, bilateral     CHF (congestive heart failure) (HCC)     Chronic kidney disease     Coronary artery disease     Disease of thyroid gland     Dizziness     Edema of leg     Hyperkalemia 5/3/2021    Hyperlipidemia     Hypertension     Other disorder of circulatory system (CODE)     Renal artery stenosis (HCC)     Right    Subclavian arterial stenosis (HCC)     B/L       Past Surgical History:   Procedure Laterality Date    BREAST SURGERY      bxs,benign    COLONOSCOPY      HYSTERECTOMY  1983    43    INCISIONAL BREAST BIOPSY      x5, 1964, 1965, 1985 and 40 [address] Bilateral 12/9/2019    Procedure: BYPASS AORTA BIFEMORAL WITH LEFT FEMORAL THROMBOEMBOLECTOMY;  Surgeon: Cesar Wharton MD;  [facility];  Service: Vascular    KS THROMBOENDARTECTMY Si Meeter INCIS Right 10/1/2021    Procedure: ENDARTERECTOMY ARTERY John Shah;  Surgeon: Jaz Rae MD;  [facility];  Service: Vascular       Family History   Problem Relation Age of Onset    Hypertension Father     Coronary artery disease Family     Arthritis Family     Skin cancer Daughter     Cancer Paternal Aunt     No Known Problems Maternal Aunt     No Known Problems Maternal Aunt     No Known Problems Maternal Aunt      I have reviewed and agree with the history as documented      E-Cigarette/Vaping    E-Cigarette Use Never User      E-Cigarette/Vaping Substances    Nicotine No     THC No     CBD No     Flavoring No     Other No     Unknown No      Social History     Tobacco Use    Smoking status: Current Some Day Smoker     Packs/day: 1 00     Years: 60 00     Pack years: 60 00     Types: Cigarettes    Smokeless tobacco: Never Used   Vaping Use    Vaping Use: Never used   Substance Use Topics    Alcohol use: Yes     Comment: wilfrid 2-3 every day for 50 years    Drug use: No       Review of Systems   Constitutional: Negative for activity change, chills, diaphoresis and fever  HENT: Negative for congestion, ear pain, nosebleeds, sore throat, trouble swallowing and voice change  Eyes: Negative for pain, discharge and redness  Respiratory: Negative for apnea, cough, choking, shortness of breath, wheezing and stridor  Cardiovascular: Negative for chest pain and palpitations  Gastrointestinal: Negative for abdominal distention, abdominal pain, constipation, diarrhea, nausea and vomiting  Endocrine: Negative for polydipsia  Genitourinary: Negative for difficulty urinating, dysuria, flank pain, frequency, hematuria and urgency  Musculoskeletal: Positive for arthralgias  Negative for back pain, gait problem, joint swelling, myalgias, neck pain and neck stiffness  Skin: Negative for pallor and rash  Neurological: Negative for dizziness, tremors, syncope, speech difficulty, weakness, numbness and headaches  Hematological: Negative for adenopathy  Psychiatric/Behavioral: Negative for confusion, hallucinations, self-injury and suicidal ideas  The patient is not nervous/anxious  Physical Exam  Physical Exam  Vitals and nursing note reviewed  Constitutional:       General: She is not in acute distress  Appearance: She is well-developed  She is not diaphoretic  HENT:      Head: Normocephalic and atraumatic        Right Ear: External ear normal       Left Ear: External ear normal       Nose: Nose normal    Eyes:      Conjunctiva/sclera: Conjunctivae normal       Pupils: Pupils are equal, round, and reactive to light  Cardiovascular:      Rate and Rhythm: Normal rate and regular rhythm  Heart sounds: Normal heart sounds  Pulmonary:      Effort: Pulmonary effort is normal       Breath sounds: Normal breath sounds  Abdominal:      General: Bowel sounds are normal       Palpations: Abdomen is soft  Musculoskeletal:         General: Tenderness present  Normal range of motion  Cervical back: Normal range of motion and neck supple  Comments: Patient has a warm pink foot with good dorsalis pedis and posterior tibialis pulses  No swelling   Skin:     General: Skin is warm and dry  Neurological:      Mental Status: She is alert and oriented to person, place, and time  Deep Tendon Reflexes: Reflexes are normal and symmetric  Psychiatric:         Behavior: Behavior is cooperative  Vital Signs  ED Triage Vitals [03/11/22 1719]   Temperature Pulse Respirations Blood Pressure SpO2   (!) 97 3 °F (36 3 °C) 64 18 150/64 100 %      Temp Source Heart Rate Source Patient Position - Orthostatic VS BP Location FiO2 (%)   Tympanic Monitor Sitting Left arm --      Pain Score       9           Vitals:    03/11/22 1719   BP: 150/64   Pulse: 64   Patient Position - Orthostatic VS: Sitting         Visual Acuity      ED Medications  Medications - No data to display    Diagnostic Studies  Results Reviewed     None                 No orders to display              Procedures  Procedures         ED Course                                             MDM  Number of Diagnoses or Management Options  Left foot pain  Diagnosis management comments:  I discussed the case with vascular surgery who advises if the foot is warm with pulses that the patient can follow-up as planned next week for studies and if worsening pain call the office in the or get her in earlier next week        Disposition  Final diagnoses:   Left foot pain     Time reflects when diagnosis was documented in both MDM as applicable and the Disposition within this note     Time User Action Codes Description Comment    3/11/2022 10:33 PM Lavinia You Add [K84 271] Left foot pain       ED Disposition     ED Disposition Condition Date/Time Comment    Discharge Stable Fri Mar 11, 2022 10:20 PM Yoko Wolff discharge to home/self care  Follow-up Information     Follow up With Specialties Details Why [id number] [facility], [address] [facility], Nurse Practitioner Schedule an appointment as soon as possible for a visit  As needed [id number] [facility] [address]  [phone]            Patient's Medications   Discharge Prescriptions    No medications on file       No discharge procedures on file      PDMP Review       Value Time User    PDMP Reviewed  Yes 10/4/2021  2:51 PM Renie Najjar, PA-C          ED Provider  Electronically Signed by           Wendy Tucker DO  03/11/22 1892

## 2022-03-14 ENCOUNTER — HOSPITAL ENCOUNTER (OUTPATIENT)
Dept: VASCULAR ULTRASOUND | Facility: HOSPITAL | Age: 82
Discharge: HOME | End: 2022-03-14
Attending: SURGERY
Payer: COMMERCIAL

## 2022-03-14 DIAGNOSIS — I74.09 AORTOILIAC OCCLUSIVE DISEASE (HCC): Chronic | ICD-10-CM

## 2022-03-14 PROCEDURE — 93922 UPR/L XTREMITY ART 2 LEVELS: CPT | Performed by: SURGERY

## 2022-03-14 PROCEDURE — 93925 LOWER EXTREMITY STUDY: CPT | Performed by: SURGERY

## 2022-03-14 PROCEDURE — 93923 UPR/LXTR ART STDY 3+ LVLS: CPT

## 2022-03-14 PROCEDURE — 93925 LOWER EXTREMITY STUDY: CPT

## 2022-03-14 NOTE — TELEPHONE ENCOUNTER
Per ED note:  "Number of Diagnoses or Management Options  Left foot pain  Diagnosis management comments:  I discussed the case with vascular surgery who advises if the foot is warm with pulses that the patient can follow-up as planned next week for studies and if worsening pain call the office in the or get her in earlier next week "    Can you reach out to pt and move up her dopplers?

## 2022-03-14 NOTE — TELEPHONE ENCOUNTER
Patient's doppler is currently scheduled for 3/16/22  She wanted a doppler for today but not sure if she will have transport, she states she will call around and see if she can get a ride    I advised I wont change the appointment until she confirms

## 2022-03-15 ENCOUNTER — TELEPHONE (OUTPATIENT)
Dept: VASCULAR SURGERY | Facility: CLINIC | Age: 82
End: 2022-03-15

## 2022-03-15 NOTE — TELEPHONE ENCOUNTER
Spoke with patient  Made her aware that she needs an office visit to review the study and discuss her symptoms  Transferred to the Call Center to make the appointment

## 2022-03-15 NOTE — TELEPHONE ENCOUNTER
Reviewed study  Looks like patient had an office visit tomorrow that was cancelled by patient   Recommend she come in to the office to assess symptoms and review study

## 2022-03-15 NOTE — TELEPHONE ENCOUNTER
Pt was in the ER on 3/11/22 for c/o left foot pain  Had PHILIP 3/14/22  Patient reports that she still is having a lot of left foot pain  Unable to ambulate without a walker  When she ambulates, she feels the pain in her left foot, and also feels a "numbness and heaviness" throughout her left leg  Left foot is also swollen and "red" per pt  Using Tylenol for pain, which is ineffective for relief  Pt was calling to request to have a provider review the results of the PHILIP and determine what the next steps should be

## 2022-03-17 ENCOUNTER — OFFICE VISIT (OUTPATIENT)
Dept: VASCULAR SURGERY | Facility: CLINIC | Age: 82
End: 2022-03-17
Payer: COMMERCIAL

## 2022-03-17 VITALS
SYSTOLIC BLOOD PRESSURE: 144 MMHG | RESPIRATION RATE: 18 BRPM | DIASTOLIC BLOOD PRESSURE: 82 MMHG | HEART RATE: 77 BPM | WEIGHT: 118 LBS | HEIGHT: 63 IN | BODY MASS INDEX: 20.91 KG/M2

## 2022-03-17 DIAGNOSIS — R60.0 PEDAL EDEMA: Primary | ICD-10-CM

## 2022-03-17 DIAGNOSIS — I73.9 PAD (PERIPHERAL ARTERY DISEASE) (HCC): ICD-10-CM

## 2022-03-17 PROCEDURE — 99213 OFFICE O/P EST LOW 20 MIN: CPT | Performed by: NURSE PRACTITIONER

## 2022-03-17 NOTE — PATIENT INSTRUCTIONS
Arterial perfusion appears adequate on recent duplex  Walking routine and work on quitting smoking   Repeat arterial duplex in 1 year    Recommended follow up with podiatry for evaluation of left lateral foot edema   Will evaluate with venous duplex to rule out DVT given acute onset left leg swelling   Follow up after mesenteric duplex as previously planned with Dr Ayse Whittaker

## 2022-03-17 NOTE — PROGRESS NOTES
Assessment/Plan:    Pedal edema  19-year-old female with HTN, HLD, CAD, chronic heart failure, CKD, carotid stenosis s/p R CEA Oct '21, subclavian stenosis, smoker, aortoiliac and peripheral arterial occlusive disease s/p aortobifem, left iliofemoral embolectomy 2/2 tissue loss by Nanci Mendoza/Ana Rosa Rueda  She presents to the office for evaluation of left foot swelling and pain   -LEAD 3/14/22 showed right CFA stenosis, right BELEN 0 96/152/94 left lower extremity diffuse disease without focal stenosis, left BELEN 0 87/178/83   -foot warm  Focal swelling to left lateral dorsal foot  Denies any trauma   -Will evaluate with venous duplex to rule out DVT  -Recommended evaluation by podiatry  Diagnoses and all orders for this visit:    Pedal edema  -     VAS lower limb venous duplex study, unilateral/limited; Future  -     Ambulatory Referral to Podiatry; Future    PAD (peripheral artery disease) (HCC)  -     VAS lower limb arterial duplex, complete bilateral; Future          Subjective:      Patient ID: Tam Murillo is a 80 y o  female  Patient was in [facility] ER on 3/11/22 for Lt foot pain  Pt had a PHILIP on 3/14/22  Pt has redness, swelling, stiffness, and pain in the Lt foot on Friday  Pt has numbness, tingling, and coldness in the Lt foot  Pt states that she did notice changes in her LLE for the past month  Pt is on ASA 81 mg, Plavix, and Atorvastatin  Pt smokes 1 ppd  HPI  19-year-old female with HTN, HLD, CAD, chronic heart failure, CKD, carotid stenosis s/p R CEA Oct '21, subclavian stenosis, smoker, aortoiliac and peripheral arterial occlusive disease s/p aortobifem, left iliofemoral embolectomy 2/2 tissue loss by Nanci Mendoza/Ana Rosa Rueda  She presents to the office for evaluation of left foot swelling and pain   Patient complaining of significant pain and swelling to left dorsal foot and the lateral aspect  Foot is warm upon exam   She has palpable popliteal pulse    She has bilateral lower extremity swelling though focal swelling of the left dorsal foot  She denies any trauma to the area  She was evaluated with a lower extremity arterial duplex  LEAD 3/14/22 showed right CFA stenosis, right BELEN 0 96/152/94 left lower extremity diffuse disease without focal stenosis, left BELEN 0 87/178/83   The following portions of the patient's history were reviewed and updated as appropriate: allergies, current medications, past family history, past medical history, past social history, past surgical history and problem list   Review of systems reviewed    Review of Systems   Constitutional: Negative  HENT: Negative  Eyes: Negative  Respiratory: Negative  Cardiovascular: Positive for leg swelling  Gastrointestinal: Negative  Endocrine: Negative  Genitourinary: Negative  Musculoskeletal: Positive for gait problem  Lt foot pain   Skin: Positive for color change and rash  Allergic/Immunologic: Negative  Neurological: Positive for weakness  Negative for dizziness and numbness  Hematological: Negative  Psychiatric/Behavioral: Negative  Objective:    I have reviewed and made appropriate changes to the review of systems input by the medical assistant      Vitals:    03/17/22 1311   BP: 144/82   BP Location: Left arm   Patient Position: Sitting   Pulse: 77   Resp: 18   Weight: 53 5 kg (118 lb)   Height: 5' 3" (1 6 m)       Patient Active Problem List   Diagnosis    Aortoiliac occlusive disease ([address] )    Carotid artery stenosis    Hypothyroidism    Nicotine dependence    Subclavian artery stenosis, left (HCC)    Aortic stenosis    Benign essential HTN    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HCC)    Hyperlipidemia    Onychomycosis    Paronychia of toenail    Simple chronic bronchitis (HCC)    Cigarette smoker    Cardiac murmur    Mass of spine    Chronic diastolic heart failure (HCC)    Acute blood loss anemia    Hypertensive heart disease with chronic diastolic congestive heart failure ([facility] [address] )    Pedal edema    Peripheral neuropathy    Parenchymal renal hypertension    Anemia in stage 3 chronic kidney disease (HCC)    Chronic kidney disease, stage 3 (HCC)    Chronic anemia    Renal artery stenosis (HCC)    History of ischemic colitis    Gastritis without bleeding    Arteriovenous malformation (AVM)    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[facility])       Past Surgical History:   Procedure Laterality Date    BREAST SURGERY      bxs,benign    COLONOSCOPY      HYSTERECTOMY  1983    37    INCISIONAL BREAST BIOPSY      x5, 1964, 1965, 1985 and [address] Bilateral 12/9/2019    Procedure: BYPASS AORTA BIFEMORAL WITH LEFT FEMORAL THROMBOEMBOLECTOMY;  Surgeon: Maya Reyes MD;  [facility];  Service: Vascular    ID THROMBOENDARTECTMY Vinh Bishop Right 10/1/2021    Procedure: ENDARTERECTOMY ARTERY Trisha Nj;  Surgeon: Wendy Starks MD;  [facility];  Service: Vascular       Family History   Problem Relation Age of Onset    Hypertension Father     Coronary artery disease Family     Arthritis Family     Skin cancer Daughter     Cancer Paternal Aunt     No Known Problems Maternal Aunt     No Known Problems Maternal Aunt     No Known Problems Maternal Aunt        Social History     Socioeconomic History    Marital status:      Spouse name: Not on file    Number of children: Not on file    Years of education: Not on file    Highest education level: Not on file   Occupational History    Not on file   Tobacco Use    Smoking status: Current Some Day Smoker     Packs/day: 1 00     Years: 60 00     Pack years: 60 00     Types: Cigarettes    Smokeless tobacco: Never Used   Vaping Use    Vaping Use: Never used   Substance and Sexual Activity    Alcohol use: Yes     Comment: wilfrid 2-3 every day for 50 years    Drug use: No    Sexual activity: Yes     Partners: Male   Other Topics Concern    Not on file   Social History Narrative    Rarely exercises    Sleeps 6-7 hours per day     Social Determinants of Health     Financial Resource Strain: Not on file   Food Insecurity: Not on file   Transportation Needs: No Transportation Needs    Lack of Transportation (Medical): No    Lack of Transportation (Non-Medical):  No   Physical Activity: Not on file   Stress: Not on file   Social Connections: Not on file   Intimate Partner Violence: Not on file   Housing Stability: Not on file       No Known Allergies      Current Outpatient Medications:     amLODIPine (NORVASC) 5 mg tablet, take 1 tablet by mouth once daily, Disp: 90 tablet, Rfl: 1    aspirin (ECOTRIN LOW STRENGTH) 81 mg EC tablet, Take 1 tablet (81 mg total) by mouth daily, Disp: , Rfl: 0    atorvastatin (LIPITOR) 80 mg tablet, Take 1 tablet (80 mg total) by mouth daily at bedtime, Disp: 90 tablet, Rfl: 3    B Complex Vitamins (VITAMIN B-COMPLEX) TABS, Take by mouth daily , Disp: , Rfl:     Bioflavonoid Products (VITAMIN C PLUS PO), Take by mouth daily, Disp: , Rfl:     carvedilol (COREG) 12 5 mg tablet, Take 1 tablet (12 5 mg total) by mouth 2 (two) times a day, Disp: 180 tablet, Rfl: 3    cholecalciferol (VITAMIN D3) 1,000 units tablet, Take 5,000 Units by mouth daily , Disp: , Rfl:     clopidogrel (PLAVIX) 75 mg tablet, take 1 tablet by mouth once daily, Disp: 90 tablet, Rfl: 1    enalapril (VASOTEC) 10 mg tablet, Take 1 tablet (10 mg total) by mouth 2 (two) times a day, Disp: 60 tablet, Rfl: 5    ipratropium (ATROVENT) 0 03 % nasal spray, 2 sprays into each nostril 3 (three) times a day as needed for rhinitis, Disp: 30 mL, Rfl: 3    levothyroxine 25 mcg tablet, Take 1 tablet (25 mcg total) by mouth daily in the early morning, Disp: 90 tablet, Rfl: 3    nicotine (NICODERM CQ) 21 mg/24 hr TD 24 hr patch, Place 1 patch on the skin every 24 hours, Disp: 28 patch, Rfl: 0    spironolactone (ALDACTONE) 25 mg tablet, Take 1 tablet (25 mg total) by mouth daily, Disp: 90 tablet, Rfl: 3    torsemide (DEMADEX) 5 MG tablet, Take 1 tablet (5 mg total) by mouth daily, Disp: 90 tablet, Rfl: 3    /82 (BP Location: Left arm, Patient Position: Sitting)   Pulse 77   Resp 18   Ht 5' 3" (1 6 m)   Wt 53 5 kg (118 lb)   BMI 20 90 kg/m²          Physical Exam  Vitals and nursing note reviewed  Constitutional:       Appearance: She is well-developed  HENT:      Head: Normocephalic and atraumatic  Eyes:      Extraocular Movements: Extraocular movements intact  Cardiovascular:      Pulses:           Femoral pulses are 2+ on the right side and 2+ on the left side  Popliteal pulses are 2+ on the right side and 2+ on the left side  Dorsalis pedis pulses are 1+ on the left side  Heart sounds: Murmur heard  Pulmonary:      Effort: Pulmonary effort is normal       Breath sounds: Normal breath sounds  Abdominal:      General: Bowel sounds are normal       Palpations: Abdomen is soft  Musculoskeletal:         General: Swelling present  Normal range of motion  Cervical back: Neck supple  Skin:     General: Skin is warm  Neurological:      Mental Status: She is alert and oriented to person, place, and time  Psychiatric:         Behavior: Behavior normal          Thought Content:  Thought content normal

## 2022-03-18 ENCOUNTER — HOSPITAL ENCOUNTER (OUTPATIENT)
Dept: RADIOLOGY | Facility: HOSPITAL | Age: 82
Discharge: HOME | End: 2022-03-18
Payer: COMMERCIAL

## 2022-03-18 DIAGNOSIS — R60.0 PEDAL EDEMA: ICD-10-CM

## 2022-03-18 PROCEDURE — 93971 EXTREMITY STUDY: CPT

## 2022-03-18 PROCEDURE — 93971 EXTREMITY STUDY: CPT | Performed by: SURGERY

## 2022-03-23 ENCOUNTER — TELEPHONE (OUTPATIENT)
Dept: VASCULAR SURGERY | Facility: CLINIC | Age: 82
End: 2022-03-23

## 2022-03-23 NOTE — TELEPHONE ENCOUNTER
Pt saw Tomeka Acevedo on 3/17 and was referred to podiatry (Dr Jey Wheat) for evaluation of L lateral foot edema  Pt called the office today to inform Tomeka Acevedo that she did see Dr Lauren Villegas yesterday and she asked me to obtain the notes and xray report so Tomeka Acevedo can review it and see if there are any other recommendations  Called Dr Emmanuel Kirkland office [phone]- they are going to fax over the notes

## 2022-03-24 NOTE — TELEPHONE ENCOUNTER
Pt was notified of same  She wants Oseas Arcos to know that the swelling was "from her little toe outside of the foot and met the ankle in the back like a semi [place] and all the way around to the other side, it was candy cane shaped"  She states the ice did help and the swelling is down and she can see her ankle now   Advised her I would forward this information to Oseas Arcos per her request

## 2022-03-24 NOTE — TELEPHONE ENCOUNTER
Brannon Diego, please see podiatry note I scanned under media tab today and advise if there are any further recommendations for pt at this time  Thank you

## 2022-03-28 NOTE — TELEPHONE ENCOUNTER
Dr Cortez Wright patient is scheduled for procedure: Pt is scheduled for a colonoscopy at [facility] and informed has cardiac history and is taking Plavix  Please advise if pt is cleared for procedure and if and for how long may hold Plavix  Thank you so much! On: __4__/_27    _/_22    _      With: Dr Lockhart_Lew_______    He/She is taking the following blood thinner:   Plavix       Can this be stopped __5____ days prior to the procedure?       Physician Approving clearance: ________________________

## 2022-04-01 NOTE — TELEPHONE ENCOUNTER
Clearance in chart  Pt is cleared for procedure and may hold her Plavix 5 days prior to the procedure  I called and spoke to pt and informed of this

## 2022-04-04 NOTE — ASSESSMENT & PLAN NOTE
40-year-old female with HTN, HLD, CAD, chronic heart failure, CKD, carotid stenosis s/p R CEA Oct '21, subclavian stenosis, smoker, aortoiliac and peripheral arterial occlusive disease s/p aortobifem, left iliofemoral embolectomy 2/2 tissue loss by Nanci Mendoza/Ana Rosa '19  She presents to the office for evaluation of left foot swelling and pain   -LEAD 3/14/22 showed right CFA stenosis, right BELEN 0 96/152/94 left lower extremity diffuse disease without focal stenosis, left BELEN 0 87/178/83   -foot warm  Focal swelling to left lateral dorsal foot  Denies any trauma   -Will evaluate with venous duplex to rule out DVT  -Recommended evaluation by podiatry  -arterial perfusion adequate by arterial duplex   Continue surveillance imaging

## 2022-04-05 ENCOUNTER — TELEPHONE (OUTPATIENT)
Dept: FAMILY MEDICINE CLINIC | Facility: CLINIC | Age: 82
End: 2022-04-05

## 2022-04-05 NOTE — TELEPHONE ENCOUNTER
Patient wants to know if it is OK for her to take Allegra for her sinus problem  Her allergies have flared up and she cant sleep at night  She has taken this medication in the past for many years but stopped last year and wants to be sure it will not interfere with her kidney function  Please advise   [phone]

## 2022-04-07 ENCOUNTER — TELEPHONE (OUTPATIENT)
Dept: HEMATOLOGY ONCOLOGY | Facility: CLINIC | Age: 82
End: 2022-04-07

## 2022-04-07 NOTE — TELEPHONE ENCOUNTER
Patient was advised by PCP not to take allegra due to renal insufficiency  Dr Garcia Fees will defer to PCP  Patient aware

## 2022-04-07 NOTE — TELEPHONE ENCOUNTER
CALL RETURN FORM   Reason for patient call? Patient has questions regarding a medication she would like to take    Patient's primary oncologist? Dr Wilde Hiss   Name of person the patient was calling for? Dr Wilde Hiss   Any additional information to add, if applicable? [phone]   Informed patient that the message will be forwarded to the team and someone will get back to them as soon as possible    Did you relay this information to the patient?

## 2022-04-14 ENCOUNTER — TELEMEDICINE (OUTPATIENT)
Dept: FAMILY MEDICINE CLINIC | Facility: CLINIC | Age: 82
End: 2022-04-14
Payer: COMMERCIAL

## 2022-04-14 DIAGNOSIS — J01.01 ACUTE RECURRENT MAXILLARY SINUSITIS: Primary | ICD-10-CM

## 2022-04-14 PROCEDURE — 99213 OFFICE O/P EST LOW 20 MIN: CPT | Performed by: NURSE PRACTITIONER

## 2022-04-14 RX ORDER — AMOXICILLIN AND CLAVULANATE POTASSIUM 875; 125 MG/1; MG/1
1 TABLET, FILM COATED ORAL EVERY 12 HOURS SCHEDULED
Qty: 14 TABLET | Refills: 0 | Status: SHIPPED | OUTPATIENT
Start: 2022-04-14 | End: 2022-04-22

## 2022-04-14 NOTE — PROGRESS NOTES
Virtual Regular Visit    Verification of patient location:    Patient is located in the following state in which I hold an active license [place]      Assessment/Plan:    Problem List Items Addressed This Visit     None      Visit Diagnoses     Acute recurrent maxillary sinusitis    -  Primary    Relevant Medications    amoxicillin-clavulanate (AUGMENTIN) 875-125 mg per tablet        Supportive care as discussed  Advised to schedule recheck chronic condition next month-due for labs  Having colonoscopy in 2 weeks  Follow up if not better in 1 week       Reason for visit is   Chief Complaint   Patient presents with    Virtual Regular Visit        Encounter provider SIRENA Vu    Provider located at [address] [place] [id number]      Recent Visits  No visits were found meeting these conditions  Showing recent visits within past 7 days and meeting all other requirements  Future Appointments  No visits were found meeting these conditions  Showing future appointments within next 150 days and meeting all other requirements       The patient was identified by name and date of birth  Fausto Gar was informed that this is a telemedicine visit and that the visit is being conducted through Telephone  My office door was closed  No one else was in the room  She acknowledged consent and understanding of privacy and security of the video platform  The patient has agreed to participate and understands they can discontinue the visit at any time  It was my intent to perform this visit via video technology but the patient was not able to do a video connection so the visit was completed via audio telephone only  Patient is aware this is a billable service  Subjective      Sinusitis  This is a recurrent problem  The current episode started more than 1 month ago  The problem has been waxing and waning since onset  There has been no fever  Associated symptoms include congestion, coughing, ear pain, headaches and sinus pressure  Pertinent negatives include no shortness of breath, sore throat or swollen glands  (Thick colored nasal discharge) Past treatments include saline sprays (antihistamines)  The treatment provided no relief          Past Medical History:   Diagnosis Date    Anemia     Arthritis     Asthma     Benign essential hypertension     CAD (coronary artery disease)     Carotid stenosis, bilateral     CHF (congestive heart failure) (HCC)     Chronic kidney disease     Coronary artery disease     Disease of thyroid gland     Dizziness     Edema of leg     Hyperkalemia 5/3/2021    Hyperlipidemia     Hypertension     Other disorder of circulatory system (CODE)     Renal artery stenosis (HCC)     Right    Subclavian arterial stenosis (HCC)     B/L       Past Surgical History:   Procedure Laterality Date    BREAST SURGERY      bxs,benign    COLONOSCOPY      HYSTERECTOMY  1983    43    INCISIONAL BREAST BIOPSY      x5, 1964, 1965, 1985 and 40 Rujaquan aDniels Bilateral 12/9/2019    Procedure: BYPASS AORTA BIFEMORAL WITH LEFT FEMORAL THROMBOEMBOLECTOMY;  Surgeon: Ronald James MD;  [facility];  Service: Vascular    AL THROMBOENDARTECTMY Daryl Jones INCIS Right 10/1/2021    Procedure: ENDARTERECTOMY ARTERY CAROTIDWITH BOVINE PATCH, INTROP DUPLEX;  Surgeon: Nancy Berg MD;  [facility];  Service: Vascular       Current Outpatient Medications   Medication Sig Dispense Refill    amLODIPine (NORVASC) 5 mg tablet take 1 tablet by mouth once daily 90 tablet 1    amoxicillin-clavulanate (AUGMENTIN) 875-125 mg per tablet Take 1 tablet by mouth every 12 (twelve) hours for 7 days 14 tablet 0    aspirin (ECOTRIN LOW STRENGTH) 81 mg EC tablet Take 1 tablet (81 mg total) by mouth daily  0    atorvastatin (LIPITOR) 80 mg tablet Take 1 tablet (80 mg total) by mouth daily at bedtime 90 tablet 3    B Complex Vitamins (VITAMIN B-COMPLEX) TABS Take by mouth daily       Bioflavonoid Products (VITAMIN C PLUS PO) Take by mouth daily      carvedilol (COREG) 12 5 mg tablet Take 1 tablet (12 5 mg total) by mouth 2 (two) times a day 180 tablet 3    cholecalciferol (VITAMIN D3) 1,000 units tablet Take 5,000 Units by mouth daily       clopidogrel (PLAVIX) 75 mg tablet take 1 tablet by mouth once daily 90 tablet 1    enalapril (VASOTEC) 10 mg tablet Take 1 tablet (10 mg total) by mouth 2 (two) times a day 60 tablet 5    ipratropium (ATROVENT) 0 03 % nasal spray 2 sprays into each nostril 3 (three) times a day as needed for rhinitis 30 mL 3    levothyroxine 25 mcg tablet Take 1 tablet (25 mcg total) by mouth daily in the early morning 90 tablet 3    nicotine (NICODERM CQ) 21 mg/24 hr TD 24 hr patch Place 1 patch on the skin every 24 hours 28 patch 0    spironolactone (ALDACTONE) 25 mg tablet Take 1 tablet (25 mg total) by mouth daily 90 tablet 3    torsemide (DEMADEX) 5 MG tablet Take 1 tablet (5 mg total) by mouth daily 90 tablet 3     No current facility-administered medications for this visit  No Known Allergies    Review of Systems   Constitutional: Positive for fatigue  Negative for fever  HENT: Positive for congestion, ear pain and sinus pressure  Negative for sore throat  Respiratory: Positive for cough  Negative for shortness of breath  Neurological: Positive for headaches  Negative for dizziness  Video Exam    There were no vitals filed for this visit  Physical Exam     I spent 10 minutes directly with the patient during this visit    VIRTUAL VISIT [facility] verbally agrees to participate in [employer]   Pt is aware that Virtual Care Services could be limited without vital signs or the ability to perform a full hands-on physical exam  Deena Wolff understands she or the provider may request at any time to terminate the video visit and request the patient to seek care or treatment in person

## 2022-04-22 DIAGNOSIS — I10 ESSENTIAL HYPERTENSION: Chronic | ICD-10-CM

## 2022-04-22 DIAGNOSIS — J31.0 NONALLERGIC RHINITIS: ICD-10-CM

## 2022-04-22 RX ORDER — IPRATROPIUM BROMIDE 21 UG/1
2 SPRAY, METERED NASAL 3 TIMES DAILY PRN
Qty: 30 ML | Refills: 3 | Status: SHIPPED | OUTPATIENT
Start: 2022-04-22

## 2022-04-22 RX ORDER — ENALAPRIL MALEATE 10 MG/1
10 TABLET ORAL 2 TIMES DAILY
Qty: 60 TABLET | Refills: 5 | Status: SHIPPED | OUTPATIENT
Start: 2022-04-22

## 2022-04-22 NOTE — TELEPHONE ENCOUNTER
Beba:    Patient asked if you can send in this Rx instead of Flonase? She stopped using the flonase and has been using Atrovent and it seems to be working better for her  Please advise

## 2022-04-25 ENCOUNTER — TELEPHONE (OUTPATIENT)
Dept: FAMILY MEDICINE CLINIC | Facility: CLINIC | Age: 82
End: 2022-04-25

## 2022-04-25 ENCOUNTER — TELEPHONE (OUTPATIENT)
Dept: CARDIOLOGY CLINIC | Facility: CLINIC | Age: 82
End: 2022-04-25

## 2022-04-25 DIAGNOSIS — R06.02 SHORTNESS OF BREATH: Primary | ICD-10-CM

## 2022-04-25 RX ORDER — ALBUTEROL SULFATE 90 UG/1
2 AEROSOL, METERED RESPIRATORY (INHALATION) EVERY 6 HOURS PRN
Qty: 8 G | Refills: 3 | Status: SHIPPED | OUTPATIENT
Start: 2022-04-25

## 2022-04-25 NOTE — TELEPHONE ENCOUNTER
Patient called asking our office about her inhaler  I scaled ov notes and recent rx from Dr Anne Peralta, advised to reach out to primary doc

## 2022-04-27 ENCOUNTER — ANESTHESIA EVENT (OUTPATIENT)
Dept: GASTROENTEROLOGY | Facility: AMBULARY SURGERY CENTER | Age: 82
End: 2022-04-27

## 2022-04-27 ENCOUNTER — ANESTHESIA (OUTPATIENT)
Dept: GASTROENTEROLOGY | Facility: AMBULARY SURGERY CENTER | Age: 82
End: 2022-04-27

## 2022-04-27 ENCOUNTER — HOSPITAL ENCOUNTER (OUTPATIENT)
Dept: GASTROENTEROLOGY | Facility: AMBULARY SURGERY CENTER | Age: 82
Setting detail: OUTPATIENT SURGERY
Discharge: HOME | End: 2022-04-27
Attending: INTERNAL MEDICINE | Admitting: INTERNAL MEDICINE
Payer: COMMERCIAL

## 2022-04-27 VITALS
SYSTOLIC BLOOD PRESSURE: 145 MMHG | HEART RATE: 61 BPM | TEMPERATURE: 97.3 F | DIASTOLIC BLOOD PRESSURE: 65 MMHG | RESPIRATION RATE: 20 BRPM | OXYGEN SATURATION: 99 %

## 2022-04-27 DIAGNOSIS — Z87.19 HISTORY OF ISCHEMIC COLITIS: ICD-10-CM

## 2022-04-27 PROCEDURE — 45385 COLONOSCOPY W/LESION REMOVAL: CPT | Performed by: INTERNAL MEDICINE

## 2022-04-27 PROCEDURE — 45380 COLONOSCOPY AND BIOPSY: CPT | Performed by: INTERNAL MEDICINE

## 2022-04-27 PROCEDURE — 88305 TISSUE EXAM BY PATHOLOGIST: CPT | Performed by: PATHOLOGY

## 2022-04-27 RX ORDER — PROPOFOL 10 MG/ML
INJECTION, EMULSION INTRAVENOUS AS NEEDED
Status: DISCONTINUED | OUTPATIENT
Start: 2022-04-27 | End: 2022-04-27

## 2022-04-27 RX ORDER — SODIUM CHLORIDE, SODIUM LACTATE, POTASSIUM CHLORIDE, CALCIUM CHLORIDE 600; 310; 30; 20 MG/100ML; MG/100ML; MG/100ML; MG/100ML
125 INJECTION, SOLUTION INTRAVENOUS CONTINUOUS
Status: CANCELLED | OUTPATIENT
Start: 2022-04-27

## 2022-04-27 RX ORDER — PROPOFOL 10 MG/ML
INJECTION, EMULSION INTRAVENOUS CONTINUOUS PRN
Status: DISCONTINUED | OUTPATIENT
Start: 2022-04-27 | End: 2022-04-27

## 2022-04-27 RX ORDER — SODIUM CHLORIDE, SODIUM LACTATE, POTASSIUM CHLORIDE, CALCIUM CHLORIDE 600; 310; 30; 20 MG/100ML; MG/100ML; MG/100ML; MG/100ML
125 INJECTION, SOLUTION INTRAVENOUS CONTINUOUS
Status: DISCONTINUED | OUTPATIENT
Start: 2022-04-27 | End: 2022-05-01 | Stop reason: HOSPADM

## 2022-04-27 RX ORDER — LIDOCAINE HYDROCHLORIDE 10 MG/ML
INJECTION, SOLUTION EPIDURAL; INFILTRATION; INTRACAUDAL; PERINEURAL AS NEEDED
Status: DISCONTINUED | OUTPATIENT
Start: 2022-04-27 | End: 2022-04-27

## 2022-04-27 RX ORDER — SODIUM CHLORIDE, SODIUM LACTATE, POTASSIUM CHLORIDE, CALCIUM CHLORIDE 600; 310; 30; 20 MG/100ML; MG/100ML; MG/100ML; MG/100ML
INJECTION, SOLUTION INTRAVENOUS CONTINUOUS PRN
Status: DISCONTINUED | OUTPATIENT
Start: 2022-04-27 | End: 2022-04-27

## 2022-04-27 RX ADMIN — LIDOCAINE HYDROCHLORIDE 50 MG: 10 INJECTION, SOLUTION EPIDURAL; INFILTRATION; INTRACAUDAL; PERINEURAL at 12:11

## 2022-04-27 RX ADMIN — SODIUM CHLORIDE, SODIUM LACTATE, POTASSIUM CHLORIDE, AND CALCIUM CHLORIDE: .6; .31; .03; .02 INJECTION, SOLUTION INTRAVENOUS at 12:04

## 2022-04-27 RX ADMIN — PROPOFOL 100 MCG/KG/MIN: 10 INJECTION, EMULSION INTRAVENOUS at 12:11

## 2022-04-27 RX ADMIN — PROPOFOL 100 MG: 10 INJECTION, EMULSION INTRAVENOUS at 12:11

## 2022-04-27 RX ADMIN — SODIUM CHLORIDE, SODIUM LACTATE, POTASSIUM CHLORIDE, AND CALCIUM CHLORIDE 125 ML/HR: .6; .31; .03; .02 INJECTION, SOLUTION INTRAVENOUS at 12:03

## 2022-04-27 NOTE — ANESTHESIA POSTPROCEDURE EVALUATION
Post-Op Assessment Note    CV Status:  Stable  Pain Score: 0    Pain management: adequate     Mental Status:  Sleepy   Hydration Status:  Stable   PONV Controlled:  None   Airway Patency:  Patent   Two or more mitigation strategies used for obstructive sleep apnea   Post Op Vitals Reviewed: Yes      Staff: CRNA         No complications documented      BP  140/60   Temp     Pulse 85   Resp 16   SpO2 99

## 2022-04-27 NOTE — ANESTHESIA PREPROCEDURE EVALUATION
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)   (+) Parenchymal renal hypertension      ENDO   (+) Hypothyroidism      /RENAL   (+) Chronic kidney disease, stage 3 (HCC)   (+) Parenchymal renal hypertension      HEMATOLOGY   (+) Acute blood loss anemia   (+) Anemia in stage 3 chronic kidney disease (HCC)   (+) Chronic anemia      NEURO/PSYCH   (+) History of ischemic colitis      PULMONARY   (+) Cigarette smoker   (+) Simple chronic bronchitis (HCC)        Physical Exam    Airway    Mallampati score: II  TM Distance: >3 FB  Neck ROM: full     Dental       Cardiovascular  Rhythm: regular, Rate: normal,     Pulmonary  Breath sounds clear to auscultation, Decreased breath sounds,     Other Findings        Anesthesia Plan  ASA Score- 2     Anesthesia Type- IV sedation with anesthesia with ASA Monitors  Additional Monitors:   Airway Plan:           Plan Factors-Exercise tolerance (METS): <4 METS  Chart reviewed  Existing labs reviewed  Patient summary reviewed  Patient is a current smoker  Patient instructed to abstain from smoking on day of procedure  Patient did not smoke on day of surgery  Induction- intravenous  Postoperative Plan- Plan for postoperative opioid use  Informed Consent- Anesthetic plan and risks discussed with patient  I personally reviewed this patient with the CRNA  Discussed and agreed on the Anesthesia Plan with the CRNA  Roxana Lewis

## 2022-04-27 NOTE — H&P
History and Physical -  [facility]  Radha Partida 80 y o  female MRN: [id number]                  HPI: Radha Partida is a 80y o  year old female who presents for colonoscopy, history of ischemic colitis, recent episode of bloody diarrhea      REVIEW OF SYSTEMS: Per the HPI, and otherwise unremarkable      Historical Information   Past Medical History:   Diagnosis Date    Anemia     Arthritis     Asthma     Benign essential hypertension     CAD (coronary artery disease)     Cardiac murmur     sees Dr Luis A Phoenix Carotid stenosis, bilateral     [facility]     Chronic kidney disease     renal artery stenosis    Chronic sinusitis     treated with antibiotics 3/22    Colitis     COPD [facility]chronic obstructive pulmonary disease) [facility] [address] [facility]     Coronary artery disease     Disease of thyroid gland     Dizziness     Edema of leg     compression stocking left leg    History of transfusion     Hyperkalemia 5/3/2021    Hyperlipidemia     Hypertension     Other disorder of circulatory system (CODE)     Renal artery stenosis (HCC)     Right    Subclavian arterial stenosis (HCC)     B/L    Wears dentures     lower one tooth    Wears glasses      Past Surgical History:   Procedure Laterality Date    BREAST SURGERY      bxs,benign    COLONOSCOPY      HYSTERECTOMY  1983    43    INCISIONAL BREAST BIOPSY      x5, 1964, 1965, 1985 and 40 Rujaqu[address] Daniels Bilateral 12/9/2019    Procedure: BYPASS AORTA BIFEMORAL WITH LEFT FEMORAL THROMBOEMBOLECTOMY;  Surgeon: Silvia Gay MD;  [facility];  Service: Vascular    MD THROMBOENDARTECTMY [facility] Right 10/1/2021    Procedure: [address], INTROP DUPLEX;  Surgeon: Mary Campos MD;  [facility];  Service: Vascular     Social History   Social History     Substance and Sexual Activity   Alcohol Use Yes    Comment: manhattans 2-3 every day for 50 years     Social History     Substance and Sexual Activity   Drug Use No     Social History     Tobacco Use   Smoking Status Current Some Day Smoker    Packs/day: 1 00    Years: 60 00    Pack years: 60 00    Types: Cigarettes   Smokeless Tobacco Never Used     Family History   Problem Relation Age of Onset    Hypertension Father     Heart disease Father         CHF    Coronary artery disease Family     Arthritis Family     Abdominal aortic aneurysm Mother     Hypertension Mother     Skin cancer Daughter     Cancer Paternal Aunt     No Known Problems Maternal Aunt     No Known Problems Maternal Aunt     No Known Problems Maternal Aunt        Meds/Allergies     (Not in a hospital admission)      No Known Allergies    Objective     BP (!) 225/87   Pulse 69   Temp (!) 97 3 °F (36 3 °C) (Temporal)   Resp 18   SpO2 99%       PHYSICAL EXAM    Gen: NAD  CV: RRR  CHEST: Clear  ABD: soft, NT/ND  EXT: no edema      ASSESSMENT/PLAN:  This is a 80y o  year old female here for colonoscopy, and she is stable and optimized for her procedure

## 2022-05-05 DIAGNOSIS — I10 MALIGNANT HYPERTENSION: ICD-10-CM

## 2022-05-05 RX ORDER — AMLODIPINE BESYLATE 5 MG/1
5 TABLET ORAL DAILY
Qty: 90 TABLET | Refills: 1 | Status: SHIPPED | OUTPATIENT
Start: 2022-05-05 | End: 2022-05-24 | Stop reason: SDUPTHER

## 2022-05-24 ENCOUNTER — OFFICE VISIT (OUTPATIENT)
Dept: CARDIOLOGY CLINIC | Facility: CLINIC | Age: 82
End: 2022-05-24
Payer: COMMERCIAL

## 2022-05-24 VITALS
WEIGHT: 111.1 LBS | SYSTOLIC BLOOD PRESSURE: 160 MMHG | OXYGEN SATURATION: 96 % | DIASTOLIC BLOOD PRESSURE: 100 MMHG | TEMPERATURE: 97.7 F | BODY MASS INDEX: 19.68 KG/M2 | HEART RATE: 94 BPM | HEIGHT: 63 IN

## 2022-05-24 DIAGNOSIS — E78.2 MIXED HYPERLIPIDEMIA: ICD-10-CM

## 2022-05-24 DIAGNOSIS — I10 MALIGNANT HYPERTENSION: ICD-10-CM

## 2022-05-24 DIAGNOSIS — E03.9 HYPOTHYROIDISM, UNSPECIFIED TYPE: ICD-10-CM

## 2022-05-24 DIAGNOSIS — I50.32 CHRONIC DIASTOLIC HEART FAILURE (HCC): ICD-10-CM

## 2022-05-24 DIAGNOSIS — I12.9 HYPERTENSIVE CHRONIC KIDNEY DISEASE WITH STAGE 1 THROUGH STAGE 4 CHRONIC KIDNEY DISEASE, OR UNSPECIFIED CHRONIC KIDNEY DISEASE: Primary | ICD-10-CM

## 2022-05-24 DIAGNOSIS — I35.0 NONRHEUMATIC AORTIC VALVE STENOSIS: ICD-10-CM

## 2022-05-24 PROCEDURE — 99214 OFFICE O/P EST MOD 30 MIN: CPT | Performed by: INTERNAL MEDICINE

## 2022-05-24 RX ORDER — AMLODIPINE BESYLATE 10 MG/1
10 TABLET ORAL DAILY
Qty: 90 TABLET | Refills: 3 | Status: SHIPPED | OUTPATIENT
Start: 2022-05-24

## 2022-05-24 RX ORDER — LEVOTHYROXINE SODIUM 0.03 MG/1
25 TABLET ORAL
Qty: 90 TABLET | Refills: 3 | Status: SHIPPED | OUTPATIENT
Start: 2022-05-24

## 2022-05-24 NOTE — PROGRESS NOTES
Cardiology   Barbra Wallace DO, Pb Christianson MD, Tk Moralez MD, April Martinez MD, [facility] [place]  -------------------------------------------------------------------  [facility]  [address], [address] [facility]  [place], [facility], [facility] [facility] [address]  [phone]    Cardiology Follow Up  Tae Kunz  1940  [id number]          Assessment/Plan:    1  Hypertensive chronic kidney disease with stage 1 through stage 4 chronic kidney disease, or unspecified chronic kidney disease    2  Malignant hypertension    3  Hypothyroidism, unspecified type    4  Mixed hyperlipidemia    5  Nonrheumatic aortic valve stenosis    6  Chronic diastolic heart failure (HCC)      - Discussed smoking cessation with patient in detail  She is not ready to quit at this time  Aware of high risk for future cardiovascular events  - she has a large amount of bruising present  May hold aspirin for now  Continue Plavix  She will follow up with vascular surgery in the next 6 weeks and may need to restart aspirin at that time  - Continue torsemide 5 mg daily  - will increase amlodipine to 10 mg daily  Continue home BP monitoring  - continue enalapril and carvedilol  Her last ejection fraction was normal       Interval History:     Tae Kunz is 80 y o  female here for followup of hypertension and CHF  She has a large amount of peripheral vascular disease  She underwent right carotid endarterectomy along with aortobifemoral bypass in December 2019  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  She follows with vascular surgery  Patient continues to smoke nearly a pack per day  She has a large amount of bruising  Both arms are covered with diffuse bruises  She currently is using aspirin 81 mg daily along with Plavix 75 mg daily  She denies any chest pain, shortness of breath, lower extremity edema, orthopnea or paroxysmal nocturnal dyspnea  BP is elevated  Past Medical History:   Diagnosis Date    Anemia     Arthritis     Asthma     Benign essential hypertension     CAD (coronary artery disease)     Cardiac murmur     sees Dr Glenetta Gilford Carotid stenosis, bilateral     [facility] failure) ([facility] )     Chronic kidney disease     renal artery stenosis    Chronic sinusitis     treated with antibiotics 3/22    Colitis     COPD (chronic obstructive pulmonary disease) ([facility] )     Coronary artery disease     Disease of thyroid gland     Dizziness     Edema of leg     compression stocking left leg    History of transfusion     Hyperkalemia 5/3/2021    Hyperlipidemia     Hypertension     Other disorder of circulatory system (CODE)     Renal artery stenosis (HCC)     Right    Subclavian arterial stenosis (HCC)     B/L    Wears dentures     lower one tooth    Wears glasses      Social History     Socioeconomic History    Marital status:      Spouse name: Not on file    Number of children: Not on file    Years of education: Not on file    Highest education level: Not on file   Occupational History    Not on file   Tobacco Use    Smoking status: Current Some Day Smoker     Packs/day: 1 00     Years: 60 00     Pack years: 60 00     Types: Cigarettes    Smokeless tobacco: Never Used   Vaping Use    Vaping Use: Never used   Substance and Sexual Activity    Alcohol use: Yes     Comment: wilfrid 2-3 every day for 50 years    Drug use: No    Sexual activity: Yes     Partners: Male     Birth control/protection: Surgical   Other Topics Concern    Not on file   Social History Narrative    Rarely exercises    Sleeps 6-7 hours per day     Social Determinants of Health     Financial Resource Strain: Not on file   Food Insecurity: Not on file   Transportation Needs: No Transportation Needs    Lack of Transportation (Medical): No    Lack of Transportation (Non-Medical):  No   Physical Activity: Not on file   Stress: Not on file   Social Connections: Not on file   Intimate Partner Violence: Not on file   Housing Stability: Not on file      Family History   Problem Relation Age of Onset    Hypertension Father     Heart disease Father         CHF    Coronary artery disease Family     Arthritis Family     Abdominal aortic aneurysm Mother     Hypertension Mother    Rico Barrientosgel Skin cancer Daughter     Cancer Paternal Aunt     No Known Problems Maternal Aunt     No Known Problems Maternal Aunt     No Known Problems Maternal Aunt      Past Surgical History:   Procedure Laterality Date    BREAST SURGERY      bxs,benign    COLONOSCOPY      HYSTERECTOMY  1983    37    INCISIONAL BREAST BIOPSY      x5, 1964, 1965, 1985 and [address] Bilateral 12/9/2019    Procedure: BYPASS AORTA BIFEMORAL WITH LEFT FEMORAL THROMBOEMBOLECTOMY;  Surgeon: Donaldo Pate MD;  [facility];  Service: Vascular    MS THROMBOENDARTECTMY [facility] Cancer Right 10/1/2021    Procedure: [address], INTROP DUPLEX;  Surgeon: Fanny Dobbs MD;  [facility];  Service: Vascular       Current Outpatient Medications:     amLODIPine (NORVASC) 10 mg tablet, Take 1 tablet (10 mg total) by mouth in the morning , Disp: 90 tablet, Rfl: 3    levothyroxine 25 mcg tablet, Take 1 tablet (25 mcg total) by mouth daily in the early morning, Disp: 90 tablet, Rfl: 3    albuterol (ProAir HFA) 90 mcg/act inhaler, Inhale 2 puffs every 6 (six) hours as needed for wheezing, Disp: 8 g, Rfl: 3    atorvastatin (LIPITOR) 80 mg tablet, Take 1 tablet (80 mg total) by mouth daily at bedtime, Disp: 90 tablet, Rfl: 3    B Complex Vitamins (VITAMIN B-COMPLEX) TABS, Take by mouth daily , Disp: , Rfl:     Bioflavonoid Products (VITAMIN C PLUS PO), Take by mouth daily, Disp: , Rfl:     bisacodyl (DULCOLAX) 5 mg EC tablet, Take 10 mg by mouth once, Disp: , Rfl:     carvedilol (COREG) 12 5 mg tablet, Take 1 tablet (12 5 mg total) by mouth 2 (two) times a day, Disp: 180 tablet, Rfl: 3    cholecalciferol (VITAMIN D3) 1,000 units tablet, Take 5,000 Units by mouth daily , Disp: , Rfl:     clopidogrel (PLAVIX) 75 mg tablet, take 1 tablet by mouth once daily (Patient taking differently: 75 mg daily Last dose 4/22/22 ), Disp: 90 tablet, Rfl: 1    COLLAGEN PO, Take by mouth Includes a probiotic and other vitamins-10 supplements-2 am-one in the afternoon-one hs, Disp: , Rfl:     Diclofenac Sodium (VOLTAREN) 1 %, 4 (four) times a day as needed  , Disp: , Rfl:     enalapril (VASOTEC) 10 mg tablet, Take 1 tablet (10 mg total) by mouth 2 (two) times a day, Disp: 60 tablet, Rfl: 5    ipratropium (ATROVENT) 0 03 % nasal spray, 2 sprays into each nostril 3 (three) times a day as needed for rhinitis, Disp: 30 mL, Rfl: 3    nicotine (NICODERM CQ) 21 mg/24 hr TD 24 hr patch, Place 1 patch on the skin every 24 hours, Disp: 28 patch, Rfl: 0    Polyethylene Glycol 3350 (MIRALAX PO), Take by mouth once, Disp: , Rfl:     spironolactone (ALDACTONE) 25 mg tablet, Take 1 tablet (25 mg total) by mouth daily (Patient taking differently: Take 25 mg by mouth every morning  ), Disp: 90 tablet, Rfl: 3    torsemide (DEMADEX) 5 MG tablet, Take 1 tablet (5 mg total) by mouth daily (Patient taking differently: Take 5 mg by mouth every morning  ), Disp: 90 tablet, Rfl: 3        Review of Systems:  Review of Systems   Respiratory: Negative for shortness of breath  Cardiovascular: Negative for chest pain, palpitations and leg swelling  Musculoskeletal: Positive for arthralgias and myalgias  All other systems reviewed and are negative  Physical Exam:  Vitals:  Vitals:    05/24/22 1127   BP: 160/100   BP Location: Left arm   Patient Position: Sitting   Cuff Size: Standard   Pulse: 94   Temp: 97 7 °F (36 5 °C)   SpO2: 96%   Weight: 50 4 kg (111 lb 1 6 oz)   Height: 5' 3" (1 6 m)     Physical Exam   Constitutional: She appears healthy   No distress  Eyes: Pupils are equal, round, and reactive to light  Conjunctivae are normal    Neck: No JVD present  Cardiovascular: Normal rate, regular rhythm and normal heart sounds  Exam reveals no gallop and no friction rub  No murmur heard  Pulmonary/Chest: Effort normal and breath sounds normal  She has no wheezes  She has no rales  Musculoskeletal:         General: No tenderness, deformity or edema  Cervical back: Normal range of motion and neck supple  Neurological: She is alert and oriented to person, place, and time  Skin: Skin is warm and dry  Cardiographics:  EKG: Personally reviewed NSR with LVH  Last known EF: 60%    This note was completed in part utilizing [employer] Direct Software  Grammatical errors, random word insertions, spelling mistakes, and incomplete sentences can be an occasional consequence of this system secondary to software limitations, ambient noise, and hardware issues  If you have any questions or concerns about the content, text, or information contained within the body of this dictation, please contact the provider for clarification

## 2022-05-31 ENCOUNTER — APPOINTMENT (OUTPATIENT)
Dept: LAB | Facility: CLINIC | Age: 82
End: 2022-05-31
Payer: COMMERCIAL

## 2022-05-31 DIAGNOSIS — N18.30 ANEMIA IN STAGE 3 CHRONIC KIDNEY DISEASE, UNSPECIFIED WHETHER STAGE 3A OR 3B CKD (HCC): Chronic | ICD-10-CM

## 2022-05-31 DIAGNOSIS — D63.1 ANEMIA IN STAGE 3 CHRONIC KIDNEY DISEASE, UNSPECIFIED WHETHER STAGE 3A OR 3B CKD (HCC): Chronic | ICD-10-CM

## 2022-05-31 DIAGNOSIS — E03.9 HYPOTHYROIDISM, UNSPECIFIED TYPE: ICD-10-CM

## 2022-05-31 LAB
ALBUMIN SERPL BCP-MCNC: 3.9 G/DL (ref 3.5–5)
ALP SERPL-CCNC: 65 U/L (ref 46–116)
ALT SERPL W P-5'-P-CCNC: 25 U/L (ref 12–78)
ANION GAP SERPL CALCULATED.3IONS-SCNC: 7 MMOL/L (ref 4–13)
AST SERPL W P-5'-P-CCNC: 29 U/L (ref 5–45)
BASOPHILS # BLD AUTO: 0.07 THOUSANDS/ΜL (ref 0–0.1)
BASOPHILS NFR BLD AUTO: 1 % (ref 0–1)
BILIRUB SERPL-MCNC: 0.63 MG/DL (ref 0.2–1)
BUN SERPL-MCNC: 57 MG/DL (ref 5–25)
CALCIUM SERPL-MCNC: 9.6 MG/DL (ref 8.3–10.1)
CHLORIDE SERPL-SCNC: 103 MMOL/L (ref 100–108)
CHOLEST SERPL-MCNC: 111 MG/DL
CO2 SERPL-SCNC: 22 MMOL/L (ref 21–32)
CREAT SERPL-MCNC: 1.81 MG/DL (ref 0.6–1.3)
EOSINOPHIL # BLD AUTO: 0.29 THOUSAND/ΜL (ref 0–0.61)
EOSINOPHIL NFR BLD AUTO: 3 % (ref 0–6)
ERYTHROCYTE [DISTWIDTH] IN BLOOD BY AUTOMATED COUNT: 17.9 % (ref 11.6–15.1)
GFR SERPL CREATININE-BSD FRML MDRD: 25 ML/MIN/1.73SQ M
GLUCOSE P FAST SERPL-MCNC: 94 MG/DL (ref 65–99)
HCT VFR BLD AUTO: 32 % (ref 34.8–46.1)
HDLC SERPL-MCNC: 58 MG/DL
HGB BLD-MCNC: 10.6 G/DL (ref 11.5–15.4)
IMM GRANULOCYTES # BLD AUTO: 0.08 THOUSAND/UL (ref 0–0.2)
IMM GRANULOCYTES NFR BLD AUTO: 1 % (ref 0–2)
LDLC SERPL CALC-MCNC: 38 MG/DL (ref 0–100)
LYMPHOCYTES # BLD AUTO: 1.86 THOUSANDS/ΜL (ref 0.6–4.47)
LYMPHOCYTES NFR BLD AUTO: 21 % (ref 14–44)
MCH RBC QN AUTO: 32.5 PG (ref 26.8–34.3)
MCHC RBC AUTO-ENTMCNC: 33.1 G/DL (ref 31.4–37.4)
MCV RBC AUTO: 98 FL (ref 82–98)
MONOCYTES # BLD AUTO: 1.21 THOUSAND/ΜL (ref 0.17–1.22)
MONOCYTES NFR BLD AUTO: 14 % (ref 4–12)
NEUTROPHILS # BLD AUTO: 5.25 THOUSANDS/ΜL (ref 1.85–7.62)
NEUTS SEG NFR BLD AUTO: 60 % (ref 43–75)
NONHDLC SERPL-MCNC: 53 MG/DL
NRBC BLD AUTO-RTO: 0 /100 WBCS
PLATELET # BLD AUTO: 261 THOUSANDS/UL (ref 149–390)
PMV BLD AUTO: 11.9 FL (ref 8.9–12.7)
POTASSIUM SERPL-SCNC: 4.5 MMOL/L (ref 3.5–5.3)
PROT SERPL-MCNC: 8 G/DL (ref 6.4–8.2)
RBC # BLD AUTO: 3.26 MILLION/UL (ref 3.81–5.12)
SODIUM SERPL-SCNC: 132 MMOL/L (ref 136–145)
TRIGL SERPL-MCNC: 73 MG/DL
TSH SERPL DL<=0.05 MIU/L-ACNC: 1.12 UIU/ML (ref 0.45–4.5)
WBC # BLD AUTO: 8.76 THOUSAND/UL (ref 4.31–10.16)

## 2022-05-31 PROCEDURE — 80061 LIPID PANEL: CPT | Performed by: INTERNAL MEDICINE

## 2022-05-31 PROCEDURE — 36415 COLL VENOUS BLD VENIPUNCTURE: CPT | Performed by: INTERNAL MEDICINE

## 2022-05-31 PROCEDURE — 85025 COMPLETE CBC W/AUTO DIFF WBC: CPT

## 2022-05-31 PROCEDURE — 84443 ASSAY THYROID STIM HORMONE: CPT

## 2022-05-31 PROCEDURE — 80053 COMPREHEN METABOLIC PANEL: CPT | Performed by: INTERNAL MEDICINE

## 2022-06-06 DIAGNOSIS — I74.09 AORTOILIAC OCCLUSIVE DISEASE (HCC): Chronic | ICD-10-CM

## 2022-06-06 RX ORDER — CLOPIDOGREL BISULFATE 75 MG/1
75 TABLET ORAL DAILY
Qty: 90 TABLET | Refills: 1 | Status: SHIPPED | OUTPATIENT
Start: 2022-06-06

## 2022-06-15 ENCOUNTER — TELEPHONE (OUTPATIENT)
Dept: HEMATOLOGY ONCOLOGY | Facility: CLINIC | Age: 82
End: 2022-06-15

## 2022-06-15 ENCOUNTER — OFFICE VISIT (OUTPATIENT)
Dept: HEMATOLOGY ONCOLOGY | Facility: MEDICAL CENTER | Age: 82
End: 2022-06-15
Payer: COMMERCIAL

## 2022-06-15 VITALS
DIASTOLIC BLOOD PRESSURE: 68 MMHG | HEART RATE: 56 BPM | RESPIRATION RATE: 20 BRPM | HEIGHT: 63 IN | BODY MASS INDEX: 20.25 KG/M2 | SYSTOLIC BLOOD PRESSURE: 124 MMHG | WEIGHT: 114.3 LBS | TEMPERATURE: 97.5 F | OXYGEN SATURATION: 98 %

## 2022-06-15 DIAGNOSIS — D63.1 ANEMIA IN STAGE 3 CHRONIC KIDNEY DISEASE, UNSPECIFIED WHETHER STAGE 3A OR 3B CKD (HCC): Primary | Chronic | ICD-10-CM

## 2022-06-15 DIAGNOSIS — N18.30 ANEMIA IN STAGE 3 CHRONIC KIDNEY DISEASE, UNSPECIFIED WHETHER STAGE 3A OR 3B CKD (HCC): Primary | Chronic | ICD-10-CM

## 2022-06-15 PROCEDURE — 99214 OFFICE O/P EST MOD 30 MIN: CPT | Performed by: INTERNAL MEDICINE

## 2022-06-15 NOTE — TELEPHONE ENCOUNTER
Appointment Confirmation (to confirm pre existing appointments - ONLY)  No need to route   Appointment with  Dr Ger Padron   Appointment date & time 6/15 @ 1pm   Location [facility]    Patient verbilized Understanding yes

## 2022-06-15 NOTE — PROGRESS NOTES
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  We previously discussed the potential interactions between an TERRY and a hematologic malignancy  We discussed with patient needs to monitor for as far as progressive fatigue, pallor, bleeding, decreased activities, progressive respiratory issues etc   Monoclonal workup will also need to be repeated in the future  Patient is to return in 4 months with a CBC/differential before  Patient is on Plavix; aspirin has been discontinued  Patient still with bruising on the upper extremities but no excessive bruising or bleeding  Patient will follow up with Cardiology as directed  Patient more recently underwent right carotid endarterectomy - no postoperative complications  Patient will follow up with vascular surgery as directed  Mrs Wolff knows to call the hematology/oncology office if there are any other questions or concerns  Carefully review your medication list and verify that the list is accurate and up-to-date  Please call the hematology/oncology office if there are medications missing from the list, medications on the list that you are not currently taking or if there is a dosage or instruction that is different from how you're taking that medication      Patient goals and areas of care:  Monitor CBC parameters  Barriers to care: none  Patient is able to self-care   ______________________________________________________________________________________    Chief Complaint   Patient presents with    Follow-up    Anemia     History of Present Illness:  42-year-old female previously referred for evaluation of anemia  Options were discussed recently and patient was to begin Procrit (but never started)       More recently Mrs Wolff underwent right carotid endarterectomy - no postoperative complications  Patient states feeling okay, baseline  Appetite is good, weight is stable, no recent GI problems including diarrhea (prior ER visit because of diarrhea)  No fevers, chills or sweats  No problems with bleeding  Patient bruises easily on the upper extremities, on Plavix  Fatigue is the same as before but patient is able to go about her normal daily activities  No shortness of breath or dyspnea on exertion  Review of Systems   Constitutional: Positive for fatigue  Negative for unexpected weight change  HENT: Negative  Eyes: Negative  Respiratory: Negative  Cardiovascular: Negative  Gastrointestinal: Negative  Endocrine: Negative  Genitourinary: Negative  Musculoskeletal: Negative  Skin: Negative  Allergic/Immunologic: Negative  Neurological: Negative  Hematological: Negative  Psychiatric/Behavioral: Negative  All other systems reviewed and are negative      Patient Active Problem List   Diagnosis    [facility]    Carotid artery stenosis    Hypothyroidism    Nicotine dependence    Subclavian artery stenosis, left (HCC)    Aortic stenosis    Benign essential HTN    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    Hyperlipidemia    Onychomycosis    Paronychia of toenail    Simple chronic bronchitis (HCC)    Cigarette smoker    Cardiac murmur    Mass of spine    Chronic diastolic heart failure (HCC)    Acute blood loss anemia    Hypertensive heart disease with chronic diastolic congestive heart failure (HCC)    Pedal edema    Peripheral neuropathy    Parenchymal renal hypertension    Anemia in stage 3 chronic kidney disease (HCC)    Chronic kidney disease, stage 3 (HCC)    Chronic anemia    Renal artery stenosis (HCC)    History of ischemic colitis    Gastritis without bleeding    Arteriovenous malformation (AVM)    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HCC)     Past Medical History:   Diagnosis Date    Anemia     Arthritis     Asthma     Benign essential hypertension     CAD (coronary artery disease)     Cardiac murmur     sees Dr Celina Vernon Carotid stenosis, bilateral     CHF (congestive heart failure) (HCC)     Chronic kidney disease     renal artery stenosis    Chronic sinusitis     treated with antibiotics 3/22    Colitis     COPD (chronic obstructive pulmonary disease) ([facility]     Coronary artery disease     Disease of thyroid gland     Dizziness     Edema of leg     compression stocking left leg    History of transfusion     Hyperkalemia 5/3/2021    Hyperlipidemia     Hypertension     Other disorder of circulatory system (CODE)     Renal artery stenosis (HCC)     Right    Subclavian arterial stenosis (HCC)     B/L    Wears dentures     lower one tooth    Wears glasses      Past Surgical History:   Procedure Laterality Date    BREAST SURGERY      bxs,benign    COLONOSCOPY      HYSTERECTOMY  1983    43    INCISIONAL BREAST BIOPSY      x5, 1964, 1965, 1985 and [address] Bilateral 12/9/2019    Procedure: BYPASS AORTA BIFEMORAL WITH LEFT FEMORAL THROMBOEMBOLECTOMY;  Surgeon: Rosalinda Prado MD;  [facility];  Service: Vascular    [place] THROMBOENDARTECTMY [place] Right 10/1/2021    Procedure: [address], INTROP DUPLEX;  Surgeon: Aleyda eRyes MD;  Location[facility];  Service: Vascular   Past surgical history:  + blood transfusions many years ago after the birth of 1 child (NOS)    Ob gyn: No postmenopausal bleeding, no breast issues, no recent mammograms    Family History   Problem Relation Age of Onset    Hypertension Father     Heart disease Father         CHF    Coronary artery disease Family     Arthritis Family     Abdominal aortic aneurysm Mother     Hypertension Mother     Skin cancer Daughter     Cancer Paternal Aunt     No Known Problems Maternal Aunt     No Known Problems Maternal Aunt     No Known Problems Maternal Aunt    Family history:  No known familial or genetic diseases, 2 daughters in good general health, 3rd daughter  (NOS)    Social History     Socioeconomic History    Marital status:      Spouse name: Not on file    Number of children: Not on file    Years of education: Not on file    Highest education level: Not on file   Occupational History    Not on file   Tobacco Use    Smoking status: Current Some Day Smoker     Packs/day: 1 00     Years: 60 00     Pack years: 60 00     Types: Cigarettes    Smokeless tobacco: Never Used   Vaping Use    Vaping Use: Never used   Substance and Sexual Activity    Alcohol use: Yes     Comment: wilfrid 2-3 every day for 50 years    Drug use: No    Sexual activity: Yes     Partners: Male     Birth control/protection: Surgical   Other Topics Concern    Not on file   Social History Narrative    Rarely exercises    Sleeps 6-7 hours per day     Social Determinants of Health     Financial Resource Strain: Not on file   Food Insecurity: Not on file   Transportation Needs: No Transportation Needs    Lack of Transportation (Medical): No    Lack of Transportation (Non-Medical):  No   Physical Activity: Not on file   Stress: Not on file   Social Connections: Not on file   Intimate Partner Violence: Not on file   Housing Stability: Not on file   Social history: 1/2 pack per day for 60 years, no drug or alcohol abuse, no toxic exposure    Current Outpatient Medications:     albuterol (ProAir HFA) 90 mcg/act inhaler, Inhale 2 puffs every 6 (six) hours as needed for wheezing, Disp: 8 g, Rfl: 3    amLODIPine (NORVASC) 10 mg tablet, Take 1 tablet (10 mg total) by mouth in the morning , Disp: 90 tablet, Rfl: 3    atorvastatin (LIPITOR) 80 mg tablet, Take 1 tablet (80 mg total) by mouth daily at bedtime, Disp: 90 tablet, Rfl: 3    B Complex Vitamins (VITAMIN B-COMPLEX) TABS, Take by mouth daily , Disp: , Rfl:     Bioflavonoid Products (VITAMIN C PLUS PO), Take by mouth daily, Disp: , Rfl:     bisacodyl (DULCOLAX) 5 mg EC tablet, Take 10 mg by mouth once, Disp: , Rfl:     carvedilol (COREG) 12 5 mg tablet, Take 1 tablet (12 5 mg total) by mouth 2 (two) times a day, Disp: 180 tablet, Rfl: 3    cholecalciferol (VITAMIN D3) 1,000 units tablet, Take 5,000 Units by mouth daily , Disp: , Rfl:     clopidogrel (PLAVIX) 75 mg tablet, Take 1 tablet (75 mg total) by mouth daily, Disp: 90 tablet, Rfl: 1    COLLAGEN PO, Take by mouth Includes a probiotic and other vitamins-10 supplements-2 am-one in the afternoon-one hs, Disp: , Rfl:     Diclofenac Sodium (VOLTAREN) 1 %, 4 (four) times a day as needed  , Disp: , Rfl:     enalapril (VASOTEC) 10 mg tablet, Take 1 tablet (10 mg total) by mouth 2 (two) times a day, Disp: 60 tablet, Rfl: 5    ipratropium (ATROVENT) 0 03 % nasal spray, 2 sprays into each nostril 3 (three) times a day as needed for rhinitis, Disp: 30 mL, Rfl: 3    levothyroxine 25 mcg tablet, Take 1 tablet (25 mcg total) by mouth daily in the early morning, Disp: 90 tablet, Rfl: 3    Polyethylene Glycol 3350 (MIRALAX PO), Take by mouth once, Disp: , Rfl:     spironolactone (ALDACTONE) 25 mg tablet, Take 1 tablet (25 mg total) by mouth daily (Patient taking differently: Take 25 mg by mouth every morning), Disp: 90 tablet, Rfl: 3    torsemide (DEMADEX) 5 MG tablet, Take 1 tablet (5 mg total) by mouth daily (Patient taking differently: Take 5 mg by mouth every morning), Disp: 90 tablet, Rfl: 3    nicotine (NICODERM CQ) 21 mg/24 hr TD 24 hr patch, Place 1 patch on the skin every 24 hours (Patient not taking: Reported on 6/15/2022), Disp: 28 patch, Rfl: 0    No Known Allergies    Vitals:    06/15/22 1259   BP: 124/68   Pulse: 56   Resp: 20   Temp: 97 5 °F (36 4 °C)   SpO2: 98%     Physical Exam  Constitutional:       Appearance: She is well-developed  Comments: Somewhat more frail appearing female, relatively good color, no respiratory distress, no signs of pain   HENT:      Head: Normocephalic and atraumatic  Right Ear: External ear normal       Left Ear: External ear normal    Eyes:      Pupils: Pupils are equal, round, and reactive to light  Comments: Conjunctiva pale, anicteric   Neck:      Comments: Well-healed right cervical scar, no adenopathy bilaterally  Cardiovascular:      Rate and Rhythm: Normal rate and regular rhythm  Heart sounds: Normal heart sounds  Pulmonary:      Effort: Pulmonary effort is normal       Breath sounds: Normal breath sounds  Comments: Fair entry bilaterally, scattered rhonchi bilateral  Abdominal:      General: Bowel sounds are normal       Palpations: Abdomen is soft  Comments: Soft, minimal diffuse tenderness, no guarding, no rigidity or rebound, +bowel sounds, cannot palpate liver or spleen   Musculoskeletal:         General: Normal range of motion  Cervical back: Normal range of motion and neck supple  Skin:     General: Skin is warm  Comments: Well tanned, warm, moist, upper extremity purpuric, no petechiae, no ecchymoses, a few scabs on the upper extremities, no bleeding   Neurological:      Mental Status: She is alert and oriented to person, place, and time  Deep Tendon Reflexes: Reflexes are normal and symmetric  Psychiatric:         Behavior: Behavior normal          Thought Content:  Thought content normal          Judgment: Judgment normal      Extremities:  1-2 + bilateral lower extremity edema, no cords, pulses are 1+  Lymphatics:  No adenopathy in the neck, supraclavicular region and axilla bilaterally    Labs        05/31/2022 BUN = 57 creatinine = 1 81 GFR = 25    10/28/2021 WBC = 6 12 hemoglobin = 10 7 hematocrit = 33 3 MCV = 91 platelet = 860  52/35/7338 WBC = 5 7 hemoglobin = 7 5 hematocrit = 24 4 MCV = 90 platelet = 499  09/46/1849 WBC = 7 7 hemoglobin = 7 7 hematocrit = 26 1 MCV = 90 RDW = 21 1 platelet = 266 neutrophil = 64% lymphocytes = 19% monocyte = 10% eosinophil = 6% BUN = 69 creatinine = 1 53 GFR = 32 calcium = 9 4 AST = 24 ALT = 28 alkaline phosphatase = 56 total protein = 7 7 total bilirubin = 0 38 free kappa = 74 4 = elevated free lambda = 39 6 = elevated kappa/lambda ratio = 1 88 = slightly elevated TSH = 1 700 folate> 20 LDH = 228 haptoglobin = 171 direct Genie = negative erythropoietin = 29 2 ISABEL = negative ESR = 26 reticulocyte count = 2 27%, = elevated iron = 49 TIBC = 495 iron saturation = 10% ferritin = 20    05/13/2021 SPEP demonstrated a monoclonal peak in the gamma region  05/13/2021 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    04/27/2021 WBC = 9 1 hemoglobin = 7 7 hematocrit = 25 3 MCV = 87 RDW = 19 platelet = 236 BUN = 50 creatinine = 1 79 GFR = 26 PT = 14 6 INR = 1 13 PTT = 30

## 2022-07-13 ENCOUNTER — HOSPITAL ENCOUNTER (OUTPATIENT)
Dept: RADIOLOGY | Facility: HOSPITAL | Age: 82
Discharge: HOME | End: 2022-07-13
Attending: SURGERY
Payer: COMMERCIAL

## 2022-07-13 DIAGNOSIS — I65.23 BILATERAL CAROTID ARTERY STENOSIS: Chronic | ICD-10-CM

## 2022-07-13 PROCEDURE — 93880 EXTRACRANIAL BILAT STUDY: CPT | Performed by: SURGERY

## 2022-07-13 PROCEDURE — 93880 EXTRACRANIAL BILAT STUDY: CPT

## 2022-07-26 ENCOUNTER — APPOINTMENT (EMERGENCY)
Dept: RADIOLOGY | Facility: HOSPITAL | Age: 82
DRG: 125 | End: 2022-07-26
Payer: COMMERCIAL

## 2022-07-26 ENCOUNTER — HOSPITAL ENCOUNTER (EMERGENCY)
Facility: HOSPITAL | Age: 82
Discharge: HOME | DRG: 125 | End: 2022-07-27
Attending: EMERGENCY MEDICINE
Payer: COMMERCIAL
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
## 2022-07-26 DIAGNOSIS — S01.119A EYEBROW LACERATION: ICD-10-CM

## 2022-07-26 PROCEDURE — 99282 EMERGENCY DEPT VISIT SF MDM: CPT | Performed by: EMERGENCY MEDICINE

## 2022-07-26 PROCEDURE — G1004 CDSM NDSC: HCPCS

## 2022-07-26 PROCEDURE — 12011 RPR F/E/E/N/L/M 2.5 CM/<: CPT | Performed by: EMERGENCY MEDICINE

## 2022-07-26 PROCEDURE — 72125 CT NECK SPINE W/O DYE: CPT

## 2022-07-26 PROCEDURE — 70450 CT HEAD/BRAIN W/O DYE: CPT

## 2022-07-26 PROCEDURE — 99284 EMERGENCY DEPT VISIT MOD MDM: CPT

## 2022-07-26 RX ORDER — LIDOCAINE HYDROCHLORIDE 10 MG/ML
5 INJECTION, SOLUTION EPIDURAL; INFILTRATION; INTRACAUDAL; PERINEURAL ONCE
Status: COMPLETED | OUTPATIENT
Start: 2022-07-26 | End: 2022-07-26

## 2022-07-26 RX ADMIN — LIDOCAINE HYDROCHLORIDE 5 ML: 10 INJECTION, SOLUTION EPIDURAL; INFILTRATION; INTRACAUDAL at 22:47

## 2022-07-27 VITALS
RESPIRATION RATE: 18 BRPM | OXYGEN SATURATION: 97 % | SYSTOLIC BLOOD PRESSURE: 114 MMHG | DIASTOLIC BLOOD PRESSURE: 50 MMHG | HEART RATE: 61 BPM

## 2022-07-27 RX ORDER — GINSENG 100 MG
1 CAPSULE ORAL ONCE
Status: COMPLETED | OUTPATIENT
Start: 2022-07-27 | End: 2022-07-27

## 2022-07-27 RX ADMIN — BACITRACIN 1 SMALL APPLICATION: 500 OINTMENT TOPICAL at 01:16

## 2022-07-27 NOTE — ED PROCEDURE NOTE
PROCEDURE  Laceration repair    Date/Time: 7/26/2022 6:30 AM  Performed by: Mary Jane Yap MD  Authorized by: Mary Jane Yap MD   Consent: Verbal consent obtained    Consent given by: patient  Patient understanding: patient states understanding of the procedure being performed  Patient identity confirmed: verbally with patient, arm band and hospital-assigned identification number  Body area: head/neck  Location details: left eyebrow  Laceration length: 2 cm  Anesthesia: local infiltration    Anesthesia:  Local Anesthetic: lidocaine 1% without epinephrine  Anesthetic total: 5 mL      Procedure Details:  Irrigation solution: saline  Irrigation method: jet lavage  Amount of cleaning: standard  Skin closure: 5-0 nylon  Number of sutures: 4  Approximation: close  Approximation difficulty: simple  Dressing: antibiotic ointment and 4x4 sterile gauze  Patient tolerance: patient tolerated the procedure well with no immediate complications           Mary Jane Yap MD  07/27/22 6552

## 2022-07-27 NOTE — ED PROVIDER NOTES
Final Diagnosis:  1  Fall, initial encounter    2  Eyebrow laceration        Chief Complaint   Patient presents with    Fall     Pt with fall around 8pm, reports hitting face and left eye, laceration above left eyebrow  Pt on plavix  HPI  Patient presents with eyebrow laceration  She slipped and fell onto face  No diploplia with EOM  No CN deficit  No vision change  No pain over sinus palpation  No nasal septal hematoma or irregularity  No ha  No vomiting  No LOC  On antiplatelets  V shaped laceration left eyebrow      - No language barrier    - History obtained from patient  - There are no limitations to the history obtained  - Previous charting underwent limited review with attention to last ED visits, labs, ekgs, and prior imaging  PMH:   has a past medical history of Anemia, Arthritis, Asthma, Benign essential hypertension, CAD (coronary artery disease), Cardiac murmur, Carotid stenosis, bilateral, CHF (congestive heart failure) ([address] ), Chronic kidney disease, Chronic sinusitis, Colitis, COPD (chronic obstructive pulmonary disease) ([address] ), Coronary artery disease, Disease of thyroid gland, Dizziness, Edema of leg, History of transfusion, Hyperkalemia (5/3/2021), Hyperlipidemia, Hypertension, Other disorder of circulatory system (CODE), Renal artery stenosis ([address] ), Subclavian arterial stenosis ([address] ), Wears dentures, and Wears glasses  PSH:   has a past surgical history that includes Breast surgery; Colonoscopy; Incisional breast biopsy; pr bypass graft othr,aortobifemoral (Bilateral, 12/9/2019); pr thromboendartectmy neck,neck incis (Right, 10/1/2021); and Hysterectomy (1983)  Social History:    Tobacco Use: High Risk    Smoking Tobacco Use: Current Some Day Smoker    Smokeless Tobacco Use: Never Used     Alcohol Use: Not on file     Patient with no illicit use    ROS:    Pertinent positives/negatives:   Review of Systems   Skin: Positive for wound  CONSTITUTIONAL:  No dizziness   No weakness  No unexpected weight loss  EYES:  No pain, erythema, or discharge  No loss of vision  ENT:  No tinnitus, decreased hearing  No epistaxis/purulent drainage  No voice change, airway closing, trismus  CARDIOVASCULAR:  No chest pain  No palpitations  No new lower extremity edema  RESPIRATORY:  No purulent cough  No hemoptysis  No dyspnea  No paroxysmal nocturnal dyspnea  No stridor, audible wheezing bedside  GASTROINTESTINAL:  Normal appetite  No vomiting, diarrhea  No pain  No bloating  No melena  GENITOURINARY:  No frequency, urgency, nocturia  No hematuria or dysuria  No discharge  No sores/adenopathy  MUSCULOSKELETAL:  No arthralgias or myalgias that are new  INTEGUMENTARY:  No swelling  No unexpected contusions  No abrasions  No lymphangitis  NEUROLOGIC:  No meningismus  No numbness of the extremities  No new focal weakness  No postural instability  PSYCHIATRIC:  No SI HI AVH  HEMATOLOGICAL:  No bleeding  No petechiae  No bruising  ALLERGIES:  No urticaria  No sudden abd cramping  No stridor  PE:     Physical exam highlights:   Physical Exam       Vitals:    07/26/22 2235 07/26/22 2315 07/27/22 0051   BP: 111/51 (!) 97/46 114/50   BP Location: Left arm Left arm Left arm   Pulse: 67 60 61   Resp: 16 16 18   SpO2: 96% 96% 97%     Vitals reviewed by me  Nursing note reviewed  Chaperone present for all sensitive exam   Const: No acute distress  Alert  Nontoxic  Not diaphoretic  HEENT: External ears normal  No protrusion drainage swelling  Nose normal  No drainage/traumatic deformity  MMM  Mouth with baseline/symmetric movement  No trismus  Eyes: No squinting  No icterus  Tracks through the room with normal EOM  No tearing/swelling/drainage  Neck: ROM normal  No rigidity  No meningismus  Cards: Rate as per vitals  Compared to monitor sinus unless documented above  Regular  Well perfused  Pulm: able to verbalize without additional effort  Effort and excursion normal  No disress   No audible wheezing/ stridor  Normal resp rate  Abd: No distension beyond baseline  No fluctuant wave  Patient without peritoneal pain with shifting/bumping the bed  MSK: ROM normal and baseline  No deformity  Skin: No new rashes visible  Well perfused  V shaped laceration left eyebrow  Neuro: Nonfocal  Baseline  CN grossly intact  Moving all four with coordination  Psych: Normal behavior and affect  A:  - Nursing note reviewed  Ddx and MDM  Laceration  Fall on ACAP - CT head r/o ICH  Repair  TDAP UTD                        CT head without contrast   Final Result      No acute intracranial abnormality  Workstation performed: [id number]         CT spine cervical without contrast   Final Result      No cervical spine fracture or traumatic malalignment  Advanced multilevel cervical degenerative changes as above overall not significantly changed compared to prior  Workstation performed: [id number]           Orders Placed This Encounter   Procedures    CT head without contrast    CT spine cervical without contrast     Labs Reviewed - No data to display    Final Diagnosis:  1  Fall, initial encounter    2  Eyebrow laceration        P:  - hospital tx includes   Medications   lidocaine (PF) (XYLOCAINE-MPF) 1 % injection 5 mL (5 mL Infiltration Given 7/26/22 2247)   bacitracin topical ointment 1 small application (1 small application Topical Given 7/27/22 0116)         - disposition  Time reflects when diagnosis was documented in both MDM as applicable and the Disposition within this note     Time User Action Codes Description Comment    7/27/2022  1:01 AM Wander Rodriguez [X26  LMQX] Fall, initial encounter     7/27/2022  1:01 AM Wander Avery Add [G32 956F] Eyebrow laceration       ED Disposition     ED Disposition   Discharge    Condition   Stable    Date/Time   Wed Jul 27, 2022  1:01 AM    Comment   Wendy Wolff discharge to home/self care                 Follow-up Information     Follow up With Specialties Details Why [id number] [facility], [address] [facility], Nurse Practitioner   [address]  [phone]            - patient will call their PCP to let them know they were in the emergency department   We discuss return precautions       - additional tx intended, if consistent with primary provider:  - patient to follow with :      Discharge Medication List as of 7/27/2022  1:01 AM      CONTINUE these medications which have NOT CHANGED    Details   albuterol (ProAir HFA) 90 mcg/act inhaler Inhale 2 puffs every 6 (six) hours as needed for wheezing, Starting Mon 4/25/2022, Normal      amLODIPine (NORVASC) 10 mg tablet Take 1 tablet (10 mg total) by mouth in the morning , Starting Tue 5/24/2022, Normal      atorvastatin (LIPITOR) 80 mg tablet Take 1 tablet (80 mg total) by mouth daily at bedtime, Starting Thu 3/10/2022, Normal      B Complex Vitamins (VITAMIN B-COMPLEX) TABS Take by mouth daily , Historical Med      Bioflavonoid Products (VITAMIN C PLUS PO) Take by mouth daily, Historical Med      bisacodyl (DULCOLAX) 5 mg EC tablet Take 10 mg by mouth once, Historical Med      carvedilol (COREG) 12 5 mg tablet Take 1 tablet (12 5 mg total) by mouth 2 (two) times a day, Starting Wed 11/17/2021, Normal      cholecalciferol (VITAMIN D3) 1,000 units tablet Take 5,000 Units by mouth daily , Historical Med      clopidogrel (PLAVIX) 75 mg tablet Take 1 tablet (75 mg total) by mouth daily, Starting Mon 6/6/2022, Normal      COLLAGEN PO Take by mouth Includes a probiotic and other vitamins-10 supplements-2 am-one in the afternoon-one hs, Historical Med      Diclofenac Sodium (VOLTAREN) 1 % 4 (four) times a day as needed  , Starting Tue 3/22/2022, Historical Med      enalapril (VASOTEC) 10 mg tablet Take 1 tablet (10 mg total) by mouth 2 (two) times a day, Starting Fri 4/22/2022, Normal      ipratropium (ATROVENT) 0 03 % nasal spray 2 sprays into each nostril 3 (three) times a day as needed for rhinitis, Starting 2022, Normal      levothyroxine 25 mcg tablet Take 1 tablet (25 mcg total) by mouth daily in the early morning, Starting 2022, Normal      nicotine (NICODERM CQ) 21 mg/24 hr TD 24 hr patch Place 1 patch on the skin every 24 hours, Starting Tue 10/5/2021, Normal      Polyethylene Glycol 3350 (MIRALAX PO) Take by mouth once, Historical Med      spironolactone (ALDACTONE) 25 mg tablet Take 1 tablet (25 mg total) by mouth daily, Starting 2021, Normal      torsemide (DEMADEX) 5 MG tablet Take 1 tablet (5 mg total) by mouth daily, Starting 2022, Normal           No discharge procedures on file  Prior to Admission Medications   Prescriptions Last Dose Informant Patient Reported? Taking? B Complex Vitamins (VITAMIN B-COMPLEX) TABS  Self Yes No   Sig: Take by mouth daily    Bioflavonoid Products (VITAMIN C PLUS PO)  Self Yes No   Sig: Take by mouth daily   COLLAGEN PO  Self Yes No   Sig: Take by mouth Includes a probiotic and other vitamins-10 supplements-2 am-one in the afternoon-one hs   Diclofenac Sodium (VOLTAREN) 1 %  Self Yes No   Si (four) times a day as needed     Polyethylene Glycol 3350 (MIRALAX PO)  Self Yes No   Sig: Take by mouth once   albuterol (ProAir HFA) 90 mcg/act inhaler  Self No No   Sig: Inhale 2 puffs every 6 (six) hours as needed for wheezing   amLODIPine (NORVASC) 10 mg tablet  Self No No   Sig: Take 1 tablet (10 mg total) by mouth in the morning     atorvastatin (LIPITOR) 80 mg tablet  Self No No   Sig: Take 1 tablet (80 mg total) by mouth daily at bedtime   bisacodyl (DULCOLAX) 5 mg EC tablet  Self Yes No   Sig: Take 10 mg by mouth once   carvedilol (COREG) 12 5 mg tablet  Self No No   Sig: Take 1 tablet (12 5 mg total) by mouth 2 (two) times a day   cholecalciferol (VITAMIN D3) 1,000 units tablet  Self Yes No   Sig: Take 5,000 Units by mouth daily    clopidogrel (PLAVIX) 75 mg tablet  Self No No   Sig: Take 1 tablet (75 mg total) by mouth daily   enalapril (VASOTEC) 10 mg tablet  Self No No   Sig: Take 1 tablet (10 mg total) by mouth 2 (two) times a day   ipratropium (ATROVENT) 0 03 % nasal spray  Self No No   Si sprays into each nostril 3 (three) times a day as needed for rhinitis   levothyroxine 25 mcg tablet  Self No No   Sig: Take 1 tablet (25 mcg total) by mouth daily in the early morning   nicotine (NICODERM CQ) 21 mg/24 hr TD 24 hr patch  Self No No   Sig: Place 1 patch on the skin every 24 hours   Patient not taking: Reported on 6/15/2022   spironolactone (ALDACTONE) 25 mg tablet  Self No No   Sig: Take 1 tablet (25 mg total) by mouth daily   Patient taking differently: Take 25 mg by mouth every morning   torsemide (DEMADEX) 5 MG tablet  Self No No   Sig: Take 1 tablet (5 mg total) by mouth daily   Patient taking differently: Take 5 mg by mouth every morning      Facility-Administered Medications: None       Portions of the record may have been created with voice recognition software  Occasional wrong word or "sound a like" substitutions may have occurred due to the inherent limitations of voice recognition software  Read the chart carefully and recognize, using context, where substitutions have occurred      Electronically signed by:  Donelda Rundle, MD Allan Aschoff, MD  [id number]

## 2022-08-09 ENCOUNTER — TELEPHONE (OUTPATIENT)
Dept: FAMILY MEDICINE CLINIC | Facility: CLINIC | Age: 82
End: 2022-08-09

## 2022-08-09 NOTE — TELEPHONE ENCOUNTER
Pt called to discuss iron supplementation  She was advised to start iron per Dr Tommy Flowers prior to cbc check 9/2022  She has 60mg at home  In past it has caused gi upset, constipation    She is willing to trial OTC FE she will call with update   She will schedule appointment when she is able to find a ride

## 2022-08-23 ENCOUNTER — OFFICE VISIT (OUTPATIENT)
Dept: CARDIOLOGY CLINIC | Facility: CLINIC | Age: 82
End: 2022-08-23
Payer: COMMERCIAL

## 2022-08-23 ENCOUNTER — APPOINTMENT (OUTPATIENT)
Dept: LAB | Facility: CLINIC | Age: 82
End: 2022-08-23
Payer: COMMERCIAL

## 2022-08-23 VITALS
DIASTOLIC BLOOD PRESSURE: 62 MMHG | SYSTOLIC BLOOD PRESSURE: 146 MMHG | BODY MASS INDEX: 19.77 KG/M2 | WEIGHT: 111.6 LBS | HEART RATE: 67 BPM

## 2022-08-23 DIAGNOSIS — E78.5 DYSLIPIDEMIA: ICD-10-CM

## 2022-08-23 DIAGNOSIS — I50.32 CHRONIC DIASTOLIC HEART FAILURE (HCC): ICD-10-CM

## 2022-08-23 DIAGNOSIS — I10 ESSENTIAL HYPERTENSION: Chronic | ICD-10-CM

## 2022-08-23 DIAGNOSIS — I10 MALIGNANT HYPERTENSION: Primary | ICD-10-CM

## 2022-08-23 DIAGNOSIS — N18.32 STAGE 3B CHRONIC KIDNEY DISEASE (HCC): ICD-10-CM

## 2022-08-23 LAB
ANION GAP SERPL CALCULATED.3IONS-SCNC: 7 MMOL/L (ref 4–13)
BUN SERPL-MCNC: 48 MG/DL (ref 5–25)
CALCIUM SERPL-MCNC: 10.2 MG/DL (ref 8.3–10.1)
CHLORIDE SERPL-SCNC: 107 MMOL/L (ref 96–108)
CO2 SERPL-SCNC: 25 MMOL/L (ref 21–32)
CREAT SERPL-MCNC: 1.4 MG/DL (ref 0.6–1.3)
GFR SERPL CREATININE-BSD FRML MDRD: 35 ML/MIN/1.73SQ M
GLUCOSE P FAST SERPL-MCNC: 99 MG/DL (ref 65–99)
POTASSIUM SERPL-SCNC: 3.8 MMOL/L (ref 3.5–5.3)
SODIUM SERPL-SCNC: 139 MMOL/L (ref 135–147)

## 2022-08-23 PROCEDURE — 80048 BASIC METABOLIC PNL TOTAL CA: CPT | Performed by: INTERNAL MEDICINE

## 2022-08-23 PROCEDURE — 36415 COLL VENOUS BLD VENIPUNCTURE: CPT | Performed by: INTERNAL MEDICINE

## 2022-08-23 PROCEDURE — 93000 ELECTROCARDIOGRAM COMPLETE: CPT | Performed by: INTERNAL MEDICINE

## 2022-08-23 PROCEDURE — 99214 OFFICE O/P EST MOD 30 MIN: CPT | Performed by: INTERNAL MEDICINE

## 2022-08-23 RX ORDER — ENALAPRIL MALEATE 10 MG/1
10 TABLET ORAL 2 TIMES DAILY
Qty: 180 TABLET | Refills: 3 | Status: SHIPPED | OUTPATIENT
Start: 2022-08-23 | End: 2022-09-21 | Stop reason: SDUPTHER

## 2022-08-23 RX ORDER — SPIRONOLACTONE 25 MG/1
25 TABLET ORAL DAILY
Qty: 90 TABLET | Refills: 3 | Status: SHIPPED | OUTPATIENT
Start: 2022-08-23

## 2022-08-23 NOTE — PROGRESS NOTES
Cardiology   Mannie Gordon DO, Giovana Chahal MD, Jose Liriano MD, Deborah Xiao MD, [facility] - [place]  -------------------------------------------------------------------  [facility]  [address], [facility],[address], [address] [place] [address], [address] [place] [address]  [phone]    Cardiology Follow Up  Dino Sheets  1940  [id number]          Assessment/Plan:    1  Malignant hypertension    2  Essential hypertension    3  Chronic diastolic heart failure ([facility] )    4  Dyslipidemia    5  Stage 3b chronic kidney disease ([facility] )      - Discussed smoking cessation with patient in detail  She is not ready to quit at this time  Aware of high risk for future cardiovascular events  - continue Plavix  Aspirin was stopped because of large amount of bruising  Significantly improved since aspirin was discontinued  She will follow up regularly with vascular surgery  - Continue torsemide 5 mg daily  -  Continue home BP monitoring  - amlodipine was increased to 10 mg daily last visit  Will continue along with enalapril and carvedilol  Her last ejection fraction was normal     - BMP ordered  Interval History:     Dino Sheets is 80 y o  female here for followup of hypertension and CHF  Since her last visit, she has been feeling well without any chest pain, shortness of breath, lower extremity edema, orthopnea or paroxysmal nocturnal dyspnea  She had 2 mechanical falls but no syncope or near syncope  She has a history of peripheral vascular disease including celiac and SMA stenosis  She does not have any pain with eating or exertion  She continues to smoke nearly a pack per day  Previously, She underwent right carotid endarterectomy along with aortobifemoral bypass in December 2019  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  She follows with vascular surgery  BP is elevated but significantly improved from previous level          Past Medical History:   Diagnosis Date    Anemia     Arthritis     Asthma     Benign essential hypertension     CAD (coronary artery disease)     Cardiac murmur     sees Dr Marlyn Banerjee Carotid stenosis, bilateral     CH[facility]congestive [facility] failure) ([facility]     Chronic kidney disease     renal artery stenosis    Chronic sinusitis     treated with antibiotics 3/22    Colitis     COPD (chronic obstructive pulmonary disease) ([facility] )     Coronary artery disease     Disease of thyroid gland     Dizziness     Edema of leg     compression stocking left leg    History of transfusion     Hyperkalemia 5/3/2021    Hyperlipidemia     Hypertension     Other disorder of circulatory system (CODE)     Renal artery stenosis (HCC)     Right    Subclavian arterial stenosis (HCC)     B/L    Wears dentures     lower one tooth    Wears glasses      Social History     Socioeconomic History    Marital status:      Spouse name: Not on file    Number of children: Not on file    Years of education: Not on file    Highest education level: Not on file   Occupational History    Not on file   Tobacco Use    Smoking status: Current Some Day Smoker     Packs/day: 1 00     Years: 60 00     Pack years: 60 00     Types: Cigarettes    Smokeless tobacco: Never Used   Vaping Use    Vaping Use: Never used   Substance and Sexual Activity    Alcohol use: Yes     Comment: wilfrid 2-3 every day for 50 years    Drug use: No    Sexual activity: Yes     Partners: Male     Birth control/protection: Surgical   Other Topics Concern    Not on file   Social History Narrative    Rarely exercises    Sleeps 6-7 hours per day     Social Determinants of Health     Financial Resource Strain: Not on file   Food Insecurity: Not on file   Transportation Needs: No Transportation Needs    Lack of Transportation (Medical): No    Lack of Transportation (Non-Medical):  No   Physical Activity: Not on file   Stress: Not on file   Social Connections: Not on file   Intimate Partner Violence: Not on file   Housing Stability: Not on file      Family History   Problem Relation Age of Onset    Hypertension Father     Heart disease Father         CHF    Coronary artery disease Family     Arthritis Family     Abdominal aortic aneurysm Mother     Hypertension Mother    Josh Polio Skin cancer Daughter     Cancer Paternal Aunt     No Known Problems Maternal Aunt     No Known Problems Maternal Aunt     No Known Problems Maternal Aunt      Past Surgical History:   Procedure Laterality Date    BREAST SURGERY      bxs,benign    COLONOSCOPY      HYSTERECTOMY  1983    37    INCISIONAL BREAST BIOPSY      x5, 1964, 1965, 1985 and [address] Bilateral 12/9/2019    Procedure: BYPASS AORTA BIFEMORAL WITH LEFT FEMORAL THROMBOEMBOLECTOMY;  Surgeon: Herve Wallis MD;  Location: [facility];  Service: Vascular    FL THROMBOENDARTECTMY Mary Jane Stallings Right 10/1/2021    Procedure: [address], INTROP DUPLEX;  Surgeon: Nidia Ram MD;  Location: [facility];  Service: Vascular       Current Outpatient Medications:     albuterol (ProAir HFA) 90 mcg/act inhaler, Inhale 2 puffs every 6 (six) hours as needed for wheezing, Disp: 8 g, Rfl: 3    amLODIPine (NORVASC) 10 mg tablet, Take 1 tablet (10 mg total) by mouth in the morning , Disp: 90 tablet, Rfl: 3    atorvastatin (LIPITOR) 80 mg tablet, Take 1 tablet (80 mg total) by mouth daily at bedtime, Disp: 90 tablet, Rfl: 3    B Complex Vitamins (VITAMIN B-COMPLEX) TABS, Take by mouth daily , Disp: , Rfl:     bacitracin topical ointment 500 units/g topical ointment, Apply 1 large application topically 2 (two) times a day, Disp: 15 g, Rfl: 0    Bioflavonoid Products (VITAMIN C PLUS PO), Take by mouth daily, Disp: , Rfl:     bisacodyl (DULCOLAX) 5 mg EC tablet, Take 10 mg by mouth once, Disp: , Rfl:     carvedilol (COREG) 12 5 mg tablet, Take 1 tablet (12 5 mg total) by mouth 2 (two) times a day, Disp: 180 tablet, Rfl: 3    cholecalciferol (VITAMIN D3) 1,000 units tablet, Take 5,000 Units by mouth daily , Disp: , Rfl:     clopidogrel (PLAVIX) 75 mg tablet, Take 1 tablet (75 mg total) by mouth daily, Disp: 90 tablet, Rfl: 1    COLLAGEN PO, Take by mouth Includes a probiotic and other vitamins-10 supplements-2 am-one in the afternoon-one hs, Disp: , Rfl:     Diclofenac Sodium (VOLTAREN) 1 %, 4 (four) times a day as needed  , Disp: , Rfl:     enalapril (VASOTEC) 10 mg tablet, Take 1 tablet (10 mg total) by mouth 2 (two) times a day, Disp: 180 tablet, Rfl: 3    ipratropium (ATROVENT) 0 03 % nasal spray, 2 sprays into each nostril 3 (three) times a day as needed for rhinitis, Disp: 30 mL, Rfl: 3    levothyroxine 25 mcg tablet, Take 1 tablet (25 mcg total) by mouth daily in the early morning, Disp: 90 tablet, Rfl: 3    Polyethylene Glycol 3350 (MIRALAX PO), Take by mouth once, Disp: , Rfl:     spironolactone (ALDACTONE) 25 mg tablet, Take 1 tablet (25 mg total) by mouth daily, Disp: 90 tablet, Rfl: 3    torsemide (DEMADEX) 5 MG tablet, Take 1 tablet (5 mg total) by mouth daily (Patient taking differently: Take 5 mg by mouth every morning), Disp: 90 tablet, Rfl: 3        Review of Systems:  Review of Systems   Respiratory: Negative for shortness of breath  Cardiovascular: Negative for chest pain, palpitations and leg swelling  Musculoskeletal: Positive for arthralgias  Physical Exam:  Vitals:  Vitals:    08/23/22 1315   BP: 146/62   BP Location: Right arm   Patient Position: Sitting   Cuff Size: Standard   Pulse: 67   Weight: 50 6 kg (111 lb 9 6 oz)     Physical Exam   Constitutional: She appears healthy  No distress  Eyes: Pupils are equal, round, and reactive to light  Conjunctivae are normal    Neck: No JVD present  Cardiovascular: Normal rate and regular rhythm  Exam reveals no gallop and no friction rub  Murmur heard     Systolic murmur is present with a grade of 2/6  Pulmonary/Chest: Effort normal and breath sounds normal  She has no wheezes  She has no rales  Musculoskeletal:         General: No tenderness, deformity or edema  Cervical back: Normal range of motion and neck supple  Neurological: She is alert and oriented to person, place, and time  Skin: Skin is warm and dry  Cardiographics:  EKG: Personally reviewed NSR with LVH  Last known EF: 60%    This note was completed in part utilizing [employer] Direct Software  Grammatical errors, random word insertions, spelling mistakes, and incomplete sentences can be an occasional consequence of this system secondary to software limitations, ambient noise, and hardware issues  If you have any questions or concerns about the content, text, or information contained within the body of this dictation, please contact the provider for clarification

## 2022-08-30 ENCOUNTER — APPOINTMENT (OUTPATIENT)
Dept: LAB | Facility: HOSPITAL | Age: 82
End: 2022-08-30
Payer: COMMERCIAL

## 2022-08-30 ENCOUNTER — HOSPITAL ENCOUNTER (OUTPATIENT)
Dept: RADIOLOGY | Facility: HOSPITAL | Age: 82
Discharge: HOME | End: 2022-08-30
Attending: SURGERY
Payer: COMMERCIAL

## 2022-08-30 DIAGNOSIS — K55.1 MESENTERIC ARTERY STENOSIS (HCC): ICD-10-CM

## 2022-08-30 DIAGNOSIS — Z87.19 HISTORY OF ISCHEMIC COLITIS: ICD-10-CM

## 2022-08-30 PROCEDURE — 93975 VASCULAR STUDY: CPT

## 2022-08-30 PROCEDURE — 93975 VASCULAR STUDY: CPT | Performed by: SURGERY

## 2022-09-01 ENCOUNTER — TELEPHONE (OUTPATIENT)
Dept: VASCULAR SURGERY | Facility: CLINIC | Age: 82
End: 2022-09-01

## 2022-09-01 NOTE — TELEPHONE ENCOUNTER
PT scheduled 10/10/22 @ 130pm with Dr Zakia Chambers    ----- Message -----   From: Blaine Millan MD   Sent: 9/1/2022  11:37 AM EDT   To: [facility] Consensus Pool     Please schedule ov f/u   ----- Message -----   From: Helder Sun   Sent: 8/31/2022  10:07 AM EDT   To: Blaine Millan MD     Task study to Romina Mabry

## 2022-09-12 ENCOUNTER — TELEPHONE (OUTPATIENT)
Dept: HEMATOLOGY ONCOLOGY | Facility: CLINIC | Age: 82
End: 2022-09-12

## 2022-09-12 NOTE — TELEPHONE ENCOUNTER
09/12/22  Placed call to patient   Introduced myself  and gave her the number for the business office  [phone]  Also if she needed it my call back   [phone]  She thanked me for the call

## 2022-09-12 NOTE — TELEPHONE ENCOUNTER
CALL RETURN FORM   Reason for patient call? patient requesting a callback  She needs the office to send her a breakdown of payments that she made from 8/21/21-7/31/22   Patient's primary oncologist? Dr John Boone   Name of person the patient was calling for? Dr John Boone   Any additional information to add, if applicable? [phone]   Informed patient that the message will be forwarded to the team and someone will get back to them as soon as possible    Did you relay this information to the patient?  yes

## 2022-09-20 ENCOUNTER — APPOINTMENT (OUTPATIENT)
Dept: LAB | Facility: CLINIC | Age: 82
End: 2022-09-20
Payer: COMMERCIAL

## 2022-09-20 ENCOUNTER — OFFICE VISIT (OUTPATIENT)
Dept: FAMILY MEDICINE CLINIC | Facility: CLINIC | Age: 82
End: 2022-09-20
Payer: COMMERCIAL

## 2022-09-20 VITALS
DIASTOLIC BLOOD PRESSURE: 66 MMHG | SYSTOLIC BLOOD PRESSURE: 130 MMHG | WEIGHT: 115.2 LBS | OXYGEN SATURATION: 98 % | HEART RATE: 68 BPM | HEIGHT: 62 IN | TEMPERATURE: 97.2 F | BODY MASS INDEX: 21.2 KG/M2 | RESPIRATION RATE: 18 BRPM

## 2022-09-20 DIAGNOSIS — D63.1 ANEMIA IN STAGE 3 CHRONIC KIDNEY DISEASE, UNSPECIFIED WHETHER STAGE 3A OR 3B CKD (HCC): Chronic | ICD-10-CM

## 2022-09-20 DIAGNOSIS — I10 BENIGN ESSENTIAL HTN: Primary | Chronic | ICD-10-CM

## 2022-09-20 DIAGNOSIS — N18.30 STAGE 3 CHRONIC KIDNEY DISEASE, UNSPECIFIED WHETHER STAGE 3A OR 3B CKD (HCC): ICD-10-CM

## 2022-09-20 DIAGNOSIS — I74.09 AORTOILIAC OCCLUSIVE DISEASE (HCC): Chronic | ICD-10-CM

## 2022-09-20 DIAGNOSIS — N18.30 ANEMIA IN STAGE 3 CHRONIC KIDNEY DISEASE, UNSPECIFIED WHETHER STAGE 3A OR 3B CKD (HCC): Chronic | ICD-10-CM

## 2022-09-20 DIAGNOSIS — I65.23 BILATERAL CAROTID ARTERY STENOSIS: ICD-10-CM

## 2022-09-20 DIAGNOSIS — E78.2 MIXED HYPERLIPIDEMIA: Chronic | ICD-10-CM

## 2022-09-20 DIAGNOSIS — I50.32 CHRONIC DIASTOLIC HEART FAILURE (HCC): Chronic | ICD-10-CM

## 2022-09-20 DIAGNOSIS — J41.0 SIMPLE CHRONIC BRONCHITIS (HCC): ICD-10-CM

## 2022-09-20 DIAGNOSIS — E03.9 HYPOTHYROIDISM, UNSPECIFIED TYPE: Chronic | ICD-10-CM

## 2022-09-20 PROBLEM — G95.89 LUMBAR EPIDURAL MASS (HCC): Status: ACTIVE | Noted: 2022-09-20

## 2022-09-20 PROBLEM — E11.9 TYPE 2 DIABETES MELLITUS WITHOUT COMPLICATION, UNSPECIFIED WHETHER LONG TERM INSULIN USE (HCC): Status: RESOLVED | Noted: 2022-09-20 | Resolved: 2022-09-20

## 2022-09-20 PROBLEM — E11.9 TYPE 2 DIABETES MELLITUS WITHOUT COMPLICATION, UNSPECIFIED WHETHER LONG TERM INSULIN USE (HCC): Status: ACTIVE | Noted: 2022-09-20

## 2022-09-20 LAB
BASOPHILS # BLD AUTO: 0.06 THOUSANDS/ΜL (ref 0–0.1)
BASOPHILS NFR BLD AUTO: 1 % (ref 0–1)
EOSINOPHIL # BLD AUTO: 0.18 THOUSAND/ΜL (ref 0–0.61)
EOSINOPHIL NFR BLD AUTO: 3 % (ref 0–6)
ERYTHROCYTE [DISTWIDTH] IN BLOOD BY AUTOMATED COUNT: 16.5 % (ref 11.6–15.1)
HCT VFR BLD AUTO: 34.4 % (ref 34.8–46.1)
HGB BLD-MCNC: 11.5 G/DL (ref 11.5–15.4)
IMM GRANULOCYTES # BLD AUTO: 0.02 THOUSAND/UL (ref 0–0.2)
IMM GRANULOCYTES NFR BLD AUTO: 0 % (ref 0–2)
LYMPHOCYTES # BLD AUTO: 1.7 THOUSANDS/ΜL (ref 0.6–4.47)
LYMPHOCYTES NFR BLD AUTO: 24 % (ref 14–44)
MCH RBC QN AUTO: 34.4 PG (ref 26.8–34.3)
MCHC RBC AUTO-ENTMCNC: 33.4 G/DL (ref 31.4–37.4)
MCV RBC AUTO: 103 FL (ref 82–98)
MONOCYTES # BLD AUTO: 0.93 THOUSAND/ΜL (ref 0.17–1.22)
MONOCYTES NFR BLD AUTO: 13 % (ref 4–12)
NEUTROPHILS # BLD AUTO: 4.25 THOUSANDS/ΜL (ref 1.85–7.62)
NEUTS SEG NFR BLD AUTO: 59 % (ref 43–75)
NRBC BLD AUTO-RTO: 0 /100 WBCS
PLATELET # BLD AUTO: 215 THOUSANDS/UL (ref 149–390)
PMV BLD AUTO: 12.3 FL (ref 8.9–12.7)
RBC # BLD AUTO: 3.34 MILLION/UL (ref 3.81–5.12)
WBC # BLD AUTO: 7.14 THOUSAND/UL (ref 4.31–10.16)

## 2022-09-20 PROCEDURE — 85025 COMPLETE CBC W/AUTO DIFF WBC: CPT

## 2022-09-20 PROCEDURE — 99214 OFFICE O/P EST MOD 30 MIN: CPT | Performed by: FAMILY MEDICINE

## 2022-09-20 PROCEDURE — 36415 COLL VENOUS BLD VENIPUNCTURE: CPT

## 2022-09-20 NOTE — PROGRESS NOTES
Name: Diaz Dove      : 1940      MRN: [id number]  Encounter Provider: Campos Tony MD  Encounter Date: 2022   Encounter department: [address] [place] [address]     1  Benign essential HTN  Comments:  Controlled on current medications  2  Hypothyroidism, unspecified type  Comments:  Continue levothyroxine  Last TSH in May 2022 within range  3  Simple chronic bronchitis ([facility] )  Comments:  Counseled on smoking cessation  4  Stage 3 chronic kidney disease, unspecified whether stage 3a or 3b CKD ([facility] )  Comments:  Stable  5  Anemia in stage 3 chronic kidney disease, unspecified whether stage 3a or 3b CKD ([facility] )  Comments: Follows heme-onc, Dr Natalia Mckinney, who manages her blood work  6  Mixed hyperlipidemia  Comments:  Controlled on Lipitor 80 mg daily  7  Chronic diastolic heart failure (HCC)  Comments:  Managed by cardiologist Dr Tommy Sapp    8  Bilateral carotid artery stenosis  Comments:  Managed by vascular surgeon Dr Hermes Nielson  9  Aortoiliac occlusive disease ([place] [address] )  Comments:  Managed by vascular surgeon Dr Hermes Nielson  On statin and antiplatelet therapy  Xiomara Parker is an 81 yo female who presents today as a new patient to establish care  She has a history of hypertension, hyperlipidemia, CHF, anemia, CKD, aortoiliac disease, carotid artery stenosis, hypothyroidism  Follows specialists including cardiology, vascular surgery, heme-onc  She reports no acute issues/concerns today  Review of Systems   Constitutional: Negative  HENT: Negative  Eyes: Negative  Respiratory: Negative  Cardiovascular: Negative  Gastrointestinal: Negative  Endocrine: Negative  Genitourinary: Negative  Musculoskeletal: Negative  Skin: Negative  Allergic/Immunologic: Negative  Neurological: Negative  Hematological: Negative  Psychiatric/Behavioral: Negative          Current Outpatient Medications on File Prior to Visit Medication Sig    albuterol (ProAir HFA) 90 mcg/act inhaler Inhale 2 puffs every 6 (six) hours as needed for wheezing    amLODIPine (NORVASC) 10 mg tablet Take 1 tablet (10 mg total) by mouth in the morning   atorvastatin (LIPITOR) 80 mg tablet Take 1 tablet (80 mg total) by mouth daily at bedtime    B Complex Vitamins (VITAMIN B-COMPLEX) TABS Take by mouth daily     bacitracin topical ointment 500 units/g topical ointment Apply 1 large application topically 2 (two) times a day    Bioflavonoid Products (VITAMIN C PLUS PO) Take by mouth daily    bisacodyl (DULCOLAX) 5 mg EC tablet Take 10 mg by mouth once    carvedilol (COREG) 12 5 mg tablet Take 1 tablet (12 5 mg total) by mouth 2 (two) times a day    cholecalciferol (VITAMIN D3) 1,000 units tablet Take 5,000 Units by mouth daily     clopidogrel (PLAVIX) 75 mg tablet Take 1 tablet (75 mg total) by mouth daily    COLLAGEN PO Take by mouth Includes a probiotic and other vitamins-10 supplements-2 am-one in the afternoon-one hs    enalapril (VASOTEC) 10 mg tablet Take 1 tablet (10 mg total) by mouth 2 (two) times a day    ipratropium (ATROVENT) 0 03 % nasal spray 2 sprays into each nostril 3 (three) times a day as needed for rhinitis    levothyroxine 25 mcg tablet Take 1 tablet (25 mcg total) by mouth daily in the early morning    spironolactone (ALDACTONE) 25 mg tablet Take 1 tablet (25 mg total) by mouth daily    Diclofenac Sodium (VOLTAREN) 1 % 4 (four) times a day as needed   (Patient not taking: Reported on 9/20/2022)    Polyethylene Glycol 3350 (MIRALAX PO) Take by mouth once (Patient not taking: Reported on 9/20/2022)       Objective     /66   Pulse 68   Temp (!) 97 2 °F (36 2 °C)   Resp 18   Ht 5' 2 25" (1 581 m)   Wt 52 3 kg (115 lb 3 2 oz)   SpO2 98%   BMI 20 90 kg/m²     Physical Exam  Constitutional:       General: She is not in acute distress  Appearance: She is well-developed  She is not diaphoretic     HENT:      Head: Normocephalic and atraumatic  Cardiovascular:      Rate and Rhythm: Normal rate and regular rhythm  Heart sounds: Normal heart sounds  No murmur heard  No friction rub  No gallop  Pulmonary:      Effort: Pulmonary effort is normal  No respiratory distress  Breath sounds: Normal breath sounds  No wheezing or rales  Chest:      Chest wall: No tenderness  Musculoskeletal:         General: No deformity  Normal range of motion  Cervical back: Normal range of motion and neck supple  Skin:     General: Skin is warm and dry  Neurological:      Mental Status: She is alert and oriented to person, place, and time  Psychiatric:         Behavior: Behavior normal          Thought Content:  Thought content normal          Judgment: Judgment normal        Jennifer Sahni MD

## 2022-09-20 NOTE — PROGRESS NOTES
Assessment and Plan:     Problem List Items Addressed This Visit        Endocrine    Hypothyroidism (Chronic)    Type 2 diabetes mellitus without complication, unspecified whether long term insulin use (HCC)       Respiratory    Simple chronic bronchitis (HCC)       Cardiovascular and Mediastinum    Benign essential HTN - Primary (Chronic)       Genitourinary    Anemia in stage 3 chronic kidney disease (HCC) (Chronic)    Chronic kidney disease, stage 3 (HCC)       Other    Hyperlipidemia (Chronic)    Lumbar epidural mass ([address] )      Other Visit Diagnoses     Medicare annual wellness visit, subsequent               Preventive health issues were discussed with patient, and age appropriate screening tests were ordered as noted in patient's After Visit Summary  Personalized health advice and appropriate referrals for health education or preventive services given if needed, as noted in patient's After Visit Summary       History of Present Illness:     Patient presents for a Medicare Wellness Visit    HPI   Patient Care Team:  Erasto Cordova MD as PCP - General (Family Medicine)  Taiwo Guillory as PCP - [address] [place], [place] [address]Th [facility] ([facility])  Aroldo Griffith DO (Cardiology)  Liliam Patel MD (Vascular Surgery)  Fior Gambino PA-C (Vascular Surgery)     Review of Systems:     Review of Systems     Problem List:     Patient Active Problem List   Diagnosis    Aortoiliac occlusive disease ([address] )    Carotid artery stenosis    Hypothyroidism    Nicotine dependence    Subclavian artery stenosis, left (HCC)    Aortic stenosis    Benign essential HTN    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[address] )    Hyperlipidemia    Onychomycosis    Paronychia of toenail    Simple chronic bronchitis ([address] )    Cigarette smoker    Cardiac murmur    Mass of spine    Chronic diastolic heart failure ([address] )    Acute blood loss anemia    Hypertensive heart disease with chronic diastolic congestive heart failure (HCC)    Pedal edema    Peripheral neuropathy    Parenchymal renal hypertension    Anemia in stage 3 chronic kidney disease (HCC)    Chronic kidney disease, stage 3 (HCC)    Chronic anemia    Renal artery stenosis (HCC)    History of ischemic colitis    Gastritis without bleeding    Arteriovenous malformation (AVM)    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HCC)    Lumbar epidural mass (HCC)    Type 2 diabetes mellitus without complication, unspecified whether long term insulin use (HCC)      Past Medical and Surgical History:     Past Medical History:   Diagnosis Date    Anemia     Arthritis     Asthma     Benign essential hypertension     CAD (coronary artery disease)     Cardiac murmur     sees Dr Marlyn Banerjee Carotid stenosis, bilateral     CHF (congestive heart failure) (HCC)     Chronic kidney disease     renal artery stenosis    Chronic sinusitis     treated with antibiotics 3/22    Colitis     COPD (chronic obstructive pulmonary disease) [facility]     Coronary artery disease     Disease of thyroid gland     Dizziness     Edema of leg     compression stocking left leg    History of transfusion     Hyperkalemia 5/3/2021    Hyperlipidemia     Hypertension     Other disorder of circulatory system (CODE)     Renal artery stenosis (HCC)     Right    Subclavian arterial stenosis (HCC)     B/L    Wears dentures     lower one tooth    Wears glasses      Past Surgical History:   Procedure Laterality Date    BREAST SURGERY      bxs,benign    COLONOSCOPY      HYSTERECTOMY  1983    43    INCISIONAL BREAST BIOPSY      x5, 1964, 1965, 1985 and [address] Bilateral 12/9/2019    Procedure: BYPASS AORTA BIFEMORAL WITH LEFT FEMORAL THROMBOEMBOLECTOMY;  Surgeon: Isamar Summers MD;  Location: [facility];  Service: Vascular    CA THROMBOENDARTECTMY Samira Fraga Right 10/1/2021    Procedure: [address], [address];  Surgeon: Tyrel Talbert MD;  Location: [facility]; Service: Vascular      Family History:     Family History   Problem Relation Age of Onset    Hypertension Father     Heart disease Father         CHF    Coronary artery disease Family     Arthritis Family     Abdominal aortic aneurysm Mother     Hypertension Mother     Skin cancer Daughter     Cancer Paternal Aunt     No Known Problems Maternal Aunt     No Known Problems Maternal Aunt     No Known Problems Maternal Aunt       Social History:     Social History     Socioeconomic History    Marital status:      Spouse name: None    Number of children: None    Years of education: None    Highest education level: None   Occupational History    None   Tobacco Use    Smoking status: Current Some Day Smoker     Packs/day: 1 00     Years: 60 00     Pack years: 60 00     Types: Cigarettes    Smokeless tobacco: Never Used   Vaping Use    Vaping Use: Never used   Substance and Sexual Activity    Alcohol use: Yes     Comment: manhattans 2-3 every day for 50 years    Drug use: No    Sexual activity: Yes     Partners: Male     Birth control/protection: Surgical   Other Topics Concern    None   Social History Narrative    Rarely exercises    Sleeps 6-7 hours per day     Social Determinants of Health     Financial Resource Strain: Medium Risk    Difficulty of Paying Living Expenses: Somewhat hard   Food Insecurity: Not on file   Transportation Needs: No Transportation Needs    Lack of Transportation (Medical): No    Lack of Transportation (Non-Medical):  No   Physical Activity: Not on file   Stress: Not on file   Social Connections: Not on file   Intimate Partner Violence: Not on file   Housing Stability: Not on file      Medications and Allergies:     Current Outpatient Medications   Medication Sig Dispense Refill    albuterol (ProAir HFA) 90 mcg/act inhaler Inhale 2 puffs every 6 (six) hours as needed for wheezing 8 g 3    amLODIPine (NORVASC) 10 mg tablet Take 1 tablet (10 mg total) by mouth in the morning  90 tablet 3    atorvastatin (LIPITOR) 80 mg tablet Take 1 tablet (80 mg total) by mouth daily at bedtime 90 tablet 3    B Complex Vitamins (VITAMIN B-COMPLEX) TABS Take by mouth daily       bacitracin topical ointment 500 units/g topical ointment Apply 1 large application topically 2 (two) times a day 15 g 0    Bioflavonoid Products (VITAMIN C PLUS PO) Take by mouth daily      bisacodyl (DULCOLAX) 5 mg EC tablet Take 10 mg by mouth once      carvedilol (COREG) 12 5 mg tablet Take 1 tablet (12 5 mg total) by mouth 2 (two) times a day 180 tablet 3    cholecalciferol (VITAMIN D3) 1,000 units tablet Take 5,000 Units by mouth daily       clopidogrel (PLAVIX) 75 mg tablet Take 1 tablet (75 mg total) by mouth daily 90 tablet 1    COLLAGEN PO Take by mouth Includes a probiotic and other vitamins-10 supplements-2 am-one in the afternoon-one hs      enalapril (VASOTEC) 10 mg tablet Take 1 tablet (10 mg total) by mouth 2 (two) times a day 180 tablet 3    ipratropium (ATROVENT) 0 03 % nasal spray 2 sprays into each nostril 3 (three) times a day as needed for rhinitis 30 mL 3    levothyroxine 25 mcg tablet Take 1 tablet (25 mcg total) by mouth daily in the early morning 90 tablet 3    spironolactone (ALDACTONE) 25 mg tablet Take 1 tablet (25 mg total) by mouth daily 90 tablet 3    Diclofenac Sodium (VOLTAREN) 1 % 4 (four) times a day as needed   (Patient not taking: Reported on 9/20/2022)      Polyethylene Glycol 3350 (MIRALAX PO) Take by mouth once (Patient not taking: Reported on 9/20/2022)       No current facility-administered medications for this visit       No Known Allergies   Immunizations:     Immunization History   Administered Date(s) Administered    COVID-19 MODERNA VACC 0 5 ML IM 02/25/2021, 03/26/2021    DTaP 5 08/07/2019    Hep B, adult 11/16/2004    INFLUENZA 10/01/2020    Influenza Quadrivalent Preservative Free 3 years and older IM 10/19/2015    Influenza Split High Dose Preservative Free IM 10/14/2016    Influenza, high dose seasonal 0 7 mL 11/29/2018, 10/17/2021    Influenza, seasonal, injectable 09/01/2014    MMR 04/23/2001    Pneumococcal Conjugate 13-Valent 09/15/2017    Pneumococcal Polysaccharide PPV23 09/08/2020    Td (adult), adsorbed 11/14/2005    Tdap 12/22/2014    Zoster Vaccine Recombinant 10/28/2020, 01/12/2021    influenza, trivalent, adjuvanted 10/11/2019      Health Maintenance:         Topic Date Due    Breast Cancer Screening: Mammogram  01/26/2023         Topic Date Due    COVID-19 Vaccine (3 - Booster for Moderna series) 08/26/2021    Influenza Vaccine (1) 09/01/2022      Medicare Screening Tests and Risk Assessments:     Aleah Loredo is here for her Subsequent Wellness visit  Health Risk Assessment:   Patient rates overall health as good  Patient feels that their physical health rating is same  Patient is satisfied with their life  Eyesight was rated as same  Hearing was rated as same  Patient feels that their emotional and mental health rating is same  Patients states they are sometimes angry  Patient states they are sometimes unusually tired/fatigued  Pain experienced in the last 7 days has been none  Patient states that she has experienced no weight loss or gain in last 6 months  Depression Screening:   PHQ-2 Score: 0      Fall Risk Screening: In the past year, patient has experienced: history of falling in past year    Number of falls: 2 or more  Injured during fall?: Yes    Feels unsteady when standing or walking?: Yes    Worried about falling?: Yes      Urinary Incontinence Screening:   Patient has not leaked urine accidently in the last six months  Home Safety:  Patient does not have trouble with stairs inside or outside of their home  Patient has working smoke alarms and has no working carbon monoxide detector  Home safety hazards include: none  Nutrition:   Current diet is Other (please comment)   High Fiber    Medications: Patient is currently taking over-the-counter supplements  OTC medications include: see medication list  Patient is able to manage medications  Activities of Daily Living (ADLs)/Instrumental Activities of Daily Living (IADLs):   Walk and transfer into and out of bed and chair?: Yes  Dress and groom yourself?: Yes    Bathe or shower yourself?: Yes    Feed yourself? Yes  Do your laundry/housekeeping?: Yes  Manage your money, pay your bills and track your expenses?: Yes  Make your own meals?: Yes    Do your own shopping?: Yes    Previous Hospitalizations:   Any hospitalizations or ED visits within the last 12 months?: No      Advance Care Planning:   Living will: Yes    Advanced directive: Yes      PREVENTIVE SCREENINGS      Cardiovascular Screening:    General: Screening Not Indicated and History Lipid Disorder      Diabetes Screening:     General: Screening Current      Breast Cancer Screening:     General: Screening Current      Cervical Cancer Screening:    General: Screening Not Indicated      Abdominal Aortic Aneurysm (AAA) Screening:    Risk factors include: family history of AAA        Lung Cancer Screening:     General: Screening Not Indicated    Screening, Brief Intervention, and Referral to Treatment (SBIRT)    Screening  Typical number of drinks in a day: 2  Typical number of drinks in a week: 14  Interpretation: Low risk drinking behavior      Single Item Drug Screening:  How often have you used an illegal drug (including marijuana) or a prescription medication for non-medical reasons in the past year? never    Single Item Drug Screen Score: 0  Interpretation: Negative screen for possible drug use disorder    No exam data present     Physical Exam:     /66   Pulse 68   Temp (!) 97 2 °F (36 2 °C)   Resp 18   Ht 5' 2 25" (1 581 m)   Wt 52 3 kg (115 lb 3 2 oz)   SpO2 98%   BMI 20 90 kg/m²     Physical Exam     Lashae Irwin MD

## 2022-09-21 DIAGNOSIS — I10 ESSENTIAL HYPERTENSION: Chronic | ICD-10-CM

## 2022-09-22 RX ORDER — ENALAPRIL MALEATE 10 MG/1
10 TABLET ORAL 2 TIMES DAILY
Qty: 180 TABLET | Refills: 3 | Status: SHIPPED | OUTPATIENT
Start: 2022-09-22

## 2022-10-17 ENCOUNTER — OFFICE VISIT (OUTPATIENT)
Dept: HEMATOLOGY ONCOLOGY | Facility: MEDICAL CENTER | Age: 82
End: 2022-10-17
Payer: COMMERCIAL

## 2022-10-17 VITALS
SYSTOLIC BLOOD PRESSURE: 130 MMHG | OXYGEN SATURATION: 97 % | DIASTOLIC BLOOD PRESSURE: 80 MMHG | TEMPERATURE: 98 F | WEIGHT: 117 LBS | HEART RATE: 67 BPM | RESPIRATION RATE: 18 BRPM | BODY MASS INDEX: 21.53 KG/M2 | HEIGHT: 62 IN

## 2022-10-17 DIAGNOSIS — D63.1 ANEMIA IN STAGE 3 CHRONIC KIDNEY DISEASE, UNSPECIFIED WHETHER STAGE 3A OR 3B CKD (HCC): Primary | Chronic | ICD-10-CM

## 2022-10-17 DIAGNOSIS — N18.30 ANEMIA IN STAGE 3 CHRONIC KIDNEY DISEASE, UNSPECIFIED WHETHER STAGE 3A OR 3B CKD (HCC): Primary | Chronic | ICD-10-CM

## 2022-10-17 DIAGNOSIS — D64.9 CHRONIC ANEMIA: Chronic | ICD-10-CM

## 2022-10-17 PROCEDURE — 99214 OFFICE O/P EST MOD 30 MIN: CPT | Performed by: INTERNAL MEDICINE

## 2022-10-17 NOTE — PROGRESS NOTES
Dena West  1940  [facility] [address] [address]  HEMATOLOGY/ONCOLOGY CONSULTATION REPORT    DISCUSSION/SUMMARY:    80-year-old female previously referred for anemia - Mrs Wolff underwent an anemia workup  Results were consistent with anemia of chronic renal insufficiency  Previously we had discussed Procrit as an option - patient had refused initially but subsequently started  Patient had a good response to treatment, Procrit is now on hold  We rediscussed options  Mrs Wolff agrees to CBCs every month  Patient understands that if the hemoglobin level should once again drop, an erythroid stimulating agent can be restarted  We rediscussed what to monitor for as far as progressive fatigue, pallor, decreased activities, bleeding, dizziness etc     Patient is on Plavix; aspirin has been discontinued  Patient still with bruising on the upper extremities but no excessive bruising or bleeding  Patient will follow up with Cardiology as directed  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  This can also be monitored  Patient is to return in 5 months; Mrs Leon Husbands knows to call if she has any other hematology questions or concerns  Carefully review your medication list and verify that the list is accurate and up-to-date  Please call the hematology/oncology office if there are medications missing from the list, medications on the list that you are not currently taking or if there is a dosage or instruction that is different from how you're taking that medication      Patient goals and areas of care:  Monitor CBC parameters  Barriers to care: none  Patient is able to self-care   ______________________________________________________________________________________    Chief Complaint   Patient presents with   • Follow-up   • Anemia of chronic renal insufficiency     History of Present Illness:  60-year-old female previously referred for evaluation of anemia  Options were discussed previously and patient was started on Procrit  This is now on hold (elevated hemoglobin level)  More recently Mrs Wolff underwent right carotid endarterectomy - no postoperative complications  Patient states feeling okay, better than before  Appetite is good, weight is stable, no recent GI problems  No fevers, chills or sweats  No problems with bleeding  Patient bruises easily on the upper extremities, on Plavix  Fatigue is the same as before but patient is able to go about her normal daily activities  No shortness of breath or dyspnea on exertion  Review of Systems   Constitutional: Positive for fatigue  Negative for unexpected weight change  HENT: Negative  Eyes: Negative  Respiratory: Negative  Cardiovascular: Negative  Gastrointestinal: Negative  Endocrine: Negative  Genitourinary: Negative  Musculoskeletal: Negative  Skin: Negative  Allergic/Immunologic: Negative  Neurological: Negative  Hematological: Negative  Psychiatric/Behavioral: Negative  All other systems reviewed and are negative      Patient Active Problem List   Diagnosis   • Aortoiliac occlusive disease ([facility] [address] )   • Carotid artery stenosis   • Hypothyroidism   • Nicotine dependence   • Subclavian artery stenosis, left (HCC)   • Aortic stenosis   • Benign essential HTN   • 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[facility])   • Hyperlipidemia   • Onychomycosis   • Paronychia of toenail   • Simple chronic bronchitis ([facility])   • Cigarette smoker   • Cardiac murmur   • Mass of spine   • Chronic diastolic heart failure (HCC)   • Acute blood loss anemia   • Hypertensive heart disease with chronic diastolic congestive heart failure (HCC)   • Pedal edema   • Peripheral neuropathy   • Parenchymal renal hypertension   • Anemia in stage 3 chronic kidney disease (HCC)   • Chronic kidney disease, stage 3 (HCC)   • Chronic anemia   • Renal artery stenosis (HCC)   • History of ischemic colitis   • Gastritis without bleeding   • Arteriovenous malformation (AVM)   • 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HCC)     Past Medical History:   Diagnosis Date   • Anemia    • Arthritis    • Asthma    • Benign essential hypertension    • CAD (coronary artery disease)    • Cardiac murmur     sees Dr Monreal Look   • Carotid stenosis, bilateral    • CHF (congestive heart failure) (HCC)    • Chronic kidney disease     renal artery stenosis   • Chronic sinusitis     treated with antibiotics 3/22   • Colitis    • COPD (chronic obstructive pulmonary disease) (HCC)    • Coronary artery disease    • Disease of thyroid gland    • Dizziness    • Edema of leg     compression stocking left leg   • History of transfusion    • Hyperkalemia 5/3/2021   • Hyperlipidemia    • Hypertension    • Other disorder of circulatory system (CODE)    • Renal artery stenosis (HCC)     Right   • Subclavian arterial stenosis (HCC)     B/L   • Type 2 diabetes mellitus without complication, unspecified whether long term insulin use ([facility] 9/20/2022   • Wears dentures     lower one tooth   • Wears glasses      Past Surgical History:   Procedure Laterality Date   • BREAST SURGERY      bxs,benign   • COLONOSCOPY     • HYSTERECTOMY  1983    37   • INCISIONAL BREAST BIOPSY      x5, 1964, 1965, 1985 and 1990   • AR BYPASS GRAFT OTHR,AORTOBIFEMORAL Bilateral 12/9/2019    Procedure: BYPASS AORTA BIFEMORAL WITH LEFT FEMORAL THROMBOEMBOLECTOMY;  Surgeon: Maranda Holland MD;  [facility];  Service: Vascular   • AR THROMBOENDARTECTMY Theodoro Boys Right 10/1/2021    Procedure: Vikki Subramanian;  Surgeon: Zion Reid MD;  [facility];  Service: Vascular   Past surgical history:  + blood transfusions many years ago after the birth of 1 child (NOS)    Ob gyn: No postmenopausal bleeding, no breast issues, no recent mammograms    Family History Problem Relation Age of Onset   • Hypertension Father    • Heart disease Father         CHF   • Coronary artery disease Family    • Arthritis Family    • Abdominal aortic aneurysm Mother    • Hypertension Mother    • Skin cancer Daughter    • Cancer Paternal Aunt    • No Known Problems Maternal Aunt    • No Known Problems Maternal Aunt    • No Known Problems Maternal Aunt    Family history:  No known familial or genetic diseases, 2 daughters in good general health, 3rd daughter  (NOS)    Social History     Socioeconomic History   • Marital status:      Spouse name: Not on file   • Number of children: Not on file   • Years of education: Not on file   • Highest education level: Not on file   Occupational History   • Not on file   Tobacco Use   • Smoking status: Current Some Day Smoker     Packs/day: 1 00     Years: 60 00     Pack years: 60 00     Types: Cigarettes   • Smokeless tobacco: Never Used   Vaping Use   • Vaping Use: Never used   Substance and Sexual Activity   • Alcohol use: Yes     Comment: manhattans 2-3 every day for 50 years   • Drug use: No   • Sexual activity: Yes     Partners: Male     Birth control/protection: Surgical   Other Topics Concern   • Not on file   Social History Narrative    Rarely exercises    Sleeps 6-7 hours per day     Social Determinants of Health     Financial Resource Strain: Medium Risk   • Difficulty of Paying Living Expenses: Somewhat hard   Food Insecurity: Not on file   Transportation Needs: No Transportation Needs   • Lack of Transportation (Medical): No   • Lack of Transportation (Non-Medical):  No   Physical Activity: Not on file   Stress: Not on file   Social Connections: Not on file   Intimate Partner Violence: Not on file   Housing Stability: Not on file   Social history: 1/2 pack per day for 60 years, no drug or alcohol abuse, no toxic exposure    Current Outpatient Medications:   •  albuterol (ProAir HFA) 90 mcg/act inhaler, Inhale 2 puffs every 6 (six) hours as needed for wheezing, Disp: 8 g, Rfl: 3  •  amLODIPine (NORVASC) 10 mg tablet, Take 1 tablet (10 mg total) by mouth in the morning , Disp: 90 tablet, Rfl: 3  •  atorvastatin (LIPITOR) 80 mg tablet, Take 1 tablet (80 mg total) by mouth daily at bedtime, Disp: 90 tablet, Rfl: 3  •  B Complex Vitamins (VITAMIN B-COMPLEX) TABS, Take by mouth daily , Disp: , Rfl:   •  bacitracin topical ointment 500 units/g topical ointment, Apply 1 large application topically 2 (two) times a day, Disp: 15 g, Rfl: 0  •  Bioflavonoid Products (VITAMIN C PLUS PO), Take by mouth daily, Disp: , Rfl:   •  bisacodyl (DULCOLAX) 5 mg EC tablet, Take 10 mg by mouth once, Disp: , Rfl:   •  carvedilol (COREG) 12 5 mg tablet, Take 1 tablet (12 5 mg total) by mouth 2 (two) times a day, Disp: 180 tablet, Rfl: 3  •  cholecalciferol (VITAMIN D3) 1,000 units tablet, Take 5,000 Units by mouth daily , Disp: , Rfl:   •  clopidogrel (PLAVIX) 75 mg tablet, Take 1 tablet (75 mg total) by mouth daily, Disp: 90 tablet, Rfl: 1  •  COLLAGEN PO, Take by mouth Includes a probiotic and other vitamins-10 supplements-2 am-one in the afternoon-one hs, Disp: , Rfl:   •  Diclofenac Sodium (VOLTAREN) 1 %, 4 (four) times a day as needed, Disp: , Rfl:   •  enalapril (VASOTEC) 10 mg tablet, Take 1 tablet (10 mg total) by mouth 2 (two) times a day, Disp: 180 tablet, Rfl: 3  •  ipratropium (ATROVENT) 0 03 % nasal spray, 2 sprays into each nostril 3 (three) times a day as needed for rhinitis, Disp: 30 mL, Rfl: 3  •  levothyroxine 25 mcg tablet, Take 1 tablet (25 mcg total) by mouth daily in the early morning, Disp: 90 tablet, Rfl: 3  •  Polyethylene Glycol 3350 (MIRALAX PO), Take by mouth once, Disp: , Rfl:   •  spironolactone (ALDACTONE) 25 mg tablet, Take 1 tablet (25 mg total) by mouth daily, Disp: 90 tablet, Rfl: 3    No Known Allergies    Vitals:    10/17/22 1415   BP: 130/80   Pulse: 67   Resp: 18   Temp: 98 °F (36 7 °C)   SpO2: 97%     Physical Exam  Constitutional:       Appearance: She is well-developed  Comments: Somewhat more frail appearing female, relatively good color, no respiratory distress, no signs of pain   HENT:      Head: Normocephalic and atraumatic  Right Ear: External ear normal       Left Ear: External ear normal    Eyes:      Pupils: Pupils are equal, round, and reactive to light  Comments: Conjunctiva pale, anicteric   Neck:      Comments: Well-healed right cervical scar, no adenopathy bilaterally  Cardiovascular:      Rate and Rhythm: Normal rate and regular rhythm  Heart sounds: Normal heart sounds  Pulmonary:      Effort: Pulmonary effort is normal       Breath sounds: Normal breath sounds  Comments: Fair entry bilaterally, scattered rhonchi bilateral  Abdominal:      General: Bowel sounds are normal       Palpations: Abdomen is soft  Comments: Soft, minimal diffuse tenderness, no guarding, no rigidity or rebound, +bowel sounds, cannot palpate liver or spleen   Musculoskeletal:         General: Normal range of motion  Cervical back: Normal range of motion and neck supple  Skin:     General: Skin is warm  Comments: Well tanned, warm, moist, upper extremity purpuric, no petechiae, no ecchymoses, a few scabs on the upper extremities, no bleeding   Neurological:      Mental Status: She is alert and oriented to person, place, and time  Deep Tendon Reflexes: Reflexes are normal and symmetric  Psychiatric:         Behavior: Behavior normal          Thought Content:  Thought content normal          Judgment: Judgment normal      Extremities:  1-2 + bilateral lower extremity edema, no cords, pulses are 1+  Lymphatics:  No adenopathy in the neck, supraclavicular region and axilla bilaterally    Labs        08/23/2022 BUN = 48 creatinine = 1 40 calcium = 10 2    05/13/2021 SPEP demonstrated a monoclonal peak in the gamma region  05/13/2021 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

## 2022-10-19 DIAGNOSIS — J31.0 NONALLERGIC RHINITIS: ICD-10-CM

## 2022-10-19 RX ORDER — IPRATROPIUM BROMIDE 21 UG/1
2 SPRAY, METERED NASAL 3 TIMES DAILY PRN
Qty: 30 ML | Refills: 3 | Status: SHIPPED | OUTPATIENT
Start: 2022-10-19

## 2022-11-03 ENCOUNTER — TELEPHONE (OUTPATIENT)
Dept: FAMILY MEDICINE CLINIC | Facility: CLINIC | Age: 82
End: 2022-11-03

## 2022-11-03 NOTE — TELEPHONE ENCOUNTER
Pt dropping off form for handicap placard for Dr Bond to fill out  Please call when form is ready for   Placed in nurse pending one

## 2022-11-12 DIAGNOSIS — I15.0 RENOVASCULAR HYPERTENSION: ICD-10-CM

## 2022-11-12 RX ORDER — CARVEDILOL 12.5 MG/1
12.5 TABLET ORAL 2 TIMES DAILY
Qty: 180 TABLET | Refills: 3 | Status: SHIPPED | OUTPATIENT
Start: 2022-11-12

## 2022-11-16 ENCOUNTER — APPOINTMENT (OUTPATIENT)
Dept: LAB | Facility: CLINIC | Age: 82
End: 2022-11-16

## 2022-11-30 ENCOUNTER — TELEPHONE (OUTPATIENT)
Dept: FAMILY MEDICINE CLINIC | Facility: CLINIC | Age: 82
End: 2022-11-30

## 2022-11-30 NOTE — TELEPHONE ENCOUNTER
Patient has already been in touch with  and they are going to get information for patient  She would like her granddaughter to be the caretaker  Right now she does not have a need for nurse, but would like someone for housekeeping and emotional support    DANIEL Ferreira

## 2022-11-30 NOTE — TELEPHONE ENCOUNTER
Roxann Umana calling wanting to speak with Dr Chivo Galloway regarding getting a caregiver for herself  I stated Dr Chivo Galloway is out on maternity leave but I would leave a message for another provider  Can we call patient to get more information on why she needs a caretaker? She did not want to go into details

## 2022-12-13 ENCOUNTER — APPOINTMENT (OUTPATIENT)
Dept: LAB | Facility: CLINIC | Age: 82
End: 2022-12-13

## 2022-12-19 DIAGNOSIS — I74.09 AORTOILIAC OCCLUSIVE DISEASE (HCC): Chronic | ICD-10-CM

## 2022-12-20 RX ORDER — CLOPIDOGREL BISULFATE 75 MG/1
TABLET ORAL
Qty: 90 TABLET | Refills: 1 | Status: SHIPPED | OUTPATIENT
Start: 2022-12-20

## 2022-12-28 ENCOUNTER — TELEPHONE (OUTPATIENT)
Dept: HEMATOLOGY ONCOLOGY | Facility: CLINIC | Age: 82
End: 2022-12-28

## 2022-12-28 NOTE — TELEPHONE ENCOUNTER
VM from patient    Yes, hi, I'm calling  My name is Ree Rosario, I'm Doctor Linh House patient  And I have a question concerning blood work that I've been getting done at the lab and I don't think that I was doing it right correctly at the lab  So if you could call me back and let me know what I'm supposed to do in this case, just let me know because I do have an appointment with him in March for the results of all this  Thank you [phone]

## 2022-12-28 NOTE — TELEPHONE ENCOUNTER
Spoke with patient to make her aware that she will go to the lab every 4 weeks for one tube (CBC w  Diff)  This is a total of 8 times, taking her to her next appointment in March with Dr Coleman Better  She verbalized understanding and agreed to plan

## 2023-01-09 ENCOUNTER — OFFICE VISIT (OUTPATIENT)
Dept: CARDIOLOGY CLINIC | Facility: CLINIC | Age: 83
End: 2023-01-09

## 2023-01-09 VITALS
BODY MASS INDEX: 22.14 KG/M2 | HEART RATE: 68 BPM | OXYGEN SATURATION: 100 % | WEIGHT: 122 LBS | DIASTOLIC BLOOD PRESSURE: 110 MMHG | SYSTOLIC BLOOD PRESSURE: 176 MMHG

## 2023-01-09 DIAGNOSIS — K55.1 MESENTERIC ARTERY STENOSIS (HCC): Chronic | ICD-10-CM

## 2023-01-09 DIAGNOSIS — I50.32 HYPERTENSIVE HEART DISEASE WITH CHRONIC DIASTOLIC CONGESTIVE HEART FAILURE (HCC): Primary | ICD-10-CM

## 2023-01-09 DIAGNOSIS — I65.23 BILATERAL CAROTID ARTERY STENOSIS: Chronic | ICD-10-CM

## 2023-01-09 DIAGNOSIS — I70.1 RENAL ARTERY STENOSIS (HCC): Chronic | ICD-10-CM

## 2023-01-09 DIAGNOSIS — I77.1 STENOSIS OF ILIAC ARTERY (HCC): Chronic | ICD-10-CM

## 2023-01-09 DIAGNOSIS — I11.0 HYPERTENSIVE HEART DISEASE WITH CHRONIC DIASTOLIC CONGESTIVE HEART FAILURE (HCC): Primary | ICD-10-CM

## 2023-01-09 RX ORDER — FUROSEMIDE 20 MG/1
20 TABLET ORAL DAILY
Qty: 30 TABLET | Refills: 0 | Status: SHIPPED | OUTPATIENT
Start: 2023-01-09

## 2023-01-09 NOTE — PROGRESS NOTES
Cardiology   Marika Poster, DO, Rema Quintanilla MD, Macey Kennedy MD, Dc Pringle MD, [facility] [place]  -------------------------------------------------------------------  [facility]  [address], [address] [facility], [address] [place] [address], [address]  [phone]    Cardiology Follow Up  Jacob Hudson  1940  [id number]          Assessment/Plan:    1  Hypertensive heart disease with chronic diastolic congestive heart failure ([facility] [address] )    2  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HCC)    3  Bilateral carotid artery stenosis    4  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HCC)    5  Renal artery stenosis ([facility] [address] )      -Evaded blood pressure along with bilateral lower extremity edema  Weight increased compared to previous visit  Likely cause of increased blood pressure is increased volume status  Will begin furosemide 20 mg daily  -BMP in 1 week  - Discussed smoking cessation with patient in detail  She is not ready to quit at this time  Aware of high risk for future cardiovascular events  - continue Plavix  Aspirin was stopped because of large amount of bruising  Significantly improved since aspirin was discontinued  She will follow up regularly with vascular surgery  -  Continue home BP monitoring  - amlodipine was increased to 10 mg daily last visit  Will continue along with enalapril and carvedilol  Her last ejection fraction was normal   If edema does not respond to diuretic, will return to 5 mg daily   - BMP ordered  Interval History:     Jacob Hudson is 80 y o  female here for followup of hypertension and CHF  Since her last visit, she has noted some shortness of breath and lower extremity edema  She denies any chest pain  Her weight has increased and blood pressure is increased today  She has a history of peripheral vascular disease including celiac and SMA stenosis  She does not have any pain with eating or exertion    She continues to smoke nearly a pack per day  Previously, She underwent right carotid endarterectomy along with aortobifemoral bypass in December 2019  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  She follows with vascular surgery            Past Medical History:   Diagnosis Date   • Anemia    • Arthritis    • Asthma    • Benign essential hypertension    • CAD (coronary artery disease)    • Cardiac murmur     sees Dr Demetrio Guzman   • Carotid stenosis, bilateral    • CHF (congestive heart failure) ([facility])    • Chronic kidney disease     renal artery stenosis   • Chronic sinusitis     treated with antibiotics 3/22   • Colitis    • COPD (chronic obstructive pulmonary disease) ([facility])    • Coronary artery disease    • Disease of thyroid gland    • Dizziness    • Edema of leg     compression stocking left leg   • History of transfusion    • Hyperkalemia 5/3/2021   • Hyperlipidemia    • Hypertension    • Other disorder of circulatory system (CODE)    • Renal artery stenosis ([facility])     Right   • Subclavian arterial stenosis ([facility])     B/L   • Type 2 diabetes mellitus without complication, unspecified whether long term insulin use [facility] 9/20/2022   • Wears dentures     lower one tooth   • Wears glasses      Social History     Socioeconomic History   • Marital status:      Spouse name: Not on file   • Number of children: Not on file   • Years of education: Not on file   • Highest education level: Not on file   Occupational History   • Not on file   Tobacco Use   • Smoking status: Some Days     Packs/day: 1 00     Years: 60 00     Pack years: 60 00     Types: Cigarettes   • Smokeless tobacco: Never   Vaping Use   • Vaping Use: Never used   Substance and Sexual Activity   • Alcohol use: Yes     Comment: manhattans 2-3 every day for 50 years   • Drug use: No   • Sexual activity: Yes     Partners: Male     Birth control/protection: Surgical   Other Topics Concern   • Not on file   Social History Narrative    Rarely exercises    Sleeps 6-7 hours per day     Social Determinants of Health     Financial Resource Strain: Medium Risk   • Difficulty of Paying Living Expenses: Somewhat hard   Food Insecurity: Not on file   Transportation Needs: No Transportation Needs   • Lack of Transportation (Medical): No   • Lack of Transportation (Non-Medical):  No   Physical Activity: Not on file   Stress: Not on file   Social Connections: Not on file   Intimate Partner Violence: Not on file   Housing Stability: Not on file      Family History   Problem Relation Age of Onset   • Hypertension Father    • Heart disease Father         CHF   • Coronary artery disease Family    • Arthritis Family    • Abdominal aortic aneurysm Mother    • Hypertension Mother    • Skin cancer Daughter    • Cancer Paternal Aunt    • No Known Problems Maternal Aunt    • No Known Problems Maternal Aunt    • No Known Problems Maternal Aunt      Past Surgical History:   Procedure Laterality Date   • BREAST SURGERY      bxs,benign   • COLONOSCOPY     • HYSTERECTOMY  1983    43   • INCISIONAL BREAST BIOPSY      x5, 1964, 1965, 1985 and 1990   • SD BYP OTH/THN VEIN AORTOBIFEMORAL Bilateral 12/9/2019    Procedure: BYPASS AORTA BIFEMORAL WITH LEFT FEMORAL THROMBOEMBOLECTOMY;  Surgeon: Tushar Jewell MD;  [facility];  Service: Vascular   • SD TEAEC W/PATCH GRF CAROTID VERTB SUBCLAV NECK INC Right 10/1/2021    Procedure: ENDARTERECTOMY ARTERY [address] [facility], INTROP DUPLEX;  Surgeon: Tierney Faulkner MD;  [facility];  Service: Vascular       Current Outpatient Medications:   •  albuterol (ProAir HFA) 90 mcg/act inhaler, Inhale 2 puffs every 6 (six) hours as needed for wheezing, Disp: 8 g, Rfl: 3  •  amLODIPine (NORVASC) 10 mg tablet, Take 1 tablet (10 mg total) by mouth in the morning , Disp: 90 tablet, Rfl: 3  •  atorvastatin (LIPITOR) 80 mg tablet, Take 1 tablet (80 mg total) by mouth daily at bedtime, Disp: 90 tablet, Rfl: 3  •  B Complex Vitamins (VITAMIN B-COMPLEX) TABS, Take by mouth daily , Disp: , Rfl:   •  Bioflavonoid Products (VITAMIN C PLUS PO), Take by mouth daily, Disp: , Rfl:   •  bisacodyl (DULCOLAX) 5 mg EC tablet, Take 10 mg by mouth once, Disp: , Rfl:   •  carvedilol (COREG) 12 5 mg tablet, Take 1 tablet (12 5 mg total) by mouth 2 (two) times a day, Disp: 180 tablet, Rfl: 3  •  cholecalciferol (VITAMIN D3) 1,000 units tablet, Take 5,000 Units by mouth daily , Disp: , Rfl:   •  clopidogrel (PLAVIX) 75 mg tablet, take 1 tablet by mouth once daily, Disp: 90 tablet, Rfl: 1  •  COLLAGEN PO, Take by mouth Includes a probiotic and other vitamins-10 supplements-2 am-one in the afternoon-one hs, Disp: , Rfl:   •  Diclofenac Sodium (VOLTAREN) 1 %, 4 (four) times a day as needed, Disp: , Rfl:   •  enalapril (VASOTEC) 10 mg tablet, Take 1 tablet (10 mg total) by mouth 2 (two) times a day, Disp: 180 tablet, Rfl: 3  •  ipratropium (ATROVENT) 0 03 % nasal spray, 2 sprays into each nostril 3 (three) times a day as needed for rhinitis, Disp: 30 mL, Rfl: 3  •  levothyroxine 25 mcg tablet, Take 1 tablet (25 mcg total) by mouth daily in the early morning, Disp: 90 tablet, Rfl: 3  •  spironolactone (ALDACTONE) 25 mg tablet, Take 1 tablet (25 mg total) by mouth daily, Disp: 90 tablet, Rfl: 3  •  bacitracin topical ointment 500 units/g topical ointment, Apply 1 large application topically 2 (two) times a day (Patient not taking: Reported on 1/9/2023), Disp: 15 g, Rfl: 0  •  Polyethylene Glycol 3350 (MIRALAX PO), Take by mouth once (Patient not taking: Reported on 1/9/2023), Disp: , Rfl:         Review of Systems:  Review of Systems   Constitutional: Positive for fatigue  Respiratory: Positive for shortness of breath  Cardiovascular: Positive for leg swelling  Negative for chest pain and palpitations  Musculoskeletal: Positive for arthralgias and myalgias  All other systems reviewed and are negative          Physical Exam:  Vitals:  Vitals:    01/09/23 1139   BP: (!) 176/110   BP Location: Left arm   Patient Position: Sitting   Cuff Size: Standard   Pulse: 68   SpO2: 100%   Weight: 55 3 kg (122 lb)     Physical Exam   Constitutional: She appears healthy  No distress  Eyes: Pupils are equal, round, and reactive to light  Conjunctivae are normal    Neck: No JVD present  Cardiovascular: Normal rate, regular rhythm and normal heart sounds  Frequent extrasystoles are present  Exam reveals no gallop and no friction rub  No murmur heard  Pulmonary/Chest: Effort normal and breath sounds normal  She has no wheezes  She has no rales  Musculoskeletal:         General: Edema present  No tenderness or deformity  Cervical back: Normal range of motion and neck supple  Neurological: She is alert and oriented to person, place, and time  Skin: Skin is warm and dry  Cardiographics:  EKG: Personally reviewed NSR with LVH and PVC  Last known EF: 60%    This note was completed in part utilizing M-Modal Fluency Direct Software  Grammatical errors, random word insertions, spelling mistakes, and incomplete sentences can be an occasional consequence of this system secondary to software limitations, ambient noise, and hardware issues  If you have any questions or concerns about the content, text, or information contained within the body of this dictation, please contact the provider for clarification

## 2023-01-17 ENCOUNTER — APPOINTMENT (OUTPATIENT)
Dept: LAB | Facility: CLINIC | Age: 83
End: 2023-01-17

## 2023-01-17 DIAGNOSIS — I74.09 AORTOILIAC OCCLUSIVE DISEASE (HCC): ICD-10-CM

## 2023-01-17 DIAGNOSIS — N18.30 ANEMIA IN STAGE 3 CHRONIC KIDNEY DISEASE, UNSPECIFIED WHETHER STAGE 3A OR 3B CKD (HCC): ICD-10-CM

## 2023-01-17 DIAGNOSIS — D63.1 ANEMIA IN STAGE 3 CHRONIC KIDNEY DISEASE, UNSPECIFIED WHETHER STAGE 3A OR 3B CKD (HCC): ICD-10-CM

## 2023-01-17 LAB
ALBUMIN SERPL BCP-MCNC: 4.4 G/DL (ref 3.5–5)
ALP SERPL-CCNC: 63 U/L (ref 46–116)
ALT SERPL W P-5'-P-CCNC: 26 U/L (ref 12–78)
ANION GAP SERPL CALCULATED.3IONS-SCNC: 6 MMOL/L (ref 4–13)
AST SERPL W P-5'-P-CCNC: 25 U/L (ref 5–45)
BILIRUB SERPL-MCNC: 0.51 MG/DL (ref 0.2–1)
BUN SERPL-MCNC: 77 MG/DL (ref 5–25)
CALCIUM SERPL-MCNC: 9.4 MG/DL (ref 8.3–10.1)
CHLORIDE SERPL-SCNC: 105 MMOL/L (ref 96–108)
CHOLEST SERPL-MCNC: 141 MG/DL
CO2 SERPL-SCNC: 25 MMOL/L (ref 21–32)
CREAT SERPL-MCNC: 1.67 MG/DL (ref 0.6–1.3)
GFR SERPL CREATININE-BSD FRML MDRD: 28 ML/MIN/1.73SQ M
GLUCOSE P FAST SERPL-MCNC: 93 MG/DL (ref 65–99)
HDLC SERPL-MCNC: 92 MG/DL
LDLC SERPL CALC-MCNC: 38 MG/DL (ref 0–100)
NONHDLC SERPL-MCNC: 49 MG/DL
POTASSIUM SERPL-SCNC: 4.5 MMOL/L (ref 3.5–5.3)
PROT SERPL-MCNC: 8.5 G/DL (ref 6.4–8.4)
SODIUM SERPL-SCNC: 136 MMOL/L (ref 135–147)
TRIGL SERPL-MCNC: 53 MG/DL

## 2023-01-18 ENCOUNTER — TELEPHONE (OUTPATIENT)
Dept: CARDIOLOGY CLINIC | Facility: CLINIC | Age: 83
End: 2023-01-18

## 2023-01-18 NOTE — LETTER
Cardiology Pre Operative Clearance      PRE OPERATIVE CARDIAC RISK ASSESSMENT    01/18/23    Shyanne Brown  1940  [id number]    Date of Surgery: TBD    Type of Surgery: Dental extractions    Surgeon: [facility]    No Cardiac Contraindication for Planned Surgical Procedures    Anticoagulation: Hold Plavix for 5 days prior to procedure    Physician Comment: Patient does not require antibiotics prior to procedure      Electronically Signed: Myesha Kimball DO

## 2023-01-19 ENCOUNTER — TELEPHONE (OUTPATIENT)
Dept: CARDIOLOGY CLINIC | Facility: CLINIC | Age: 83
End: 2023-01-19

## 2023-01-19 NOTE — TELEPHONE ENCOUNTER
----- Message from Milton De Los Santos DO sent at 1/19/2023  3:04 PM EST -----  Can you please let the patient know blood work was fine  Cholesterol levels good  Kidney test is abnormal but in line with previous readings

## 2023-01-20 ENCOUNTER — TELEPHONE (OUTPATIENT)
Dept: HEMATOLOGY ONCOLOGY | Facility: CLINIC | Age: 83
End: 2023-01-20

## 2023-01-20 NOTE — TELEPHONE ENCOUNTER
CALL RETURN FORM   Reason for patient call? Patient calling to speak with Dr Dudley Higgins she needs medical clearance to have dental work   Patient's primary oncologist? Dr Dudley Higgins   Name of person the patient was calling for? Dr Dudley Higgins   Any additional information to add, if applicable? [phone]   Informed patient that the message will be forwarded to the team and someone will get back to them as soon as possible    Did you relay this information to the patient?  yes

## 2023-01-20 NOTE — TELEPHONE ENCOUNTER
Pradaxa is managed by cardiology  Cardiologist will need to clear patient for dental work  Patient aware

## 2023-01-23 ENCOUNTER — TELEPHONE (OUTPATIENT)
Dept: CARDIOLOGY CLINIC | Facility: CLINIC | Age: 83
End: 2023-01-23

## 2023-01-23 NOTE — TELEPHONE ENCOUNTER
Patient called seeking cardiac clearance for dentist    She was informed that this was faxed - they never received it     I printed a copy for her to  and refaxed clearance to dentist  Returned call to patient.  She requested a preg conf visit with Dr. Paredes for 11/12/20, due to being out of town.  Appt scheduled for 11/12/20 at 2:30 pm.  She confirmed appt, provider and location.  She verbalized understanding of the current visitor and mask policies.

## 2023-02-10 ENCOUNTER — TELEPHONE (OUTPATIENT)
Dept: VASCULAR SURGERY | Facility: CLINIC | Age: 83
End: 2023-02-10

## 2023-02-10 ENCOUNTER — OFFICE VISIT (OUTPATIENT)
Dept: VASCULAR SURGERY | Facility: CLINIC | Age: 83
End: 2023-02-10

## 2023-02-10 VITALS
DIASTOLIC BLOOD PRESSURE: 78 MMHG | SYSTOLIC BLOOD PRESSURE: 140 MMHG | WEIGHT: 118 LBS | BODY MASS INDEX: 20.91 KG/M2 | HEIGHT: 63 IN

## 2023-02-10 DIAGNOSIS — I65.23 BILATERAL CAROTID ARTERY STENOSIS: Chronic | ICD-10-CM

## 2023-02-10 DIAGNOSIS — I74.09 AORTOILIAC OCCLUSIVE DISEASE (HCC): Chronic | ICD-10-CM

## 2023-02-10 DIAGNOSIS — R60.0 BILATERAL LEG EDEMA: Chronic | ICD-10-CM

## 2023-02-10 DIAGNOSIS — K55.1 MESENTERIC ARTERY STENOSIS (HCC): Primary | Chronic | ICD-10-CM

## 2023-02-10 NOTE — ASSESSMENT & PLAN NOTE
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

## 2023-02-10 NOTE — PROGRESS NOTES
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  She remains asymptomatic with no postprandial pain or weight loss  We discussed the findings on duplex imaging which show no significant change  At this point no further intervention is planned other than periodic duplex follow-up which will be scheduled in 6 months  She will continue her antiplatelet and statin therapy  Carotid artery stenosis  1  History of right carotid stenosis treated with carotid endarterectomy  She is doing well in this regard  She is on antiplatelet and statin therapy and will be scheduled for standard noninvasive imaging follow-up  2   Severe asymptomatic left carotid artery stenosis  This is unchanged on recent duplex evaluation  She is overdue for follow-up imaging which will be scheduled in the near future  In the interval she will continue her current antiplatelet and statin therapy  Aortoiliac occlusive disease ([facility] [address] )  Aortoiliac and infrainguinal arterial occlusive disease with remote history of aortobiiliac bypass  She is asymptomatic  She is overdue for aortic duplex follow-up which will be scheduled  Diagnoses and all orders for this visit:    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HCC)  -     VAS celiac and/or mesenteric duplex; complete study; Future    Bilateral carotid artery stenosis  -     VAS carotid complete study; Future    Aortoiliac occlusive disease ([address] )  -     VAS abdominal aorta/iliacs; complete study; Future    Bilateral leg edema  -     Compression Stocking          Subjective:      Patient ID: Adeline Hudson is a 80 y o  female  Patient had a CV on 7/13/22 and Celiac/ mesenteric duplex on 8/30/22  Pt denies TIA or CVA symptoms  Pt denies change in appetite, fear of eating, or weight changes  Pt smokes 1 ppd  Pt is on Atorvastatin and Plavix       80year-old smoker with long history of peripheral arterial occlusive disease has had multiple interventions to include aortobiiliac bypass 12/9/2019 and right carotid endarterectomy 10/1/2021  She presents in follow-up of recent noninvasive imaging  On evaluation today she states that she is overall doing well  She denies any specific symptoms consistent with claudication or rest pain  She denies any abdominal pain, postprandial pain or weight loss  She denies any focal neurologic symptoms which would be consistent with TIA, CVA or amaurosis fugax  Mesenteric duplex 8/30/2022 with greater than 70% celiac and superior mesenteric artery stenosis with flow velocities 289 and 333 cm/s respectively  Carotid duplex 7/13/2022 with wide patency of right carotid artery following endarterectomy  On the left there is a greater than 70% stenosis with full velocity 281/51 cm/s  Lower extremity arterial duplex 3/14/2022 with right 50-75% common femoral artery stenosis with ankle-brachial index 0 96  On the left there is no focal stenosis with ankle-brachial index of 0 88        The following portions of the patient's history were reviewed and updated as appropriate: allergies, current medications, past family history, past medical history, past social history, past surgical history and problem list     Past Medical History:  Past Medical History:   Diagnosis Date   • Anemia    • Arthritis    • Asthma    • Benign essential hypertension    • CAD (coronary artery disease)    • Cardiac murmur     sees Dr Sherrie Orourke   • Carotid stenosis, bilateral    • CHF (congestive heart failure) ([facility] )    • Chronic kidney disease     renal artery stenosis   • Chronic sinusitis     treated with antibiotics 3/22   • Colitis    • COPD (chronic obstructive pulmonary disease) ([facility] )    • Coronary artery disease    • Disease of thyroid gland    • Dizziness    • Edema of leg     compression stocking left leg   • History of transfusion    • Hyperkalemia 5/3/2021   • Hyperlipidemia    • Hypertension    • Other disorder of circulatory system (CODE)    • Renal artery stenosis (HCC)     Right   • Subclavian arterial stenosis (HCC)     B/L   • Type 2 diabetes mellitus without complication, unspecified whether long term insulin use ([facility] [address] ) 9/20/2022   • Wears dentures     lower one tooth   • Wears glasses        Past Surgical History:  Past Surgical History:   Procedure Laterality Date   • BREAST SURGERY      bxs,benign   • COLONOSCOPY     • HYSTERECTOMY  1983    43   • INCISIONAL BREAST BIOPSY      x5, 1964, 1965, 1985 and [address] AORTOBIFEMORAL Bilateral 12/9/2019    Procedure: BYPASS AORTA BIFEMORAL WITH LEFT FEMORAL THROMBOEMBOLECTOMY;  Surgeon: Diana Ayala MD;  Location: [facility];  Service: Vascular   • SC TEAEC W/PATCH GRF CAROTID VERTB [place] Right 10/1/2021    Procedure: ENDARTERECTOMY ARTERY [address] [facility], INTRO DUPLEX;  Surgeon: Lucy Rasheed MD;  Location: [facility];  Service: Vascular       Social History:  Social History     Substance and Sexual Activity   Alcohol Use Yes    Comment: manhattans 2-3 every day for 50 years     Social History     Substance and Sexual Activity   Drug Use No     Social History     Tobacco Use   Smoking Status Some Days   • Packs/day: 1 00   • Years: 60 00   • Pack years: 60 00   • Types: Cigarettes   Smokeless Tobacco Never       Family History:  Family History   Problem Relation Age of Onset   • Hypertension Father    • Heart disease Father         CHF   • Coronary artery disease Family    • Arthritis Family    • Abdominal aortic aneurysm Mother    • Hypertension Mother    • Skin cancer Daughter    • Cancer Paternal Aunt    • No Known Problems Maternal Aunt    • No Known Problems Maternal Aunt    • No Known Problems Maternal Aunt        Allergies:  No Known Allergies    Medications:    Current Outpatient Medications:   •  albuterol (ProAir HFA) 90 mcg/act inhaler, Inhale 2 puffs every 6 (six) hours as needed for wheezing, Disp: 8 g, Rfl: 3  •  amLODIPine (NORVASC) 10 mg tablet, Take 1 tablet (10 mg total) by mouth in the morning , Disp: 90 tablet, Rfl: 3  •  atorvastatin (LIPITOR) 80 mg tablet, Take 1 tablet (80 mg total) by mouth daily at bedtime, Disp: 90 tablet, Rfl: 3  •  B Complex Vitamins (VITAMIN B-COMPLEX) TABS, Take by mouth daily , Disp: , Rfl:   •  Bioflavonoid Products (VITAMIN C PLUS PO), Take by mouth daily, Disp: , Rfl:   •  bisacodyl (DULCOLAX) 5 mg EC tablet, Take 10 mg by mouth once, Disp: , Rfl:   •  carvedilol (COREG) 12 5 mg tablet, Take 1 tablet (12 5 mg total) by mouth 2 (two) times a day, Disp: 180 tablet, Rfl: 3  •  cholecalciferol (VITAMIN D3) 1,000 units tablet, Take 5,000 Units by mouth daily , Disp: , Rfl:   •  clopidogrel (PLAVIX) 75 mg tablet, take 1 tablet by mouth once daily, Disp: 90 tablet, Rfl: 1  •  COLLAGEN PO, Take by mouth Includes a probiotic and other vitamins-10 supplements-2 am-one in the afternoon-one hs, Disp: , Rfl:   •  Diclofenac Sodium (VOLTAREN) 1 %, 4 (four) times a day as needed, Disp: , Rfl:   •  enalapril (VASOTEC) 10 mg tablet, Take 1 tablet (10 mg total) by mouth 2 (two) times a day, Disp: 180 tablet, Rfl: 3  •  furosemide (LASIX) 20 mg tablet, Take 1 tablet (20 mg total) by mouth daily, Disp: 30 tablet, Rfl: 0  •  ipratropium (ATROVENT) 0 03 % nasal spray, 2 sprays into each nostril 3 (three) times a day as needed for rhinitis, Disp: 30 mL, Rfl: 3  •  levothyroxine 25 mcg tablet, Take 1 tablet (25 mcg total) by mouth daily in the early morning, Disp: 90 tablet, Rfl: 3  •  spironolactone (ALDACTONE) 25 mg tablet, Take 1 tablet (25 mg total) by mouth daily, Disp: 90 tablet, Rfl: 3  •  bacitracin topical ointment 500 units/g topical ointment, Apply 1 large application topically 2 (two) times a day (Patient not taking: Reported on 1/9/2023), Disp: 15 g, Rfl: 0  •  Polyethylene Glycol 3350 (MIRALAX PO), Take by mouth once (Patient not taking: Reported on 1/9/2023), Disp: , Rfl: Vitals:  /78 (02/10/23 1437)    Temp      Pulse     Resp      SpO2        Lab Results and Cultures:   CBC with diff:   Lab Results   Component Value Date    WBC 8 06 01/17/2023    HGB 12 5 01/17/2023    HCT 38 1 01/17/2023     (H) 01/17/2023     01/17/2023    ADJUSTEDWBC 9 20 08/26/2016    MCH 34 7 (H) 01/17/2023    MCHC 32 8 01/17/2023    RDW 15 9 (H) 01/17/2023    MPV 11 8 01/17/2023    NRBC 0 01/17/2023   ,   BMP/CMP:  Lab Results   Component Value Date    K 4 5 01/17/2023     01/17/2023    CO2 25 01/17/2023    CO2 21 12/09/2019    BUN 77 (H) 01/17/2023    CREATININE 1 67 (H) 01/17/2023    GLUCOSE 173 (H) 12/09/2019    CALCIUM 9 4 01/17/2023    AST 25 01/17/2023    ALT 26 01/17/2023    ALKPHOS 63 01/17/2023    EGFR 28 01/17/2023   ,   Lipid Panel:   Lab Results   Component Value Date    CHOL 151 09/09/2014   ,   Coags:   Lab Results   Component Value Date    PTT 31 10/15/2021    INR 1 15 10/16/2021   ,       Review of Systems   Constitutional: Negative for appetite change and unexpected weight change  HENT: Negative  Eyes: Negative for visual disturbance  Respiratory: Negative  Cardiovascular: Negative  Gastrointestinal: Negative for abdominal distention, abdominal pain, constipation, diarrhea and vomiting  Endocrine: Negative  Genitourinary: Negative  Musculoskeletal: Negative  Skin: Negative  Allergic/Immunologic: Negative  Neurological: Negative for dizziness, facial asymmetry, speech difficulty, weakness, numbness and headaches  Hematological: Negative  Psychiatric/Behavioral: Negative  Objective:      /78 (BP Location: Right arm, Patient Position: Sitting)   Ht 5' 3" (1 6 m)   Wt 53 5 kg (118 lb)   BMI 20 90 kg/m²          Physical Exam  Constitutional:       Appearance: Normal appearance  She is well-developed  HENT:      Head: Normocephalic and atraumatic     Eyes:      Conjunctiva/sclera: Conjunctivae normal    Neck: Vascular: No carotid bruit (Bilateral bruit) or JVD  Cardiovascular:      Rate and Rhythm: Normal rate and regular rhythm  Pulses:           Carotid pulses are 2+ on the right side with bruit and 2+ on the left side with bruit  Radial pulses are 2+ on the right side and 2+ on the left side  Femoral pulses are 2+ on the right side and 2+ on the left side  Popliteal pulses are 2+ on the right side and 2+ on the left side  Dorsalis pedis pulses are 0 on the right side and 0 on the left side  Posterior tibial pulses are 2+ on the right side and 0 on the left side  Heart sounds: S1 normal and S2 normal  Murmur heard  Systolic murmur is present  Comments: Both feet are pink and warm with good capillary refill  Pulmonary:      Effort: Pulmonary effort is normal       Breath sounds: Normal breath sounds  Abdominal:      General: There is no abdominal bruit  Palpations: Abdomen is soft  There is no pulsatile mass  Tenderness: There is no abdominal tenderness  Hernia: No hernia is present  Musculoskeletal:         General: Swelling (Bilateral foot, ankle and lower calf swelling) present  No tenderness or deformity  Normal range of motion  Cervical back: Normal range of motion and neck supple  Skin:     General: Skin is warm and dry  Coloration: Skin is not pale  Neurological:      General: No focal deficit present  Mental Status: She is alert and oriented to person, place, and time  Sensory: No sensory deficit  Motor: No weakness        Gait: Gait normal    Psychiatric:         Mood and Affect: Mood normal          Speech: Speech normal          Behavior: Behavior normal

## 2023-02-10 NOTE — ASSESSMENT & PLAN NOTE
Aortoiliac and infrainguinal arterial occlusive disease with remote history of aortobiiliac bypass  She is asymptomatic  She is overdue for aortic duplex follow-up which will be scheduled

## 2023-02-10 NOTE — ASSESSMENT & PLAN NOTE
1   History of right carotid stenosis treated with carotid endarterectomy  She is doing well in this regard  She is on antiplatelet and statin therapy and will be scheduled for standard noninvasive imaging follow-up  2   Severe asymptomatic left carotid artery stenosis  This is unchanged on recent duplex evaluation  She is overdue for follow-up imaging which will be scheduled in the near future  In the interval she will continue her current antiplatelet and statin therapy

## 2023-02-10 NOTE — LETTER
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  She remains asymptomatic with no postprandial pain or weight loss  We discussed the findings on duplex imaging which show no significant change  At this point no further intervention is planned other than periodic duplex follow-up which will be scheduled in 6 months  She will continue her antiplatelet and statin therapy  Carotid artery stenosis  1  History of right carotid stenosis treated with carotid endarterectomy  She is doing well in this regard  She is on antiplatelet and statin therapy and will be scheduled for standard noninvasive imaging follow-up  2   Severe asymptomatic left carotid artery stenosis  This is unchanged on recent duplex evaluation  She is overdue for follow-up imaging which will be scheduled in the near future  In the interval she will continue her current antiplatelet and statin therapy  Aortoiliac occlusive disease ([address] )  Aortoiliac and infrainguinal arterial occlusive disease with remote history of aortobiiliac bypass  She is asymptomatic  She is overdue for aortic duplex follow-up which will be scheduled  If you have questions, please do not hesitate to call me  I look forward to following Bob Washington along with you           Sincerely,        Leatha Henson MD        CC: No Recipients

## 2023-02-10 NOTE — PATIENT INSTRUCTIONS
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  She remains asymptomatic with no postprandial pain or weight loss  We discussed the findings on duplex imaging which show no significant change  At this point no further intervention is planned other than periodic duplex follow-up which will be scheduled in 6 months  She will continue her antiplatelet and statin therapy  Carotid artery stenosis  1  History of right carotid stenosis treated with carotid endarterectomy  She is doing well in this regard  She is on antiplatelet and statin therapy and will be scheduled for standard noninvasive imaging follow-up  2   Severe asymptomatic left carotid artery stenosis  This is unchanged on recent duplex evaluation  She is overdue for follow-up imaging which will be scheduled in the near future  In the interval she will continue her current antiplatelet and statin therapy  Aortoiliac occlusive disease ([address] )  Aortoiliac and infrainguinal arterial occlusive disease with remote history of aortobiiliac bypass  She is asymptomatic  She is overdue for aortic duplex follow-up which will be scheduled

## 2023-02-13 ENCOUNTER — TELEPHONE (OUTPATIENT)
Dept: CARDIOLOGY CLINIC | Facility: CLINIC | Age: 83
End: 2023-02-13

## 2023-02-13 DIAGNOSIS — I50.32 HYPERTENSIVE HEART DISEASE WITH CHRONIC DIASTOLIC CONGESTIVE HEART FAILURE (HCC): ICD-10-CM

## 2023-02-13 DIAGNOSIS — I11.0 HYPERTENSIVE HEART DISEASE WITH CHRONIC DIASTOLIC CONGESTIVE HEART FAILURE (HCC): ICD-10-CM

## 2023-02-13 RX ORDER — FUROSEMIDE 20 MG/1
20 TABLET ORAL DAILY
Qty: 30 TABLET | Refills: 0 | Status: SHIPPED | OUTPATIENT
Start: 2023-02-13

## 2023-02-13 NOTE — TELEPHONE ENCOUNTER
Please call patient had appt tomorrow for 1 month follow up had to andrea to April concerns regarding a medication Patient of Dr Cary Found

## 2023-02-15 ENCOUNTER — HOSPITAL ENCOUNTER (OUTPATIENT)
Dept: RADIOLOGY | Facility: HOSPITAL | Age: 83
Discharge: HOME | End: 2023-02-15
Attending: SURGERY

## 2023-02-15 DIAGNOSIS — I65.23 BILATERAL CAROTID ARTERY STENOSIS: Chronic | ICD-10-CM

## 2023-02-15 DIAGNOSIS — I74.09 AORTOILIAC OCCLUSIVE DISEASE (HCC): Chronic | ICD-10-CM

## 2023-02-17 ENCOUNTER — TRANSCRIBE ORDERS (OUTPATIENT)
Dept: LAB | Facility: CLINIC | Age: 83
End: 2023-02-17

## 2023-02-17 ENCOUNTER — APPOINTMENT (OUTPATIENT)
Dept: LAB | Facility: CLINIC | Age: 83
End: 2023-02-17

## 2023-02-17 ENCOUNTER — TELEPHONE (OUTPATIENT)
Dept: VASCULAR SURGERY | Facility: CLINIC | Age: 83
End: 2023-02-17

## 2023-02-17 DIAGNOSIS — N18.30 ANEMIA IN STAGE 3 CHRONIC KIDNEY DISEASE, UNSPECIFIED WHETHER STAGE 3A OR 3B CKD (HCC): Chronic | ICD-10-CM

## 2023-02-17 DIAGNOSIS — D63.1 ANEMIA OF CHRONIC RENAL FAILURE, UNSPECIFIED CKD STAGE: ICD-10-CM

## 2023-02-17 DIAGNOSIS — N18.9 ANEMIA OF CHRONIC RENAL FAILURE, UNSPECIFIED CKD STAGE: ICD-10-CM

## 2023-02-17 DIAGNOSIS — D64.9 CHRONIC ANEMIA: Chronic | ICD-10-CM

## 2023-02-17 DIAGNOSIS — N18.30 STAGE 3 CHRONIC KIDNEY DISEASE, UNSPECIFIED WHETHER STAGE 3A OR 3B CKD (HCC): ICD-10-CM

## 2023-02-17 DIAGNOSIS — D63.1 ANEMIA IN STAGE 3 CHRONIC KIDNEY DISEASE, UNSPECIFIED WHETHER STAGE 3A OR 3B CKD (HCC): Chronic | ICD-10-CM

## 2023-02-17 DIAGNOSIS — N18.30 STAGE 3 CHRONIC KIDNEY DISEASE, UNSPECIFIED WHETHER STAGE 3A OR 3B CKD (HCC): Primary | ICD-10-CM

## 2023-02-17 LAB
BASOPHILS # BLD AUTO: 0.07 THOUSANDS/ÂΜL (ref 0–0.1)
BASOPHILS NFR BLD AUTO: 1 % (ref 0–1)
EOSINOPHIL # BLD AUTO: 0.33 THOUSAND/ÂΜL (ref 0–0.61)
EOSINOPHIL NFR BLD AUTO: 4 % (ref 0–6)
ERYTHROCYTE [DISTWIDTH] IN BLOOD BY AUTOMATED COUNT: 15.8 % (ref 11.6–15.1)
HCT VFR BLD AUTO: 35.4 % (ref 34.8–46.1)
HGB BLD-MCNC: 11.3 G/DL (ref 11.5–15.4)
IMM GRANULOCYTES # BLD AUTO: 0.03 THOUSAND/UL (ref 0–0.2)
IMM GRANULOCYTES NFR BLD AUTO: 0 % (ref 0–2)
LYMPHOCYTES # BLD AUTO: 2.23 THOUSANDS/ÂΜL (ref 0.6–4.47)
LYMPHOCYTES NFR BLD AUTO: 27 % (ref 14–44)
MCH RBC QN AUTO: 33.8 PG (ref 26.8–34.3)
MCHC RBC AUTO-ENTMCNC: 31.9 G/DL (ref 31.4–37.4)
MCV RBC AUTO: 106 FL (ref 82–98)
MONOCYTES # BLD AUTO: 1.1 THOUSAND/ÂΜL (ref 0.17–1.22)
MONOCYTES NFR BLD AUTO: 13 % (ref 4–12)
NEUTROPHILS # BLD AUTO: 4.49 THOUSANDS/ÂΜL (ref 1.85–7.62)
NEUTS SEG NFR BLD AUTO: 55 % (ref 43–75)
NRBC BLD AUTO-RTO: 0 /100 WBCS
PLATELET # BLD AUTO: 213 THOUSANDS/UL (ref 149–390)
PMV BLD AUTO: 12 FL (ref 8.9–12.7)
RBC # BLD AUTO: 3.34 MILLION/UL (ref 3.81–5.12)
WBC # BLD AUTO: 8.25 THOUSAND/UL (ref 4.31–10.16)

## 2023-02-17 NOTE — TELEPHONE ENCOUNTER
----- Message from Cecelia Hugginsd sent at 2/17/2023  8:45 AM EST -----  Call patient for OV with Heidi Santos MD

## 2023-03-06 ENCOUNTER — TELEPHONE (OUTPATIENT)
Dept: HEMATOLOGY ONCOLOGY | Facility: MEDICAL CENTER | Age: 83
End: 2023-03-06

## 2023-03-06 NOTE — TELEPHONE ENCOUNTER
LVM to the patient in regards to rescheduling her appt on 3/15/23 with Dr Jared Monterroso to one of our [facility] due to provider availability  Informed patient to contact the office to reschedule at [phone]

## 2023-03-07 ENCOUNTER — TELEPHONE (OUTPATIENT)
Dept: HEMATOLOGY ONCOLOGY | Facility: CLINIC | Age: 83
End: 2023-03-07

## 2023-03-14 DIAGNOSIS — I50.32 HYPERTENSIVE HEART DISEASE WITH CHRONIC DIASTOLIC CONGESTIVE HEART FAILURE (HCC): ICD-10-CM

## 2023-03-14 DIAGNOSIS — E78.2 MIXED HYPERLIPIDEMIA: ICD-10-CM

## 2023-03-14 DIAGNOSIS — I11.0 HYPERTENSIVE HEART DISEASE WITH CHRONIC DIASTOLIC CONGESTIVE HEART FAILURE (HCC): ICD-10-CM

## 2023-03-15 RX ORDER — ATORVASTATIN CALCIUM 80 MG/1
80 TABLET, FILM COATED ORAL
Qty: 90 TABLET | Refills: 3 | Status: SHIPPED | OUTPATIENT
Start: 2023-03-15 | End: 2023-03-16 | Stop reason: SDUPTHER

## 2023-03-15 RX ORDER — FUROSEMIDE 20 MG/1
20 TABLET ORAL DAILY
Qty: 30 TABLET | Refills: 0 | Status: SHIPPED | OUTPATIENT
Start: 2023-03-15 | End: 2023-03-16 | Stop reason: SDUPTHER

## 2023-03-16 ENCOUNTER — OFFICE VISIT (OUTPATIENT)
Dept: HEMATOLOGY ONCOLOGY | Facility: MEDICAL CENTER | Age: 83
End: 2023-03-16

## 2023-03-16 VITALS
TEMPERATURE: 98 F | BODY MASS INDEX: 20.55 KG/M2 | HEART RATE: 69 BPM | RESPIRATION RATE: 16 BRPM | SYSTOLIC BLOOD PRESSURE: 130 MMHG | DIASTOLIC BLOOD PRESSURE: 70 MMHG | OXYGEN SATURATION: 93 % | HEIGHT: 63 IN | WEIGHT: 116 LBS

## 2023-03-16 DIAGNOSIS — N18.30 ANEMIA IN STAGE 3 CHRONIC KIDNEY DISEASE, UNSPECIFIED WHETHER STAGE 3A OR 3B CKD (HCC): Primary | ICD-10-CM

## 2023-03-16 DIAGNOSIS — D75.89 MACROCYTOSIS: ICD-10-CM

## 2023-03-16 DIAGNOSIS — I11.0 HYPERTENSIVE HEART DISEASE WITH CHRONIC DIASTOLIC CONGESTIVE HEART FAILURE (HCC): ICD-10-CM

## 2023-03-16 DIAGNOSIS — D63.1 ANEMIA IN STAGE 3 CHRONIC KIDNEY DISEASE, UNSPECIFIED WHETHER STAGE 3A OR 3B CKD (HCC): Primary | ICD-10-CM

## 2023-03-16 DIAGNOSIS — E78.2 MIXED HYPERLIPIDEMIA: ICD-10-CM

## 2023-03-16 DIAGNOSIS — E46 PROTEIN-CALORIE MALNUTRITION, UNSPECIFIED SEVERITY (HCC): ICD-10-CM

## 2023-03-16 DIAGNOSIS — D47.2 MGUS (MONOCLONAL GAMMOPATHY OF UNKNOWN SIGNIFICANCE): ICD-10-CM

## 2023-03-16 DIAGNOSIS — I50.32 HYPERTENSIVE HEART DISEASE WITH CHRONIC DIASTOLIC CONGESTIVE HEART FAILURE (HCC): ICD-10-CM

## 2023-03-16 NOTE — PROGRESS NOTES
[facility] [address] [address]  Hematology Ambulatory [facility], 1940, [id number]  3/16/2023      Assessment and Plan   1  Anemia in stage 3 chronic kidney disease, unspecified whether stage 3a or 3b CKD (HCC)  Stable  Patient no longer requires Procrit injections  Observation every 6 months is advisable  Patient has any symptoms of anemia, she was told to contact the office  Last follow-up was 5 months ago, we can elongate this to 6 months depending upon results of the testing as outlined in #2 below  - CBC and differential; Future  - Comprehensive metabolic panel; Future  - CBC and differential; Future    2  MGUS (monoclonal gammopathy of unknown significance)  IgG kappa MGUS found previously  Last time patient was checked was in May 2021  Patient will go for blood work next week regarding MGUS laboratory testing  Of note patient does have intermittent hypercalcemia and underlying kidney dysfunction  I will continue to keep the results of the MGUS testing once they are made available to the patient directly  Patient does not have MyChart  - Beta 2 microglobulin, serum; Future  - CBC and differential; Future  - IgG, IgA, IgM; Future  - Immunoglobulin free LT chains blood; Future  - Protein electrophoresis, serum; Future  - Comprehensive metabolic panel; Future  - CBC and differential; Future    3  Macrocytosis; 4  Protein-calorie malnutrition, unspecified severity ([facility] )  I have noted that the patient has developed macrocytosis of the red blood cells over the past year  Unsure as to etiology of this  Will request a below blood testing  Previous testing for hemolytic anemia was negative and will not be tested at this time hemoglobin in the 11 g/dL range  - CBC and differential; Future  - Comprehensive metabolic panel; Future  - Folate; Future  - Methylmalonic acid, serum;  Future  - Reticulocytes; Future  - Iron Panel (Includes Ferritin, Iron Sat%, Iron, and TIBC); Future  - CBC and differential; Future    Of note, patient's exam was significant with pulmonary findings  Patient has not had fever  Patient will make an appointment with Dr Vanessa Bliss to discuss changes to inhaled medications  Patient has a significant tobacco history which is likely contributing to emphysema/COPD  The patient is scheduled for follow-up in approximately 6 months  Patient voiced agreement and understanding to the above  Patient advised to call the Hematology/Oncology office with any questions and concerns regarding the above  Barrier(s) to care: None  The patient is able to self care  Loretta Wayne PA-C  Medical Oncology/Hematology  [address]    Subjective     Chief Complaint   Patient presents with   • Follow-up       History of present illness: This is an 71-year-old female with past medical history of arterial occlusive disease, hypothyroidism, aortic stenosis, peripheral neuropathy, AVMs, chronic renal disease, chronic tobacco use who presents to the hematology clinic for evaluation of anemia  Patient previously underwent Procrit injections prior to 2021  These have been on hold since then as the patient's hemoglobin had responded and maintained itself above 10 g/dL  Patient was also found to have MGUS   5/13/2021 IgG kappa MGUS identified on immunofixation  M spike = 0 27, FLC = 1 88, lambda = 39, kappa = 74  Hemoglobin = 7 7, MCV 90, RDW high at 21 1, B12 = 553, folate greater than 20, LDH and haptoglobin within normal limits, Genie negative, erythropoietin = 99 2 (lower than what 1 would expect in relationship to a very low hemoglobin), ISABEL negative sed rate within normal limits, iron saturation = 10%, TIBC high at 495, ferritin = 20    10/3/2021 Kappa = 33 0, K/L ratio 1 43, ferritin = 31, TIBC elevated at 459 with an iron saturation 25%      1/17/2020 calcium = 9 4, creatinine 1 67, BUN 77, EGFR 28, LFTs WNL, total protein 8 5 (H)  2/17/2023 WBC 8 25, hemoglobin 11 3, , platelet count 817, hemoglobin = 12 5, , RDW 15 9, white blood cell count 8 06    Interval history: Generally patient feels well  Patient continues to smoke  Patient presents today with her neighbor/friend who helps provide since she no longer drives herself  Patient notes that she eats 1 meal a day  Patient does not eat breakfast or lunch    Review of Systems   Constitutional: Positive for fatigue  Negative for appetite change, fever and unexpected weight change  HENT: Negative for nosebleeds  Respiratory: Negative for cough, choking and shortness of breath  Negative hemoptysis  Cardiovascular: Negative for chest pain, palpitations and leg swelling  Gastrointestinal: Negative  Negative for abdominal distention, abdominal pain, anal bleeding, blood in stool, constipation, diarrhea, nausea and vomiting  Endocrine: Negative  Negative for cold intolerance  Genitourinary: Negative  Negative for hematuria, menstrual problem, vaginal bleeding, vaginal discharge and vaginal pain  Musculoskeletal: Negative  Negative for arthralgias, myalgias, neck pain and neck stiffness  Skin: Negative  Negative for color change, pallor and rash  Allergic/Immunologic: Negative  Negative for immunocompromised state  Neurological: Negative  Negative for weakness and headaches  Hematological: Negative for adenopathy  Does not bruise/bleed easily  All other systems reviewed and are negative        Patient Active Problem List   Diagnosis   • Aortoiliac occlusive disease ([facility] )   • Carotid artery stenosis   • Hypothyroidism   • Nicotine dependence   • Subclavian artery stenosis, left (HCC)   • Aortic stenosis   • Benign essential HTN   • 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[facility])   • Hyperlipidemia   • Onychomycosis   • Paronychia of toenail   • Simple chronic bronchitis ([facility] )   • Cigarette smoker   • Cardiac murmur   • Mass of spine   • Chronic diastolic heart failure (HCC)   • Acute blood loss anemia   • Hypertensive heart disease with chronic diastolic congestive heart failure (HCC)   • Bilateral leg edema   • Peripheral neuropathy   • Parenchymal renal hypertension   • Anemia in stage 3 chronic kidney disease (HCC)   • Chronic kidney disease, stage 3 (HCC)   • Chronic anemia   • Renal artery stenosis (HCC)   • History of ischemic colitis   • Gastritis without bleeding   • Arteriovenous malformation (AVM)   • 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HCC)     Past Medical History:   Diagnosis Date   • Anemia    • Arthritis    • Asthma    • Benign essential hypertension    • CAD (coronary artery disease)    • Cardiac murmur     sees Dr Ashley Montenegro   • Carotid stenosis, bilateral    • CHF (congestive heart failure) ([facility] [address] )    • Chronic kidney disease     renal artery stenosis   • Chronic sinusitis     treated with antibiotics 3/22   • Colitis    • COPD (chronic obstructive pulmonary disease) ([facility] )    • Coronary artery disease    • Disease of thyroid gland    • Dizziness    • Edema of leg     compression stocking left leg   • History of transfusion    • Hyperkalemia 5/3/2021   • Hyperlipidemia    • Hypertension    • Other disorder of circulatory system (CODE)    • Renal artery stenosis (HCC)     Right   • Subclavian arterial stenosis (HCC)     B/L   • Type 2 diabetes mellitus without complication, unspecified whether long term insulin use ([facility] 9/20/2022   • Wears dentures     lower one tooth   • Wears glasses      Past Surgical History:   Procedure Laterality Date   • BREAST SURGERY      bxs,benign   • COLONOSCOPY     • HYSTERECTOMY  1983    43   • INCISIONAL BREAST BIOPSY      x5, 1964, 1965, 1985 and 1990   • ND BYP OTH/THN VEIN AORTOBIFEMORAL Bilateral 12/9/2019    Procedure: BYPASS AORTA BIFEMORAL WITH LEFT FEMORAL THROMBOEMBOLECTOMY;  Surgeon: Erna Macias MD;  Location: [facility];  Service: Vascular • NC TEAEC W/PATCH GRF CAROTID VERTB SUBCLAV NECK INC Right 10/1/2021    Procedure: ENDARTERECTOMY ARTERY CAROTIDWITH BOVINE PATCH, INTROP DUPLEX;  Surgeon: Cornell Delvalle MD;  Location: [facility];  Service: Vascular     Family History   Problem Relation Age of Onset   • Hypertension Father    • Heart disease Father         CHF   • Coronary artery disease Family    • Arthritis Family    • Abdominal aortic aneurysm Mother    • Hypertension Mother    • Skin cancer Daughter    • Cancer Paternal Aunt    • No Known Problems Maternal Aunt    • No Known Problems Maternal Aunt    • No Known Problems Maternal Aunt      Social History     Socioeconomic History   • Marital status:      Spouse name: Not on file   • Number of children: Not on file   • Years of education: Not on file   • Highest education level: Not on file   Occupational History   • Not on file   Tobacco Use   • Smoking status: Some Days     Packs/day: 1 00     Years: 60 00     Pack years: 60 00     Types: Cigarettes   • Smokeless tobacco: Never   Vaping Use   • Vaping Use: Never used   Substance and Sexual Activity   • Alcohol use: Yes     Comment: manhattans 2-3 every day for 50 years   • Drug use: No   • Sexual activity: Yes     Partners: Male     Birth control/protection: Surgical   Other Topics Concern   • Not on file   Social History Narrative    Rarely exercises    Sleeps 6-7 hours per day     Social Determinants of Health     Financial Resource Strain: Medium Risk   • Difficulty of Paying Living Expenses: Somewhat hard   Food Insecurity: Not on file   Transportation Needs: No Transportation Needs   • Lack of Transportation (Medical): No   • Lack of Transportation (Non-Medical):  No   Physical Activity: Not on file   Stress: Not on file   Social Connections: Not on file   Intimate Partner Violence: Not on file   Housing Stability: Not on file       Current Outpatient Medications:   •  albuterol (ProAir HFA) 90 mcg/act inhaler, Inhale 2 puffs every 6 (six) hours as needed for wheezing, Disp: 8 g, Rfl: 3  •  amLODIPine (NORVASC) 10 mg tablet, Take 1 tablet (10 mg total) by mouth in the morning , Disp: 90 tablet, Rfl: 3  •  atorvastatin (LIPITOR) 80 mg tablet, Take 1 tablet (80 mg total) by mouth daily at bedtime, Disp: 90 tablet, Rfl: 3  •  B Complex Vitamins (VITAMIN B-COMPLEX) TABS, Take by mouth daily , Disp: , Rfl:   •  Bioflavonoid Products (VITAMIN C PLUS PO), Take by mouth daily, Disp: , Rfl:   •  bisacodyl (DULCOLAX) 5 mg EC tablet, Take 10 mg by mouth once, Disp: , Rfl:   •  carvedilol (COREG) 12 5 mg tablet, Take 1 tablet (12 5 mg total) by mouth 2 (two) times a day, Disp: 180 tablet, Rfl: 3  •  cholecalciferol (VITAMIN D3) 1,000 units tablet, Take 5,000 Units by mouth daily , Disp: , Rfl:   •  clopidogrel (PLAVIX) 75 mg tablet, take 1 tablet by mouth once daily, Disp: 90 tablet, Rfl: 1  •  COLLAGEN PO, Take by mouth Includes a probiotic and other vitamins-10 supplements-2 am-one in the afternoon-one hs, Disp: , Rfl:   •  Diclofenac Sodium (VOLTAREN) 1 %, 4 (four) times a day as needed, Disp: , Rfl:   •  enalapril (VASOTEC) 10 mg tablet, Take 1 tablet (10 mg total) by mouth 2 (two) times a day, Disp: 180 tablet, Rfl: 3  •  furosemide (LASIX) 20 mg tablet, Take 1 tablet (20 mg total) by mouth daily, Disp: 30 tablet, Rfl: 0  •  ipratropium (ATROVENT) 0 03 % nasal spray, 2 sprays into each nostril 3 (three) times a day as needed for rhinitis, Disp: 30 mL, Rfl: 3  •  levothyroxine 25 mcg tablet, Take 1 tablet (25 mcg total) by mouth daily in the early morning, Disp: 90 tablet, Rfl: 3  •  spironolactone (ALDACTONE) 25 mg tablet, Take 1 tablet (25 mg total) by mouth daily, Disp: 90 tablet, Rfl: 3  No Known Allergies    Objective   /70 (BP Location: Left arm, Patient Position: Sitting, Cuff Size: Adult)   Pulse 69   Temp 98 °F (36 7 °C)   Resp 16   Ht 5' 3" (1 6 m)   Wt 52 6 kg (116 lb)   SpO2 93%   BMI 20 55 kg/m²    Physical Exam  Constitutional:       General: She is not in acute distress  Appearance: She is well-developed  HENT:      Head: Normocephalic and atraumatic  Mouth/Throat:      Pharynx: No oropharyngeal exudate  Eyes:      General: No scleral icterus  Pupils: Pupils are equal, round, and reactive to light  Cardiovascular:      Rate and Rhythm: Normal rate and regular rhythm  Heart sounds: Murmur heard  Pulmonary:      Effort: Pulmonary effort is normal  No respiratory distress  Breath sounds: Wheezing and rhonchi present  Abdominal:      General: Bowel sounds are normal  There is no distension  Palpations: Abdomen is soft  Tenderness: There is no abdominal tenderness  Musculoskeletal:         General: Normal range of motion  Cervical back: Normal range of motion  Lymphadenopathy:      Cervical: No cervical adenopathy  Skin:     General: Skin is warm  Coloration: Skin is not pale  Findings: No rash  Comments: Yellowed nails   Neurological:      Mental Status: She is alert and oriented to person, place, and time  Cranial Nerves: No cranial nerve deficit  Psychiatric:         Thought Content: Thought content normal          Result Review  Labs:  No visits with results within 3 Week(s) from this visit     Latest known visit with results is:   Appointment on 02/17/2023   Component Date Value Ref Range Status   • WBC 02/17/2023 8 25  4 31 - 10 16 Thousand/uL Final   • RBC 02/17/2023 3 34 (L)  3 81 - 5 12 Million/uL Final   • Hemoglobin 02/17/2023 11 3 (L)  11 5 - 15 4 g/dL Final   • Hematocrit 02/17/2023 35 4  34 8 - 46 1 % Final   • MCV 02/17/2023 106 (H)  82 - 98 fL Final   • MCH 02/17/2023 33 8  26 8 - 34 3 pg Final   • MCHC 02/17/2023 31 9  31 4 - 37 4 g/dL Final   • RDW 02/17/2023 15 8 (H)  11 6 - 15 1 % Final   • MPV 02/17/2023 12 0  8 9 - 12 7 fL Final   • Platelets 79/05/3519 213  149 - 390 Thousands/uL Final   • nRBC 02/17/2023 0  /100 WBCs Final • Neutrophils Relative 02/17/2023 55  43 - 75 % Final   • Immat GRANS % 02/17/2023 0  0 - 2 % Final   • Lymphocytes Relative 02/17/2023 27  14 - 44 % Final   • Monocytes Relative 02/17/2023 13 (H)  4 - 12 % Final   • Eosinophils Relative 02/17/2023 4  0 - 6 % Final   • Basophils Relative 02/17/2023 1  0 - 1 % Final   • Neutrophils Absolute 02/17/2023 4 49  1 85 - 7 62 Thousands/µL Final   • Immature Grans Absolute 02/17/2023 0 03  0 00 - 0 20 Thousand/uL Final   • Lymphocytes Absolute 02/17/2023 2 23  0 60 - 4 47 Thousands/µL Final   • Monocytes Absolute 02/17/2023 1 10  0 17 - 1 22 Thousand/µL Final   • Eosinophils Absolute 02/17/2023 0 33  0 00 - 0 61 Thousand/µL Final   • Basophils Absolute 02/17/2023 0 07  0 00 - 0 10 Thousands/µL Final       Please note: This report has been generated by a voice recognition software system  Therefore there may be syntax, spelling, and/or grammatical errors  Please call if you have any questions

## 2023-03-17 RX ORDER — ATORVASTATIN CALCIUM 80 MG/1
80 TABLET, FILM COATED ORAL
Qty: 90 TABLET | Refills: 3 | Status: SHIPPED | OUTPATIENT
Start: 2023-03-17

## 2023-03-17 RX ORDER — FUROSEMIDE 20 MG/1
20 TABLET ORAL DAILY
Qty: 30 TABLET | Refills: 0 | Status: SHIPPED | OUTPATIENT
Start: 2023-03-17

## 2023-03-21 ENCOUNTER — APPOINTMENT (OUTPATIENT)
Dept: LAB | Facility: CLINIC | Age: 83
End: 2023-03-21

## 2023-03-21 DIAGNOSIS — D75.89 MACROCYTOSIS: ICD-10-CM

## 2023-03-21 DIAGNOSIS — D63.1 ANEMIA IN STAGE 3 CHRONIC KIDNEY DISEASE, UNSPECIFIED WHETHER STAGE 3A OR 3B CKD (HCC): ICD-10-CM

## 2023-03-21 DIAGNOSIS — E46 PROTEIN-CALORIE MALNUTRITION, UNSPECIFIED SEVERITY (HCC): ICD-10-CM

## 2023-03-21 DIAGNOSIS — N18.30 ANEMIA IN STAGE 3 CHRONIC KIDNEY DISEASE, UNSPECIFIED WHETHER STAGE 3A OR 3B CKD (HCC): ICD-10-CM

## 2023-03-21 DIAGNOSIS — D47.2 MGUS (MONOCLONAL GAMMOPATHY OF UNKNOWN SIGNIFICANCE): ICD-10-CM

## 2023-03-21 LAB
ALBUMIN SERPL BCP-MCNC: 4.2 G/DL (ref 3.5–5)
ALP SERPL-CCNC: 53 U/L (ref 46–116)
ALT SERPL W P-5'-P-CCNC: 28 U/L (ref 12–78)
ANION GAP SERPL CALCULATED.3IONS-SCNC: 3 MMOL/L (ref 4–13)
AST SERPL W P-5'-P-CCNC: 29 U/L (ref 5–45)
BASOPHILS # BLD AUTO: 0.07 THOUSANDS/ÂΜL (ref 0–0.1)
BASOPHILS NFR BLD AUTO: 1 % (ref 0–1)
BILIRUB SERPL-MCNC: 0.49 MG/DL (ref 0.2–1)
BUN SERPL-MCNC: 68 MG/DL (ref 5–25)
CALCIUM SERPL-MCNC: 10 MG/DL (ref 8.3–10.1)
CHLORIDE SERPL-SCNC: 109 MMOL/L (ref 96–108)
CO2 SERPL-SCNC: 25 MMOL/L (ref 21–32)
CREAT SERPL-MCNC: 1.57 MG/DL (ref 0.6–1.3)
EOSINOPHIL # BLD AUTO: 0.28 THOUSAND/ÂΜL (ref 0–0.61)
EOSINOPHIL NFR BLD AUTO: 3 % (ref 0–6)
ERYTHROCYTE [DISTWIDTH] IN BLOOD BY AUTOMATED COUNT: 16.3 % (ref 11.6–15.1)
FERRITIN SERPL-MCNC: 28 NG/ML (ref 8–388)
FOLATE SERPL-MCNC: >20 NG/ML (ref 3.1–17.5)
GFR SERPL CREATININE-BSD FRML MDRD: 30 ML/MIN/1.73SQ M
GLUCOSE SERPL-MCNC: 122 MG/DL (ref 65–140)
HCT VFR BLD AUTO: 34.8 % (ref 34.8–46.1)
HGB BLD-MCNC: 11.6 G/DL (ref 11.5–15.4)
IGA SERPL-MCNC: 202 MG/DL (ref 70–400)
IGG SERPL-MCNC: 1420 MG/DL (ref 700–1600)
IGM SERPL-MCNC: 76 MG/DL (ref 40–230)
IMM GRANULOCYTES # BLD AUTO: 0.03 THOUSAND/UL (ref 0–0.2)
IMM GRANULOCYTES NFR BLD AUTO: 0 % (ref 0–2)
IRON SATN MFR SERPL: 19 % (ref 15–50)
IRON SERPL-MCNC: 74 UG/DL (ref 50–170)
LYMPHOCYTES # BLD AUTO: 1.82 THOUSANDS/ÂΜL (ref 0.6–4.47)
LYMPHOCYTES NFR BLD AUTO: 21 % (ref 14–44)
MCH RBC QN AUTO: 34.7 PG (ref 26.8–34.3)
MCHC RBC AUTO-ENTMCNC: 33.3 G/DL (ref 31.4–37.4)
MCV RBC AUTO: 104 FL (ref 82–98)
MONOCYTES # BLD AUTO: 1.02 THOUSAND/ÂΜL (ref 0.17–1.22)
MONOCYTES NFR BLD AUTO: 12 % (ref 4–12)
NEUTROPHILS # BLD AUTO: 5.57 THOUSANDS/ÂΜL (ref 1.85–7.62)
NEUTS SEG NFR BLD AUTO: 63 % (ref 43–75)
NRBC BLD AUTO-RTO: 0 /100 WBCS
PLATELET # BLD AUTO: 238 THOUSANDS/UL (ref 149–390)
PMV BLD AUTO: 11.8 FL (ref 8.9–12.7)
POTASSIUM SERPL-SCNC: 5.3 MMOL/L (ref 3.5–5.3)
PROT SERPL-MCNC: 8.1 G/DL (ref 6.4–8.4)
RBC # BLD AUTO: 3.34 MILLION/UL (ref 3.81–5.12)
RETICS # AUTO: ABNORMAL 10*3/UL (ref 14097–95744)
RETICS # CALC: 2.19 % (ref 0.37–1.87)
SODIUM SERPL-SCNC: 137 MMOL/L (ref 135–147)
TIBC SERPL-MCNC: 394 UG/DL (ref 250–450)
WBC # BLD AUTO: 8.79 THOUSAND/UL (ref 4.31–10.16)

## 2023-03-22 LAB
KAPPA LC FREE SER-MCNC: 57.8 MG/L (ref 3.3–19.4)
KAPPA LC FREE/LAMBDA FREE SER: 2.15 {RATIO} (ref 0.26–1.65)
LAMBDA LC FREE SERPL-MCNC: 26.9 MG/L (ref 5.7–26.3)

## 2023-03-23 LAB
ALBUMIN SERPL ELPH-MCNC: 4.65 G/DL (ref 3.5–5)
ALBUMIN SERPL ELPH-MCNC: 59.6 % (ref 52–65)
ALPHA1 GLOB SERPL ELPH-MCNC: 0.33 G/DL (ref 0.1–0.4)
ALPHA1 GLOB SERPL ELPH-MCNC: 4.2 % (ref 2.5–5)
ALPHA2 GLOB SERPL ELPH-MCNC: 0.73 G/DL (ref 0.4–1.2)
ALPHA2 GLOB SERPL ELPH-MCNC: 9.4 % (ref 7–13)
B2 MICROGLOB SERPL-MCNC: 4.3 MG/L (ref 0.6–2.4)
BETA GLOB ABNORMAL SERPL ELPH-MCNC: 0.42 G/DL (ref 0.4–0.8)
BETA1 GLOB SERPL ELPH-MCNC: 5.4 % (ref 5–13)
BETA2 GLOB SERPL ELPH-MCNC: 4.5 % (ref 2–8)
BETA2+GAMMA GLOB SERPL ELPH-MCNC: 0.35 G/DL (ref 0.2–0.5)
GAMMA GLOB ABNORMAL SERPL ELPH-MCNC: 1.32 G/DL (ref 0.5–1.6)
GAMMA GLOB SERPL ELPH-MCNC: 16.9 % (ref 12–22)
IGG/ALB SER: 1.48 {RATIO} (ref 1.1–1.8)
INTERPRETATION UR IFE-IMP: NORMAL
M PROTEIN 1 MFR SERPL ELPH: 5.3 %
M PROTEIN 1 SERPL ELPH-MCNC: 0.41 G/DL
PROT PATTERN SERPL ELPH-IMP: NORMAL
PROT SERPL-MCNC: 7.8 G/DL (ref 6.4–8.2)

## 2023-03-24 LAB — METHYLMALONATE SERPL-SCNC: 248 NMOL/L (ref 0–378)

## 2023-03-30 ENCOUNTER — TELEPHONE (OUTPATIENT)
Dept: HEMATOLOGY ONCOLOGY | Facility: MEDICAL CENTER | Age: 83
End: 2023-03-30

## 2023-03-30 DIAGNOSIS — D63.1 ANEMIA IN STAGE 3 CHRONIC KIDNEY DISEASE, UNSPECIFIED WHETHER STAGE 3A OR 3B CKD: ICD-10-CM

## 2023-03-30 DIAGNOSIS — N18.30 ANEMIA IN STAGE 3 CHRONIC KIDNEY DISEASE, UNSPECIFIED WHETHER STAGE 3A OR 3B CKD: ICD-10-CM

## 2023-03-30 DIAGNOSIS — D47.2 MGUS (MONOCLONAL GAMMOPATHY OF UNKNOWN SIGNIFICANCE): Primary | ICD-10-CM

## 2023-03-30 NOTE — TELEPHONE ENCOUNTER
LVM to patient with information below  Informed patient if she has any questions or concerns please call the office at [phone]        ----- Message from Allyssa Silverman PA-C sent at 3/30/2023  1:31 PM EDT -----  Call pt please  NO addiitonal vitamin supplements needed, but I would like to check blood work in 3 months and then again before follow up with Dr Quyen Campos in September       Please mail printed labs with dates highlighted for 3 month check and the 6 month check prior to her appt with dr Mary Borjas on 9/18/2023

## 2023-04-05 ENCOUNTER — TELEPHONE (OUTPATIENT)
Dept: CARDIOLOGY CLINIC | Facility: CLINIC | Age: 83
End: 2023-04-05

## 2023-04-05 ENCOUNTER — RA CDI HCC (OUTPATIENT)
Dept: OTHER | Facility: HOSPITAL | Age: 83
End: 2023-04-05

## 2023-04-05 NOTE — LETTER
Cardiology Pre Operative Clearance      PRE OPERATIVE CARDIAC RISK ASSESSMENT    04/05/23    Roxanne Shane  1940  [id number]    Date of Surgery: TBD  Type of Surgery: Oral restoration, [place]     Surgeon: Dental Health Asst        Anticoagulation: Patient on clopidogrel, which can be held at discretion of dentist for up to 7 days prior to procedure and resumed as soon as possible postprocedure    Physician Comment: No cardiac contraindication to proposed dental procedures  Electronically Signed: Rahat James MD

## 2023-04-05 NOTE — TELEPHONE ENCOUNTER
"Patient was last seen by oncology 3/15/23    Vitals:  /70 (BP Location: Left arm, Patient Position: Sitting, Cuff Size: Adult)  Pulse 69  Temp 98 °F (36 7 °C)  Resp 16  Ht 5' 3\" (1 6 m)  Wt 52 6 kg (116 lb)  SpO2 93%  BMI 20 55 kg/m²  BSA 1 53 m²  Pain Sc 0-No pain     "

## 2023-04-05 NOTE — PROGRESS NOTES
[facility]  coding opportunities     I73 [id number]     Chart Reviewed number of suggestions sent to Provider: 1     Patients Insurance     Medicare Insurance: [facility]

## 2023-04-06 ENCOUNTER — TELEPHONE (OUTPATIENT)
Dept: HEMATOLOGY ONCOLOGY | Facility: CLINIC | Age: 83
End: 2023-04-06

## 2023-04-06 NOTE — TELEPHONE ENCOUNTER
Spoke with patient requesting she have labs completed per JOAN Lozano Art, patient voiced understanding     ----- Message from Aleida Barreto PA-C sent at 3/30/2023   Check to see that pt had labs completed for MGUS and Macrocytosis of RBC  ANETA- Call with results and change follow up OR add labs as needed

## 2023-04-06 NOTE — TELEPHONE ENCOUNTER
I have resent fax to [facility] - fax [phone]

## 2023-05-05 ENCOUNTER — OFFICE VISIT (OUTPATIENT)
Dept: VASCULAR SURGERY | Facility: CLINIC | Age: 83
End: 2023-05-05

## 2023-05-05 ENCOUNTER — TELEPHONE (OUTPATIENT)
Dept: VASCULAR SURGERY | Facility: CLINIC | Age: 83
End: 2023-05-05

## 2023-05-05 VITALS
HEIGHT: 63 IN | WEIGHT: 116 LBS | DIASTOLIC BLOOD PRESSURE: 84 MMHG | SYSTOLIC BLOOD PRESSURE: 146 MMHG | BODY MASS INDEX: 20.55 KG/M2 | HEART RATE: 68 BPM

## 2023-05-05 DIAGNOSIS — I70.1 RENAL ARTERY STENOSIS (HCC): Chronic | ICD-10-CM

## 2023-05-05 DIAGNOSIS — I10 BENIGN ESSENTIAL HTN: Chronic | ICD-10-CM

## 2023-05-05 DIAGNOSIS — I12.0 HYPERTENSIVE CHRONIC KIDNEY DISEASE WITH STAGE 5 CHRONIC KIDNEY DISEASE OR END STAGE RENAL DISEASE (HCC): ICD-10-CM

## 2023-05-05 DIAGNOSIS — I50.32 HYPERTENSIVE HEART DISEASE WITH CHRONIC DIASTOLIC CONGESTIVE HEART FAILURE (HCC): ICD-10-CM

## 2023-05-05 DIAGNOSIS — I65.23 BILATERAL CAROTID ARTERY STENOSIS: Chronic | ICD-10-CM

## 2023-05-05 DIAGNOSIS — I35.0 NONRHEUMATIC AORTIC VALVE STENOSIS: Chronic | ICD-10-CM

## 2023-05-05 DIAGNOSIS — I77.1 SUBCLAVIAN ARTERY STENOSIS, LEFT (HCC): Chronic | ICD-10-CM

## 2023-05-05 DIAGNOSIS — I74.09 AORTOILIAC OCCLUSIVE DISEASE (HCC): Primary | Chronic | ICD-10-CM

## 2023-05-05 DIAGNOSIS — I11.0 HYPERTENSIVE HEART DISEASE WITH CHRONIC DIASTOLIC CONGESTIVE HEART FAILURE (HCC): ICD-10-CM

## 2023-05-05 NOTE — TELEPHONE ENCOUNTER
REMINDER: Under Reason For Call, comments MUST be formatted as:   (Surgeon's Initials) / (Procedure)      Special Instructions / FYI: PATIENT REFUSES TO SEE DR DE LA CRUZ    Procedure: HYDRATION needed for CT head & neck with contrast    Level: 3 - Route clearance(s) to [facility] Clearance Pool     Allergies: Patient has no known allergies  Instructions Given: CT / CTA Instructions    Dialysis: Patient is not on dialysis  Return Visit Required Prior to Procedure: No     Consent: NO CONSENT REQUIRED    For Surgical Clearances     Levels   1-3   ROUTE this encounter to [facility] Clearance Pool (AND)   [facility] Surgery Coordinator Pool     Level   4   ROUTE this encounter to [facility] Surgery Coordinator Pool       HYDRATION CLEARANCES   ONLY ROUTE TO  [facility] Clearance Pool     (1) ONE CLEARANCE NEEDED - Nephrology Clearance // Clearing Provider's Name (Vic Ang): Jacey Ibarra //  Spoke with: Hollie Vega  //  Office Contact Information P: [phone] - F: EPIC POOL    Yes, I have ROUTED this encounter to [facility] Surgery Coordinator and/or [facility] Clearance Pool

## 2023-05-05 NOTE — PATIENT INSTRUCTIONS
Carotid artery stenosis  -Right ICA is patent  -Left ICA stenosis - now severe   -Continue with clopidogrel 75 and atorvastatin 80  -Check CTA head and neck      Symptoms of stroke:  - Unable to speak or understand speech  - Unable to move one side of the body (arm or leg)  - Visual changes  - Call 911 for any symptoms of stroke            Aortoiliac occlusive disease    -Continue with clopidogrel 75 and atorvastatin 80  -Continue with regular walking  -Call office if development of leg/ foot pain, coldness or wounds  -Follow up AOIL in 1 year (2/2024) and office visit

## 2023-05-05 NOTE — PROGRESS NOTES
Assessment/Plan:    Bilateral carotid artery stenosis  -     Ambulatory Referral to Nephrology; Future  -     CTA head and neck w wo contrast; Future  -     VAS carotid complete study; Future    -Asymptomatic NANCY    -CV duplex 2/15/23:     R ICA and endart site are patent(111/16), R vert and SCA stenosis    L ICA 70%+ (540/63)       -CV duplex 7/13/22: L /53     -CV duplex 1/11/22: L ICA 50-69% 223/43, ratio 2 85    -CTA H/N 2/2021: Severe atherosclerotic diseases of the R carotid bifurcation with moderate to severe ICA stenosis  L ICA progression 50-60%  Moderate R vertebral  No IC stenosis or aneurysm  Plan:  -Asymptomatic carotid artery disease with patent R ICA/endart and severe  L ICA stenosis  -CV duplex shows L ICA is severe by velocity criteria  -Comparison to prior studies, left ICA appears to have progressive NANCY  -Continue with clopidogrel 75 and atorvastatin 80  -Smoking cessation strongly recommended  -Check CTA to confirm degree of stenosis and make further recommendations      Aortoiliac occlusive disease ([address] )  -     VAS abdominal aorta/iliacs; complete study; Future  -Status post aortobifem bypass 2019  -Asymptomatic; no ischemic rest pain or wounds    -AOIL 2/15/23: patent ABF bypass graft with elevated distal savannah velocities  >70% SMA and bilateral renal artery stenosis   BELEN's: R 0 89, 0 83    Plan:  -Stable AIOD/PAD without claudication, ischemic symptoms with patent ABF bypass  -Recommend regular walking, if can be done safely  -Continue with clopidogrel 75 and atorvastatin 80  -Smoking cessation  -Call office if development of leg/ foot pain, coldness or wounds  -Follow up AOIL in 1 year (2/2024) and office visit        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-No current symptoms of mesenteric stenosis  -SMA stenosis  -Continue with clopidogrel 75 and atorvastatin 80  -Continue to monitor clinically with periodic duplex surveillance        Additional diagnoses:  Hypertensive heart disease with chronic diastolic congestive heart failure (HCC)    Subclavian artery stenosis, left (HCC)    Renal artery stenosis (HCC)    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 (HCC)      Nonrheumatic aortic valve stenosis      Subjective:      Patient ID: Geraldo Webb is a 80 y o  female  Patient presents today to review AOIL and carotid duplex done 2/15  S/P R CEA in 2021 and aorta bi-fem bypass in 2019  Patient denies claudication and rest pain  Only admits to b/l foot numbness at times  Denies any s/s of CVA  HPI   Geraldo Webb 60XO F smoker, Htn, HLD, asymptomatic NANCY (severe on R), CKD 3, AIOD, s/p ABF bypass with LEFT iliofemoral thromboembolectomy ([facility] 12/9/19), peripheral arterial disease, neuropathy who presents for vascular follow up and to review recent vascular studies  5/5/23: Since she was last seen, she has had evaluation and treatment for anemia and found to have [address]  She has no new complaints  She has no history of stroke or symptoms of TIA/stroke  No amaurosis fugax, vision changes, dysarthria or unilateral numbness or weakness  We reviewed her carotid duplex study which shows stable, patent right ICA and endarterectomy site  However, on the left there is severe ICA stenosis > 70%+ by velocity criteria with  cm/sand EDV 63 cm/s  Compared with prior Dopplers, velocities continue to increase  She had a CTA head and neck only 2 years ago which demonstrates moderate left ICA stenosis  Given Dopplers revealing progressively worsening stenoses, will recommend repeat CTA head and neck  Today, she walks into the office without cane or walker but states that she generally needs a walker to get around for safety given chronic neuropathy  She is quite please with result of the ABF bypass and that surgery healed well  She has no claudication, ischemic rest pain or foot wounds  AOIL demonstrates patent bypass      Patient confirms that she is taking clopidogrel 75 and aspirin 80  She plans to undergo periodontal cleaning which requires holding clopidogrel  I advised that she request cleaning on antiplatelet, but she is already holding for 5 days  She is still smoking cigarettes  We discussed the risk of continued smoking particularly in light of continued progression of atherosclerotic disease  11 36  67/1 67, 30  LDL 38      AOIL 2/15/23  Operative History:  2021-10-01 Right Standard (ICA, ECA and CCA) endarterectomy  2019-12-09 Aorto_bi_common femoral  Risk Factors: The patient has history of HTN, Hyperlipidemia, CKD, CAD and smoking (current)  1-2 ppd  FINDINGS:     Unilateral                PSV (cm/s)  EDV (cm/s)  AP (cm)  TRV (cm)    Sup-Dolly Ao                                            2 2       2 1    Px Inf-Raphael Ao                     67                  1 5       1 7    Celiac                           245          37                       Prox   SMA                        468          42                       Aortic Inflow anastmosis         165                                   Aortic Inflow Artery              67                                      Right                Impression  PSV (cm/s)  EDV (cm/s)    Prox Renal           1 - 59%            199          32    Dist Graft Limb                         132                Limb Anastomosis                        217                Limb Outflow Artery                     257                Mid Graft Limb                          171                Prox Graft Limb                         236                   Left                 Impression  PSV (cm/s)  EDV (cm/s)    Prox Renal           1 - 59%            289          53    Dist Graft Limb                         164                Limb Anastomosis                        243                Limb Outflow Artery                     286                Mid Graft Limb                          215                Prox Graft Limb                         188                         CONCLUSION:     Impression  Patent aorto-bi-fem bypass graft with bilateral elevated distal limb  velocities  There is >70% stenosis of the celiac and superior mesenteric arteries  Bilateral renal artery stenosis  Ankle/Brachial indices are: Rt - 0 89 (Prior 0 96) and Lt - 0 83 (Prior 0 87)  Great toe pressures are within the healing range  Compared to previous study on 12/9/2020, there is no significant change  SIGNATURE:  Electronically Signed by: Samara Chu on 2023-02-16 07:43:08 AM        CV duplex 2/15/23  FINDINGS:     Right        Impression  PSV  EDV (cm/s)  Direction of Flow  Ratio    Dist  ICA                144          29                      1 03    Mid  ICA                 107          21                      0 77    Prox  ICA                111          16                      0 79    Dist CCA                 150          13                              Mid CCA                  140          13                      0 85    Prox CCA                 165          17                      1 77    Ext Carotid              105           0                      0 75    Prox Vert    Stenosis    277          12  Antegrade                   Subclavian   Stenosis    390          14                              Innominate                93          13                                 Left         Impression  PSV  EDV (cm/s)  Direction of Flow    Dist  ICA                111          21                       Mid  ICA                 123          25                       Prox   ICA    70%+        540          63                       Dist CCA                  69          13                       Ext Carotid              342          23                       Prox Vert                 61          15  Antegrade            Subclavian   Stenosis    168          16                                CONCLUSION:     Impression  RIGHT:  Widely "patent internal carotid artery and endarterectomy site  Vertebral flow is antegrade with proximal stenosis  There is subclavian artery  stenosis  LEFT:  There is >70% stenosis in the internal carotid artery  Plaque is heterogenous  and irregular  Moderate disease in the external carotid artery  Vertebral artery flow is antegrade  There is evidence subclavian artery  disease  Compared to previous study on 7/13/2022, there is progression of disease on the  left  The following portions of the patient's history were reviewed and updated as appropriate: allergies, current medications, past family history, past medical history, past social history, past surgical history and problem list     Review of Systems   Constitutional: Negative  HENT: Negative  Eyes: Negative  Respiratory: Negative  Cardiovascular: Negative  Gastrointestinal: Negative  Endocrine: Negative  Genitourinary: Negative  Musculoskeletal: Positive for back pain  Skin: Negative  Allergic/Immunologic: Negative  Neurological: Positive for numbness  Hematological: Negative  Psychiatric/Behavioral: Negative  Objective:    /84 (BP Location: Left arm, Patient Position: Sitting)   Pulse 68   Ht 5' 3\" (1 6 m)   Wt 52 6 kg (116 lb)   BMI 20 55 kg/m²        Physical Exam  Vitals and nursing note reviewed  Constitutional:       Appearance: She is well-developed  HENT:      Head: Normocephalic and atraumatic  Eyes:      Pupils: Pupils are equal, round, and reactive to light  Neck:      Vascular: Carotid bruit (B) present  No JVD  Cardiovascular:      Rate and Rhythm: Normal rate and regular rhythm  Pulses:           Carotid pulses are 2+ on the right side and 2+ on the left side  Radial pulses are 2+ on the right side and 2+ on the left side  Femoral pulses are 2+ on the right side and 2+ on the left side       Heart sounds: Normal heart sounds, S1 normal and S2 normal  " "No murmur heard  No friction rub  No gallop  Comments:     Trace LE edema    Feet are normal color and temp  Cap refill 2-3 seconds  No wounds    Pulmonary:      Effort: Pulmonary effort is normal  No accessory muscle usage or respiratory distress  Breath sounds: Normal breath sounds  No wheezing or rales  Abdominal:      General: Bowel sounds are normal  There is no distension  Palpations: Abdomen is soft  Tenderness: There is no abdominal tenderness  Musculoskeletal:         General: No deformity  Normal range of motion  Skin:     General: Skin is warm and dry  Capillary Refill: Capillary refill takes 2 to 3 seconds  Findings: No lesion or rash  Nails: There is no clubbing  Neurological:      Mental Status: She is alert and oriented to person, place, and time  Comments: Grossly normal    Psychiatric:         Behavior: Behavior is cooperative  I have reviewed and made appropriate changes to the review of systems input by the medical assistant      Vitals:    05/05/23 1304   BP: 146/84   BP Location: Left arm   Patient Position: Sitting   Pulse: 68   Weight: 52 6 kg (116 lb)   Height: 5' 3\" (1 6 m)       Patient Active Problem List   Diagnosis   • Aortoiliac occlusive disease ([facility] [address] )   • Carotid artery stenosis   • Hypothyroidism   • Nicotine dependence   • Subclavian artery stenosis, left (HCC)   • Aortic stenosis   • Benign essential HTN   • 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HCC)   • Hyperlipidemia   • Onychomycosis   • Paronychia of toenail   • Simple chronic bronchitis (HCC)   • Cigarette smoker   • Cardiac murmur   • Mass of spine   • Chronic diastolic heart failure (HCC)   • Acute blood loss anemia   • Hypertensive heart disease with chronic diastolic congestive heart failure (HCC)   • Bilateral leg edema   • Peripheral neuropathy   • Parenchymal renal hypertension   • Anemia in stage 3 chronic kidney disease (HCC)   • Chronic kidney disease, stage 3 " [facility])   • Chronic anemia   • Renal artery stenosis (HCC)   • History of ischemic colitis   • Gastritis without bleeding   • Arteriovenous malformation (AVM)   • 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HCC)       Past Surgical History:   Procedure Laterality Date   • BREAST SURGERY      bxs,benign   • COLONOSCOPY     • HYSTERECTOMY  1983    43   • INCISIONAL BREAST BIOPSY      x5, 1964, 1965, 1985 and [address] AORTOBIFEMORAL Bilateral 12/9/2019    Procedure: BYPASS AORTA BIFEMORAL WITH LEFT FEMORAL THROMBOEMBOLECTOMY;  Surgeon: Erika iSngh MD;  Location: [facility];  Service: Vascular   • IL TEAEC W/PATCH GRF CAROTID VERTB SUBCLAV NECK INC Right 10/1/2021    Procedure: ENDARTERECTOMY ARTERY [address] [facility], INTRO DUPLEX;  Surgeon: Rodney Schwartz MD;  Location: [facility];  Service: Vascular       Family History   Problem Relation Age of Onset   • Hypertension Father    • Heart disease Father         CHF   • Coronary artery disease Family    • Arthritis Family    • Abdominal aortic aneurysm Mother    • Hypertension Mother    • Skin cancer Daughter    • Cancer Paternal Aunt    • No Known Problems Maternal Aunt    • No Known Problems Maternal Aunt    • No Known Problems Maternal Aunt        Social History     Socioeconomic History   • Marital status:      Spouse name: Not on file   • Number of children: Not on file   • Years of education: Not on file   • Highest education level: Not on file   Occupational History   • Not on file   Tobacco Use   • Smoking status: Every Day     Packs/day: 1 00     Years: 60 00     Pack years: 60 00     Types: Cigarettes   • Smokeless tobacco: Never   Vaping Use   • Vaping Use: Never used   Substance and Sexual Activity   • Alcohol use: Yes     Comment: manhattans 2-3 every day for 50 years   • Drug use: No   • Sexual activity: Yes     Partners: Male     Birth control/protection: Surgical   Other Topics Concern   • Not on file   Social History Narrative Rarely exercises    Sleeps 6-7 hours per day     Social Determinants of Health     Financial Resource Strain: Medium Risk   • Difficulty of Paying Living Expenses: Somewhat hard   Food Insecurity: Not on file   Transportation Needs: No Transportation Needs   • Lack of Transportation (Medical): No   • Lack of Transportation (Non-Medical):  No   Physical Activity: Not on file   Stress: Not on file   Social Connections: Not on file   Intimate Partner Violence: Not on file   Housing Stability: Not on file       No Known Allergies      Current Outpatient Medications:   •  albuterol (ProAir HFA) 90 mcg/act inhaler, Inhale 2 puffs every 6 (six) hours as needed for wheezing, Disp: 8 g, Rfl: 3  •  amLODIPine (NORVASC) 10 mg tablet, Take 1 tablet (10 mg total) by mouth in the morning , Disp: 90 tablet, Rfl: 3  •  atorvastatin (LIPITOR) 80 mg tablet, Take 1 tablet (80 mg total) by mouth daily at bedtime, Disp: 90 tablet, Rfl: 3  •  B Complex Vitamins (VITAMIN B-COMPLEX) TABS, Take by mouth daily , Disp: , Rfl:   •  Bioflavonoid Products (VITAMIN C PLUS PO), Take by mouth daily, Disp: , Rfl:   •  bisacodyl (DULCOLAX) 5 mg EC tablet, Take 10 mg by mouth once, Disp: , Rfl:   •  carvedilol (COREG) 12 5 mg tablet, Take 1 tablet (12 5 mg total) by mouth 2 (two) times a day, Disp: 180 tablet, Rfl: 3  •  cholecalciferol (VITAMIN D3) 1,000 units tablet, Take 5,000 Units by mouth daily , Disp: , Rfl:   •  clopidogrel (PLAVIX) 75 mg tablet, take 1 tablet by mouth once daily, Disp: 90 tablet, Rfl: 1  •  COLLAGEN PO, Take by mouth Includes a probiotic and other vitamins-10 supplements-2 am-one in the afternoon-one hs, Disp: , Rfl:   •  Diclofenac Sodium (VOLTAREN) 1 %, 4 (four) times a day as needed, Disp: , Rfl:   •  enalapril (VASOTEC) 10 mg tablet, Take 1 tablet (10 mg total) by mouth 2 (two) times a day, Disp: 180 tablet, Rfl: 3  •  furosemide (LASIX) 20 mg tablet, Take 1 tablet (20 mg total) by mouth daily, Disp: 90 tablet, Rfl: 3  • ipratropium (ATROVENT) 0 03 % nasal spray, 2 sprays into each nostril 3 (three) times a day as needed for rhinitis, Disp: 30 mL, Rfl: 3  •  levothyroxine 25 mcg tablet, Take 1 tablet (25 mcg total) by mouth daily in the early morning, Disp: 90 tablet, Rfl: 3  •  spironolactone (ALDACTONE) 25 mg tablet, Take 1 tablet (25 mg total) by mouth daily, Disp: 90 tablet, Rfl: 3

## 2023-05-08 ENCOUNTER — TELEPHONE (OUTPATIENT)
Dept: HEMATOLOGY ONCOLOGY | Facility: CLINIC | Age: 83
End: 2023-05-08

## 2023-05-08 NOTE — TELEPHONE ENCOUNTER
Patient Call    Who are you speaking with? Patient    If it is not the patient, are they listed on an active communication consent form? N/A   What is the reason for this call? Patient calling in stating she would like to speak with Dr Ardian Humphreys or someone on his team regarding an urgent matter  Does this require a call back? Yes   If a call back is required, please list [facility] call back number [phone]   If a call back is required, advise that a message will be forwarded to their care team and someone will return their call as soon as possible  Did you relay this information to the patient?  Yes

## 2023-05-08 NOTE — TELEPHONE ENCOUNTER
Spoke with patient  Vascular surgeon has ordered:  Bilateral carotid artery stenosis  -     Ambulatory Referral to Nephrology; Future  -     CTA head and neck w wo contrast; Future  -     VAS carotid complete study;  Future  Patient was wondering if Dr Melody Gar can speak to Nephrology issue  Patient aware that Dr Melody Gar is a hematologist and patient will need consult with nephrologist  Patient aware

## 2023-05-09 ENCOUNTER — OFFICE VISIT (OUTPATIENT)
Dept: FAMILY MEDICINE CLINIC | Facility: CLINIC | Age: 83
End: 2023-05-09

## 2023-05-09 VITALS
TEMPERATURE: 97.6 F | HEART RATE: 82 BPM | SYSTOLIC BLOOD PRESSURE: 138 MMHG | BODY MASS INDEX: 20.38 KG/M2 | HEIGHT: 63 IN | WEIGHT: 115 LBS | RESPIRATION RATE: 16 BRPM | OXYGEN SATURATION: 95 % | DIASTOLIC BLOOD PRESSURE: 85 MMHG

## 2023-05-09 DIAGNOSIS — F17.200 NICOTINE DEPENDENCE, UNCOMPLICATED, UNSPECIFIED NICOTINE PRODUCT TYPE: Chronic | ICD-10-CM

## 2023-05-09 DIAGNOSIS — E03.9 HYPOTHYROIDISM, UNSPECIFIED TYPE: Chronic | ICD-10-CM

## 2023-05-09 DIAGNOSIS — Z12.31 ENCOUNTER FOR SCREENING MAMMOGRAM FOR MALIGNANT NEOPLASM OF BREAST: ICD-10-CM

## 2023-05-09 DIAGNOSIS — Z00.00 MEDICARE ANNUAL WELLNESS VISIT, SUBSEQUENT: Primary | ICD-10-CM

## 2023-05-09 DIAGNOSIS — M19.90 ARTHRITIS: ICD-10-CM

## 2023-05-09 DIAGNOSIS — J41.0 SIMPLE CHRONIC BRONCHITIS (HCC): ICD-10-CM

## 2023-05-09 DIAGNOSIS — I10 BENIGN ESSENTIAL HTN: Chronic | ICD-10-CM

## 2023-05-09 NOTE — PATIENT INSTRUCTIONS
Medicare Preventive Visit Patient Instructions  Thank you for completing your Welcome to Medicare Visit or Medicare Annual Wellness Visit today  Your next wellness visit will be due in one year (5/9/2024)  The screening/preventive services that you may require over the next 5-10 years are detailed below  Some tests may not apply to you based off risk factors and/or age  Screening tests ordered at today's visit but not completed yet may show as past due  Also, please note that scanned in results may not display below  Preventive Screenings:  Service Recommendations Previous Testing/Comments   Colorectal Cancer Screening  * Colonoscopy    * Fecal Occult Blood Test (FOBT)/Fecal Immunochemical Test (FIT)  * Fecal DNA/Cologuard Test  * Flexible Sigmoidoscopy Age: 39-70 years old   Colonoscopy: every 10 years (may be performed more frequently if at higher risk)  OR  FOBT/FIT: every 1 year  OR  Cologuard: every 3 years  OR  Sigmoidoscopy: every 5 years  Screening may be recommended earlier than age 39 if at higher risk for colorectal cancer  Also, an individualized decision between you and your healthcare provider will decide whether screening between the ages of 74-80 would be appropriate  Colonoscopy: 04/27/2022  FOBT/FIT: 10/03/2021  Cologuard: Not on file  Sigmoidoscopy: Not on file          Breast Cancer Screening Age: 36 years old  Frequency: every 1-2 years  Not required if history of left and right mastectomy Mammogram: 01/26/2022    Screening Current   Cervical Cancer Screening Between the ages of 21-29, pap smear recommended once every 3 years  Between the ages of 33-67, can perform pap smear with HPV co-testing every 5 years     Recommendations may differ for women with a history of total hysterectomy, cervical cancer, or abnormal pap smears in past  Pap Smear: Not on file    Screening Not Indicated   Hepatitis C Screening Once for adults born between [facility]  More frequently in patients at high risk for Hepatitis C Hep C Antibody: Not on file        Diabetes Screening 1-2 times per year if you're at risk for diabetes or have pre-diabetes Fasting glucose: 93 mg/dL (1/17/2023)  A1C: 5 3 % (9/21/2021)  Screening Current   Cholesterol Screening Once every 5 years if you don't have a lipid disorder  May order more often based on risk factors  Lipid panel: 01/17/2023    Screening Not Indicated  History Lipid Disorder     Other Preventive Screenings Covered by Medicare:  1  Abdominal Aortic Aneurysm (AAA) Screening: covered once if your at risk  You're considered to be at risk if you have a family history of AAA  2  Lung Cancer Screening: covers low dose CT scan once per year if you meet all of the following conditions: (1) Age 50-69; (2) No signs or symptoms of lung cancer; (3) Current smoker or have quit smoking within the last 15 years; (4) You have a tobacco smoking history of at least 20 pack years (packs per day multiplied by number of years you smoked); (5) You get a written order from a healthcare provider  3  Glaucoma Screening: covered annually if you're considered high risk: (1) You have diabetes OR (2) Family history of glaucoma OR (3)  aged 48 and older OR (3)  American aged 72 and older  3  Osteoporosis Screening: covered every 2 years if you meet one of the following conditions: (1) You're estrogen deficient and at risk for osteoporosis based off medical history and other findings; (2) Have a vertebral abnormality; (3) On glucocorticoid therapy for more than 3 months; (4) Have primary hyperparathyroidism; (5) On osteoporosis medications and need to assess response to drug therapy  · Last bone density test (DXA Scan): 10/07/2020   5  HIV Screening: covered annually if you're between the age of 15-65  Also covered annually if you are younger than 13 and older than 72 with risk factors for HIV infection   For pregnant patients, it is covered up to 3 times per pregnancy  Immunizations:  Immunization Recommendations   Influenza Vaccine Annual influenza vaccination during flu season is recommended for all persons aged >= 6 months who do not have contraindications   Pneumococcal Vaccine   * Pneumococcal conjugate vaccine = PCV13 (Prevnar 13), PCV15 (Vaxneuvance), PCV20 (Prevnar 20)  * Pneumococcal polysaccharide vaccine = PPSV23 (Pneumovax) Adults 25-60 years old: 1-3 doses may be recommended based on certain risk factors  Adults 72 years old: 1-2 doses may be recommended based off what pneumonia vaccine you previously received   Hepatitis B Vaccine 3 dose series if at intermediate or high risk (ex: diabetes, end stage renal disease, liver disease)   Tetanus (Td) Vaccine - COST NOT COVERED BY MEDICARE PART B Following completion of primary series, a booster dose should be given every 10 years to maintain immunity against tetanus  Td may also be given as tetanus wound prophylaxis  Tdap Vaccine - COST NOT COVERED BY MEDICARE PART B Recommended at least once for all adults  For pregnant patients, recommended with each pregnancy  Shingles Vaccine (Shingrix) - COST NOT COVERED BY MEDICARE PART B  2 shot series recommended in those aged 48 and above     Health Maintenance Due:      Topic Date Due   • Breast Cancer Screening: Mammogram  01/26/2023     Immunizations Due:      Topic Date Due   • COVID-19 Vaccine (3 - Booster for Moderna series) 05/21/2021     Advance Directives   What are advance directives? Advance directives are legal documents that state your wishes and plans for medical care  These plans are made ahead of time in case you lose your ability to make decisions for yourself  Advance directives can apply to any medical decision, such as the treatments you want, and if you want to donate organs  What are the types of advance directives? There are many types of advance directives, and each state has rules about how to use them   You may choose a combination of any of the following:  · Living will: This is a written record of the treatment you want  You can also choose which treatments you do not want, which to limit, and which to stop at a certain time  This includes surgery, medicine, IV fluid, and tube feedings  · Durable power of  for healthcare [facility]): This is a written record that states who you want to make healthcare choices for you when you are unable to make them for yourself  This person, called a proxy, is usually a family member or a friend  You may choose more than 1 proxy  · Do not resuscitate (DNR) order:  A DNR order is used in case your heart stops beating or you stop breathing  It is a request not to have certain forms of treatment, such as CPR  A DNR order may be included in other types of advance directives  · Medical directive: This covers the care that you want if you are in a coma, near death, or unable to make decisions for yourself  You can list the treatments you want for each condition  Treatment may include pain medicine, surgery, blood transfusions, dialysis, IV or tube feedings, and a ventilator (breathing machine)  · Values history: This document has questions about your views, beliefs, and how you feel and think about life  This information can help others choose the care that you would choose  Why are advance directives important? An advance directive helps you control your care  Although spoken wishes may be used, it is better to have your wishes written down  Spoken wishes can be misunderstood, or not followed  Treatments may be given even if you do not want them  An advance directive may make it easier for your family to make difficult choices about your care  Cigarette Smoking and Your Health   Risks to your health if you smoke:  Nicotine and other chemicals found in tobacco damage every cell in your body  Even if you are a light smoker, you have an increased risk for cancer, heart disease, and lung disease   If "you are pregnant or have diabetes, smoking increases your risk for complications  Benefits to your health if you stop smoking:   · You decrease respiratory symptoms such as coughing, wheezing, and shortness of breath  · You reduce your risk for cancers of the lung, mouth, throat, kidney, bladder, pancreas, stomach, and cervix  If you already have cancer, you increase the benefits of chemotherapy  You also reduce your risk for cancer returning or a second cancer from developing  · You reduce your risk for heart disease, blood clots, heart attack, and stroke  · You reduce your risk for lung infections, and diseases such as pneumonia, asthma, chronic bronchitis, and emphysema  · Your circulation improves  More oxygen can be delivered to your body  If you have diabetes, you lower your risk for complications, such as kidney, artery, and eye diseases  You also lower your risk for nerve damage  Nerve damage can lead to amputations, poor vision, and blindness  · You improve your body's ability to heal and to fight infections  For more information and support to stop smoking:   · [employer]  Phone: 9- 747 - 810-8837  Web Address: [employer]  Alcohol Use and Your Health    Drinking too much can harm your health  Excessive alcohol use leads to about 88,000 death in the [place] each year, and shortens the life of those who diet by almost 30 years  Further, excessive drinking cost the economy $249 billion in 2010  Most excessive drinkers are not alcohol dependent  Excessive alcohol use has immediate effects that increase the risk of many harmful health conditions  These are most often the result of binge drinking  Over time, excessive alcohol use can lead to the development of chronic diseases and other series health problems  What is considered a \"drink\"? Excessive alcohol use includes:  · Binge Drinking: For women, 4 or more drinks consumed on one occasion   For men, 5 or more drinks " consumed on one occasion  · Heavy Drinking: For women, 8 or more drinks per week  For men, 15 or more drinks per week  · Any alcohol used by pregnant women  · Any alcohol used by those under the age of 21 years    If you choose to drink, do so in moderation:  · Do not drink at all if you are under the age of 24, or if you are or may be pregnant, or have health problems that could be made worse by drinking  · For women, up to 1 drink per day  · For men, up to 2 drinks a day    No one should begin drinking or drink more frequently based on potential health benefits    Short-Term Health Risks:  · Injuries: motor vehicle crashes, falls, drownings, burns  · Violence: homicide, suicide, sexual assault, intimate partner violence  · Alcohol poisoning  · Reproductive health: risky sexual behaviors, unintended prengnacy, sexually transmitted diseases, miscarriage, stillbirth, fetal alcohol syndrome    Long-Term Health Risks:  · Chronic diseases: high blood pressure, heart disease, stroke, liver disease, digestive problems  · Cancers: breast, mouth and throat, liver, colon  · Learning and memory problems: dementia, poor school performance  · Mental health: depression, anxiety, insomnia  · Social problems: lost productivity, family problems, unemployment  · Alcohol dependence    For support and more information:  · Substance Abuse and [facility], [place] [address] , [address] [place]  Web Address: https://[employer]/    · Alcoholics Anonymous        Web Address: http://www mac[employer] info/    https://www cdc gov/alcohol/fact-sheets/alcohol-use htm     © Copyright [employer] 2018 Information is for End User's use only and may not be sold, redistributed or otherwise used for commercial purposes   All illustrations and images included in CareNotes® are the copyrighted property of [employer]  or [employer]

## 2023-05-09 NOTE — PROGRESS NOTES
Assessment and Plan:     Problem List Items Addressed This Visit        Endocrine    Hypothyroidism (Chronic)    Relevant Orders    TSH, 3rd generation    T4, free       Respiratory    Simple chronic bronchitis (HCC)       Cardiovascular and Mediastinum    Benign essential HTN (Chronic)     Stable  Other    Nicotine dependence (Chronic)   Other Visit Diagnoses     Medicare annual wellness visit, subsequent    -  Primary    Encounter for screening mammogram for malignant neoplasm of breast        Relevant Orders    Mammo screening bilateral w 3d & cad    Arthritis        Relevant Medications    Diclofenac Sodium (VOLTAREN) 1 %          Depression Screening and Follow-up Plan: Patient was screened for depression during today's encounter  They screened negative with a PHQ-2 score of 0  Falls Plan of Care: balance, strength, and gait training instructions were provided  Tobacco Cessation Counseling: Tobacco cessation counseling was provided  The patient is sincerely urged to quit consumption of tobacco  She is not ready to quit tobacco  Medication options discussed  Patient refused medication  Preventive health issues were discussed with patient, and age appropriate screening tests were ordered as noted in patient's After Visit Summary  Personalized health advice and appropriate referrals for health education or preventive services given if needed, as noted in patient's After Visit Summary  History of Present Illness:     Patient presents for a Medicare Wellness Visit    HPI   Patient Care Team:  Jose Miguel Saini MD as PCP - General (Family Medicine)  Ade Thorne MD as PCP - [address] [place], [place] [id number] [facility] ([facility])  Saige Valle DO (Cardiology)  Demetrio Westfall MD (Vascular Surgery)  Tiara Petersen PA-C (Vascular Surgery)     Review of Systems:     Review of Systems   Constitutional: Negative  HENT: Negative  Eyes: Negative  Respiratory: Negative  Cardiovascular: Negative  Gastrointestinal: Negative  Endocrine: Negative  Genitourinary: Negative  Musculoskeletal: Negative  Skin: Negative  Allergic/Immunologic: Negative  Neurological: Negative  Hematological: Negative  Psychiatric/Behavioral: Negative           Problem List:     Patient Active Problem List   Diagnosis   • Aortoiliac occlusive disease ([place] [address] )   • Carotid artery stenosis   • Hypothyroidism   • Nicotine dependence   • Subclavian artery stenosis, left ([facility])   • Aortic stenosis   • Benign essential HTN   • 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[facility])   • Hyperlipidemia   • Onychomycosis   • Paronychia of toenail   • Simple chronic bronchitis ([facility])   • Cigarette smoker   • Cardiac murmur   • Mass of spine   • Chronic diastolic heart failure ([facility])   • Acute blood loss anemia   • Hypertensive heart disease with chronic diastolic congestive heart failure ([facility])   • Bilateral leg edema   • Peripheral neuropathy   • Parenchymal renal hypertension   • Anemia in stage 3 chronic kidney disease ([facility])   • Chronic kidney disease, stage 3 ([facility])   • Chronic anemia   • Renal artery stenosis ([facility])   • History of ischemic colitis   • Gastritis without bleeding   • Arteriovenous malformation (AVM)   • 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[facility])   • 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 ([place] [address] )      Past Medical and Surgical History:     Past Medical History:   Diagnosis Date   • Anemia    • Arthritis    • Asthma    • Benign essential hypertension    • CAD (coronary artery disease)    • Cardiac murmur     sees Dr Ale Collins   • Carotid stenosis, bilateral    • CHF (congestive heart failure) ([place] [address] )    • Chronic kidney disease     renal artery stenosis   • Chronic sinusitis     treated with antibiotics 3/22   • Colitis    • COPD (chronic obstructive pulmonary disease) ([place] [address] )    • Coronary artery disease    • Disease of thyroid gland    • Dizziness    • Edema of leg     compression stocking left leg   • History of transfusion    • Hyperkalemia 5/3/2021   • Hyperlipidemia    • Hypertension    • Other disorder of circulatory system (CODE)    • Renal artery stenosis (HCC)     Right   • Subclavian arterial stenosis (HCC)     B/L   • Type 2 diabetes mellitus without complication, unspecified whether long term insulin use ([facility] [address] ) 9/20/2022   • Wears dentures     lower one tooth   • Wears glasses      Past Surgical History:   Procedure Laterality Date   • BREAST SURGERY      bxs,benign   • COLONOSCOPY     • HYSTERECTOMY  1983    43   • INCISIONAL BREAST BIOPSY      x5, 1964, 1965, 1985 and [address] AORTOBIFEMORAL Bilateral 12/9/2019    Procedure: BYPASS AORTA BIFEMORAL WITH LEFT FEMORAL THROMBOEMBOLECTOMY;  Surgeon: Ayana Quiroga MD;  Location: [facility];  Service: Vascular   • ND TEAEC W/PATCH GRF CAROTID VERTB Vikram Right 10/1/2021    Procedure: ENDARTERECTOMY ARTERY [address] [facility], INTROP DUPLEX;  Surgeon: Piotr Wood MD;  Location: [facility];  Service: Vascular      Family History:     Family History   Problem Relation Age of Onset   • Hypertension Father    • Heart disease Father         CHF   • Coronary artery disease Family    • Arthritis Family    • Abdominal aortic aneurysm Mother    • Hypertension Mother    • Skin cancer Daughter    • Cancer Paternal Aunt    • No Known Problems Maternal Aunt    • No Known Problems Maternal Aunt    • No Known Problems Maternal Aunt       Social History:     Social History     Socioeconomic History   • Marital status:      Spouse name: None   • Number of children: None   • Years of education: None   • Highest education level: None   Occupational History   • None   Tobacco Use   • Smoking status: Every Day     Packs/day: 1 00     Years: 30 00     Pack years: 30 00     Types: Cigarettes     Start date: 1960     Passive exposure: Current   • Smokeless tobacco: Never   Vaping Use   • Vaping Use: Never used   Substance and Sexual Activity   • Alcohol use: Yes     Comment: wilfrid 2-3 every day for 50 years   • Drug use: No   • Sexual activity: Yes     Partners: Male     Birth control/protection: Surgical   Other Topics Concern   • None   Social History Narrative    Rarely exercises    Sleeps 6-7 hours per day     Social Determinants of Health     Financial Resource Strain: Low Risk    • Difficulty of Paying Living Expenses: Not hard at all   Food Insecurity: Not on file   Transportation Needs: No Transportation Needs   • Lack of Transportation (Medical): No   • Lack of Transportation (Non-Medical): No   Physical Activity: Not on file   Stress: Not on file   Social Connections: Not on file   Intimate Partner Violence: Not on file   Housing Stability: Not on file      Medications and Allergies:     Current Outpatient Medications   Medication Sig Dispense Refill   • albuterol (ProAir HFA) 90 mcg/act inhaler Inhale 2 puffs every 6 (six) hours as needed for wheezing 8 g 3   • amLODIPine (NORVASC) 10 mg tablet Take 1 tablet (10 mg total) by mouth in the morning   90 tablet 3   • atorvastatin (LIPITOR) 80 mg tablet Take 1 tablet (80 mg total) by mouth daily at bedtime 90 tablet 3   • B Complex Vitamins (VITAMIN B-COMPLEX) TABS Take by mouth daily      • Bioflavonoid Products (VITAMIN C PLUS PO) Take by mouth daily     • bisacodyl (DULCOLAX) 5 mg EC tablet Take 10 mg by mouth once     • carvedilol (COREG) 12 5 mg tablet Take 1 tablet (12 5 mg total) by mouth 2 (two) times a day 180 tablet 3   • cholecalciferol (VITAMIN D3) 1,000 units tablet Take 5,000 Units by mouth daily      • clopidogrel (PLAVIX) 75 mg tablet take 1 tablet by mouth once daily 90 tablet 1   • COLLAGEN PO Take by mouth Includes a probiotic and other vitamins-10 supplements-2 am-one in the afternoon-one hs     • Diclofenac Sodium (VOLTAREN) 1 % Apply 4 g topically 4 (four) times a day as needed (arthtitis pain) 350 g 0   • enalapril (VASOTEC) 10 mg tablet Take 1 tablet (10 mg total) by mouth 2 (two) times a day 180 tablet 3   • furosemide (LASIX) 20 mg tablet Take 1 tablet (20 mg total) by mouth daily 90 tablet 3   • ipratropium (ATROVENT) 0 03 % nasal spray 2 sprays into each nostril 3 (three) times a day as needed for rhinitis 30 mL 3   • levothyroxine 25 mcg tablet Take 1 tablet (25 mcg total) by mouth daily in the early morning 90 tablet 3   • spironolactone (ALDACTONE) 25 mg tablet Take 1 tablet (25 mg total) by mouth daily 90 tablet 3     No current facility-administered medications for this visit  No Known Allergies   Immunizations:     Immunization History   Administered Date(s) Administered   • COVID-19 MODERNA VACC 0 5 ML IM 02/25/2021, 03/26/2021   • DTaP 5 08/07/2019   • Hep B, adult 11/16/2004   • INFLUENZA 10/01/2020   • Influenza Quadrivalent Preservative Free 3 years and older IM 10/19/2015   • Influenza Split High Dose Preservative Free IM 10/14/2016   • Influenza, high dose seasonal 0 7 mL 11/29/2018, 10/17/2021   • Influenza, seasonal, injectable 09/01/2014   • MMR 04/23/2001   • Pneumococcal Conjugate 13-Valent 09/15/2017   • Pneumococcal Polysaccharide PPV23 09/08/2020   • Td (adult), adsorbed 11/14/2005   • Tdap 12/22/2014   • Zoster Vaccine Recombinant 10/28/2020, 01/12/2021   • influenza, trivalent, adjuvanted 10/11/2019      Health Maintenance:         Topic Date Due   • Breast Cancer Screening: Mammogram  01/26/2023     There are no preventive care reminders to display for this patient  Medicare Screening Tests and Risk Assessments:     Denilson Haider is here for her Subsequent Wellness visit  Health Risk Assessment:   Patient rates overall health as good  Patient feels that their physical health rating is same  Patient is satisfied with their life  Eyesight was rated as same  Hearing was rated as same  Patient feels that their emotional and mental health rating is same  Patients states they are never, rarely angry   Patient states they are sometimes unusually tired/fatigued  Pain experienced in the last 7 days has been some  Patient's pain rating has been 4/10  Patient states that she has experienced no weight loss or gain in last 6 months  Depression Screening:   PHQ-2 Score: 0      Fall Risk Screening: In the past year, patient has experienced: no history of falling in past year      Urinary Incontinence Screening:   Patient has not leaked urine accidently in the last six months  Home Safety:  Patient does not have trouble with stairs inside or outside of their home  Patient has working smoke alarms and has working carbon monoxide detector  Home safety hazards include: none  Nutrition:   Current diet is Regular and Frequent junk food  Medications:   Patient is not currently taking any over-the-counter supplements  Patient is able to manage medications  Activities of Daily Living (ADLs)/Instrumental Activities of Daily Living (IADLs):   Walk and transfer into and out of bed and chair?: Yes  Dress and groom yourself?: Yes    Bathe or shower yourself?: Yes    Feed yourself?  Yes  Do your laundry/housekeeping?: Yes  Manage your money, pay your bills and track your expenses?: Yes  Make your own meals?: Yes    Do your own shopping?: Yes    Previous Hospitalizations:   Any hospitalizations or ED visits within the last 12 months?: No      Advance Care Planning:   Living will: Yes    Advanced directive: Yes      PREVENTIVE SCREENINGS      Cardiovascular Screening:    General: Screening Not Indicated and History Lipid Disorder      Diabetes Screening:     General: Screening Current      Breast Cancer Screening:     General: Risks and Benefits Discussed    Due for: Mammogram        Cervical Cancer Screening:    General: Screening Not Indicated      Osteoporosis Screening:    General: Screening Not Indicated      Abdominal Aortic Aneurysm (AAA) Screening:    Risk factors include: family history of AAA        Lung Cancer Screening:     General: Screening Not Indicated      Hepatitis C Screening:    General: Screening Not Indicated    Screening, Brief Intervention, and Referral to Treatment (SBIRT)    Screening      AUDIT-C Screenin) How often did you have a drink containing alcohol in the past year? 4 or more times a week  2) How many drinks did you have on a typical day when you were drinking in the past year? 1 to 2  3) How often did you have 6 or more drinks on one occasion in the past year? never    AUDIT-C Score: 4  Interpretation: Score 3-12 (female): POSITIVE screen for alcohol misuse    AUDIT Screenin) How often during the last year have you found that you were not able to stop drinking once you had started? 0 - never  5) How often during the last year have you failed to do what was normally expected from you because of drinking? 0 - never  6) How often during the last year have you needed a first drink in the morning to get yourself going after a heavy drinking session? 0 - never  7) How often during the last year have you had a feeling of guilt or remorse after drinking? 0 - never  8) How often during the last year have you been unable to remember what happened the night before because you had been drinking? 0 - never  9) Have you or someone else been injured as a result of your drinking? 0 - no  10) Has a relative or friend or a doctor or another health worker been concerned about your drinking or suggested you cut down? 0 - no    AUDIT Score: 4  Interpretation: Low risk alcohol consumption    Single Item Drug Screening:  How often have you used an illegal drug (including marijuana) or a prescription medication for non-medical reasons in the past year? never    Single Item Drug Screen Score: 0  Interpretation: Negative screen for possible drug use disorder    Brief Intervention  Alcohol & drug use screenings were reviewed  No concerns regarding substance use disorder identified  No results found       Physical Exam:     /85 "(BP Location: Left arm, Patient Position: Sitting, Cuff Size: Standard)   Pulse 82   Temp 97 6 °F (36 4 °C) (Temporal)   Resp 16   Ht 5' 3\" (1 6 m)   Wt 52 2 kg (115 lb)   SpO2 95%   BMI 20 37 kg/m²     Physical Exam  Vitals and nursing note reviewed  Constitutional:       General: She is not in acute distress  Appearance: She is well-developed  HENT:      Head: Normocephalic and atraumatic  Right Ear: Tympanic membrane, ear canal and external ear normal  There is no impacted cerumen  Left Ear: Tympanic membrane, ear canal and external ear normal  There is no impacted cerumen  Nose: Nose normal       Mouth/Throat:      Mouth: Mucous membranes are moist    Eyes:      Conjunctiva/sclera: Conjunctivae normal    Cardiovascular:      Rate and Rhythm: Normal rate and regular rhythm  Heart sounds: No murmur heard  Pulmonary:      Effort: Pulmonary effort is normal  No respiratory distress  Breath sounds: Normal breath sounds  Abdominal:      General: Abdomen is flat  There is no distension  Palpations: Abdomen is soft  There is no mass  Tenderness: There is no abdominal tenderness  There is no guarding or rebound  Hernia: No hernia is present  Musculoskeletal:         General: Normal range of motion  Cervical back: Neck supple  Skin:     General: Skin is warm and dry  Neurological:      General: No focal deficit present  Mental Status: She is alert and oriented to person, place, and time            Alis Joyce MD  "

## 2023-05-09 NOTE — TELEPHONE ENCOUNTER
lOvin Hartley  [facility] Clearance Pool 46 minutes ago (9:44 AM)     KR  [facility]  She declines being seen in [facility] office   Placed patient on high priority cancellation list

## 2023-05-09 NOTE — TELEPHONE ENCOUNTER
Pt is scheduled to see GEORGINA Moran PA-C in your [facility] office 7/7/23, it is listed as f/u visit  She needs clearance for CTA to confirm severe carotid stenosis, she may need surgery  Are there any earlier apts available for clearance? If so can you please reach out to pt to schedule? Thank you for your help  Clearly defined time of onset between 3 and 4.5 hours of the start of treatment

## 2023-05-15 ENCOUNTER — TELEPHONE (OUTPATIENT)
Dept: HEMATOLOGY ONCOLOGY | Facility: MEDICAL CENTER | Age: 83
End: 2023-05-15

## 2023-05-15 NOTE — TELEPHONE ENCOUNTER
Called pt and let her know I needed to reschedule her appt with Dr Davi Morales to 11/1 and she was fine with the new date and time

## 2023-05-16 DIAGNOSIS — E03.9 HYPOTHYROIDISM, UNSPECIFIED TYPE: ICD-10-CM

## 2023-05-16 RX ORDER — LEVOTHYROXINE SODIUM 0.03 MG/1
25 TABLET ORAL
Qty: 90 TABLET | Refills: 3 | Status: SHIPPED | OUTPATIENT
Start: 2023-05-16

## 2023-06-05 DIAGNOSIS — I10 MALIGNANT HYPERTENSION: ICD-10-CM

## 2023-06-06 RX ORDER — AMLODIPINE BESYLATE 10 MG/1
TABLET ORAL
Qty: 90 TABLET | Refills: 3 | Status: SHIPPED | OUTPATIENT
Start: 2023-06-06

## 2023-06-12 ENCOUNTER — TELEPHONE (OUTPATIENT)
Dept: HEMATOLOGY ONCOLOGY | Facility: CLINIC | Age: 83
End: 2023-06-12

## 2023-06-12 NOTE — TELEPHONE ENCOUNTER
Appointment Confirmation   Who are you speaking with? Patient   If it is not the patient, are they listed on an active communication consent form? N/A   Which provider is the appointment scheduled with? Dr Melissa Roe   When is the appointment scheduled? Please list date and time          11/01/2023 @1:20PM    At which location is the appointment scheduled to take place? [facility]   Did caller verbalize understanding of appointment details?  Yes

## 2023-07-03 ENCOUNTER — TELEPHONE (OUTPATIENT)
Dept: NEPHROLOGY | Facility: CLINIC | Age: 83
End: 2023-07-03

## 2023-07-03 DIAGNOSIS — D63.1 ANEMIA IN STAGE 3 CHRONIC KIDNEY DISEASE, UNSPECIFIED WHETHER STAGE 3A OR 3B CKD (HCC): Primary | Chronic | ICD-10-CM

## 2023-07-03 DIAGNOSIS — E03.9 HYPOTHYROIDISM, UNSPECIFIED TYPE: Chronic | ICD-10-CM

## 2023-07-03 DIAGNOSIS — N18.30 STAGE 3 CHRONIC KIDNEY DISEASE, UNSPECIFIED WHETHER STAGE 3A OR 3B CKD (HCC): ICD-10-CM

## 2023-07-03 DIAGNOSIS — I74.09 AORTOILIAC OCCLUSIVE DISEASE (HCC): Chronic | ICD-10-CM

## 2023-07-03 DIAGNOSIS — N18.30 ANEMIA IN STAGE 3 CHRONIC KIDNEY DISEASE, UNSPECIFIED WHETHER STAGE 3A OR 3B CKD (HCC): Primary | Chronic | ICD-10-CM

## 2023-07-03 DIAGNOSIS — J31.0 NONALLERGIC RHINITIS: ICD-10-CM

## 2023-07-03 RX ORDER — IPRATROPIUM BROMIDE 21 UG/1
SPRAY, METERED NASAL
Qty: 30 ML | Refills: 3 | Status: SHIPPED | OUTPATIENT
Start: 2023-07-03

## 2023-07-03 NOTE — TELEPHONE ENCOUNTER
Called and spoke to the patient to relay the message above. Patient expressed understanding and had no further questions or concerns at this time.

## 2023-07-03 NOTE — TELEPHONE ENCOUNTER
----- Message from Geno Koyanagi, PA-C sent at 7/3/2023  2:55 PM EDT -----  Regarding: RE: No Active Labs- 7/7 Appt  Please obtain renal function panel, CBC, PTH, urine protein creatinine ratio. Thanks  ----- Message -----  From: Gerardo Gomez  Sent: 7/3/2023   8:27 AM EDT  To: Geno Koyanagi, PA-C  Subject: No Active Labs- 7/7 Appt                         Hi again Annette! This patient also has an appointment with you for 7/7 and has no active labs from our office. Last seen in hospital in 2021 with IgG kappa monoclonal gammopathy, CKD-3B, HTN, Hypokalemia, and CHF. Last labs are from 3/21/23.  Please advise what labs you would like

## 2023-07-04 RX ORDER — CLOPIDOGREL BISULFATE 75 MG/1
TABLET ORAL
Qty: 90 TABLET | Refills: 1 | Status: SHIPPED | OUTPATIENT
Start: 2023-07-04

## 2023-07-05 ENCOUNTER — LAB (OUTPATIENT)
Dept: LAB | Facility: CLINIC | Age: 83
End: 2023-07-05
Payer: COMMERCIAL

## 2023-07-05 DIAGNOSIS — N18.30 ANEMIA IN STAGE 3 CHRONIC KIDNEY DISEASE, UNSPECIFIED WHETHER STAGE 3A OR 3B CKD (HCC): Chronic | ICD-10-CM

## 2023-07-05 DIAGNOSIS — N18.30 STAGE 3 CHRONIC KIDNEY DISEASE, UNSPECIFIED WHETHER STAGE 3A OR 3B CKD (HCC): ICD-10-CM

## 2023-07-05 DIAGNOSIS — E03.9 HYPOTHYROIDISM, UNSPECIFIED TYPE: Chronic | ICD-10-CM

## 2023-07-05 DIAGNOSIS — D47.2 MGUS (MONOCLONAL GAMMOPATHY OF UNKNOWN SIGNIFICANCE): ICD-10-CM

## 2023-07-05 DIAGNOSIS — D63.1 ANEMIA IN STAGE 3 CHRONIC KIDNEY DISEASE, UNSPECIFIED WHETHER STAGE 3A OR 3B CKD (HCC): Chronic | ICD-10-CM

## 2023-07-05 LAB
ALBUMIN SERPL BCP-MCNC: 3.9 G/DL (ref 3.5–5)
ANION GAP SERPL CALCULATED.3IONS-SCNC: 2 MMOL/L
BASOPHILS # BLD AUTO: 0.07 THOUSANDS/ÂΜL (ref 0–0.1)
BASOPHILS NFR BLD AUTO: 1 % (ref 0–1)
BUN SERPL-MCNC: 42 MG/DL (ref 5–25)
CALCIUM SERPL-MCNC: 9.5 MG/DL (ref 8.3–10.1)
CHLORIDE SERPL-SCNC: 109 MMOL/L (ref 96–108)
CO2 SERPL-SCNC: 25 MMOL/L (ref 21–32)
CREAT SERPL-MCNC: 1.13 MG/DL (ref 0.6–1.3)
EOSINOPHIL # BLD AUTO: 0.23 THOUSAND/ÂΜL (ref 0–0.61)
EOSINOPHIL NFR BLD AUTO: 3 % (ref 0–6)
ERYTHROCYTE [DISTWIDTH] IN BLOOD BY AUTOMATED COUNT: 15.7 % (ref 11.6–15.1)
GFR SERPL CREATININE-BSD FRML MDRD: 45 ML/MIN/1.73SQ M
GLUCOSE P FAST SERPL-MCNC: 84 MG/DL (ref 65–99)
HCT VFR BLD AUTO: 32.9 % (ref 34.8–46.1)
HGB BLD-MCNC: 11.3 G/DL (ref 11.5–15.4)
IMM GRANULOCYTES # BLD AUTO: 0.03 THOUSAND/UL (ref 0–0.2)
IMM GRANULOCYTES NFR BLD AUTO: 0 % (ref 0–2)
LYMPHOCYTES # BLD AUTO: 1.9 THOUSANDS/ÂΜL (ref 0.6–4.47)
LYMPHOCYTES NFR BLD AUTO: 27 % (ref 14–44)
MCH RBC QN AUTO: 35.3 PG (ref 26.8–34.3)
MCHC RBC AUTO-ENTMCNC: 34.3 G/DL (ref 31.4–37.4)
MCV RBC AUTO: 103 FL (ref 82–98)
MONOCYTES # BLD AUTO: 1.12 THOUSAND/ÂΜL (ref 0.17–1.22)
MONOCYTES NFR BLD AUTO: 16 % (ref 4–12)
NEUTROPHILS # BLD AUTO: 3.62 THOUSANDS/ÂΜL (ref 1.85–7.62)
NEUTS SEG NFR BLD AUTO: 53 % (ref 43–75)
NRBC BLD AUTO-RTO: 0 /100 WBCS
PHOSPHATE SERPL-MCNC: 3.5 MG/DL (ref 2.3–4.1)
PLATELET # BLD AUTO: 204 THOUSANDS/UL (ref 149–390)
PMV BLD AUTO: 11.9 FL (ref 8.9–12.7)
POTASSIUM SERPL-SCNC: 4.9 MMOL/L (ref 3.5–5.3)
PTH-INTACT SERPL-MCNC: 27.9 PG/ML (ref 12–88)
RBC # BLD AUTO: 3.2 MILLION/UL (ref 3.81–5.12)
SODIUM SERPL-SCNC: 136 MMOL/L (ref 135–147)
WBC # BLD AUTO: 6.97 THOUSAND/UL (ref 4.31–10.16)

## 2023-07-05 PROCEDURE — 83970 ASSAY OF PARATHORMONE: CPT

## 2023-07-05 PROCEDURE — 80069 RENAL FUNCTION PANEL: CPT

## 2023-07-05 PROCEDURE — 85025 COMPLETE CBC W/AUTO DIFF WBC: CPT

## 2023-07-06 PROBLEM — I12.0 HYPERTENSIVE CHRONIC KIDNEY DISEASE WITH STAGE 5 CHRONIC KIDNEY DISEASE OR END STAGE RENAL DISEASE (HCC): Status: RESOLVED | Noted: 2023-05-05 | Resolved: 2023-07-06

## 2023-07-06 NOTE — RESULT ENCOUNTER NOTE
Stable. Improvement in creatinine. Results to be reviewed at scheduled follow-up appointment this week.

## 2023-07-07 ENCOUNTER — APPOINTMENT (OUTPATIENT)
Dept: LAB | Facility: CLINIC | Age: 83
End: 2023-07-07
Payer: COMMERCIAL

## 2023-07-07 ENCOUNTER — OFFICE VISIT (OUTPATIENT)
Dept: NEPHROLOGY | Facility: CLINIC | Age: 83
End: 2023-07-07
Payer: COMMERCIAL

## 2023-07-07 VITALS
HEIGHT: 63 IN | DIASTOLIC BLOOD PRESSURE: 80 MMHG | HEART RATE: 65 BPM | SYSTOLIC BLOOD PRESSURE: 162 MMHG | WEIGHT: 117 LBS | BODY MASS INDEX: 20.73 KG/M2

## 2023-07-07 DIAGNOSIS — I12.9 PARENCHYMAL RENAL HYPERTENSION, STAGE 1 THROUGH STAGE 4 OR UNSPECIFIED CHRONIC KIDNEY DISEASE: ICD-10-CM

## 2023-07-07 DIAGNOSIS — N18.30 BENIGN HYPERTENSION WITH CHRONIC KIDNEY DISEASE, STAGE III (HCC): ICD-10-CM

## 2023-07-07 DIAGNOSIS — I74.09 AORTOILIAC OCCLUSIVE DISEASE (HCC): Chronic | ICD-10-CM

## 2023-07-07 DIAGNOSIS — E78.2 MIXED HYPERLIPIDEMIA: Chronic | ICD-10-CM

## 2023-07-07 DIAGNOSIS — I12.9 BENIGN HYPERTENSION WITH CHRONIC KIDNEY DISEASE, STAGE III (HCC): ICD-10-CM

## 2023-07-07 DIAGNOSIS — I50.32 CHRONIC DIASTOLIC HEART FAILURE (HCC): Chronic | ICD-10-CM

## 2023-07-07 DIAGNOSIS — D64.9 CHRONIC ANEMIA: Chronic | ICD-10-CM

## 2023-07-07 DIAGNOSIS — N18.32 STAGE 3B CHRONIC KIDNEY DISEASE (HCC): Primary | ICD-10-CM

## 2023-07-07 DIAGNOSIS — E03.9 HYPOTHYROIDISM, UNSPECIFIED TYPE: Chronic | ICD-10-CM

## 2023-07-07 DIAGNOSIS — I65.23 BILATERAL CAROTID ARTERY STENOSIS: Chronic | ICD-10-CM

## 2023-07-07 PROCEDURE — 99214 OFFICE O/P EST MOD 30 MIN: CPT | Performed by: PHYSICIAN ASSISTANT

## 2023-07-07 PROCEDURE — 84156 ASSAY OF PROTEIN URINE: CPT

## 2023-07-07 PROCEDURE — 82570 ASSAY OF URINE CREATININE: CPT

## 2023-07-07 NOTE — PATIENT INSTRUCTIONS
Your kidney function remains stable. Most recent creatinine 1.13, stable below prior baseline. We will continue routine surveillance as outlined below. Optimized from a renal standpoint to proceed with CTA as requested by vascular surgery. Recommendations forwarded to vascular surgery. Recommend IV hydration before and after CTA. Hold enalapril, Lasix and spironolactone on day of CTA. Repeat labs in 1 week after CTA. Hold all NSAIDs for 7-10 days prior to CTA  Avoid all NSAIDs to include ibuprofen, Motrin, Aleve, Advil, Naproxen, Celebrex, Indomethacin, Toradol. Stay well hydrated. Continue all prescribed medications. Call if blood pressure consistently more than 140/90 or less than 110/50s. High and low blood pressures may affect your kidney function. Recommend low salt diet (2 gm sodium diet). Please let us know if you are scheduled for any studies with IV contrast (ex: CT scan, arteriogram or cardiac catheterization)   Send in 1 weeks of BP readings to office for review. Follow up in 6 months with Dr. Sydnee Jerome with repeat labs prior to appointment. Please contact the office with new symptoms or concerns.

## 2023-07-07 NOTE — PROGRESS NOTES
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 Lloyd Zaragoza  -recent creatinine 1.13, GFR 45  -UPCr <0.16 in 5/'21  -Renal function stable and creatinine below baseline  -Treat modifiable risk factors  -avoid nephrotoxins, NSAIDs, hypotension and IV contrast if possible. -stay well hydrated  -follow-up in 6 months with repeat blood and urine studies. Patient wishes to switch nephrologist due to office location. Patient wants to stay in [place]. Bilateral carotid artery stenosis:  -Asymptomatic  -High-grade L ICA stenosis on carotid duplex  -S/p R CEA 10/1/2021, patent on recent duplex  -On Plavix and statin  -Plan for CTA head/neck per vascular surgery  -management per vascular surgery  -Patient optimized from a renal standpoint to proceed with CTA with the following recommendations:  · Hold enalapril, Lasix and spironolactone on day of CTA  · Recommend IV hydration pre and post imaging. Recommend NSS @ 150 ml/hr x 1 hr prior to CTA followed by NSS @ 60 ml/hr x 4 hours post-imaging  · Limited IV contrast load if possible  · Avoid hypotension  · Hold NSAIDs 7-10 days prior to CTA  · Check BMP 1 week after CTA    Hypertension/Volume status:  -BP not at goal.  -significant history of white coat hypertension but blood pressure appears to trend -150 at home  -secondary work-up negative for primary hyperaldosterone state, pheochromocytoma or renal artery stenosis  -volume status euvolemic  -current medications: Amlodipine 10 mg daily, Coreg 12.5 mg twice daily, enalapril 10 mg twice daily, Lasix 20 mg daily PRN (averaging few times a week), spironolactone 25 mg daily  -changes: None for now. Patient to send in 1 weeks worth of blood pressure readings to the office for review to determine need for modifications.   Patient reluctant to increasing any blood pressure medications.  -Avoid NSAIDs  -Avoid hypotension or fluctuations in blood pressure.   -low sodium (2 gm) diet. Encourage regular exercise  -continue to monitor BP at home    Anemia of CKD/IgG kappa monoclonal gammopathy:  -Hgb 11.3, at goal.  Goal >10  -Followed by hematology  -continue to monitor    Bone Mineral Disease of CKD:  -Calcium and magnesium stable  -Phosphorus 3.5, at goal  -PTH 27.9, at goal  -Vitamin D deficiency: Vitamin D 25 78.3 in May 2021, at goal.  On cholecalciferol 5000 units daily. repeat prior to next appointment  -continue to monitor    Aortoiliac occlusive disease:  -S/p aortobifemoral bypass with left CFEA 12/9/2019  -On Plavix and statin  -Management and surveillance per vascular surgery    Chronic diastolic congestive heart failure:  -compensated  -echo 10/16/2021:  EF 60% with G2 DD  -home diuretics: Lasix 20 mg daily, spironolactone 25 mg daily  -on beta blockers and ACEi  -fluid restriction, 2 gm low sodium diet  -strict I/Os, daily weights  -management per Cardiology    Dyslipidemia:  -stable  -continue statin  -low-cholesterol and low-fat diet, aerobic exercise  -management per PCP    Hypothyroidism:  -stable  -continue levothyroxine  -management per PCP    Age related screening: Your primary caregiver may do yearly screening for colorectal cancer. It is recommended in all men and women over 48years old. You may have screening earlier if you have colon disease or a family history of colorectal cancer      Jose Antonio Long was seen today for follow-up. Diagnoses and all orders for this visit:    Stage 3b chronic kidney disease ([address])  -     Comprehensive metabolic panel; Future  -     CBC; Future  -     Protein / creatinine ratio, urine; Future  -     Phosphorus; Future  -     Magnesium; Future  -     PTH, intact; Future  -     Vitamin D 25 hydroxy; Future    Mixed hyperlipidemia    Chronic anemia  -     CBC;  Future    Parenchymal renal hypertension, stage 1 through stage 4 or unspecified chronic kidney disease    Chronic diastolic heart failure (HCC)    Benign hypertension with chronic kidney disease, stage III (HCC)    Aortoiliac occlusive disease (HCC)    Bilateral carotid artery stenosis  -     Ambulatory Referral to Nephrology    Hypothyroidism, unspecified type        Follow up with physician in 6 months with repeat blood and urine studies. Please call the office with any questions or concerns. Reason for Visit: Follow-up (CKD.3/)    HPI: Mohit Rivera is a 80 y.o. female with CKD 3B, HTN, HLD, CHF, hypothyroidism, anemia, IgG kappa monoclonal gammopathy, COPD, CAD, left subclavian artery stenosis, carotid stenosis, s/p R CEA 10/1/2021, aortoiliac occlusive disease, s/p aortobifemoral bypass 12/9/2019 who presents for follow up of CKD and clearance for CTA to evaluate severe left carotid stenosis. Patient followed by Dr. Ganga Moreno, last seen in office May 2021. Patient last seen by our practice during hospitalization in October 2021 for elective right CEA. Most recent creatinine 1.13, stable and at baseline. Patient denies recent hospitalizations or ER visits. Patient denies NSAID use. Patient denies nausea, vomiting, diarrhea, dyspnea, orthopnea, edema, hematuria or foamy urine. ROS: A complete review of systems was performed and was negative unless otherwise noted in the history of present illness. Allergies:   Patient has no known allergies.     Medications:     Current Outpatient Medications:   •  albuterol (ProAir HFA) 90 mcg/act inhaler, Inhale 2 puffs every 6 (six) hours as needed for wheezing, Disp: 8 g, Rfl: 3  •  amLODIPine (NORVASC) 10 mg tablet, take 1 tablet by mouth every morning, Disp: 90 tablet, Rfl: 3  •  atorvastatin (LIPITOR) 80 mg tablet, Take 1 tablet (80 mg total) by mouth daily at bedtime, Disp: 90 tablet, Rfl: 3  •  B Complex Vitamins (VITAMIN B-COMPLEX) TABS, Take by mouth daily , Disp: , Rfl:   •  Bioflavonoid Products (VITAMIN C PLUS PO), Take by mouth daily, Disp: , Rfl:   •  bisacodyl (DULCOLAX) 5 mg EC tablet, Take 10 mg by mouth once, Disp: , Rfl:   •  carvedilol (COREG) 12.5 mg tablet, Take 1 tablet (12.5 mg total) by mouth 2 (two) times a day, Disp: 180 tablet, Rfl: 3  •  cholecalciferol (VITAMIN D3) 1,000 units tablet, Take 5,000 Units by mouth daily , Disp: , Rfl:   •  clopidogrel (PLAVIX) 75 mg tablet, take 1 tablet by mouth once daily, Disp: 90 tablet, Rfl: 1  •  COLLAGEN PO, Take by mouth Includes a probiotic and other vitamins-10 supplements-2 am-one in the afternoon-one hs, Disp: , Rfl:   •  Diclofenac Sodium (VOLTAREN) 1 %, Apply 4 g topically 4 (four) times a day as needed (arthtitis pain), Disp: 350 g, Rfl: 0  •  enalapril (VASOTEC) 10 mg tablet, Take 1 tablet (10 mg total) by mouth 2 (two) times a day, Disp: 180 tablet, Rfl: 3  •  furosemide (LASIX) 20 mg tablet, Take 1 tablet (20 mg total) by mouth daily (Patient taking differently: Take 20 mg by mouth if needed), Disp: 90 tablet, Rfl: 3  •  levothyroxine 25 mcg tablet, Take 1 tablet (25 mcg total) by mouth daily in the early morning, Disp: 90 tablet, Rfl: 3  •  spironolactone (ALDACTONE) 25 mg tablet, Take 1 tablet (25 mg total) by mouth daily, Disp: 90 tablet, Rfl: 3  •  ipratropium (ATROVENT) 0.03 % nasal spray, instill 2 sprays into each nostril three times a day if needed  FOR RHINITIS, Disp: 30 mL, Rfl: 3    Past Medical History:   Diagnosis Date   • Anemia    • Arthritis    • Asthma    • Benign essential hypertension    • CAD (coronary artery disease)    • Cardiac murmur     sees Dr. Gamez December   • Carotid stenosis, bilateral    • CHF (congestive heart failure) ([facility])    • Chronic kidney disease     renal artery stenosis   • Chronic sinusitis     treated with antibiotics 3/22   • Colitis    • COPD (chronic obstructive pulmonary disease) ([facility])    • Coronary artery disease    • Disease of thyroid gland    • Dizziness    • Edema of leg     compression stocking left leg   • History of transfusion    • Hyperkalemia 5/3/2021   • Hyperlipidemia    • Hypertension    • Other disorder of circulatory system (CODE)    • Renal artery stenosis (HCC)     Right   • Subclavian arterial stenosis (HCC)     B/L   • Type 2 diabetes mellitus without complication, unspecified whether long term insulin use ([address]) 9/20/2022   • Wears dentures     lower one tooth   • Wears glasses      Past Surgical History:   Procedure Laterality Date   • BREAST SURGERY      bxs,benign   • COLONOSCOPY     • HYSTERECTOMY  1983    43   • INCISIONAL BREAST BIOPSY      x5, 1964, 1965, 1985 [facility] Bilateral 12/9/2019    Procedure: BYPASS AORTA BIFEMORAL WITH LEFT FEMORAL THROMBOEMBOLECTOMY;  Surgeon: Dex Kraus MD;  Location: [facility];  Service: Vascular   • KS TEAEC W/PATCH GRF CAROTID VERTB [address] Right 10/1/2021    Procedure: ENDARTERECTOMY ARTERY Annika File;  Surgeon: Magda Romero MD;  Location: [facility];  Service: Vascular     Family History   Problem Relation Age of Onset   • Hypertension Father    • Heart disease Father         CHF   • Coronary artery disease Family    • Arthritis Family    • Abdominal aortic aneurysm Mother    • Hypertension Mother    • Skin cancer Daughter    • Cancer Paternal Aunt    • No Known Problems Maternal Aunt    • No Known Problems Maternal Aunt    • No Known Problems Maternal Aunt       reports that she has been smoking cigarettes. She started smoking about 63 years ago. She has a 30.00 pack-year smoking history. She has been exposed to tobacco smoke. She has never used smokeless tobacco. She reports current alcohol use. She reports that she does not use drugs.     Physical Exam:   Vitals:    07/07/23 1354   BP: 162/80   BP Location: Left arm   Patient Position: Sitting   Cuff Size: Standard   Pulse: 65   Weight: 53.1 kg (117 lb)   Height: 5' 3" (1.6 m)     Body mass index is 20.73 kg/m². General:  Awake, alert, appears comfortable and in no acute distress. Nontoxic. Skin:  No rash, warm, good skin turgor   Eyes:  PERRL, EOMI, sclerae nonicteric.  no periorbital edema   ENT:  Moist mucous membranes  Neck:  Trachea midline, symmetric. No JVD.  +L carotid bruits. Radiation cardiac murmur to right carotid. Well-healed right neck scar. Chest:  Clear to auscultation bilaterally without wheezes, crackles or rhonchi  CVS:  Regular rate and rhythm with IV/VI systolicmurmur radiating to carotids, gallop or rub. S1 and S2 identified and normal.  No S3, S4.   Abdomen:  Soft, nontender, nondistended without masses. Normal bowel sounds x 4 quadrants. No bruit. Extremities:  Warm, pink, motor and sensory intact and well perfused. No cyanosis, pallor. No BLE edema. Neuro:  Awake, alert, oriented x3. Grossly intact  Psych:  Appropriate affect. Mentating appropriately. Normal mental status exam      Procedure:  No results found for this or any previous visit. Lab Results   Component Value Date    GLUCOSE 173 (H) 12/09/2019    CALCIUM 9.5 07/05/2023    K 4.9 07/05/2023    CO2 25 07/05/2023     (H) 07/05/2023    BUN 42 (H) 07/05/2023    CREATININE 1.13 07/05/2023       Results from last 7 days   Lab Units 07/05/23  1242   WBC Thousand/uL 6.97   HEMOGLOBIN g/dL 11.3*   HEMATOCRIT % 32.9*   PLATELETS Thousands/uL 204   POTASSIUM mmol/L 4.9   CHLORIDE mmol/L 109*   CO2 mmol/L 25   BUN mg/dL 42*   CREATININE mg/dL 1.13   CALCIUM mg/dL 9.5   PHOSPHORUS mg/dL 3.5       EMR, including Epic, "[employer] Everywhere and outside scanned documents reviewed. I have personally reviewed the blood work as stated above and in my note. I have personally reviewed PCP, consultants and prior nephrology notes.

## 2023-07-08 LAB
CREAT UR-MCNC: 58.7 MG/DL
PROT UR-MCNC: 15 MG/DL
PROT/CREAT UR: 0.26 MG/G{CREAT} (ref 0–0.1)

## 2023-07-10 DIAGNOSIS — I65.23 BILATERAL CAROTID ARTERY STENOSIS: Primary | ICD-10-CM

## 2023-07-10 DIAGNOSIS — N18.32 STAGE 3B CHRONIC KIDNEY DISEASE (HCC): ICD-10-CM

## 2023-07-10 RX ORDER — SODIUM CHLORIDE 9 MG/ML
150 INJECTION, SOLUTION INTRAVENOUS CONTINUOUS
Status: CANCELLED | OUTPATIENT
Start: 2023-07-19

## 2023-07-10 RX ORDER — SODIUM CHLORIDE 9 MG/ML
60 INJECTION, SOLUTION INTRAVENOUS CONTINUOUS
Status: CANCELLED | OUTPATIENT
Start: 2023-07-19 | End: 2023-07-19

## 2023-07-17 ENCOUNTER — TELEPHONE (OUTPATIENT)
Dept: INFUSION CENTER | Facility: HOSPITAL | Age: 83
End: 2023-07-17

## 2023-07-19 ENCOUNTER — HOSPITAL ENCOUNTER (OUTPATIENT)
Dept: RADIOLOGY | Facility: HOSPITAL | Age: 83
Discharge: HOME | End: 2023-07-19
Payer: COMMERCIAL

## 2023-07-19 ENCOUNTER — HOSPITAL ENCOUNTER (OUTPATIENT)
Dept: INFUSION CENTER | Facility: HOSPITAL | Age: 83
Discharge: HOME | End: 2023-07-19
Payer: COMMERCIAL

## 2023-07-19 VITALS
TEMPERATURE: 98.2 F | WEIGHT: 117.28 LBS | HEART RATE: 90 BPM | BODY MASS INDEX: 20.78 KG/M2 | OXYGEN SATURATION: 98 % | DIASTOLIC BLOOD PRESSURE: 70 MMHG | SYSTOLIC BLOOD PRESSURE: 165 MMHG | HEIGHT: 63 IN

## 2023-07-19 DIAGNOSIS — N18.32 STAGE 3B CHRONIC KIDNEY DISEASE (HCC): Primary | ICD-10-CM

## 2023-07-19 DIAGNOSIS — I65.23 BILATERAL CAROTID ARTERY STENOSIS: ICD-10-CM

## 2023-07-19 DIAGNOSIS — I65.23 BILATERAL CAROTID ARTERY STENOSIS: Chronic | ICD-10-CM

## 2023-07-19 PROCEDURE — G1004 CDSM NDSC: HCPCS

## 2023-07-19 PROCEDURE — 70496 CT ANGIOGRAPHY HEAD: CPT

## 2023-07-19 PROCEDURE — 70498 CT ANGIOGRAPHY NECK: CPT

## 2023-07-19 RX ORDER — SODIUM CHLORIDE 9 MG/ML
60 INJECTION, SOLUTION INTRAVENOUS CONTINUOUS
Status: DISPENSED | OUTPATIENT
Start: 2023-07-19 | End: 2023-07-19

## 2023-07-19 RX ORDER — SODIUM CHLORIDE 9 MG/ML
150 INJECTION, SOLUTION INTRAVENOUS CONTINUOUS
Status: CANCELLED | OUTPATIENT
Start: 2023-07-19

## 2023-07-19 RX ORDER — SODIUM CHLORIDE 9 MG/ML
60 INJECTION, SOLUTION INTRAVENOUS CONTINUOUS
Status: CANCELLED | OUTPATIENT
Start: 2023-07-19

## 2023-07-19 RX ORDER — SODIUM CHLORIDE 9 MG/ML
150 INJECTION, SOLUTION INTRAVENOUS CONTINUOUS
Status: DISPENSED | OUTPATIENT
Start: 2023-07-19 | End: 2023-07-19

## 2023-07-19 RX ADMIN — IOHEXOL 85 ML: 350 INJECTION, SOLUTION INTRAVENOUS at 09:50

## 2023-07-19 RX ADMIN — SODIUM CHLORIDE 150 ML/HR: 0.9 INJECTION, SOLUTION INTRAVENOUS at 08:17

## 2023-07-19 RX ADMIN — SODIUM CHLORIDE 60 ML/HR: 0.9 INJECTION, SOLUTION INTRAVENOUS at 09:41

## 2023-07-28 ENCOUNTER — APPOINTMENT (OUTPATIENT)
Dept: LAB | Facility: CLINIC | Age: 83
End: 2023-07-28
Payer: COMMERCIAL

## 2023-07-28 DIAGNOSIS — N18.30 ANEMIA IN STAGE 3 CHRONIC KIDNEY DISEASE, UNSPECIFIED WHETHER STAGE 3A OR 3B CKD (HCC): ICD-10-CM

## 2023-07-28 DIAGNOSIS — D63.1 ANEMIA IN STAGE 3 CHRONIC KIDNEY DISEASE, UNSPECIFIED WHETHER STAGE 3A OR 3B CKD (HCC): ICD-10-CM

## 2023-07-28 DIAGNOSIS — I65.23 BILATERAL CAROTID ARTERY STENOSIS: ICD-10-CM

## 2023-07-28 DIAGNOSIS — D47.2 MGUS (MONOCLONAL GAMMOPATHY OF UNKNOWN SIGNIFICANCE): ICD-10-CM

## 2023-07-28 DIAGNOSIS — N18.32 STAGE 3B CHRONIC KIDNEY DISEASE (HCC): ICD-10-CM

## 2023-07-28 DIAGNOSIS — I74.09 AORTOILIAC OCCLUSIVE DISEASE (HCC): ICD-10-CM

## 2023-07-28 DIAGNOSIS — E03.9 HYPOTHYROIDISM, UNSPECIFIED TYPE: Chronic | ICD-10-CM

## 2023-07-28 LAB
ALBUMIN SERPL BCP-MCNC: 4.2 G/DL (ref 3.5–5)
ALP SERPL-CCNC: 56 U/L (ref 46–116)
ALT SERPL W P-5'-P-CCNC: 32 U/L (ref 12–78)
ANION GAP SERPL CALCULATED.3IONS-SCNC: 7 MMOL/L
AST SERPL W P-5'-P-CCNC: 30 U/L (ref 5–45)
BILIRUB SERPL-MCNC: 0.67 MG/DL (ref 0.2–1)
BUN SERPL-MCNC: 45 MG/DL (ref 5–25)
CALCIUM SERPL-MCNC: 9.5 MG/DL (ref 8.3–10.1)
CHLORIDE SERPL-SCNC: 106 MMOL/L (ref 96–108)
CO2 SERPL-SCNC: 23 MMOL/L (ref 21–32)
CREAT SERPL-MCNC: 1.2 MG/DL (ref 0.6–1.3)
GFR SERPL CREATININE-BSD FRML MDRD: 41 ML/MIN/1.73SQ M
GLUCOSE P FAST SERPL-MCNC: 93 MG/DL (ref 65–99)
IGA SERPL-MCNC: 198 MG/DL (ref 70–400)
IGG SERPL-MCNC: 1430 MG/DL (ref 700–1600)
IGM SERPL-MCNC: 72 MG/DL (ref 40–230)
POTASSIUM SERPL-SCNC: 5 MMOL/L (ref 3.5–5.3)
PROT SERPL-MCNC: 8 G/DL (ref 6.4–8.4)
SODIUM SERPL-SCNC: 136 MMOL/L (ref 135–147)
T4 FREE SERPL-MCNC: 0.74 NG/DL (ref 0.61–1.12)
TSH SERPL DL<=0.05 MIU/L-ACNC: 2.03 UIU/ML (ref 0.45–4.5)

## 2023-07-28 PROCEDURE — 82232 ASSAY OF BETA-2 PROTEIN: CPT

## 2023-07-28 PROCEDURE — 84443 ASSAY THYROID STIM HORMONE: CPT

## 2023-07-28 PROCEDURE — 36415 COLL VENOUS BLD VENIPUNCTURE: CPT

## 2023-07-28 PROCEDURE — 83521 IG LIGHT CHAINS FREE EACH: CPT

## 2023-07-28 PROCEDURE — 84439 ASSAY OF FREE THYROXINE: CPT

## 2023-07-28 PROCEDURE — 80053 COMPREHEN METABOLIC PANEL: CPT

## 2023-07-28 PROCEDURE — 84165 PROTEIN E-PHORESIS SERUM: CPT

## 2023-07-28 PROCEDURE — 82784 ASSAY IGA/IGD/IGG/IGM EACH: CPT

## 2023-07-29 LAB
KAPPA LC FREE SER-MCNC: 41.7 MG/L (ref 3.3–19.4)
KAPPA LC FREE/LAMBDA FREE SER: 1.77 {RATIO} (ref 0.26–1.65)
LAMBDA LC FREE SERPL-MCNC: 23.6 MG/L (ref 5.7–26.3)

## 2023-07-30 LAB — B2 MICROGLOB SERPL-MCNC: 3.1 MG/L (ref 0.6–2.4)

## 2023-07-31 ENCOUNTER — TELEPHONE (OUTPATIENT)
Dept: FAMILY MEDICINE CLINIC | Facility: CLINIC | Age: 83
End: 2023-07-31

## 2023-07-31 NOTE — TELEPHONE ENCOUNTER
Left message for patient to call back    Harleen Sugar    ----- Message from Loreto Clark MD sent at 7/31/2023 12:12 PM EDT -----  Can we please let Juan M Wheeler know that her thyroid blood work is normal. Thank you.

## 2023-08-01 ENCOUNTER — TELEPHONE (OUTPATIENT)
Dept: NEPHROLOGY | Facility: CLINIC | Age: 83
End: 2023-08-01

## 2023-08-01 LAB
ALBUMIN SERPL ELPH-MCNC: 4.59 G/DL (ref 3.2–5.1)
ALBUMIN SERPL ELPH-MCNC: 59.6 % (ref 48–70)
ALPHA1 GLOB SERPL ELPH-MCNC: 0.29 G/DL (ref 0.15–0.47)
ALPHA1 GLOB SERPL ELPH-MCNC: 3.8 % (ref 1.8–7)
ALPHA2 GLOB SERPL ELPH-MCNC: 0.66 G/DL (ref 0.42–1.04)
ALPHA2 GLOB SERPL ELPH-MCNC: 8.6 % (ref 5.9–14.9)
BETA GLOB ABNORMAL SERPL ELPH-MCNC: 0.46 G/DL (ref 0.31–0.57)
BETA1 GLOB SERPL ELPH-MCNC: 6 % (ref 4.7–7.7)
BETA2 GLOB SERPL ELPH-MCNC: 4.3 % (ref 3.1–7.9)
BETA2+GAMMA GLOB SERPL ELPH-MCNC: 0.33 G/DL (ref 0.2–0.58)
GAMMA GLOB ABNORMAL SERPL ELPH-MCNC: 1.36 G/DL (ref 0.4–1.66)
GAMMA GLOB SERPL ELPH-MCNC: 17.7 % (ref 6.9–22.3)
IGG/ALB SER: 1.48 {RATIO} (ref 1.1–1.8)
M PROTEIN 1 MFR SERPL ELPH: 6.5 %
M PROTEIN 1 SERPL ELPH-MCNC: 0.5 G/DL
PROT PATTERN SERPL ELPH-IMP: NORMAL
PROT SERPL-MCNC: 7.7 G/DL (ref 6.4–8.2)

## 2023-08-01 PROCEDURE — 84165 PROTEIN E-PHORESIS SERUM: CPT | Performed by: STUDENT IN AN ORGANIZED HEALTH CARE EDUCATION/TRAINING PROGRAM

## 2023-08-01 NOTE — TELEPHONE ENCOUNTER
Pt [facility] that labs are stable [facility]        ----- Message from Selam Berger PA-C sent at 7/31/2023 11:39 PM EDT -----  Labs reviewed and renal function stable. Please call patient let her know her labs are stable after her recent CTA.

## 2023-08-01 NOTE — RESULT ENCOUNTER NOTE
Labs reviewed and renal function stable. Please call patient let her know her labs are stable after her recent CTA.

## 2023-09-06 DIAGNOSIS — I10 ESSENTIAL HYPERTENSION: Chronic | ICD-10-CM

## 2023-09-06 RX ORDER — ENALAPRIL MALEATE 10 MG/1
10 TABLET ORAL 2 TIMES DAILY
Qty: 180 TABLET | Refills: 3 | Status: SHIPPED | OUTPATIENT
Start: 2023-09-06

## 2023-09-12 ENCOUNTER — APPOINTMENT (OUTPATIENT)
Dept: LAB | Facility: CLINIC | Age: 83
End: 2023-09-12
Payer: COMMERCIAL

## 2023-09-12 DIAGNOSIS — N18.32 STAGE 3B CHRONIC KIDNEY DISEASE (HCC): ICD-10-CM

## 2023-09-12 DIAGNOSIS — N18.30 ANEMIA IN STAGE 3 CHRONIC KIDNEY DISEASE, UNSPECIFIED WHETHER STAGE 3A OR 3B CKD: ICD-10-CM

## 2023-09-12 DIAGNOSIS — D63.1 ANEMIA IN STAGE 3 CHRONIC KIDNEY DISEASE, UNSPECIFIED WHETHER STAGE 3A OR 3B CKD: ICD-10-CM

## 2023-09-12 DIAGNOSIS — I74.09 AORTOILIAC OCCLUSIVE DISEASE (HCC): ICD-10-CM

## 2023-09-12 LAB
25(OH)D3 SERPL-MCNC: 137.9 NG/ML (ref 30–100)
ALBUMIN SERPL BCP-MCNC: 4.6 G/DL (ref 3.5–5)
ALP SERPL-CCNC: 50 U/L (ref 34–104)
ALT SERPL W P-5'-P-CCNC: 22 U/L (ref 7–52)
ANION GAP SERPL CALCULATED.3IONS-SCNC: 12 MMOL/L
AST SERPL W P-5'-P-CCNC: 31 U/L (ref 13–39)
BASOPHILS # BLD AUTO: 0.06 THOUSANDS/ÂΜL (ref 0–0.1)
BASOPHILS NFR BLD AUTO: 1 % (ref 0–1)
BILIRUB SERPL-MCNC: 0.66 MG/DL (ref 0.2–1)
BUN SERPL-MCNC: 52 MG/DL (ref 5–25)
CALCIUM SERPL-MCNC: 10.3 MG/DL (ref 8.4–10.2)
CHLORIDE SERPL-SCNC: 103 MMOL/L (ref 96–108)
CO2 SERPL-SCNC: 22 MMOL/L (ref 21–32)
CREAT SERPL-MCNC: 1.41 MG/DL (ref 0.6–1.3)
EOSINOPHIL # BLD AUTO: 0.26 THOUSAND/ÂΜL (ref 0–0.61)
EOSINOPHIL NFR BLD AUTO: 3 % (ref 0–6)
ERYTHROCYTE [DISTWIDTH] IN BLOOD BY AUTOMATED COUNT: 15.7 % (ref 11.6–15.1)
GFR SERPL CREATININE-BSD FRML MDRD: 34 ML/MIN/1.73SQ M
GLUCOSE P FAST SERPL-MCNC: 94 MG/DL (ref 65–99)
HCT VFR BLD AUTO: 36 % (ref 34.8–46.1)
HGB BLD-MCNC: 12 G/DL (ref 11.5–15.4)
IGA SERPL-MCNC: 208 MG/DL (ref 66–433)
IGG SERPL-MCNC: 1363 MG/DL (ref 635–1741)
IGM SERPL-MCNC: 94 MG/DL (ref 45–281)
IMM GRANULOCYTES # BLD AUTO: 0.03 THOUSAND/UL (ref 0–0.2)
IMM GRANULOCYTES NFR BLD AUTO: 0 % (ref 0–2)
LYMPHOCYTES # BLD AUTO: 1.81 THOUSANDS/ÂΜL (ref 0.6–4.47)
LYMPHOCYTES NFR BLD AUTO: 24 % (ref 14–44)
MCH RBC QN AUTO: 35.4 PG (ref 26.8–34.3)
MCHC RBC AUTO-ENTMCNC: 33.3 G/DL (ref 31.4–37.4)
MCV RBC AUTO: 106 FL (ref 82–98)
MONOCYTES # BLD AUTO: 0.98 THOUSAND/ÂΜL (ref 0.17–1.22)
MONOCYTES NFR BLD AUTO: 13 % (ref 4–12)
NEUTROPHILS # BLD AUTO: 4.55 THOUSANDS/ÂΜL (ref 1.85–7.62)
NEUTS SEG NFR BLD AUTO: 59 % (ref 43–75)
NRBC BLD AUTO-RTO: 0 /100 WBCS
PLATELET # BLD AUTO: 186 THOUSANDS/UL (ref 149–390)
PMV BLD AUTO: 12.6 FL (ref 8.9–12.7)
POTASSIUM SERPL-SCNC: 4.6 MMOL/L (ref 3.5–5.3)
PROT SERPL-MCNC: 7.6 G/DL (ref 6.4–8.4)
RBC # BLD AUTO: 3.39 MILLION/UL (ref 3.81–5.12)
SODIUM SERPL-SCNC: 137 MMOL/L (ref 135–147)
WBC # BLD AUTO: 7.69 THOUSAND/UL (ref 4.31–10.16)

## 2023-09-12 PROCEDURE — 82306 VITAMIN D 25 HYDROXY: CPT

## 2023-09-14 LAB
ALBUMIN SERPL ELPH-MCNC: 4.43 G/DL (ref 3.2–5.1)
ALBUMIN SERPL ELPH-MCNC: 59 % (ref 48–70)
ALPHA1 GLOB SERPL ELPH-MCNC: 0.31 G/DL (ref 0.15–0.47)
ALPHA1 GLOB SERPL ELPH-MCNC: 4.1 % (ref 1.8–7)
ALPHA2 GLOB SERPL ELPH-MCNC: 0.68 G/DL (ref 0.42–1.04)
ALPHA2 GLOB SERPL ELPH-MCNC: 9 % (ref 5.9–14.9)
B2 MICROGLOB SERPL-MCNC: 4.2 MG/L (ref 0.6–2.4)
BETA GLOB ABNORMAL SERPL ELPH-MCNC: 0.43 G/DL (ref 0.31–0.57)
BETA1 GLOB SERPL ELPH-MCNC: 5.7 % (ref 4.7–7.7)
BETA2 GLOB SERPL ELPH-MCNC: 5 % (ref 3.1–7.9)
BETA2+GAMMA GLOB SERPL ELPH-MCNC: 0.38 G/DL (ref 0.2–0.58)
GAMMA GLOB ABNORMAL SERPL ELPH-MCNC: 1.29 G/DL (ref 0.4–1.66)
GAMMA GLOB SERPL ELPH-MCNC: 17.2 % (ref 6.9–22.3)
IGG/ALB SER: 1.44 {RATIO} (ref 1.1–1.8)
KAPPA LC FREE SER-MCNC: 46.9 MG/L (ref 3.3–19.4)
KAPPA LC FREE/LAMBDA FREE SER: 1.78 {RATIO} (ref 0.26–1.65)
LAMBDA LC FREE SERPL-MCNC: 26.4 MG/L (ref 5.7–26.3)
M PROTEIN 1 MFR SERPL ELPH: 4.1 %
M PROTEIN 1 SERPL ELPH-MCNC: 0.31 G/DL
PROT PATTERN SERPL ELPH-IMP: NORMAL
PROT SERPL-MCNC: 7.5 G/DL (ref 6.4–8.4)

## 2023-09-14 PROCEDURE — 84165 PROTEIN E-PHORESIS SERUM: CPT | Performed by: PATHOLOGY

## 2023-09-15 ENCOUNTER — OFFICE VISIT (OUTPATIENT)
Dept: VASCULAR SURGERY | Facility: CLINIC | Age: 83
End: 2023-09-15
Payer: COMMERCIAL

## 2023-09-15 ENCOUNTER — TELEPHONE (OUTPATIENT)
Dept: CARDIOLOGY CLINIC | Facility: CLINIC | Age: 83
End: 2023-09-15

## 2023-09-15 VITALS
HEART RATE: 82 BPM | SYSTOLIC BLOOD PRESSURE: 158 MMHG | HEIGHT: 63 IN | WEIGHT: 118 LBS | DIASTOLIC BLOOD PRESSURE: 78 MMHG | BODY MASS INDEX: 20.91 KG/M2

## 2023-09-15 DIAGNOSIS — I65.23 BILATERAL CAROTID ARTERY STENOSIS: Primary | Chronic | ICD-10-CM

## 2023-09-15 DIAGNOSIS — I10 ESSENTIAL HYPERTENSION: Chronic | ICD-10-CM

## 2023-09-15 PROCEDURE — 99214 OFFICE O/P EST MOD 30 MIN: CPT | Performed by: SURGERY

## 2023-09-15 RX ORDER — SPIRONOLACTONE 25 MG/1
25 TABLET ORAL DAILY
Qty: 90 TABLET | Refills: 1 | Status: SHIPPED | OUTPATIENT
Start: 2023-09-15

## 2023-09-15 NOTE — ASSESSMENT & PLAN NOTE
1.  Asymptomatic high-grade left internal carotid artery stenosis. We discussed the findings on recent duplex and CT angiogram which are consistent with a stenosis of greater than 80% in the distal common carotid artery. We did discuss the indications for treatment along with the treatment options available and the associated risks and benefits. At this point she is undecided whether she would want to pursue any further surgeries. In this regard I have asked her to undergo cardiac clearance to better establish her perioperative risk to aid in decision making. Following her cardiac evaluation she will return to the office for further recommendations. 2.  History of right carotid endarterectomy 10/1/2021. She is doing well in this regard. We discussed the findings on duplex evaluation which show no significant residual stenosis.

## 2023-09-15 NOTE — PATIENT INSTRUCTIONS
Carotid artery stenosis  1. Asymptomatic high-grade left internal carotid artery stenosis. We discussed the findings on recent duplex and CT angiogram which are consistent with a stenosis of greater than 80% in the distal common carotid artery. We did discuss the indications for treatment along with the treatment options available and the associated risks and benefits. At this point she is undecided whether she would want to pursue any further surgeries. In this regard I have asked her to undergo cardiac clearance to better establish her perioperative risk to aid in decision making. Following her cardiac evaluation she will return to the office for further recommendations. 2.  History of right carotid endarterectomy 10/1/2021. She is doing well in this regard. We discussed the findings on duplex evaluation which show no significant residual stenosis.

## 2023-09-15 NOTE — LETTER
September 15, 2023     Kasandra Louis MD  [address] [place] [id number]    Patient: Genia Mane   YOB: 1940   Date of Visit: 9/15/2023       Dear Dr. Michel Rondon:    Thank you for referring Julian Cali to me for evaluation. Below are the relevant portions of my assessment and plan of care. Carotid artery stenosis  1. Asymptomatic high-grade left internal carotid artery stenosis. We discussed the findings on recent duplex and CT angiogram which are consistent with a stenosis of greater than 80% in the distal common carotid artery. We did discuss the indications for treatment along with the treatment options available and the associated risks and benefits. At this point she is undecided whether she would want to pursue any further surgeries. In this regard I have asked her to undergo cardiac clearance to better establish her perioperative risk to aid in decision making. Following her cardiac evaluation she will return to the office for further recommendations. 2.  History of right carotid endarterectomy 10/1/2021. She is doing well in this regard. We discussed the findings on duplex evaluation which show no significant residual stenosis. If you have questions, please do not hesitate to call me. I look forward to following David Pena along with you.          Sincerely,        Jodi Sebastian MD        CC: Karina Lisa, DO

## 2023-09-15 NOTE — PROGRESS NOTES
Assessment/Plan:    Carotid artery stenosis  1. Asymptomatic high-grade left internal carotid artery stenosis. We discussed the findings on recent duplex and CT angiogram which are consistent with a stenosis of greater than 80% in the distal common carotid artery. We did discuss the indications for treatment along with the treatment options available and the associated risks and benefits. At this point she is undecided whether she would want to pursue any further surgeries. In this regard I have asked her to undergo cardiac clearance to better establish her perioperative risk to aid in decision making. Following her cardiac evaluation she will return to the office for further recommendations. 2.  History of right carotid endarterectomy 10/1/2021. She is doing well in this regard. We discussed the findings on duplex evaluation which show no significant residual stenosis. Diagnoses and all orders for this visit:    Bilateral carotid artery stenosis          Subjective:      Patient ID: Natividad Ryan is a 80 y.o. female. Patient presents to review the CTA head and neck done 7/19/23. Pt reports occasional dizziness, but denies all other TIA/CVA symptoms. She is taking Atorvastatin and Plavix. She smokes 1ppd. 49-year-old smoker presents in follow-up of recent cerebrovascular imaging. She has a long history of arterial occlusive disease treated with aortobifemoral bypass in 2019 and right carotid endarterectomy 10/1/2021. On follow-up duplex imaging she had progression of her left carotid artery stenosis which prompted a CT angiogram.  On evaluation today she states overall she is doing well. Her only limitations are secondary to arthritis mostly involving her left knee. She otherwise denies any focal neurologic symptoms which would be consistent with TIA, CVA or amaurosis fugax. Carotid duplex shows wide patency of the right internal carotid artery following endarterectomy.   On the left there is a greater than 70% stenosis with flow velocity of 540/63 cm/s. CT angiogram 7/19/2023. The right internal carotid artery is widely patent following endarterectomy. On the left there is a highly calcified plaque in the distal common carotid artery creating a critical stenosis of greater than 80%. I have spent a total time of 30 minutes on 09/15/23 in caring for this patient including Diagnostic results, Prognosis, Risks and benefits of tx options, Patient and family education, Risk factor reductions and Impressions.         The following portions of the patient's history were reviewed and updated as appropriate: allergies, current medications, past family history, past medical history, past social history, past surgical history and problem list     Past Medical History:  Past Medical History:   Diagnosis Date   • Anemia    • Arthritis    • Asthma    • Benign essential hypertension    • CAD (coronary artery disease)    • Cardiac murmur     sees Dr. Magda Roberson   • Carotid stenosis, bilateral    • CHF (congestive heart failure) ([address])    • Chronic kidney disease     renal artery stenosis   • Chronic sinusitis     treated with antibiotics 3/22   • Colitis    • COPD (chronic obstructive pulmonary disease) ([address])    • Coronary artery disease    • Disease of thyroid gland    • Dizziness    • Edema of leg     compression stocking left leg   • History of transfusion    • Hyperkalemia 5/3/2021   • Hyperlipidemia    • Hypertension    • Other disorder of circulatory system (CODE)    • Renal artery stenosis (HCC)     Right   • Subclavian arterial stenosis (HCC)     B/L   • Type 2 diabetes mellitus without complication, unspecified whether long term insulin use ([address]) 9/20/2022   • Wears dentures     lower one tooth   • Wears glasses        Past Surgical History:  Past Surgical History:   Procedure Laterality Date   • BREAST SURGERY      bxs,benign   • COLONOSCOPY     • HYSTERECTOMY  1983 37   • INCISIONAL BREAST BIOPSY x5, 1964, 1965, 1985 and [address] [facility] [address] AORTOBIFEMORAL Bilateral 12/9/2019    Procedure: BYPASS AORTA BIFEMORAL WITH LEFT FEMORAL THROMBOEMBOLECTOMY;  Surgeon: Maliha Hernandez MD;  [facility];  Service: Vascular   • VT TEAEC W/PATCH GRF [address]ID VERTB [address] Right 10/1/2021    Procedure: ENDARTERECTOMY ARTERY [address] [facility] [address], INTROP DUPLEX;  Surgeon: Ivonne Conway MD;  [facility];  Service: Vascular       Social History:  Social History     Substance and Sexual Activity   Alcohol Use Yes    Comment: manhattans 2-3 every day for 50 years     Social History     Substance and Sexual Activity   Drug Use No     Social History     Tobacco Use   Smoking Status Every Day   • Packs/day: 1.00   • Years: 30.00   • Total pack years: 30.00   • Types: Cigarettes   • Start date: 56   • Passive exposure: Current   Smokeless Tobacco Never       Family History:  Family History   Problem Relation Age of Onset   • Hypertension Father    • Heart disease Father         CHF   • Coronary artery disease Family    • Arthritis Family    • Abdominal aortic aneurysm Mother    • Hypertension Mother    • Skin cancer Daughter    • Cancer Paternal Aunt    • No Known Problems Maternal Aunt    • No Known Problems Maternal Aunt    • No Known Problems Maternal Aunt        Allergies:   Allergies   Allergen Reactions   • Tall Ragweed Wheezing       Medications:    Current Outpatient Medications:   •  albuterol (ProAir HFA) 90 mcg/act inhaler, Inhale 2 puffs every 6 (six) hours as needed for wheezing, Disp: 8 g, Rfl: 3  •  amLODIPine (NORVASC) 10 mg tablet, take 1 tablet by mouth every morning, Disp: 90 tablet, Rfl: 3  •  atorvastatin (LIPITOR) 80 mg tablet, Take 1 tablet (80 mg total) by mouth daily at bedtime, Disp: 90 tablet, Rfl: 3  •  B Complex Vitamins (VITAMIN B-COMPLEX) TABS, Take by mouth daily , Disp: , Rfl:   •  Bioflavonoid Products (VITAMIN C PLUS PO), Take by mouth daily, Disp: , Rfl:   •  bisacodyl (DULCOLAX) 5 mg EC tablet, Take 10 mg by mouth once, Disp: , Rfl:   •  carvedilol (COREG) 12.5 mg tablet, Take 1 tablet (12.5 mg total) by mouth 2 (two) times a day, Disp: 180 tablet, Rfl: 3  •  cholecalciferol (VITAMIN D3) 1,000 units tablet, Take 5,000 Units by mouth daily , Disp: , Rfl:   •  clopidogrel (PLAVIX) 75 mg tablet, take 1 tablet by mouth once daily, Disp: 90 tablet, Rfl: 1  •  COLLAGEN PO, Take by mouth Includes a probiotic and other vitamins-10 supplements-2 am-one in the afternoon-one hs, Disp: , Rfl:   •  Diclofenac Sodium (VOLTAREN) 1 %, Apply 4 g topically 4 (four) times a day as needed (arthtitis pain), Disp: 350 g, Rfl: 0  •  enalapril (VASOTEC) 10 mg tablet, take 1 tablet by mouth twice a day, Disp: 180 tablet, Rfl: 3  •  furosemide (LASIX) 20 mg tablet, Take 1 tablet (20 mg total) by mouth daily (Patient taking differently: Take 20 mg by mouth if needed), Disp: 90 tablet, Rfl: 3  •  ipratropium (ATROVENT) 0.03 % nasal spray, instill 2 sprays into each nostril three times a day if needed  FOR RHINITIS, Disp: 30 mL, Rfl: 3  •  levothyroxine 25 mcg tablet, Take 1 tablet (25 mcg total) by mouth daily in the early morning, Disp: 90 tablet, Rfl: 3  •  spironolactone (ALDACTONE) 25 mg tablet, Take 1 tablet (25 mg total) by mouth daily, Disp: 90 tablet, Rfl: 3    Vitals:  /78 (09/15/23 1358)    Temp      Pulse 82 (09/15/23 1358)   Resp      SpO2        Lab Results and Cultures:   CBC with diff:   Lab Results   Component Value Date    WBC 7.69 09/12/2023    HGB 12.0 09/12/2023    HCT 36.0 09/12/2023     (H) 09/12/2023     09/12/2023    ADJUSTEDWBC 9.20 08/26/2016    RBC 3.39 (L) 09/12/2023    MCH 35.4 (H) 09/12/2023    MCHC 33.3 09/12/2023    RDW 15.7 (H) 09/12/2023    MPV 12.6 09/12/2023    NRBC 0 09/12/2023   ,   BMP/CMP:  Lab Results   Component Value Date    K 4.6 09/12/2023     09/12/2023    CO2 22 09/12/2023    CO2 21 12/09/2019    BUN 52 (H) 09/12/2023    CREATININE 1.41 (H) 09/12/2023    GLUCOSE 173 (H) 12/09/2019    CALCIUM 10.3 (H) 09/12/2023    AST 31 09/12/2023    ALT 22 09/12/2023    ALKPHOS 50 09/12/2023    EGFR 34 09/12/2023   ,   Lipid Panel:   Lab Results   Component Value Date    CHOL 151 09/09/2014   ,   Coags:   Lab Results   Component Value Date    PTT 31 10/15/2021    INR 1.15 10/16/2021   ,     . Review of Systems   Neurological: Positive for dizziness. All other systems reviewed and are negative.         Objective:      /78 (BP Location: Right arm, Patient Position: Sitting, Cuff Size: Standard)   Pulse 82   Ht 5' 3" (1.6 m)   Wt 53.5 kg (118 lb)   BMI 20.90 kg/m²          Physical Exam

## 2023-09-18 ENCOUNTER — TELEPHONE (OUTPATIENT)
Dept: HEMATOLOGY ONCOLOGY | Facility: CLINIC | Age: 83
End: 2023-09-18

## 2023-09-18 NOTE — TELEPHONE ENCOUNTER
Patient Call    Who are you speaking with? Patient    If it is not the patient, are they listed on an active communication consent form? N/A   What is the reason for this call? The patient has some questions about her lab work, the patient would like to know if she needs to complete anything else or not. Does this require a call back? Yes   If a call back is required, please list best call back number [phone]   If a call back is required, advise that a message will be forwarded to their care team and someone will return their call as soon as possible. Did you relay this information to the patient?  Yes

## 2023-09-26 ENCOUNTER — TELEPHONE (OUTPATIENT)
Dept: NEPHROLOGY | Facility: CLINIC | Age: 83
End: 2023-09-26

## 2023-09-26 NOTE — TELEPHONE ENCOUNTER
Called pt to schedule jan f/u with provider in [facility] office. Patient states she has a lot going on at the moment and is requesting a call in the new year to schedule.  Adjusted recall

## 2023-10-05 ENCOUNTER — TELEPHONE (OUTPATIENT)
Dept: VASCULAR SURGERY | Facility: CLINIC | Age: 83
End: 2023-10-05

## 2023-10-05 NOTE — TELEPHONE ENCOUNTER
Received vm from pt stating she has ov w/ Dr. Soni Bird tomorrow 10/6 however she is not seeing cardiology until 10/13. Reviewed last ov note from Dr. Soni Bird 9/15/23  "Assessment/Plan:     Carotid artery stenosis  1. Asymptomatic high-grade left internal carotid artery stenosis. We discussed the findings on recent duplex and CT angiogram which are consistent with a stenosis of greater than 80% in the distal common carotid artery. We did discuss the indications for treatment along with the treatment options available and the associated risks and benefits. At this point she is undecided whether she would want to pursue any further surgeries. In this regard I have asked her to undergo cardiac clearance to better establish her perioperative risk to aid in decision making. Following her cardiac evaluation she will return to the office for further recommendations"    Clearance call was not routed to nursing, will add her to board. Diann Rusos called pt and r/s her ov w/ Dr. Soni Bird to 10/20/23.

## 2023-10-13 ENCOUNTER — OFFICE VISIT (OUTPATIENT)
Dept: CARDIOLOGY CLINIC | Facility: CLINIC | Age: 83
End: 2023-10-13
Payer: COMMERCIAL

## 2023-10-13 VITALS
HEIGHT: 63 IN | SYSTOLIC BLOOD PRESSURE: 128 MMHG | BODY MASS INDEX: 20.91 KG/M2 | WEIGHT: 118 LBS | HEART RATE: 75 BPM | DIASTOLIC BLOOD PRESSURE: 80 MMHG | OXYGEN SATURATION: 97 %

## 2023-10-13 DIAGNOSIS — Z01.810 PREOPERATIVE CARDIOVASCULAR EXAMINATION: Primary | ICD-10-CM

## 2023-10-13 DIAGNOSIS — I11.0 HYPERTENSIVE HEART DISEASE WITH CHRONIC DIASTOLIC CONGESTIVE HEART FAILURE (HCC): ICD-10-CM

## 2023-10-13 DIAGNOSIS — I10 ESSENTIAL HYPERTENSION: ICD-10-CM

## 2023-10-13 DIAGNOSIS — E78.2 MIXED HYPERLIPIDEMIA: ICD-10-CM

## 2023-10-13 DIAGNOSIS — I50.32 HYPERTENSIVE HEART DISEASE WITH CHRONIC DIASTOLIC CONGESTIVE HEART FAILURE (HCC): ICD-10-CM

## 2023-10-13 DIAGNOSIS — I50.32 CHRONIC DIASTOLIC HEART FAILURE (HCC): ICD-10-CM

## 2023-10-13 DIAGNOSIS — I65.23 BILATERAL CAROTID ARTERY STENOSIS: ICD-10-CM

## 2023-10-13 DIAGNOSIS — I70.1 RENAL ARTERY STENOSIS (HCC): ICD-10-CM

## 2023-10-13 PROCEDURE — 99214 OFFICE O/P EST MOD 30 MIN: CPT | Performed by: INTERNAL MEDICINE

## 2023-10-13 PROCEDURE — 93000 ELECTROCARDIOGRAM COMPLETE: CPT | Performed by: INTERNAL MEDICINE

## 2023-10-13 NOTE — PROGRESS NOTES
Cardiology   Juanivan Rios DO, Charles Bauman MD, Tigre Ma MD, Sanam Rae MD, [facility] - [facility] [place]  -------------------------------------------------------------------  Critical access hospital and Vascular Center  [address] [place] [address], [address] [place] [address]  [phone]    Cardiology Follow Up  Natividad Ryan  1940  [id number]          Assessment/Plan:    1. Preoperative cardiovascular examination    2. Bilateral carotid artery stenosis    3. Hypertensive heart disease with chronic diastolic congestive heart failure ([address])    4. Essential hypertension    5. Mixed hyperlipidemia    6. Chronic diastolic heart failure ([address])    7. Renal artery stenosis [facility])      -Patient will require nuclear stress test and echocardiogram prior to undergoing any vascular procedure. She is at increased risk for procedure due to ongoing tobacco abuse and poor conditioning.  -Discussed importance of smoking cessation. She does not plan on quitting at this time.  -Continue current medication regimen.    -No edema present on exam today. Continue furosemide 20 mg daily. - continue Plavix. Aspirin was stopped because of large amount of bruising. Significantly improved since aspirin was discontinued. She will follow up regularly with vascular surgery. Interval History:     Natividad Ryan is 80 y.o. female here for followup of hypertension and CHF. Since her last visit, She was diagnosed with high-grade left internal carotid artery stenosis and will likely have intervention done. She requires preoperative restratification prior to undergoing any procedure. She is following regularly with vascular surgery. She denies any chest pain with exertion. She feels some shortness of breath that she exerts herself. She is limited due to arthritis. She continues to smoke nearly a pack per day.       Her current medication regimen includes carvedilol 12.5 mg twice daily, enalapril 10 mg daily, amlodipine 10 mg daily and spironolactone 25 mg daily. She overall reports good adherence with medications. She has a history of peripheral vascular disease including celiac and SMA stenosis. She does not have any pain with eating or exertion. Previously, She underwent right carotid endarterectomy along with aortobifemoral bypass in December 2019. 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 Past Medical History:   Diagnosis Date    Anemia     Arthritis     Asthma     Benign essential hypertension     CAD (coronary artery disease)     Cardiac murmur     sees Dr. Hakeem Gaston    Carotid stenosis, bilateral     CHF (congestive heart failure) (HCC)     Chronic kidney disease     renal artery stenosis    Chronic sinusitis     treated with antibiotics 3/22    Colitis     COPD (chronic obstructive pulmonary disease) (HCC)     Coronary artery disease     Disease of thyroid gland     Dizziness     Edema of leg     compression stocking left leg    History of transfusion     Hyperkalemia 5/3/2021    Hyperlipidemia     Hypertension     Other disorder of circulatory system (CODE)     Renal artery stenosis (HCC)     Right    Subclavian arterial stenosis (HCC)     B/L    Type 2 diabetes mellitus without complication, unspecified whether long term insulin use ([address]) 9/20/2022    Wears dentures     lower one tooth    Wears glasses      Social History     Socioeconomic History    Marital status:      Spouse name: Not on file    Number of children: Not on file    Years of education: Not on file    Highest education level: Not on file   Occupational History    Not on file   Tobacco Use    Smoking status: Every Day     Packs/day: 1.00     Years: 30.00     Total pack years: 30.00     Types: Cigarettes     Start date: 56     Passive exposure: Current    Smokeless tobacco: Never   Vaping Use    Vaping Use: Never used   Substance and Sexual Activity    Alcohol use:  Yes Comment: wilfrid 2-3 every day for 50 years    Drug use: No    Sexual activity: Yes     Partners: Male     Birth control/protection: Surgical   Other Topics Concern    Not on file   Social History Narrative    Rarely exercises    Sleeps 6-7 hours per day     Social Determinants of Health     Financial Resource Strain: Low Risk  (5/9/2023)    Overall Financial Resource Strain (CARDIA)     Difficulty of Paying Living Expenses: Not hard at all   Food Insecurity: Not on file   Transportation Needs: No Transportation Needs (5/9/2023)    PRAPARE - Transportation     Lack of Transportation (Medical): No     Lack of Transportation (Non-Medical):  No   Physical Activity: Not on file   Stress: Not on file   Social Connections: Not on file   Intimate Partner Violence: Not on file   Housing Stability: Not on file      Family History   Problem Relation Age of Onset    Hypertension Father     Heart disease Father         CHF    Coronary artery disease Family     Arthritis Family     Abdominal aortic aneurysm Mother     Hypertension Mother     Skin cancer Daughter     Cancer Paternal Aunt     No Known Problems Maternal Aunt     No Known Problems Maternal Aunt     No Known Problems Maternal Aunt      Past Surgical History:   Procedure Laterality Date    BREAST SURGERY      bxs,benign    COLONOSCOPY      HYSTERECTOMY  1983    43    INCISIONAL BREAST BIOPSY      x5, 1964, 1965, 1985 and [address] AORTOBIFEMORAL Bilateral 12/9/2019    Procedure: BYPASS AORTA BIFEMORAL WITH LEFT FEMORAL THROMBOEMBOLECTOMY;  Surgeon: Ottoniel Nugent MD;  [facility];  Service: Vascular    GA TEAEC W/PATCH GRF CAROTID VERTB SUBCLAV NECK INC Right 10/1/2021    Procedure: ENDARTERECTOMY ARTERY [place];  Surgeon: Fran Irby MD;  [facility];  Service: Vascular       Current Outpatient Medications:     albuterol (ProAir HFA) 90 mcg/act inhaler, Inhale 2 puffs every 6 (six) hours as needed for wheezing, Disp: 8 g, Rfl: 3    amLODIPine (NORVASC) 10 mg tablet, take 1 tablet by mouth every morning, Disp: 90 tablet, Rfl: 3    atorvastatin (LIPITOR) 80 mg tablet, Take 1 tablet (80 mg total) by mouth daily at bedtime, Disp: 90 tablet, Rfl: 3    B Complex Vitamins (VITAMIN B-COMPLEX) TABS, Take by mouth daily , Disp: , Rfl:     Bioflavonoid Products (VITAMIN C PLUS PO), Take by mouth daily, Disp: , Rfl:     bisacodyl (DULCOLAX) 5 mg EC tablet, Take 10 mg by mouth once, Disp: , Rfl:     carvedilol (COREG) 12.5 mg tablet, Take 1 tablet (12.5 mg total) by mouth 2 (two) times a day, Disp: 180 tablet, Rfl: 3    cholecalciferol (VITAMIN D3) 1,000 units tablet, Take 5,000 Units by mouth daily , Disp: , Rfl:     clopidogrel (PLAVIX) 75 mg tablet, take 1 tablet by mouth once daily, Disp: 90 tablet, Rfl: 1    COLLAGEN PO, Take by mouth Includes a probiotic and other vitamins-10 supplements-2 am-one in the afternoon-one hs, Disp: , Rfl:     Diclofenac Sodium (VOLTAREN) 1 %, Apply 4 g topically 4 (four) times a day as needed (arthtitis pain), Disp: 350 g, Rfl: 0    enalapril (VASOTEC) 10 mg tablet, take 1 tablet by mouth twice a day, Disp: 180 tablet, Rfl: 3    furosemide (LASIX) 20 mg tablet, Take 1 tablet (20 mg total) by mouth daily (Patient taking differently: Take 20 mg by mouth if needed), Disp: 90 tablet, Rfl: 3    ipratropium (ATROVENT) 0.03 % nasal spray, instill 2 sprays into each nostril three times a day if needed  FOR RHINITIS, Disp: 30 mL, Rfl: 3    levothyroxine 25 mcg tablet, Take 1 tablet (25 mcg total) by mouth daily in the early morning, Disp: 90 tablet, Rfl: 3    spironolactone (ALDACTONE) 25 mg tablet, Take 1 tablet (25 mg total) by mouth daily, Disp: 90 tablet, Rfl: 1        Review of Systems:  Review of Systems   Constitutional:  Positive for fatigue. Respiratory:  Positive for shortness of breath. Cardiovascular:  Positive for leg swelling. Negative for chest pain and palpitations. Musculoskeletal:  Positive for arthralgias and gait problem. All other systems reviewed and are negative. Physical Exam:  Vitals:  Vitals:    10/13/23 1559   BP: 128/80   BP Location: Left arm   Patient Position: Sitting   Cuff Size: Standard   Pulse: 75   SpO2: 97%   Weight: 53.5 kg (118 lb)   Height: 5' 3" (1.6 m)     Physical Exam   Constitutional: She appears healthy. No distress. Eyes: Pupils are equal, round, and reactive to light. Conjunctivae are normal.   Neck: No JVD present. Cardiovascular: Normal rate and regular rhythm. Exam reveals no gallop and no friction rub. Murmur heard. Pulmonary/Chest: Effort normal and breath sounds normal. She has no wheezes. She has no rales. Musculoskeletal:         General: No tenderness, deformity or edema. Cervical back: Normal range of motion and neck supple. Neurological: She is alert and oriented to person, place, and time. Skin: Skin is warm and dry. Cardiographics:  EKG: Personally reviewed NSR with LVH    Last known EF: 60%    This note was completed in part utilizing [employer] Software. Grammatical errors, random word insertions, spelling mistakes, and incomplete sentences can be an occasional consequence of this system secondary to software limitations, ambient noise, and hardware issues. If you have any questions or concerns about the content, text, or information contained within the body of this dictation, please contact the provider for clarification.

## 2023-10-20 ENCOUNTER — OFFICE VISIT (OUTPATIENT)
Dept: VASCULAR SURGERY | Facility: CLINIC | Age: 83
End: 2023-10-20

## 2023-10-20 ENCOUNTER — TELEPHONE (OUTPATIENT)
Dept: VASCULAR SURGERY | Facility: CLINIC | Age: 83
End: 2023-10-20

## 2023-10-20 VITALS
SYSTOLIC BLOOD PRESSURE: 120 MMHG | HEART RATE: 70 BPM | BODY MASS INDEX: 21.09 KG/M2 | HEIGHT: 63 IN | WEIGHT: 119 LBS | DIASTOLIC BLOOD PRESSURE: 80 MMHG

## 2023-10-20 DIAGNOSIS — I65.23 BILATERAL CAROTID ARTERY STENOSIS: Primary | Chronic | ICD-10-CM

## 2023-10-20 DIAGNOSIS — F17.210 CIGARETTE SMOKER: Chronic | ICD-10-CM

## 2023-10-20 RX ORDER — CHLORHEXIDINE GLUCONATE ORAL RINSE 1.2 MG/ML
15 SOLUTION DENTAL ONCE
OUTPATIENT
Start: 2023-10-20 | End: 2023-10-20

## 2023-10-20 RX ORDER — CEFAZOLIN SODIUM 1 G/50ML
1000 SOLUTION INTRAVENOUS ONCE
OUTPATIENT
Start: 2023-10-20 | End: 2023-10-20

## 2023-10-20 NOTE — PROGRESS NOTES
Assessment/Plan:    Carotid artery stenosis  1. Asymptomatic high-grade left internal carotid artery stenosis. We again discussed the indications for treatment and the treatment options available. Due to the highly calcified nature of her plaque best option would be carotid endarterectomy. She has been evaluated by cardiology but is pending echo and stress test.  Following completion of her cardiac evaluation we will plan on scheduling her carotid endarterectomy. 2.  History of right carotid endarterectomy. She is doing well in this regard. This will be followed periodically after treatment of her contralateral left side. Diagnoses and all orders for this visit:    Bilateral carotid artery stenosis    Cigarette smoker          Subjective:      Patient ID: Natividad Ryan is a 80 y.o. female. Patient presents after her cardiac clearance 10/13/23. She denies TIA/CVA symptoms. She is taking Atorvastatin and Plavix. She smokes 1ppd. 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 On evaluation today she denies any focal neurologic symptoms which would be consistent with TIA, CVA or amaurosis fugax. She underwent cardiac evaluation on 10/13/2023 and is pending echocardiogram and stress test which is scheduled for next week in preparation for carotid endarterectomy. She is present with her son today and all questions are answered. I again discussed the role of cigarette smoking with her but she again declined any attempt to quit. She understands the associated risks and benefits. Carotid duplex 2/15/2023 with critical left carotid artery stenosis with flow velocity of 540/63 cm/s. CT angiogram 7/19/2023 with highly calcified plaque creating a greater than 80% stenosis mostly in the distal common carotid artery.     I have spent a total time of 25 minutes on 10/20/23 in caring for this patient including Diagnostic results, Prognosis, Risks and benefits of tx options, Patient and family education, Importance of tx compliance, Risk factor reductions, Impressions, Counseling / Coordination of care, and Reviewing / ordering tests, medicine, procedures  .             The following portions of the patient's history were reviewed and updated as appropriate: allergies, current medications, past family history, past medical history, past social history, past surgical history, and problem list    Past Medical History:  Past Medical History:   Diagnosis Date    Anemia     Arthritis     Asthma     Benign essential hypertension     CAD (coronary artery disease)     Cardiac murmur     sees Dr. Shelia Virgen    Carotid stenosis, bilateral     CHF (congestive heart failure) ([address])     Chronic kidney disease     renal artery stenosis    Chronic sinusitis     treated with antibiotics 3/22    Colitis     COPD (chronic obstructive pulmonary disease) ([address])     Coronary artery disease     Disease of thyroid gland     Dizziness     Edema of leg     compression stocking left leg    History of transfusion     Hyperkalemia 5/3/2021    Hyperlipidemia     Hypertension     Other disorder of circulatory system (CODE)     Renal artery stenosis (HCC)     Right    Subclavian arterial stenosis (HCC)     B/L    Type 2 diabetes mellitus without complication, unspecified whether long term insulin use ([address]) 9/20/2022    Wears dentures     lower one tooth    Wears glasses        Past Surgical History:  Past Surgical History:   Procedure Laterality Date    BREAST SURGERY      bxs,benign    COLONOSCOPY      HYSTERECTOMY  1983    43    INCISIONAL BREAST BIOPSY      x5, 1964, 1965, 1985 and [address] AORTOBIFEMORAL Bilateral 12/9/2019    Procedure: BYPASS AORTA BIFEMORAL WITH LEFT FEMORAL THROMBOEMBOLECTOMY;  Surgeon: Oumou Arechiga MD;  [facility];  Service: Vascular    NC TEAEC W/PATCH GRF CAROTID VERTB [facility] Right 10/1/2021    Procedure: ENDARTERECTOMY ARTERY CAROTIDWITH BOVINE Ashley Ravins;  Surgeon: Avi Blas MD;  [facility];  Service: Vascular       Social History:  Social History     Substance and Sexual Activity   Alcohol Use Yes    Comment: manjose 2-3 every day for 50 years     Social History     Substance and Sexual Activity   Drug Use No     Social History     Tobacco Use   Smoking Status Every Day    Packs/day: 1.00    Years: 30.00    Total pack years: 30.00    Types: Cigarettes    Start date: 1960    Passive exposure: Current   Smokeless Tobacco Never       Family History:  Family History   Problem Relation Age of Onset    Hypertension Father     Heart disease Father         CHF    Coronary artery disease Family     Arthritis Family     Abdominal aortic aneurysm Mother     Hypertension Mother     Skin cancer Daughter     Cancer Paternal Aunt     No Known Problems Maternal Aunt     No Known Problems Maternal Aunt     No Known Problems Maternal Aunt        Allergies:   Allergies   Allergen Reactions    Tall Ragweed Wheezing       Medications:    Current Outpatient Medications:     albuterol (ProAir HFA) 90 mcg/act inhaler, Inhale 2 puffs every 6 (six) hours as needed for wheezing, Disp: 8 g, Rfl: 3    amLODIPine (NORVASC) 10 mg tablet, take 1 tablet by mouth every morning, Disp: 90 tablet, Rfl: 3    atorvastatin (LIPITOR) 80 mg tablet, Take 1 tablet (80 mg total) by mouth daily at bedtime, Disp: 90 tablet, Rfl: 3    B Complex Vitamins (VITAMIN B-COMPLEX) TABS, Take by mouth daily , Disp: , Rfl:     Bioflavonoid Products (VITAMIN C PLUS PO), Take by mouth daily, Disp: , Rfl:     bisacodyl (DULCOLAX) 5 mg EC tablet, Take 10 mg by mouth once, Disp: , Rfl:     carvedilol (COREG) 12.5 mg tablet, Take 1 tablet (12.5 mg total) by mouth 2 (two) times a day, Disp: 180 tablet, Rfl: 3    cholecalciferol (VITAMIN D3) 1,000 units tablet, Take 5,000 Units by mouth daily , Disp: , Rfl:     clopidogrel (PLAVIX) 75 mg tablet, take 1 tablet by mouth once daily, Disp: 90 tablet, Rfl: 1    COLLAGEN PO, Take by mouth Includes a probiotic and other vitamins-10 supplements-2 am-one in the afternoon-one hs, Disp: , Rfl:     Diclofenac Sodium (VOLTAREN) 1 %, Apply 4 g topically 4 (four) times a day as needed (arthtitis pain), Disp: 350 g, Rfl: 0    enalapril (VASOTEC) 10 mg tablet, take 1 tablet by mouth twice a day, Disp: 180 tablet, Rfl: 3    furosemide (LASIX) 20 mg tablet, Take 1 tablet (20 mg total) by mouth daily (Patient taking differently: Take 20 mg by mouth if needed), Disp: 90 tablet, Rfl: 3    ipratropium (ATROVENT) 0.03 % nasal spray, instill 2 sprays into each nostril three times a day if needed  FOR RHINITIS, Disp: 30 mL, Rfl: 3    levothyroxine 25 mcg tablet, Take 1 tablet (25 mcg total) by mouth daily in the early morning, Disp: 90 tablet, Rfl: 3    spironolactone (ALDACTONE) 25 mg tablet, Take 1 tablet (25 mg total) by mouth daily, Disp: 90 tablet, Rfl: 1    Vitals:  /80 (10/20/23 1434)    Temp      Pulse 70 (10/20/23 1434)   Resp      SpO2        Lab Results and Cultures:   CBC with diff:   Lab Results   Component Value Date    WBC 7.69 09/12/2023    HGB 12.0 09/12/2023    HCT 36.0 09/12/2023     (H) 09/12/2023     09/12/2023    ADJUSTEDWBC 9.20 08/26/2016    RBC 3.39 (L) 09/12/2023    MCH 35.4 (H) 09/12/2023    MCHC 33.3 09/12/2023    RDW 15.7 (H) 09/12/2023    MPV 12.6 09/12/2023    NRBC 0 09/12/2023   ,   BMP/CMP:  Lab Results   Component Value Date    K 4.6 09/12/2023     09/12/2023    CO2 22 09/12/2023    CO2 21 12/09/2019    BUN 52 (H) 09/12/2023    CREATININE 1.41 (H) 09/12/2023    GLUCOSE 173 (H) 12/09/2019    CALCIUM 10.3 (H) 09/12/2023    AST 31 09/12/2023    ALT 22 09/12/2023    ALKPHOS 50 09/12/2023    EGFR 34 09/12/2023   ,   Lipid Panel:   Lab Results   Component Value Date    CHOL 151 09/09/2014   ,   Coags:   Lab Results   Component Value Date    PTT 31 10/15/2021    INR 1.15 10/16/2021   ,     . Review of Systems   All other systems reviewed and are negative.         Objective:      /80 (BP Location: Right arm, Patient Position: Sitting, Cuff Size: Standard)   Pulse 70   Ht 5' 3" (1.6 m)   Wt 54 kg (119 lb)   BMI 21.08 kg/m²          Physical Exam      Operative Scheduling Information:    Hospital:  [facility]    Physician:  [address]    Surgery: Left carotid endarterectomy    Urgency:  Standard    Level:  Level 3: Outpatients to be scheduled for elective surgery than can be delayed up to 4 weeks without reasonable expectation of detriment to patient    Case Length:  Normal    Post-op Bed:  ICU    OR Table:  Standard    Equipment Needs:  Vascular technologist    Medication Instructions:  Plavix:  Continue (do not hold)    Hydration:  No    Contrast Allergy:  no

## 2023-10-20 NOTE — H&P (VIEW-ONLY)
Assessment/Plan:    Carotid artery stenosis  1. Asymptomatic high-grade left internal carotid artery stenosis. We again discussed the indications for treatment and the treatment options available. Due to the highly calcified nature of her plaque best option would be carotid endarterectomy. She has been evaluated by cardiology but is pending echo and stress test.  Following completion of her cardiac evaluation we will plan on scheduling her carotid endarterectomy. 2.  History of right carotid endarterectomy. She is doing well in this regard. This will be followed periodically after treatment of her contralateral left side. Diagnoses and all orders for this visit:    Bilateral carotid artery stenosis    Cigarette smoker          Subjective:      Patient ID: Jess Ayon is a 80 y.o. female. Patient presents after her cardiac clearance 10/13/23. She denies TIA/CVA symptoms. She is taking Atorvastatin and Plavix. She smokes 1ppd. 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 On evaluation today she denies any focal neurologic symptoms which would be consistent with TIA, CVA or amaurosis fugax. She underwent cardiac evaluation on 10/13/2023 and is pending echocardiogram and stress test which is scheduled for next week in preparation for carotid endarterectomy. She is present with her son today and all questions are answered. I again discussed the role of cigarette smoking with her but she again declined any attempt to quit. She understands the associated risks and benefits. Carotid duplex 2/15/2023 with critical left carotid artery stenosis with flow velocity of 540/63 cm/s. CT angiogram 7/19/2023 with highly calcified plaque creating a greater than 80% stenosis mostly in the distal common carotid artery.     I have spent a total time of 25 minutes on 10/20/23 in caring for this patient including Diagnostic results, Prognosis, Risks and benefits of tx options, Patient and family education, Importance of tx compliance, Risk factor reductions, Impressions, Counseling / Coordination of care, and Reviewing / ordering tests, medicine, procedures  .             The following portions of the patient's history were reviewed and updated as appropriate: allergies, current medications, past family history, past medical history, past social history, past surgical history, and problem list    Past Medical History:  Past Medical History:   Diagnosis Date    Anemia     Arthritis     Asthma     Benign essential hypertension     CAD (coronary artery disease)     Cardiac murmur     sees Dr. Tori Maki    Carotid stenosis, bilateral     CHF (congestive heart failure) ([address])     Chronic kidney disease     renal artery stenosis    Chronic sinusitis     treated with antibiotics 3/22    Colitis     COPD (chronic obstructive pulmonary disease) ([address])     Coronary artery disease     Disease of thyroid gland     Dizziness     Edema of leg     compression stocking left leg    History of transfusion     Hyperkalemia 5/3/2021    Hyperlipidemia     Hypertension     Other disorder of circulatory system (CODE)     Renal artery stenosis (HCC)     Right    Subclavian arterial stenosis (HCC)     B/L    Type 2 diabetes mellitus without complication, unspecified whether long term insulin use ([address]) 9/20/2022    Wears dentures     lower one tooth    Wears glasses        Past Surgical History:  Past Surgical History:   Procedure Laterality Date    BREAST SURGERY      bxs,benign    COLONOSCOPY      HYSTERECTOMY  1983    43    INCISIONAL BREAST BIOPSY      x5, 1964, 1965, 1985 and [address] AORTOBIFEMORAL Bilateral 12/9/2019    Procedure: BYPASS AORTA BIFEMORAL WITH LEFT FEMORAL THROMBOEMBOLECTOMY;  Surgeon: Iban Panda MD;  [facility];  Service: Vascular    AK TEAEC W/PATCH GRF CAROTID VERTB [facility] Right 10/1/2021    Procedure: ENDARTERECTOMY ARTERY CAROTIDWITH BOVINE Arthuro Class;  Surgeon: Jt Mirza MD;  [facility];  Service: Vascular       Social History:  Social History     Substance and Sexual Activity   Alcohol Use Yes    Comment: manhashantibeatriz 2-3 every day for 50 years     Social History     Substance and Sexual Activity   Drug Use No     Social History     Tobacco Use   Smoking Status Every Day    Packs/day: 1.00    Years: 30.00    Total pack years: 30.00    Types: Cigarettes    Start date: 1960    Passive exposure: Current   Smokeless Tobacco Never       Family History:  Family History   Problem Relation Age of Onset    Hypertension Father     Heart disease Father         CHF    Coronary artery disease Family     Arthritis Family     Abdominal aortic aneurysm Mother     Hypertension Mother     Skin cancer Daughter     Cancer Paternal Aunt     No Known Problems Maternal Aunt     No Known Problems Maternal Aunt     No Known Problems Maternal Aunt        Allergies:   Allergies   Allergen Reactions    Tall Ragweed Wheezing       Medications:    Current Outpatient Medications:     albuterol (ProAir HFA) 90 mcg/act inhaler, Inhale 2 puffs every 6 (six) hours as needed for wheezing, Disp: 8 g, Rfl: 3    amLODIPine (NORVASC) 10 mg tablet, take 1 tablet by mouth every morning, Disp: 90 tablet, Rfl: 3    atorvastatin (LIPITOR) 80 mg tablet, Take 1 tablet (80 mg total) by mouth daily at bedtime, Disp: 90 tablet, Rfl: 3    B Complex Vitamins (VITAMIN B-COMPLEX) TABS, Take by mouth daily , Disp: , Rfl:     Bioflavonoid Products (VITAMIN C PLUS PO), Take by mouth daily, Disp: , Rfl:     bisacodyl (DULCOLAX) 5 mg EC tablet, Take 10 mg by mouth once, Disp: , Rfl:     carvedilol (COREG) 12.5 mg tablet, Take 1 tablet (12.5 mg total) by mouth 2 (two) times a day, Disp: 180 tablet, Rfl: 3    cholecalciferol (VITAMIN D3) 1,000 units tablet, Take 5,000 Units by mouth daily , Disp: , Rfl:     clopidogrel (PLAVIX) 75 mg tablet, take 1 tablet by mouth once daily, Disp: 90 tablet, Rfl: 1    COLLAGEN PO, Take by mouth Includes a probiotic and other vitamins-10 supplements-2 am-one in the afternoon-one hs, Disp: , Rfl:     Diclofenac Sodium (VOLTAREN) 1 %, Apply 4 g topically 4 (four) times a day as needed (arthtitis pain), Disp: 350 g, Rfl: 0    enalapril (VASOTEC) 10 mg tablet, take 1 tablet by mouth twice a day, Disp: 180 tablet, Rfl: 3    furosemide (LASIX) 20 mg tablet, Take 1 tablet (20 mg total) by mouth daily (Patient taking differently: Take 20 mg by mouth if needed), Disp: 90 tablet, Rfl: 3    ipratropium (ATROVENT) 0.03 % nasal spray, instill 2 sprays into each nostril three times a day if needed  FOR RHINITIS, Disp: 30 mL, Rfl: 3    levothyroxine 25 mcg tablet, Take 1 tablet (25 mcg total) by mouth daily in the early morning, Disp: 90 tablet, Rfl: 3    spironolactone (ALDACTONE) 25 mg tablet, Take 1 tablet (25 mg total) by mouth daily, Disp: 90 tablet, Rfl: 1    Vitals:  /80 (10/20/23 1434)    Temp      Pulse 70 (10/20/23 1434)   Resp      SpO2        Lab Results and Cultures:   CBC with diff:   Lab Results   Component Value Date    WBC 7.69 09/12/2023    HGB 12.0 09/12/2023    HCT 36.0 09/12/2023     (H) 09/12/2023     09/12/2023    ADJUSTEDWBC 9.20 08/26/2016    RBC 3.39 (L) 09/12/2023    MCH 35.4 (H) 09/12/2023    MCHC 33.3 09/12/2023    RDW 15.7 (H) 09/12/2023    MPV 12.6 09/12/2023    NRBC 0 09/12/2023   ,   BMP/CMP:  Lab Results   Component Value Date    K 4.6 09/12/2023     09/12/2023    CO2 22 09/12/2023    CO2 21 12/09/2019    BUN 52 (H) 09/12/2023    CREATININE 1.41 (H) 09/12/2023    GLUCOSE 173 (H) 12/09/2019    CALCIUM 10.3 (H) 09/12/2023    AST 31 09/12/2023    ALT 22 09/12/2023    ALKPHOS 50 09/12/2023    EGFR 34 09/12/2023   ,   Lipid Panel:   Lab Results   Component Value Date    CHOL 151 09/09/2014   ,   Coags:   Lab Results   Component Value Date    PTT 31 10/15/2021    INR 1.15 10/16/2021   ,     . Review of Systems   All other systems reviewed and are negative.         Objective:      /80 (BP Location: Right arm, Patient Position: Sitting, Cuff Size: Standard)   Pulse 70   Ht 5' 3" (1.6 m)   Wt 54 kg (119 lb)   BMI 21.08 kg/m²          Physical Exam      Operative Scheduling Information:    Hospital[facility]  [facility]    Physician:  Barbara Ohara    Surgery: Left carotid endarterectomy    Urgency:  Standard    Level:  Level 3: Outpatients to be scheduled for elective surgery than can be delayed up to 4 weeks without reasonable expectation of detriment to patient    Case Length:  Normal    Post-op Bed:  ICU    OR Table:  Standard    Equipment Needs:  Vascular technologist    Medication Instructions:  Plavix:  Continue (do not hold)    Hydration:  No    Contrast Allergy:  no

## 2023-10-20 NOTE — LETTER
October 20, 2023     Paradise Miller MD  [address] [place] [facility] [address]    Patient: Luis Alberto Xiao   YOB: 1940   Date of Visit: 10/20/2023       Dear Dr. Randi Jennings:    Thank you for referring Daina Young to me for evaluation. Below are the relevant portions of my assessment and plan of care. Carotid artery stenosis  1. Asymptomatic high-grade left internal carotid artery stenosis. We again discussed the indications for treatment and the treatment options available. Due to the highly calcified nature of her plaque best option would be carotid endarterectomy. She has been evaluated by cardiology but is pending echo and stress test.  Following completion of her cardiac evaluation we will plan on scheduling her carotid endarterectomy. 2.  History of right carotid endarterectomy. She is doing well in this regard. This will be followed periodically after treatment of her contralateral left side. If you have questions, please do not hesitate to call me. I look forward to following Katie Casey along with you.          Sincerely,        Juno Diop MD        CC: No Recipients

## 2023-10-20 NOTE — TELEPHONE ENCOUNTER
REMINDER: Under Reason For Call, comments MUST be formatted as:   (Surgeon's Initials) / (Procedure)      Special Instructions / FYI: Per Cardiology Dr. Leyn Breen pt needs to complete Echo Stress Test prior to surgery. Procedure: (CEA) Carotid Endarterectomy / Patch Angioplasty     Level: 3 - Route clearance(s) to [facility] Clearance Pool     Allergies: Tall ragweed    Instructions Given: NO Bowel Prep General Instructions     Dialysis: Patient is not on dialysis. Return Visit Required Prior to Procedure: No.    Consent: I certify that patient has signed, printed, timed, and dated their surgery consent. I certify that the patient's LEGAL NAME and DATE OF BIRTH are written in the upper left corner on BOTH sides of the consent. I certify that BOTH sides of the completed surgery consent have been scanned into the patient's Epic chart by myself on 10/20/2023. Yes, I have LABELED the consent in Epic as Consent for Vascular Procedure. For Surgical Clearances     Levels   1-3   ROUTE this encounter to [facility] Clearance Pool (AND)   [facility] Surgery Coordinator Pool     Level   4   ROUTE this encounter to [facility] Surgery Coordinator Pool       HYDRATION CLEARANCES   ONLY ROUTE TO  [facility] Clearance Pool     (1) ONE CLEARANCE NEEDED - Cardiology  Clearance // Clearing Provider's Name (Luis Gillette): Tj Russell //  Spoke with: Anne Marie Harp  //  Office Contact Information P: [phone] - F: [phone]    Yes, I have ROUTED this encounter to [facility] Surgery Coordinator and/or [facility] Clearance Pool.

## 2023-10-20 NOTE — PATIENT INSTRUCTIONS
Carotid artery stenosis  1. Asymptomatic high-grade left internal carotid artery stenosis. We again discussed the indications for treatment and the treatment options available. Due to the highly calcified nature of her plaque best option would be carotid endarterectomy. She has been evaluated by cardiology but is pending echo and stress test.  Following completion of her cardiac evaluation we will plan on scheduling her carotid endarterectomy. 2.  History of right carotid endarterectomy. She is doing well in this regard. This will be followed periodically after treatment of her contralateral left side.

## 2023-10-20 NOTE — LETTER
10/20/23    Re: Cardiology  Clearance    Patient Name: Isabel Rodriguez   Patient : 1940   Patient MRN: [id number]   Patient Phone: [phone]     Dear Dr. Robert Andrew ,    Dr. Yuli Polanco MD is requesting clearance for above patient, prior to proceeding with carotid endarterectomy (CEA) / patch angioplasty . Patient's procedure will be scheduled at [address] [place] [address] once clearance has been obtained. Patient will be given general anesthesia. We spoke with your office today regarding clearance request.   Cardiology [facility]  informed us that patient was recently seen and may not require an appointment with you. They informed us that they will reach out to the patient to schedule if needed. Please fax your recommendations, including any medication recommendations, to [phone], attention nursing. Or route your recommendations to [facility] pool [facility] Clearance Pool [[id number]]. Please reach out with any questions or concerns.     Sincerely,    [facility] [place]

## 2023-10-20 NOTE — ASSESSMENT & PLAN NOTE
1.  Asymptomatic high-grade left internal carotid artery stenosis. We again discussed the indications for treatment and the treatment options available. Due to the highly calcified nature of her plaque best option would be carotid endarterectomy. She has been evaluated by cardiology but is pending echo and stress test.  Following completion of her cardiac evaluation we will plan on scheduling her carotid endarterectomy. 2.  History of right carotid endarterectomy. She is doing well in this regard. This will be followed periodically after treatment of her contralateral left side.

## 2023-10-23 ENCOUNTER — TELEPHONE (OUTPATIENT)
Dept: FAMILY MEDICINE CLINIC | Facility: CLINIC | Age: 83
End: 2023-10-23

## 2023-10-23 ENCOUNTER — NURSE TRIAGE (OUTPATIENT)
Age: 83
End: 2023-10-23

## 2023-10-23 ENCOUNTER — OFFICE VISIT (OUTPATIENT)
Dept: FAMILY MEDICINE CLINIC | Facility: CLINIC | Age: 83
End: 2023-10-23
Payer: COMMERCIAL

## 2023-10-23 ENCOUNTER — APPOINTMENT (OUTPATIENT)
Dept: RADIOLOGY | Facility: CLINIC | Age: 83
End: 2023-10-23
Payer: COMMERCIAL

## 2023-10-23 VITALS
HEIGHT: 63 IN | DIASTOLIC BLOOD PRESSURE: 74 MMHG | BODY MASS INDEX: 21.09 KG/M2 | RESPIRATION RATE: 20 BRPM | WEIGHT: 119 LBS | HEART RATE: 64 BPM | TEMPERATURE: 98 F | SYSTOLIC BLOOD PRESSURE: 139 MMHG

## 2023-10-23 DIAGNOSIS — M79.672 LEFT FOOT PAIN: ICD-10-CM

## 2023-10-23 DIAGNOSIS — M19.90 ARTHRITIS: ICD-10-CM

## 2023-10-23 DIAGNOSIS — M79.672 LEFT FOOT PAIN: Primary | ICD-10-CM

## 2023-10-23 DIAGNOSIS — M25.572 ACUTE LEFT ANKLE PAIN: ICD-10-CM

## 2023-10-23 PROCEDURE — 99213 OFFICE O/P EST LOW 20 MIN: CPT | Performed by: FAMILY MEDICINE

## 2023-10-23 PROCEDURE — 73630 X-RAY EXAM OF FOOT: CPT

## 2023-10-23 PROCEDURE — 73610 X-RAY EXAM OF ANKLE: CPT

## 2023-10-23 NOTE — TELEPHONE ENCOUNTER
Called Stacie Hadley to discuss her xrays. No answer. I left message on machine regarding results and to let her know that her xrays showed no abnormalities other than some soft tissue swelling. Recommend that she continue to rest, ice, elevate her foot and if symptoms persist, to please let me know.

## 2023-10-23 NOTE — PROGRESS NOTES
Name: Karla Ruiz      : 1940      MRN: [id number]  Encounter Provider: Ara Carlson MD  Encounter Date: 10/23/2023   Encounter department: [facility]     1. Left foot pain  -     XR foot 3+ vw left; Future; Expected date: 10/23/2023    Unclear etiology of her current pain. Recommend rest, ice, elevation. Will get xray. Subjective      HPI  1 week history of left foot pain and swelling. Unchanged since it began. The pain started around her left ankle, but gradually has spread to the rest of her foot. No known injury. There is some mild erythema on left foot compared to right foot. She has full ROM. No significant numbness/tingling. Review of Systems   Constitutional: Negative. HENT: Negative. Eyes: Negative. Respiratory: Negative. Cardiovascular: Negative. Gastrointestinal: Negative. Endocrine: Negative. Genitourinary: Negative. Musculoskeletal: Negative. Skin: Negative. Allergic/Immunologic: Negative. Neurological: Negative. Hematological: Negative. Psychiatric/Behavioral: Negative.          Current Outpatient Medications on File Prior to Visit   Medication Sig    albuterol (ProAir HFA) 90 mcg/act inhaler Inhale 2 puffs every 6 (six) hours as needed for wheezing    amLODIPine (NORVASC) 10 mg tablet take 1 tablet by mouth every morning    atorvastatin (LIPITOR) 80 mg tablet Take 1 tablet (80 mg total) by mouth daily at bedtime    B Complex Vitamins (VITAMIN B-COMPLEX) TABS Take by mouth daily     Bioflavonoid Products (VITAMIN C PLUS PO) Take by mouth daily    bisacodyl (DULCOLAX) 5 mg EC tablet Take 10 mg by mouth once    carvedilol (COREG) 12.5 mg tablet Take 1 tablet (12.5 mg total) by mouth 2 (two) times a day    cholecalciferol (VITAMIN D3) 1,000 units tablet Take 5,000 Units by mouth daily     clopidogrel (PLAVIX) 75 mg tablet take 1 tablet by mouth once daily    COLLAGEN PO Take by mouth Includes a probiotic and other vitamins-10 supplements-2 am-one in the afternoon-one hs    Diclofenac Sodium (VOLTAREN) 1 % Apply 4 g topically 4 (four) times a day as needed (arthtitis pain)    enalapril (VASOTEC) 10 mg tablet take 1 tablet by mouth twice a day    furosemide (LASIX) 20 mg tablet Take 1 tablet (20 mg total) by mouth daily (Patient taking differently: Take 20 mg by mouth if needed)    ipratropium (ATROVENT) 0.03 % nasal spray instill 2 sprays into each nostril three times a day if needed  FOR RHINITIS    levothyroxine 25 mcg tablet Take 1 tablet (25 mcg total) by mouth daily in the early morning    spironolactone (ALDACTONE) 25 mg tablet Take 1 tablet (25 mg total) by mouth daily    [DISCONTINUED] Diclofenac Sodium (VOLTAREN) 1 % Apply 4 g topically 4 (four) times a day as needed (arthtitis pain)       Objective     /74   Pulse 64   Temp 98 °F (36.7 °C)   Resp 20   Ht 5' 3" (1.6 m)   Wt 54 kg (119 lb)   BMI 21.08 kg/m²     Physical Exam  Constitutional:       General: She is not in acute distress. Appearance: She is well-developed. She is not diaphoretic. HENT:      Head: Normocephalic and atraumatic. Eyes:      General: No scleral icterus. Right eye: No discharge. Left eye: No discharge. Conjunctiva/sclera: Conjunctivae normal.   Pulmonary:      Effort: Pulmonary effort is normal.   Musculoskeletal:      Cervical back: Normal range of motion. Left lower leg: Edema (left foot and ankle) present. Left foot: Normal range of motion. No deformity, bunion, Charcot foot or foot drop. Feet:      Left foot:      Skin integrity: Erythema (mild) present. No ulcer, blister, skin breakdown or warmth. Toenail Condition: Left toenails are normal.   Skin:     General: Skin is warm. Neurological:      Mental Status: She is alert and oriented to person, place, and time. Psychiatric:         Behavior: Behavior normal.         Thought Content:  Thought content normal. Judgment: Judgment normal.       Trey Piña MD

## 2023-10-23 NOTE — TELEPHONE ENCOUNTER
----- Message from Antonina Campos sent at 10/23/2023 11:55 AM EDT -----  Pt having test done on Thursday at the hospital and asked if Dr Pradeep Quiroga can order her xray.   Stated she has been having pain in her L leg more her foot and ankle an pain in her R arm, x1W. Pt state the her extremities have been very painful and would like to have the xray done on Thursday while at the hospital.  Pt can be reached at [phone]

## 2023-10-23 NOTE — TELEPHONE ENCOUNTER
Reason for call:   [x] Refill   [] Prior Auth  [] Other:     Office:   [x] PCP/Provider -  Dr Alberto Aldana  [] Specialty/Provider -     Medication: diclofenac    Dose/Frequency: 1%    Quantity:     [facility]    Does the patient have enough for 3 days?    [x] Yes   [] No - Send as HP to POD

## 2023-10-25 ENCOUNTER — TELEPHONE (OUTPATIENT)
Dept: CARDIOLOGY CLINIC | Facility: CLINIC | Age: 83
End: 2023-10-25

## 2023-10-26 ENCOUNTER — TELEPHONE (OUTPATIENT)
Dept: CARDIOLOGY CLINIC | Facility: CLINIC | Age: 83
End: 2023-10-26

## 2023-10-26 ENCOUNTER — HOSPITAL ENCOUNTER (OUTPATIENT)
Dept: NON INVASIVE DIAGNOSTICS | Facility: HOSPITAL | Age: 83
Discharge: HOME | End: 2023-10-26
Attending: INTERNAL MEDICINE
Payer: COMMERCIAL

## 2023-10-26 ENCOUNTER — HOSPITAL ENCOUNTER (OUTPATIENT)
Dept: RADIOLOGY | Facility: HOSPITAL | Age: 83
Discharge: HOME | End: 2023-10-26
Attending: INTERNAL MEDICINE
Payer: COMMERCIAL

## 2023-10-26 VITALS
SYSTOLIC BLOOD PRESSURE: 180 MMHG | DIASTOLIC BLOOD PRESSURE: 61 MMHG | HEIGHT: 63 IN | BODY MASS INDEX: 21.09 KG/M2 | WEIGHT: 119 LBS | HEART RATE: 76 BPM

## 2023-10-26 VITALS
OXYGEN SATURATION: 98 % | RESPIRATION RATE: 20 BRPM | SYSTOLIC BLOOD PRESSURE: 154 MMHG | HEART RATE: 70 BPM | DIASTOLIC BLOOD PRESSURE: 70 MMHG | HEIGHT: 63 IN | BODY MASS INDEX: 21.08 KG/M2

## 2023-10-26 DIAGNOSIS — I50.32 CHRONIC DIASTOLIC HEART FAILURE (HCC): ICD-10-CM

## 2023-10-26 LAB
AORTIC ROOT: 2.6 CM
APICAL FOUR CHAMBER EJECTION FRACTION: 79 %
E WAVE DECELERATION TIME: 198 MS
E/A RATIO: 1.13
FRACTIONAL SHORTENING: 31 (ref 28–44)
INTERVENTRICULAR SEPTUM IN DIASTOLE (PARASTERNAL SHORT AXIS VIEW): 1.2 CM
INTERVENTRICULAR SEPTUM: 1.2 CM (ref 0.6–1.1)
LAAS-AP2: 23.4 CM2
LAAS-AP4: 20.4 CM2
LEFT ATRIUM SIZE: 3.5 CM
LEFT ATRIUM VOLUME (MOD BIPLANE): 67 ML
LEFT ATRIUM VOLUME INDEX (MOD BIPLANE): 43.2 ML/M2
LEFT INTERNAL DIMENSION IN SYSTOLE: 2.5 CM (ref 2.1–4)
LEFT VENTRICULAR INTERNAL DIMENSION IN DIASTOLE: 3.6 CM (ref 3.5–6)
LEFT VENTRICULAR POSTERIOR WALL IN END DIASTOLE: 1.3 CM
LEFT VENTRICULAR STROKE VOLUME: 32 ML
LVSV (TEICH): 32 ML
MAX HR PERCENT: 78 %
MAX HR: 107 BPM
MV E'TISSUE VEL-LAT: 10 CM/S
MV E'TISSUE VEL-SEP: 9 CM/S
MV PEAK A VEL: 0.87 M/S
MV PEAK E VEL: 98 CM/S
MV STENOSIS PRESSURE HALF TIME: 57 MS
MV VALVE AREA P 1/2 METHOD: 3.86
RATE PRESSURE PRODUCT: NORMAL
RIGHT ATRIUM AREA SYSTOLE A4C: 15.4 CM2
RIGHT VENTRICLE ID DIMENSION: 3.6 CM
SL CV LEFT ATRIUM LENGTH A2C: 5.6 CM
SL CV PED ECHO LEFT VENTRICLE DIASTOLIC VOLUME (MOD BIPLANE) 2D: 54 ML
SL CV PED ECHO LEFT VENTRICLE SYSTOLIC VOLUME (MOD BIPLANE) 2D: 22 ML
SL CV REST NUCLEAR ISOTOPE DOSE: 9.9 MCI
SL CV STRESS NUCLEAR ISOTOPE DOSE: 32.9 MCI
SL CV STRESS RECOVERY BP: NORMAL MMHG
SL CV STRESS RECOVERY HR: 100 BPM
SL CV STRESS RECOVERY O2 SAT: 100 %
STRESS ANGINA INDEX: 0
STRESS BASELINE BP: NORMAL MMHG
STRESS BASELINE HR: 70 BPM
STRESS O2 SAT REST: 98 %
STRESS PEAK HR: 94 BPM
STRESS POST ESTIMATED WORKLOAD: 1.6 METS
STRESS POST EXERCISE DUR MIN: 3 MIN
STRESS POST O2 SAT PEAK: 100 %
STRESS POST PEAK BP: 160 MMHG
TR MAX PG: 35 MMHG
TR PEAK VELOCITY: 3 M/S
TRICUSPID ANNULAR PLANE SYSTOLIC EXCURSION: 1.8 CM
TRICUSPID VALVE PEAK REGURGITATION VELOCITY: 2.96 M/S

## 2023-10-26 PROCEDURE — 93016 CV STRESS TEST SUPVJ ONLY: CPT | Performed by: INTERNAL MEDICINE

## 2023-10-26 PROCEDURE — 93017 CV STRESS TEST TRACING ONLY: CPT

## 2023-10-26 PROCEDURE — A9502 TC99M TETROFOSMIN: HCPCS

## 2023-10-26 PROCEDURE — 93018 CV STRESS TEST I&R ONLY: CPT | Performed by: INTERNAL MEDICINE

## 2023-10-26 PROCEDURE — 93306 TTE W/DOPPLER COMPLETE: CPT | Performed by: INTERNAL MEDICINE

## 2023-10-26 PROCEDURE — 78452 HT MUSCLE IMAGE SPECT MULT: CPT | Performed by: INTERNAL MEDICINE

## 2023-10-26 PROCEDURE — 78452 HT MUSCLE IMAGE SPECT MULT: CPT

## 2023-10-26 PROCEDURE — 93306 TTE W/DOPPLER COMPLETE: CPT

## 2023-10-26 PROCEDURE — G1004 CDSM NDSC: HCPCS

## 2023-10-26 RX ORDER — REGADENOSON 0.08 MG/ML
0.4 INJECTION, SOLUTION INTRAVENOUS ONCE
Status: COMPLETED | OUTPATIENT
Start: 2023-10-26 | End: 2023-10-26

## 2023-10-26 RX ADMIN — REGADENOSON 0.4 MG: 0.08 INJECTION, SOLUTION INTRAVENOUS at 09:48

## 2023-10-26 NOTE — TELEPHONE ENCOUNTER
edgardo Jansen, [address]; [facility] Clearance Pool15 minutes ago (4:27 PM)     LF  Pt had stress today, please provide update re: cardiac clearance. Thank you.

## 2023-10-26 NOTE — TELEPHONE ENCOUNTER
----- Message from Angel Colon, [address] [facility] sent at 10/26/2023  2:40 PM EDT -----  Please let patient know that her stress test was read as a nonischemic study. Blood pressure was stable during the testing.   This is a normal test.

## 2023-10-30 ENCOUNTER — PREP FOR PROCEDURE (OUTPATIENT)
Dept: VASCULAR SURGERY | Facility: CLINIC | Age: 83
End: 2023-10-30

## 2023-10-30 ENCOUNTER — TELEPHONE (OUTPATIENT)
Dept: CARDIOLOGY CLINIC | Facility: CLINIC | Age: 83
End: 2023-10-30

## 2023-10-30 DIAGNOSIS — I65.23 BILATERAL CAROTID ARTERY STENOSIS: Primary | ICD-10-CM

## 2023-10-30 NOTE — TELEPHONE ENCOUNTER
Authorization requirements reviewed. Please refer to Edna Bradley / Suad Lopez number [id number] for case updates.

## 2023-10-30 NOTE — LETTER
Cardiology Pre Operative Clearance      PRE OPERATIVE CARDIAC RISK ASSESSMENT    10/31/23    Chasidy Ayala  1940  [id number]    Date of Surgery: 11/16/2023    Type of Surgery:  (CEA) Carotid Endarterectomy / Patch Angioplasty      Surgeon: Dr. Chiquita Essex    No Cardiac Contraindication for Planned Surgical Procedures    Anticoagulation: yes patient is on Plavix 75 mg daily    Physician Comment: Patient recently underwent 2D echocardiogram which demonstrated an EF of 60 to 65% with no wall motion abnormalities. She did have mild tricuspid valve regurgitation with estimated peak PA pressures of 40 mmHg. Lexiscan nuclear stress test did not demonstrate any perfusion defects.     Electronically Signed:  [facility], [place]  Cardiology

## 2023-10-30 NOTE — TELEPHONE ENCOUNTER
Spoke to Jose Manuel at cardiology office, and they will get back to us with a clearance once the Dr. Major Bias her.

## 2023-10-30 NOTE — TELEPHONE ENCOUNTER
Verified patient's insurance   CONFIRMED - Patient's insurance is [employer] [facility]  Is patient requesting a call when authorization has been obtained? Patient did not request a call. Surgery Date: 11-16-23  Primary Surgeon: Hakeem Silva // Chapicnito Milian (NPI: [id number])  Assisting Surgeon: Not Applicable (N/A)  Facility: [place] (Tax: [id number] / NPI: [id number])  Inpatient / Outpatient: Inpatient  Level: 3    Clearance Received: Yes, Cardiology . Consent Received: Yes, scanned into Epic on 10-20-23. Medication Hold / Last Dose:  No Hold on ASA  IR Notified: Not Applicable (N/A)  Rep. Notified: Not Applicable (N/A)  Equipment Needs: Not Applicable (N/A)  Vas Lab Requested:  Yes  Patient Contacted: 10-30-23    Diagnosis: I65.23  Procedure/ CPT Code(s): (CEA) Carotid Endarterectomy / Patch Angioplasty // CPT: 23632    For varicose vein related procedures:   Last LEVDR: Not Applicable (N/A)  CEAP Classification: Not Applicable (N/A)  VCSS: Not Applicable (N/A)    Post Operative Date/ Time: TBD     *Please review medication hold(s), PATs, and check H&P with patient. *  PATIENT WAS MAILED SURGERY/SHOWERING/DISCHARGE/COVID INSTRUCTIONS AFTER REVIEWING WITH THEM VIA PHONE CALL.       Spoke to patient to schedule surgery patient was offered 11-2-23 but decline that date [facility]

## 2023-10-31 ENCOUNTER — TELEPHONE (OUTPATIENT)
Dept: CARDIOLOGY CLINIC | Facility: CLINIC | Age: 83
End: 2023-10-31

## 2023-10-31 ENCOUNTER — PREP FOR PROCEDURE (OUTPATIENT)
Dept: VASCULAR SURGERY | Facility: CLINIC | Age: 83
End: 2023-10-31

## 2023-10-31 NOTE — TELEPHONE ENCOUNTER
----- Message from Bill Rosa, [address] [facility] sent at 10/31/2023  8:27 AM EDT -----  Please let patient know that her echocardiogram demonstrates normal ejection fraction, she has some mild regurgitation of both mitral and tricuspid valve disease of the valves between the atrium and the ventricle.   On the right side her lung pressures are mildly elevated.,  But unchanged from previous study

## 2023-11-01 ENCOUNTER — OFFICE VISIT (OUTPATIENT)
Dept: HEMATOLOGY ONCOLOGY | Facility: MEDICAL CENTER | Age: 83
End: 2023-11-01
Payer: COMMERCIAL

## 2023-11-01 ENCOUNTER — APPOINTMENT (OUTPATIENT)
Dept: LAB | Facility: CLINIC | Age: 83
DRG: 038 | End: 2023-11-01
Payer: COMMERCIAL

## 2023-11-01 VITALS
SYSTOLIC BLOOD PRESSURE: 140 MMHG | WEIGHT: 119.4 LBS | RESPIRATION RATE: 18 BRPM | BODY MASS INDEX: 21.16 KG/M2 | OXYGEN SATURATION: 99 % | DIASTOLIC BLOOD PRESSURE: 76 MMHG | HEART RATE: 70 BPM | TEMPERATURE: 97.8 F | HEIGHT: 63 IN

## 2023-11-01 DIAGNOSIS — D47.2 MGUS (MONOCLONAL GAMMOPATHY OF UNKNOWN SIGNIFICANCE): Primary | ICD-10-CM

## 2023-11-01 DIAGNOSIS — N18.30 ANEMIA IN STAGE 3 CHRONIC KIDNEY DISEASE, UNSPECIFIED WHETHER STAGE 3A OR 3B CKD: Chronic | ICD-10-CM

## 2023-11-01 DIAGNOSIS — I65.23 BILATERAL CAROTID ARTERY STENOSIS: ICD-10-CM

## 2023-11-01 DIAGNOSIS — D63.1 ANEMIA IN STAGE 3 CHRONIC KIDNEY DISEASE, UNSPECIFIED WHETHER STAGE 3A OR 3B CKD: Chronic | ICD-10-CM

## 2023-11-01 LAB
ANION GAP SERPL CALCULATED.3IONS-SCNC: 10 MMOL/L
BUN SERPL-MCNC: 32 MG/DL (ref 5–25)
CALCIUM SERPL-MCNC: 10.3 MG/DL (ref 8.4–10.2)
CHLORIDE SERPL-SCNC: 105 MMOL/L (ref 96–108)
CO2 SERPL-SCNC: 25 MMOL/L (ref 21–32)
CREAT SERPL-MCNC: 1 MG/DL (ref 0.6–1.3)
ERYTHROCYTE [DISTWIDTH] IN BLOOD BY AUTOMATED COUNT: 15.8 % (ref 11.6–15.1)
GFR SERPL CREATININE-BSD FRML MDRD: 52 ML/MIN/1.73SQ M
GLUCOSE SERPL-MCNC: 94 MG/DL (ref 65–140)
HCT VFR BLD AUTO: 37.2 % (ref 34.8–46.1)
HGB BLD-MCNC: 12.5 G/DL (ref 11.5–15.4)
INR PPP: 1.11 (ref 0.84–1.19)
MCH RBC QN AUTO: 35.4 PG (ref 26.8–34.3)
MCHC RBC AUTO-ENTMCNC: 33.6 G/DL (ref 31.4–37.4)
MCV RBC AUTO: 105 FL (ref 82–98)
PLATELET # BLD AUTO: 231 THOUSANDS/UL (ref 149–390)
PMV BLD AUTO: 11.5 FL (ref 8.9–12.7)
POTASSIUM SERPL-SCNC: 4.8 MMOL/L (ref 3.5–5.3)
PROTHROMBIN TIME: 14.2 SECONDS (ref 11.6–14.5)
RBC # BLD AUTO: 3.53 MILLION/UL (ref 3.81–5.12)
SODIUM SERPL-SCNC: 140 MMOL/L (ref 135–147)
WBC # BLD AUTO: 7.56 THOUSAND/UL (ref 4.31–10.16)

## 2023-11-01 PROCEDURE — 85610 PROTHROMBIN TIME: CPT

## 2023-11-01 PROCEDURE — 86850 RBC ANTIBODY SCREEN: CPT

## 2023-11-01 PROCEDURE — 86901 BLOOD TYPING SEROLOGIC RH(D): CPT

## 2023-11-01 PROCEDURE — 99214 OFFICE O/P EST MOD 30 MIN: CPT | Performed by: INTERNAL MEDICINE

## 2023-11-01 PROCEDURE — 36415 COLL VENOUS BLD VENIPUNCTURE: CPT

## 2023-11-01 PROCEDURE — 85027 COMPLETE CBC AUTOMATED: CPT

## 2023-11-01 PROCEDURE — 80048 BASIC METABOLIC PNL TOTAL CA: CPT

## 2023-11-01 PROCEDURE — 86900 BLOOD TYPING SEROLOGIC ABO: CPT

## 2023-11-01 NOTE — PROGRESS NOTES
Brissa Brennanoter  1940  [facility] HEMATOLOGY ONCOLOGY [address] [facility] [address]    DISCUSSION/SUMMARY:    80year-old female previously referred for anemia - Mrs. Wolff underwent an anemia workup. Results were consistent with anemia of chronic renal insufficiency. Previously patient was treated with an erythroid stimulating agent, was effective. Subsequently the Epogen placed on hold and patient began to violence. Recent CBC is good/acceptable. Additionally, follow-up blood work for the MGUS does not demonstrate any evidence of progression. The plan is to continue with surveillance. Patient understands that Epogen is an option in the future if the hemoglobin level should trend downward and she becomes more symptomatic. Etiology for the left foot swelling is not clear. Patient denies any trauma. Patient will follow-up with her PCP. Patient will also follow with vascular, scheduled for carotid endarterectomy in a few weeks. Patient understands that the Plavix will likely need manipulation for the procedure. Mrs. Wolff is to return in 6 months with repeat blood work before. Carefully review your medication list and verify that the list is accurate and up-to-date. Please call the hematology/oncology office if there are medications missing from the list, medications on the list that you are not currently taking or if there is a dosage or instruction that is different from how you're taking that medication. Patient goals and areas of care:  Monitor CBC parameters  Barriers to care: none  Patient is able to self-care  ______________________________________________________________________________________    Chief Complaint   Patient presents with    Follow-up    Anemia, MGUS     History of Present Illness:  25-year-old female previously referred for evaluation of anemia.   Patient has been monitored for MGUS, anemia of chronic renal sufficiency and macrocytosis. The plan has been surveillance. Mrs. Wolff returns for follow-up. Patient states feeling +/-. Patient denies any trauma but Mrs. Wolff recent developed left ankle and foot swelling (no calf swelling). Patient has been seen by her PCP, work-up/treatment is in process. Swelling seems to be getting slowly better. Otherwise patient states feeling more or less the same as before. Appetite is good, weight is stable. No GI,  or GYN issues or bleeding. Activities are slightly decreased but not from systemic problems but from the left ankle pain. No specific problems with the Plavix. Patient states that she is scheduled for carotid enterectomy in a few weeks. Review of Systems   Constitutional:  Positive for fatigue. Negative for unexpected weight change. HENT: Negative. Eyes: Negative. Respiratory: Negative. Cardiovascular: Negative. Gastrointestinal: Negative. Endocrine: Negative. Genitourinary: Negative. Musculoskeletal: Negative. Skin: Negative. Allergic/Immunologic: Negative. Neurological: Negative. Negative for light-headedness. Hematological: Negative. Psychiatric/Behavioral: Negative. All other systems reviewed and are negative.     Patient Active Problem List   Diagnosis    Aortoiliac occlusive disease ([address])    Carotid artery stenosis    Hypothyroidism    Nicotine dependence    Subclavian artery stenosis, left (HCC)    Aortic stenosis    Benign hypertension with chronic kidney disease, stage III ([facility])    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HCC)    Hyperlipidemia    Onychomycosis    Paronychia of toenail    Simple chronic bronchitis (HCC)    Cigarette smoker    Cardiac murmur    Mass of spine    Chronic diastolic heart failure (HCC)    Acute blood loss anemia    Hypertensive heart disease with chronic diastolic congestive heart failure (HCC)    Bilateral leg edema    Peripheral neuropathy    Parenchymal renal hypertension    Anemia in stage 3 chronic kidney disease     Chronic kidney disease, stage 3 (HCC)    Chronic anemia    Renal artery stenosis (HCC)    History of ischemic colitis    Gastritis without bleeding    Arteriovenous malformation (AVM)    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HCC)    MGUS (monoclonal gammopathy of unknown significance)     Past Medical History:   Diagnosis Date    Anemia     Arthritis     Asthma     Benign essential hypertension     CAD (coronary artery disease)     Cardiac murmur     sees Dr. Alix Schwartz    Carotid stenosis, bilateral     CHF (congestive heart failure) (HCC)     Chronic kidney disease     renal artery stenosis    Chronic sinusitis     treated with antibiotics 3/22    Colitis     COPD (chronic obstructive pulmonary disease) ([address])     Coronary artery disease     Disease of thyroid gland     Dizziness     Edema of leg     compression stocking left leg    History of transfusion     Hyperkalemia 5/3/2021    Hyperlipidemia     Hypertension     Other disorder of circulatory system (CODE)     Renal artery stenosis (HCC)     Right    Subclavian arterial stenosis (HCC)     B/L    Type 2 diabetes mellitus without complication, unspecified whether long term insulin use ([address]) 9/20/2022    Wears dentures     lower one tooth    Wears glasses      Past Surgical History:   Procedure Laterality Date    BREAST SURGERY      bxs,benign    COLONOSCOPY      HYSTERECTOMY  1983    43    INCISIONAL BREAST BIOPSY      x5, 1964, 1965, 1985 and [address] AORTOBIFEMORAL Bilateral 12/9/2019    Procedure: BYPASS AORTA BIFEMORAL WITH LEFT FEMORAL THROMBOEMBOLECTOMY;  Surgeon: Nghia Lockwood MD;  Location: [facility];  Service: Vascular    HI TEAEC W/PATCH GRF CAROTID VERTB SUBCLAV NECK INC Right 10/1/2021    Procedure: [address] [facility], INTROP DUPLEX;  Surgeon: Roman Vargas MD;  Location: [facility];  Service: Vascular   Past surgical history:  + blood transfusions many years ago after the birth of 1 child (NOS)    Ob gyn: No postmenopausal bleeding, no breast issues, no recent mammograms    Family History   Problem Relation Age of Onset    Hypertension Father     Heart disease Father         CHF    Coronary artery disease Family     Arthritis Family     Abdominal aortic aneurysm Mother     Hypertension Mother     Skin cancer Daughter     Cancer Paternal Aunt     No Known Problems Maternal Aunt     No Known Problems Maternal Aunt     No Known Problems Maternal Aunt    Family history:  No known familial or genetic diseases, 2 daughters in good general health, 3rd daughter  (NOS)    Social History     Socioeconomic History    Marital status:      Spouse name: Not on file    Number of children: Not on file    Years of education: Not on file    Highest education level: Not on file   Occupational History    Not on file   Tobacco Use    Smoking status: Every Day     Packs/day: 1.00     Years: 30.00     Total pack years: 30.00     Types: Cigarettes     Start date:      Passive exposure: Current    Smokeless tobacco: Never   Vaping Use    Vaping Use: Never used   Substance and Sexual Activity    Alcohol use: Yes     Comment: wilfrid 2-3 every day for 50 years    Drug use: No    Sexual activity: Yes     Partners: Male     Birth control/protection: Surgical   Other Topics Concern    Not on file   Social History Narrative    Rarely exercises    Sleeps 6-7 hours per day     Social Determinants of Health     Financial Resource Strain: Low Risk  (2023)    Overall Financial Resource Strain (CARDIA)     Difficulty of Paying Living Expenses: Not hard at all   Food Insecurity: Not on file   Transportation Needs: No Transportation Needs (2023)    PRAPARE - Transportation     Lack of Transportation (Medical): No     Lack of Transportation (Non-Medical):  No   Physical Activity: Not on file   Stress: Not on file   Social Connections: Not on file   Intimate Partner Violence: Not on file   Housing Stability: Not on file   Social history: 1/2 pack per day for 60 years, no drug or alcohol abuse, no toxic exposure    Current Outpatient Medications:     albuterol (ProAir HFA) 90 mcg/act inhaler, Inhale 2 puffs every 6 (six) hours as needed for wheezing, Disp: 8 g, Rfl: 3    amLODIPine (NORVASC) 10 mg tablet, take 1 tablet by mouth every morning, Disp: 90 tablet, Rfl: 3    atorvastatin (LIPITOR) 80 mg tablet, Take 1 tablet (80 mg total) by mouth daily at bedtime, Disp: 90 tablet, Rfl: 3    B Complex Vitamins (VITAMIN B-COMPLEX) TABS, Take by mouth daily , Disp: , Rfl:     Bioflavonoid Products (VITAMIN C PLUS PO), Take by mouth daily, Disp: , Rfl:     bisacodyl (DULCOLAX) 5 mg EC tablet, Take 10 mg by mouth once, Disp: , Rfl:     carvedilol (COREG) 12.5 mg tablet, Take 1 tablet (12.5 mg total) by mouth 2 (two) times a day, Disp: 180 tablet, Rfl: 3    cholecalciferol (VITAMIN D3) 1,000 units tablet, Take 5,000 Units by mouth daily , Disp: , Rfl:     clopidogrel (PLAVIX) 75 mg tablet, take 1 tablet by mouth once daily, Disp: 90 tablet, Rfl: 1    COLLAGEN PO, Take by mouth Includes a probiotic and other vitamins-10 supplements-2 am-one in the afternoon-one hs, Disp: , Rfl:     Diclofenac Sodium (VOLTAREN) 1 %, Apply 4 g topically 4 (four) times a day as needed (arthtitis pain), Disp: 350 g, Rfl: 1    enalapril (VASOTEC) 10 mg tablet, take 1 tablet by mouth twice a day, Disp: 180 tablet, Rfl: 3    furosemide (LASIX) 20 mg tablet, Take 1 tablet (20 mg total) by mouth daily (Patient taking differently: Take 20 mg by mouth if needed), Disp: 90 tablet, Rfl: 3    ipratropium (ATROVENT) 0.03 % nasal spray, instill 2 sprays into each nostril three times a day if needed  FOR RHINITIS, Disp: 30 mL, Rfl: 3    levothyroxine 25 mcg tablet, Take 1 tablet (25 mcg total) by mouth daily in the early morning, Disp: 90 tablet, Rfl: 3    spironolactone (ALDACTONE) 25 mg tablet, Take 1 tablet (25 mg total) by mouth daily, Disp: 90 tablet, Rfl: 1    Allergies   Allergen Reactions    Tall Ragweed Wheezing       Vitals:    11/01/23 1306   BP: 140/76   Pulse: 70   Resp: 18   Temp: 97.8 °F (36.6 °C)   SpO2: 99%     Physical Exam  Constitutional:       Appearance: She is well-developed. Comments: Somewhat more frail appearing female, relatively good color, no respiratory distress, no signs of pain   HENT:      Head: Normocephalic and atraumatic. Right Ear: External ear normal.      Left Ear: External ear normal.   Eyes:      Pupils: Pupils are equal, round, and reactive to light. Comments: Conjunctiva pale, anicteric   Neck:      Comments: Well-healed right cervical scar, no adenopathy bilaterally  Cardiovascular:      Rate and Rhythm: Normal rate and regular rhythm. Heart sounds: Normal heart sounds. Pulmonary:      Effort: Pulmonary effort is normal.      Breath sounds: Normal breath sounds. Comments: Fair entry bilaterally, scattered rhonchi bilateral  Abdominal:      General: Bowel sounds are normal.      Palpations: Abdomen is soft. Comments: Soft, minimal diffuse tenderness, no guarding, no rigidity or rebound, +bowel sounds, cannot palpate liver or spleen   Musculoskeletal:         General: Normal range of motion. Cervical back: Normal range of motion and neck supple. Skin:     General: Skin is warm. Comments: Good color, warm, moist, few scattered upper extremity purpura, no active bleeding   Neurological:      Mental Status: She is alert and oriented to person, place, and time. Deep Tendon Reflexes: Reflexes are normal and symmetric. Psychiatric:         Behavior: Behavior normal.         Thought Content:  Thought content normal.         Judgment: Judgment normal.     Extremities: Left foot is slightly more swollen than the right low the ankle, pulses are decreased, no cords  Lymphatics:  No adenopathy in the neck, supraclavicular region and axilla bilaterally    Labs        9/12/2023 SPEP demonstrated a monoclonal peak in the gamma region (previous immunofixation from March 2023 identified IgG kappa)     9/12/2023 BUN = 52 creatinine = 1.41 LFTs WNL calcium = 10.3 total protein = 7.6 beta-2 microglobulin = 4.2

## 2023-11-02 ENCOUNTER — TELEPHONE (OUTPATIENT)
Dept: VASCULAR SURGERY | Facility: CLINIC | Age: 83
End: 2023-11-02

## 2023-11-02 ENCOUNTER — ANESTHESIA EVENT (OUTPATIENT)
Dept: PERIOP | Facility: HOSPITAL | Age: 83
DRG: 038 | End: 2023-11-02
Payer: COMMERCIAL

## 2023-11-02 RX ORDER — ACETAMINOPHEN 500 MG
500 TABLET ORAL EVERY 6 HOURS PRN
COMMUNITY

## 2023-11-02 NOTE — TELEPHONE ENCOUNTER
Chart and notes reviewed. Would recommend follow up with PCP as her symptoms are persisting. Encourage continued compression use as it is helping. Should be put on first thing in the am and not wait until symptoms occur. Elevate and follow instructions from PCP.

## 2023-11-02 NOTE — TELEPHONE ENCOUNTER
Pt is scheduled for L CEA w/ Dr. Andriy Pugh 11/16/23, she was last in office to see him 10/20/23. Pt states PAT advised her to call us re: continued pain and swelling and redness L ankle/foot. She states she told Dr. Andriy Pugh about it at [facility] 10/20, she saw pcp 10/23, xrays were ordered, per pt she was told they were normal.  Per note from Dr. Sheila Plata 10/23 she left pt message "Rony Mao to discuss her xrays. No answer. I left message on machine regarding results and to let her know that her xrays showed no abnormalities other than some soft tissue swelling. Recommend that she continue to rest, ice, elevate her foot and if symptoms persist, to please let me know. "    Pt states her swelling and redness persist, ? If she called her pcp, she did not, pt states she thought she should "come to the vascular lab to get checked". Pt states this has been ongoing for approx a month and is not improving, her ankle is very sore and red, Dr. Andriy Pugh advised her to wear compression stockings which she is wearing and it does help. Pt notes yesterday her foot/ankle was so painful she couldn't walk on it. Today it is better, it is still swollen but she is sitting on recliner and elevating, she applied Voltaren  cream and the pain improved. Pt states 2 days a go she "had an attack on my R leg", ? What occurred, pt states she had sharp pain on the bottom of her foot, she applied Voltaren immediately and the pain resolved, it has been ok since then. She does note her R ankle/foot was also puffy but is ok now. Bilateral ankles both appear red. Her feet/toes are normal temp and color, she denies rest pain, no wounds/ulcer. Advised pt I would route her concerns to our triage provider and we would get back to her w/ recommendations.

## 2023-11-02 NOTE — PRE-PROCEDURE INSTRUCTIONS
Pre-Surgery Instructions:   Medication Instructions    acetaminophen (TYLENOL) 500 mg tablet Uses PRN- OK to take day of surgery    albuterol (ProAir HFA) 90 mcg/act inhaler Uses PRN- OK to take day of surgery    amLODIPine (NORVASC) 10 mg tablet Take day of surgery. atorvastatin (LIPITOR) 80 mg tablet Take night before surgery    B Complex Vitamins (VITAMIN B-COMPLEX) TABS Stop taking 7 days prior to surgery. Bioflavonoid Products (VITAMIN C PLUS PO) Stop taking 7 days prior to surgery. bisacodyl (DULCOLAX) 5 mg EC tablet Hold day of surgery. carvedilol (COREG) 12.5 mg tablet Take day of surgery. cholecalciferol (VITAMIN D3) 1,000 units tablet Stop taking 7 days prior to surgery. clopidogrel (PLAVIX) 75 mg tablet Take day of surgery. COLLAGEN PO Stop taking 7 days prior to surgery. Diclofenac Sodium (VOLTAREN) 1 % Stop taking 3 days prior to surgery. enalapril (VASOTEC) 10 mg tablet Per SERJIO-Do not take this medication the day before and the morning of the day of surgery     furosemide (LASIX) 20 mg tablet Uses PRN- DO NOT take day of surgery    ipratropium (ATROVENT) 0.03 % nasal spray Uses PRN- DO NOT take day of surgery    levothyroxine 25 mcg tablet Take day of surgery. spironolactone (ALDACTONE) 25 mg tablet Hold day of surgery. Medication instructions for day surgery reviewed. Please use only a sip of water to take your instructed medications. Avoid all over the counter vitamins, supplements and NSAIDS for one week prior to surgery per anesthesia guidelines. Tylenol is ok to take as needed. You will receive a call one business day prior to surgery with an arrival time and hospital directions. If your surgery is scheduled on a Monday, the hospital will be calling you on the Friday prior to your surgery. If you have not heard from anyone by 8pm, please call the hospital supervisor through the hospital  at [phone]. Zaynab Every [phone]).     Do not eat or drink anything after midnight the night before your surgery, including candy, mints, lifesavers, or chewing gum. Do not drink alcohol 24hrs before your surgery. Try not to smoke at least 24hrs before your surgery. Follow the pre surgery showering instructions as listed in the [facility] Surgical Experience Booklet” or otherwise provided by your surgeon's office. Do not use a blade to shave the surgical area 1 week before surgery. It is okay to use a clean electric clippers up to 24 hours before surgery. Do not apply any lotions, creams, including makeup, cologne, deodorant, or perfumes after showering on the day of your surgery. Do not use dry shampoo, hair spray, hair gel, or any type of hair products. No contact lenses, eye make-up, or artificial eyelashes. Remove nail polish, including gel polish, and any artificial, gel, or acrylic nails if possible. Remove all jewelry including rings and body piercing jewelry. Wear causal clothing that is easy to take on and off. Consider your type of surgery. Keep any valuables, jewelry, piercings at home. Please bring any specially ordered equipment (sling, braces) if indicated. Arrange for a responsible person to drive you to and from the hospital on the day of your surgery. Visitor Guidelines discussed. Call the surgeon's office with any new illnesses, exposures, or additional questions prior to surgery. Please reference your [facility] Surgical Experience Booklet” for additional information to prepare for your upcoming surgery.

## 2023-11-04 DIAGNOSIS — I15.0 RENOVASCULAR HYPERTENSION: ICD-10-CM

## 2023-11-06 RX ORDER — CARVEDILOL 12.5 MG/1
12.5 TABLET ORAL 2 TIMES DAILY
Qty: 180 TABLET | Refills: 1 | Status: SHIPPED | OUTPATIENT
Start: 2023-11-06 | End: 2023-11-08 | Stop reason: SDUPTHER

## 2023-11-07 ENCOUNTER — APPOINTMENT (OUTPATIENT)
Dept: LAB | Facility: CLINIC | Age: 83
DRG: 038 | End: 2023-11-07
Payer: COMMERCIAL

## 2023-11-08 ENCOUNTER — TELEPHONE (OUTPATIENT)
Dept: CARDIOLOGY CLINIC | Facility: CLINIC | Age: 83
End: 2023-11-08

## 2023-11-08 ENCOUNTER — TRANSCRIBE ORDERS (OUTPATIENT)
Dept: ADMINISTRATIVE | Facility: HOSPITAL | Age: 83
End: 2023-11-08

## 2023-11-08 DIAGNOSIS — I15.0 RENOVASCULAR HYPERTENSION: ICD-10-CM

## 2023-11-08 DIAGNOSIS — G89.29 CHRONIC PAIN OF LEFT ANKLE: ICD-10-CM

## 2023-11-08 DIAGNOSIS — M81.8 OTHER OSTEOPOROSIS WITHOUT CURRENT PATHOLOGICAL FRACTURE: Primary | ICD-10-CM

## 2023-11-08 DIAGNOSIS — M25.572 CHRONIC PAIN OF LEFT ANKLE: ICD-10-CM

## 2023-11-08 DIAGNOSIS — R93.89 ABNORMAL X-RAY: ICD-10-CM

## 2023-11-08 DIAGNOSIS — E03.9 HYPOTHYROIDISM, UNSPECIFIED TYPE: ICD-10-CM

## 2023-11-08 RX ORDER — CARVEDILOL 12.5 MG/1
12.5 TABLET ORAL 2 TIMES DAILY
Qty: 180 TABLET | Refills: 1 | Status: SHIPPED | OUTPATIENT
Start: 2023-11-08

## 2023-11-08 RX ORDER — LEVOTHYROXINE SODIUM 0.03 MG/1
25 TABLET ORAL
Qty: 90 TABLET | Refills: 3 | Status: SHIPPED | OUTPATIENT
Start: 2023-11-08

## 2023-11-08 NOTE — TELEPHONE ENCOUNTER
Dr. Silvia Perla, podiatry found patient to have gout, they would like to prescribe her medication. But it is not easy on her kidneys and in light of her upcoming surgery they are asking if it is okay with you that they prescribe it to her.

## 2023-11-09 ENCOUNTER — TELEPHONE (OUTPATIENT)
Dept: VASCULAR SURGERY | Facility: CLINIC | Age: 83
End: 2023-11-09

## 2023-11-09 NOTE — TELEPHONE ENCOUNTER
Received call from Luzmaria Hirsch at Dr. Dona Man office, pt  has gout and they wanted to prescribe her colchicine. Pt was concerned due to upcoming surgery w/ Dr. Albert Curtis (CEA), since pt was concerned they advised her to call us to make sure it is ok to take. Advised I would obtain ok from provider, Mateus3 Hector Hirsch asks that we f/u w/ pt re: outcome.

## 2023-11-09 NOTE — TELEPHONE ENCOUNTER
Called dr. Jose Vazquez office to clarify there request. Pt was to contact the office that she is having the surgery with to see if it was okay for her to start clochicine not our office. Will call pt and instruct her to reach out to vascular office.

## 2023-11-09 NOTE — TELEPHONE ENCOUNTER
Pt called the office stating she was seen by her podiatrist, Dr. Taj Grier and he ordered blood work. He said she has gout and wants to prescribe her a medication but he said it could affect her kidneys. Pt states that they told her to call the cardiology office regarding this but they advised her to call our office. Pt states she needs the ok from us to start the medication. She does not know what medication it is that he is recommending. Pt requested that we call Dr. Nikolai Goncalves office to clarify this. Called Dr. Nikolai Goncalves office [phone] and left message to call the office.

## 2023-11-10 ENCOUNTER — TELEPHONE (OUTPATIENT)
Age: 83
End: 2023-11-10

## 2023-11-10 ENCOUNTER — TELEPHONE (OUTPATIENT)
Dept: HEMATOLOGY ONCOLOGY | Facility: CLINIC | Age: 83
End: 2023-11-10

## 2023-11-10 NOTE — TELEPHONE ENCOUNTER
Patient called requesting a copy of her labs from 11/7/2023 be mailed to her home. Verified address.

## 2023-11-10 NOTE — TELEPHONE ENCOUNTER
Mrs. Donaldo Mirza is to return in 6 months with repeat blood work before.   No other labs ordered  Patient questioning that there was a diabetic work up ordered  Dr Galileo Messina did not order those tests  Patient aware

## 2023-11-10 NOTE — TELEPHONE ENCOUNTER
Patient Call    Who are you speaking with? Patient    If it is not the patient, are they listed on an active communication consent form? N/A   What is the reason for this call? Patient has questions about some labs orders    Does this require a call back? Yes   If a call back is required, please list best call back number [phone]   If a call back is required, advise that a message will be forwarded to their care team and someone will return their call as soon as possible. Did you relay this information to the patient?  Yes

## 2023-11-15 DIAGNOSIS — J31.0 NONALLERGIC RHINITIS: ICD-10-CM

## 2023-11-15 RX ORDER — IPRATROPIUM BROMIDE 21 UG/1
2 SPRAY, METERED NASAL 3 TIMES DAILY PRN
Qty: 30 ML | Refills: 3 | Status: SHIPPED | OUTPATIENT
Start: 2023-11-15

## 2023-11-15 NOTE — ANESTHESIA PREPROCEDURE EVALUATION
Procedure:  ENDARTERECTOMY ARTERY CAROTID (Left: Neck)    Relevant Problems   CARDIO   (+) Aortic stenosis   (+) Aortoiliac occlusive disease (HCC)   (+) Cardiac murmur   (+) Hyperlipidemia   (+) Hypertensive heart disease with chronic diastolic congestive heart failure (HCC)   (+) Parenchymal renal hypertension      ENDO   (+) Hypothyroidism      /RENAL   (+) Benign hypertension with chronic kidney disease, stage III (HCC)   (+) Chronic kidney disease, stage 3 (HCC)   (+) Parenchymal renal hypertension      HEMATOLOGY   (+) Acute blood loss anemia   (+) Anemia in stage 3 chronic kidney disease    (+) Chronic anemia      PULMONARY   (+) Cigarette smoker   (+) Simple chronic bronchitis (HCC)   No nuclear or clinical evidence of pharmacologically induced significant myocardial ischemia    Stress ECG: The ECG was not diagnostic due to resting ST-T abnormalities and vasodilator/pharmacologic stress. Perfusion: There are no significant perfusion defects. Summed stress and differential scores are 0    Stress Function: Left ventricular function post-stress is normal.  Calculated ejection fraction is 79%. Perfusion Defect Conclusion: There is no evidence of transient ischemic dilation (TID). Physical Exam    Airway    Mallampati score: II  TM Distance: >3 FB  Neck ROM: full     Dental       Cardiovascular      Pulmonary      Other Findings        Anesthesia Plan  ASA Score- 3     Anesthesia Type- general with ASA Monitors. Additional Monitors: arterial line. Airway Plan: ETT. Plan Factors-Exercise tolerance (METS): >4 METS. Chart reviewed. EKG reviewed. Existing labs reviewed. Patient summary reviewed. Patient is a current smoker. Induction- intravenous. Postoperative Plan- Plan for postoperative opioid use. Informed Consent- Anesthetic plan and risks discussed with patient. I personally reviewed this patient with the CRNA.  Discussed and agreed on the Anesthesia Plan with the RASHID Johnson

## 2023-11-16 ENCOUNTER — ANESTHESIA (OUTPATIENT)
Dept: PERIOP | Facility: HOSPITAL | Age: 83
DRG: 038 | End: 2023-11-16
Payer: COMMERCIAL

## 2023-11-16 ENCOUNTER — HOSPITAL ENCOUNTER (INPATIENT)
Facility: HOSPITAL | Age: 83
LOS: 1 days | Discharge: HOME | DRG: 038 | End: 2023-11-17
Attending: SURGERY | Admitting: SURGERY
Payer: COMMERCIAL

## 2023-11-16 ENCOUNTER — APPOINTMENT (INPATIENT)
Dept: RADIOLOGY | Facility: HOSPITAL | Age: 83
DRG: 038 | End: 2023-11-16
Payer: COMMERCIAL

## 2023-11-16 DIAGNOSIS — N18.32 STAGE 3B CHRONIC KIDNEY DISEASE (HCC): ICD-10-CM

## 2023-11-16 DIAGNOSIS — I65.23 BILATERAL CAROTID ARTERY STENOSIS: Chronic | ICD-10-CM

## 2023-11-16 DIAGNOSIS — D63.1 ANEMIA IN STAGE 3 CHRONIC KIDNEY DISEASE, UNSPECIFIED WHETHER STAGE 3A OR 3B CKD: Primary | ICD-10-CM

## 2023-11-16 DIAGNOSIS — I65.22 LEFT CAROTID STENOSIS: ICD-10-CM

## 2023-11-16 DIAGNOSIS — N18.30 ANEMIA IN STAGE 3 CHRONIC KIDNEY DISEASE, UNSPECIFIED WHETHER STAGE 3A OR 3B CKD: Primary | ICD-10-CM

## 2023-11-16 LAB
KCT BLD-ACNC: 213 SEC (ref 89–137)
KCT BLD-ACNC: 239 SEC (ref 89–137)
KCT BLD-ACNC: 246 SEC (ref 89–137)
KCT BLD-ACNC: 279 SEC (ref 89–137)
PLATELET # BLD AUTO: 141 THOUSANDS/UL (ref 149–390)
PMV BLD AUTO: 11.2 FL (ref 8.9–12.7)
SPECIMEN SOURCE: ABNORMAL

## 2023-11-16 PROCEDURE — 35301 RECHANNELING OF ARTERY: CPT | Performed by: SURGERY

## 2023-11-16 PROCEDURE — 85347 COAGULATION TIME ACTIVATED: CPT

## 2023-11-16 PROCEDURE — 85049 AUTOMATED PLATELET COUNT: CPT | Performed by: SURGERY

## 2023-11-16 PROCEDURE — NC001 PR NO CHARGE: Performed by: SURGERY

## 2023-11-16 PROCEDURE — 03UJ0KZ SUPPLEMENT LEFT COMMON CAROTID ARTERY WITH NONAUTOLOGOUS TISSUE SUBSTITUTE, OPEN APPROACH: ICD-10-PCS | Performed by: SURGERY

## 2023-11-16 PROCEDURE — C1781 MESH (IMPLANTABLE): HCPCS | Performed by: SURGERY

## 2023-11-16 PROCEDURE — 03CJ0ZZ EXTIRPATION OF MATTER FROM LEFT COMMON CAROTID ARTERY, OPEN APPROACH: ICD-10-PCS | Performed by: SURGERY

## 2023-11-16 PROCEDURE — 93882 EXTRACRANIAL UNI/LTD STUDY: CPT

## 2023-11-16 PROCEDURE — 99223 1ST HOSP IP/OBS HIGH 75: CPT | Performed by: STUDENT IN AN ORGANIZED HEALTH CARE EDUCATION/TRAINING PROGRAM

## 2023-11-16 PROCEDURE — 87081 CULTURE SCREEN ONLY: CPT | Performed by: SURGERY

## 2023-11-16 PROCEDURE — 99223 1ST HOSP IP/OBS HIGH 75: CPT | Performed by: ANESTHESIOLOGY

## 2023-11-16 DEVICE — XENOSURE BIOLOGIC PATCH, 0.8CM X 8CM, EIFU
Type: IMPLANTABLE DEVICE | Site: CAROTID | Status: FUNCTIONAL
Brand: XENOSURE BIOLOGIC PATCH

## 2023-11-16 RX ORDER — SODIUM CHLORIDE 9 MG/ML
INJECTION, SOLUTION INTRAVENOUS AS NEEDED
Status: DISCONTINUED | OUTPATIENT
Start: 2023-11-16 | End: 2023-11-16 | Stop reason: HOSPADM

## 2023-11-16 RX ORDER — LABETALOL HYDROCHLORIDE 5 MG/ML
5 INJECTION, SOLUTION INTRAVENOUS
Status: DISCONTINUED | OUTPATIENT
Start: 2023-11-16 | End: 2023-11-17 | Stop reason: HOSPADM

## 2023-11-16 RX ORDER — ROCURONIUM BROMIDE 10 MG/ML
INJECTION, SOLUTION INTRAVENOUS AS NEEDED
Status: DISCONTINUED | OUTPATIENT
Start: 2023-11-16 | End: 2023-11-16

## 2023-11-16 RX ORDER — DEXAMETHASONE SODIUM PHOSPHATE 10 MG/ML
INJECTION, SOLUTION INTRAMUSCULAR; INTRAVENOUS AS NEEDED
Status: DISCONTINUED | OUTPATIENT
Start: 2023-11-16 | End: 2023-11-16

## 2023-11-16 RX ORDER — CEFAZOLIN SODIUM 1 G/50ML
SOLUTION INTRAVENOUS AS NEEDED
Status: DISCONTINUED | OUTPATIENT
Start: 2023-11-16 | End: 2023-11-16

## 2023-11-16 RX ORDER — BISACODYL 5 MG/1
10 TABLET, DELAYED RELEASE ORAL EVERY EVENING
Status: DISCONTINUED | OUTPATIENT
Start: 2023-11-16 | End: 2023-11-17 | Stop reason: HOSPADM

## 2023-11-16 RX ORDER — CEFAZOLIN SODIUM 1 G/50ML
1000 SOLUTION INTRAVENOUS ONCE
Status: DISCONTINUED | OUTPATIENT
Start: 2023-11-16 | End: 2023-11-16 | Stop reason: HOSPADM

## 2023-11-16 RX ORDER — SODIUM CHLORIDE, SODIUM LACTATE, POTASSIUM CHLORIDE, CALCIUM CHLORIDE 600; 310; 30; 20 MG/100ML; MG/100ML; MG/100ML; MG/100ML
125 INJECTION, SOLUTION INTRAVENOUS CONTINUOUS
Status: DISCONTINUED | OUTPATIENT
Start: 2023-11-16 | End: 2023-11-16

## 2023-11-16 RX ORDER — SODIUM CHLORIDE 9 MG/ML
INJECTION, SOLUTION INTRAVENOUS CONTINUOUS PRN
Status: DISCONTINUED | OUTPATIENT
Start: 2023-11-16 | End: 2023-11-16

## 2023-11-16 RX ORDER — ONDANSETRON 2 MG/ML
4 INJECTION INTRAMUSCULAR; INTRAVENOUS EVERY 6 HOURS PRN
Status: DISCONTINUED | OUTPATIENT
Start: 2023-11-16 | End: 2023-11-17 | Stop reason: HOSPADM

## 2023-11-16 RX ORDER — ONDANSETRON 2 MG/ML
4 INJECTION INTRAMUSCULAR; INTRAVENOUS ONCE AS NEEDED
Status: DISCONTINUED | OUTPATIENT
Start: 2023-11-16 | End: 2023-11-16 | Stop reason: HOSPADM

## 2023-11-16 RX ORDER — HEPARIN SODIUM 1000 [USP'U]/ML
INJECTION, SOLUTION INTRAVENOUS; SUBCUTANEOUS AS NEEDED
Status: DISCONTINUED | OUTPATIENT
Start: 2023-11-16 | End: 2023-11-16

## 2023-11-16 RX ORDER — HYDROMORPHONE HCL/PF 1 MG/ML
0.5 SYRINGE (ML) INJECTION
Status: DISCONTINUED | OUTPATIENT
Start: 2023-11-16 | End: 2023-11-16 | Stop reason: HOSPADM

## 2023-11-16 RX ORDER — SPIRONOLACTONE 25 MG/1
25 TABLET ORAL DAILY
Status: DISCONTINUED | OUTPATIENT
Start: 2023-11-17 | End: 2023-11-17 | Stop reason: HOSPADM

## 2023-11-16 RX ORDER — LIDOCAINE HYDROCHLORIDE 10 MG/ML
INJECTION, SOLUTION EPIDURAL; INFILTRATION; INTRACAUDAL; PERINEURAL AS NEEDED
Status: DISCONTINUED | OUTPATIENT
Start: 2023-11-16 | End: 2023-11-16

## 2023-11-16 RX ORDER — DIPHENHYDRAMINE HYDROCHLORIDE 50 MG/ML
12.5 INJECTION INTRAMUSCULAR; INTRAVENOUS ONCE AS NEEDED
Status: DISCONTINUED | OUTPATIENT
Start: 2023-11-16 | End: 2023-11-16 | Stop reason: HOSPADM

## 2023-11-16 RX ORDER — SODIUM CHLORIDE, SODIUM LACTATE, POTASSIUM CHLORIDE, CALCIUM CHLORIDE 600; 310; 30; 20 MG/100ML; MG/100ML; MG/100ML; MG/100ML
20 INJECTION, SOLUTION INTRAVENOUS CONTINUOUS
Status: DISCONTINUED | OUTPATIENT
Start: 2023-11-16 | End: 2023-11-16

## 2023-11-16 RX ORDER — ATORVASTATIN CALCIUM 80 MG/1
80 TABLET, FILM COATED ORAL
Status: DISCONTINUED | OUTPATIENT
Start: 2023-11-16 | End: 2023-11-17 | Stop reason: HOSPADM

## 2023-11-16 RX ORDER — FENTANYL CITRATE 50 UG/ML
INJECTION, SOLUTION INTRAMUSCULAR; INTRAVENOUS AS NEEDED
Status: DISCONTINUED | OUTPATIENT
Start: 2023-11-16 | End: 2023-11-16

## 2023-11-16 RX ORDER — CLOPIDOGREL BISULFATE 75 MG/1
75 TABLET ORAL DAILY
Status: DISCONTINUED | OUTPATIENT
Start: 2023-11-17 | End: 2023-11-17 | Stop reason: HOSPADM

## 2023-11-16 RX ORDER — LEVOTHYROXINE SODIUM 0.03 MG/1
25 TABLET ORAL
Status: DISCONTINUED | OUTPATIENT
Start: 2023-11-17 | End: 2023-11-17 | Stop reason: HOSPADM

## 2023-11-16 RX ORDER — ONDANSETRON 2 MG/ML
INJECTION INTRAMUSCULAR; INTRAVENOUS AS NEEDED
Status: DISCONTINUED | OUTPATIENT
Start: 2023-11-16 | End: 2023-11-16

## 2023-11-16 RX ORDER — ALBUTEROL SULFATE 90 UG/1
2 AEROSOL, METERED RESPIRATORY (INHALATION) EVERY 6 HOURS PRN
Status: DISCONTINUED | OUTPATIENT
Start: 2023-11-16 | End: 2023-11-17 | Stop reason: HOSPADM

## 2023-11-16 RX ORDER — SODIUM CHLORIDE, SODIUM GLUCONATE, SODIUM ACETATE, POTASSIUM CHLORIDE, MAGNESIUM CHLORIDE, SODIUM PHOSPHATE, DIBASIC, AND POTASSIUM PHOSPHATE .53; .5; .37; .037; .03; .012; .00082 G/100ML; G/100ML; G/100ML; G/100ML; G/100ML; G/100ML; G/100ML
75 INJECTION, SOLUTION INTRAVENOUS CONTINUOUS
Status: DISCONTINUED | OUTPATIENT
Start: 2023-11-16 | End: 2023-11-17

## 2023-11-16 RX ORDER — HYDRALAZINE HYDROCHLORIDE 20 MG/ML
15 INJECTION INTRAMUSCULAR; INTRAVENOUS
Status: DISCONTINUED | OUTPATIENT
Start: 2023-11-16 | End: 2023-11-17 | Stop reason: HOSPADM

## 2023-11-16 RX ORDER — PROTAMINE SULFATE 10 MG/ML
INJECTION, SOLUTION INTRAVENOUS AS NEEDED
Status: DISCONTINUED | OUTPATIENT
Start: 2023-11-16 | End: 2023-11-16

## 2023-11-16 RX ORDER — ENOXAPARIN SODIUM 100 MG/ML
30 INJECTION SUBCUTANEOUS DAILY
Status: DISCONTINUED | OUTPATIENT
Start: 2023-11-16 | End: 2023-11-17 | Stop reason: HOSPADM

## 2023-11-16 RX ORDER — NICOTINE 21 MG/24HR
1 PATCH, TRANSDERMAL 24 HOURS TRANSDERMAL DAILY
Status: DISCONTINUED | OUTPATIENT
Start: 2023-11-16 | End: 2023-11-17 | Stop reason: HOSPADM

## 2023-11-16 RX ORDER — AMLODIPINE BESYLATE 10 MG/1
10 TABLET ORAL EVERY MORNING
Status: DISCONTINUED | OUTPATIENT
Start: 2023-11-17 | End: 2023-11-16

## 2023-11-16 RX ORDER — PROPOFOL 10 MG/ML
INJECTION, EMULSION INTRAVENOUS AS NEEDED
Status: DISCONTINUED | OUTPATIENT
Start: 2023-11-16 | End: 2023-11-16

## 2023-11-16 RX ORDER — EPHEDRINE SULFATE 50 MG/ML
INJECTION INTRAVENOUS AS NEEDED
Status: DISCONTINUED | OUTPATIENT
Start: 2023-11-16 | End: 2023-11-16

## 2023-11-16 RX ORDER — SODIUM CHLORIDE, SODIUM LACTATE, POTASSIUM CHLORIDE, CALCIUM CHLORIDE 600; 310; 30; 20 MG/100ML; MG/100ML; MG/100ML; MG/100ML
INJECTION, SOLUTION INTRAVENOUS CONTINUOUS PRN
Status: DISCONTINUED | OUTPATIENT
Start: 2023-11-16 | End: 2023-11-16

## 2023-11-16 RX ORDER — FENTANYL CITRATE/PF 50 MCG/ML
50 SYRINGE (ML) INJECTION
Status: DISCONTINUED | OUTPATIENT
Start: 2023-11-16 | End: 2023-11-16 | Stop reason: HOSPADM

## 2023-11-16 RX ORDER — CHLORHEXIDINE GLUCONATE ORAL RINSE 1.2 MG/ML
15 SOLUTION DENTAL ONCE
Status: COMPLETED | OUTPATIENT
Start: 2023-11-16 | End: 2023-11-16

## 2023-11-16 RX ADMIN — FENTANYL CITRATE 50 MCG: 50 INJECTION, SOLUTION INTRAMUSCULAR; INTRAVENOUS at 10:13

## 2023-11-16 RX ADMIN — ATORVASTATIN CALCIUM 80 MG: 80 TABLET, FILM COATED ORAL at 21:49

## 2023-11-16 RX ADMIN — PHENYLEPHRINE HYDROCHLORIDE 80 MCG/MIN: 10 INJECTION INTRAVENOUS at 08:10

## 2023-11-16 RX ADMIN — SODIUM CHLORIDE, SODIUM GLUCONATE, SODIUM ACETATE, POTASSIUM CHLORIDE, MAGNESIUM CHLORIDE, SODIUM PHOSPHATE, DIBASIC, AND POTASSIUM PHOSPHATE 90 ML/HR: .53; .5; .37; .037; .03; .012; .00082 INJECTION, SOLUTION INTRAVENOUS at 12:33

## 2023-11-16 RX ADMIN — REMIFENTANIL HYDROCHLORIDE 0.05 MCG/KG/MIN: 1 INJECTION, POWDER, LYOPHILIZED, FOR SOLUTION INTRAVENOUS at 07:44

## 2023-11-16 RX ADMIN — SODIUM CHLORIDE, SODIUM LACTATE, POTASSIUM CHLORIDE, AND CALCIUM CHLORIDE 125 ML/HR: .6; .31; .03; .02 INJECTION, SOLUTION INTRAVENOUS at 07:03

## 2023-11-16 RX ADMIN — DEXAMETHASONE SODIUM PHOSPHATE 10 MG: 10 INJECTION, SOLUTION INTRAMUSCULAR; INTRAVENOUS at 07:50

## 2023-11-16 RX ADMIN — HYDROMORPHONE HYDROCHLORIDE 0.5 MG: 1 INJECTION, SOLUTION INTRAMUSCULAR; INTRAVENOUS; SUBCUTANEOUS at 11:39

## 2023-11-16 RX ADMIN — CHLORHEXIDINE GLUCONATE 15 ML: 1.2 SOLUTION ORAL at 07:04

## 2023-11-16 RX ADMIN — CEFAZOLIN SODIUM 1000 MG: 1 SOLUTION INTRAVENOUS at 07:35

## 2023-11-16 RX ADMIN — HYDROMORPHONE HYDROCHLORIDE 0.5 MG: 1 INJECTION, SOLUTION INTRAMUSCULAR; INTRAVENOUS; SUBCUTANEOUS at 11:20

## 2023-11-16 RX ADMIN — SODIUM CHLORIDE, SODIUM LACTATE, POTASSIUM CHLORIDE, AND CALCIUM CHLORIDE: .6; .31; .03; .02 INJECTION, SOLUTION INTRAVENOUS at 07:33

## 2023-11-16 RX ADMIN — EPHEDRINE SULFATE 10 MG: 50 INJECTION, SOLUTION INTRAVENOUS at 10:26

## 2023-11-16 RX ADMIN — HEPARIN SODIUM 6000 UNITS: 1000 INJECTION INTRAVENOUS; SUBCUTANEOUS at 08:24

## 2023-11-16 RX ADMIN — PROPOFOL 100 MG: 10 INJECTION, EMULSION INTRAVENOUS at 07:41

## 2023-11-16 RX ADMIN — ENOXAPARIN SODIUM 30 MG: 40 INJECTION SUBCUTANEOUS at 18:29

## 2023-11-16 RX ADMIN — SODIUM CHLORIDE: 0.9 INJECTION, SOLUTION INTRAVENOUS at 07:42

## 2023-11-16 RX ADMIN — SUGAMMADEX 200 MG: 100 INJECTION, SOLUTION INTRAVENOUS at 10:30

## 2023-11-16 RX ADMIN — EPHEDRINE SULFATE 10 MG: 50 INJECTION, SOLUTION INTRAVENOUS at 08:27

## 2023-11-16 RX ADMIN — PHENYLEPHRINE HYDROCHLORIDE 100 MCG/MIN: 10 INJECTION INTRAVENOUS at 07:45

## 2023-11-16 RX ADMIN — ROCURONIUM BROMIDE 50 MG: 10 INJECTION, SOLUTION INTRAVENOUS at 07:41

## 2023-11-16 RX ADMIN — LIDOCAINE HYDROCHLORIDE 50 MG: 10 INJECTION, SOLUTION EPIDURAL; INFILTRATION; INTRACAUDAL; PERINEURAL at 07:41

## 2023-11-16 RX ADMIN — ONDANSETRON 4 MG: 2 INJECTION INTRAMUSCULAR; INTRAVENOUS at 07:57

## 2023-11-16 RX ADMIN — NICOTINE 1 PATCH: 14 PATCH, EXTENDED RELEASE TRANSDERMAL at 12:39

## 2023-11-16 RX ADMIN — EPHEDRINE SULFATE 5 MG: 50 INJECTION, SOLUTION INTRAVENOUS at 09:16

## 2023-11-16 RX ADMIN — HEPARIN SODIUM 1000 UNITS: 1000 INJECTION INTRAVENOUS; SUBCUTANEOUS at 09:11

## 2023-11-16 RX ADMIN — PROTAMINE SULFATE 40 MG: 10 INJECTION, SOLUTION INTRAVENOUS at 10:08

## 2023-11-16 RX ADMIN — ROCURONIUM BROMIDE 20 MG: 10 INJECTION, SOLUTION INTRAVENOUS at 08:39

## 2023-11-16 NOTE — H&P
[address]  H&P  Name: Karla Ruiz 80 y.o. female I MRN: [id number]  Unit/Bed#: ICU 6 I Date of Admission: 11/16/2023   Date of Service: 11/16/2023 I Hospital Day: 0      Assessment/Plan   * Carotid artery stenosis  Assessment & Plan  Presented on 11/16 for an elective left carotid endarterectomy  History of a right carotid endarterectomy repair in 2021  Admitted to ICU postoperatively   Currently normotensive -hold home dose Coreg/Lasix/Vasotec  Resume home dose Norvasc, Aldactone per surgery  Continue with neuro exams per protocol  SBP goal 100-160 -administer Tamir-Synephrine or Cardene if needed to obtain this goal  Monitor incision    Hypertensive heart disease with chronic diastolic congestive heart failure ([address])  Assessment & Plan  Wt Readings from Last 3 Encounters:   11/16/23 55.1 kg (121 lb 7.6 oz)   11/01/23 54.2 kg (119 lb 6.4 oz)   10/26/23 54 kg (119 lb)     Last echocardiogram October 2023 with EF 65 to 70%  Maintain -160 postoperatively -use Tamir-Synephrine or Cardene if needed  Hold home dose Lasix/Coreg  Continue with Norvasc/Aldactone per surgery  Currently stable on room air          Chronic kidney disease, stage 3 [facility])  Assessment & Plan  Lab Results   Component Value Date    EGFR 52 11/01/2023    EGFR 34 09/12/2023    EGFR 41 07/28/2023    CREATININE 1.00 11/01/2023    CREATININE 1.41 (H) 09/12/2023    CREATININE 1.20 07/28/2023     Baseline creatinine 1.2-1.4  Monitor I&O  Avoid nephrotoxic agents    Chronic diastolic heart failure (HCC)  Assessment & Plan  Wt Readings from Last 3 Encounters:   11/16/23 55.1 kg (121 lb 7.6 oz)   11/01/23 54.2 kg (119 lb 6.4 oz)   10/26/23 54 kg (119 lb)       Last echocardiogram October 2023 with EF 65 to 70%  Hold home dose Lasix/Coreg  Currently stable on room air        Hyperlipidemia  Assessment & Plan  Continue home dose statin    Hypothyroidism  Assessment & Plan  Continue home dose levothyroxine           History of Present Illness     HPI: Yunier Thompson is a 80 y.o. who presents with past medical history of CAD, CHF, CKD with renal artery stenosis, pack-a-day smoker, type II diabetic, aortic bifemoral bypass, and right carotid repair. Patient presented on 11/16 for an elective left carotid endarterectomy. She was admitted to the ICU postoperatively where she was found to be normotensive, alert and oriented x4, and had a normal neurologic exam.  Left CEA incision, clean, dry, and intact. Plan of care discussed with patient and all questions answered. History obtained from chart review and the patient. Review of Systems   Constitutional:  Positive for fatigue. HENT: Negative. Eyes: Negative. Respiratory: Negative. Cardiovascular: Negative. Gastrointestinal: Negative. Endocrine: Negative. Genitourinary: Negative. Musculoskeletal: Negative. Skin: Negative. Allergic/Immunologic: Negative. Neurological: Negative. Hematological: Negative. Psychiatric/Behavioral: Negative. Disposition: Critical care  Historical Information   Past Medical History:  No date: Anemia      Comment:  sees  Hematologist Dr John Melgar appt 11/1/23  No date:  Anxiety  No date: Arthritis  No date: Asthma  No date: CAD (coronary artery disease)  No date: Cardiac murmur      Comment:  sees Dr. Slade Warner  No date: Carotid stenosis, bilateral  No date: CHF (congestive heart failure) ([address])  No date: Chronic kidney disease      Comment:  renal artery stenosis  No date: Chronic pain disorder      Comment:  back  No date: Chronic sinusitis      Comment:  treated with antibiotics 3/22  No date: Colitis  No date: COPD (chronic obstructive pulmonary disease) ([facility])  No date: Coronary artery disease  No date: Cough  No date: Current every day smoker      Comment:  encouraged to cut back  No date: Dental crowns present  No date: Depression  No date: Disease of thyroid gland  No date: Edema of leg      Comment:  compression stocking left leg  No date: History of dizziness  No date: History of hyperkalemia      Comment:  "couple years ago"  No date: History of transfusion  No date: Hyperlipidemia  No date: Hypertension  No date: Left leg pain      Comment:  and foot-to call surgeon office 11/2/23 also right leg                pain  No date: Muscle weakness      Comment:  sometimes of legs  No date: Other disorder of circulatory system (CODE)  No date: Presence of dental bridge  No date: Renal artery stenosis (HCC)      Comment:  Right  No date: Risk for falls  No date: Subclavian arterial stenosis (HCC)      Comment:  B/L  09/20/2022: Type 2 diabetes mellitus without complication,   unspecified whether long term insulin use ([address])      Comment:  11/2/23-pt not aware of being diabetic  No date: Kaylyn Chappell as ambulation aid      Comment:  "sometimes"  No date: Wears glasses Past Surgical History:  No date: BREAST SURGERY      Comment:  bxs,benign  No date: COLONOSCOPY  1983: HYSTERECTOMY      Comment:  43  No date: INCISIONAL BREAST BIOPSY      Comment:  x5, 1964, 1965, 1985 and 1990 12/09/2019: [place] BYP OTH/THN VEIN AORTOBIFEMORAL; Bilateral      Comment:  Procedure: BYPASS AORTA BIFEMORAL WITH LEFT FEMORAL                THROMBOEMBOLECTOMY;  Surgeon: Marissa Lovelace MD;                 [facility];  Service: Vascular  10/01/2021: [place] TEAEC W/PATCH GRF CAROTID VERTB SUBCLAV NECK INC;  Right      Comment:  Procedure: [address], INTROP DUPLEX;  Surgeon: Jovon Interiano MD;                 Location: [facility];  Service: Vascular  No date: WISDOM TOOTH EXTRACTION   Current Outpatient Medications   Medication Instructions    acetaminophen (TYLENOL) 500 mg, Oral, Every 6 hours PRN    albuterol (ProAir HFA) 90 mcg/act inhaler 2 puffs, Inhalation, Every 6 hours PRN    amLODIPine (NORVASC) 10 mg tablet take 1 tablet by mouth every morning    atorvastatin (LIPITOR) 80 mg, Oral, Daily at bedtime B Complex Vitamins (VITAMIN B-COMPLEX) TABS Oral, Daily    Bioflavonoid Products (VITAMIN C PLUS PO) Oral, Daily    bisacodyl (DULCOLAX) 10 mg, Oral, Every evening    carvedilol (COREG) 12.5 mg, Oral, 2 times daily    cholecalciferol (VITAMIN D3) 5,000 Units, Oral, Daily    clopidogrel (PLAVIX) 75 mg tablet take 1 tablet by mouth once daily    COLLAGEN PO Oral, Includes a probiotic and other vitamins-10 supplements-2 am-one in the afternoon-one hs     Diclofenac Sodium (VOLTAREN) 4 g, Topical, 4 times daily PRN    enalapril (VASOTEC) 10 mg, Oral, 2 times daily    furosemide (LASIX) 20 mg, Oral, Daily    ipratropium (ATROVENT) 0.03 % nasal spray 2 sprays, Nasal, 3 times daily PRN    levothyroxine 25 mcg, Oral, Daily (early morning)    MAGNESIUM PO Oral, Daily at bedtime    spironolactone (ALDACTONE) 25 mg, Oral, Daily    Allergies   Allergen Reactions    Tall Ragweed Wheezing      Social History     Tobacco Use    Smoking status: Every Day     Packs/day: 1.00     Years: 30.00     Total pack years: 30.00     Types: Cigarettes     Start date: 56     Passive exposure: Current    Smokeless tobacco: Never    Tobacco comments:     Encouraged cutting back ahead of surgery---has quit in the past   Vaping Use    Vaping Use: Never used   Substance Use Topics    Alcohol use: Yes     Comment: wilfrid 2-3 every day for 50 years    Drug use: No    Family History   Problem Relation Age of Onset    Hypertension Father     Heart disease Father         CHF    Coronary artery disease Family     Arthritis Family     Abdominal aortic aneurysm Mother     Hypertension Mother     Skin cancer Daughter     Cancer Paternal Aunt     No Known Problems Maternal Aunt     No Known Problems Maternal Aunt     No Known Problems Maternal Aunt           Objective                            Vitals I/O      Most Recent Min/Max in 24hrs   Temp (!) 97 °F (36.1 °C) Temp  Min: 97 °F (36.1 °C)  Max: 97.6 °F (36.4 °C)   Pulse (!) 52 Pulse  Min: 46  Max: 58   Resp 14 Resp  Min: 14  Max: 20   BP (!) 105/49 BP  Min: 91/44  Max: 114/52   O2 Sat 96 % SpO2  Min: 96 %  Max: 100 %      Intake/Output Summary (Last 24 hours) at 11/16/2023 1422  Last data filed at 11/16/2023 1201  Gross per 24 hour   Intake 1800 ml   Output --   Net 1800 ml       No diet orders on file    Invasive Monitoring   Arterial Line  Julieth /42  Arterial Line BP  Min: 93/65  Max: 117/42   MAP 68 mmHg  Arterial Line MAP (mmHg)  Min: 68 mmHg  Max: 68 mmHg           Physical Exam   Physical Exam  Vitals and nursing note reviewed. Eyes:      General: Vision grossly intact. Extraocular Movements: Extraocular movements intact. Conjunctiva/sclera: Conjunctivae normal.      Pupils: Pupils are equal, round, and reactive to light. Skin:     Comments: Left CEA incision clean, dry, intact   HENT:      Head: Normocephalic and atraumatic. Right Ear: Hearing and tympanic membrane normal.      Left Ear: Hearing and tympanic membrane normal.      Mouth/Throat:      Mouth: Mucous membranes are dry. Cardiovascular:      Rate and Rhythm: Normal rate and regular rhythm. Musculoskeletal:         General: Normal range of motion. Right lower leg: Trace Edema present. Left lower leg: Trace Edema present. Abdominal:      Palpations: Abdomen is soft. Comments: Bowel sounds hypoactive   Constitutional:       Appearance: She is well-developed and well-nourished. Pulmonary:      Comments: On 2 L nasal cannula  Bilateral breath sounds diminished  Neurological:      Mental Status: She is alert and oriented to person, place and time. Mental status is at baseline. Motor: gross motor function is at baseline for patient. Strength full and intact in all extremities. Corneal reflex present, cough reflex and gag reflex intact. Genitourinary/Anorectal:     Comments: Due to void           Diagnostic Studies      EKG: Sinus rhythm  Imaging: No results found. Medications:  Scheduled PRN   [START ON 11/17/2023] amLODIPine, 10 mg, QAM  atorvastatin, 80 mg, HS  bisacodyl, 10 mg, QPM  [START ON 11/17/2023] clopidogrel, 75 mg, Daily  enoxaparin, 30 mg, Daily  [START ON 11/17/2023] levothyroxine, 25 mcg, Early Morning  nicotine, 1 patch, Daily  [START ON 11/17/2023] spironolactone, 25 mg, Daily      albuterol, 2 puff, Q6H PRN  labetalol, 5 mg, Q15 Min PRN   And  hydrALAZINE, 15 mg, Q15 Min PRN   And  niCARdipine, 1-15 mg/hr, Continuous PRN  ondansetron, 4 mg, Q6H PRN  sodium chloride, 500 mL, Once PRN   Followed by  [START ON 11/17/2023] phenylephine,  mcg/min, Continuous PRN       Continuous    multi-electrolyte, 90 mL/hr, Last Rate: 90 mL/hr (11/16/23 1233)  niCARdipine, 1-15 mg/hr  [START ON 11/17/2023] phenylephine,  mcg/min         Labs:    CBC    No recent results  BMP    No recent results    Coags    No recent results     Additional Electrolytes  No recent results       Blood Gas    No recent results  No recent results LFTs  No recent results    Infectious  No recent results  Glucose  No recent results          Critical Care Time Delivered : Upon my evaluation, this patient had a high probability of imminent or life-threatening deterioration due to left CEA postoperative complications, which required my direct attention, intervention, and personal management. I have personally provided 30 minutes of critical care time, exclusive of procedures, teaching, family meetings, and any prior time recorded by providers other than myself.    Anticipated Length of Stay is > 2 midnights  [address] [facility]

## 2023-11-16 NOTE — ANESTHESIA PROCEDURE NOTES
Arterial Line Insertion    Performed by: Agueda Chandra MD  Authorized by: Agueda Chandra MD  Consent: Written consent obtained. Risks and benefits: risks, benefits and alternatives were discussed  Consent given by: patient  Required items: required blood products, implants, devices, and special equipment available  Time out: Immediately prior to procedure a "time out" was called to verify the correct patient, procedure, equipment, support staff and site/side marked as required. Preparation: Patient was prepped and draped in the usual sterile fashion.   Indications: hemodynamic monitoring  Orientation:  Left  Location: radial artery  Procedure Details:  Needle gauge: 20  Seldinger technique: Seldinger technique used  Number of attempts: 1    Post-procedure:  Post-procedure: dressing applied  Waveform: good waveform  Patient tolerance: patient tolerated the procedure well with no immediate complications

## 2023-11-16 NOTE — ANESTHESIA POSTPROCEDURE EVALUATION
Post-Op Assessment Note    CV Status:  Stable  Pain Score: 0    Pain management: adequate    Multimodal analgesia used between 6 hours prior to anesthesia start to PACU discharge    Mental Status:  Alert   Hydration Status:  Stable   PONV Controlled:  None   Airway Patency:  Patent  Two or more mitigation strategies used for obstructive sleep apnea   Post Op Vitals Reviewed: Yes    No anethesia notable event occurred.     Staff: CRNA               BP   95/45   Temp 97.6   Pulse 85   Resp 16   SpO2 98

## 2023-11-16 NOTE — ASSESSMENT & PLAN NOTE
Wt Readings from Last 3 Encounters:   11/16/23 55.1 kg (121 lb 7.6 oz)   11/01/23 54.2 kg (119 lb 6.4 oz)   10/26/23 54 kg (119 lb)       Last echocardiogram October 2023 with EF 65 to 70%  Hold home dose Lasix/Coreg  Currently stable on room air

## 2023-11-16 NOTE — OCCUPATIONAL THERAPY NOTE
Occupational Therapy Cancellation     Patient Name: Kyung Rosario Date: 11/16/2023  Problem List  Principal Problem:    Carotid artery stenosis    Past Medical History  Past Medical History:   Diagnosis Date    Anemia     sees  Hematologist Dr Madonna Banks appt 11/1/23    Anxiety     Arthritis     Asthma     CAD (coronary artery disease)     Cardiac murmur     sees Dr. Parker Senior    Carotid stenosis, bilateral     CHF (congestive heart failure) (HCC)     Chronic kidney disease     renal artery stenosis    Chronic pain disorder     back    Chronic sinusitis     treated with antibiotics 3/22    Colitis     COPD (chronic obstructive pulmonary disease) ([address])     Coronary artery disease     Cough     Current every day smoker     encouraged to cut back    Dental crowns present     Depression     Disease of thyroid gland     Edema of leg     compression stocking left leg    History of dizziness     History of hyperkalemia     "couple years ago"    History of transfusion     Hyperlipidemia     Hypertension     Left leg pain     and foot-to call surgeon office 11/2/23 also right leg pain    Muscle weakness     sometimes of legs    Other disorder of circulatory system (CODE)     Presence of dental bridge     Renal artery stenosis (HCC)     Right    Risk for falls     Subclavian arterial stenosis (HCC)     B/L    Type 2 diabetes mellitus without complication, unspecified whether long term insulin use ([address]) 09/20/2022 11/2/23-pt not aware of being diabetic    Walker as ambulation aid     "sometimes"    Wears glasses      Past Surgical History  Past Surgical History:   Procedure Laterality Date    BREAST SURGERY      bxs,benign    COLONOSCOPY      HYSTERECTOMY  1983    43    INCISIONAL BREAST BIOPSY      x5, 1964, 1965, 1985 and [address] AORTOBIFEMORAL Bilateral 12/09/2019    Procedure: BYPASS AORTA BIFEMORAL WITH LEFT FEMORAL THROMBOEMBOLECTOMY;  Surgeon: Ivana Cooks, MD;  Location: [facility]; Service: Vascular    [place] TEAEC W/PATCH GRF CAROTID VERTB SUBCLAV NECK INC Right 10/01/2021    Procedure: ENDARTERECTOMY ARTERY CAROTIDWITH BOVINE PATCH, INTROP DUPLEX;  Surgeon: Anette Saint, MD;  Location: [facility];  Service: Vascular    WISDOM TOOTH EXTRACTION             11/16/23 1316   Note Type   Note type Cancelled Session  (Thursday 11/16/23)   Cancel Reasons Medical status  (strict bed rest following L CEA)   Additional Comments OT orders received and chart review completed. Pt w/ active bed rest orders following L CEA. Will cancel and continue to follow as appropriate.  Spoke w/ PT, Ale España and RN, Preeti Davies, OTR/L  [id number]  [id number]

## 2023-11-16 NOTE — OP NOTE
OPERATIVE REPORT  PATIENT NAME: Ari De Los Santos    :  1940  MRN: [id number]  Pt Location: [facility] OR ROOM 02    SURGERY DATE: 2023    Surgeon(s) and Role:     * Madi Chan MD - Primary     * Magi Dhaliwal MD - Assisting    Pre-op Diagnosis:  Bilateral carotid artery stenosis [I65.23]    Post-Op Diagnosis Codes:     * Bilateral carotid artery stenosis [I65.23]      Procedure(s):  LEFT CAROTID ENDARTERECTOMY WITH BOVINE PATCH, INTRA-OP DUPLEX    Specimen(s):  * No specimens in log *    Estimated Blood Loss:   Minimal    Drains:  none    Anesthesia Type:   General    Operative Indications:  Bilateral carotid artery stenosis []  80year-old smoker with long history of severe peripheral arterial occlusive disease to include cerebrovascular occlusive disease with previous right carotid endarterectomy is found to have a critical left carotid artery stenosis on duplex and confirmed on CT angiogram.  Patient now presents for elective endarterectomy. Operative Findings:  Highly calcified atherosclerotic plaque in the distal common and proximal internal carotid arteries creating a critical stenosis. Following endarterectomy and bovine patch angioplasty there was no significant residual stenosis or intraluminal defect in the common or internal carotid arteries. There was a stenosis with near occlusion of the external carotid artery 1.5-2 cm from its origin. Complications:   None    Procedure and Technique:    A qualified surgical resident was  available for this case. An assistant was required to aid in dissection, retraction and our true reconstruction. The patient was brought to the operating room and placed on the operating table in the supine position. The patient was identified by verbal confirmation and armband identification. An ultrasound was performed to identify the carotid bifurcation which was marked.   The patient was prepped and draped in usual sterile fashion with chlorhexidine. An Ioban drape was placed. .    A transverse incision was made in a skin fold overlying the previously marked right carotid bifurcation. Dissection was carried through the subcutaneous tissue and platysma to the anterior border the sternocleidomastoid muscle. The Weitleaner retractor was inserted exposing the facial vein. The vein was doubly ligated and divided exposing the carotid artery. IV heparin was administered and an ACT obtained. Further heparin was administered as need to obtain a therapeutic ACT. With careful mobilization the internal carotid artery beyond the plaque, common carotid artery proximal to the plaque, external carotid artery and superior thyroidal arteries were dissected and encircled with Vesseloops. During this dissection the vagus and hypoglossal nerves were visualized and protected. Traction was placed on the vessel loops and an anterolateral arteriotomy was created in the common carotid artery extending it into the internal carotid artery with the Solorzano scissors. The 8 [place] Davis shunt was easily inserted first into the distal internal carotid artery with good backbleeding noted. The proximal end was placed in the common carotid artery with restoration of shunted flow. Standard endarterectomy was then performed. Plaque was removed from the common external and internal carotid artery to a good endpoint. The arteriotomy was then closed with a bovine pericardial patch secured with a 6-0 Prolene suture. When the suture line was nearly completed the shunt was removed the vessels were flushed vigorously in both directions and the suture line was secured. Flow was restored first into the external and then the internal carotid artery. No significant bleeding points were encounter. Duplex ultrasound was then brought to the field and insonation of the external/internal and common carotid arteries showed excellent waveforms and no technical defects.     Fibrillar coagulant was placed in the operative field for added hemostasis. 40 mg protamine was administered. Once hemostasis was obtained the platysma was closed in a running fashion with 3 0 Monocryl suture and the  wound edges were infiltrated with 0.5% Marcaine with epinephrine mixed with 1% lidocaine. A 4-0 Monocryl subcuticular skin closure was performed with placement of a Histoacryl bandage. The patient awoke moving all 4 extremities and following commands. I was present for the entire procedure.     Patient Disposition:  PACU     [facility] Quality [facility] - Carotid Endarterectomy     Urgency:  Elective    Anesthesia: General Type: Conventional    Side: left    Patch Type: Bovine Pericardial     If Prosthetic, Patch : Fariba    Shunt: Yes- Routine    Skin Prep: Chlorhexidine    Drain: No  Heparin: yes    Protamine: yes      Dextran: No    Re-explore artery after closure: no    Total Procedure time:   Event Time In   Procedure Start 0807   Procedure Closing 1024   Procedure Finish 2889       Monitoring:   EEG: no   Stump Pressure: no   Other: no    Completion Study:   Doppler: no   Duplex: yes   Arteriogram: no    Concomitant Procedure:   Proximal Endovascular: no   Distal Endovascular: no   CABG: no   Other Arterial Op: no      SIGNATURE: Gloria Winslow MD  DATE: November 16, 2023  TIME: 10:44 AM

## 2023-11-16 NOTE — INTERVAL H&P NOTE
H&P reviewed. After examining the patient I find no changes in the patients condition since the H&P had been written. Vitals:    11/16/23 0641   BP: 114/52   Pulse: 58   Resp: 16   Temp: (!) 97.4 °F (36.3 °C)   SpO2: 99%         Physical Exam  Vitals and nursing note reviewed. Constitutional:       Appearance: She is well-developed. HENT:      Head: Normocephalic and atraumatic. Eyes:      Pupils: Pupils are equal, round, and reactive to light. Neck:      Vascular: Carotid bruit (B) present. No JVD. Cardiovascular:      Rate and Rhythm: Normal rate and regular rhythm. Pulses:           Carotid pulses are 2+ on the right side and 2+ on the left side. Radial pulses are 2+ on the right side and 2+ on the left side. Heart sounds: Normal heart sounds, S1 normal and S2 normal. No murmur heard. No friction rub. No gallop. Comments:   Pulmonary:      Effort: Pulmonary effort is normal. No accessory muscle usage or respiratory distress. Breath sounds: Normal breath sounds. No wheezing or rales. Abdominal:      General: Bowel sounds are normal. There is no distension. Palpations: Abdomen is soft. Tenderness: There is no abdominal tenderness. Neurological:      Mental Status: She is alert and oriented to person, place, and time. Comments: Grossly normal    Psychiatric:         Behavior: Behavior is cooperative.

## 2023-11-16 NOTE — PHYSICAL THERAPY NOTE
Physical Therapy Cancellation Note       11/16/23 0578   Note Type   Note type Cancelled Session   Cancel Reasons Medical status  (Bes rest following L CEA)   Additional Comments PT orders received - pt currently with active bed rest order following L CEA. Will cancel PT at this time and follow-up once medically appropriate.    Licensure   [place] License Number  Loretta Vanessa [id number]

## 2023-11-16 NOTE — ASSESSMENT & PLAN NOTE
Presented on 11/16 for an elective left carotid endarterectomy  History of a right carotid endarterectomy repair in 2021  Admitted to ICU postoperatively   Currently normotensive -hold home dose Coreg/Lasix/Vasotec  Resume home dose Norvasc, Aldactone per surgery  Continue with neuro exams per protocol  SBP goal 100-160 -administer Tamir-Synephrine or Cardene if needed to obtain this goal  Monitor incision

## 2023-11-16 NOTE — CONSULTS
Consultation - Nephrology   Linette Armijo 80 y.o. female MRN: [id number]  Unit/Bed#: ICU 11 Encounter: [id number]    ASSESSMENT AND PLAN:   80 y.o woman with PMH of peripheral arterial occlusive disease and cerebrovascular occlusive disease, CAD, CHF, CKD G3b  (baseline creatinine 1.3 to 1.8 mg/dL ). Patient was admitted for left elective endarterectomy. .  Nephrology is consulted for management of SERJIO prevention      PLAN    #CKD G3b  Baseline creatinine: 1.3 to 1.8 mg/dL  Current creatinine: 1 mg/dL, below baseline  Etiology: Likely secondary to nephrosclerosis in the settings of cardiovascular disease  Plan:  Maintain mean arterial pressure above 65 mmHg to avoid hypotension/hypoperfusion  Avoid nephrotoxic agents such as NSAIDs and contrast if at all possible    Avoid opioids   Adjust medications to GFR  Renal diet   Agree with IV hydration with Plasma-Lyte for now  Enforce fluid intake    #Volume/hypertension:  Volume: Euvolemic  Blood pressure: Tendency to hypotension, /49mmhg   Low sodium diet   IV hydration as above  Hold amlodipine for now  On spironolactone 25    #Acid base disorder  serum HCO3 25 mmol/L  Goal    #CKD-MBD  Calcium 10.3  Slightly above goal    #Hypercalcemia  PTH appropriately suppressed  MGUS    #Anemia:  Current hemoglobin:12.3 milligrams per deciliter  Treatment:  Transfuse for hemoglobin less than 7.0 per primary service      #Carotid stenosis  Status post endarterectomy      HISTORY OF PRESENT ILLNESS:  Requesting Physician: Stephanie Palumbo MD  Reason for Consult: SERJIO prevention    Linette Armijo is a 80 y.o.  female with PMH of peripheral arterial occlusive disease and cerebrovascular occlusive disease, CAD, CHF, CKD G3b  (baseline creatinine 1.3 to 1.8 mg/dL ). Patient was admitted for left elective endarterectomy. . A renal consultation is requested today for assistance in the management of prevention of SERJIO.     PAST MEDICAL HISTORY:  Past Medical History: Diagnosis Date    Anemia     sees  Hematologist Dr Efrem Calles appt 11/1/23    Anxiety     Arthritis     Asthma     CAD (coronary artery disease)     Cardiac murmur     sees Dr. Channing Rivers    Carotid stenosis, bilateral     CHF (congestive heart failure) (HCC)     Chronic kidney disease     renal artery stenosis    Chronic pain disorder     back    Chronic sinusitis     treated with antibiotics 3/22    Colitis     COPD (chronic obstructive pulmonary disease) ([address])     Coronary artery disease     Cough     Current every day smoker     encouraged to cut back    Dental crowns present     Depression     Disease of thyroid gland     Edema of leg     compression stocking left leg    History of dizziness     History of hyperkalemia     "couple years ago"    History of transfusion     Hyperlipidemia     Hypertension     Left leg pain     and foot-to call surgeon office 11/2/23 also right leg pain    Muscle weakness     sometimes of legs    Other disorder of circulatory system (CODE)     Presence of dental bridge     Renal artery stenosis (HCC)     Right    Risk for falls     Subclavian arterial stenosis (HCC)     B/L    Type 2 diabetes mellitus without complication, unspecified whether long term insulin use ([address]) 09/20/2022 11/2/23-pt not aware of being diabetic    Walker as ambulation aid     "sometimes"    Wears glasses        PAST SURGICAL HISTORY:  Past Surgical History:   Procedure Laterality Date    BREAST SURGERY      bxs,benign    COLONOSCOPY      HYSTERECTOMY  1983    43    INCISIONAL BREAST BIOPSY      x5, 1964, 1965, 1985 and [address] AORTOBIFEMORAL Bilateral 12/09/2019    Procedure: BYPASS AORTA BIFEMORAL WITH LEFT FEMORAL THROMBOEMBOLECTOMY;  Surgeon: Kasi Mtz MD;  Location: [facility];  Service: Vascular    ND TEAEC W/PATCH GRF CAROTID VERTB SUBCLAV NECK INC Right 10/01/2021    Procedure: [address], INTROP DUPLEX;  Surgeon: Darryl Aguilar MD; Location: [facility];  Service: Vascular    WISDOM TOOTH EXTRACTION         ALLERGIES:  Allergies   Allergen Reactions    Tall Ragweed Wheezing       SOCIAL HISTORY:  Social History     Substance and Sexual Activity   Alcohol Use Yes    Comment: lizbethttbeatriz 2-3 every day for 50 years     Social History     Substance and Sexual Activity   Drug Use No     Social History     Tobacco Use   Smoking Status Every Day    Packs/day: 1.00    Years: 30.00    Total pack years: 30.00    Types: Cigarettes    Start date: 1960    Passive exposure: Current   Smokeless Tobacco Never   Tobacco Comments    Encouraged cutting back ahead of surgery---has quit in the past       FAMILY HISTORY:  Family History   Problem Relation Age of Onset    Hypertension Father     Heart disease Father         CHF    Coronary artery disease Family     Arthritis Family     Abdominal aortic aneurysm Mother     Hypertension Mother     Skin cancer Daughter     Cancer Paternal Aunt     No Known Problems Maternal Aunt     No Known Problems Maternal Aunt     No Known Problems Maternal Aunt        MEDICATIONS:    Current Facility-Administered Medications:     albuterol (PROVENTIL HFA,VENTOLIN HFA) inhaler 2 puff, 2 puff, Inhalation, Q6H PRN, Arnold Jose MD    atorvastatin (LIPITOR) tablet 80 mg, 80 mg, Oral, HS, Arnold Jose MD    bisacodyl (DULCOLAX) EC tablet 10 mg, 10 mg, Oral, QPM, Arnold Jose MD    [START ON 11/17/2023] clopidogrel (PLAVIX) tablet 75 mg, 75 mg, Oral, Daily, Keyshawn Archibald MD    enoxaparin (LOVENOX) subcutaneous injection 30 mg, 30 mg, Subcutaneous, Daily, Keyshawn Archibald MD    labetalol (NORMODYNE) injection 5 mg, 5 mg, Intravenous, Q15 Min PRN **AND** hydrALAZINE (APRESOLINE) injection 15 mg, 15 mg, Intravenous, Q15 Min PRN **AND** niCARdipine (CARDENE) 25 mg (STANDARD CONCENTRATION) in sodium chloride 0.9% 250 mL, 1-15 mg/hr, Intravenous, Continuous PRN, Arnold Jose MD    [START ON 11/17/2023] levothyroxine tablet 25 mcg, 25 mcg, Oral, Early Morning, Ko Conde MD    multi-electrolyte (PLASMALYTE-A/ISOLYTE-S PH 7.4) IV solution, 90 mL/hr, Intravenous, Continuous, Ko Conde MD, Last Rate: 90 mL/hr at 11/16/23 1233, 90 mL/hr at 11/16/23 1233    nicotine (NICODERM CQ) 14 mg/24hr TD 24 hr patch 1 patch, 1 patch, Transdermal, Daily, Ko Conde MD, 1 patch at 11/16/23 1239    ondansetron (ZOFRAN) injection 4 mg, 4 mg, Intravenous, Q6H PRN, Ko Conde MD    sodium chloride 0.9 % bolus 500 mL, 500 mL, Intravenous, Once PRN **FOLLOWED BY** [START ON 11/17/2023] phenylephrine (NAT-SYNEPHRINE) 50 mg (STANDARD CONCENTRATION) in sodium chloride 0.9% 250 mL,  mcg/min, Intravenous, Continuous PRN, Ko Conde MD    [START ON 11/17/2023] spironolactone (ALDACTONE) tablet 25 mg, 25 mg, Oral, Daily, Ko Conde MD    REVIEW OF SYSTEMS:  Complete 10 point review of systems were obtained and discussed in length with the patient. Complete review of systems were negative / unremarkable except mentioned in the HPI section.      Review of Systems - Psychological ROS: negative  Ophthalmic ROS: negative  ENT ROS: negative  Hematological and Lymphatic ROS: positive for - fatigue  Endocrine ROS: negative  Respiratory ROS: no cough, shortness of breath, or wheezing  Cardiovascular ROS: no chest pain or dyspnea on exertion  Gastrointestinal ROS: no abdominal pain, change in bowel habits, or black or bloody stools  Genito-Urinary ROS: no dysuria, trouble voiding, or hematuria  Musculoskeletal ROS: negative  Neurological ROS: no TIA or stroke symptoms  Dermatological ROS: negative     PHYSICAL EXAM:  Current Weight: Weight - Scale: 55.1 kg (121 lb 7.6 oz)  First Weight: Weight - Scale: 54 kg (119 lb)  Vitals:    11/16/23 1222   BP: (!) 105/49   Pulse: (!) 52   Resp: 14   Temp: (!) 97 °F (36.1 °C)   SpO2: 96%       Intake/Output Summary (Last 24 hours) at 11/16/2023 1528  Last data filed at 11/16/2023 1201  Gross per 24 hour   Intake 1800 ml   Output --   Net 1800 ml     Wt Readings from Last 3 Encounters:   11/16/23 55.1 kg (121 lb 7.6 oz)   11/01/23 54.2 kg (119 lb 6.4 oz)   10/26/23 54 kg (119 lb)     Temp Readings from Last 3 Encounters:   11/16/23 (!) 97 °F (36.1 °C) (Temporal)   11/01/23 97.8 °F (36.6 °C) (Temporal)   10/23/23 98 °F (36.7 °C)     BP Readings from Last 3 Encounters:   11/16/23 (!) 105/49   11/01/23 140/76   10/26/23 154/70     Pulse Readings from Last 3 Encounters:   11/16/23 (!) 52   11/01/23 70   10/26/23 70        General:  no acute distress at this time  Skin:  No acute rash  Eyes:  No scleral icterus and noninjected  ENT:  mucous membranes moist  Neck:  no carotid bruits  Chest:  Clear to auscultation percussion, good respiratory effort, no use of accessory respiratory muscles  CVS:  Regular rate and rhythm without rub   Abdomen:  soft and nontender   Extremities: no significant lower extremity edema  Neuro:  No gross focality  Psych:  Alert , cooperative       Invasive Devices:      Lab Results:         Invalid input(s): "ALBUMIN"    Other Studies:   VAS intra-op ultrasound CEA   Final Result by Madi Chan MD (11/16 3977)          Portions of the record may have been created with voice recognition software. Occasional wrong word or "sound a like" substitutions may have occurred due to the inherent limitations of voice recognition software. Read the chart carefully and recognize, using context, where substitutions have occurred.

## 2023-11-16 NOTE — ASSESSMENT & PLAN NOTE
Lab Results   Component Value Date    EGFR 52 11/01/2023    EGFR 34 09/12/2023    EGFR 41 07/28/2023    CREATININE 1.00 11/01/2023    CREATININE 1.41 (H) 09/12/2023    CREATININE 1.20 07/28/2023     Baseline creatinine 1.2-1.4  Monitor I&O  Avoid nephrotoxic agents

## 2023-11-17 ENCOUNTER — TRANSITIONAL CARE MANAGEMENT (OUTPATIENT)
Dept: FAMILY MEDICINE CLINIC | Facility: CLINIC | Age: 83
End: 2023-11-17

## 2023-11-17 VITALS
SYSTOLIC BLOOD PRESSURE: 102 MMHG | HEIGHT: 63 IN | WEIGHT: 121.47 LBS | TEMPERATURE: 97.6 F | BODY MASS INDEX: 21.52 KG/M2 | DIASTOLIC BLOOD PRESSURE: 47 MMHG | RESPIRATION RATE: 18 BRPM | OXYGEN SATURATION: 99 % | HEART RATE: 66 BPM

## 2023-11-17 LAB
ANION GAP SERPL CALCULATED.3IONS-SCNC: 8 MMOL/L
APTT PPP: 32 SECONDS (ref 23–37)
BUN SERPL-MCNC: 48 MG/DL (ref 5–25)
CALCIUM SERPL-MCNC: 7.9 MG/DL (ref 8.4–10.2)
CHLORIDE SERPL-SCNC: 110 MMOL/L (ref 96–108)
CO2 SERPL-SCNC: 20 MMOL/L (ref 21–32)
CREAT SERPL-MCNC: 1.09 MG/DL (ref 0.6–1.3)
ERYTHROCYTE [DISTWIDTH] IN BLOOD BY AUTOMATED COUNT: 15.6 % (ref 11.6–15.1)
GFR SERPL CREATININE-BSD FRML MDRD: 47 ML/MIN/1.73SQ M
GLUCOSE SERPL-MCNC: 112 MG/DL (ref 65–140)
HCT VFR BLD AUTO: 25.5 % (ref 34.8–46.1)
HCT VFR BLD AUTO: 26.7 % (ref 34.8–46.1)
HGB BLD-MCNC: 8.5 G/DL (ref 11.5–15.4)
HGB BLD-MCNC: 9.2 G/DL (ref 11.5–15.4)
INR PPP: 1.13 (ref 0.84–1.19)
MAGNESIUM SERPL-MCNC: 1.6 MG/DL (ref 1.9–2.7)
MCH RBC QN AUTO: 34.4 PG (ref 26.8–34.3)
MCHC RBC AUTO-ENTMCNC: 33.3 G/DL (ref 31.4–37.4)
MCV RBC AUTO: 103 FL (ref 82–98)
MRSA NOSE QL CULT: NORMAL
PHOSPHATE SERPL-MCNC: 3.4 MG/DL (ref 2.3–4.1)
PLATELET # BLD AUTO: 136 THOUSANDS/UL (ref 149–390)
PMV BLD AUTO: 11.2 FL (ref 8.9–12.7)
POTASSIUM SERPL-SCNC: 4 MMOL/L (ref 3.5–5.3)
PROTHROMBIN TIME: 14.8 SECONDS (ref 11.6–14.5)
RBC # BLD AUTO: 2.47 MILLION/UL (ref 3.81–5.12)
SODIUM SERPL-SCNC: 138 MMOL/L (ref 135–147)
WBC # BLD AUTO: 10.73 THOUSAND/UL (ref 4.31–10.16)

## 2023-11-17 PROCEDURE — 99232 SBSQ HOSP IP/OBS MODERATE 35: CPT | Performed by: ANESTHESIOLOGY

## 2023-11-17 PROCEDURE — 99024 POSTOP FOLLOW-UP VISIT: CPT | Performed by: PHYSICIAN ASSISTANT

## 2023-11-17 PROCEDURE — 85018 HEMOGLOBIN: CPT | Performed by: PHYSICIAN ASSISTANT

## 2023-11-17 PROCEDURE — 85027 COMPLETE CBC AUTOMATED: CPT | Performed by: SURGERY

## 2023-11-17 PROCEDURE — 84100 ASSAY OF PHOSPHORUS: CPT | Performed by: NURSE PRACTITIONER

## 2023-11-17 PROCEDURE — 85730 THROMBOPLASTIN TIME PARTIAL: CPT | Performed by: SURGERY

## 2023-11-17 PROCEDURE — 97167 OT EVAL HIGH COMPLEX 60 MIN: CPT

## 2023-11-17 PROCEDURE — 99232 SBSQ HOSP IP/OBS MODERATE 35: CPT | Performed by: STUDENT IN AN ORGANIZED HEALTH CARE EDUCATION/TRAINING PROGRAM

## 2023-11-17 PROCEDURE — 85014 HEMATOCRIT: CPT | Performed by: PHYSICIAN ASSISTANT

## 2023-11-17 PROCEDURE — 83735 ASSAY OF MAGNESIUM: CPT | Performed by: NURSE PRACTITIONER

## 2023-11-17 PROCEDURE — 80048 BASIC METABOLIC PNL TOTAL CA: CPT | Performed by: SURGERY

## 2023-11-17 PROCEDURE — 85610 PROTHROMBIN TIME: CPT | Performed by: SURGERY

## 2023-11-17 RX ORDER — CARVEDILOL 12.5 MG/1
12.5 TABLET ORAL 2 TIMES DAILY
Status: DISCONTINUED | OUTPATIENT
Start: 2023-11-17 | End: 2023-11-17 | Stop reason: HOSPADM

## 2023-11-17 RX ORDER — MAGNESIUM SULFATE HEPTAHYDRATE 40 MG/ML
4 INJECTION, SOLUTION INTRAVENOUS ONCE
Status: COMPLETED | OUTPATIENT
Start: 2023-11-17 | End: 2023-11-17

## 2023-11-17 RX ORDER — NICOTINE 21 MG/24HR
1 PATCH, TRANSDERMAL 24 HOURS TRANSDERMAL DAILY
Qty: 28 PATCH | Refills: 0
Start: 2023-11-18

## 2023-11-17 RX ADMIN — NICOTINE 1 PATCH: 14 PATCH, EXTENDED RELEASE TRANSDERMAL at 08:27

## 2023-11-17 RX ADMIN — LEVOTHYROXINE SODIUM 25 MCG: 25 TABLET ORAL at 06:03

## 2023-11-17 RX ADMIN — CARVEDILOL 12.5 MG: 12.5 TABLET, FILM COATED ORAL at 12:15

## 2023-11-17 RX ADMIN — MAGNESIUM SULFATE HEPTAHYDRATE 4 G: 40 INJECTION, SOLUTION INTRAVENOUS at 08:17

## 2023-11-17 RX ADMIN — CLOPIDOGREL BISULFATE 75 MG: 75 TABLET ORAL at 08:28

## 2023-11-17 NOTE — CASE MANAGEMENT
Case Management Progress Note    Patient name Galdino Russell  Location ICU 11/ICU 11 MRN [id number]  : 1940 Date 2023       LOS (days): 1  Geometric Mean LOS (GMLOS) (days):   Days to GMLOS:        OBJECTIVE:        Current admission status: Inpatient  Preferred Pharmacy:   [address] [place] [address] [id number] Remed[facility] Bates, [address] ([address])  [address] [id number])  [address] [facility] [address]  Phone: [phone] Fax: [phone]    [id number] [address] [place], [address]  [address] [facility] [address]  Phone: [phone] Fax: [phone]    Primary Care Provider: Kishan Salcedo MD    Primary Insurance: [facility]  Secondary Insurance:     PROGRESS NOTE:      CM met with patient bedside to provide list of home care companies/services in the [facility] area. Patient was appreciative of the information.

## 2023-11-17 NOTE — OCCUPATIONAL THERAPY NOTE
Occupational Therapy Evaluation     Patient Name: Seven Session Date: 11/17/2023  Problem List  Principal Problem:    Carotid artery stenosis  Active Problems:    Hypothyroidism    Hyperlipidemia    Chronic diastolic heart failure (HCC)    Hypertensive heart disease with chronic diastolic congestive heart failure (HCC)    Chronic kidney disease, stage 3 (HCC)    Past Medical History  Past Medical History:   Diagnosis Date    Anemia     sees  Hematologist Dr Efrem Calles appt 11/1/23    Anxiety     Arthritis     Asthma     CAD (coronary artery disease)     Cardiac murmur     sees Dr. Channing Rivers    Carotid stenosis, bilateral     CHF (congestive heart failure) (HCC)     Chronic kidney disease     renal artery stenosis    Chronic pain disorder     back    Chronic sinusitis     treated with antibiotics 3/22    Colitis     COPD (chronic obstructive pulmonary disease) ([address])     Coronary artery disease     Cough     Current every day smoker     encouraged to cut back    Dental crowns present     Depression     Disease of thyroid gland     Edema of leg     compression stocking left leg    History of dizziness     History of hyperkalemia     "couple years ago"    History of transfusion     Hyperlipidemia     Hypertension     Left leg pain     and foot-to call surgeon office 11/2/23 also right leg pain    Muscle weakness     sometimes of legs    Other disorder of circulatory system (CODE)     Presence of dental bridge     Renal artery stenosis (HCC)     Right    Risk for falls     Subclavian arterial stenosis (HCC)     B/L    Type 2 diabetes mellitus without complication, unspecified whether long term insulin use ([address]) 09/20/2022 11/2/23-pt not aware of being diabetic    Walker as ambulation aid     "sometimes"    Wears glasses      Past Surgical History  Past Surgical History:   Procedure Laterality Date    BREAST SURGERY      bxs,benign    COLONOSCOPY      HYSTERECTOMY  1983    43    INCISIONAL BREAST BIOPSY      x5, 1964, 1965, 1985 and [address] AORTOBIFEMORAL Bilateral 12/09/2019    Procedure: BYPASS AORTA BIFEMORAL WITH LEFT FEMORAL THROMBOEMBOLECTOMY;  Surgeon: Oumou Arechiga MD;  [facility]: [facility];  Service: Vascular    IL TEAEC W/PATCH GRF CAROTID VERTB SUBCLAV NECK INC Right 10/01/2021    Procedure: ENDARTERECTOMY ARTERY Nadja Burns;  Surgeon: Nate Samayoa MD;  Location: [facility];  Service: Vascular    IL TEAEC W/PATCH GRF CAROTID VERTB [address] Left 11/16/2023    Procedure: LEFT CAROTID ENDARTERECTOMY WITH BOVINE PATCH, INTRA-OP DUPLEX;  Surgeon: Tomy Hi MD;  [facility];  Service: Vascular    WISDOM TOOTH EXTRACTION           11/17/23 1232   OT Last Visit   OT Visit Date 11/17/23  (Friday)   Note Type   Note type Evaluation   Pain Assessment   Pain Assessment Tool 0-10   Pain Score No Pain   Restrictions/Precautions   Weight Bearing Precautions Per Order No   Other Precautions Multiple lines;Telemetry;Pain  (L UE A-line)   Home Living   Type of Home Apartment; Other (Comment)  (2nd floor senior high rise apt)   Home Layout Performs ADLs on one level; Able to live on main level with bedroom/bathroom; Elevator   Bathroom Shower/Tub Tub/shower unit   Bathroom Toilet Standard   Bathroom Equipment Grab bars in [address]; Other (Comment)  (rollator)   Additional Comments Pt reports living alone at baseline in senior, elevator accessible apt   Prior Function   Level of [place] Independent with ADLs; Independent with functional mobility  (- drive)   Lives With (S)  Alone   Receives Help From Family;Friend(s); Neighbor   IADLs Family/Friend/Other provides transportation   Falls in the last 6 months 0   Vocational Retired   Comments Pt reports I w/ ADLs at baseline w/ out use of AD w/ in apt.  Pt reports keeping rollator in apt and uses as needed when she socializes, goes to community [employer]   Autonomy Pt reports I w/ ADLs at baseline w/ out use of AD   Reciprocal Relationships Pt reports living alone. Supportive friends/ family assist w/ community mobility to store. 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 "I like the knitty gritty and cleaning out drawers and cabinets"   General   Additional Pertinent History Pt is s/p L CEA on 11/16/23   Family/Caregiver Present No   Additional General Comments Significant PMH impacting her occupational performance includes aortic stenosis, chronic anemia, diastolic heart failure, COPD, CAD, R CEA (10/1/21). Personal and environmental factors impacting performance includes lives alone, advanced age, difficulty completing IADLs; supports/ strengths include independent at baseline, access to AD   ADL   Where Assessed Chair  (vs standing in room)   Eating Assistance 7  [facility]  (seated OOB in chair finsihing her lunch upon therapist arrival)   Grooming Assistance 6  Modified Independent   Grooming Deficit Setup; Increased time to complete;Supervision/safety   UB Bathing Assistance Unable to assess  (anticipate mod I after set- up w/ + time based on fxal obs skills, clinical judgement)   LB Bathing Assistance Unable to assess  (anticipate mod I based on fxal obs skills, clinical judgement)   UB Dressing Assistance 6  Modified independent   UB Dressing Deficit Setup; Increased time to complete  (+ time due to multiple lines)   LB Dressing Assistance 6  Modified independent   LB Dressing Deficit Setup; Increased time to complete  (don underwear, pants and personal slippers w/ mod I for set- up and + time)   Toileting Assistance    (denied need to void.  RN reports pt walked to bathroom earlier)   Additional Comments Pt able to flex/ ext rot hip to cross LE over opposite to thread LB clothing while seated   Bed Mobility   Supine to Sit Unable to assess   Sit to Supine Unable to assess   Additional Comments Pt seated OOB In chair upon arrival finishing her lunch and OOB in chair post eval w/ feet supported on foot stool   Transfers   Sit to Stand 5  Supervision   Additional items Assist x 1;Verbal cues; Bedrails;Armrests  (cues for hand placement, line mgmt)   Stand to Sit 5  Supervision   Additional items Assist x 1;Verbal cues;Armrests; Bedrails   Additional Comments Pt performed sit <> stand 3X w/ S --> mod I   Functional Mobility   Functional Mobility 5  Supervision   Additional Comments engaged in functional mobility w/ S in room w/ + time due to multiple lines   Additional items   (no AD)   Balance   Static Sitting Normal   Dynamic Sitting Fair +   Static Standing Fair +   Ambulatory Fair   Activity Tolerance   Activity Tolerance Patient tolerated treatment well   Medical Staff Made Aware spoke w/ PT, Lorna and CM,   Nurse Made Aware spoke w/ RNLien pre / post eval   RUE Assessment   RUE Assessment WFL   RUE Strength   RUE Overall Strength Within Functional Limits - able to perform ADL tasks with strength  (observed during ADLs)   LUE Assessment   LUE Assessment WFL   LUE Strength   LUE Overall Strength Within Functional Limits - able to perform ADL tasks with strength  (observed during ADLs)   Hand Function   Gross Motor Coordination Functional   Fine Motor Coordination Functional   Cognition   Overall Cognitive Status WFL   Arousal/Participation Alert; Cooperative   Attention Attends with cues to redirect   Orientation Level Oriented X4   Memory Within functional limits   Following Commands Follows multistep commands without difficulty   Comments Identified pt by full name and birthdate. Alert and oriented; able to participate in conversation   Assessment   Limitation Decreased ADL status; Decreased endurance;Decreased high-level ADLs   Assessment Pt is an 81yo female admitted to [address] on 11/16/23 s/p L CEA. Pt reports I w/ ADLs home alone in elevator accessible senior apt. Pt does not drive and reports supportive family / friends assist w/ community mobility. Upon eval, pt alert and oriented. Able to participate in conversation and communicate wants / needs. Pt required mod I to complete grooming, mod I UBD, mod I LBD, S sit <> stand and S w/ out use of AD for functional mobility. Pt is completing ADLs at / near baseline level of I. Recommend active participation in ADLs in acute care w/ RN staff assistance as needed due to multiple lines. No rehab needs. DC OT   Goals   Patient Goals Pt stated that she would like to return home   Plan   OT Treatment Day 0  (Friday 11/17/23)   OT Frequency Eval only   Discharge Recommendation   Rehab Resource Intensity Level, OT No post-acute rehabilitation needs   Equipment Recommended Shower/Tub chair with back ($)   AM-PAC Daily Activity Inpatient   Lower Body Dressing 4   Bathing 3   Toileting 4   Upper Body Dressing 4   Grooming 4   Eating 4   Daily Activity Raw Score 23   Daily Activity Standardized Score (Calc for Raw Score >=11) 51.12   AM-PAC Applied Cognition Inpatient   Following a Speech/Presentation 4   Understanding Ordinary Conversation 4   Taking Medications 3   Remembering Where Things Are Placed or Put Away 4   Remembering List of 4-5 Errands 3   Taking Care of Complicated Tasks 3   Applied Cognition Raw Score 21   Applied Cognition Standardized Score 44.3   Barthel Index   Feeding 10   Bathing 0   Grooming Score 5   Dressing Score 5   Bladder Score 10   Bowels Score 10   Toilet Use Score 5   Transfers (Bed/Chair) Score 10   Mobility (Level Surface) Score 10   Stairs Score 0   Barthel Index Score 65   End of Consult   Patient Position at End of Consult All needs within reach; Bedside chair   Nurse Communication Nurse aware of consult   Licensure   [place] License Number  Hanselhemabel Pablo, OTR/L [id number]       The patient's raw score on the AM-PAC Daily Activity Inpatient Short Form is 23. A raw score of greater than or equal to 19 suggests the patient may benefit from discharge to home. Please refer to the recommendation of the Occupational Therapist for safe discharge planning.     JONATHAN Clinton/DOROTEO  [id number]  [id number]

## 2023-11-17 NOTE — PROGRESS NOTES
[id number] [facility]  Progress Note  Name: Salud Dowling  MRN: [id number]  Unit/Bed#: ICU 11 I Date of Admission: 11/16/2023   Date of Service: 11/17/2023 I Hospital Day: 1    Assessment/Plan   * Carotid artery stenosis  Assessment & Plan  Presented on 11/16 for an elective left carotid endarterectomy  History of a right carotid endarterectomy repair in 2021  Admitted to ICU postoperatively   Now POD #1  Currently normotensive -hold home dose Coreg/Lasix/Vasotec  Resume home dose Norvasc, Aldactone per surgery  Continue with neuro exams per protocol  SBP goal 100-160, did not require continuous infusions overnight   Monitor incision, currently without evidence of significant ecchymosis or hematoma   Pain control     Hypertensive heart disease with chronic diastolic congestive heart failure (HCC)  Assessment & Plan  Wt Readings from Last 3 Encounters:   11/16/23 55.1 kg (121 lb 7.6 oz)   11/01/23 54.2 kg (119 lb 6.4 oz)   10/26/23 54 kg (119 lb)     Last echocardiogram October 2023 with EF 65 to 70%  Maintain -160  Consider resuming home dose Lasix/Coreg  Continue with Norvasc/Aldactone per surgery  Currently stable on room air          Chronic kidney disease, stage 3 [facility])  Assessment & Plan  Lab Results   Component Value Date    EGFR 52 11/01/2023    EGFR 34 09/12/2023    EGFR 41 07/28/2023    CREATININE 1.00 11/01/2023    CREATININE 1.41 (H) 09/12/2023    CREATININE 1.20 07/28/2023     Baseline creatinine 1.2-1.4  Labs pending this AM  Monitor I&O  Avoid nephrotoxic agents    Chronic diastolic heart failure (HCC)  Assessment & Plan  Wt Readings from Last 3 Encounters:   11/16/23 55.1 kg (121 lb 7.6 oz)   11/01/23 54.2 kg (119 lb 6.4 oz)   10/26/23 54 kg (119 lb)       Last echocardiogram October 2023 with EF 65 to 70%  Home Lasix/Coreg held yesterday, consider resuming today   Currently stable on room air        Hyperlipidemia  Assessment & Plan  Continue home dose statin    Hypothyroidism  Assessment & Plan  Continue home dose levothyroxine             Disposition: Med Surg with Telemetry    ICU Core Measures     A: Assess, Prevent, and Manage Pain Has pain been assessed? Yes  Need for changes to pain regimen? No   B: Both SAT/SAT  N/A   C: Choice of Sedation RASS Goal: N/A patient not on sedation  Need for changes to sedation or analgesia regimen? NA   D: Delirium CAM-ICU: Positive   E: Early Mobility  Plan for early mobility? Yes   F: Family Engagement Plan for family engagement today? Yes       Review of Invasive Devices:        Julieth Plan: Discontinue arterial line    Prophylaxis:  VTE VTE covered by:  enoxaparin, Subcutaneous, 30 mg at 11/16/23 1829       Stress Ulcer  not ordered         Significant 24hr Events     24hr events: POD #1 s/p left CEA. No acute events overnight. BP within parameters without continuous infusions. Neurologically intact. Subjective   Review of Systems   Constitutional:  Positive for fatigue. HENT: Negative. Eyes: Negative. Respiratory: Negative. Cardiovascular: Negative. Gastrointestinal: Negative. Endocrine: Negative. Genitourinary: Negative. Musculoskeletal: Negative. Skin: Negative. Allergic/Immunologic: Negative. Neurological: Negative. Hematological: Negative. Psychiatric/Behavioral: Negative.         Objective                            Vitals I/O      Most Recent Min/Max in 24hrs   Temp 97.6 °F (36.4 °C) Temp  Min: 97 °F (36.1 °C)  Max: 98.3 °F (36.8 °C)   Pulse 66 Pulse  Min: 49  Max: 85   Resp 16 Resp  Min: 11  Max: 33   BP (!) 106/46 BP  Min: 91/44  Max: 122/53   O2 Sat 93 % SpO2  Min: 93 %  Max: 100 %      Intake/Output Summary (Last 24 hours) at 11/17/2023 0647  Last data filed at 11/17/2023 0641  Gross per 24 hour   Intake 2740.5 ml   Output 900 ml   Net 1840.5 ml       Diet Cardiovascular; Cardiac    Invasive Monitoring           Physical Exam   Physical Exam  Eyes:      Pupils: Pupils are equal, round, and reactive to light. Skin:     General: Skin is warm and dry. HENT:      Head: Normocephalic and atraumatic. Neck:      Comments: Left CEA incision with ecchymosis but no hematoma. Site C/D/I  Musculoskeletal:         General: Normal range of motion. Right lower leg: No edema. Left lower leg: No edema. Abdominal:      Palpations: Abdomen is soft. Tenderness: There is no abdominal tenderness. Constitutional:       Appearance: She is well-developed and well-nourished. Pulmonary:      Effort: Pulmonary effort is normal.      Breath sounds: Normal breath sounds. Neurological:      General: No focal deficit present. Mental Status: She is alert and oriented to person, place and time. Motor: Strength full and intact in all extremities. Diagnostic Studies      EKG: NSR  Imaging:  I have personally reviewed pertinent reports.    and I have personally reviewed pertinent films in PACS     Medications:  Scheduled PRN   atorvastatin, 80 mg, HS  bisacodyl, 10 mg, QPM  clopidogrel, 75 mg, Daily  enoxaparin, 30 mg, Daily  levothyroxine, 25 mcg, Early Morning  magnesium sulfate, 4 g, Once  nicotine, 1 patch, Daily  spironolactone, 25 mg, Daily      albuterol, 2 puff, Q6H PRN  labetalol, 5 mg, Q15 Min PRN   And  hydrALAZINE, 15 mg, Q15 Min PRN   And  niCARdipine, 1-15 mg/hr, Continuous PRN  ondansetron, 4 mg, Q6H PRN  phenylephine,  mcg/min, Continuous PRN       Continuous    niCARdipine, 1-15 mg/hr  phenylephine,  mcg/min         Labs:    CBC    Recent Labs     11/16/23  1645 11/17/23  0602   WBC  --  10.73*   HGB  --  8.5*   HCT  --  25.5*   * 136*     BMP    Recent Labs     11/17/23  0602   SODIUM 138   K 4.0   *   CO2 20*   AGAP 8   BUN 48*   CREATININE 1.09   CALCIUM 7.9*       Coags    Recent Labs     11/17/23  0602   INR 1.13   PTT 32        Additional Electrolytes  Recent Labs     11/17/23  0602   MG 1.6*   PHOS 3.4          Blood Gas    No recent results  No recent results LFTs  No recent results    Infectious  No recent results  Glucose  Recent Labs     11/17/23  0602   GLUC [facility]

## 2023-11-17 NOTE — QUICK NOTE
Patient unsure of what pharmacy she wants to use therefore no outpatient pharmacy is listed. Patient written a script for nicotine patch - 1 patch every 24 hours disp #30, no refills.

## 2023-11-17 NOTE — PROGRESS NOTES
Progress Note - Vascular Surgery   Leyla Bridges 80 y.o. female MRN: [id number]  Unit/Bed#: ICU 11 Encounter: [id number]    Assessment:  1) POD #1 s/p Left Carotid Endarterectomy -stable, blood pressure slightly low but otherwise AVSS, minimal to no tenderness over operative site, neurovascularly intact throughout the evening into this this morning, gross neurologic and cranial nerve exam this morning without any deficits, trachea is midline and neck is soft and supple, patient tolerating advancement of diet, not up and ambulating yet, hemoglobin did drop from 12.3-8.5 some of which could be dilutional but would like to reevaluate, otherwise labs within normal limits    Plan:  1)   -Continue to tentatively plan for discharge this afternoon  -Repeat hemoglobin/hematocrit between 12 to 1 PM  -Continue regular diet  -Wait for pending PT/OT input  -Continue Lexapro can discontinue Lovenox due to significant bruising  -Continue home medications  -Continue IV fluids  -Discharge instructions  -Bedside neurologic exam    Subjective/Objective   Chief Complaint: I am doing pretty well, I feel much better than yesterday now that it is over    Subjective: Patient was seen and examined at bedside. Patient denies any acute events overnight. Patient's been tolerating regular diet. Patient has not been up and ambulating quite yet. Patient has not seen PT/OT and also reports that she has a history of some arthritis and gout on her left side which sometimes inhibits her mobility. Patient other wise feels quite well. Patient denies any significant tenderness or pain overlying the left neck/operative site. Patient has not had any issues with her breathing. Patient does not note any sort of focal neurologic deficits. Patient denies any sort of change in her speech, visual disturbances, auditory disturbances, facial drooping, loss of balance, weakness of her extremities.   She is quite eager to go this afternoon    Objective: Blood pressure 141/61, pulse 71, temperature 97.5 °F (36.4 °C), temperature source Temporal, resp. rate (!) 26, height 5' 3" (1.6 m), weight 55.1 kg (121 lb 7.6 oz), SpO2 (!) 88 %. ,Body mass index is 21.52 kg/m². Intake/Output Summary (Last 24 hours) at 11/17/2023 0929  Last data filed at 11/17/2023 0641  Gross per 24 hour   Intake 2690.5 ml   Output 900 ml   Net 1790.5 ml       Invasive Devices       Peripheral Intravenous Line  Duration             Peripheral IV 11/16/23 Left Forearm 1 day    Peripheral IV 11/16/23 Right Wrist 1 day              Arterial Line  Duration             Arterial Line 11/16/23 Left Radial 1 day                    Physical Exam: /61   Pulse 71   Temp 97.5 °F (36.4 °C) (Temporal)   Resp (!) 26   Ht 5' 3" (1.6 m)   Wt 55.1 kg (121 lb 7.6 oz)   SpO2 (!) 88%   BMI 21.52 kg/m²   General appearance: alert and oriented, in no acute distress  Head: Normocephalic, without obvious abnormality, atraumatic  Eyes: conjunctivae/corneas clear. PERRL, EOM's intact. Fundi benign. Neck: no adenopathy, no carotid bruit, no JVD, supple, symmetrical, trachea midline, and thyroid not enlarged, symmetric, no tenderness/mass/nodules  Lungs: clear to auscultation bilaterally  Heart: regular rate and rhythm, S1, S2 normal, no murmur, click, rub or gallop and mild systolic ejection murmur over aortic and pulmonic areas  Extremities: extremities normal, warm and well-perfused; no cyanosis, clubbing, or edema  Skin: Skin color, texture, turgor normal. No rashes or lesions or incision is c/d/i    Lab, Imaging and other studies:I have personally reviewed pertinent lab results.   , CBC:   Lab Results   Component Value Date    WBC 10.73 (H) 11/17/2023    HGB 8.5 (L) 11/17/2023    HCT 25.5 (L) 11/17/2023     (H) 11/17/2023     (L) 11/17/2023    RBC 2.47 (L) 11/17/2023    MCH 34.4 (H) 11/17/2023    MCHC 33.3 11/17/2023    RDW 15.6 (H) 11/17/2023    MPV 11.2 11/17/2023   , CMP:   Lab Results   Component Value Date    SODIUM 138 11/17/2023    K 4.0 11/17/2023     (H) 11/17/2023    CO2 20 (L) 11/17/2023    BUN 48 (H) 11/17/2023    CREATININE 1.09 11/17/2023    CALCIUM 7.9 (L) 11/17/2023    EGFR 47 11/17/2023     VTE Pharmacologic Prophylaxis: Enoxaparin (Lovenox)  VTE Mechanical Prophylaxis: sequential compression device

## 2023-11-17 NOTE — UTILIZATION REVIEW
Date: 11/17 Elective surgical IP case. Initial Clinical Review    Elective IP surgical procedure  Age/Sex: 80 y.o. female  Surgery Date: 11/16  Procedure:   Pre-op Diagnosis:  Bilateral carotid artery stenosis [I65.23]     Post-Op Diagnosis Codes:     * Bilateral carotid artery stenosis [I65.23]      Procedure(s):  LEFT CAROTID ENDARTERECTOMY WITH BOVINE PATCH, INTRA-OP DUPLEX     Anesthesia: General    Operative Findings:   Highly calcified atherosclerotic plaque in the distal common and proximal internal carotid arteries creating a critical stenosis. Following endarterectomy and bovine patch angioplasty there was no significant residual stenosis or intraluminal defect in the common or internal carotid arteries. There was a stenosis with near occlusion of the external carotid artery 1.5-2 cm from its origin. POD#1 Progress Note/POD #1  Pt upgraded from SD level 1 to Critical care overnight. Normotensive. Lower BP, minimal tenderness at op site. Neurovascular intact, no neuro deficits this AM.  Decreased Hgb down to 8.5 from 12.3 - repeat H/H at noon, therapy evals. Continue IV fluids, neuro exams, analgesia. On exam no deficits. Lungs clear.       Admission Orders: Date/Time/Statement:   Admission Orders (From admission, onward)       Ordered        11/16/23 0744  Inpatient Admission  Once                          Orders Placed This Encounter   Procedures    Inpatient Admission     Standing Status:   Standing     Number of Occurrences:   1     Order Specific Question:   Level of Care     Answer:   Level 1 Stepdown [13]     Order Specific Question:   Estimated length of stay     Answer:   Inpatient Only Surgery     Vital Signs: /61   Pulse 71   Temp 97.5 °F (36.4 °C) (Temporal)   Resp (!) 26   Ht 5' 3" (1.6 m)   Wt 55.1 kg (121 lb 7.6 oz)   SpO2 (!) 88%   BMI 21.52 kg/m²     Pertinent Labs/Diagnostic Test Results:   VAS intra-op ultrasound CEA   Final Result by Erika Salinas MD (00/91 [id number])            Results from last 7 days   Lab Units 11/17/23  0602 11/16/23  1645   WBC Thousand/uL 10.73*  --    HEMOGLOBIN g/dL 8.5*  --    HEMATOCRIT % 25.5*  --    PLATELETS Thousands/uL 136* 141*         Results from last 7 days   Lab Units 11/17/23  0602   SODIUM mmol/L 138   POTASSIUM mmol/L 4.0   CHLORIDE mmol/L 110*   CO2 mmol/L 20*   ANION GAP mmol/L 8   BUN mg/dL 48*   CREATININE mg/dL 1.09   EGFR ml/min/1.73sq m 47   CALCIUM mg/dL 7.9*   MAGNESIUM mg/dL 1.6*   PHOSPHORUS mg/dL 3.4             Results from last 7 days   Lab Units 11/17/23  0602   GLUCOSE RANDOM mg/dL 112     Results from last 7 days   Lab Units 11/17/23  0602   PROTIME seconds 14.8*   INR  1.13   PTT seconds 32     Diet: cardiac  Mobility: elevate HOB  DVT Prophylaxis: SCDs, Plavix    Medications/Pain Control:   Scheduled Medications:  atorvastatin, 80 mg, Oral, HS  bisacodyl, 10 mg, Oral, QPM  clopidogrel, 75 mg, Oral, Daily  enoxaparin, 30 mg, Subcutaneous, Daily  levothyroxine, 25 mcg, Oral, Early Morning  magnesium sulfate, 4 g, Intravenous, Once  nicotine, 1 patch, Transdermal, Daily  spironolactone, 25 mg, Oral, Daily      Continuous IV Infusions:  niCARdipine, 1-15 mg/hr, Intravenous, Continuous PRN - d/c   phenylephine,  mcg/min, Intravenous, Continuous PRN - d/c       PRN Meds:  albuterol, 2 puff, Inhalation, Q6H PRN  labetalol, 5 mg, Intravenous, Q15 Min PRN   And  hydrALAZINE, 15 mg, Intravenous, Q15 Min PRN   And  niCARdipine, 1-15 mg/hr, Intravenous, Continuous PRN  ondansetron, 4 mg, Intravenous, Q6H PRN  phenylephine,  mcg/min, Intravenous, Continuous PRN    Network Utilization Review Department  ATTENTION: Please call with any questions or concerns to [phone] and carefully listen to the prompts so that you are directed to the right person. All voicemails are confidential.   For Discharge needs, contact Care Management DC Support Team at [phone] opt.  2  Send all requests for admission clinical reviews, approved or denied determinations and any other requests to dedicated fax number below belonging to the campus where the patient is receiving treatment.  List of dedicated fax numbers for the Facilities:  [place] DENIALS (Administrative/Medical Necessity) [phone]   DISCHARGE SUPPORT TEAM (NETWORK) [id number] Jack Suarez (Maternity/NICU/Pediatrics) [phone]   [address] [place] [address] Drive 1521 [facility] 1000 [facility] [phone] [address] [address] [facility] [address] [facility] [address] 1300 [facility] [address] [place] [address] [phone]

## 2023-11-17 NOTE — PROGRESS NOTES
NEPHROLOGY PROGRESS NOTE   Leyla Bridges 80 y.o. female MRN: [id number]  Unit/Bed#: ICU 11 Encounter: [id number]  Reason for Consult: SERJIO prevention    ASSESSMENT AND PLAN:   80 y.o woman with PMH of peripheral arterial occlusive disease and cerebrovascular occlusive disease, CAD, CHF, CKD G3b  (baseline creatinine 1.3 to 1.8 mg/dL ). Patient was admitted for left elective endarterectomy. .  Nephrology is consulted for management of SERJIO prevention        PLAN     #CKD G3b  Baseline creatinine: 1.3 to 1.8 mg/dL  Current creatinine: 1.09 mg/dL, baseline  Etiology: Likely secondary to nephrosclerosis in the settings of cardiovascular disease  Plan:  Maintain mean arterial pressure above 65 mmHg to avoid hypotension/hypoperfusion  Avoid nephrotoxic agents such as NSAIDs and contrast if at all possible    Avoid opioids   Adjust medications to GFR  Renal diet   Agree with IV hydration with Plasma-Lyte for now  Enforce fluid intake  Patient can continue follow-up with PCP and nephrology on discharge     #Volume/hypertension:  Volume: Euvolemic  Blood pressure: Tendency to hypotension, /49 mmhg   Low sodium diet   Discontinue IV fluids. Patient is drinking  Continue holding amlodipine   Consider discontinue spironolactone     #Acid base disorder  Hyperchloremic acidosis  serum HCO3 20 mmol/L  Discontinue IV fluids as above  Indication of sodium bicarb at this time     #CKD-MBD  Calcium 7.9  Monitor ionized calcium     #Hypercalcemia  PTH appropriately suppressed  MGUS     #Anemia:  Current hemoglobin: 8.5 mg/dL drop from yesterday  Rule out active bleeding, versus dilutional  Treatment:  Transfuse for hemoglobin less than 7.0 per primary service       #Carotid stenosis  Status post endarterectomy  Management as per vascular team    Nephrology will sign off at this time. Thank you for involving us in this case. Please call us if any question. SUBJECTIVE:  Patient seen and examined at bedside.  No chest pain, shortness of breath, nausea, vomiting, abdominal pain or diarrhea. No urinary complaints.          OBJECTIVE:  Current Weight: Weight - Scale: 55.1 kg (121 lb 7.6 oz)  Vitals:    11/17/23 0700   BP: (!) 107/49   Pulse: 59   Resp: 17   Temp: 97.5 °F (36.4 °C)   SpO2: 97%       Intake/Output Summary (Last 24 hours) at 11/17/2023 0819  Last data filed at 11/17/2023 0641  Gross per 24 hour   Intake 2690.5 ml   Output 900 ml   Net 1790.5 ml     Wt Readings from Last 3 Encounters:   11/16/23 55.1 kg (121 lb 7.6 oz)   11/01/23 54.2 kg (119 lb 6.4 oz)   10/26/23 54 kg (119 lb)     Temp Readings from Last 3 Encounters:   11/17/23 97.5 °F (36.4 °C) (Temporal)   11/01/23 97.8 °F (36.6 °C) (Temporal)   10/23/23 98 °F (36.7 °C)     BP Readings from Last 3 Encounters:   11/17/23 (!) 107/49   11/01/23 140/76   10/26/23 154/70     Pulse Readings from Last 3 Encounters:   11/17/23 59   11/01/23 70   10/26/23 70        General:  no acute distress at this time  Skin:  No acute rash  Eyes:  No scleral icterus and noninjected  ENT:  mucous membranes moist  Neck:  no carotid bruits  Chest:  Clear to auscultation percussion, good respiratory effort, no use of accessory respiratory muscles  CVS:  Regular rate and rhythm without rub   Abdomen:  soft and nontender   Extremities: no significant lower extremity edema  Neuro:  No gross focality  Psych:  Alert , cooperative       Medications:    Current Facility-Administered Medications:     albuterol (PROVENTIL HFA,VENTOLIN HFA) inhaler 2 puff, 2 puff, Inhalation, Q6H PRN, Deyvi Grayson MD    atorvastatin (LIPITOR) tablet 80 mg, 80 mg, Oral, HS, Keyshawn Archibald MD, 80 mg at 11/16/23 2149    bisacodyl (DULCOLAX) EC tablet 10 mg, 10 mg, Oral, QPM, Keyshawn Archibald MD    clopidogrel (PLAVIX) tablet 75 mg, 75 mg, Oral, Daily, Keyshawn Archibald MD    enoxaparin (LOVENOX) subcutaneous injection 30 mg, 30 mg, Subcutaneous, Daily, Deyvi Grayson MD, 30 mg at 11/16/23 1829 labetalol (NORMODYNE) injection 5 mg, 5 mg, Intravenous, Q15 Min PRN **AND** hydrALAZINE (APRESOLINE) injection 15 mg, 15 mg, Intravenous, Q15 Min PRN **AND** niCARdipine (CARDENE) 25 mg (STANDARD CONCENTRATION) in sodium chloride 0.9% 250 mL, 1-15 mg/hr, Intravenous, Continuous PRN, Sharad Juarez MD    levothyroxine tablet 25 mcg, 25 mcg, Oral, Early Morning, Sharad Juarez MD, 25 mcg at 23 0603    magnesium sulfate 4 g/100 mL IVPB (premix) 4 g, 4 g, Intravenous, Once, [employer], CRNP, Last Rate: 25 mL/hr at 23 0817, 4 g at 23 0817    nicotine (NICODERM CQ) 14 mg/24hr TD 24 hr patch 1 patch, 1 patch, Transdermal, Daily, Sharad Juarez MD, 1 patch at 23 1239    ondansetron (ZOFRAN) injection 4 mg, 4 mg, Intravenous, Q6H PRN, Sharad Juarez MD    [] sodium chloride 0.9 % bolus 500 mL, 500 mL, Intravenous, Once PRN **FOLLOWED BY** phenylephrine (NAT-SYNEPHRINE) 50 mg (STANDARD CONCENTRATION) in sodium chloride 0.9% 250 mL,  mcg/min, Intravenous, Continuous PRN, Sharad Juarez MD    spironolactone (ALDACTONE) tablet 25 mg, 25 mg, Oral, Daily, Sharad Juarez MD    Laboratory Results:  Results from last 7 days   Lab Units 23  0602 23  1645   WBC Thousand/uL 10.73*  --    HEMOGLOBIN g/dL 8.5*  --    HEMATOCRIT % 25.5*  --    PLATELETS Thousands/uL 136* 141*   SODIUM mmol/L 138  --    POTASSIUM mmol/L 4.0  --    CHLORIDE mmol/L 110*  --    CO2 mmol/L 20*  --    BUN mg/dL 48*  --    CREATININE mg/dL 1.09  --    CALCIUM mg/dL 7.9*  --    MAGNESIUM mg/dL 1.6*  --    PHOSPHORUS mg/dL 3.4  --        VAS intra-op ultrasound CEA   Final Result by Cherisse Kussmaul, MD ( [id number])          Portions of the record may have been created with voice recognition software. Occasional wrong word or "sound a like" substitutions may have occurred due to the inherent limitations of voice recognition software.  Read the chart carefully and recognize, using context, where substitutions have occurred.

## 2023-11-17 NOTE — ASSESSMENT & PLAN NOTE
Presented on 11/16 for an elective left carotid endarterectomy  History of a right carotid endarterectomy repair in 2021  Admitted to ICU postoperatively   Now POD #1  Currently normotensive -hold home dose Coreg/Lasix/Vasotec  Resume home dose Norvasc, Aldactone per surgery  Continue with neuro exams per protocol  SBP goal 100-160, did not require continuous infusions overnight   Monitor incision, currently without evidence of significant ecchymosis or hematoma   Pain control

## 2023-11-17 NOTE — DISCHARGE INSTR - OTHER ORDERS
Skin Care Plan:     1-Clease wound to left posterior heel with wound cleanser or soap and water & pat dry. Open small Dermagran square & apply to wound in a single layer cut to size of wound. Cover with gauze, mane & tape. Change every other day and as needed. 2-Hydraguard barrier cream to bilateral sacrum, buttock and right heel 2x/day and as needed and to left heel with dressing changes every other day. 3-Float heels on 2 pillows in bed to offload pressure so heels are not in contact with mattress or pillows. 4-Use pressure redistribution cushion in chair when out of bed. Limit prolonged sitting. 5-Moisturize skin daily with skin nourishing cream.  6-Turn/reposition every 2 hrs for pressure re-distribution on skin. 7-If wound does not heal or if new wounds develop, you may follow up at [facility]. Call Central Scheduling for appointment: [phone]. 8-You may purchase Paulina Sleeves on [address] [place] [address] to protect your arms, especially when wearing short sleeves. You may also be able to purchase them from a surgical supply store.

## 2023-11-17 NOTE — ASSESSMENT & PLAN NOTE
Lab Results   Component Value Date    EGFR 52 11/01/2023    EGFR 34 09/12/2023    EGFR 41 07/28/2023    CREATININE 1.00 11/01/2023    CREATININE 1.41 (H) 09/12/2023    CREATININE 1.20 07/28/2023     Baseline creatinine 1.2-1.4  Labs pending this AM  Monitor I&O  Avoid nephrotoxic agents

## 2023-11-17 NOTE — DISCHARGE INSTR - AVS FIRST PAGE
DISCHARGE INSTRUCTIONS  CAROTID ENDARTERECTOMY    ACTIVITY:  Limit your physical activity to walking for the first week and then increase your activity as tolerated. Walking up steps and normal activities may be resumed as you feel ready. Avoid strenuous activity such as vigorous exercise. Avoid heavy lifting (do not lift more than 15 pounds) for the first four weeks after surgery. You should not drive a car for at least one week following discharge from the hospital and you are off all narcotic pain medication. You may ride in a car. If you have had a stroke or mini-stroke, please consult with your neurologist prior to driving. DO NOT START OR RESUME ANY PREVIOUSLY PLANNED THERAPY (PHYSICAL, CARDIAC, REHAB, ETC…) UNTIL YOU DISCUSS WITH YOUR SURGEON AND THEY FEEL IT IS SAFE TO ENGAGE IN THERAPY. DIET:  Resume your normal diet. Good nutrition is important for healing of your incision. You can expect to have a sore throat and trouble swallowing after surgery which should improve quickly. If you feel like you are choking, please call your doctor. RECURRENT SYMPTOMS: If you develop any new numbness, weakness, vision changes, drooping of the face, or difficulty with speech after discharge, CALL 911 or go to the nearest Emergency department immediately. INCISION SITE:  You may have surgical glue at your incision site. There are stitches present under the skin which will absorb on their own. The glue is used to cover the incision, assist in closure, and prevent contamination. This adhesive will darken and peel away on its own within one to two weeks. Do not pick at it. If you have a bandage, please remove this on the second day after surgery. You should shower daily. Wash incision daily with soap and water, but do not rub or scrub the incision; rinse thoroughly and pat dry. Do not bathe in a tub or swim for the first 4 weeks following surgery or if you have any open wounds.   It is normal to have some bruising, swelling or discoloration around the incision. IF increasing redness, pain, bleeding or a bulge develops, call our office immediately. If you notice any active bleeding at the site, apply pressure to the site and call our office ([phone]) or [phone]. FOLLOW UP STUDIES: Doppler ultrasound studies are very important to your post-operative care. Your surgeon will arrange them at your first postoperative visit. The first study will be 3 months after surgery. FOLLOW UP APPOINTMENTS:  Making and keeping follow up appointments and ultrasound tests are important to your recovery. If you have difficulty making it to or keeping your follow up appointments, call the office. If you have increased pain, fever >101.5, increased drainage, redness or a bad smell at your surgery site, new coldness/numbness of your arm or leg, please call us immediately and GO directly to the ER. PLEASE CALL THE OFFICE IF YOU HAVE ANY QUESTIONS  [phone]  [phone]  4 [facility] [address], [place] ([place]), [address] [place] [address]  801 [facility],[address] [place] [address], 65 [facility]  100 [address], [address] [place], [place] [address]), 1200 [facility]  1501 [facility], 319 [facility], 161 [facility], [address] [place] [address]:  Cleanse wound & pat dry. Open small Dermagran square & apply to wound in a single layer cut to size. Cover with gauze and tape.  Change every other day

## 2023-11-17 NOTE — WOUND OSTOMY CARE
Progress Note - Wound   Ari De Los Santos 80 y.o. female MRN: [id number]  Unit/Bed#: ICU 11 Encounter: [id number]      Assessment: This is an 80year old female patient admitted on 11/16/23 with left carotid artery stenosis. She has a history of HTN, diastolic dysfunction, CKD and right carotid artery stenosis. She was awake, alert & oriented x 3 and agreeable to have wound visit done. She is for discharge today and is waiting for her transportation. Assessment Findings:  1-Patient sustained a traumatic wound to left posterior heel accidentally due to striking it with an IV pole when ambulating. Wound was dressed by Primary RN and orders in place for wound and skin care. Wound appears on photo to be a partial thickness wound with red granulation tissue. 2-As per primary RN, sacrobuttocks are intact. Plan:   1-Clease wound to left posterior heel with NSS & pat dry. Open small Dermagran square & apply to wound in a single layer cut to size of wound. Cover with gauze, mane & tape. Change every other day and as needed. 2-Hydraguard to bilateral sacrum, buttock and right heel BID and PRN and to left heel every other day with dressing changes. 3-Float heels on 2 pillows to offload pressure so heels are not in contact with mattress or pillows. 4-Ehob pressure redistribution cushion in chair when out of bed. Limit prolonged sitting. 5-Moisturize skin daily with skin nourishing cream.  6-Turn/reposition q2h or when medically stable for pressure re-distribution on skin. Wound 11/17/23 Skin Tear Abrasion(s) Heel Left (Active)   Wound Image   11/17/23 1015     Discussed assessment findings, and plan of care/recommendations with Lien BERMAN. Patient is for discharge today, please call or tiger text with questions and concerns    Recommendations written as orders.   Kori Solorio MSN, RN, Richard Zurita

## 2023-11-17 NOTE — ASSESSMENT & PLAN NOTE
Wt Readings from Last 3 Encounters:   11/16/23 55.1 kg (121 lb 7.6 oz)   11/01/23 54.2 kg (119 lb 6.4 oz)   10/26/23 54 kg (119 lb)     Last echocardiogram October 2023 with EF 65 to 70%  Maintain -160  Consider resuming home dose Lasix/Coreg  Continue with Norvasc/Aldactone per surgery  Currently stable on room air

## 2023-11-17 NOTE — ASSESSMENT & PLAN NOTE
Wt Readings from Last 3 Encounters:   11/16/23 55.1 kg (121 lb 7.6 oz)   11/01/23 54.2 kg (119 lb 6.4 oz)   10/26/23 54 kg (119 lb)       Last echocardiogram October 2023 with EF 65 to 70%  Home Lasix/Coreg held yesterday, consider resuming today   Currently stable on room air

## 2023-11-17 NOTE — NURSING NOTE
Patient ordered discharge to home. Discharge instructions including followup appt, post op care and adverse symptoms to watch for included. Discharge medications reviewed with patient including which medications were given and next doses due. Patient states understanding of discharge instructions. Encouraged patient to call MD office if she has any additional questions once home.    Patient discharged via wheelchair for significant other to  in front of hospital.

## 2023-11-17 NOTE — PHYSICAL THERAPY NOTE
11/17/23 1250   PT Last Visit   PT Visit Date 11/17/23   Note Type   Note type Screen   Additional Comments PT orders received and chart reviewed. Per pt, was seen by OT earlier in the day and did well with ambulation and functional mobility. Pt amb w/out an AD inside and uses her rollator outside. Pt states being dc later today and will have good family support at home. Pt defers PT evaluation at this time as pt feels she has no skilled PT needs. Care coordination with Wil Hamilton who stated that pt did very well with OT.  Will dc skilled IP PT needs at this time per pt request.   Licensure   [place] License Number  [address] [place], [place] [id number]

## 2023-11-17 NOTE — QUICK NOTE
Attempted to call daughter Bessie Delvalle with an update. No answer. Attempted to call daughter Kindra Putnam with an update - number out of service.

## 2023-11-20 ENCOUNTER — RA CDI HCC (OUTPATIENT)
Dept: OTHER | Facility: HOSPITAL | Age: 83
End: 2023-11-20

## 2023-11-20 ENCOUNTER — TELEPHONE (OUTPATIENT)
Dept: VASCULAR SURGERY | Facility: CLINIC | Age: 83
End: 2023-11-20

## 2023-11-20 NOTE — PROGRESS NOTES
[address] [facility] coding opportunities          Chart Reviewed number of suggestions sent to Provider: 1     Patients Insurance   I73.9  Medicare Insurance: [facility] Medicare Complete

## 2023-11-20 NOTE — TELEPHONE ENCOUNTER
Vascular Nurse Navigator Post Op Call    Procedure: LEFT CAROTID ENDARTERECTOMY WITH BOVINE PATCH, INTRA-OP DUPLEX     Date of Procedure: 11/16/23    Surgeon:   Milvia Llamas MD - [id number] Susan Hirsch MD - Assisting    Discharge Date:  11/17/23    Discharge Disposition:  Home    Change in Vision?: No    Change in Speech?: No    Weakness?: No    Uncontrolled Pain?: No    Hoarseness?: No    Trouble Swallowing?: No    Incision Concerns?: No    Anticoagulation pt was discharged on post op?: Clopidogrel (Plavix)    Statin pt was discharged on post op?: Lipitor (atorvastatin)    Bleeding?: No    Fever/chills?: No      Reviewed discharge instructions and incision care with patient. NEXT OFFICE VISIT SCHEDULED:  11/22/23 at 1 pm with Moon Bowen PA-C at The [address] [place] [address] Available?: Yes      Any further questions/concerns? Patient stated that she is doing good since discharge. She stated that she is having some oozing on her arm where they poked her for blood. She stated that she is cleaning the areas and changing the bandages as needed. Reviewed incision care with her - wash dialy with soap and water. Reviewed discharge medications - Plavix. All questions answered. No concerns expressed at this time.

## 2023-11-20 NOTE — UTILIZATION REVIEW
NOTIFICATION OF ADMISSION DISCHARGE   This is a Notification of Discharge from [facility]. Please be advised that this patient has been discharge from our facility. Below you will find the admission and discharge date and time including the patient’s disposition. UTILIZATION REVIEW CONTACT:  Coreen Orellana  Utilization   Network Utilization Review Department  Phone: [phone] x carefully listen to the prompts. All voicemails are confidential.  Email: [email or web address]     ADMISSION INFORMATION  PRESENTATION DATE: 11/16/2023  6:16 AM  OBERVATION ADMISSION DATE:   INPATIENT ADMISSION DATE: 11/16/23  7:44 AM   DISCHARGE DATE: 11/17/2023  3:15 PM   DISPOSITION:Home/Self Care    Network Utilization Review Department  ATTENTION: Please call with any questions or concerns to [phone] and carefully listen to the prompts so that you are directed to the right person. All voicemails are confidential.   For Discharge needs, contact Care Management DC Support Team at [phone] opt. 2  Send all requests for admission clinical reviews, approved or denied determinations and any other requests to dedicated fax number below belonging to the campus where the patient is receiving treatment.  List of dedicated fax numbers for the Facilities:  [place] DENIALS (Administrative/Medical Necessity) [phone]   DISCHARGE SUPPORT TEAM (Network) [phone] 2303 [facility] (Maternity/NICU/Pediatrics) [phone]   [address] [facility] [address] [phone] [address] [facility] [phone]   [address] [place] [address]  [address]  [phone]

## 2023-11-21 ENCOUNTER — OFFICE VISIT (OUTPATIENT)
Dept: FAMILY MEDICINE CLINIC | Facility: CLINIC | Age: 83
End: 2023-11-21
Payer: COMMERCIAL

## 2023-11-21 VITALS
BODY MASS INDEX: 20.8 KG/M2 | HEIGHT: 63 IN | WEIGHT: 117.4 LBS | HEART RATE: 72 BPM | TEMPERATURE: 96.5 F | RESPIRATION RATE: 16 BRPM | DIASTOLIC BLOOD PRESSURE: 88 MMHG | SYSTOLIC BLOOD PRESSURE: 148 MMHG

## 2023-11-21 DIAGNOSIS — D69.6 PLATELETS DECREASED (HCC): ICD-10-CM

## 2023-11-21 DIAGNOSIS — E78.2 MIXED HYPERLIPIDEMIA: ICD-10-CM

## 2023-11-21 DIAGNOSIS — D62 ACUTE BLOOD LOSS ANEMIA: ICD-10-CM

## 2023-11-21 DIAGNOSIS — I65.22 STENOSIS OF LEFT CAROTID ARTERY: Primary | Chronic | ICD-10-CM

## 2023-11-21 DIAGNOSIS — N18.30 STAGE 3 CHRONIC KIDNEY DISEASE, UNSPECIFIED WHETHER STAGE 3A OR 3B CKD (HCC): ICD-10-CM

## 2023-11-21 DIAGNOSIS — I50.32 HYPERTENSIVE HEART DISEASE WITH CHRONIC DIASTOLIC CONGESTIVE HEART FAILURE (HCC): ICD-10-CM

## 2023-11-21 DIAGNOSIS — E83.42 HYPOMAGNESEMIA: ICD-10-CM

## 2023-11-21 DIAGNOSIS — I11.0 HYPERTENSIVE HEART DISEASE WITH CHRONIC DIASTOLIC CONGESTIVE HEART FAILURE (HCC): ICD-10-CM

## 2023-11-21 DIAGNOSIS — I10 MALIGNANT HYPERTENSION: ICD-10-CM

## 2023-11-21 DIAGNOSIS — I10 ESSENTIAL HYPERTENSION: Chronic | ICD-10-CM

## 2023-11-21 PROCEDURE — 99495 TRANSJ CARE MGMT MOD F2F 14D: CPT | Performed by: FAMILY MEDICINE

## 2023-11-21 RX ORDER — FUROSEMIDE 20 MG/1
20 TABLET ORAL DAILY
Qty: 90 TABLET | Refills: 3 | Status: SHIPPED | OUTPATIENT
Start: 2023-11-21

## 2023-11-21 RX ORDER — SPIRONOLACTONE 25 MG/1
25 TABLET ORAL DAILY
Qty: 90 TABLET | Refills: 1 | Status: SHIPPED | OUTPATIENT
Start: 2023-11-21

## 2023-11-21 RX ORDER — COLCHICINE 0.6 MG/1
TABLET ORAL
COMMUNITY
Start: 2023-11-09

## 2023-11-21 RX ORDER — ATORVASTATIN CALCIUM 80 MG/1
80 TABLET, FILM COATED ORAL
Qty: 90 TABLET | Refills: 3 | Status: SHIPPED | OUTPATIENT
Start: 2023-11-21

## 2023-11-21 RX ORDER — AMLODIPINE BESYLATE 10 MG/1
10 TABLET ORAL EVERY MORNING
Qty: 90 TABLET | Refills: 3 | Status: SHIPPED | OUTPATIENT
Start: 2023-11-21

## 2023-11-21 NOTE — PROGRESS NOTES
Assessment & Plan     1. Stenosis of left carotid artery  Assessment & Plan:  She is doing well s/p left CEA during recent hospitalization. Continue current medications including plavix, high dose statin, and current blood pressure medications. Her BP in office today is slightly elevated at 148/88 but home blood pressures have been within range. She will continue to keep a home BP log and let her cardiologist know if SBP >160. She has f/u visit with vascular surgeon next week. 2. Acute blood loss anemia  -     CBC and differential; Future    3. Stage 3 chronic kidney disease, unspecified whether stage 3a or 3b CKD ([address])  -     Comprehensive metabolic panel; Future    4. Hypomagnesemia  -     Magnesium; Future    5. Platelets decreased ([address])  -     CBC and differential; Future        Depression Screening and Follow-up Plan: Patient was screened for depression during today's encounter. They screened negative with a PHQ-2 score of 0. Subjective     Transitional Care Management Review:   Joo Mcknight is a 80 y.o. female here for TCM follow up. During the TCM phone call patient stated:  TCM Call       Date and time call was made  11/17/2023  3:42 PM    Hospital care reviewed  Records reviewed    Patient was hospitialized at  [address]    Date of Admission  11/16/23    Date of discharge  11/17/23    Diagnosis  Carotid artery stenosis    Disposition  Home    Were the patients medications reviewed and updated  No  States that she knows what to take and will call if any questions    Current Symptoms  None          TCM Call       Post hospital issues  None    Should patient be enrolled in anticoag monitoring? No    Scheduled for follow up?   Yes    Not clinically warranted  transfered to skilled nursing     Referrals needed  Dr Alvin Pop     Did you obtain your prescribed medications  Yes    Do you need help managing your prescriptions or medications  No    Is transportation to your appointment needed Yes    Specify why  She does not drive any more    I have advised the patient to call PCP with any new or worsening symptoms  [address]  Family    The type of support provided  Physical; Emotional    Do you have social support  Yes, as much as I need    Are you recieving any outpatient services  No    Are you recieving home care services  Yes    Types of home care services  Other (comment)    [address]- she is looking in to    Are you using any community resources  No    Current waiver services  No    Have you fallen in the last 12 months  Yes    How many times  1    Interperter language line needed  No    Counseling  Patient    Counseling topics  Activities of daily living; Importance of RX compliance; patient and family education; instructions for management; Risk factor reduction    Comments  I spoke with Karyle Crochet who states that she is doing fine. She knows to call if any fevers, incision site s/s of infection, etc Jennifer Mueller is here today for hospital discharge follow up after she was admitted to [facility] for elective left carotid endarterectomy. Surgery went well with no major complications other than blood loss anemia- however anemia improved with no blood transfusion. She was advised to continue plavix, statin and resume home blood pressure medications. She did have a wound on her right arm and right heel, but they are heeling well. Surgical incision wound on neck healing well. She has f/u with vascular surgery next week. Denies any acute issues or complaints at this time. Review of Systems   Constitutional: Negative. HENT: Negative. Eyes: Negative. Respiratory: Negative. Cardiovascular: Negative. Gastrointestinal: Negative. Endocrine: Negative. Genitourinary: Negative. Musculoskeletal: Negative. Skin: Negative. Allergic/Immunologic: Negative. Neurological: Negative. Hematological: Negative. Psychiatric/Behavioral: Negative. Objective     /88   Pulse 72   Temp (!) 96.5 °F (35.8 °C)   Resp 16   Ht 5' 3" (1.6 m)   Wt 53.3 kg (117 lb 6.4 oz)   BMI 20.80 kg/m²      Physical Exam  Vitals and nursing note reviewed. Constitutional:       General: She is not in acute distress. Appearance: She is well-developed. HENT:      Head: Normocephalic and atraumatic. Eyes:      Conjunctiva/sclera: Conjunctivae normal.   Cardiovascular:      Rate and Rhythm: Normal rate and regular rhythm. Heart sounds: No murmur heard. Pulmonary:      Effort: Pulmonary effort is normal. No respiratory distress. Breath sounds: Normal breath sounds. Abdominal:      Palpations: Abdomen is soft. Tenderness: There is no abdominal tenderness. Musculoskeletal:         General: No swelling. Cervical back: Neck supple. Skin:     General: Skin is warm and dry. Capillary Refill: Capillary refill takes less than 2 seconds. Comments: Significant ecchymosis on right arm with an abrasion that is currently not bleeding. No signs of infection. Surgical incision site on left side of neck healing well with no signs of bleeding or infection. Neurological:      Mental Status: She is alert.    Psychiatric:         Mood and Affect: Mood normal.       Medications have been reviewed by provider in current encounter    Chevy Andrade MD

## 2023-11-21 NOTE — ASSESSMENT & PLAN NOTE
She is doing well s/p left CEA during recent hospitalization. Continue current medications including plavix, high dose statin, and current blood pressure medications. Her BP in office today is slightly elevated at 148/88 but home blood pressures have been within range. She will continue to keep a home BP log and let her cardiologist know if SBP >160. She has f/u visit with vascular surgeon next week.

## 2023-11-27 ENCOUNTER — OFFICE VISIT (OUTPATIENT)
Dept: VASCULAR SURGERY | Facility: CLINIC | Age: 83
End: 2023-11-27
Payer: COMMERCIAL

## 2023-11-27 ENCOUNTER — APPOINTMENT (OUTPATIENT)
Dept: LAB | Facility: CLINIC | Age: 83
End: 2023-11-27
Payer: COMMERCIAL

## 2023-11-27 VITALS
BODY MASS INDEX: 20.55 KG/M2 | HEIGHT: 63 IN | DIASTOLIC BLOOD PRESSURE: 78 MMHG | WEIGHT: 116 LBS | SYSTOLIC BLOOD PRESSURE: 136 MMHG | HEART RATE: 59 BPM

## 2023-11-27 DIAGNOSIS — N18.30 STAGE 3 CHRONIC KIDNEY DISEASE, UNSPECIFIED WHETHER STAGE 3A OR 3B CKD (HCC): ICD-10-CM

## 2023-11-27 DIAGNOSIS — E83.42 HYPOMAGNESEMIA: ICD-10-CM

## 2023-11-27 DIAGNOSIS — K55.1 MESENTERIC ARTERY STENOSIS (HCC): Chronic | ICD-10-CM

## 2023-11-27 DIAGNOSIS — I65.23 BILATERAL CAROTID ARTERY STENOSIS: Primary | Chronic | ICD-10-CM

## 2023-11-27 DIAGNOSIS — D62 ACUTE BLOOD LOSS ANEMIA: ICD-10-CM

## 2023-11-27 DIAGNOSIS — D69.6 PLATELETS DECREASED (HCC): ICD-10-CM

## 2023-11-27 DIAGNOSIS — I74.09 AORTOILIAC OCCLUSIVE DISEASE (HCC): Chronic | ICD-10-CM

## 2023-11-27 LAB
ALBUMIN SERPL BCP-MCNC: 4.3 G/DL (ref 3.5–5)
ALP SERPL-CCNC: 60 U/L (ref 34–104)
ALT SERPL W P-5'-P-CCNC: 19 U/L (ref 7–52)
ANION GAP SERPL CALCULATED.3IONS-SCNC: 8 MMOL/L
AST SERPL W P-5'-P-CCNC: 31 U/L (ref 13–39)
BASOPHILS # BLD AUTO: 0.07 THOUSANDS/ÂΜL (ref 0–0.1)
BASOPHILS NFR BLD AUTO: 1 % (ref 0–1)
BILIRUB SERPL-MCNC: 0.54 MG/DL (ref 0.2–1)
BUN SERPL-MCNC: 34 MG/DL (ref 5–25)
CALCIUM SERPL-MCNC: 9.8 MG/DL (ref 8.4–10.2)
CHLORIDE SERPL-SCNC: 104 MMOL/L (ref 96–108)
CO2 SERPL-SCNC: 27 MMOL/L (ref 21–32)
CREAT SERPL-MCNC: 1.07 MG/DL (ref 0.6–1.3)
EOSINOPHIL # BLD AUTO: 0.15 THOUSAND/ÂΜL (ref 0–0.61)
EOSINOPHIL NFR BLD AUTO: 2 % (ref 0–6)
ERYTHROCYTE [DISTWIDTH] IN BLOOD BY AUTOMATED COUNT: 15.3 % (ref 11.6–15.1)
GFR SERPL CREATININE-BSD FRML MDRD: 48 ML/MIN/1.73SQ M
GLUCOSE SERPL-MCNC: 101 MG/DL (ref 65–140)
HCT VFR BLD AUTO: 32.7 % (ref 34.8–46.1)
HGB BLD-MCNC: 10.6 G/DL (ref 11.5–15.4)
IMM GRANULOCYTES # BLD AUTO: 0.04 THOUSAND/UL (ref 0–0.2)
IMM GRANULOCYTES NFR BLD AUTO: 1 % (ref 0–2)
LYMPHOCYTES # BLD AUTO: 1.64 THOUSANDS/ÂΜL (ref 0.6–4.47)
LYMPHOCYTES NFR BLD AUTO: 23 % (ref 14–44)
MAGNESIUM SERPL-MCNC: 1.6 MG/DL (ref 1.9–2.7)
MCH RBC QN AUTO: 34.3 PG (ref 26.8–34.3)
MCHC RBC AUTO-ENTMCNC: 32.4 G/DL (ref 31.4–37.4)
MCV RBC AUTO: 106 FL (ref 82–98)
MONOCYTES # BLD AUTO: 0.92 THOUSAND/ÂΜL (ref 0.17–1.22)
MONOCYTES NFR BLD AUTO: 13 % (ref 4–12)
NEUTROPHILS # BLD AUTO: 4.28 THOUSANDS/ÂΜL (ref 1.85–7.62)
NEUTS SEG NFR BLD AUTO: 60 % (ref 43–75)
NRBC BLD AUTO-RTO: 0 /100 WBCS
PLATELET # BLD AUTO: 306 THOUSANDS/UL (ref 149–390)
PMV BLD AUTO: 11.5 FL (ref 8.9–12.7)
POTASSIUM SERPL-SCNC: 4.1 MMOL/L (ref 3.5–5.3)
PROT SERPL-MCNC: 7.1 G/DL (ref 6.4–8.4)
RBC # BLD AUTO: 3.09 MILLION/UL (ref 3.81–5.12)
SODIUM SERPL-SCNC: 139 MMOL/L (ref 135–147)
WBC # BLD AUTO: 7.1 THOUSAND/UL (ref 4.31–10.16)

## 2023-11-27 PROCEDURE — 85025 COMPLETE CBC W/AUTO DIFF WBC: CPT

## 2023-11-27 PROCEDURE — 99213 OFFICE O/P EST LOW 20 MIN: CPT | Performed by: SURGERY

## 2023-11-27 PROCEDURE — 36415 COLL VENOUS BLD VENIPUNCTURE: CPT

## 2023-11-27 PROCEDURE — 80053 COMPREHEN METABOLIC PANEL: CPT

## 2023-11-27 PROCEDURE — 83735 ASSAY OF MAGNESIUM: CPT

## 2023-11-27 NOTE — ASSESSMENT & PLAN NOTE
History of aorto by femoral bypass and femoral thrombectomy she denies any symptoms related to claudication or rest pain she has palpable popliteal pulses on exam and is able to ambulate without leg pain. She has surveillance duplex AO IL and lead already ordered and we will have her follow-up with our office in a year.

## 2023-11-27 NOTE — PROGRESS NOTES
Assessment/Plan:    Carotid artery stenosis  History of right carotid endarterectomy 2 years ago and recent left carotid endarterectomy done 2 weeks ago by Dr. Elina Bailey. She is done very well postoperatively she denies any neurologic complaints or issues related to her incision she is currently taking Plavix she is really unclear on why she cannot take aspirin and she is unclear if the preference for Plavix was from her cardiologist.  From a vascular standpoint she could be on aspirin monotherapy at this time and I will otherwise not change any medications and I asked her to discuss this further with her cardiologist.  She is still smoking I will place a referral to the smoking cessation hotline and she will need a carotid duplex in 3 months for standard surveillance. Aortoiliac occlusive disease ([address])  History of aorto by femoral bypass and femoral thrombectomy she denies any symptoms related to claudication or rest pain she has palpable popliteal pulses on exam and is able to ambulate without leg pain. She has surveillance duplex AO IL and lead already ordered and we will have her follow-up with our office in a year. Diagnoses and all orders for this visit:    Bilateral carotid artery stenosis  -     VAS carotid complete study; Future  -     Ambulatory Referral to Smoking Cessation Program; Future    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HCC)    Aortoiliac occlusive disease (HCC)          Subjective:      Patient ID: Ruth Alvarado is a 80 y.o. female. Patient presents PO L CEA done 11/16/23 (Dr. Elina Bailey). Denies trouble swallowing, hoarseness, or s/s CVA. Incision is c/d/I. HPI    The following portions of the patient's history were reviewed and updated as appropriate: allergies, current medications, past family history, past medical history, past social history, past surgical history, and problem list.  Denies neurologic complaints. Denies claudication and rest pain. On plavix and still smoking.     Review of Systems   Constitutional: Negative. HENT: Negative. Eyes: Negative. Respiratory: Negative. Cardiovascular: Negative. Gastrointestinal: Negative. Endocrine: Negative. Genitourinary: Negative. Musculoskeletal: Negative. Skin: Negative. Allergic/Immunologic: Negative. Neurological: Negative. Hematological: Negative. Psychiatric/Behavioral: Negative. I have reviewed the ROS above and made changes as needed. Objective:      /78 (BP Location: Right arm, Patient Position: Sitting, Cuff Size: Standard)   Pulse 59   Ht 5' 3" (1.6 m)   Wt 52.6 kg (116 lb)   BMI 20.55 kg/m²          Physical Exam      General  Exam: alert, awake, oriented, no distress, consistent with stated age    Integumentary  Exam: warm, dry, no gross lesions, no bruises and normal color    Head and Neck  Exam: supple, no bruits, trachea midline, no JVD, no mass or palpable nodes    Left neck incision c/d/I  Neck soft    Eye  Exam: extraoccular movements intact, no scleral icterus, sclera clear, pupils equal round and reactive to light    ENMT  Exam: oral mucosa pink and moist    Chest and Lung  Exam: chest normal without deformity, bilaterally expansive, clear to auscultation    Cardiovascular  Exam: regular rate, regular rhythm, no murmurs, no rubs or gallops    Adbomen  Exam: soft, non-tender, non-distended, no pulsatile abdominal masses, no abdominal bruit    Peripheral Vascular  Exam: no clubbing of the digits of the upper extremity, no cyanosis, no edema, both feet are warm, radial pulses 2+ bilaterally, skin well perfused, without and no varicosities.     No widened popliteal pulse noted bilaterally    Upper Extremity:  Palpation: Radial pulse- Bilateral 2+    Lower Extremity:  Palpation: Femoral pulse- Bilateral 2+         Popliteal pulse - Bilateral 2+        Non-palpable distal pulses   LE warm, no wounds    Neurologic  Exam:alert, non-focal, oriented x 3, cranial nerves II-XII grossly intact

## 2023-11-27 NOTE — ASSESSMENT & PLAN NOTE
History of right carotid endarterectomy 2 years ago and recent left carotid endarterectomy done 2 weeks ago by Dr. Danika Denton. She is done very well postoperatively she denies any neurologic complaints or issues related to her incision she is currently taking Plavix she is really unclear on why she cannot take aspirin and she is unclear if the preference for Plavix was from her cardiologist.  From a vascular standpoint she could be on aspirin monotherapy at this time and I will otherwise not change any medications and I asked her to discuss this further with her cardiologist.  She is still smoking I will place a referral to the smoking cessation hotline and she will need a carotid duplex in 3 months for standard surveillance.

## 2023-11-28 ENCOUNTER — PATIENT OUTREACH (OUTPATIENT)
Dept: OTHER | Facility: CLINIC | Age: 83
End: 2023-11-28

## 2023-12-05 ENCOUNTER — TELEPHONE (OUTPATIENT)
Dept: FAMILY MEDICINE CLINIC | Facility: CLINIC | Age: 83
End: 2023-12-05

## 2023-12-05 NOTE — TELEPHONE ENCOUNTER
Spoke to the patient to inform her that she is scheduled for an AWV today at 1pm, but is not due. She stated that she has no other concerns to come in for, and would like us to cancel the appointment. She is wondering what she should do because she takes a magnesium supplement every night and her levels are still low? I informed her I would talk with Dr Mack Stanford and we would call her back regarding it.      Dara Vargas LPN

## 2023-12-05 NOTE — TELEPHONE ENCOUNTER
Can we find out what dose of magnesium she is taking over the counter? If she is on 250mg once a day, she can increase to two pills (500mg total). If she is taking the 400mg dose, she can increase to two pills of those (800mg total).

## 2023-12-05 NOTE — TELEPHONE ENCOUNTER
Left voice mail for patient to return call.  Please get magnesium dosage and notify of Dr Bond's note when she calls back  Thank you  Toya Narayanan MA

## 2023-12-05 NOTE — TELEPHONE ENCOUNTER
Patient did return call,  patient takes "sleep support bone and muscle health" it is 200mg of magnesium and she takes 1 capsule hs. It helps her sleep. She wants to clarify message. Is she to now take 4 capsules at bedtime, the bottle states to take 2 however she only takes 1. Or do you want her to get another magnesium to take during the day.

## 2023-12-06 DIAGNOSIS — I10 ESSENTIAL HYPERTENSION: Chronic | ICD-10-CM

## 2023-12-06 RX ORDER — ENALAPRIL MALEATE 10 MG/1
10 TABLET ORAL 2 TIMES DAILY
Qty: 180 TABLET | Refills: 1 | Status: SHIPPED | OUTPATIENT
Start: 2023-12-06

## 2023-12-06 NOTE — TELEPHONE ENCOUNTER
Patient informed. She is now asking if she is able to dye her hair and when she can go to the dentist next?   Bob Cr, CMA

## 2023-12-12 NOTE — TELEPHONE ENCOUNTER
Patient informed and expressed understanding. No further action.   Marlena Nelson, [facility]

## 2023-12-27 DIAGNOSIS — I74.09 AORTOILIAC OCCLUSIVE DISEASE (HCC): Chronic | ICD-10-CM

## 2023-12-27 RX ORDER — CLOPIDOGREL BISULFATE 75 MG/1
75 TABLET ORAL DAILY
Qty: 90 TABLET | Refills: 1 | Status: SHIPPED | OUTPATIENT
Start: 2023-12-27

## 2023-12-27 NOTE — TELEPHONE ENCOUNTER
Reason for call:   [x] Refill   [] Prior Auth  [] Other:     Office:   [x] PCP/Provider - Dr Bond  [] Specialty/Provider -     Medication:   Clopidogrel 75 mg, 1 qd, 90      [facility]    Does the patient have enough for 3 days?   [x] Yes   [] No - Send as HP to POD

## 2024-01-03 ENCOUNTER — TELEPHONE (OUTPATIENT)
Dept: NEPHROLOGY | Facility: CLINIC | Age: 84
End: 2024-01-03

## 2024-01-03 NOTE — TELEPHONE ENCOUNTER
LM for pt to schedule Jan f/u with any provider in [place]. Transferring from Dr. Guerrero. Patient prefers [facility]

## 2024-01-09 ENCOUNTER — TELEPHONE (OUTPATIENT)
Dept: NEPHROLOGY | Facility: CLINIC | Age: 84
End: 2024-01-09

## 2024-01-09 DIAGNOSIS — M19.90 ARTHRITIS: ICD-10-CM

## 2024-01-09 DIAGNOSIS — J31.0 NONALLERGIC RHINITIS: ICD-10-CM

## 2024-01-09 RX ORDER — IPRATROPIUM BROMIDE 21 UG/1
2 SPRAY, METERED NASAL 3 TIMES DAILY PRN
Qty: 30 ML | Refills: 0 | Status: SHIPPED | OUTPATIENT
Start: 2024-01-09

## 2024-01-09 NOTE — TELEPHONE ENCOUNTER
Pt called office to schedule appointment with [facility], pt is transfer of care from Dr. Guerrero. Pt is scheduled in [place] office and pt will have blood work done prior to appointment

## 2024-01-09 NOTE — TELEPHONE ENCOUNTER
Reason for call:   [x] Refill   [] Prior Auth  [] Other:     Office:   [x] PCP/Provider - Gely Bond MD   [] Specialty/Provider -     Medication:       ipratropium (ATROVENT) 0.03 % nasal spray       Diclofenac Sodium (VOLTAREN) 1 %     Dose/Frequency:     2 spray, Nasal, 3 times daily PRN       4 g, Topical, 4 times daily PRN     Quantity:   30ml  350gm    Pharmacy: [email or web address] [address]     Does the patient have enough for 3 days?   [] Yes   [x] No - Send as HP to POD

## 2024-01-18 NOTE — TELEPHONE ENCOUNTER
Pt called in stating that she called her insurance and they informed her that if she gets this prescription Diclofenac Sodium (Voltaren) 1% to the [facility] on [address] [phone] they would fully cover the prescription. Pt is asking if we can send this this medication to the [facility], by the end of the day. Please advise with the pt for any questions or whenever the prescription is sent to the pharmacy thank you.

## 2024-01-19 NOTE — TELEPHONE ENCOUNTER
Patient called in again asking if Dr. Bond sent over the prescription to the [facility] pharmacy for her. Please advise, she hasn't heard from anyone and would like a call back. Thank you!

## 2024-01-22 ENCOUNTER — TELEPHONE (OUTPATIENT)
Dept: FAMILY MEDICINE CLINIC | Facility: CLINIC | Age: 84
End: 2024-01-22

## 2024-01-22 DIAGNOSIS — M19.90 ARTHRITIS: ICD-10-CM

## 2024-01-22 NOTE — TELEPHONE ENCOUNTER
Please call in a script for her cream the generic to Voltarin to [facility] in [place] she can not use [facility] for this because  they don't accept her U card and [facility] will, please refill this today and call when ready for .

## 2024-02-05 ENCOUNTER — TELEPHONE (OUTPATIENT)
Dept: HEMATOLOGY ONCOLOGY | Facility: CLINIC | Age: 84
End: 2024-02-05

## 2024-02-05 DIAGNOSIS — I15.0 RENOVASCULAR HYPERTENSION: ICD-10-CM

## 2024-02-05 RX ORDER — CARVEDILOL 12.5 MG/1
TABLET ORAL
Qty: 180 TABLET | Refills: 0 | Status: SHIPPED | OUTPATIENT
Start: 2024-02-05

## 2024-02-05 NOTE — TELEPHONE ENCOUNTER
Appointment Confirmation   Who are you speaking with? Patient   If it is not the patient, are they listed on an active communication consent form? Yes   Which provider is the appointment scheduled with?  Dr. Brunson   When is the appointment scheduled?  Please list date and time 5/3/24   At which location is the appointment scheduled to take place? [facility]   Did caller verbalize understanding of appointment details? Yes

## 2024-02-06 NOTE — TELEPHONE ENCOUNTER
[facility]  [facility]    [facility]  [address] [place]  [address]    EI Evaluation Progress Note:  Single Visit    Name:  Germaine Thomas  YOB: 2021  Age:  2 year old 6 month old  Date:  2024  Visit Diagnosis:   ED Diagnosis   1. 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 Pt thinks foot pain may be due to arthritis, but is not completely sure. Denies injury . Pt is using ice for symptom relief. She would like to have xrays done. Appt made for this afternoon. Reason for Disposition   MODERATE pain (e.g., interferes with normal activities) and present > 3 days    Answer Assessment - Initial Assessment Questions  1. ONSET: "When did the muscle aches or body pains start?"       For a few months for right arm and left foot pain x 1 week   2. LOCATION: "What part of your body is hurting?" (e.g., entire body, arms, legs)       Left leg and right arm   3. SEVERITY: "How bad is the pain?" (Scale 1-10; or mild, moderate, severe)    - MILD (1-3): doesn't interfere with normal activities     - MODERATE (4-7): interferes with normal activities or awakens from sleep     - SEVERE (8-10):  excruciating pain, unable to do any normal activities       Moderate   4. CAUSE: "What do you think is causing the pains?"      Arthritis   5. FEVER: "Have you been having fever?"      no  6. OTHER SYMPTOMS: "Do you have any other symptoms?" (e.g., chest pain, weakness, rash, cold or flu symptoms, weight loss)      no  7. PREGNANCY: "Is there any chance you are pregnant?" "When was your last menstrual period?"      no  8.  TRAVEL: "Have you traveled out of the country in the last month?" (e.g., travel history, exposures)      no    Protocols used: Muscle Aches and Body Pain-ADULT-[place]

## 2024-02-16 ENCOUNTER — TELEPHONE (OUTPATIENT)
Dept: ENDOCRINOLOGY | Facility: CLINIC | Age: 84
End: 2024-02-16

## 2024-02-16 NOTE — TELEPHONE ENCOUNTER
Needed to establish with  In [place] so Dr. Diaz had 3:30 as I was sorting [facility] Schedule. I reminded her of her labs. She is not feeling well but will call and she asked me to confirm her though.

## 2024-02-19 DIAGNOSIS — R06.02 SHORTNESS OF BREATH: ICD-10-CM

## 2024-02-19 RX ORDER — ALBUTEROL SULFATE 90 UG/1
2 AEROSOL, METERED RESPIRATORY (INHALATION) EVERY 6 HOURS PRN
Qty: 8 G | Refills: 3 | Status: SHIPPED | OUTPATIENT
Start: 2024-02-19

## 2024-02-19 NOTE — TELEPHONE ENCOUNTER
Reason for call:   [x] Refill   [] Prior Auth  [] Other:     Office:   [x] PCP/Provider -   [] Specialty/Provider -     Medication: albuterol (ProAir HFA) 90 mcg/act inhaler     Dose/Frequency: Inhale 2 puffs every 6 (six) hours as needed for wheezing   Quantity: 8g    Pharmacy: [address] [phone]

## 2024-02-26 ENCOUNTER — LAB (OUTPATIENT)
Dept: LAB | Facility: CLINIC | Age: 84
End: 2024-02-26
Payer: COMMERCIAL

## 2024-02-26 DIAGNOSIS — D63.1 ANEMIA IN STAGE 3 CHRONIC KIDNEY DISEASE, UNSPECIFIED WHETHER STAGE 3A OR 3B CKD: Chronic | ICD-10-CM

## 2024-02-26 DIAGNOSIS — N18.32 STAGE 3B CHRONIC KIDNEY DISEASE (HCC): ICD-10-CM

## 2024-02-26 DIAGNOSIS — D47.2 MGUS (MONOCLONAL GAMMOPATHY OF UNKNOWN SIGNIFICANCE): ICD-10-CM

## 2024-02-26 DIAGNOSIS — D64.9 CHRONIC ANEMIA: Chronic | ICD-10-CM

## 2024-02-26 DIAGNOSIS — N18.30 ANEMIA IN STAGE 3 CHRONIC KIDNEY DISEASE, UNSPECIFIED WHETHER STAGE 3A OR 3B CKD: Chronic | ICD-10-CM

## 2024-02-26 LAB
ALBUMIN SERPL BCP-MCNC: 4.1 G/DL (ref 3.5–5)
ALP SERPL-CCNC: 58 U/L (ref 34–104)
ALT SERPL W P-5'-P-CCNC: 13 U/L (ref 7–52)
ANION GAP SERPL CALCULATED.3IONS-SCNC: 8 MMOL/L
AST SERPL W P-5'-P-CCNC: 22 U/L (ref 13–39)
BILIRUB SERPL-MCNC: 0.45 MG/DL (ref 0.2–1)
BUN SERPL-MCNC: 42 MG/DL (ref 5–25)
CALCIUM SERPL-MCNC: 9.6 MG/DL (ref 8.4–10.2)
CHLORIDE SERPL-SCNC: 105 MMOL/L (ref 96–108)
CO2 SERPL-SCNC: 25 MMOL/L (ref 21–32)
CREAT SERPL-MCNC: 1.16 MG/DL (ref 0.6–1.3)
CREAT UR-MCNC: 94.9 MG/DL
ERYTHROCYTE [DISTWIDTH] IN BLOOD BY AUTOMATED COUNT: 15.7 % (ref 11.6–15.1)
GFR SERPL CREATININE-BSD FRML MDRD: 43 ML/MIN/1.73SQ M
GLUCOSE P FAST SERPL-MCNC: 113 MG/DL (ref 65–99)
HCT VFR BLD AUTO: 34.1 % (ref 34.8–46.1)
HGB BLD-MCNC: 11.3 G/DL (ref 11.5–15.4)
MAGNESIUM SERPL-MCNC: 1.7 MG/DL (ref 1.9–2.7)
MCH RBC QN AUTO: 33.8 PG (ref 26.8–34.3)
MCHC RBC AUTO-ENTMCNC: 33.1 G/DL (ref 31.4–37.4)
MCV RBC AUTO: 102 FL (ref 82–98)
PHOSPHATE SERPL-MCNC: 3.2 MG/DL (ref 2.3–4.1)
PLATELET # BLD AUTO: 249 THOUSANDS/UL (ref 149–390)
PMV BLD AUTO: 11.7 FL (ref 8.9–12.7)
POTASSIUM SERPL-SCNC: 4.2 MMOL/L (ref 3.5–5.3)
PROT SERPL-MCNC: 6.9 G/DL (ref 6.4–8.4)
PROT UR-MCNC: 24 MG/DL
PROT/CREAT UR: 0.25 MG/G{CREAT} (ref 0–0.1)
PTH-INTACT SERPL-MCNC: 36.3 PG/ML (ref 12–88)
RBC # BLD AUTO: 3.34 MILLION/UL (ref 3.81–5.12)
SODIUM SERPL-SCNC: 138 MMOL/L (ref 135–147)
WBC # BLD AUTO: 9.05 THOUSAND/UL (ref 4.31–10.16)

## 2024-02-26 PROCEDURE — 84156 ASSAY OF PROTEIN URINE: CPT

## 2024-02-26 PROCEDURE — 80053 COMPREHEN METABOLIC PANEL: CPT

## 2024-02-26 PROCEDURE — 36415 COLL VENOUS BLD VENIPUNCTURE: CPT

## 2024-02-26 PROCEDURE — 83735 ASSAY OF MAGNESIUM: CPT

## 2024-02-26 PROCEDURE — 84100 ASSAY OF PHOSPHORUS: CPT

## 2024-02-26 PROCEDURE — 85027 COMPLETE CBC AUTOMATED: CPT

## 2024-02-26 PROCEDURE — 82570 ASSAY OF URINE CREATININE: CPT

## 2024-02-26 PROCEDURE — 83970 ASSAY OF PARATHORMONE: CPT

## 2024-02-27 ENCOUNTER — HOSPITAL ENCOUNTER (OUTPATIENT)
Dept: RADIOLOGY | Facility: HOSPITAL | Age: 84
Discharge: HOME | End: 2024-02-27
Attending: SURGERY
Payer: COMMERCIAL

## 2024-02-27 DIAGNOSIS — I65.23 BILATERAL CAROTID ARTERY STENOSIS: Chronic | ICD-10-CM

## 2024-02-27 PROCEDURE — 93880 EXTRACRANIAL BILAT STUDY: CPT

## 2024-02-27 NOTE — RESULT ENCOUNTER NOTE
Labs reviewed and stable.  Results to be reviewed at scheduled follow-up appointment this week with Dr. Jeffries

## 2024-02-28 PROCEDURE — 93880 EXTRACRANIAL BILAT STUDY: CPT | Performed by: SURGERY

## 2024-02-29 ENCOUNTER — TRANSCRIBE ORDERS (OUTPATIENT)
Dept: ADMINISTRATIVE | Facility: HOSPITAL | Age: 84
End: 2024-02-29

## 2024-02-29 DIAGNOSIS — I65.23 BILATERAL CAROTID ARTERY STENOSIS: Primary | Chronic | ICD-10-CM

## 2024-03-01 ENCOUNTER — OFFICE VISIT (OUTPATIENT)
Dept: NEPHROLOGY | Facility: CLINIC | Age: 84
End: 2024-03-01
Payer: COMMERCIAL

## 2024-03-01 VITALS
SYSTOLIC BLOOD PRESSURE: 144 MMHG | OXYGEN SATURATION: 97 % | DIASTOLIC BLOOD PRESSURE: 90 MMHG | BODY MASS INDEX: 20.91 KG/M2 | HEART RATE: 82 BPM | HEIGHT: 63 IN | WEIGHT: 118 LBS

## 2024-03-01 DIAGNOSIS — I11.0 HYPERTENSIVE HEART DISEASE WITH CHRONIC DIASTOLIC CONGESTIVE HEART FAILURE (HCC): ICD-10-CM

## 2024-03-01 DIAGNOSIS — I12.9 BENIGN HYPERTENSION WITH CHRONIC KIDNEY DISEASE, STAGE III (HCC): ICD-10-CM

## 2024-03-01 DIAGNOSIS — N18.30 BENIGN HYPERTENSION WITH CHRONIC KIDNEY DISEASE, STAGE III (HCC): ICD-10-CM

## 2024-03-01 DIAGNOSIS — E78.2 MIXED HYPERLIPIDEMIA: ICD-10-CM

## 2024-03-01 DIAGNOSIS — I70.1 RENAL ARTERY STENOSIS (HCC): Chronic | ICD-10-CM

## 2024-03-01 DIAGNOSIS — I50.32 HYPERTENSIVE HEART DISEASE WITH CHRONIC DIASTOLIC CONGESTIVE HEART FAILURE (HCC): ICD-10-CM

## 2024-03-01 DIAGNOSIS — I50.32 CHRONIC DIASTOLIC HEART FAILURE (HCC): Chronic | ICD-10-CM

## 2024-03-01 DIAGNOSIS — I12.9 PARENCHYMAL RENAL HYPERTENSION, STAGE 1 THROUGH STAGE 4 OR UNSPECIFIED CHRONIC KIDNEY DISEASE: ICD-10-CM

## 2024-03-01 DIAGNOSIS — I10 MALIGNANT HYPERTENSION: ICD-10-CM

## 2024-03-01 DIAGNOSIS — K55.1 MESENTERIC ARTERY STENOSIS (HCC): Primary | Chronic | ICD-10-CM

## 2024-03-01 PROBLEM — D63.1 ANEMIA DUE TO STAGE 4 CHRONIC KIDNEY DISEASE: Status: ACTIVE | Noted: 2024-03-01

## 2024-03-01 PROBLEM — N18.4 ANEMIA DUE TO STAGE 4 CHRONIC KIDNEY DISEASE: Status: ACTIVE | Noted: 2024-03-01

## 2024-03-01 PROCEDURE — 99406 BEHAV CHNG SMOKING 3-10 MIN: CPT | Performed by: INTERNAL MEDICINE

## 2024-03-01 PROCEDURE — 99214 OFFICE O/P EST MOD 30 MIN: CPT | Performed by: INTERNAL MEDICINE

## 2024-03-01 RX ORDER — AMLODIPINE BESYLATE 10 MG/1
10 TABLET ORAL EVERY MORNING
Qty: 90 TABLET | Refills: 3 | Status: SHIPPED | OUTPATIENT
Start: 2024-03-01

## 2024-03-01 RX ORDER — ATORVASTATIN CALCIUM 80 MG/1
80 TABLET, FILM COATED ORAL
Qty: 90 TABLET | Refills: 3 | Status: SHIPPED | OUTPATIENT
Start: 2024-03-01

## 2024-03-01 NOTE — PROGRESS NOTES
NEPHROLOGY OFFICE VISIT   Deena Wolff 83 y.o. female MRN: [id number]  3/1/2024    Reason for Visit: Chronic kidney disease and hypertension    History of Present Illness (HPI):    I had the pleasure of seeing Deena today in the renal clinic for the continued management of kidney disease stage III and hypertension.    This is my first encounter with her.  She had been followed by my colleague Dr. Guerrero and is transitioning her care to me.    She was last seen by nephrology back in November 2023 when she was admitted for left elective endarterectomy.  Nephrology was consulted for SERJIO prevention.  Her renal function remained stable postprocedure.    Blood pressure at home is ranging 130s to 140s systolic.    She requires a refill of her amlodipine and atorvastatin which I have done for her.    Discussed her blood work from February 26 which included a CMP.  Electrolytes are normal and renal function is quite stable and actually below her baseline.    She still continues to smoke.  I discussed smoking cessation with her for approximately 3 to 5 minutes.  Discussed about a nicotine patch.  She has tried it in the past.  At this time she is has no interest in to smoke.    ASSESSMENT and PLAN:    Chronic Kidney Disease Stage III  --Imaging: Right kidney 9.9 cm.  Left kidney 10.6 cm.  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, age-related nephron loss  --Biopsy Proven: No indication  --Serologies: No  --RAAS Blockade: Enalapril  --Reducing Cardiovascular Risk Factors:  Simvastatin+ Ezetimibe reduced atherosclerotic events in CKD (SHARP trial); Low dose aspirin safe (if no contraindications exist); smoking is an  independent risk factor for Chronic Kidney Disease and progression, strongly recommend smoking cessation  --Sodium-Glucose Cotransporter-2 (SGLT2) Inhibitors:  May see an acute drop in the eGFR initially when starting the medication but then a stabilization of the renal function, with slower loss of renal function as compared to placebo.  Relative risk of end-stage renal disease, doubling of serum creatinine or death from renal causes were also found to be lower as compared to placebo. (CREDENCE).  DAPA-CKD study, showed that patients with chronic kidney disease regardless of the presence or absence of diabetes the risk of composite of a sustained decline in the estimated GFR of at least 50%, end-stage renal disease or death from renal or cardiovascular causes was significantly lower with Dapagliflozin than with placebo. EMPEROR-Reduced study also showed beneficial effects. EMPA-CKD, among wide range of patients with CKD, who were at risk for progression, empagliflozin led a lower risk of kidney disease progression or death from cardiovascular causes than placebo.  If no contraindications exist I would recommend this medication added to the regiment. If eGFR < 60 cc/min (avoid ertugliflozin); avoid or caution with GFR < 20 mL/min, not an absolute contraindication, likely lower benefits.   --Finerenone: In patients with chronic kidney disease with type 2 diabetes, treatment led to lower risks of CKD progression and cardiovascular events than placebo. (FIDELIO-DKD)  --Status: Renal function is quite stable with a creatinine of 1.1 mg/dL, with a GFR of approximately 43 mL/min.  Her blood pressure is well-controlled and at target for age.  --Management/Recommendations: Continue current management but smoking cessation strongly advised.  Continue ACE inhibitor.  No indication for any type of intervention given that the blood pressure is controlled and the renal function is stable.  Please notify nephrology of any  contrast studies.    Hypertension  -- Stage II (American College of Cardiology/American Heart Association)  -- with underlying chronic kidney disease and smoking  -- Body mass index is 20.9 kg/m².  -- Volume status: Euvolemic  -- Etiology: Primary hypertension, renal artery stenosis  -- Secondary Work-Up: Renal artery Doppler showed right renal artery stenosis  -- Target Goal: < 130/80 (ACC/AHA, CKD with proteinuria, CKD in diabetics) ; if tolerated can target SBP < 120 mmHg in high cardiovascular risk ( Age > 75, CKD, CVD, No Diabetics SPRINT)  -- Lifestyle Modifications: DASH Diet, Weight Loss for ideal body weight, 45 mins of cardiovascular exercise 3 times a week as tolerated, and if no contraindications exist, smoking cessation, limit alcohol use, avoid NSAIDS, monitor blood pressure at home  -- Status: Blood pressure close to target, diastolic blood pressure is little bit elevated but systolic blood pressure is at target  -- Current antihypertensive regiment: Amlodipine 10 mg daily, carvedilol 12.5 mg twice a day, enalapril 10 mg daily, furosemide 20 mg daily, spironolactone 25 mg daily  -- Changes: Continue current management, home blood pressure monitoring, avoid hypotension and low blood pressure readings to prevent hypoperfusion, continue ACE inhibitor, smoking cessation strongly advised    Smoker/Tobacco Dependence  -- Actively smoking: Yes  -- Duration of smokin+ years  -- Spent approximately: 5 minutes discussing smoking cessation at length, and advised to quitting smoking.  -- Eager to quit smoking: No  -- Offered nicotine patch, recommend cognitive behavioral therapy   -- Smoking can affect medications used to treat high blood pressure.  Smoking increases the risk of strokes and heart attacks in people with high blood pressure.  Smoking is a well-known risk factor for chronic kidney disease.  Active smoking increases development and progression of chronic kidney disease.  It can lead to an  increased risk of worsening microalbuminuria.  Can lead to increased risk of developing some forms of kidney malignancies.  Causes damage to the cardiovascular system leading to poor blood flow resulting in worsening kidney function.  It can perpetuate worsening diabetic kidney disease.  It can also lead to idiopathic nodular glomerulosclerosis.    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 If you have any questions or concerns about your results, please feel free to call our office.    6.) Cut back on smoking        Orders Placed This Encounter   Procedures    Cystatin C With eGFR     Standing Status:   Future     Standing Expiration Date:   3/1/2025    Albumin / creatinine urine ratio     Standing Status:   Future     Standing Expiration Date:   3/1/2025    Comprehensive metabolic panel     This is a patient instruction: Patient fasting for 8 hours or longer recommended.     Standing Status:   Future      "Standing Expiration Date:   3/1/2025    CBC     Standing Status:   Future     Standing Expiration Date:   3/1/2025    PTH, intact     Standing Status:   Future     Standing Expiration Date:   3/1/2025    Phosphorus     Standing Status:   Future     Standing Expiration Date:   3/1/2025    Uric acid     Standing Status:   Future     Standing Expiration Date:   3/1/2025       OBJECTIVE:  Current Weight: Weight - Scale: 53.5 kg (118 lb)  Vitals:    03/01/24 1534   BP: 144/90   BP Location: Left arm   Patient Position: Sitting   Cuff Size: Standard   Pulse: 82   SpO2: 97%   Weight: 53.5 kg (118 lb)   Height: 5' 3\" (1.6 m)    Body mass index is 20.9 kg/m².      REVIEW OF SYSTEMS:    Review of Systems   Constitutional:  Negative for activity change and fever.   Respiratory:  Negative for cough, chest tightness, shortness of breath and wheezing.    Cardiovascular:  Negative for chest pain and leg swelling.   Gastrointestinal:  Negative for abdominal pain, diarrhea, nausea and vomiting.   Endocrine: Negative for polyuria.   Genitourinary:  Negative for difficulty urinating, dysuria, flank pain, frequency and urgency.   Skin:  Negative for rash.   Neurological:  Negative for dizziness, syncope, light-headedness and headaches.       PHYSICAL EXAM:      Physical Exam  Vitals and nursing note reviewed.   Constitutional:       General: She is not in acute distress.     Appearance: She is well-developed.   HENT:      Head: Normocephalic and atraumatic.   Eyes:      General: No scleral icterus.     Conjunctiva/sclera: Conjunctivae normal.      Pupils: Pupils are equal, round, and reactive to light.   Cardiovascular:      Rate and Rhythm: Normal rate and regular rhythm.      Heart sounds: S1 normal and S2 normal. No murmur heard.     No friction rub. No gallop.   Pulmonary:      Effort: Pulmonary effort is normal. No respiratory distress.      Breath sounds: Wheezing present. No rales.   Abdominal:      General: Bowel sounds are " normal.      Palpations: Abdomen is soft.      Tenderness: There is no abdominal tenderness. There is no rebound.   Musculoskeletal:         General: Normal range of motion.      Cervical back: Normal range of motion and neck supple.   Skin:     Findings: No rash.   Neurological:      Mental Status: She is alert and oriented to person, place, and time.   Psychiatric:         Behavior: Behavior normal.         Medications:    Current Outpatient Medications:     albuterol (ProAir HFA) 90 mcg/act inhaler, Inhale 2 puffs every 6 (six) hours as needed for wheezing, Disp: 8 g, Rfl: 3    amLODIPine (NORVASC) 10 mg tablet, Take 1 tablet (10 mg total) by mouth every morning, Disp: 90 tablet, Rfl: 3    atorvastatin (LIPITOR) 80 mg tablet, Take 1 tablet (80 mg total) by mouth daily at bedtime, Disp: 90 tablet, Rfl: 3    B Complex Vitamins (VITAMIN B-COMPLEX) TABS, Take by mouth daily , Disp: , Rfl:     Bioflavonoid Products (VITAMIN C PLUS PO), Take by mouth daily, Disp: , Rfl:     bisacodyl (DULCOLAX) 5 mg EC tablet, Take 10 mg by mouth every evening, Disp: , Rfl:     carvedilol (COREG) 12.5 mg tablet, TAKE 1 TABLET TWO TIMES A DAY, Disp: 180 tablet, Rfl: 0    cholecalciferol (VITAMIN D3) 1,000 units tablet, Take 5,000 Units by mouth daily , Disp: , Rfl:     clopidogrel (PLAVIX) 75 mg tablet, Take 1 tablet (75 mg total) by mouth daily, Disp: 90 tablet, Rfl: 1    COLLAGEN PO, Take by mouth Includes a probiotic and other vitamins-10 supplements-2 am-one in the afternoon-one hs, Disp: , Rfl:     Diclofenac Sodium (VOLTAREN) 1 %, Apply 4 g topically 4 (four) times a day as needed (arthtitis pain), Disp: 350 g, Rfl: 1    enalapril (VASOTEC) 10 mg tablet, Take 1 tablet (10 mg total) by mouth 2 (two) times a day, Disp: 180 tablet, Rfl: 1    furosemide (LASIX) 20 mg tablet, Take 1 tablet (20 mg total) by mouth daily (Patient taking differently: Take 20 mg by mouth if needed), Disp: 90 tablet, Rfl: 3    levothyroxine 25 mcg tablet,  Take 1 tablet (25 mcg total) by mouth daily in the early morning, Disp: 90 tablet, Rfl: 3    MAGNESIUM PO, Take by mouth daily at bedtime, Disp: , Rfl:     spironolactone (ALDACTONE) 25 mg tablet, Take 1 tablet (25 mg total) by mouth daily, Disp: 90 tablet, Rfl: 1    acetaminophen (TYLENOL) 500 mg tablet, Take 500 mg by mouth every 6 (six) hours as needed for mild pain, Disp: , Rfl:     colchicine (COLCRYS) 0.6 mg tablet, , Disp: , Rfl:     ipratropium (ATROVENT) 0.03 % nasal spray, 2 sprays into each nostril 3 (three) times a day as needed for rhinitis, Disp: 30 mL, Rfl: 0    nicotine (NICODERM CQ) 14 mg/24hr TD 24 hr patch, Place 1 patch on the skin over 24 hours daily Do not start before November 18, 2023. (Patient not taking: Reported on 3/1/2024), Disp: 28 patch, Rfl: 0    Laboratory Results:  Results from last 7 days   Lab Units 02/26/24  1320   WBC Thousand/uL 9.05   HEMOGLOBIN g/dL 11.3*   HEMATOCRIT % 34.1*   PLATELETS Thousands/uL 249   POTASSIUM mmol/L 4.2   CHLORIDE mmol/L 105   CO2 mmol/L 25   BUN mg/dL 42*   CREATININE mg/dL 1.16   CALCIUM mg/dL 9.6   MAGNESIUM mg/dL 1.7*   PHOSPHORUS mg/dL 3.2       Results for orders placed or performed in visit on 02/26/24   Comprehensive metabolic panel   Result Value Ref Range    Sodium 138 135 - 147 mmol/L    Potassium 4.2 3.5 - 5.3 mmol/L    Chloride 105 96 - 108 mmol/L    CO2 25 21 - 32 mmol/L    ANION GAP 8 mmol/L    BUN 42 (H) 5 - 25 mg/dL    Creatinine 1.16 0.60 - 1.30 mg/dL    Glucose, Fasting 113 (H) 65 - 99 mg/dL    Calcium 9.6 8.4 - 10.2 mg/dL    AST 22 13 - 39 U/L    ALT 13 7 - 52 U/L    Alkaline Phosphatase 58 34 - 104 U/L    Total Protein 6.9 6.4 - 8.4 g/dL    Albumin 4.1 3.5 - 5.0 g/dL    Total Bilirubin 0.45 0.20 - 1.00 mg/dL    eGFR 43 ml/min/1.73sq m   CBC   Result Value Ref Range    WBC 9.05 4.31 - 10.16 Thousand/uL    RBC 3.34 (L) 3.81 - 5.12 Million/uL    Hemoglobin 11.3 (L) 11.5 - 15.4 g/dL    Hematocrit 34.1 (L) 34.8 - 46.1 %     (H)  82 - 98 fL    MCH 33.8 26.8 - 34.3 pg    MCHC 33.1 31.4 - 37.4 g/dL    RDW 15.7 (H) 11.6 - 15.1 %    Platelets 249 149 - 390 Thousands/uL    MPV 11.7 8.9 - 12.7 fL   Protein / creatinine ratio, urine   Result Value Ref Range    Creatinine, Ur 94.9 Reference range not established. mg/dL    Protein Urine Random 24 Reference range not established. mg/dL    Prot/Creat Ratio, Ur 0.25 (H) 0.00 - 0.10   Phosphorus   Result Value Ref Range    Phosphorus 3.2 2.3 - 4.1 mg/dL   Magnesium   Result Value Ref Range    Magnesium 1.7 (L) 1.9 - 2.7 mg/dL   PTH, intact   Result Value Ref Range    PTH 36.3 12.0 - 88.0 pg/mL

## 2024-03-01 NOTE — PATIENT INSTRUCTIONS
1.)  Low 2 g sodium diet    2.)  Monitor weights at home    3.)  Avoid NSAIDs (ibuprofen, Motrin, Advil, Aleve, naproxen)    4.)  Monitor blood pressure at home, call if blood pressure greater than 150/90 persistently    5.) I will plan to discuss all results including blood work, and/or imaging at our next visit, unless there is an urgent indication, in which case I will call you earlier. If you have any questions or concerns about your results, please feel free to call our office.    6.) Cut back on smoking

## 2024-03-04 DIAGNOSIS — J31.0 NONALLERGIC RHINITIS: ICD-10-CM

## 2024-03-04 NOTE — TELEPHONE ENCOUNTER
Reason for call:   [x] Refill   [] Prior Auth  [] Other:     Office:   [x] PCP/Provider -   [] Specialty/Provider -     Medication:   ipratropium (ATROVENT) 0.03 % nasal spray     Dose/Frequency: 2 sprays into each nostril 3 (three) times a day as needed for rhinitis     Quantity: 30 mL     Pharmacy: [address]     Does the patient have enough for 3 days?   [] Yes   [x] No - Send as HP to POD

## 2024-03-05 RX ORDER — IPRATROPIUM BROMIDE 21 UG/1
2 SPRAY, METERED NASAL 3 TIMES DAILY PRN
Qty: 30 ML | Refills: 5 | Status: SHIPPED | OUTPATIENT
Start: 2024-03-05

## 2024-03-15 ENCOUNTER — TELEPHONE (OUTPATIENT)
Dept: CARDIOLOGY CLINIC | Facility: CLINIC | Age: 84
End: 2024-03-15

## 2024-03-15 NOTE — TELEPHONE ENCOUNTER
Patient saw a foot doctor yesterday    Concerns are regarding swelling in the legs and pain in feet, pins and needles. She reports that she does have neuropathy.   Podiatrist suggested a chiropractor, and testing, however she did not want more testing. She reports increased back pain due to legs    Nephrologist has no concers,   Vascular has no concerns   Per patient she is unsure who to address this with.     She wants to know where to go/ who would assist.

## 2024-03-28 DIAGNOSIS — I74.09 AORTOILIAC OCCLUSIVE DISEASE (HCC): Chronic | ICD-10-CM

## 2024-03-28 RX ORDER — CLOPIDOGREL BISULFATE 75 MG/1
75 TABLET ORAL DAILY
Qty: 90 TABLET | Refills: 1 | Status: SHIPPED | OUTPATIENT
Start: 2024-03-28

## 2024-04-30 ENCOUNTER — NURSE TRIAGE (OUTPATIENT)
Age: 84
End: 2024-04-30

## 2024-04-30 NOTE — TELEPHONE ENCOUNTER
Contacted patient to inform her of recommendations from Melisa PACK.  She was agreeable to schedule appt and AOIL.  Call was transferred to Ly[place]i, clerical, to schedule both appointments.

## 2024-04-30 NOTE — TELEPHONE ENCOUNTER
Pt states the swelling, pins and needles have been present since March 2024. Primarily L foot/ankle. Progressively worsening. Was tx for gout by podiatry. Has also seen nephrology. She also occasionally has sudden onset of pain LLE, randomly, sometimes extends to inner leg and buttock. Denies rest pain, foot wounds. Pt initially made ov to see NIKI Guerrero PA-C 5/3 however pt cx as she went to podiatry for foot pain and was tx for gout and pain in foot improved.  She feels pain and swelling now is different. Currently today she does not have any swelling or pain. Per consensus her last AOIL doppler was 2/15/23 and was due in one year and has not been scheduled.  Pt had BYPASS AORTA BIFEMORAL WITH LEFT FEMORAL THROMBOEMBOLECTOMY (Bilateral: Abdomen) 12/9/19.Pt notes she occasionally wears compression stockings that were prescribed by Dr. Oseguera, she wore them last week and it did help somewhat w/ swelling. Swelling is improved in am, per pt ble are normal first thing in the am. Today her L 3rd,4th and 5th toe are numb, this also started in March.   Advised pt I would send message to triage providers re: above and our office will call her back w/ recommendations.

## 2024-04-30 NOTE — TELEPHONE ENCOUNTER
"Reason for Disposition  • MILD swelling of both ankles (i.e., pedal edema) AND new-onset or worsening    Answer Assessment - Initial Assessment Questions  1. ONSET: \"When did the swelling start?\" (e.g., minutes, hours, days)      BLE swelling, L >R, started in March right after visit w/ Dr. Rangel  2. LOCATION: \"What part of the leg is swollen?\"  \"Are both legs swollen or just one leg?\"      Mostly calves and feet, also knee but mostly foot and ankle on L  3. SEVERITY: \"How bad is the swelling?\" (e.g., localized; mild, moderate, severe)   - Localized - small area of swelling localized to one leg   - MILD pedal edema - swelling limited to foot and ankle, pitting edema < 1/4 inch (6 mm) deep, rest and elevation eliminate most or all swelling   - MODERATE edema - swelling of lower leg to knee, pitting edema > 1/4 inch (6 mm) deep, rest and elevation only partially reduce swelling   - SEVERE edema - swelling extends above knee, facial or hand swelling present       moderate  4. REDNESS: \"Does the swelling look red or infected?\"      Pt notes she initially had some redness but this has resolved. L medial malleolus was red but she was dx w/ gout and tx.  5. PAIN: \"Is the swelling painful to touch?\" If Yes, ask: \"How painful is it?\"   (Scale 1-10; mild, moderate or severe)      Pt c/o pins and needles in L foot and ankle, constant, this also started after last ov and she was eval by podiatry  6. FEVER: \"Do you have a fever?\" If Yes, ask: \"What is it, how was it measured, and when did it start?\"       no  7. CAUSE: \"What do you think is causing the leg swelling?\"      unsure  8. MEDICAL HISTORY: \"Do you have a history of heart failure, kidney disease, liver failure, or cancer?\"      Hx kidney disease, did see nephrology  9. RECURRENT SYMPTOM: \"Have you had leg swelling before?\" If Yes, ask: \"When was the last time?\" \"What happened that time?\"      no  10. OTHER SYMPTOMS: \"Do you have any other symptoms?\" (e.g., chest pain, " "difficulty breathing)        no  11. PREGNANCY: \"Is there any chance you are pregnant?\" \"When was your last menstrual period?\"        N/a    Protocols used: Leg Swelling and Edema-ADULT-[place]    "

## 2024-04-30 NOTE — TELEPHONE ENCOUNTER
SIRENA Ness  [facility] Clinical1 hour ago (1:06 PM)       Please reschedule pt's appt w/ AOIL doppler prior in approx two weeks. Encourage pt to wear compression stockings as prescribed. Have pt notify the office if symptoms worsen.

## 2024-05-01 ENCOUNTER — TELEPHONE (OUTPATIENT)
Dept: HEMATOLOGY ONCOLOGY | Facility: MEDICAL CENTER | Age: 84
End: 2024-05-01

## 2024-05-01 ENCOUNTER — APPOINTMENT (OUTPATIENT)
Dept: LAB | Facility: CLINIC | Age: 84
End: 2024-05-01
Payer: COMMERCIAL

## 2024-05-01 DIAGNOSIS — D63.1 ANEMIA IN STAGE 3 CHRONIC KIDNEY DISEASE, UNSPECIFIED WHETHER STAGE 3A OR 3B CKD  (HCC): Chronic | ICD-10-CM

## 2024-05-01 DIAGNOSIS — N18.30 ANEMIA IN STAGE 3 CHRONIC KIDNEY DISEASE, UNSPECIFIED WHETHER STAGE 3A OR 3B CKD  (HCC): Chronic | ICD-10-CM

## 2024-05-01 DIAGNOSIS — N18.30 ANEMIA IN STAGE 3 CHRONIC KIDNEY DISEASE, UNSPECIFIED WHETHER STAGE 3A OR 3B CKD  (HCC): ICD-10-CM

## 2024-05-01 DIAGNOSIS — D47.2 MGUS (MONOCLONAL GAMMOPATHY OF UNKNOWN SIGNIFICANCE): ICD-10-CM

## 2024-05-01 DIAGNOSIS — D63.1 ANEMIA IN STAGE 3 CHRONIC KIDNEY DISEASE, UNSPECIFIED WHETHER STAGE 3A OR 3B CKD  (HCC): ICD-10-CM

## 2024-05-01 DIAGNOSIS — D47.2 MGUS (MONOCLONAL GAMMOPATHY OF UNKNOWN SIGNIFICANCE): Primary | ICD-10-CM

## 2024-05-01 LAB
BASOPHILS # BLD AUTO: 0.06 THOUSANDS/ÂΜL (ref 0–0.1)
BASOPHILS NFR BLD AUTO: 1 % (ref 0–1)
EOSINOPHIL # BLD AUTO: 0.18 THOUSAND/ÂΜL (ref 0–0.61)
EOSINOPHIL NFR BLD AUTO: 3 % (ref 0–6)
ERYTHROCYTE [DISTWIDTH] IN BLOOD BY AUTOMATED COUNT: 17.2 % (ref 11.6–15.1)
HCT VFR BLD AUTO: 34.5 % (ref 34.8–46.1)
HGB BLD-MCNC: 11.5 G/DL (ref 11.5–15.4)
IGA SERPL-MCNC: 202 MG/DL (ref 66–433)
IGG SERPL-MCNC: 1261 MG/DL (ref 635–1741)
IGM SERPL-MCNC: 68 MG/DL (ref 45–281)
IMM GRANULOCYTES # BLD AUTO: 0.02 THOUSAND/UL (ref 0–0.2)
IMM GRANULOCYTES NFR BLD AUTO: 0 % (ref 0–2)
LYMPHOCYTES # BLD AUTO: 1.57 THOUSANDS/ÂΜL (ref 0.6–4.47)
LYMPHOCYTES NFR BLD AUTO: 25 % (ref 14–44)
MCH RBC QN AUTO: 34.7 PG (ref 26.8–34.3)
MCHC RBC AUTO-ENTMCNC: 33.3 G/DL (ref 31.4–37.4)
MCV RBC AUTO: 104 FL (ref 82–98)
MONOCYTES # BLD AUTO: 1 THOUSAND/ÂΜL (ref 0.17–1.22)
MONOCYTES NFR BLD AUTO: 16 % (ref 4–12)
NEUTROPHILS # BLD AUTO: 3.51 THOUSANDS/ÂΜL (ref 1.85–7.62)
NEUTS SEG NFR BLD AUTO: 55 % (ref 43–75)
NRBC BLD AUTO-RTO: 0 /100 WBCS
PLATELET # BLD AUTO: 191 THOUSANDS/UL (ref 149–390)
PMV BLD AUTO: 12.2 FL (ref 8.9–12.7)
RBC # BLD AUTO: 3.31 MILLION/UL (ref 3.81–5.12)
WBC # BLD AUTO: 6.34 THOUSAND/UL (ref 4.31–10.16)

## 2024-05-01 PROCEDURE — 80053 COMPREHEN METABOLIC PANEL: CPT

## 2024-05-01 PROCEDURE — 82784 ASSAY IGA/IGD/IGG/IGM EACH: CPT

## 2024-05-01 PROCEDURE — 85025 COMPLETE CBC W/AUTO DIFF WBC: CPT

## 2024-05-01 NOTE — TELEPHONE ENCOUNTER
Spoke with patient regarding labs needed to be drawn prior to appointment.  Indicated the scripts are in the system, they are non-fasting and patient can go to any [facility] facility to have the labs drawn.      Patient verbalized understanding, stated she went back in mid April (along with other tests), but no tests populated the system. Going again today for lab work.

## 2024-05-03 ENCOUNTER — OFFICE VISIT (OUTPATIENT)
Dept: HEMATOLOGY ONCOLOGY | Facility: MEDICAL CENTER | Age: 84
End: 2024-05-03
Payer: COMMERCIAL

## 2024-05-03 VITALS
DIASTOLIC BLOOD PRESSURE: 84 MMHG | RESPIRATION RATE: 17 BRPM | WEIGHT: 117 LBS | OXYGEN SATURATION: 97 % | HEIGHT: 63 IN | HEART RATE: 68 BPM | BODY MASS INDEX: 20.73 KG/M2 | SYSTOLIC BLOOD PRESSURE: 160 MMHG | TEMPERATURE: 97.3 F

## 2024-05-03 DIAGNOSIS — D63.1 ANEMIA DUE TO STAGE 4 CHRONIC KIDNEY DISEASE  (HCC): ICD-10-CM

## 2024-05-03 DIAGNOSIS — E46 PROTEIN-CALORIE MALNUTRITION, UNSPECIFIED SEVERITY (HCC): ICD-10-CM

## 2024-05-03 DIAGNOSIS — N18.4 ANEMIA DUE TO STAGE 4 CHRONIC KIDNEY DISEASE  (HCC): ICD-10-CM

## 2024-05-03 DIAGNOSIS — D69.6 PLATELETS DECREASED (HCC): ICD-10-CM

## 2024-05-03 DIAGNOSIS — D64.9 CHRONIC ANEMIA: Primary | Chronic | ICD-10-CM

## 2024-05-03 LAB
KAPPA LC FREE SER-MCNC: 49.1 MG/L (ref 3.3–19.4)
KAPPA LC FREE/LAMBDA FREE SER: 1.9 {RATIO} (ref 0.26–1.65)
LAMBDA LC FREE SERPL-MCNC: 25.9 MG/L (ref 5.7–26.3)

## 2024-05-03 PROCEDURE — 99214 OFFICE O/P EST MOD 30 MIN: CPT | Performed by: INTERNAL MEDICINE

## 2024-05-03 NOTE — PROGRESS NOTES
Deena Wolff  1940  [facility] [address] [facility] [address]    DISCUSSION/SUMMARY:    83 year-old female previously referred for anemia - Mrs. Wolff underwent an anemia workup.  Results were consistent with anemia of chronic renal insufficiency.  Previously patient was treated with an erythroid stimulating agent, was effective.  Subsequently the Epogen placed on hold and patient has been under surveillance.    From a hematology standpoint, patient is doing okay.  CBC parameters are okay/acceptable.  An TERRY is not needed at this time.  MGUS surveillance does not demonstrate any evidence of a progressing plasma cell dyscrasia.    Bilateral lower extremity/foot swelling is not new.  Patient will speak to her PCP about this.  I do not believe there is any connection between the swollen feet and the anemia.  Patient is followed by vascular (recent carotid surgery); query asking their expert opinion on the feet swelling (ie, is it a vascular problem?).    Mrs. Wolff was given a 6-month office visit follow-up but this may change depending upon the above.  Patient knows to call if she has any other hematology questions or concerns.    Carefully review your medication list and verify that the list is accurate and up-to-date. Please call the hematology/oncology office if there are medications missing from the list, medications on the list that you are not currently taking or if there is a dosage or instruction that is different from how you're taking that medication.    Patient goals and areas of care:  Monitor CBC parameters  Barriers to care: none  Patient is able to self-care  ______________________________________________________________________________________    Chief Complaint   Patient presents with    Follow-up    Chronic anemia     History of Present Illness:  83-year-old female previously referred for evaluation of anemia.   Patient has been monitored for MGUS, anemia of chronic renal sufficiency and macrocytosis.  The plan has been surveillance.  Mrs. Wolff returns for follow-up.    Patient states feeling +/-, about the same as before.  Fatigue is the same as before but patient continues to be active.  Patient underwent left carotid surgery a few months ago, no complications.  No problems with excessive bruising or bleeding although patient does get bruises on her extremities more than usual.  No fevers or signs of infection.  Appetite is okay, weight is stable.  No GI,  or GYN bleeding.  Patient is concerned that her feet are red and swollen, tender to the touch.  This limits activities somewhat.    Review of Systems   Constitutional:  Positive for fatigue. Negative for unexpected weight change.   HENT: Negative.     Eyes: Negative.    Respiratory: Negative.     Cardiovascular:  Positive for leg swelling.   Gastrointestinal: Negative.    Endocrine: Negative.    Genitourinary: Negative.    Musculoskeletal: Negative.    Skin: Negative.    Allergic/Immunologic: Negative.    Neurological: Negative.  Negative for light-headedness.   Hematological: Negative.    Psychiatric/Behavioral: Negative.     All other systems reviewed and are negative.    Patient Active Problem List   Diagnosis    Aortoiliac occlusive disease (HCC)    Carotid artery stenosis    Hypothyroidism    Nicotine dependence    Subclavian artery stenosis, left (HCC)    Aortic stenosis    Benign hypertension with chronic kidney disease, stage III ([facility])    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HCC)    Hyperlipidemia    Onychomycosis    Paronychia of toenail    Simple chronic bronchitis ([facility])    Cigarette smoker    Cardiac murmur    Mass of spine    Chronic diastolic heart failure (HCC)    Acute blood loss anemia    Hypertensive heart disease with chronic diastolic congestive heart failure (HCC)    Bilateral leg edema    Peripheral neuropathy    Parenchymal renal hypertension    Anemia  "in stage 3 chronic kidney disease  (HCC)    Chronic kidney disease, stage 3 (HCC)    Chronic anemia    Renal artery stenosis (HCC)    History of ischemic colitis    Gastritis without bleeding    Arteriovenous malformation (AVM)    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HCC)    MGUS (monoclonal gammopathy of unknown significance)    Platelets decreased (HCC)    Anemia due to stage 4 chronic kidney disease  (HCC)    Protein-calorie malnutrition, unspecified severity (HCC)     Past Medical History:   Diagnosis Date    Anemia     sees  Hematologist Dr Brunson appt 11/1/23    Anxiety     Arthritis     Asthma     CAD (coronary artery disease)     Cardiac murmur     sees Dr. Kapoor    Carotid stenosis, bilateral     CHF (congestive heart failure) (HCC)     Chronic kidney disease     renal artery stenosis    Chronic pain disorder     back    Chronic sinusitis     treated with antibiotics 3/22    Colitis     COPD (chronic obstructive pulmonary disease) (HCC)     Coronary artery disease     Cough     Current every day smoker     encouraged to cut back    Dental crowns present     Depression     Disease of thyroid gland     Edema of leg     compression stocking left leg    History of dizziness     History of hyperkalemia     \"couple years ago\"    History of transfusion     Hyperlipidemia     Hypertension     Left leg pain     and foot-to call surgeon office 11/2/23 also right leg pain    Muscle weakness     sometimes of legs    Other disorder of circulatory system (CODE)     Presence of dental bridge     Renal artery stenosis (HCC)     Right    Risk for falls     Subclavian arterial stenosis (HCC)     B/L    Type 2 diabetes mellitus without complication, unspecified whether long term insulin use ([facility]) 09/20/2022 11/2/23-pt not aware of being diabetic    Walker as ambulation aid     \"sometimes\"    Wears glasses      Past Surgical History:   Procedure Laterality Date    BREAST SURGERY      bxs,benign    COLONOSCOPY      HYSTERECTOMY  1983 "    43    INCISIONAL BREAST BIOPSY      x5, 1964, 1965,  and     GA BYP OTH/THN VEIN AORTOBIFEMORAL Bilateral 2019    Procedure: BYPASS AORTA BIFEMORAL WITH LEFT FEMORAL THROMBOEMBOLECTOMY;  Surgeon: Yobany Mendoza MD;  Location:  MAIN OR;  Service: Vascular    GA TEAEC W/PATCH GRF CAROTID VERTB SUBCLAV NECK INC Right 10/01/2021    Procedure: ENDARTERECTOMY ARTERY CAROTIDWITH BOVINE PATCH, INTROP DUPLEX;  Surgeon: Curt Oseguera MD;  Location: [facility];  Service: Vascular    GA TEAEC W/PATCH GRF CAROTID VERTB SUBCLAV NECK INC Left 2023    Procedure: LEFT CAROTID ENDARTERECTOMY WITH BOVINE PATCH, INTRA-OP DUPLEX;  Surgeon: Curt Oseguera MD;  Location: [facility];  Service: Vascular    WISDOM TOOTH EXTRACTION     Past surgical history:  + blood transfusions many years ago after the birth of 1 child (NOS)    Ob gyn: No postmenopausal bleeding, no breast issues, no recent mammograms    Family History   Problem Relation Age of Onset    Hypertension Father     Heart disease Father         CHF    Coronary artery disease Family     Arthritis Family     Abdominal aortic aneurysm Mother     Hypertension Mother     Skin cancer Daughter     Cancer Paternal Aunt     No Known Problems Maternal Aunt     No Known Problems Maternal Aunt     No Known Problems Maternal Aunt    Family history:  No known familial or genetic diseases, 2 daughters in good general health, 3rd daughter  (NOS)    Social History     Socioeconomic History    Marital status:      Spouse name: Not on file    Number of children: Not on file    Years of education: Not on file    Highest education level: Not on file   Occupational History    Not on file   Tobacco Use    Smoking status: Every Day     Current packs/day: 1.00     Average packs/day: 1 pack/day for 64.3 years (64.3 ttl pk-yrs)     Types: Cigarettes     Start date:      Passive exposure: Current    Smokeless tobacco: Never    Tobacco comments:      Encouraged cutting back ahead of surgery---has quit in the past   Vaping Use    Vaping status: Never Used   Substance and Sexual Activity    Alcohol use: Yes     Comment: wilfrid 2-3 every day for 50 years    Drug use: No    Sexual activity: Not on file     Comment: defer   Other Topics Concern    Not on file   Social History Narrative    Rarely exercises    Sleeps 6-7 hours per day     Social Determinants of Health     Financial Resource Strain: Low Risk  (5/9/2023)    Overall Financial Resource Strain (CARDIA)     Difficulty of Paying Living Expenses: Not hard at all   Food Insecurity: Not on file   Transportation Needs: No Transportation Needs (5/9/2023)    PRAPARE - Transportation     Lack of Transportation (Medical): No     Lack of Transportation (Non-Medical): No   Physical Activity: Not on file   Stress: Not on file   Social Connections: Not on file   Intimate Partner Violence: Not on file   Housing Stability: Not on file   Social history: 1/2 pack per day for 60 years, no drug or alcohol abuse, no toxic exposure    Current Outpatient Medications:     acetaminophen (TYLENOL) 500 mg tablet, Take 500 mg by mouth every 6 (six) hours as needed for mild pain, Disp: , Rfl:     albuterol (ProAir HFA) 90 mcg/act inhaler, Inhale 2 puffs every 6 (six) hours as needed for wheezing, Disp: 8 g, Rfl: 3    amLODIPine (NORVASC) 10 mg tablet, Take 1 tablet (10 mg total) by mouth every morning, Disp: 90 tablet, Rfl: 3    atorvastatin (LIPITOR) 80 mg tablet, Take 1 tablet (80 mg total) by mouth daily at bedtime, Disp: 90 tablet, Rfl: 3    B Complex Vitamins (VITAMIN B-COMPLEX) TABS, Take by mouth daily , Disp: , Rfl:     Bioflavonoid Products (VITAMIN C PLUS PO), Take by mouth daily, Disp: , Rfl:     bisacodyl (DULCOLAX) 5 mg EC tablet, Take 10 mg by mouth every evening, Disp: , Rfl:     carvedilol (COREG) 12.5 mg tablet, TAKE 1 TABLET TWO TIMES A DAY, Disp: 180 tablet, Rfl: 0    cholecalciferol (VITAMIN D3) 1,000 units  tablet, Take 5,000 Units by mouth daily , Disp: , Rfl:     clopidogrel (PLAVIX) 75 mg tablet, TAKE 1 TABLET BY MOUTH DAILY, Disp: 90 tablet, Rfl: 1    COLLAGEN PO, Take by mouth Includes a probiotic and other vitamins-10 supplements-2 am-one in the afternoon-one hs, Disp: , Rfl:     Diclofenac Sodium (VOLTAREN) 1 %, Apply 4 g topically 4 (four) times a day as needed (arthtitis pain), Disp: 350 g, Rfl: 1    enalapril (VASOTEC) 10 mg tablet, Take 1 tablet (10 mg total) by mouth 2 (two) times a day, Disp: 180 tablet, Rfl: 1    ipratropium (ATROVENT) 0.03 % nasal spray, 2 sprays into each nostril 3 (three) times a day as needed for rhinitis, Disp: 30 mL, Rfl: 5    levothyroxine 25 mcg tablet, Take 1 tablet (25 mcg total) by mouth daily in the early morning, Disp: 90 tablet, Rfl: 3    MAGNESIUM PO, Take by mouth daily at bedtime, Disp: , Rfl:     spironolactone (ALDACTONE) 25 mg tablet, Take 1 tablet (25 mg total) by mouth daily, Disp: 90 tablet, Rfl: 1    colchicine (COLCRYS) 0.6 mg tablet, , Disp: , Rfl:     furosemide (LASIX) 20 mg tablet, Take 1 tablet (20 mg total) by mouth daily (Patient taking differently: Take 20 mg by mouth if needed), Disp: 90 tablet, Rfl: 3    nicotine (NICODERM CQ) 14 mg/24hr TD 24 hr patch, Place 1 patch on the skin over 24 hours daily Do not start before November 18, 2023. (Patient not taking: Reported on 3/1/2024), Disp: 28 patch, Rfl: 0    Allergies   Allergen Reactions    Tall Ragweed Wheezing       Vitals:    05/03/24 1214   BP: 160/84   Pulse: 68   Resp: 17   Temp: (!) 97.3 °F (36.3 °C)   SpO2: 97%     Physical Exam  Constitutional:       Appearance: She is well-developed.      Comments: Somewhat more frail appearing female, relatively good color, no respiratory distress, no signs of pain   HENT:      Head: Normocephalic and atraumatic.      Right Ear: External ear normal.      Left Ear: External ear normal.   Eyes:      Pupils: Pupils are equal, round, and reactive to light.       Comments: Conjunctiva pale, anicteric   Neck:      Comments: Well-healed right cervical scar, no adenopathy bilaterally  Cardiovascular:      Rate and Rhythm: Normal rate and regular rhythm.      Heart sounds: Normal heart sounds.   Pulmonary:      Effort: Pulmonary effort is normal.      Breath sounds: Normal breath sounds.      Comments: Fair entry bilaterally, scattered rhonchi bilateral  Abdominal:      General: Bowel sounds are normal.      Palpations: Abdomen is soft.      Comments: Soft, nontender, + bowel sounds, no guarding   Musculoskeletal:         General: Normal range of motion.      Cervical back: Normal range of motion and neck supple.   Skin:     General: Skin is warm.      Comments: Relatively good color, warm, moist, few scattered purpura and ecchymoses, no scabs, no bleeding - see extremities below   Neurological:      Mental Status: She is alert and oriented to person, place, and time.      Deep Tendon Reflexes: Reflexes are normal and symmetric.   Psychiatric:         Behavior: Behavior normal.         Thought Content: Thought content normal.         Judgment: Judgment normal.     Extremities: Both lower extremities are swollen mostly in the foot, 1+, red, somewhat inflamed, tender to the touch, no obvious cords, pulses are decreased, both hands also somewhat red and inflamed but not swollen  Lymphatics:  No adenopathy in the neck, supraclavicular region and axilla bilaterally    Labs            5/1/2024 Free light chain analysis still in process    5/1/2024 BUN = 40 creatinine = 1.33 GFR = 36 calcium = 9.5 LFTs WNL total protein = 7.3    9/12/2023 SPEP demonstrated a monoclonal peak in the gamma region (previous immunofixation from March 2023 identified IgG kappa)     9/12/2023 BUN = 52 creatinine = 1.41 LFTs WNL calcium = 10.3 total protein = 7.6 beta-2 microglobulin = 4.2

## 2024-05-08 LAB
ALBUMIN SERPL BCP-MCNC: 4.4 G/DL (ref 3.5–5)
ALP SERPL-CCNC: 49 U/L (ref 34–104)
ALT SERPL W P-5'-P-CCNC: 19 U/L (ref 7–52)
ANION GAP SERPL CALCULATED.3IONS-SCNC: 10 MMOL/L (ref 4–13)
AST SERPL W P-5'-P-CCNC: 29 U/L (ref 13–39)
BILIRUB SERPL-MCNC: 0.54 MG/DL (ref 0.2–1)
BUN SERPL-MCNC: 40 MG/DL (ref 5–25)
CALCIUM SERPL-MCNC: 9.5 MG/DL (ref 8.4–10.2)
CHLORIDE SERPL-SCNC: 105 MMOL/L (ref 96–108)
CO2 SERPL-SCNC: 25 MMOL/L (ref 21–32)
CREAT SERPL-MCNC: 1.33 MG/DL (ref 0.6–1.3)
GFR SERPL CREATININE-BSD FRML MDRD: 36 ML/MIN/1.73SQ M
GLUCOSE P FAST SERPL-MCNC: 93 MG/DL (ref 65–99)
POTASSIUM SERPL-SCNC: 4.5 MMOL/L (ref 3.5–5.3)
PROT SERPL-MCNC: 7.3 G/DL (ref 6.4–8.4)
SODIUM SERPL-SCNC: 140 MMOL/L (ref 135–147)

## 2024-05-21 ENCOUNTER — HOSPITAL ENCOUNTER (OUTPATIENT)
Dept: RADIOLOGY | Facility: HOSPITAL | Age: 84
Discharge: HOME | End: 2024-05-21
Payer: COMMERCIAL

## 2024-05-21 DIAGNOSIS — I74.09 AORTOILIAC OCCLUSIVE DISEASE (HCC): Chronic | ICD-10-CM

## 2024-05-21 PROCEDURE — 93923 UPR/LXTR ART STDY 3+ LVLS: CPT

## 2024-05-21 PROCEDURE — 93978 VASCULAR STUDY: CPT

## 2024-05-22 PROCEDURE — 93978 VASCULAR STUDY: CPT | Performed by: SURGERY

## 2024-05-24 ENCOUNTER — OFFICE VISIT (OUTPATIENT)
Dept: VASCULAR SURGERY | Facility: CLINIC | Age: 84
End: 2024-05-24
Payer: COMMERCIAL

## 2024-05-24 VITALS
HEIGHT: 63 IN | BODY MASS INDEX: 20.04 KG/M2 | HEART RATE: 60 BPM | RESPIRATION RATE: 16 BRPM | DIASTOLIC BLOOD PRESSURE: 60 MMHG | SYSTOLIC BLOOD PRESSURE: 138 MMHG | OXYGEN SATURATION: 96 % | WEIGHT: 113.1 LBS

## 2024-05-24 DIAGNOSIS — F17.210 CIGARETTE SMOKER: Chronic | ICD-10-CM

## 2024-05-24 DIAGNOSIS — I70.1 RENAL ARTERY STENOSIS (HCC): Chronic | ICD-10-CM

## 2024-05-24 DIAGNOSIS — K55.1 MESENTERIC ARTERY STENOSIS (HCC): Chronic | ICD-10-CM

## 2024-05-24 DIAGNOSIS — I73.9 PERIPHERAL ARTERIAL DISEASE (HCC): ICD-10-CM

## 2024-05-24 DIAGNOSIS — E78.2 MIXED HYPERLIPIDEMIA: Chronic | ICD-10-CM

## 2024-05-24 DIAGNOSIS — I77.1 STENOSIS OF ILIAC ARTERY (HCC): Chronic | ICD-10-CM

## 2024-05-24 DIAGNOSIS — N18.4 ANEMIA DUE TO STAGE 4 CHRONIC KIDNEY DISEASE  (HCC): ICD-10-CM

## 2024-05-24 DIAGNOSIS — D63.1 ANEMIA DUE TO STAGE 4 CHRONIC KIDNEY DISEASE  (HCC): ICD-10-CM

## 2024-05-24 DIAGNOSIS — I74.09 AORTOILIAC OCCLUSIVE DISEASE (HCC): Primary | Chronic | ICD-10-CM

## 2024-05-24 PROCEDURE — 99214 OFFICE O/P EST MOD 30 MIN: CPT | Performed by: PHYSICIAN ASSISTANT

## 2024-05-24 NOTE — PROGRESS NOTES
Ambulatory Visit  Name: Deena Wolff      : 1940      MRN: [id number]  Encounter Provider: Joycelyn Guerrero PA-C  Encounter Date: 2024   Encounter department: [facility] [place]    Assessment & Plan   1. Aortoiliac occlusive disease (HCC)  -     VAS abdominal aorta/iliac duplex; Future; Expected date: 2025  2. Peripheral arterial disease (HCC)  -     VAS ARTERIAL DUPLEX- LOWER LIMB BILATERAL; Future; Expected date: 2024  3. 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HCC)  4. Mixed hyperlipidemia  5. Anemia due to stage 4 chronic kidney disease  (HCC)  6. Cigarette smoker  7. 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HCC)  8. Renal artery stenosis (HCC)      Aortoiliac occlusive disease  Peripheral arterial disease  Neuropathy    -Reports numbness/ paresthesias in the toes/feet x 3 months and diagnosed with gout and OA by outside podiatry and was given Voltaren which she states helped, but she is uncertain if she should be taking it  -Although reports new symptoms, she has reported these in the past.    -AOIL 24:     Patent ABF bypass with bilateral elevated velocities throughout.     >70% celiac and SMA.    >60% L Renal, <60% R renal    R BELEN 0.89, L BELEN 0.90 with great toe pressures within the healing range.      -AOIL 2/15/23: Patent ABF bypass with elevated distal limb velocities.    >70% Celiac and SMA stenosis. B renal stenoses.     R BELEN 0.89 and L 0.83 with great toe pressures within healing    Plan:  -AIOD s/p ABF which remains patent with preserved BELEN's and PT pulses in feet  -Called office for numbness and leg swelling for which AOIL was ordered  -Reviewed AOIL with patent which shows patent ABF  -Given neuropathy, will check PHILIP for completeness  -Recommend regular walking as tolerated  -Follow up with podiatry +/- neurology  -Avoid foot wounds  -Wearing crocks; talk to podiatry about footwear, inserts ect which may help the feet  -Foot numbness and tingling was improved Voltaren; discuss  Voltaren with nephro and possibly try Aspircream   -Continue to monitor symptoms and follow up in about 3 months       Carotid artery stenosis  -Status post bilateral carotid endarterectomy  -CV duplex 2/27/24: Widely patent R ICA/endart and L ICA/ endart (R 116/21, L 109/19)    -Asymptomatic carotid artery disease with patent bilateral endarterectomy sites  -Patient education regarding pathophysiology and treatment of carotid stenosis provided  -Discussed the importance of smoking cessation in setting of vascular disease  -Maintain good blood pressure and cholesterol control  -Continue with clopidogrel 75 and atorvastatin 80  -Follow up CV duplex in 6 months (8/2023) with VQI OV      Leg edema  -Currently no leg edema  -May utilize compression for edema.      Tobacco use is a significant patient-modifiable risk factor for this patient’s vascular disease with multiple vascular comorbidities, and a significant risk factor for failure of and complications from any endovascular or surgical interventions.    I explained to the patient the effects of smoking including peripheral artery disease, coronary artery disease, cerebrovascular disease as well as cancer and chronic obstructive pulmonary disease. I asked the patient to stop smoking immediately. It is never too late to quit, and many studies show significant health benefits as well as economical savings after smoking cessation. I offered to the patient nicotine replacement therapy as well as referral to the smoking cessation program and access to the quit line 0-532-UANBDMC or ambulatory referral to our network smoking cessation program.    Based on our conversation, this patient does not appear ready to quit    And declined my offer of nicotine replacement or tobacco cessation medications    The patient did not set a quit date. I will continue to  follow up on this issue at our next scheduled visit.     I spent approximately 5 minutes on tobacco cessation  "counseling with this patient.        History of Present Illness     Deena Wolff 84yo F smoker, Htn, HLD, asymptomatic bilateral carotid stenosis, s/p CEA's (R 10/2021, L 11/2023), CKD 3, AIOD, s/p ABF bypass with LEFT iliofemoral thromboembolectomy ([facility] 12/9/19), peripheral arterial disease, neuropathy who presents for vascular follow up and to review recent vascular studies.    She has generally used a walker for ambulation due to personal safety concerns given chronic neuropathy of the feet.  Since ABF bypass surgery, she has had no claudication, ischemic rest pain or tissue loss.  She has been maintained on clopidogrel and atorvastatin 80.  She is still smoking cigarettes.  Multiple conversations regarding smoking cessation were discussed with patient.  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  She has not been interested in quitting.    5/24/24:  Ms. Wolff ports that she has \"good days and bad days.\"  She refers to L>R numbness and tingling of both feet and ankles which may have been worsening recently.  She was already seen outside podiatry and diagnosed with arthritis.  She was given Voltaren which she states helps but stopped taking it.  The foot actually feels better when she is walking and wearing certain shoes.  She has numbness and feels like she has a wound and foot, but no pain.  She is also had some foot and ankle swelling and had been treated for gout.    In the last months, she has had episodes of diarrhea and constipation and wants to see GI again.  When asked if there is any association with food or postprandial pain she states \"nothing specific.\" She should see GI and monitor.      She continues to smoke cigarettes up to about a pack a day.  She is well aware of the risks of continued smoking.    We reviewed her aortoiliac duplex which shows patent aortobifem bypass with her BELEN 0.89 on the right and 0.90 on the left.  There have been " "elevated velocities in the graft.  It appears on the right anastomosis the velocity has increased from last year to 460 cm/s.     BUN/ creat 40/1.33 (42/1.16)    Review of Systems   Constitutional: Negative.    HENT: Negative.     Eyes: Negative.    Respiratory: Negative.     Cardiovascular:  Positive for leg swelling.   Gastrointestinal: Negative.    Endocrine: Negative.    Genitourinary: Negative.    Musculoskeletal: Negative.    Skin: Negative.    Allergic/Immunologic: Negative.    Neurological: Negative.    Hematological: Negative.    Psychiatric/Behavioral: Negative.         Objective     /60 (BP Location: Right arm, Patient Position: Sitting, Cuff Size: Standard)   Pulse 60   Resp 16   Ht 5' 3\" (1.6 m)   Wt 51.3 kg (113 lb 1.6 oz)   SpO2 96%   BMI 20.03 kg/m²         Physical Exam  Vitals and nursing note reviewed.   Constitutional:       Appearance: She is well-developed.   HENT:      Head: Normocephalic and atraumatic.   Eyes:      Pupils: Pupils are equal, round, and reactive to light.   Neck:      Vascular: Carotid bruit present. No JVD.   Cardiovascular:      Rate and Rhythm: Normal rate and regular rhythm.      Pulses:           Carotid pulses are 2+ on the right side and 2+ on the left side.       Radial pulses are 2+ on the right side and 2+ on the left side.        Femoral pulses are 2+ on the right side and 2+ on the left side.       Posterior tibial pulses are 2+ on the right side and 2+ on the left side.      Heart sounds: S1 normal and S2 normal. Murmur heard.      Systolic murmur is present with a grade of 2/6.      No friction rub. No gallop.   Pulmonary:      Effort: Pulmonary effort is normal. No accessory muscle usage or respiratory distress.      Breath sounds: Normal breath sounds. No wheezing or rales.   Abdominal:      General: Bowel sounds are normal. There is no distension.      Palpations: Abdomen is soft.      Tenderness: There is no abdominal tenderness. "   Musculoskeletal:         General: No deformity. Normal range of motion.      Cervical back: Neck supple.      Right lower leg: No edema.      Left lower leg: No edema.   Skin:     General: Skin is warm and dry.      Findings: No lesion or rash.      Nails: There is no clubbing.   Neurological:      Mental Status: She is alert and oriented to person, place, and time.      Comments: Grossly normal    Psychiatric:         Behavior: Behavior is cooperative.         AOIL 5/21/24  [facility]  CLINICAL:  Indications:  Evaluate for progression of Aorto-Iliac occlusive disease s/p  revascularization. Patient is asymptomatic at this time.  Operative History:  2023-11-16 Left Standard (ICA, ECA and CCA) endarterectomy  2021-10-01 Right CEA (Dr. Oseguera / Dr. Guerrero)  2021-10-01 Right Standard (ICA, ECA and CCA) endarterectomy  2019-12-09 Aorto_bi_common femoral  Risk Factors  The patient has history of HTN, Hyperlipidemia, CKD, CAD, COPD, CHF, and  smoking (current) 1-2 ppd.  Clinical  Right Pressure:  168/ mm Hg, Left Pressure:  167/ mm Hg.     FINDINGS:     Unilateral                Impression  PSV (cm/s)  EDV (cm/s)  AP (cm)  TRV (cm)    Sup-Dolly Ao                                    82                  1.8              Px Inf-Raphael Ao                                114                  1.3       1.7    Ds Inf-Raphael Ao                                 86                  1.5       1.3    Celiac                    >70%               315          46                       Prox. SMA                 >70%               616          77                       Aortic Inflow anastmosis                     114                                   Aortic Inflow Artery                          82                                   Graft Stem                                    86                                      Right                Impression  PSV (cm/s)  EDV (cm/s)   RAR    Dist EIA                                460                       Ostial Renal                            241          23          Prox Renal           1 - 59%            227          18  2.77    Dist Graft Limb                         235                      Limb Anastomosis                        460                      Limb Outflow Artery                     349                      Mid Graft Limb                          241                      Prox Graft Limb                         216                         Left                 Impression  PSV (cm/s)  EDV (cm/s)   RAR    Dist EIA                                251                      Prox Renal           60 - 99%           357          37  4.35    Dist Graft Limb                         161                      Limb Anastomosis                        231                      Limb Outflow Artery                     251                      Mid Graft Limb                          202                      Prox Graft Limb                         369                               CONCLUSION:     Impression     Patent aorto-bi-fem bypass graft with bilateral elevated velocities noted  throughout.  There is >70% stenosis noted in the celiac trunk and superior mesenteric  arteries.  There is >60% stenosis noted in the left renal artery and a <60% stenosis noted  in the right renal artery.  Ankle/Brachial indices are: Rt - 0.89 (Prior 0.89) and Lt - 0.90 (Prior 0.83)  Great toe pressures are within the healing range.     Compared to previous study on 12/9/2020, the left renal artery stenosis is now  >60%.      AOIL 2/15/23  FINDINGS:     Unilateral                PSV (cm/s)  EDV (cm/s)  AP (cm)  TRV (cm)    Sup-Dolly Ao                                            2.2       2.1    Px Inf-Raphael Ao                     67                  1.5       1.7    Celiac                           245          37                       Prox. SMA                        468          42                       Aortic Inflow anastmosis          165                                   Aortic Inflow Artery              67                                      Right                Impression  PSV (cm/s)  EDV (cm/s)    Prox Renal           1 - 59%            199          32    Dist Graft Limb                         132                Limb Anastomosis                        217                Limb Outflow Artery                     257                Mid Graft Limb                          171                Prox Graft Limb                         236                   Left                 Impression  PSV (cm/s)  EDV (cm/s)    Prox Renal           1 - 59%            289          53    Dist Graft Limb                         164                Limb Anastomosis                        243                Limb Outflow Artery                     286                Mid Graft Limb                          215                Prox Graft Limb                         188                         CONCLUSION:     Impression  Patent aorto-bi-fem bypass graft with bilateral elevated distal limb  velocities.  There is >70% stenosis of the celiac and superior mesenteric arteries.  Bilateral renal artery stenosis.  Ankle/Brachial indices are: Rt - 0.89 (Prior 0.96) and Lt - 0.83 (Prior 0.87)  Great toe pressures are within the healing range.     Compared to previous study on 12/9/2020, there is no significant change.      Carotid 2/27/24  FINDINGS:     Right        Impression     PSV  EDV (cm/s)  Direction of Flow  Ratio    Dist. ICA                   119          27                      1.27    Mid. ICA                    122          29                      1.29    Prox. ICA    Widely Patent  116          21                      1.23    Dist CCA                     77          11                              Mid CCA                      94          11                      0.58    Prox CCA                    162          15                              Ext Carotid                  "149          10                      1.58    Prox Vert                    64           7  Antegrade                   Subclavian   Stenosis       393           0                                 Left         Impression     PSV  EDV (cm/s)  Direction of Flow  Ratio    Dist. ICA                   122          29                      2.63    Mid. ICA                     89          21                      1.92    Prox. ICA    Widely Patent  109          19                      2.35    Dist CCA                     64          13                              Mid CCA                      46          13                      1.06    Prox CCA                     43          10                              Ext Carotid  Retrograde      78           0                      1.69    Prox Vert                    63          16  Antegrade                   Subclavian                  181           0                                       CONCLUSION:  Impression  RIGHT:  Widely patent internal carotid artery and endarterectomy site.  Vertebral artery flow is antegrade. There is stenosis noted in the subclavian  artery.     LEFT:  Widely patent internal carotid artery and endarterectomy site.  The external carotid artery demonstrates retrograde flow.  Vertebral artery flow is antegrade. There is no significant subclavian artery  disease.     Compared to previous study on 2/15/2023, there is no change on the right and  intervention on the left.         I have reviewed and made appropriate changes to the review of systems input by the medical assistant.    Vitals:    05/24/24 1307   BP: 138/60   BP Location: Right arm   Patient Position: Sitting   Cuff Size: Standard   Pulse: 60   Resp: 16   SpO2: 96%   Weight: 51.3 kg (113 lb 1.6 oz)   Height: 5' 3\" (1.6 m)       Patient Active Problem List   Diagnosis    Aortoiliac occlusive disease (HCC)    Carotid artery stenosis    Hypothyroidism    Nicotine dependence    Subclavian artery stenosis, " left (HCC)    Aortic stenosis    Benign hypertension with chronic kidney disease, stage III (HCC)    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HCC)    Hyperlipidemia    Onychomycosis    Paronychia of toenail    Simple chronic bronchitis (HCC)    Cigarette smoker    Cardiac murmur    Mass of spine    Chronic diastolic heart failure (HCC)    Acute blood loss anemia    Hypertensive heart disease with chronic diastolic congestive heart failure (HCC)    Bilateral leg edema    Peripheral neuropathy    Parenchymal renal hypertension    Anemia in stage 3 chronic kidney disease  (HCC)    Chronic kidney disease, stage 3 (HCC)    Chronic anemia    Renal artery stenosis (HCC)    History of ischemic colitis    Gastritis without bleeding    Arteriovenous malformation (AVM)    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HCC)    MGUS (monoclonal gammopathy of unknown significance)    Platelets decreased (HCC)    Anemia due to stage 4 chronic kidney disease  (HCC)    Protein-calorie malnutrition, unspecified severity (HCC)       Past Surgical History:   Procedure Laterality Date    BREAST SURGERY      bxs,benign    COLONOSCOPY      HYSTERECTOMY  1983    43    INCISIONAL BREAST BIOPSY      x5, 1964, 1965, 1985 and 1990    [place] BYP OTH/THN VEIN AORTOBIFEMORAL Bilateral 12/09/2019    Procedure: BYPASS AORTA BIFEMORAL WITH LEFT FEMORAL THROMBOEMBOLECTOMY;  Surgeon: Yobany Mendoza MD;  Location: [facility];  Service: Vascular    NY TEAEC W/PATCH GRF CAROTID VERTB SUBCLAV NECK INC Right 10/01/2021    Procedure: ENDARTERECTOMY ARTERY CAROTIDWITH BOVINE PATCH, INTROP DUPLEX;  Surgeon: Curt Oseguera MD;  Location:  MAIN OR;  Service: Vascular    [place] TEAEC W/PATCH GRF CAROTID VERTB SUBCLAV NECK INC Left 11/16/2023    Procedure: LEFT CAROTID ENDARTERECTOMY WITH BOVINE PATCH, INTRA-OP DUPLEX;  Surgeon: Curt Oseguera MD;  Location: [facility];  Service: Vascular    WISDOM TOOTH EXTRACTION         Family History   Problem Relation Age of Onset    Hypertension Father      Heart disease Father         CHF    Coronary artery disease Family     Arthritis Family     Abdominal aortic aneurysm Mother     Hypertension Mother     Skin cancer Daughter     Cancer Paternal Aunt     No Known Problems Maternal Aunt     No Known Problems Maternal Aunt     No Known Problems Maternal Aunt        Social History     Socioeconomic History    Marital status:      Spouse name: Not on file    Number of children: Not on file    Years of education: Not on file    Highest education level: Not on file   Occupational History    Not on file   Tobacco Use    Smoking status: Every Day     Current packs/day: 1.00     Average packs/day: 1 pack/day for 64.4 years (64.4 ttl pk-yrs)     Types: Cigarettes     Start date: 1960     Passive exposure: Current    Smokeless tobacco: Never    Tobacco comments:     Encouraged cutting back ahead of surgery---has quit in the past   Vaping Use    Vaping status: Never Used   Substance and Sexual Activity    Alcohol use: Yes     Comment: wilfrid 2-3 every day for 50 years    Drug use: No    Sexual activity: Not on file     Comment: defer   Other Topics Concern    Not on file   Social History Narrative    Rarely exercises    Sleeps 6-7 hours per day     Social Determinants of Health     Financial Resource Strain: Low Risk  (5/9/2023)    Overall Financial Resource Strain (CARDIA)     Difficulty of Paying Living Expenses: Not hard at all   Food Insecurity: Not on file   Transportation Needs: No Transportation Needs (5/9/2023)    PRAPARE - Transportation     Lack of Transportation (Medical): No     Lack of Transportation (Non-Medical): No   Physical Activity: Not on file   Stress: Not on file   Social Connections: Not on file   Intimate Partner Violence: Not on file   Housing Stability: Not on file       Allergies   Allergen Reactions    Tall Ragweed Wheezing         Current Outpatient Medications:     acetaminophen (TYLENOL) 500 mg tablet, Take 500 mg by mouth every 6  (six) hours as needed for mild pain, Disp: , Rfl:     albuterol (ProAir HFA) 90 mcg/act inhaler, Inhale 2 puffs every 6 (six) hours as needed for wheezing, Disp: 8 g, Rfl: 3    amLODIPine (NORVASC) 10 mg tablet, Take 1 tablet (10 mg total) by mouth every morning, Disp: 90 tablet, Rfl: 3    atorvastatin (LIPITOR) 80 mg tablet, Take 1 tablet (80 mg total) by mouth daily at bedtime, Disp: 90 tablet, Rfl: 3    B Complex Vitamins (VITAMIN B-COMPLEX) TABS, Take by mouth daily , Disp: , Rfl:     Bioflavonoid Products (VITAMIN C PLUS PO), Take by mouth daily, Disp: , Rfl:     bisacodyl (DULCOLAX) 5 mg EC tablet, Take 10 mg by mouth every evening, Disp: , Rfl:     carvedilol (COREG) 12.5 mg tablet, TAKE 1 TABLET TWO TIMES A DAY, Disp: 180 tablet, Rfl: 0    cholecalciferol (VITAMIN D3) 1,000 units tablet, Take 5,000 Units by mouth daily , Disp: , Rfl:     clopidogrel (PLAVIX) 75 mg tablet, TAKE 1 TABLET BY MOUTH DAILY, Disp: 90 tablet, Rfl: 1    colchicine (COLCRYS) 0.6 mg tablet, , Disp: , Rfl:     COLLAGEN PO, Take by mouth Includes a probiotic and other vitamins-10 supplements-2 am-one in the afternoon-one hs, Disp: , Rfl:     Diclofenac Sodium (VOLTAREN) 1 %, Apply 4 g topically 4 (four) times a day as needed (arthtitis pain), Disp: 350 g, Rfl: 1    enalapril (VASOTEC) 10 mg tablet, Take 1 tablet (10 mg total) by mouth 2 (two) times a day, Disp: 180 tablet, Rfl: 1    furosemide (LASIX) 20 mg tablet, Take 1 tablet (20 mg total) by mouth daily (Patient taking differently: Take 20 mg by mouth if needed), Disp: 90 tablet, Rfl: 3    ipratropium (ATROVENT) 0.03 % nasal spray, 2 sprays into each nostril 3 (three) times a day as needed for rhinitis, Disp: 30 mL, Rfl: 5    levothyroxine 25 mcg tablet, Take 1 tablet (25 mcg total) by mouth daily in the early morning, Disp: 90 tablet, Rfl: 3    MAGNESIUM PO, Take by mouth daily at bedtime, Disp: , Rfl:     spironolactone (ALDACTONE) 25 mg tablet, Take 1 tablet (25 mg total) by mouth  daily, Disp: 90 tablet, Rfl: 1    nicotine (NICODERM CQ) 14 mg/24hr TD 24 hr patch, Place 1 patch on the skin over 24 hours daily Do not start before November 18, 2023. (Patient not taking: Reported on 3/1/2024), Disp: 28 patch, Rfl: 0

## 2024-05-24 NOTE — PATIENT INSTRUCTIONS
Smoking      I offered to the patient nicotine replacement therapy as well as referral to the smoking cessation program and access to the quit line 7-380-PSTYZSC or ambulatory referral to our network smoking cessation program.      AOIL  -Regular walking as tolerated  -Avoid foot wounds  -Follow up with podiatry +/- neurology  -Better with Volterin, try Aspircream   -Follow up vascular testing as discussed

## 2024-06-11 ENCOUNTER — TELEPHONE (OUTPATIENT)
Dept: NEUROLOGY | Facility: CLINIC | Age: 84
End: 2024-06-11

## 2024-06-11 NOTE — TELEPHONE ENCOUNTER
"[place]  6/11/24 9:17 AM:    \"Name is Deena Wolff, YOB: 1940. One of my doctors has suggested that I see, that I might have neuropathy and that I needed to see a specialist. I would like a call back. Thank you.\"    # [phone]  -------------------------------------  Pt requesting a call back to discuss possible appt. Pt has Neuropathy. No referral in chart.   "

## 2024-06-12 ENCOUNTER — TELEPHONE (OUTPATIENT)
Age: 84
End: 2024-06-12

## 2024-06-12 NOTE — TELEPHONE ENCOUNTER
Patient called in asks when she should have her labs done? There is no expected date.  Next appt in Sept, please advise.

## 2024-06-17 ENCOUNTER — NURSE TRIAGE (OUTPATIENT)
Age: 84
End: 2024-06-17

## 2024-06-17 DIAGNOSIS — R19.7 DIARRHEA, UNSPECIFIED TYPE: Primary | ICD-10-CM

## 2024-06-17 NOTE — TELEPHONE ENCOUNTER
Called and relayed the recommendations to patient there is no sooner appts she has a appt with her pcp on 7/5/24 so she will get the stool studies and the X-ray completed in the meantime and will contact the office if anything additional is needed

## 2024-06-17 NOTE — TELEPHONE ENCOUNTER
Regarding: weightloss, diarrhea  ----- Message from Clari ERICKSON sent at 6/17/2024 10:40 AM EDT -----  Patients GI provider:  Dr. NGUYỄN    Number to return call: [phone]    Reason for call: Pt calling with complaints of diarrhea and weightloss. Please contact pt to further discuss.    Scheduled procedure/appointment date if applicable: 11/07/2024

## 2024-06-17 NOTE — TELEPHONE ENCOUNTER
"LOV 11/24/21 CLARA [place] hx of ischemic colitis, gastritis, AVM, Procedure colon 4/27/22, EGD 10/16/21    Patient experiencing diarrhea since March, was constipated and toodk stool softeners and she also attributes to some foods she may be eating at times. She admits to not hydrating as much as she should drinking some water during the day and cocktails in the evening. She notes she gets dietary fiber. She said she lost five pounds in one week about a month ago but unsure what her weight is currently (no scale at home). She is taking Imodium frequently. I asked if she would complete stool studies at this time asking that she use Pepto Bismol and discontinue the Imodium but she does not feel she can stop the Imodium and declined. I reviewed BRAT diet, fiber supplement to purchase OTC and emphasized to increase hydration. Patient requesting visit and only urgent available.  I instructed patient to report to ED if worsening symptoms, weakness, unable to hydrate in case of dehydration. Please review/advise.      Reason for Disposition   Diarrhea is a chronic symptom (recurrent or ongoing AND lasting > 4 weeks)    Answer Assessment - Initial Assessment Questions  1. DIARRHEA SEVERITY: \"How bad is the diarrhea?\" \"How many extra stools have you had in the past 24 hours than normal?\"     - NO DIARRHEA (SCALE 0)    - MILD (SCALE 1-3): Few loose or mushy BMs; increase of 1-3 stools over normal daily number of stools; mild increase in ostomy output.    -  MODERATE (SCALE 4-7): Increase of 4-6 stools daily over normal; moderate increase in ostomy output.  * SEVERE (SCALE 8-10; OR 'WORST POSSIBLE'): Increase of 7 or more stools daily over normal; moderate increase in ostomy output; incontinence.      Sometimes five or six times, past 24 hours a couple yesterday  2. ONSET: \"When did the diarrhea begin?\"       Ongoing since March  3. BM CONSISTENCY: \"How loose or watery is the diarrhea?\"       Runny and depends on what I eat  4. " "VOMITING: \"Are you also vomiting?\" If Yes, ask: \"How many times in the past 24 hours?\"       no  5. ABDOMINAL PAIN: \"Are you having any abdominal pain?\" If Yes, ask: \"What does it feel like?\" (e.g., crampy, dull, intermittent, constant)       Cramping a little bit once in a while  6. ABDOMINAL PAIN SEVERITY: If present, ask: \"How bad is the pain?\"  (e.g., Scale 1-10; mild, moderate, or severe)    - MILD (1-3): doesn't interfere with normal activities, abdomen soft and not tender to touch     - MODERATE (4-7): interferes with normal activities or awakens from sleep, tender to touch     - SEVERE (8-10): excruciating pain, doubled over, unable to do any normal activities        mild  7. ORAL INTAKE: If vomiting, \"Have you been able to drink liquids?\" \"How much fluids have you had in the past 24 hours?\"      hydrating  8. HYDRATION: \"Any signs of dehydration?\" (e.g., dry mouth [not just dry lips], too weak to stand, dizziness, new weight loss) \"When did you last urinate?\"      Wt loss a month ago lost five pounds in one week, unsure what weight is currently  9. EXPOSURE: \"Have you traveled to a foreign country recently?\" \"Have you been exposed to anyone with diarrhea?\" \"Could you have eaten any food that was spoiled?\"      denies  10. ANTIBIOTIC USE: \"Are you taking antibiotics now or have you taken antibiotics in the past 2 months?\"        denies  11. OTHER SYMPTOMS: \"Do you have any other symptoms?\" (e.g., fever, blood in stool)        \"I did have a little blood way back but nothing since\" and \"ate something and had black stool once but nothing since\"  12. PREGNANCY: \"Is there any chance you are pregnant?\" \"When was your last menstrual period?\"        N/A    Protocols used: Diarrhea-ADULT-[place]    "

## 2024-06-27 ENCOUNTER — RA CDI HCC (OUTPATIENT)
Dept: OTHER | Facility: HOSPITAL | Age: 84
End: 2024-06-27

## 2024-06-27 ENCOUNTER — TELEPHONE (OUTPATIENT)
Dept: FAMILY MEDICINE CLINIC | Facility: CLINIC | Age: 84
End: 2024-06-27

## 2024-06-27 NOTE — TELEPHONE ENCOUNTER
Patient called requesting refill for Plavix. Patient made aware medication was refilled on 3/28/24 for 90 tablets with 1 refills to [address].  Patient instructed to contact the pharmacy to obtain refills of medication. Patient verbalized understanding.

## 2024-07-05 ENCOUNTER — APPOINTMENT (OUTPATIENT)
Dept: RADIOLOGY | Facility: CLINIC | Age: 84
End: 2024-07-05
Payer: COMMERCIAL

## 2024-07-05 ENCOUNTER — OFFICE VISIT (OUTPATIENT)
Dept: FAMILY MEDICINE CLINIC | Facility: CLINIC | Age: 84
End: 2024-07-05
Payer: COMMERCIAL

## 2024-07-05 VITALS
OXYGEN SATURATION: 97 % | SYSTOLIC BLOOD PRESSURE: 122 MMHG | WEIGHT: 113 LBS | DIASTOLIC BLOOD PRESSURE: 70 MMHG | RESPIRATION RATE: 18 BRPM | TEMPERATURE: 97.5 F | BODY MASS INDEX: 20.02 KG/M2 | HEIGHT: 63 IN | HEART RATE: 90 BPM

## 2024-07-05 DIAGNOSIS — R19.7 DIARRHEA, UNSPECIFIED TYPE: ICD-10-CM

## 2024-07-05 DIAGNOSIS — E03.9 HYPOTHYROIDISM, UNSPECIFIED TYPE: Chronic | ICD-10-CM

## 2024-07-05 DIAGNOSIS — Z53.20 MAMMOGRAM DECLINED: ICD-10-CM

## 2024-07-05 DIAGNOSIS — J31.0 NONALLERGIC RHINITIS: ICD-10-CM

## 2024-07-05 DIAGNOSIS — J41.0 SIMPLE CHRONIC BRONCHITIS (HCC): ICD-10-CM

## 2024-07-05 DIAGNOSIS — F17.200 NICOTINE DEPENDENCE, UNCOMPLICATED, UNSPECIFIED NICOTINE PRODUCT TYPE: Chronic | ICD-10-CM

## 2024-07-05 DIAGNOSIS — Z00.00 MEDICARE ANNUAL WELLNESS VISIT, SUBSEQUENT: Primary | ICD-10-CM

## 2024-07-05 DIAGNOSIS — E78.2 MIXED HYPERLIPIDEMIA: Chronic | ICD-10-CM

## 2024-07-05 PROCEDURE — G0439 PPPS, SUBSEQ VISIT: HCPCS | Performed by: FAMILY MEDICINE

## 2024-07-05 PROCEDURE — 74018 RADEX ABDOMEN 1 VIEW: CPT

## 2024-07-05 RX ORDER — IPRATROPIUM BROMIDE 21 UG/1
2 SPRAY, METERED NASAL 3 TIMES DAILY PRN
Qty: 30 ML | Refills: 5 | Status: SHIPPED | OUTPATIENT
Start: 2024-07-05

## 2024-07-05 NOTE — PATIENT INSTRUCTIONS
Medicare Preventive Visit Patient Instructions  Thank you for completing your Welcome to Medicare Visit or Medicare Annual Wellness Visit today. Your next wellness visit will be due in one year (7/6/2025).  The screening/preventive services that you may require over the next 5-10 years are detailed below. Some tests may not apply to you based off risk factors and/or age. Screening tests ordered at today's visit but not completed yet may show as past due. Also, please note that scanned in results may not display below.  Preventive Screenings:  Service Recommendations Previous Testing/Comments   Colorectal Cancer Screening  * Colonoscopy    * Fecal Occult Blood Test (FOBT)/Fecal Immunochemical Test (FIT)  * Fecal DNA/Cologuard Test  * Flexible Sigmoidoscopy Age: 45-75 years old   Colonoscopy: every 10 years (may be performed more frequently if at higher risk)  OR  FOBT/FIT: every 1 year  OR  Cologuard: every 3 years  OR  Sigmoidoscopy: every 5 years  Screening may be recommended earlier than age 45 if at higher risk for colorectal cancer. Also, an individualized decision between you and your healthcare provider will decide whether screening between the ages of 76-85 would be appropriate. Colonoscopy: 04/27/2022  FOBT/FIT: 10/03/2021  Cologuard: Not on file  Sigmoidoscopy: Not on file          Breast Cancer Screening Age: 40+ years old  Frequency: every 1-2 years  Not required if history of left and right mastectomy Mammogram: 01/26/2022        Cervical Cancer Screening Between the ages of 21-29, pap smear recommended once every 3 years.   Between the ages of 30-65, can perform pap smear with HPV co-testing every 5 years.   Recommendations may differ for women with a history of total hysterectomy, cervical cancer, or abnormal pap smears in past. Pap Smear: Not on file    Screening Not Indicated   Hepatitis C Screening Once for adults born between 1945 and 1965  More frequently in patients at high risk for Hepatitis C  Hep C Antibody: Not on file        Diabetes Screening 1-2 times per year if you're at risk for diabetes or have pre-diabetes Fasting glucose: 93 mg/dL (5/1/2024)  A1C: 5.3 % (9/21/2021)  Screening Current   Cholesterol Screening Once every 5 years if you don't have a lipid disorder. May order more often based on risk factors. Lipid panel: 01/17/2023    Screening Not Indicated  History Lipid Disorder     Other Preventive Screenings Covered by Medicare:  Abdominal Aortic Aneurysm (AAA) Screening: covered once if your at risk. You're considered to be at risk if you have a family history of AAA.  Lung Cancer Screening: covers low dose CT scan once per year if you meet all of the following conditions: (1) Age 55-77; (2) No signs or symptoms of lung cancer; (3) Current smoker or have quit smoking within the last 15 years; (4) You have a tobacco smoking history of at least 20 pack years (packs per day multiplied by number of years you smoked); (5) You get a written order from a healthcare provider.  Glaucoma Screening: covered annually if you're considered high risk: (1) You have diabetes OR (2) Family history of glaucoma OR (3)  aged 50 and older OR (4)  American aged 65 and older  Osteoporosis Screening: covered every 2 years if you meet one of the following conditions: (1) You're estrogen deficient and at risk for osteoporosis based off medical history and other findings; (2) Have a vertebral abnormality; (3) On glucocorticoid therapy for more than 3 months; (4) Have primary hyperparathyroidism; (5) On osteoporosis medications and need to assess response to drug therapy.   Last bone density test (DXA Scan): 10/07/2020.  HIV Screening: covered annually if you're between the age of 15-65. Also covered annually if you are younger than 15 and older than 65 with risk factors for HIV infection. For pregnant patients, it is covered up to 3 times per pregnancy.    Immunizations:  Immunization  Recommendations   Influenza Vaccine Annual influenza vaccination during flu season is recommended for all persons aged >= 6 months who do not have contraindications   Pneumococcal Vaccine   * Pneumococcal conjugate vaccine = PCV13 (Prevnar 13), PCV15 (Vaxneuvance), PCV20 (Prevnar 20)  * Pneumococcal polysaccharide vaccine = PPSV23 (Pneumovax) Adults 19-65 yo with certain risk factors or if 65+ yo  If never received any pneumonia vaccine: recommend Prevnar 20 (PCV20)  Give PCV20 if previously received 1 dose of PCV13 or PPSV23   Hepatitis B Vaccine 3 dose series if at intermediate or high risk (ex: diabetes, end stage renal disease, liver disease)   Respiratory syncytial virus (RSV) Vaccine - COVERED BY MEDICARE PART D  * RSVPreF3 (Arexvy) CDC recommends that adults 60 years of age and older may receive a single dose of RSV vaccine using shared clinical decision-making (SCDM)   Tetanus (Td) Vaccine - COST NOT COVERED BY MEDICARE PART B Following completion of primary series, a booster dose should be given every 10 years to maintain immunity against tetanus. Td may also be given as tetanus wound prophylaxis.   Tdap Vaccine - COST NOT COVERED BY MEDICARE PART B Recommended at least once for all adults. For pregnant patients, recommended with each pregnancy.   Shingles Vaccine (Shingrix) - COST NOT COVERED BY MEDICARE PART B  2 shot series recommended in those 19 years and older who have or will have weakened immune systems or those 50 years and older     Health Maintenance Due:      Topic Date Due   • Breast Cancer Screening: Mammogram  01/26/2023     Immunizations Due:  There are no preventive care reminders to display for this patient.  Advance Directives   What are advance directives?  Advance directives are legal documents that state your wishes and plans for medical care. These plans are made ahead of time in case you lose your ability to make decisions for yourself. Advance directives can apply to any medical  decision, such as the treatments you want, and if you want to donate organs.   What are the types of advance directives?  There are many types of advance directives, and each state has rules about how to use them. You may choose a combination of any of the following:  Living will:  This is a written record of the treatment you want. You can also choose which treatments you do not want, which to limit, and which to stop at a certain time. This includes surgery, medicine, IV fluid, and tube feedings.   Durable power of  for healthcare (DPAHC):  This is a written record that states who you want to make healthcare choices for you when you are unable to make them for yourself. This person, called a proxy, is usually a family member or a friend. You may choose more than 1 proxy.  Do not resuscitate (DNR) order:  A DNR order is used in case your heart stops beating or you stop breathing. It is a request not to have certain forms of treatment, such as CPR. A DNR order may be included in other types of advance directives.  Medical directive:  This covers the care that you want if you are in a coma, near death, or unable to make decisions for yourself. You can list the treatments you want for each condition. Treatment may include pain medicine, surgery, blood transfusions, dialysis, IV or tube feedings, and a ventilator (breathing machine).  Values history:  This document has questions about your views, beliefs, and how you feel and think about life. This information can help others choose the care that you would choose.  Why are advance directives important?  An advance directive helps you control your care. Although spoken wishes may be used, it is better to have your wishes written down. Spoken wishes can be misunderstood, or not followed. Treatments may be given even if you do not want them. An advance directive may make it easier for your family to make difficult choices about your care.   Fall Prevention    Fall  prevention  includes ways to make your home and other areas safer. It also includes ways you can move more carefully to prevent a fall. Health conditions that cause changes in your blood pressure, vision, or muscle strength and coordination may increase your risk for falls. Medicines may also increase your risk for falls if they make you dizzy, weak, or sleepy.   Fall prevention tips:   Stand or sit up slowly.    Use assistive devices as directed.    Wear shoes that fit well and have soles that .    Wear a personal alarm.    Stay active.    Manage your medical conditions.    Home Safety Tips:  Add items to prevent falls in the bathroom.    Keep paths clear.    Install bright lights in your home.    Keep items you use often on shelves within reach.    Paint or place reflective tape on the edges of your stairs.    Cigarette Smoking and Your Health   Risks to your health if you smoke:  Nicotine and other chemicals found in tobacco damage every cell in your body. Even if you are a light smoker, you have an increased risk for cancer, heart disease, and lung disease. If you are pregnant or have diabetes, smoking increases your risk for complications.   Benefits to your health if you stop smoking:   You decrease respiratory symptoms such as coughing, wheezing, and shortness of breath.   You reduce your risk for cancers of the lung, mouth, throat, kidney, bladder, pancreas, stomach, and cervix. If you already have cancer, you increase the benefits of chemotherapy. You also reduce your risk for cancer returning or a second cancer from developing.   You reduce your risk for heart disease, blood clots, heart attack, and stroke.   You reduce your risk for lung infections, and diseases such as pneumonia, asthma, chronic bronchitis, and emphysema.  Your circulation improves. More oxygen can be delivered to your body. If you have diabetes, you lower your risk for complications, such as kidney, artery, and eye diseases. You  also lower your risk for nerve damage. Nerve damage can lead to amputations, poor vision, and blindness.  You improve your body's ability to heal and to fight infections.  For more information and support to stop smoking:   Smokefree.gov  Phone: 1- 298 - 945-8372  Web Address: www.smokefree.[employer]     © Copyright [employer] 2018 Information is for End User's use only and may not be sold, redistributed or otherwise used for commercial purposes. All illustrations and images included in CareNotes® are the copyrighted property of A.D.A.M., Inc. or [employer]

## 2024-07-05 NOTE — PROGRESS NOTES
Ambulatory Visit  Name: Deena Wolff      : 1940      MRN: [id number]  Encounter Provider: Gely Bond MD  Encounter Date: 2024   Encounter department: [facility]    Assessment & Plan   1. Medicare annual wellness visit, subsequent  2. Simple chronic bronchitis (HCC)  Comments:  Stable.  3. Hypothyroidism, unspecified type  -     TSH, 3rd generation; Future  -     T4, free; Future  4. Mixed hyperlipidemia  -     Lipid Panel with Direct LDL reflex; Future  5. Mammogram declined  6. Nonallergic rhinitis  -     ipratropium (ATROVENT) 0.03 % nasal spray; 2 sprays into each nostril 3 (three) times a day as needed for rhinitis  7. Nicotine dependence, uncomplicated, unspecified nicotine product type  Assessment & Plan:  Counseled extensively on smoking cessation. She states that she is not ready to quit.       Depression Screening and Follow-up Plan: Patient was screened for depression during today's encounter. They screened negative with a PHQ-2 score of 0.    Falls Plan of Care: balance, strength, and gait training instructions were provided. Recommended assistive device to help with gait and balance.       Preventive health issues were discussed with patient, and age appropriate screening tests were ordered as noted in patient's After Visit Summary. Personalized health advice and appropriate referrals for health education or preventive services given if needed, as noted in patient's After Visit Summary.    History of Present Illness     HPI   Patient Care Team:  Gely Bond MD as PCP - General (Family Medicine)  Emery Bond MD as PCP - [facility]CP-[facility] ([facility]  Sid Kapoor DO (Cardiology)  Curt Oseguera MD (Vascular Surgery)  Joycelyn Guerrero PA-C (Vascular Surgery)  Amaury Jeffries MD (Nephrology)    Review of Systems   Constitutional: Negative.    HENT: Negative.     Eyes: Negative.    Respiratory: Negative.     Cardiovascular: Negative.    Gastrointestinal: Negative.     Endocrine: Negative.    Genitourinary: Negative.    Musculoskeletal: Negative.    Skin: Negative.    Allergic/Immunologic: Negative.    Neurological: Negative.    Hematological: Negative.    Psychiatric/Behavioral: Negative.       Medical History Reviewed by provider this encounter:  Tobacco  Allergies  Meds  Problems  Med Hx  Surg Hx  Fam Hx       Annual Wellness Visit Questionnaire   Deena is here for her Subsequent Wellness visit.     Health Risk Assessment:   Patient rates overall health as good. Patient feels that their physical health rating is same. Patient is satisfied with their life. Eyesight was rated as same. Hearing was rated as same. Patient feels that their emotional and mental health rating is same. Patients states they are sometimes angry. Patient states they are never, rarely unusually tired/fatigued. Pain experienced in the last 7 days has been some. Patient's pain rating has been 8/10. Patient states that she has experienced no weight loss or gain in last 6 months.     Depression Screening:   PHQ-2 Score: 0      Fall Risk Screening:   In the past year, patient has experienced: history of falling in past year    Number of falls: 2 or more  Injured during fall?: No    Feels unsteady when standing or walking?: Yes    Worried about falling?: Yes      Urinary Incontinence Screening:   Patient has not leaked urine accidently in the last six months.     Home Safety:  Patient has trouble with stairs inside or outside of their home. Patient has working smoke alarms and has working carbon monoxide detector. Home safety hazards include: none.     Nutrition:   Current diet is Regular.     Medications:   Patient is not currently taking any over-the-counter supplements. Patient is able to manage medications.     Activities of Daily Living (ADLs)/Instrumental Activities of Daily Living (IADLs):   Walk and transfer into and out of bed and chair?: Yes  Dress and groom yourself?: Yes    Bathe or shower  yourself?: Yes    Feed yourself? Yes  Do your laundry/housekeeping?: Yes  Manage your money, pay your bills and track your expenses?: Yes  Make your own meals?: Yes    Do your own shopping?: Yes    Previous Hospitalizations:   Any hospitalizations or ED visits within the last 12 months?: Yes    How many hospitalizations have you had in the last year?: 1-2    Advance Care Planning:   Living will: Yes    Durable POA for healthcare: Yes    Advanced directive: Yes      PREVENTIVE SCREENINGS      Cardiovascular Screening:    General: Screening Not Indicated and History Lipid Disorder      Diabetes Screening:     General: Risks and Benefits Discussed and Screening Current      Colorectal Cancer Screening:     General: Screening Not Indicated      Breast Cancer Screening:     General: Risks and Benefits Discussed and Patient Declines    Due for: Mammogram        Cervical Cancer Screening:    General: Screening Not Indicated      Osteoporosis Screening:    General: Screening Not Indicated and History Osteoporosis      Abdominal Aortic Aneurysm (AAA) Screening:    Risk factors include: family history of AAA        General: Screening Not Indicated      Lung Cancer Screening:     General: Screening Not Indicated      Hepatitis C Screening:    General: Screening Not Indicated    Screening, Brief Intervention, and Referral to Treatment (SBIRT)    Screening  Typical number of drinks in a day: 2  Typical number of drinks in a week: 14  Interpretation: Low risk drinking behavior.    Single Item Drug Screening:  How often have you used an illegal drug (including marijuana) or a prescription medication for non-medical reasons in the past year? never    Single Item Drug Screen Score: 0  Interpretation: Negative screen for possible drug use disorder    Brief Intervention  Healthy alcohol use/limits discussed.     Social Determinants of Health     Financial Resource Strain: Low Risk  (5/9/2023)    Overall Financial Resource Strain  "(CARDIA)     Difficulty of Paying Living Expenses: Not hard at all   Transportation Needs: No Transportation Needs (5/9/2023)    PRAPARE - Transportation     Lack of Transportation (Medical): No     Lack of Transportation (Non-Medical): No     No results found.    Objective     /70   Pulse 90   Temp 97.5 °F (36.4 °C)   Resp 18   Ht 5' 3\" (1.6 m)   Wt 51.3 kg (113 lb)   SpO2 97%   BMI 20.02 kg/m²     Physical Exam  Constitutional:       General: She is not in acute distress.     Appearance: Normal appearance. She is normal weight. She is not ill-appearing, toxic-appearing or diaphoretic.   HENT:      Head: Normocephalic and atraumatic.      Right Ear: Tympanic membrane, ear canal and external ear normal. There is no impacted cerumen.      Left Ear: Tympanic membrane, ear canal and external ear normal. There is no impacted cerumen.      Nose: Nose normal.      Mouth/Throat:      Mouth: Mucous membranes are moist.      Pharynx: Oropharynx is clear. No oropharyngeal exudate or posterior oropharyngeal erythema.   Eyes:      General: No scleral icterus.        Right eye: No discharge.         Left eye: No discharge.      Extraocular Movements: Extraocular movements intact.      Conjunctiva/sclera: Conjunctivae normal.      Pupils: Pupils are equal, round, and reactive to light.   Cardiovascular:      Rate and Rhythm: Normal rate and regular rhythm.      Pulses: Normal pulses.      Heart sounds: Normal heart sounds. No murmur heard.     No friction rub. No gallop.   Pulmonary:      Effort: Pulmonary effort is normal. No respiratory distress.      Breath sounds: Normal breath sounds. No stridor. No wheezing, rhonchi or rales.   Chest:      Chest wall: No tenderness.   Musculoskeletal:         General: Normal range of motion.   Skin:     General: Skin is warm and dry.   Neurological:      General: No focal deficit present.      Mental Status: She is alert and oriented to person, place, and time. "       Administrative Statements

## 2024-07-31 DIAGNOSIS — I15.0 RENOVASCULAR HYPERTENSION: ICD-10-CM

## 2024-07-31 DIAGNOSIS — E03.9 HYPOTHYROIDISM, UNSPECIFIED TYPE: ICD-10-CM

## 2024-07-31 RX ORDER — CARVEDILOL 12.5 MG/1
TABLET ORAL
Qty: 180 TABLET | Refills: 1 | Status: SHIPPED | OUTPATIENT
Start: 2024-07-31

## 2024-07-31 RX ORDER — LEVOTHYROXINE SODIUM 0.03 MG/1
TABLET ORAL
Qty: 90 TABLET | Refills: 0 | Status: SHIPPED | OUTPATIENT
Start: 2024-07-31

## 2024-08-09 ENCOUNTER — TELEPHONE (OUTPATIENT)
Dept: NEUROLOGY | Facility: CLINIC | Age: 84
End: 2024-08-09

## 2024-08-13 ENCOUNTER — APPOINTMENT (OUTPATIENT)
Dept: LAB | Facility: CLINIC | Age: 84
End: 2024-08-13
Payer: COMMERCIAL

## 2024-08-13 DIAGNOSIS — N18.30 BENIGN HYPERTENSION WITH CHRONIC KIDNEY DISEASE, STAGE III (HCC): ICD-10-CM

## 2024-08-13 DIAGNOSIS — I12.9 BENIGN HYPERTENSION WITH CHRONIC KIDNEY DISEASE, STAGE III (HCC): ICD-10-CM

## 2024-08-13 DIAGNOSIS — I10 MALIGNANT HYPERTENSION: ICD-10-CM

## 2024-08-13 DIAGNOSIS — I12.9 PARENCHYMAL RENAL HYPERTENSION, STAGE 1 THROUGH STAGE 4 OR UNSPECIFIED CHRONIC KIDNEY DISEASE: ICD-10-CM

## 2024-08-13 DIAGNOSIS — E03.9 HYPOTHYROIDISM, UNSPECIFIED TYPE: Chronic | ICD-10-CM

## 2024-08-13 DIAGNOSIS — I50.32 HYPERTENSIVE HEART DISEASE WITH CHRONIC DIASTOLIC CONGESTIVE HEART FAILURE (HCC): ICD-10-CM

## 2024-08-13 DIAGNOSIS — I70.1 RENAL ARTERY STENOSIS (HCC): Chronic | ICD-10-CM

## 2024-08-13 DIAGNOSIS — I50.32 CHRONIC DIASTOLIC HEART FAILURE (HCC): Chronic | ICD-10-CM

## 2024-08-13 DIAGNOSIS — I11.0 HYPERTENSIVE HEART DISEASE WITH CHRONIC DIASTOLIC CONGESTIVE HEART FAILURE (HCC): ICD-10-CM

## 2024-08-13 DIAGNOSIS — K55.1 MESENTERIC ARTERY STENOSIS (HCC): Chronic | ICD-10-CM

## 2024-08-13 DIAGNOSIS — E78.2 MIXED HYPERLIPIDEMIA: ICD-10-CM

## 2024-08-13 LAB
ALBUMIN SERPL BCG-MCNC: 4.4 G/DL (ref 3.5–5)
ALP SERPL-CCNC: 52 U/L (ref 34–104)
ALT SERPL W P-5'-P-CCNC: 18 U/L (ref 7–52)
ANION GAP SERPL CALCULATED.3IONS-SCNC: 11 MMOL/L (ref 4–13)
AST SERPL W P-5'-P-CCNC: 27 U/L (ref 13–39)
BILIRUB SERPL-MCNC: 0.71 MG/DL (ref 0.2–1)
BUN SERPL-MCNC: 47 MG/DL (ref 5–25)
CALCIUM SERPL-MCNC: 10.3 MG/DL (ref 8.4–10.2)
CHLORIDE SERPL-SCNC: 105 MMOL/L (ref 96–108)
CHOLEST SERPL-MCNC: 125 MG/DL
CO2 SERPL-SCNC: 24 MMOL/L (ref 21–32)
CREAT SERPL-MCNC: 1.33 MG/DL (ref 0.6–1.3)
ERYTHROCYTE [DISTWIDTH] IN BLOOD BY AUTOMATED COUNT: 16 % (ref 11.6–15.1)
GFR SERPL CREATININE-BSD FRML MDRD: 36 ML/MIN/1.73SQ M
GLUCOSE P FAST SERPL-MCNC: 100 MG/DL (ref 65–99)
HCT VFR BLD AUTO: 34.9 % (ref 34.8–46.1)
HDLC SERPL-MCNC: 82 MG/DL
HGB BLD-MCNC: 11.7 G/DL (ref 11.5–15.4)
LDLC SERPL CALC-MCNC: 33 MG/DL (ref 0–100)
MCH RBC QN AUTO: 35 PG (ref 26.8–34.3)
MCHC RBC AUTO-ENTMCNC: 33.5 G/DL (ref 31.4–37.4)
MCV RBC AUTO: 105 FL (ref 82–98)
PHOSPHATE SERPL-MCNC: 3.6 MG/DL (ref 2.3–4.1)
PLATELET # BLD AUTO: 199 THOUSANDS/UL (ref 149–390)
PMV BLD AUTO: 12.3 FL (ref 8.9–12.7)
POTASSIUM SERPL-SCNC: 4.9 MMOL/L (ref 3.5–5.3)
PROT SERPL-MCNC: 8 G/DL (ref 6.4–8.4)
PTH-INTACT SERPL-MCNC: 30.3 PG/ML (ref 12–88)
RBC # BLD AUTO: 3.34 MILLION/UL (ref 3.81–5.12)
SODIUM SERPL-SCNC: 140 MMOL/L (ref 135–147)
T4 FREE SERPL-MCNC: 0.83 NG/DL (ref 0.61–1.12)
TRIGL SERPL-MCNC: 49 MG/DL
TSH SERPL DL<=0.05 MIU/L-ACNC: 1.5 UIU/ML (ref 0.45–4.5)
URATE SERPL-MCNC: 9.6 MG/DL (ref 2–7.5)
WBC # BLD AUTO: 6.53 THOUSAND/UL (ref 4.31–10.16)

## 2024-08-13 PROCEDURE — 84439 ASSAY OF FREE THYROXINE: CPT

## 2024-08-13 PROCEDURE — 36415 COLL VENOUS BLD VENIPUNCTURE: CPT

## 2024-08-13 PROCEDURE — 80053 COMPREHEN METABOLIC PANEL: CPT

## 2024-08-13 PROCEDURE — 84100 ASSAY OF PHOSPHORUS: CPT

## 2024-08-13 PROCEDURE — 82610 CYSTATIN C: CPT

## 2024-08-13 PROCEDURE — 84443 ASSAY THYROID STIM HORMONE: CPT

## 2024-08-13 PROCEDURE — 80061 LIPID PANEL: CPT

## 2024-08-13 PROCEDURE — 84550 ASSAY OF BLOOD/URIC ACID: CPT

## 2024-08-13 PROCEDURE — 85027 COMPLETE CBC AUTOMATED: CPT

## 2024-08-13 PROCEDURE — 83970 ASSAY OF PARATHORMONE: CPT

## 2024-08-16 ENCOUNTER — OFFICE VISIT (OUTPATIENT)
Dept: NEPHROLOGY | Facility: CLINIC | Age: 84
End: 2024-08-16

## 2024-08-16 VITALS
DIASTOLIC BLOOD PRESSURE: 68 MMHG | BODY MASS INDEX: 20.91 KG/M2 | OXYGEN SATURATION: 98 % | SYSTOLIC BLOOD PRESSURE: 144 MMHG | HEART RATE: 54 BPM | WEIGHT: 118 LBS | HEIGHT: 63 IN

## 2024-08-16 DIAGNOSIS — D47.2 MGUS (MONOCLONAL GAMMOPATHY OF UNKNOWN SIGNIFICANCE): ICD-10-CM

## 2024-08-16 DIAGNOSIS — I50.32 HYPERTENSIVE HEART DISEASE WITH CHRONIC DIASTOLIC CONGESTIVE HEART FAILURE (HCC): ICD-10-CM

## 2024-08-16 DIAGNOSIS — I70.1 RENAL ARTERY STENOSIS (HCC): Primary | Chronic | ICD-10-CM

## 2024-08-16 DIAGNOSIS — E79.0 HYPERURICEMIA: ICD-10-CM

## 2024-08-16 DIAGNOSIS — D63.1 ANEMIA IN STAGE 3B CHRONIC KIDNEY DISEASE  (HCC): Chronic | ICD-10-CM

## 2024-08-16 DIAGNOSIS — I10 MALIGNANT HYPERTENSION: ICD-10-CM

## 2024-08-16 DIAGNOSIS — N18.32 ANEMIA IN STAGE 3B CHRONIC KIDNEY DISEASE  (HCC): Chronic | ICD-10-CM

## 2024-08-16 DIAGNOSIS — I11.0 HYPERTENSIVE HEART DISEASE WITH CHRONIC DIASTOLIC CONGESTIVE HEART FAILURE (HCC): ICD-10-CM

## 2024-08-16 DIAGNOSIS — N18.32 STAGE 3B CHRONIC KIDNEY DISEASE (HCC): ICD-10-CM

## 2024-08-16 DIAGNOSIS — N18.30 BENIGN HYPERTENSION WITH CHRONIC KIDNEY DISEASE, STAGE III (HCC): ICD-10-CM

## 2024-08-16 DIAGNOSIS — I12.9 BENIGN HYPERTENSION WITH CHRONIC KIDNEY DISEASE, STAGE III (HCC): ICD-10-CM

## 2024-08-16 PROBLEM — N18.4 ANEMIA DUE TO STAGE 4 CHRONIC KIDNEY DISEASE  (HCC): Status: RESOLVED | Noted: 2024-03-01 | Resolved: 2024-08-16

## 2024-08-16 LAB
CYSTATIN C SERPL-MCNC: 2.2 MG/L (ref 0.87–1.12)
GFR/BSA.PRED SERPLBLD CYS-BASED-ARV: 23 ML/MIN/1.73

## 2024-08-16 RX ORDER — AMLODIPINE BESYLATE 10 MG/1
10 TABLET ORAL EVERY MORNING
Qty: 90 TABLET | Refills: 3 | Status: SHIPPED | OUTPATIENT
Start: 2024-08-16

## 2024-08-16 NOTE — PATIENT INSTRUCTIONS
1.)  Low 2 g sodium diet    2.)  Monitor weights at home    3.)  Avoid NSAIDs (ibuprofen, Motrin, Advil, Aleve, naproxen)    4.)  Monitor blood pressure at home, call if blood pressure greater than 150/90 persistently    5.) I will plan to discuss all results including blood work, and/or imaging at our next visit, unless there is an urgent indication, in which case I will call you earlier. If you have any questions or concerns about your results, please feel free to call our office.        
denies pain/discomfort (Rating = 0)

## 2024-08-16 NOTE — PROGRESS NOTES
NEPHROLOGY OFFICE VISIT   Deena Wolff 84 y.o. female MRN: [id number]  8/16/2024    Reason for Visit: Chronic kidney disease stage IIIb    History of Present Illness (HPI):    I had the pleasure of seeing Deena today in the renal clinic for the continued management of chronic kidney disease stage IIIb. Since our last visit, there has been no ER visits or hospitilizations.  She complains of significant neuropathic pain of her lower legs.  She has been referred to neurology.    Her blood pressure has been quite stable at this time    She needs a refill for her amlodipine which I provided for her.    We reviewed her blood work from August 13 which included PTH BMP CBC and phosphorus.    She unfortunately continues to still smoke 1 pack/day.  She is following with vascular surgery for peripheral vascular disease.    Her calcium was 10.3 corrected.  Sodium normal at 140 and potassium normal 4.9.  Renal function stable with a creatinine 1.33 mg/dL.  PTH and phosphorus within normal limits.  Uric acid level was elevated at 9.6 but improvement from last time where was 11.9.  I discussed with her about allopurinol she would like to avoid another medication so after discussion we will check it again in 6 months if it is improving we will continue to monitor if it is worsening she is agreeable to allopurinol.    ASSESSMENT and PLAN:    Chronic Kidney Disease Stage IIIB  --Imaging: Right kidney 9.9 cm.  Left kidney 10.6 cm.  Both kidneys are diffusely echogenic  --Urinalysis: Trace protein and otherwise bland  --Proteinuria: Check microalbumin/creatinine ratio at the next visit  --Baseline Kidney Function: 1.3-1.8 mg/dL  --Etiology: Presumed be secondary to renovascular hypertension, arteriolosclerosis, hypertensive nephrosclerosis.  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, age-related nephron loss  --Biopsy Proven: No indication  --Serologies: No  --RAAS Blockade: Enalapril  --Reducing Cardiovascular Risk Factors:   Simvastatin+ Ezetimibe reduced atherosclerotic events in CKD (SHARP trial); Low dose aspirin safe (if no contraindications exist); smoking is an independent risk factor for Chronic Kidney Disease and progression, strongly recommend smoking cessation  --Sodium-Glucose Cotransporter-2 (SGLT2) Inhibitors:  May see an acute drop in the eGFR initially when starting the medication but then a stabilization of the renal function, with slower loss of renal function as compared to placebo.  Relative risk of end-stage renal disease, doubling of serum creatinine or death from renal causes were also found to be lower as compared to placebo. ([facility]).  DAPA-CKD study, showed that patients with chronic kidney disease regardless of the presence or absence of diabetes the risk of composite of a sustained decline in the estimated GFR of at least 50%, end-stage renal disease or death from renal or cardiovascular causes was significantly lower with Dapagliflozin than with placebo. EMPEROR-Reduced study also showed beneficial effects. EMPA-CKD, among wide range of patients with CKD, who were at risk for progression, empagliflozin led a lower risk of kidney disease progression or death from cardiovascular causes than placebo.  If no contraindications exist I would recommend this medication added to the regiment. If eGFR < 60 cc/min (avoid ertugliflozin); avoid or caution with GFR < 20 mL/min, not an absolute contraindication, likely lower benefits.   --Finerenone: In patients with chronic kidney disease with type 2 diabetes, treatment led to lower risks of CKD progression and cardiovascular events than placebo. (FIDELIO-DKD)  --Status: Renal function stable with a creatinine 1.3 mg/dL.  Electrolytes are normal.  --Management/Recommendations: Continue current management including RAAS blockade.  Smoking cessation strongly advised.     Hypertension  -- Stage II (American College of Cardiology/American Heart Association)  -- with  underlying chronic kidney disease and smoking  -- Body mass index is 20.9 kg/m².  -- Volume status: Euvolemic  -- Etiology: Primary hypertension, renal artery stenosis  -- Secondary Work-Up: Renal artery Doppler showed right renal artery stenosis  -- Target Goal: < 130/80 (ACC/AHA, CKD with proteinuria, CKD in diabetics) ; if tolerated can target SBP < 120 mmHg in high cardiovascular risk ( Age > 75, CKD, CVD, No Diabetics SPRINT)  -- Lifestyle Modifications: DASH Diet, Weight Loss for ideal body weight, 45 mins of cardiovascular exercise 3 times a week as tolerated, and if no contraindications exist, smoking cessation, limit alcohol use, avoid NSAIDS, monitor blood pressure at home  -- Status: For her age blood pressure overall at target.  -- Current antihypertensive regiment: Amlodipine 10 mg daily, carvedilol 12.5 mg twice a day, enalapril 10 mg daily, furosemide 20 mg daily, spironolactone 25 mg daily  -- Changes: Continue current management at this time.  Prevent hypoperfusion/hypotension but continue RAAS blockade.  Smoking cessation strongly advised.     Smoker/Tobacco Dependence  -- Actively smoking: Yes  -- Duration of smokin+ years  -- Spent approximately: 5 minutes discussing smoking cessation at length, and advised to quitting smoking.  -- Eager to quit smoking: No  -- Offered nicotine patch, recommend cognitive behavioral therapy   -- Smoking can affect medications used to treat high blood pressure.  Smoking increases the risk of strokes and heart attacks in people with high blood pressure.  Smoking is a well-known risk factor for chronic kidney disease.  Active smoking increases development and progression of chronic kidney disease.  It can lead to an increased risk of worsening microalbuminuria.  Can lead to increased risk of developing some forms of kidney malignancies.  Causes damage to the cardiovascular system leading to poor blood flow resulting in worsening kidney function.  It can  perpetuate worsening diabetic kidney disease.  It can also lead to idiopathic nodular glomerulosclerosis.     Right renal artery stenosis  -- Renal artery Doppler showed greater than 60% stenosis in the proximal right renal artery  -- Renal vascular index 0.85  --Renal function stable along with blood pressure.  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  Patient is maintained on diuretics which will worsen the uric acid levels  -- Check again in 6 months if worsening consider starting allopurinol    Elevated calcium level  -- PTH and phosphorus are normal  -- Slightly elevated we will check an ionized calcium at the next visit      PATIENT INSTRUCTIONS:    Patient Instructions   1.)  Low 2 g sodium diet    2.)  Monitor weights at home    3.)  Avoid NSAIDs (ibuprofen, Motrin, Advil, Aleve, naproxen)    4.)  Monitor blood pressure at home, call if blood pressure greater than 150/90 persistently    5.) I will plan to discuss all results including blood work, and/or imaging at our next visit, unless there is an urgent indication, in which case I will call you earlier. If you have any questions or concerns about your results, please feel free to call our office.            Orders Placed This Encounter   Procedures    Calcium, ionized     Standing Status:   Future     Standing Expiration Date:   8/16/2025    Albumin / creatinine urine ratio     Standing Status:   Future     Standing  "Expiration Date:   8/16/2025    Comprehensive metabolic panel     This is a patient instruction: Patient fasting for 8 hours or longer recommended.     Standing Status:   Future     Standing Expiration Date:   8/16/2025    Phosphorus     Standing Status:   Future     Standing Expiration Date:   8/16/2025    CBC and Platelet     Standing Status:   Future     Standing Expiration Date:   8/16/2025    Uric acid     Standing Status:   Future     Standing Expiration Date:   8/16/2025       OBJECTIVE:  Current Weight: Weight - Scale: 53.5 kg (118 lb)  Vitals:    08/16/24 1022   BP: 144/68   BP Location: Left arm   Patient Position: Standing   Cuff Size: Standard   Pulse: (!) 54   SpO2: 98%   Weight: 53.5 kg (118 lb)   Height: 5' 3\" (1.6 m)    Body mass index is 20.9 kg/m².      REVIEW OF SYSTEMS:    Review of Systems   Constitutional:  Negative for activity change and fever.   Respiratory:  Negative for cough, chest tightness, shortness of breath and wheezing.    Cardiovascular:  Negative for chest pain and leg swelling.   Gastrointestinal:  Negative for abdominal pain, diarrhea, nausea and vomiting.   Endocrine: Negative for polyuria.   Genitourinary:  Negative for difficulty urinating, dysuria, flank pain, frequency and urgency.   Skin:  Negative for rash.   Neurological:  Negative for dizziness, syncope, light-headedness and headaches.   All other systems reviewed and are negative.      PHYSICAL EXAM:      Physical Exam  Vitals and nursing note reviewed.   Constitutional:       General: She is not in acute distress.     Appearance: She is well-developed.   HENT:      Head: Normocephalic and atraumatic.   Eyes:      General: No scleral icterus.     Conjunctiva/sclera: Conjunctivae normal.      Pupils: Pupils are equal, round, and reactive to light.   Cardiovascular:      Rate and Rhythm: Normal rate and regular rhythm.      Heart sounds: S1 normal and S2 normal. No murmur heard.     No friction rub. No gallop. "   Pulmonary:      Effort: Pulmonary effort is normal. No respiratory distress.      Breath sounds: Normal breath sounds. No wheezing or rales.   Abdominal:      General: Bowel sounds are normal.      Palpations: Abdomen is soft.      Tenderness: There is no abdominal tenderness. There is no rebound.   Musculoskeletal:         General: Normal range of motion.      Cervical back: Normal range of motion and neck supple.   Skin:     Findings: No rash.   Neurological:      Mental Status: She is alert and oriented to person, place, and time.   Psychiatric:         Behavior: Behavior normal.         Medications:    Current Outpatient Medications:     albuterol (ProAir HFA) 90 mcg/act inhaler, Inhale 2 puffs every 6 (six) hours as needed for wheezing, Disp: 8 g, Rfl: 3    amLODIPine (NORVASC) 10 mg tablet, Take 1 tablet (10 mg total) by mouth every morning, Disp: 90 tablet, Rfl: 3    atorvastatin (LIPITOR) 80 mg tablet, Take 1 tablet (80 mg total) by mouth daily at bedtime, Disp: 90 tablet, Rfl: 3    B Complex Vitamins (VITAMIN B-COMPLEX) TABS, Take by mouth daily , Disp: , Rfl:     Bioflavonoid Products (VITAMIN C PLUS PO), Take by mouth daily, Disp: , Rfl:     bisacodyl (DULCOLAX) 5 mg EC tablet, Take 10 mg by mouth every evening, Disp: , Rfl:     carvedilol (COREG) 12.5 mg tablet, TAKE 1 TABLET TWO TIMES A DAY, Disp: 180 tablet, Rfl: 1    cholecalciferol (VITAMIN D3) 1,000 units tablet, Take 5,000 Units by mouth daily , Disp: , Rfl:     clopidogrel (PLAVIX) 75 mg tablet, TAKE 1 TABLET BY MOUTH DAILY, Disp: 90 tablet, Rfl: 1    enalapril (VASOTEC) 10 mg tablet, Take 1 tablet (10 mg total) by mouth 2 (two) times a day, Disp: 180 tablet, Rfl: 1    furosemide (LASIX) 20 mg tablet, Take 1 tablet (20 mg total) by mouth daily (Patient taking differently: Take 20 mg by mouth if needed), Disp: 90 tablet, Rfl: 3    levothyroxine 25 mcg tablet, TAKE 1 TABLET DAILY IN THE EARLY MORNING, Disp: 90 tablet, Rfl: 0    MAGNESIUM PO, Take  500 mg by mouth daily at bedtime, Disp: , Rfl:     spironolactone (ALDACTONE) 25 mg tablet, Take 1 tablet (25 mg total) by mouth daily, Disp: 90 tablet, Rfl: 1    acetaminophen (TYLENOL) 500 mg tablet, Take 500 mg by mouth every 6 (six) hours as needed for mild pain, Disp: , Rfl:     colchicine (COLCRYS) 0.6 mg tablet, , Disp: , Rfl:     COLLAGEN PO, Take by mouth Includes a probiotic and other vitamins-10 supplements-2 am-one in the afternoon-one hs (Patient not taking: Reported on 8/16/2024), Disp: , Rfl:     Diclofenac Sodium (VOLTAREN) 1 %, Apply 4 g topically 4 (four) times a day as needed (arthtitis pain) (Patient not taking: Reported on 7/5/2024), Disp: 350 g, Rfl: 1    ipratropium (ATROVENT) 0.03 % nasal spray, 2 sprays into each nostril 3 (three) times a day as needed for rhinitis, Disp: 30 mL, Rfl: 5    nicotine (NICODERM CQ) 14 mg/24hr TD 24 hr patch, Place 1 patch on the skin over 24 hours daily Do not start before November 18, 2023. (Patient not taking: Reported on 3/1/2024), Disp: 28 patch, Rfl: 0    Laboratory Results:  Results from last 7 days   Lab Units 08/13/24  1245   WBC Thousand/uL 6.53   HEMOGLOBIN g/dL 11.7   HEMATOCRIT % 34.9   PLATELETS Thousands/uL 199   POTASSIUM mmol/L 4.9   CHLORIDE mmol/L 105   CO2 mmol/L 24   BUN mg/dL 47*   CREATININE mg/dL 1.33*   CALCIUM mg/dL 10.3*   PHOSPHORUS mg/dL 3.6       Results for orders placed or performed in visit on 08/13/24   Comprehensive metabolic panel   Result Value Ref Range    Sodium 140 135 - 147 mmol/L    Potassium 4.9 3.5 - 5.3 mmol/L    Chloride 105 96 - 108 mmol/L    CO2 24 21 - 32 mmol/L    ANION GAP 11 4 - 13 mmol/L    BUN 47 (H) 5 - 25 mg/dL    Creatinine 1.33 (H) 0.60 - 1.30 mg/dL    Glucose, Fasting 100 (H) 65 - 99 mg/dL    Calcium 10.3 (H) 8.4 - 10.2 mg/dL    AST 27 13 - 39 U/L    ALT 18 7 - 52 U/L    Alkaline Phosphatase 52 34 - 104 U/L    Total Protein 8.0 6.4 - 8.4 g/dL    Albumin 4.4 3.5 - 5.0 g/dL    Total Bilirubin 0.71 0.20 -  1.00 mg/dL    eGFR 36 ml/min/1.73sq m   CBC   Result Value Ref Range    WBC 6.53 4.31 - 10.16 Thousand/uL    RBC 3.34 (L) 3.81 - 5.12 Million/uL    Hemoglobin 11.7 11.5 - 15.4 g/dL    Hematocrit 34.9 34.8 - 46.1 %     (H) 82 - 98 fL    MCH 35.0 (H) 26.8 - 34.3 pg    MCHC 33.5 31.4 - 37.4 g/dL    RDW 16.0 (H) 11.6 - 15.1 %    Platelets 199 149 - 390 Thousands/uL    MPV 12.3 8.9 - 12.7 fL   PTH, intact   Result Value Ref Range    PTH 30.3 12.0 - 88.0 pg/mL   Phosphorus   Result Value Ref Range    Phosphorus 3.6 2.3 - 4.1 mg/dL   Uric acid   Result Value Ref Range    Uric Acid 9.6 (H) 2.0 - 7.5 mg/dL   Lipid Panel with Direct LDL reflex   Result Value Ref Range    Cholesterol 125 See Comment mg/dL    Triglycerides 49 See Comment mg/dL    HDL, Direct 82 >=50 mg/dL    LDL Calculated 33 0 - 100 mg/dL   TSH, 3rd generation   Result Value Ref Range    TSH 3RD GENERATON 1.503 0.450 - 4.500 uIU/mL   T4, free   Result Value Ref Range    Free T4 0.83 0.61 - 1.12 ng/dL   Cystatin C With eGFR   Result Value Ref Range    CYSTATIN C 2.20 (H) 0.87 - 1.12 mg/L    eGFR 23 (L) >59 mL/min/1.73

## 2024-08-22 ENCOUNTER — TELEPHONE (OUTPATIENT)
Age: 84
End: 2024-08-22

## 2024-08-23 DIAGNOSIS — E83.42 HYPOMAGNESEMIA: Primary | ICD-10-CM

## 2024-08-23 RX ORDER — FOLIC ACID 0.8 MG
1 TABLET ORAL DAILY
Qty: 90 CAPSULE | Refills: 0 | Status: SHIPPED | OUTPATIENT
Start: 2024-08-23

## 2024-08-29 ENCOUNTER — TELEPHONE (OUTPATIENT)
Dept: GASTROENTEROLOGY | Facility: CLINIC | Age: 84
End: 2024-08-29

## 2024-08-29 NOTE — TELEPHONE ENCOUNTER
I spoke with pt and she will call back if she can get a ride for an earlier appt with Dr Shearer today. Sb

## 2024-08-29 NOTE — TELEPHONE ENCOUNTER
Pt was waiting outside for her 2:35 pickup for her 3pm appt today. Unfortunately her ride canceled on her and as per the [employer] representative, you do not get notified when a  cancels. Pt missed her appt so we rescheduled her to 9/6.    Pt has a walker and will need help. I advised pt we will let [employer] know in case there are any issues.     Called [employer], and rescheduled a ride for 12:35  on 9/6. [employer] informed us the pt will need someone to accompany her due to the walker. [employer] drivers are unable to get out of the car.     Tried to call pt back to inform, line was busy. Called 5 times, still busy. I will call back

## 2024-08-30 NOTE — TELEPHONE ENCOUNTER
Called pt and informed her of 12:35 pickup for her 9/6 appt. I informed her, she will need someone to assist her with the walker, as the  cannot get out of the vehicle. Pt is understanding.

## 2024-09-03 ENCOUNTER — TELEPHONE (OUTPATIENT)
Age: 84
End: 2024-09-03

## 2024-09-03 NOTE — TELEPHONE ENCOUNTER
Pt. Calling asking for a lyft ride to appointment on 9/6/24 , please call patient to setup transportation

## 2024-09-06 ENCOUNTER — OFFICE VISIT (OUTPATIENT)
Dept: GASTROENTEROLOGY | Facility: CLINIC | Age: 84
End: 2024-09-06
Payer: COMMERCIAL

## 2024-09-06 VITALS
HEART RATE: 68 BPM | WEIGHT: 119 LBS | HEIGHT: 63 IN | SYSTOLIC BLOOD PRESSURE: 150 MMHG | BODY MASS INDEX: 21.09 KG/M2 | DIASTOLIC BLOOD PRESSURE: 76 MMHG

## 2024-09-06 DIAGNOSIS — R19.7 DIARRHEA, UNSPECIFIED TYPE: Primary | ICD-10-CM

## 2024-09-06 DIAGNOSIS — R63.4 WEIGHT LOSS, ABNORMAL: ICD-10-CM

## 2024-09-06 PROCEDURE — 99213 OFFICE O/P EST LOW 20 MIN: CPT | Performed by: PHYSICIAN ASSISTANT

## 2024-09-06 NOTE — PROGRESS NOTES
[facility] - Outpatient Progress Note  Deena Wolff 84 y.o. female MRN: [id number]  Encounter: [id number]    Assessment and Plan    1. Diarrhea, unspecified type  -TSH normal 8/13/24  - Get celiac serology  -Obtain stool studies    2. Weight loss, abnormal  -Weight was 114 pounds back in November 2021 and is at 119 pounds today      --------------------------------------------------------------------------------------------------------------------    Chief Complaint: Diarrhea weight loss    HPI: Deena Wolff is a 84 y.o. female new to me with past medical history of carotid, iliac, renal artery, mesenteric artery and aortic stenosis, diastolic heart failure, duodenal AVMs, hypothyroidism, chronic kidney disease stage III, chronic anemia, history of ischemic colitis who presents today for complaints of diarrhea and weight loss.  Patient weighed 114 pounds back in November 2021.    Diarrhea  How long have you had symptoms - 6 months  Form of stool - currently normal (stopped a month ago)  Frequency of stool - 5 times per day  Recent sick contacts or hospitalization - No  Recent antibiotics - No  Any new medications - No  Recent Travel - No  History of cholecystectomy - No  History of bowel resections - No  Family history of IBS, IBD, Celiac disease - No  Nocturnal diarrhea - No  Is diarrhea mainly post prandial - Yes  Known food allergies (eg Lactose or Gluten) - No   Blood in stool - No  Previous colonoscopy -April 2022 with multiple polyps removed    TSH August 2024 normal and hemoglobin normal.      Patient denies any nausea, vomiting, abdominal pain, dysphagia, constipation, blood in stool, or black tarry stools.     Endoscopy History:  EGD - 10/2021 gastritis, non-bleeding gastric AVM.  Colonoscopy - 4/2022 with multiple polyps removed , recall 4/2025    Review of Systems:   General: negative for fatigue, fever, night sweats or unexpected weight loss  Psychological: negative for  anxiety or depression  Ophthalmic: negative for blurry vision or scleral icterus  ENT: negative for headaches, sore throat or dysphagia  Hematological and Lymphatic: negative for pallor or swollen lymph nodes  Respiratory: negative for cough, shortness of breath or wheezing  Cardiovascular: negative for chest pain, edema or murmur  Gastrointestinal: as mentioned in HPI  Genito-Urinary: negative for dysuria or incontinence  Musculoskeletal: negative for joint pain, joint stiffness or joint swelling  Dermatological: negative for pruritus, rash, or jaundice    Current Medications  Current Outpatient Medications   Medication Sig Dispense Refill    acetaminophen (TYLENOL) 500 mg tablet Take 500 mg by mouth every 6 (six) hours as needed for mild pain      albuterol (ProAir HFA) 90 mcg/act inhaler Inhale 2 puffs every 6 (six) hours as needed for wheezing 8 g 3    amLODIPine (NORVASC) 10 mg tablet Take 1 tablet (10 mg total) by mouth every morning 90 tablet 3    atorvastatin (LIPITOR) 80 mg tablet Take 1 tablet (80 mg total) by mouth daily at bedtime 90 tablet 3    B Complex Vitamins (VITAMIN B-COMPLEX) TABS Take by mouth daily       Bioflavonoid Products (VITAMIN C PLUS PO) Take by mouth daily      bisacodyl (DULCOLAX) 5 mg EC tablet Take 10 mg by mouth every evening      carvedilol (COREG) 12.5 mg tablet TAKE 1 TABLET TWO TIMES A  tablet 1    Cholecalciferol (Vitamin D3) 125 MCG (5000 UT) TABS Take 5,000 Units by mouth daily      clopidogrel (PLAVIX) 75 mg tablet TAKE 1 TABLET BY MOUTH DAILY 90 tablet 1    enalapril (VASOTEC) 10 mg tablet Take 1 tablet (10 mg total) by mouth 2 (two) times a day 180 tablet 1    furosemide (LASIX) 20 mg tablet Take 1 tablet (20 mg total) by mouth daily (Patient taking differently: Take 20 mg by mouth if needed) 90 tablet 3    ipratropium (ATROVENT) 0.03 % nasal spray 2 sprays into each nostril 3 (three) times a day as needed for rhinitis 30 mL 5    levothyroxine 25 mcg tablet TAKE 1  "TABLET DAILY IN THE EARLY MORNING 90 tablet 0    Magnesium 500 MG CAPS Take 1 capsule (500 mg total) by mouth in the morning 90 capsule 0    spironolactone (ALDACTONE) 25 mg tablet Take 1 tablet (25 mg total) by mouth daily 90 tablet 1    colchicine (COLCRYS) 0.6 mg tablet  (Patient not taking: Reported on 7/5/2024)      COLLAGEN PO Take by mouth Includes a probiotic and other vitamins-10 supplements-2 am-one in the afternoon-one hs (Patient not taking: Reported on 8/16/2024)      Diclofenac Sodium (VOLTAREN) 1 % Apply 4 g topically 4 (four) times a day as needed (arthtitis pain) (Patient not taking: Reported on 7/5/2024) 350 g 1    MAGNESIUM PO Take 500 mg by mouth daily at bedtime (Patient not taking: Reported on 9/6/2024)      nicotine (NICODERM CQ) 14 mg/24hr TD 24 hr patch Place 1 patch on the skin over 24 hours daily Do not start before November 18, 2023. (Patient not taking: Reported on 3/1/2024) 28 patch 0     No current facility-administered medications for this visit.       Past Medical History  Past Medical History:   Diagnosis Date    Anemia     sees  Hematologist Dr Brunson appt 11/1/23    Anxiety     Arthritis     Asthma     CAD (coronary artery disease)     Cardiac murmur     sees Dr. Kapoor    Carotid stenosis, bilateral     CHF (congestive heart failure) (HCC)     Chronic kidney disease     renal artery stenosis    Chronic pain disorder     back    Chronic sinusitis     treated with antibiotics 3/22    Colitis     COPD (chronic obstructive pulmonary disease) (HCC)     Coronary artery disease     Cough     Current every day smoker     encouraged to cut back    Dental crowns present     Depression     Disease of thyroid gland     Edema of leg     compression stocking left leg    History of dizziness     History of hyperkalemia     \"couple years ago\"    History of transfusion     Hyperlipidemia     Hypertension     Left leg pain     and foot-to call surgeon office 11/2/23 also right leg pain    Muscle " "weakness     sometimes of legs    Other disorder of circulatory system (CODE)     Presence of dental bridge     Renal artery stenosis (HCC)     Right    Risk for falls     Subclavian arterial stenosis (HCC)     B/L    Type 2 diabetes mellitus without complication, unspecified whether long term insulin use (HCC) 09/20/2022 11/2/23-pt not aware of being diabetic    Walker as ambulation aid     \"sometimes\"    Wears glasses        Past Surgical History  Past Surgical History:   Procedure Laterality Date    BREAST SURGERY      bxs,benign    COLONOSCOPY      HYSTERECTOMY  1983    43    INCISIONAL BREAST BIOPSY      x5, 1964, 1965, 1985 and 1990    [place] BYP OTH/THN VEIN AORTOBIFEMORAL Bilateral 12/09/2019    Procedure: BYPASS AORTA BIFEMORAL WITH LEFT FEMORAL THROMBOEMBOLECTOMY;  Surgeon: Yobany Mendoza MD;  Location:  MAIN OR;  Service: Vascular    NE TEAEC W/PATCH GRF CAROTID VERTB SUBCLAV NECK INC Right 10/01/2021    Procedure: ENDARTERECTOMY ARTERY CAROTIDWITH BOVINE PATCH, INTROP DUPLEX;  Surgeon: Curt Oseguera MD;  Location:  MAIN OR;  Service: Vascular    NE TEAEC W/PATCH GRF CAROTID VERTB SUBCLAV NECK INC Left 11/16/2023    Procedure: LEFT CAROTID ENDARTERECTOMY WITH BOVINE PATCH, INTRA-OP DUPLEX;  Surgeon: Curt Oseguera MD;  Location: [facility];  Service: Vascular    WISDOM TOOTH EXTRACTION         Past Social History   Social History     Socioeconomic History    Marital status:      Spouse name: None    Number of children: None    Years of education: None    Highest education level: None   Occupational History    None   Tobacco Use    Smoking status: Every Day     Current packs/day: 1.00     Average packs/day: 1 pack/day for 64.7 years (64.7 ttl pk-yrs)     Types: Cigarettes     Start date: 1960     Passive exposure: Current    Smokeless tobacco: Never    Tobacco comments:     Encouraged cutting back ahead of surgery---has quit in the past   Vaping Use    Vaping status: Never Used " "  Substance and Sexual Activity    Alcohol use: Yes     Comment: wilfrid 2-3 every day for 50 years    Drug use: No    Sexual activity: None     Comment: defer   Other Topics Concern    None   Social History Narrative    Rarely exercises    Sleeps 6-7 hours per day     Social Determinants of Health     Financial Resource Strain: Low Risk  (5/9/2023)    Overall Financial Resource Strain (CARDIA)     Difficulty of Paying Living Expenses: Not hard at all   Food Insecurity: Not on file   Transportation Needs: No Transportation Needs (5/9/2023)    PRAPARE - Transportation     Lack of Transportation (Medical): No     Lack of Transportation (Non-Medical): No   Physical Activity: Not on file   Stress: Not on file   Social Connections: Not on file   Intimate Partner Violence: Not on file   Housing Stability: Not on file       Vital Signs  Vitals:    09/06/24 1300   BP: 150/76   BP Location: Left arm   Patient Position: Sitting   Cuff Size: Standard   Pulse: 68   Weight: 54 kg (119 lb)   Height: 5' 3\" (1.6 m)       Physical Exam:  General appearance: alert, cooperative, no distress  HEENT: normocephalic, anicteric, no eye erythema or discharge, no oropharyngeal thrush  Neck: supple  Lungs: CTA b/l, no rales, rhonchi, or wheezing, unlabored respirations  Heart: RRR, no murmur, rubs, or gallops  Abdomen: soft, non-tender, non-distended, normal bowel sounds, no masses or organomegaly  Rectal: deferred  Extremities: no cyanosis, clubbing, or edema  Musculoskeletal: normal gait  Skin: color and texture normal, no jaundice, no rashes or lesions  Psychiatric: alert and oriented, normal affect and behavior                                                          George Taylor PA-C  "

## 2024-09-09 ENCOUNTER — NURSE TRIAGE (OUTPATIENT)
Dept: OTHER | Facility: OTHER | Age: 84
End: 2024-09-09

## 2024-09-09 DIAGNOSIS — I10 ESSENTIAL HYPERTENSION: Chronic | ICD-10-CM

## 2024-09-09 NOTE — TELEPHONE ENCOUNTER
"Regarding: Needs Prescription for Enalapril  ----- Message from Hilary SADLER sent at 9/9/2024  5:48 PM EDT -----  \" I did not get my Medication Enalapril, I only have 2 days left. \"    ( I did not see this Medication on Med List. )    "

## 2024-09-09 NOTE — TELEPHONE ENCOUNTER
"Reason for Disposition  • [1] Prescription refill request for NON-ESSENTIAL medicine (i.e., no harm to patient if med not taken) AND [2] triager unable to refill per department policy    Answer Assessment - Initial Assessment Questions  1. DRUG NAME: \"What medicine do you need to have refilled?\"      Vasotec and Albuterol inhaler  4. PRESCRIBING HCP: \"Who prescribed it?\" Reason: If prescribed by specialist, call should be referred to that group.      PCP    Protocols used: Medication Refill and Renewal Call-ADULT-    "

## 2024-09-09 NOTE — TELEPHONE ENCOUNTER
Medication Refill Request     Name Enalapril   Dose/Frequency 10mg PO BID  Quantity 180  Verified pharmacy   [x]  Verified ordering Provider   [x]  Does patient have enough for the next 3 days? Yes [] No [x]     Patient has enough medication for 2 days    Medication Refill Request     Name Albuterol Inhaler  Dose/Frequency 2 puffs every 6 hours  Quantity 8g  Verified pharmacy   [x]  Verified ordering Provider   [x]  Does patient have enough for the next 3 days? Yes [] No [x]

## 2024-09-10 ENCOUNTER — APPOINTMENT (OUTPATIENT)
Dept: LAB | Facility: CLINIC | Age: 84
End: 2024-09-10
Payer: COMMERCIAL

## 2024-09-10 DIAGNOSIS — R06.02 SHORTNESS OF BREATH: ICD-10-CM

## 2024-09-10 DIAGNOSIS — R19.7 DIARRHEA, UNSPECIFIED TYPE: ICD-10-CM

## 2024-09-10 DIAGNOSIS — I10 ESSENTIAL HYPERTENSION: Chronic | ICD-10-CM

## 2024-09-10 LAB
GLIADIN PEPTIDE IGA SER-ACNC: 3.9 U/ML
GLIADIN PEPTIDE IGA SER-ACNC: NEGATIVE
GLIADIN PEPTIDE IGG SER-ACNC: <0.4 U/ML
GLIADIN PEPTIDE IGG SER-ACNC: NEGATIVE
IGA SERPL-MCNC: 266 MG/DL (ref 66–433)
TTG IGA SER-ACNC: <0.5 U/ML
TTG IGA SER-ACNC: NEGATIVE
TTG IGG SER-ACNC: <0.8 U/ML
TTG IGG SER-ACNC: NEGATIVE

## 2024-09-10 PROCEDURE — 86364 TISS TRNSGLTMNASE EA IG CLAS: CPT

## 2024-09-10 PROCEDURE — 36415 COLL VENOUS BLD VENIPUNCTURE: CPT

## 2024-09-10 PROCEDURE — 82784 ASSAY IGA/IGD/IGG/IGM EACH: CPT

## 2024-09-10 PROCEDURE — 86258 DGP ANTIBODY EACH IG CLASS: CPT

## 2024-09-10 RX ORDER — ENALAPRIL MALEATE 10 MG/1
10 TABLET ORAL 2 TIMES DAILY
Qty: 180 TABLET | Refills: 1 | Status: SHIPPED | OUTPATIENT
Start: 2024-09-10

## 2024-09-10 RX ORDER — SPIRONOLACTONE 25 MG/1
25 TABLET ORAL DAILY
Qty: 90 TABLET | Refills: 1 | Status: SHIPPED | OUTPATIENT
Start: 2024-09-10

## 2024-09-10 RX ORDER — ALBUTEROL SULFATE 90 UG/1
2 AEROSOL, METERED RESPIRATORY (INHALATION) EVERY 6 HOURS PRN
Qty: 8 G | Refills: 3 | Status: SHIPPED | OUTPATIENT
Start: 2024-09-10

## 2024-09-13 ENCOUNTER — APPOINTMENT (OUTPATIENT)
Dept: LAB | Facility: CLINIC | Age: 84
End: 2024-09-13
Payer: COMMERCIAL

## 2024-09-13 DIAGNOSIS — R19.7 DIARRHEA, UNSPECIFIED TYPE: ICD-10-CM

## 2024-09-13 PROCEDURE — 82653 EL-1 FECAL QUANTITATIVE: CPT

## 2024-09-16 LAB — ELASTASE PANC STL-MCNT: 636 UG/G

## 2024-09-20 ENCOUNTER — TELEPHONE (OUTPATIENT)
Dept: VASCULAR SURGERY | Facility: CLINIC | Age: 84
End: 2024-09-20

## 2024-09-20 NOTE — TELEPHONE ENCOUNTER
Spoke with patient about appointment time change from 10am to 8am virtual visit with Joycelyn Guerrero on 11/1, patient understood and is aware.

## 2024-09-23 DIAGNOSIS — I74.09 AORTOILIAC OCCLUSIVE DISEASE (HCC): Chronic | ICD-10-CM

## 2024-09-24 RX ORDER — CLOPIDOGREL BISULFATE 75 MG/1
75 TABLET ORAL DAILY
Qty: 90 TABLET | Refills: 1 | Status: SHIPPED | OUTPATIENT
Start: 2024-09-24

## 2024-10-09 ENCOUNTER — TELEPHONE (OUTPATIENT)
Age: 84
End: 2024-10-09

## 2024-10-09 ENCOUNTER — OFFICE VISIT (OUTPATIENT)
Dept: CARDIOLOGY CLINIC | Facility: CLINIC | Age: 84
End: 2024-10-09
Payer: COMMERCIAL

## 2024-10-09 VITALS
SYSTOLIC BLOOD PRESSURE: 132 MMHG | BODY MASS INDEX: 21.09 KG/M2 | DIASTOLIC BLOOD PRESSURE: 82 MMHG | HEIGHT: 63 IN | WEIGHT: 119 LBS | HEART RATE: 66 BPM | OXYGEN SATURATION: 99 %

## 2024-10-09 DIAGNOSIS — I11.0 HYPERTENSIVE HEART DISEASE WITH CHRONIC DIASTOLIC CONGESTIVE HEART FAILURE (HCC): ICD-10-CM

## 2024-10-09 DIAGNOSIS — E78.2 MIXED HYPERLIPIDEMIA: ICD-10-CM

## 2024-10-09 DIAGNOSIS — I10 ESSENTIAL HYPERTENSION: ICD-10-CM

## 2024-10-09 DIAGNOSIS — I65.23 BILATERAL CAROTID ARTERY STENOSIS: ICD-10-CM

## 2024-10-09 DIAGNOSIS — I50.32 HYPERTENSIVE HEART DISEASE WITH CHRONIC DIASTOLIC CONGESTIVE HEART FAILURE (HCC): ICD-10-CM

## 2024-10-09 DIAGNOSIS — I50.32 CHRONIC DIASTOLIC HEART FAILURE (HCC): Primary | ICD-10-CM

## 2024-10-09 PROCEDURE — 99214 OFFICE O/P EST MOD 30 MIN: CPT | Performed by: INTERNAL MEDICINE

## 2024-10-09 PROCEDURE — 93000 ELECTROCARDIOGRAM COMPLETE: CPT | Performed by: INTERNAL MEDICINE

## 2024-10-09 NOTE — TELEPHONE ENCOUNTER
Pt called confirming her appointment today for 11:20 am. She also wanted to confirm [employer]. Advised per appointment note [employer] will be coming at 10:55am.

## 2024-10-09 NOTE — PROGRESS NOTES
Cardiology   Sid Kapoor DO, [facility]  Lawrence Glover MD, [facility]  Krishna Shahid MD, [facility]  Lissett Yi MD, [facility]  -------------------------------------------------------------------  [facility]  [address], [address], [address]  [place], [place], [address]  [phone]    Cardiology Follow Up  Deena Wolff  1940  [id number]          Assessment/Plan:    1. Chronic diastolic heart failure (HCC)    2. Bilateral carotid artery stenosis    3. Hypertensive heart disease with chronic diastolic congestive heart failure (HCC)    4. Mixed hyperlipidemia    5. Essential hypertension      - She is at increased risk for procedure due to ongoing tobacco abuse and poor conditioning.  -Discussed importance of smoking cessation.  She does not plan on quitting at this time.  -Continue current medication regimen.    -No edema present on exam today.  Continue furosemide 20 mg daily.    - continue Plavix.  Aspirin was stopped because of large amount of bruising.  Significantly improved since aspirin was discontinued.  She will follow up regularly with vascular surgery.  - Continue atorvastatin    Interval History:     Deena Wolff is 84 y.o. female here for followup of hypertension and CHF.    Since her last visit, she had carotid endarterectomy in November 2023.  There were no complications with procedure.    She denies any chest pain with exertion.  She feels some shortness of breath that she exerts herself.  She is limited due to arthritis and vascular disease.  She continues to smoke nearly a pack per day.      Her current medication regimen includes carvedilol 12.5 mg twice daily, enalapril 10 mg daily, amlodipine 10 mg daily and spironolactone 25 mg daily.  She overall reports good adherence with medications.       She has a history of peripheral vascular disease including celiac and SMA stenosis.  She does not have any pain with eating or exertion.  Previously, She  "underwent right carotid endarterectomy along with aortobifemoral bypass in December 2019.  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          Past Medical History:   Diagnosis Date    Anemia     sees  Hematologist Dr Brunson appt 11/1/23    Anxiety     Arthritis     Asthma     CAD (coronary artery disease)     Cardiac murmur     sees Dr. Kapoor    Carotid stenosis, bilateral     CHF (congestive heart failure) (HCC)     Chronic kidney disease     renal artery stenosis    Chronic pain disorder     back    Chronic sinusitis     treated with antibiotics 3/22    Colitis     COPD (chronic obstructive pulmonary disease) (HCC)     Coronary artery disease     Cough     Current every day smoker     encouraged to cut back    Dental crowns present     Depression     Disease of thyroid gland     Edema of leg     compression stocking left leg    History of dizziness     History of hyperkalemia     \"couple years ago\"    History of transfusion     Hyperlipidemia     Hypertension     Left leg pain     and foot-to call surgeon office 11/2/23 also right leg pain    Muscle weakness     sometimes of legs    Other disorder of circulatory system (CODE)     Presence of dental bridge     Renal artery stenosis (HCC)     Right    Risk for falls     Subclavian arterial stenosis (HCC)     B/L    Type 2 diabetes mellitus without complication, unspecified whether long term insulin use ([facility]) 09/20/2022 11/2/23-pt not aware of being diabetic    Walker as ambulation aid     \"sometimes\"    Wears glasses      Social History     Socioeconomic History    Marital status:      Spouse name: Not on file    Number of children: Not on file    Years of education: Not on file    Highest education level: Not on file   Occupational History    Not on file   Tobacco Use    Smoking status: Every Day     Current packs/day: 1.00     Average packs/day: 1 pack/day for 64.8 years (64.8 ttl pk-yrs)     Types: Cigarettes     Start date: " 1960     Passive exposure: Current    Smokeless tobacco: Never    Tobacco comments:     Encouraged cutting back ahead of surgery---has quit in the past   Vaping Use    Vaping status: Never Used   Substance and Sexual Activity    Alcohol use: Yes     Comment: wilfrid 2-3 every day for 50 years    Drug use: No    Sexual activity: Not on file     Comment: defer   Other Topics Concern    Not on file   Social History Narrative    Rarely exercises    Sleeps 6-7 hours per day     Social Determinants of Health     Financial Resource Strain: Low Risk  (5/9/2023)    Overall Financial Resource Strain (CARDIA)     Difficulty of Paying Living Expenses: Not hard at all   Food Insecurity: Not on file   Transportation Needs: No Transportation Needs (5/9/2023)    PRAPARE - Transportation     Lack of Transportation (Medical): No     Lack of Transportation (Non-Medical): No   Physical Activity: Not on file   Stress: Not on file   Social Connections: Not on file   Intimate Partner Violence: Not on file   Housing Stability: Not on file      Family History   Problem Relation Age of Onset    Hypertension Father     Heart disease Father         CHF    Coronary artery disease Family     Arthritis Family     Abdominal aortic aneurysm Mother     Hypertension Mother     Skin cancer Daughter     Cancer Paternal Aunt     No Known Problems Maternal Aunt     No Known Problems Maternal Aunt     No Known Problems Maternal Aunt      Past Surgical History:   Procedure Laterality Date    BREAST SURGERY      bxs,benign    COLONOSCOPY      HYSTERECTOMY  1983    43    INCISIONAL BREAST BIOPSY      x5, 1964, 1965, 1985 and 1990    [place] BYP OTH/THN VEIN AORTOBIFEMORAL Bilateral 12/09/2019    Procedure: BYPASS AORTA BIFEMORAL WITH LEFT FEMORAL THROMBOEMBOLECTOMY;  Surgeon: Yobany Mendoza MD;  Location: [facility];  Service: Vascular    [place] TEAEC W/PATCH GRF CAROTID VERTB SUBCLAV NECK INC Right 10/01/2021    Procedure: ENDARTERECTOMY ARTERY CAROTIDWITH  BOVINE PATCH, INTROP DUPLEX;  Surgeon: Curt Oseguera MD;  Location:  MAIN OR;  Service: Vascular    NE TEAEC W/PATCH GRF CAROTID VERTB SUBCLAV NECK INC Left 11/16/2023    Procedure: LEFT CAROTID ENDARTERECTOMY WITH BOVINE PATCH, INTRA-OP DUPLEX;  Surgeon: Curt Oseguera MD;  Location: [facility];  Service: Vascular    WISDOM TOOTH EXTRACTION         Current Outpatient Medications:     acetaminophen (TYLENOL) 500 mg tablet, Take 500 mg by mouth every 6 (six) hours as needed for mild pain, Disp: , Rfl:     albuterol (ProAir HFA) 90 mcg/act inhaler, Inhale 2 puffs every 6 (six) hours as needed for wheezing, Disp: 8 g, Rfl: 3    amLODIPine (NORVASC) 10 mg tablet, Take 1 tablet (10 mg total) by mouth every morning, Disp: 90 tablet, Rfl: 3    atorvastatin (LIPITOR) 80 mg tablet, Take 1 tablet (80 mg total) by mouth daily at bedtime, Disp: 90 tablet, Rfl: 3    B Complex Vitamins (VITAMIN B-COMPLEX) TABS, Take by mouth daily , Disp: , Rfl:     Bioflavonoid Products (VITAMIN C PLUS PO), Take by mouth daily, Disp: , Rfl:     bisacodyl (DULCOLAX) 5 mg EC tablet, Take 10 mg by mouth every evening, Disp: , Rfl:     carvedilol (COREG) 12.5 mg tablet, TAKE 1 TABLET TWO TIMES A DAY, Disp: 180 tablet, Rfl: 1    Cholecalciferol (Vitamin D3) 125 MCG (5000 UT) TABS, Take 5,000 Units by mouth daily, Disp: , Rfl:     clopidogrel (PLAVIX) 75 mg tablet, TAKE 1 TABLET BY MOUTH DAILY, Disp: 90 tablet, Rfl: 1    enalapril (VASOTEC) 10 mg tablet, Take 1 tablet (10 mg total) by mouth 2 (two) times a day, Disp: 180 tablet, Rfl: 1    furosemide (LASIX) 20 mg tablet, Take 1 tablet (20 mg total) by mouth daily (Patient taking differently: Take 20 mg by mouth if needed), Disp: 90 tablet, Rfl: 3    ipratropium (ATROVENT) 0.03 % nasal spray, 2 sprays into each nostril 3 (three) times a day as needed for rhinitis, Disp: 30 mL, Rfl: 5    levothyroxine 25 mcg tablet, TAKE 1 TABLET DAILY IN THE EARLY MORNING, Disp: 90 tablet, Rfl: 0    Magnesium  "500 MG CAPS, Take 1 capsule (500 mg total) by mouth in the morning, Disp: 90 capsule, Rfl: 0    spironolactone (ALDACTONE) 25 mg tablet, TAKE ONE TABLET BY MOUTH DAILY, Disp: 90 tablet, Rfl: 1    colchicine (COLCRYS) 0.6 mg tablet, , Disp: , Rfl:     COLLAGEN PO, Take by mouth Includes a probiotic and other vitamins-10 supplements-2 am-one in the afternoon-one hs (Patient not taking: Reported on 8/16/2024), Disp: , Rfl:     Diclofenac Sodium (VOLTAREN) 1 %, Apply 4 g topically 4 (four) times a day as needed (arthtitis pain) (Patient not taking: Reported on 7/5/2024), Disp: 350 g, Rfl: 1    MAGNESIUM PO, Take 500 mg by mouth daily at bedtime (Patient not taking: Reported on 9/6/2024), Disp: , Rfl:     nicotine (NICODERM CQ) 14 mg/24hr TD 24 hr patch, Place 1 patch on the skin over 24 hours daily Do not start before November 18, 2023. (Patient not taking: Reported on 3/1/2024), Disp: 28 patch, Rfl: 0        Review of Systems:  Review of Systems   Constitutional:  Positive for fatigue.   Cardiovascular:  Positive for leg swelling. Negative for chest pain and palpitations.   Musculoskeletal:  Positive for arthralgias, gait problem and myalgias.   All other systems reviewed and are negative.        Physical Exam:  Vitals:  Vitals:    10/09/24 1107   BP: 132/82   BP Location: Left arm   Patient Position: Sitting   Cuff Size: Standard   Pulse: 66   SpO2: 99%   Weight: 54 kg (119 lb)   Height: 5' 3\" (1.6 m)     Physical Exam   Constitutional: She appears healthy. No distress.   Eyes: Pupils are equal, round, and reactive to light. Conjunctivae are normal.   Neck: No JVD present.   Cardiovascular: Normal rate and regular rhythm. Exam reveals no gallop and no friction rub.   Murmur heard.  Pulmonary/Chest: Effort normal and breath sounds normal. She has no wheezes. She has no rales.   Musculoskeletal:         General: No tenderness, deformity or edema.      Cervical back: Normal range of motion and neck supple. "   Neurological: She is alert and oriented to person, place, and time.   Skin: Skin is warm and dry.        Cardiographics:  EKG: Personally reviewed NSR with LVH    Last known EF: 60%    This note was completed in part utilizing M-Modal Fluency Direct Software.  Grammatical errors, random word insertions, spelling mistakes, and incomplete sentences can be an occasional consequence of this system secondary to software limitations, ambient noise, and hardware issues.  If you have any questions or concerns about the content, text, or information contained within the body of this dictation, please contact the provider for clarification.

## 2024-10-16 ENCOUNTER — HOSPITAL ENCOUNTER (OUTPATIENT)
Dept: RADIOLOGY | Facility: HOSPITAL | Age: 84
Discharge: HOME | End: 2024-10-16
Attending: SURGERY
Payer: COMMERCIAL

## 2024-10-16 DIAGNOSIS — I73.9 PERIPHERAL ARTERIAL DISEASE (HCC): ICD-10-CM

## 2024-10-16 DIAGNOSIS — I65.23 BILATERAL CAROTID ARTERY STENOSIS: Chronic | ICD-10-CM

## 2024-10-16 PROCEDURE — 93880 EXTRACRANIAL BILAT STUDY: CPT | Performed by: SURGERY

## 2024-10-16 PROCEDURE — 93925 LOWER EXTREMITY STUDY: CPT

## 2024-10-16 PROCEDURE — 93880 EXTRACRANIAL BILAT STUDY: CPT

## 2024-10-16 PROCEDURE — 93923 UPR/LXTR ART STDY 3+ LVLS: CPT

## 2024-10-16 PROCEDURE — 93925 LOWER EXTREMITY STUDY: CPT | Performed by: SURGERY

## 2024-10-16 PROCEDURE — 93922 UPR/L XTREMITY ART 2 LEVELS: CPT | Performed by: SURGERY

## 2024-10-28 DIAGNOSIS — E03.9 HYPOTHYROIDISM, UNSPECIFIED TYPE: ICD-10-CM

## 2024-10-28 NOTE — TELEPHONE ENCOUNTER
Reason for call:   [x] Refill   [] Prior Auth  [] Other:     Office:   [] PCP/Provider -   [x] Specialty/Provider - Cardio    Medication:     levothyroxine 25 mcg tablet       Dose/Frequency:  TAKE 1 TABLET DAILY IN THE EARLY MORNING     Quantity: 90    Pharmacy: [facility] [employer] [address]     Does the patient have enough for 3 days?   [x] Yes   [] No - Send as HP to POD

## 2024-10-29 RX ORDER — LEVOTHYROXINE SODIUM 25 UG/1
25 TABLET ORAL DAILY
Qty: 90 TABLET | Refills: 1 | Status: SHIPPED | OUTPATIENT
Start: 2024-10-29

## 2024-10-31 NOTE — PROGRESS NOTES
Ambulatory Visit  Name: Deena Wolff      : 1940      MRN: [id number]  Encounter Provider: Joycelyn Guerrero PA-C  Encounter Date: 2024   Encounter department: [facility] [place]    Assessment & Plan  Aortoiliac occlusive disease (HCC)    Orders:    VAS abdominal aorta/iliac duplex; Future    VAS ARTERIAL DUPLEX- LOWER LIMB BILATERAL; Future    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HCC)    Orders:    VAS abdominal aorta/iliac duplex; Future    Bilateral carotid artery stenosis    Orders:    VAS carotid complete study; Future    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HCC)         Subclavian artery stenosis, left (HCC)    Orders:    VAS carotid complete study; Future    Benign hypertension with chronic kidney disease, stage III (HCC)  Lab Results   Component Value Date    EGFR 36 2024    EGFR 23 (L) 2024    EGFR 36 2024    CREATININE 1.33 (H) 2024    CREATININE 1.33 (H) 2024    CREATININE 1.16 2024            Mixed hyperlipidemia         Cigarette smoker           History of Present Illness   {?Quick Links Encounters * My Last Note * Last Note in Specialty * Snapshot * Since Last Visit * History :78996}  Deena Wolff is a 84 y.o. female who presents ***    CC: PAD.     Pt Is here for 3 mo follow-up. Pt had CV and PHILIP on 10/16/2024.               {History obtained from (Optional):79596}  Review of Systems   Constitutional:  Negative for appetite change, chills and unexpected weight change.   Gastrointestinal:  Negative for abdominal pain.   Skin:  Negative for color change and wound.   Neurological:  Negative for numbness and headaches.     {Select to Display PMH (Optional):72705}      Objective   {?Quick Links Trend Vitals * Enter New Vitals * Results Review * Timeline (Adult) * Labs * Imaging * Cardiology * Procedures * Lung Cancer Screening * Surgical eConsent :20632}  There were no vitals taken for this visit.    Physical Exam  {Administrative / Billing Section  (Optional):05571}

## 2024-11-01 ENCOUNTER — TELEMEDICINE (OUTPATIENT)
Dept: VASCULAR SURGERY | Facility: CLINIC | Age: 84
End: 2024-11-01
Payer: COMMERCIAL

## 2024-11-01 DIAGNOSIS — I70.1 RENAL ARTERY STENOSIS (HCC): Primary | Chronic | ICD-10-CM

## 2024-11-01 DIAGNOSIS — F17.210 CIGARETTE SMOKER: Chronic | ICD-10-CM

## 2024-11-01 DIAGNOSIS — N18.30 BENIGN HYPERTENSION WITH CHRONIC KIDNEY DISEASE, STAGE III (HCC): ICD-10-CM

## 2024-11-01 DIAGNOSIS — K55.1 MESENTERIC ARTERY STENOSIS (HCC): Chronic | ICD-10-CM

## 2024-11-01 DIAGNOSIS — I74.09 AORTOILIAC OCCLUSIVE DISEASE (HCC): Chronic | ICD-10-CM

## 2024-11-01 DIAGNOSIS — I77.1 SUBCLAVIAN ARTERY STENOSIS, LEFT (HCC): Chronic | ICD-10-CM

## 2024-11-01 DIAGNOSIS — E78.2 MIXED HYPERLIPIDEMIA: Chronic | ICD-10-CM

## 2024-11-01 DIAGNOSIS — I65.23 BILATERAL CAROTID ARTERY STENOSIS: ICD-10-CM

## 2024-11-01 DIAGNOSIS — I12.9 BENIGN HYPERTENSION WITH CHRONIC KIDNEY DISEASE, STAGE III (HCC): ICD-10-CM

## 2024-11-01 DIAGNOSIS — I77.1 STENOSIS OF ILIAC ARTERY (HCC): Chronic | ICD-10-CM

## 2024-11-01 PROCEDURE — 99214 OFFICE O/P EST MOD 30 MIN: CPT | Performed by: PHYSICIAN ASSISTANT

## 2024-11-01 NOTE — ASSESSMENT & PLAN NOTE
Lab Results   Component Value Date    EGFR 36 08/13/2024    EGFR 23 (L) 08/13/2024    EGFR 36 05/01/2024    CREATININE 1.33 (H) 08/13/2024    CREATININE 1.33 (H) 05/01/2024    CREATININE 1.16 02/26/2024

## 2024-11-01 NOTE — ASSESSMENT & PLAN NOTE
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  -AOIL demonstrates patent ABF with elevated velocities throughout particularly R limb anastomosis at 460 cm/s  -PHILIP shows normal BELEN's with met/toe pressures normal, above healing  -Smoking cessation discussed   -Maintain good blood pressure and cholesterol control  -Continue with medical therapy clopidogrel 75 and atorvastatin 80  -Continue with routine podiatry care   -Call the office immediately if you develop symptoms of leg pain, foot pain or foot wounds.   -Recommend formal in-person vascular office visit to discuss symptoms and perform physical examination including vitals, pulse examination, gait eval, foot exam, etc    -Left message at Dr. Woodruff's office per patient request. However, explained that this should not replaced office visit/ exam.     Orders:    VAS abdominal aorta/iliac duplex; Future    VAS ARTERIAL DUPLEX- LOWER  LIMB BILATERAL; Future

## 2024-11-01 NOTE — ASSESSMENT & PLAN NOTE
-Smoking cessation discussed, not interested in quitting at this time we will continue to work on this issue

## 2024-11-01 NOTE — ASSESSMENT & PLAN NOTE
Orders:    VAS abdominal aorta/iliac duplex; Future    VAS ARTERIAL DUPLEX- LOWER LIMB BILATERAL; Future

## 2024-11-01 NOTE — ASSESSMENT & PLAN NOTE
-Continue to monitor clinically for signs of mesenteric ischemia  -Continue with clopidogrel and atorvastatin

## 2024-11-01 NOTE — PATIENT INSTRUCTIONS
Symptoms of stroke:  - Unable to speak or understand speech  - Unable to move one side of the body (arm or leg)  - Visual changes  - Call 911 for any symptoms of stroke        Peripheral arterial disease  Carotid artery disease  Smoking    Medical therapy - continue with clopidogrel 75 and atorvastatin 80        -Recommend activity as tolerated  -Smoking cessation   -Maintain good blood pressure and cholesterol control  -Call the office immediately if you develop symptoms of leg pain, foot pain or foot wounds.   -Follow up vascular studies in 1 year  -In-person visit

## 2024-11-01 NOTE — PROGRESS NOTES
Virtual Regular Visit  Name: Deena Wolff      : 1940      MRN: [id number]  Encounter Provider: Joycelyn Guerrero PA-C  Encounter Date: 2024   Enc[facility] department: [facility]    Verification of patient location:  Patient is located at Home in the following state in which I hold an active license [place]  Assessment & Plan  Aortoiliac occlusive disease (HCC)  S/P aortobifemoral bypass 19  -Arranged for televist as refused in-person OV    -AOIL 24    Patent ABF bypass graft with bilateral elevated velocities throughout (R 430, L 231)    Celiac and SMA >70% stenosis    R renal <60% stenosis (227/18, RAR 2.77)    L renal >60% stenosis (357/37, RAR 4.35)    R BELEN 0.89, L 0.90 with toe pressures within the healing range.    C/w 20 L renal artery stenosis now 60%, otherwise no change     -AOIL 2/15/23    Patent ABF bypass graft with bilateral elevated distal limb velocities (R 257, 286)    Celiac and SMA >70% stenosis    R BELEN 0.89, L BELEN 0.83 with toe pressures above healing    No change significant change c/w 2020 study    -PHILIP 10/16/24    R 0.99/173/123, patent BELEN graft    L 0.97/173/97, patent BELEN graft    Plan:  -AIOD s/p ABF bypass graft which remains patent with elevated velocities in the distal limbs  -Multiple leg symptoms - including: worsening ambulatory dysfunction, leg pain, foot neuropathy and edema which is worse on the left which may be musculoskeletal, but physical examination is necessary to evaluate.  -AOIL demonstrates patent ABF with elevated velocities throughout particularly R limb anastomosis at 460 cm/s  -PHILIP shows normal BELEN's with met/toe pressures normal, above healing  -Smoking cessation discussed   -Maintain good blood pressure and cholesterol control  -Continue with medical therapy clopidogrel 75 and atorvastatin 80  -Continue with routine podiatry care   -Call the office immediately if you develop symptoms of leg pain, foot pain or foot wounds.    -Recommend formal in-person vascular office visit to discuss symptoms and perform physical examination including vitals, pulse examination, gait eval, foot exam, etc    -Left message at Dr. Woodruff's office per patient request. However, explained that this should not replaced office visit/ exam.     Orders:    VAS abdominal aorta/iliac duplex; Future    VAS ARTERIAL DUPLEX- LOWER LIMB BILATERAL; Future    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HCC)  -Status post aortic bifemoral bypass; management as above under aortoiliac occlusive disease  Orders:    VAS abdominal aorta/iliac duplex; Future    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HCC)  -Continue to monitor clinically for signs of mesenteric ischemia  -Continue with clopidogrel and atorvastatin       Bilateral carotid artery stenosis  -Asymptomatic, bilateral carotid artery stenosis with patent bilateral carotid endarterectomies  -CV duplex 10/16/2024 shows widely patent bilateral carotid CEAs (R83/13, L95/20), left subclavian artery stenosis  -Smoking cessation  -Continue with clopidogrel 75 and atorvastatin 80  -Patient education regarding carotid disease and stroke provided  -Follow-up carotid duplex study in 1 year with office visit  Orders:    VAS carotid complete study; Future    Subclavian artery stenosis, left (HCC)  -Will monitor clinically as above under bilateral carotid artery stenosis  -Continue with current medical therapy on clopidogrel and atorvastatin 80  Orders:    VAS carotid complete study; Future    Benign hypertension with chronic kidney disease, stage III (HCC)  -Maintain good blood pressure and cholesterol control per primary care  And follows with nephrology-       Mixed hyperlipidemia  -Maintain good blood pressure and cholesterol control per primary care       Cigarette smoker  -Smoking cessation discussed, not interested in quitting at this time we will continue to work on this issue       Renal artery stenosis (HCC)  -R >L renal artery stenosis on duplex  as above  -BUN/creatinine 47/1.33, eGFR 36  -Did not come into the office for blood pressure check  -Currently on 5 BP Rx (amlodipine, enalapril, carvedilol, spironolactone, furosemide)  -Followed by nephrology, Dr. Jeffries   -Monitor, consider formal renal duplex          Encounter provider Joycelyn Guerrero PA-C    The patient was identified by name and date of birth. Deena Wolff was informed that this is a telemedicine visit and that the visit is being conducted through Telephone.  My office door was closed. No one else was in the room.  She acknowledged consent and understanding of privacy and security of the video platform. The patient has agreed to participate and understands they can discontinue the visit at any time. It was my intent to perform this visit via video technology but the patient was not able to do a video connection so the visit was completed via audio telephone only. Patient is aware this is a billable service.     History of Present Illness     Deena Wolff is a 84 y.o. female smoker, Htn, HLD, CKD III, asymptomatic NANCY (s/p R CEA '21, L CEA '23), AIOD, s/p ABF bypass with LEFT iliofemoral thromboembolectomy ([facility] 12/9/19), peripheral arterial disease, neuropathy who presents for vascular follow up and to review recent vascular studies.     11/1/24:  Ms. Wolff was placed as a televisit today.  I was unable to connect with her via video.  She was last seen 1 year ago by Dr. Rangel, after L CEA by Dr. Oseguera. Patient states that she hasn't walked will since that time.     During visit, she remained reluctant to discuss her symptoms and very abrupt and vague. She would like to review the testing and have me talk to Dr. Woodruff. Since she has not been walking well, I explained that I would like to know more about her symptoms so I could try to help her determine where her symptoms might be coming from and whether or not they are vascular.     She has bilateral sharp leg pains with hip cramping and  "falls. She tells me that she has arthritis in the back and symptoms can occur at any time.  She has been using arthritis cream along the back which seems to help.  However, she has difficulty walking.  She holds onto the shopping cart in the grocery store but has to stop multiple times.     She is having \"pins-and-needles\" from the back and down the left leg which sounds like a newer problem.    Additionally, she has fluid from the ankle to the calf with intermittent numbness of the left leg, but now also the right leg.  She goes on to tell me that she is supposed to be wearing compression stockings, but due to to the heat, she has not been wearing them and at this point feels she should start wearing them again.    No foot pain or open wounds.     No symptoms of TIA/stroke.  We reviewed her carotid duplex study which shows patent bilateral carotid endarterectomy sites.  Velocities are stable.  Show has a left subclavian artery stenosis.  V QI was completed.  We will continue with annual duplex surveillance.    She tells me blood pressure is ok.     She is still smoking cigarettes.  She states that she has been smoking for 60 years and will not quit.     Given her vascular history and current symptoms, I strongly recommended office appointment, in-person visit where we can further discuss symptoms, perform examination and correlate with her vascular disease.  She tells me that she has had multiple examinations by her podiatrist Dr. Woodruff so she doesn't need another examination.       Medical therapy includes Plavix 75 and atorvastatin 80.    LDL 33  BUN/creat 47/1.33, eGFR 36      -Can't walk for well over a year  -\"Bad, sharp\" leg pain - particularly L; pins and needles which radiate from the back down the leg; chronic back pain/ arthritis in the back  -Hip pain / cramping and falls  -Intermittent numbness L foot, but now the R  -Uses a walker; shopping cart due to pain across the back  ?predates the " "bypass  -Wooden foot/ ankles; increased foot / LE edema; Fluid up at the \"ankle bone\" up the calf but not wearing compression due to summer months which helps  -Back/leg pain can be 4-6 hours; uses ice/ heat; can be \"a 24 hour a day thing\"  -\"Dr. Woodruff has examined me\"        VQI NANCY Long-Term Follow-Up    Myocardial Infarction - No    Neurological Event - No    Reperfusion Symptoms - No    Modified Jefferson Davis Score - 1 - No significant disability (Able to carry out all usual activities, despite some symptoms        Functional Status - Self-Care         Review of Systems  As above.     Imaging:    Carotid du 10/16/24  [facility]PORT  CLINICAL:  Indications:  Patient presents for follow up of known carotid artery occlusive disease s/p  CEA.  Patient is currently asymptomatic at this time.  Operative History:  2023-11-16 Left Standard (ICA, ECA and CCA) endarterectomy  2021-10-01 Right CEA (Dr. Oseguera / Dr. Guerrero)  2021-10-01 Right Standard (ICA, ECA and CCA) endarterectomy  2019-12-09 Aorto_bi_common femoral  Risk Factors  The patient has history of HTN, Hyperlipidemia, CKD, CAD and smoking (current)  1-2 ppd.  Clinical  Right Pressure:  184/ mm Hg, Left Pressure:  181/ mm Hg.     FINDINGS:     Right        Impression     PSV  EDV (cm/s)  Direction of Flow  Ratio    Dist. ICA                   102          20                      0.98    Mid. ICA                     94          19                      0.91    Prox. ICA    Widely Patent   83          13                      0.80    Dist CCA                    109           7                              Mid CCA                     104          11                      0.81    Prox CCA                    128          13                      0.94    Ext Carotid                  91           7                      0.88    Vertebral                    46          10                              Prox Vert                    46          10  Antegrade                 "   Subclavian                  112           0                              Innominate                  136           0                                 Left         Impression     PSV  EDV (cm/s)  Direction of Flow  Ratio    Dist. ICA                    88          16                      2.10    Mid. ICA                    112          23                      2.69    Prox. ICA    Widely Patent   95          20                      2.28    Dist CCA                     63          10                              Mid CCA                      42          11                      0.68    Prox CCA                     61          12                              Ext Carotid                  71           7                      1.70    Vertebral                    55          16                              Prox Vert                    55          16  Antegrade                   Subclavian                  499          32                                       CONCLUSION:     Impression  RIGHT:  Widely patent internal carotid artery and endarterectomy site.  Vertebral artery flow is antegrade.     LEFT:  Widely patent internal carotid artery and endarterectomy site.  The external carotid artery is near occlusion with minimal antegrade flow in  the proximal to mid segment.  Vertebral artery flow is antegrade.  There is stenosis in the proximal subclavian artery.     Compared to previous study on 2/27/24, there is LT subclavian stenosis and near  occlusion of the LT ECA.        PHILIP 10/16/24  FINDINGS:     Segment                Right       Left                                            PSV (cm/s)  PSV (cm/s)    Prox. EIA                                 273    Common Femoral Artery         358         191    Prox Profunda                  77          70    Prox SFA                      123         155    Mid SFA                       142         166    Dist SFA                      122         109    Proximal Pop                    78         101    Distal Pop                    113          93    Dist Post Tibial               89         104    Dist. Ant. Tibial              69          79    Dist Peroneal                              51             CONCLUSION:  Impression:  RIGHT LOWER LIMB:  Evidence suggestive of patent aorta-bi-iliac graft.  Ankle/Brachial index: 0.99  which is in the normal category (Prior 0.96)  PVR/ PPG tracings are dampened.  Metatarsal pressure of 173 mmHg.  Great toe pressure of 123 mmHg, within the healing range.     LEFT LOWER LIMB:  Evidence suggestive of patent aorta-bi-iliac graft.  Ankle/Brachial index: 0.97  which is in the normal category. (Prior 0.87)  PVR/ PPG tracings are dampened.  Metatarsal pressure of 172mmHg.  Great toe pressure of 97 mmHg, within the healing range.        Compared to previous study on 03/14/2022, there is no significant change.          AOIL 5/21/24        [facility]PORT  CLINICAL:  Indications:  Evaluate for progression of Aorto-Iliac occlusive disease s/p  revascularization. Patient is asymptomatic at this time.  Operative History:  2023-11-16 Left Standard (ICA, ECA and CCA) endarterectomy  2021-10-01 Right CEA (Dr. Oseguera / Dr. Guerrero)  2021-10-01 Right Standard (ICA, ECA and CCA) endarterectomy  2019-12-09 Aorto_bi_common femoral  Risk Factors  The patient has history of HTN, Hyperlipidemia, CKD, CAD, COPD, CHF, and  smoking (current) 1-2 ppd.  Clinical  Right Pressure:  168/ mm Hg, Left Pressure:  167/ mm Hg.     FINDINGS:     Unilateral                Impression  PSV (cm/s)  EDV (cm/s)  AP (cm)  TRV (cm)    Sup-Dolly Ao                                    82                  1.8              Px Inf-Raphael Ao                                114                  1.3       1.7    Ds Inf-Raphael Ao                                 86                  1.5       1.3    Celiac                    >70%               315          46                       Prox. SMA                 >70%                616          77                       Aortic Inflow anastmosis                     114                                   Aortic Inflow Artery                          82                                   Graft Stem                                    86                                      Right                Impression  PSV (cm/s)  EDV (cm/s)   RAR    Dist EIA                                460                      Ostial Renal                            241          23          Prox Renal           1 - 59%            227          18  2.77    Dist Graft Limb                         235                      Limb Anastomosis                        460                      Limb Outflow Artery                     349                      Mid Graft Limb                          241                      Prox Graft Limb                         216                         Left                 Impression  PSV (cm/s)  EDV (cm/s)   RAR    Dist EIA                                251                      Prox Renal           60 - 99%           357          37  4.35    Dist Graft Limb                         161                      Limb Anastomosis                        231                      Limb Outflow Artery                     251                      Mid Graft Limb                          202                      Prox Graft Limb                         369                              CONCLUSION:     Impression     Patent aorto-bi-fem bypass graft with bilateral elevated velocities noted  throughout.  There is >70% stenosis noted in the celiac trunk and superior mesenteric  arteries.  There is >60% stenosis noted in the left renal artery and a <60% stenosis noted  in the right renal artery.  Ankle/Brachial indices are: Rt - 0.89 (Prior 0.89) and Lt - 0.90 (Prior 0.83)  Great toe pressures are within the healing range.     Compared to previous study on 12/9/2020, the left renal artery stenosis is now  >60%.          Objective     There were no vitals taken for this visit.  Physical Exam    Visit Time  Total Visit Duration: 30'      I have reviewed and made appropriate changes to the review of systems input by the medical assistant.    There were no vitals filed for this visit.    Patient Active Problem List   Diagnosis    Aortoiliac occlusive disease (HCC)    Bilateral carotid artery stenosis    Hypothyroidism    Nicotine dependence    Subclavian artery stenosis, left (HCC)    Aortic stenosis    Benign hypertension with chronic kidney disease, stage III (HCC)    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HCC)    Hyperlipidemia    Onychomycosis    Paronychia of toenail    Simple chronic bronchitis (HCC)    Cigarette smoker    Cardiac murmur    Mass of spine    Chronic diastolic heart failure (HCC)    Acute blood loss anemia    Hypertensive heart disease with chronic diastolic congestive heart failure (HCC)    Bilateral leg edema    Peripheral neuropathy    Parenchymal renal hypertension    Anemia in stage 3 chronic kidney disease  (HCC)    Chronic kidney disease, stage 3 (HCC)    Chronic anemia    Renal artery stenosis (HCC)    History of ischemic colitis    Gastritis without bleeding    Arteriovenous malformation (AVM)    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HCC)    MGUS (monoclonal gammopathy of unknown significance)    Platelets decreased (HCC)    Protein-calorie malnutrition, unspecified severity (HCC)    Hyperuricemia       Past Surgical History:   Procedure Laterality Date    BREAST SURGERY      bxs,benign    COLONOSCOPY      HYSTERECTOMY  1983    43    INCISIONAL BREAST BIOPSY      x5, 1964, 1965, 1985 and 1990    CT BYP OTH/THN VEIN AORTOBIFEMORAL Bilateral 12/09/2019    Procedure: BYPASS AORTA BIFEMORAL WITH LEFT FEMORAL THROMBOEMBOLECTOMY;  Surgeon: Yobany Mendoza MD;  Location: [facility];  Service: Vascular    CT TEAEC W/PATCH GRF CAROTID VERTB SUBCLAV NECK INC Right 10/01/2021    Procedure: ENDARTERECTOMY ARTERY CAROTIDWITH BOVINE  PATCH, INTROP DUPLEX;  Surgeon: Curt Oseguera MD;  Location:  [facility] OR;  Service: Vascular    MA TEAEC W/PATCH GRF CAROTID VERTB SUBCLAV NECK INC Left 11/16/2023    Procedure: LEFT CAROTID ENDARTERECTOMY WITH BOVINE PATCH, INTRA-OP DUPLEX;  Surgeon: Curt Oseguera MD;  Location: [facility];  Service: Vascular    WISDOM TOOTH EXTRACTION         Family History   Problem Relation Age of Onset    Hypertension Father     Heart disease Father         CHF    Coronary artery disease Family     Arthritis Family     Abdominal aortic aneurysm Mother     Hypertension Mother     Skin cancer Daughter     Cancer Paternal Aunt     No Known Problems Maternal Aunt     No Known Problems Maternal Aunt     No Known Problems Maternal Aunt        Social History     Socioeconomic History    Marital status:      Spouse name: Not on file    Number of children: Not on file    Years of education: Not on file    Highest education level: Not on file   Occupational History    Not on file   Tobacco Use    Smoking status: Every Day     Current packs/day: 1.00     Average packs/day: 1 pack/day for 64.8 years (64.8 ttl pk-yrs)     Types: Cigarettes     Start date: 1960     Passive exposure: Current    Smokeless tobacco: Never    Tobacco comments:     Encouraged cutting back ahead of surgery---has quit in the past   Vaping Use    Vaping status: Never Used   Substance and Sexual Activity    Alcohol use: Yes     Comment: wilfrid 2-3 every day for 50 years    Drug use: No    Sexual activity: Not on file     Comment: defer   Other Topics Concern    Not on file   Social History Narrative    Rarely exercises    Sleeps 6-7 hours per day     Social Determinants of Health     Financial Resource Strain: Low Risk  (5/9/2023)    Overall Financial Resource Strain (CARDIA)     Difficulty of Paying Living Expenses: Not hard at all   Food Insecurity: Not on file   Transportation Needs: No Transportation Needs (5/9/2023)    PRAPARE - Transportation      Lack of Transportation (Medical): No     Lack of Transportation (Non-Medical): No   Physical Activity: Not on file   Stress: Not on file   Social Connections: Not on file   Intimate Partner Violence: Not on file   Housing Stability: Not on file       Allergies   Allergen Reactions    Tall Ragweed Wheezing         Current Outpatient Medications:     acetaminophen (TYLENOL) 500 mg tablet, Take 500 mg by mouth every 6 (six) hours as needed for mild pain, Disp: , Rfl:     albuterol (ProAir HFA) 90 mcg/act inhaler, Inhale 2 puffs every 6 (six) hours as needed for wheezing, Disp: 8 g, Rfl: 3    amLODIPine (NORVASC) 10 mg tablet, Take 1 tablet (10 mg total) by mouth every morning, Disp: 90 tablet, Rfl: 3    atorvastatin (LIPITOR) 80 mg tablet, Take 1 tablet (80 mg total) by mouth daily at bedtime, Disp: 90 tablet, Rfl: 3    B Complex Vitamins (VITAMIN B-COMPLEX) TABS, Take by mouth daily , Disp: , Rfl:     Bioflavonoid Products (VITAMIN C PLUS PO), Take by mouth daily, Disp: , Rfl:     bisacodyl (DULCOLAX) 5 mg EC tablet, Take 10 mg by mouth every evening, Disp: , Rfl:     carvedilol (COREG) 12.5 mg tablet, TAKE 1 TABLET TWO TIMES A DAY, Disp: 180 tablet, Rfl: 1    Cholecalciferol (Vitamin D3) 125 MCG (5000 UT) TABS, Take 5,000 Units by mouth daily, Disp: , Rfl:     clopidogrel (PLAVIX) 75 mg tablet, TAKE 1 TABLET BY MOUTH DAILY, Disp: 90 tablet, Rfl: 1    colchicine (COLCRYS) 0.6 mg tablet, , Disp: , Rfl:     COLLAGEN PO, Take by mouth Includes a probiotic and other vitamins-10 supplements-2 am-one in the afternoon-one hs (Patient not taking: Reported on 8/16/2024), Disp: , Rfl:     enalapril (VASOTEC) 10 mg tablet, Take 1 tablet (10 mg total) by mouth 2 (two) times a day, Disp: 180 tablet, Rfl: 1    furosemide (LASIX) 20 mg tablet, Take 1 tablet (20 mg total) by mouth daily (Patient taking differently: Take 20 mg by mouth if needed), Disp: 90 tablet, Rfl: 3    ipratropium (ATROVENT) 0.03 % nasal spray, 2 sprays into  each nostril 3 (three) times a day as needed for rhinitis, Disp: 30 mL, Rfl: 5    levothyroxine 25 mcg tablet, Take 1 tablet (25 mcg total) by mouth daily, Disp: 90 tablet, Rfl: 1    Magnesium 500 MG CAPS, Take 1 capsule (500 mg total) by mouth in the morning, Disp: 90 capsule, Rfl: 0    MAGNESIUM PO, Take 500 mg by mouth daily at bedtime (Patient not taking: Reported on 9/6/2024), Disp: , Rfl:     spironolactone (ALDACTONE) 25 mg tablet, TAKE ONE TABLET BY MOUTH DAILY, Disp: 90 tablet, Rfl: 1

## 2024-11-01 NOTE — ASSESSMENT & PLAN NOTE
-Asymptomatic, bilateral carotid artery stenosis with patent bilateral carotid endarterectomies  -CV duplex 10/16/2024 shows widely patent bilateral carotid CEAs (R83/13, L95/20), left subclavian artery stenosis  -Smoking cessation  -Continue with clopidogrel 75 and atorvastatin 80  -Patient education regarding carotid disease and stroke provided  -Follow-up carotid duplex study in 1 year with office visit  Orders:    VAS carotid complete study; Future

## 2024-11-01 NOTE — ASSESSMENT & PLAN NOTE
-Maintain good blood pressure and cholesterol control per primary care  And follows with nephrology-

## 2024-11-01 NOTE — ASSESSMENT & PLAN NOTE
-Status post aortic bifemoral bypass; management as above under aortoiliac occlusive disease  Orders:    VAS abdominal aorta/iliac duplex; Future

## 2024-11-01 NOTE — ASSESSMENT & PLAN NOTE
-Will monitor clinically as above under bilateral carotid artery stenosis  -Continue with current medical therapy on clopidogrel and atorvastatin 80  Orders:    VAS carotid complete study; Future

## 2024-11-01 NOTE — ASSESSMENT & PLAN NOTE
-R >L renal artery stenosis on duplex as above  -BUN/creatinine 47/1.33, eGFR 36  -Did not come into the office for blood pressure check  -Currently on 5 BP Rx (amlodipine, enalapril, carvedilol, spironolactone, furosemide)  -Followed by nephrology, Dr. Jeffries   -Monitor, consider formal renal duplex

## 2024-11-04 ENCOUNTER — OFFICE VISIT (OUTPATIENT)
Dept: HEMATOLOGY ONCOLOGY | Facility: MEDICAL CENTER | Age: 84
End: 2024-11-04
Payer: COMMERCIAL

## 2024-11-04 VITALS
DIASTOLIC BLOOD PRESSURE: 76 MMHG | TEMPERATURE: 97.5 F | SYSTOLIC BLOOD PRESSURE: 116 MMHG | OXYGEN SATURATION: 100 % | WEIGHT: 118 LBS | HEART RATE: 85 BPM | HEIGHT: 63 IN | RESPIRATION RATE: 17 BRPM | BODY MASS INDEX: 20.91 KG/M2

## 2024-11-04 DIAGNOSIS — D63.1 ANEMIA IN STAGE 3 CHRONIC KIDNEY DISEASE, UNSPECIFIED WHETHER STAGE 3A OR 3B CKD  (HCC): ICD-10-CM

## 2024-11-04 DIAGNOSIS — N18.30 ANEMIA IN STAGE 3 CHRONIC KIDNEY DISEASE, UNSPECIFIED WHETHER STAGE 3A OR 3B CKD  (HCC): ICD-10-CM

## 2024-11-04 DIAGNOSIS — D75.89 MACROCYTOSIS: ICD-10-CM

## 2024-11-04 DIAGNOSIS — D47.2 MGUS (MONOCLONAL GAMMOPATHY OF UNKNOWN SIGNIFICANCE): Primary | ICD-10-CM

## 2024-11-04 DIAGNOSIS — D64.9 CHRONIC ANEMIA: ICD-10-CM

## 2024-11-04 PROCEDURE — 99213 OFFICE O/P EST LOW 20 MIN: CPT | Performed by: PHYSICIAN ASSISTANT

## 2024-11-04 NOTE — PROGRESS NOTES
Deena Wolff  1940  [facility] [id number]    DISCUSSION/SUMMARY:    84 year-old female previously referred for anemia - Mrs. Wolff underwent an anemia workup.  Results were consistent with anemia of chronic renal insufficiency.  Previously patient was treated with an erythroid stimulating agent, was effective.  Subsequently the Epogen placed on hold and patient has been under surveillance.    From a hematology standpoint, patient is doing okay.  CBC parameters are okay/acceptable.      Patient also has history of MGUS. Last myeloma markers were completed 5/1/2024.  M Chris was 0.31.  [place] free light chain 49.1, lambda free light chain 25.9.  Ratio 1.90. , IGG 1,261, IGM 68.    She has stable chronic kidney disease.  Hemoglobin is 11.7, improved from before.    MGUS surveillance does not demonstrate any evidence of a progressing plasma cell dyscrasia.    Mrs. Wolff was given a 6-month office visit follow-up.  Will continue to repeat myeloma markers yearly.  Will be due in May 2025.    Patient knows to call if she has any other hematology questions or concerns.    Carefully review your medication list and verify that the list is accurate and up-to-date. Please call the hematology/oncology office if there are medications missing from the list, medications on the list that you are not currently taking or if there is a dosage or instruction that is different from how you're taking that medication.    Patient goals and areas of care:  Monitor CBC parameters.  Barriers to care: none  Patient is able to self-care  ______________________________________________________________________________________    Chief Complaint   Patient presents with    Follow-up     History of Present Illness:  84-year-old female previously referred for evaluation of anemia.  Patient has been monitored for MGUS, anemia of chronic  renal sufficiency and macrocytosis.  The plan has been surveillance.  Mrs. Wolff returns for follow-up.    Patient states feeling about the same as before.  Fatigue is the same as before but patient continues to be active.  She follows with podiatry/ vascular surgery for management of PAD. She says she is now walking without a walker which she is pleased about.    No problems with excessive bruising or bleeding although patient does get bruises on her extremities more than usual.  No fevers or signs of infection.  Appetite is okay, weight is stable.  No GI,  or GYN bleeding.      Review of Systems   Constitutional:  Positive for fatigue. Negative for unexpected weight change.   HENT: Negative.     Eyes: Negative.    Respiratory: Negative.     Gastrointestinal: Negative.    Endocrine: Negative.    Genitourinary: Negative.    Musculoskeletal: Negative.    Skin: Negative.    Allergic/Immunologic: Negative.    Neurological: Negative.  Negative for light-headedness.   Hematological: Negative.    Psychiatric/Behavioral: Negative.     All other systems reviewed and are negative.    Patient Active Problem List   Diagnosis    Aortoiliac occlusive disease (HCC)    Bilateral carotid artery stenosis    Hypothyroidism    Nicotine dependence    Subclavian artery stenosis, left (HCC)    Aortic stenosis    Benign hypertension with chronic kidney disease, stage III (HCC)    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HCC)    Hyperlipidemia    Onychomycosis    Paronychia of toenail    Simple chronic bronchitis (HCC)    Cigarette smoker    Cardiac murmur    Mass of spine    Chronic diastolic heart failure (HCC)    Acute blood loss anemia    Hypertensive heart disease with chronic diastolic congestive heart failure (HCC)    Bilateral leg edema    Peripheral neuropathy    Parenchymal renal hypertension    Anemia in stage 3 chronic kidney disease  (HCC)    Chronic kidney disease, stage 3 (HCC)    Chronic anemia    Renal artery stenosis (HCC)     "History of ischemic colitis    Gastritis without bleeding    Arteriovenous malformation (AVM)    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HCC)    MGUS (monoclonal gammopathy of unknown significance)    Platelets decreased (HCC)    Protein-calorie malnutrition, unspecified severity ([facility])    Hyperuricemia     Past Medical History:   Diagnosis Date    Anemia     sees  Hematologist Dr Brunson appt 11/1/23    Anxiety     Arthritis     Asthma     CAD (coronary artery disease)     Cardiac murmur     sees Dr. Kapoor    Carotid stenosis, bilateral     CHF (congestive heart failure) (HCC)     Chronic kidney disease     renal artery stenosis    Chronic pain disorder     back    Chronic sinusitis     treated with antibiotics 3/22    Colitis     COPD (chronic obstructive pulmonary disease) (HCC)     Coronary artery disease     Cough     Current every day smoker     encouraged to cut back    Dental crowns present     Depression     Disease of thyroid gland     Edema of leg     compression stocking left leg    History of dizziness     History of hyperkalemia     \"couple years ago\"    History of transfusion     Hyperlipidemia     Hypertension     Left leg pain     and foot-to call surgeon office 11/2/23 also right leg pain    Muscle weakness     sometimes of legs    Other disorder of circulatory system (CODE)     Presence of dental bridge     Renal artery stenosis (HCC)     Right    Risk for falls     Subclavian arterial stenosis (HCC)     B/L    Type 2 diabetes mellitus without complication, unspecified whether long term insulin use ([facility]) 09/20/2022 11/2/23-pt not aware of being diabetic    Walker as ambulation aid     \"sometimes\"    Wears glasses      Past Surgical History:   Procedure Laterality Date    BREAST SURGERY      bxs,benign    COLONOSCOPY      HYSTERECTOMY  1983    43    INCISIONAL BREAST BIOPSY      x5, 1964, 1965, 1985 and 1990    DE BYP OTH/THN VEIN AORTOBIFEMORAL Bilateral 12/09/2019    Procedure: BYPASS AORTA BIFEMORAL WITH " LEFT FEMORAL THROMBOEMBOLECTOMY;  Surgeon: Yobany Mendoza MD;  Location: [facility];  Service: Vascular    NC TEAEC W/PATCH GRF CAROTID VERTB SUBCLAV NECK INC Right 10/01/2021    Procedure: ENDARTERECTOMY ARTERY CAROTIDWITH BOVINE PATCH, INTROP DUPLEX;  Surgeon: Curt Oseguera MD;  Location: [facility];  Service: Vascular    NC TEAEC W/PATCH GRF CAROTID VERTB SUBCLAV NECK INC Left 2023    Procedure: LEFT CAROTID ENDARTERECTOMY WITH BOVINE PATCH, INTRA-OP DUPLEX;  Surgeon: uCrt Oseguera MD;  Location: [facility];  Service: Vascular    WISDOM TOOTH EXTRACTION     Past surgical history:  + blood transfusions many years ago after the birth of 1 child (NOS)    Ob gyn: No postmenopausal bleeding, no breast issues, no recent mammograms    Family History   Problem Relation Age of Onset    Hypertension Father     Heart disease Father         CHF    Coronary artery disease Family     Arthritis Family     Abdominal aortic aneurysm Mother     Hypertension Mother     Skin cancer Daughter     Cancer Paternal Aunt     No Known Problems Maternal Aunt     No Known Problems Maternal Aunt     No Known Problems Maternal Aunt    Family history:  No known familial or genetic diseases, 2 daughters in good general health, 3rd daughter  (NOS)    Social History     Socioeconomic History    Marital status:      Spouse name: Not on file    Number of children: Not on file    Years of education: Not on file    Highest education level: Not on file   Occupational History    Not on file   Tobacco Use    Smoking status: Every Day     Current packs/day: 1.00     Average packs/day: 1 pack/day for 64.8 years (64.8 ttl pk-yrs)     Types: Cigarettes     Start date:      Passive exposure: Current    Smokeless tobacco: Never    Tobacco comments:     Encouraged cutting back ahead of surgery---has quit in the past   Vaping Use    Vaping status: Never Used   Substance and Sexual Activity    Alcohol use: Yes     Comment:  manhastella 2-3 every day for 50 years    Drug use: No    Sexual activity: Not on file     Comment: defer   Other Topics Concern    Not on file   Social History Narrative    Rarely exercises    Sleeps 6-7 hours per day     Social Determinants of Health     Financial Resource Strain: Low Risk  (5/9/2023)    Overall Financial Resource Strain (CARDIA)     Difficulty of Paying Living Expenses: Not hard at all   Food Insecurity: Not on file   Transportation Needs: No Transportation Needs (5/9/2023)    PRAPARE - Transportation     Lack of Transportation (Medical): No     Lack of Transportation (Non-Medical): No   Physical Activity: Not on file   Stress: Not on file   Social Connections: Not on file   Intimate Partner Violence: Not on file   Housing Stability: Not on file   Social history: 1/2 pack per day for 60 years, no drug or alcohol abuse, no toxic exposure    Current Outpatient Medications:     acetaminophen (TYLENOL) 500 mg tablet, Take 500 mg by mouth every 6 (six) hours as needed for mild pain, Disp: , Rfl:     albuterol (ProAir HFA) 90 mcg/act inhaler, Inhale 2 puffs every 6 (six) hours as needed for wheezing, Disp: 8 g, Rfl: 3    amLODIPine (NORVASC) 10 mg tablet, Take 1 tablet (10 mg total) by mouth every morning, Disp: 90 tablet, Rfl: 3    atorvastatin (LIPITOR) 80 mg tablet, Take 1 tablet (80 mg total) by mouth daily at bedtime, Disp: 90 tablet, Rfl: 3    B Complex Vitamins (VITAMIN B-COMPLEX) TABS, Take by mouth daily , Disp: , Rfl:     Bioflavonoid Products (VITAMIN C PLUS PO), Take by mouth daily, Disp: , Rfl:     bisacodyl (DULCOLAX) 5 mg EC tablet, Take 10 mg by mouth every evening, Disp: , Rfl:     carvedilol (COREG) 12.5 mg tablet, TAKE 1 TABLET TWO TIMES A DAY, Disp: 180 tablet, Rfl: 1    Cholecalciferol (Vitamin D3) 125 MCG (5000 UT) TABS, Take 5,000 Units by mouth daily, Disp: , Rfl:     clopidogrel (PLAVIX) 75 mg tablet, TAKE 1 TABLET BY MOUTH DAILY, Disp: 90 tablet, Rfl: 1    enalapril (VASOTEC)  10 mg tablet, Take 1 tablet (10 mg total) by mouth 2 (two) times a day, Disp: 180 tablet, Rfl: 1    furosemide (LASIX) 20 mg tablet, Take 1 tablet (20 mg total) by mouth daily (Patient taking differently: Take 20 mg by mouth if needed), Disp: 90 tablet, Rfl: 3    ipratropium (ATROVENT) 0.03 % nasal spray, 2 sprays into each nostril 3 (three) times a day as needed for rhinitis, Disp: 30 mL, Rfl: 5    levothyroxine 25 mcg tablet, Take 1 tablet (25 mcg total) by mouth daily, Disp: 90 tablet, Rfl: 1    Magnesium 500 MG CAPS, Take 1 capsule (500 mg total) by mouth in the morning, Disp: 90 capsule, Rfl: 0    spironolactone (ALDACTONE) 25 mg tablet, TAKE ONE TABLET BY MOUTH DAILY, Disp: 90 tablet, Rfl: 1    colchicine (COLCRYS) 0.6 mg tablet, , Disp: , Rfl:     COLLAGEN PO, Take by mouth Includes a probiotic and other vitamins-10 supplements-2 am-one in the afternoon-one hs (Patient not taking: Reported on 8/16/2024), Disp: , Rfl:     MAGNESIUM PO, Take 500 mg by mouth daily at bedtime (Patient not taking: Reported on 9/6/2024), Disp: , Rfl:     Allergies   Allergen Reactions    Tall Ragweed Wheezing       Vitals:    11/04/24 1332   BP: 116/76   Pulse: 85   Resp: 17   Temp: 97.5 °F (36.4 °C)   SpO2: 100%       Physical Exam  Constitutional:       Appearance: She is well-developed.      Comments: Somewhat more frail appearing female, relatively good color, no respiratory distress, no signs of pain   HENT:      Head: Normocephalic and atraumatic.      Right Ear: External ear normal.      Left Ear: External ear normal.   Eyes:      Pupils: Pupils are equal, round, and reactive to light.      Comments: Conjunctiva pale, anicteric   Neck:      Comments: Well-healed right cervical scar, no adenopathy bilaterally  Cardiovascular:      Rate and Rhythm: Normal rate and regular rhythm.      Heart sounds: Normal heart sounds.   Pulmonary:      Effort: Pulmonary effort is normal.      Breath sounds: Normal breath sounds.       Comments: Fair entry bilaterally, scattered rhonchi bilateral  Abdominal:      General: Abdomen is flat. There is no distension.      Palpations: Abdomen is soft.      Tenderness: There is no abdominal tenderness.      Comments: Soft, nontender.   Musculoskeletal:         General: Normal range of motion.      Cervical back: Normal range of motion and neck supple.   Skin:     General: Skin is warm.      Comments: Relatively good color, warm, moist, few scattered purpura and ecchymoses, no scabs, no bleeding.   Neurological:      Mental Status: She is alert and oriented to person, place, and time.   Psychiatric:         Behavior: Behavior normal.         Thought Content: Thought content normal.         Judgment: Judgment normal.     Extremities: No significant lower extremity edema.  Lymphatics:  No adenopathy in the neck, supraclavicular region and axilla bilaterally    Labs  8/13/2024 WBC 6.53, hemoglobin 11.7, , platelets 199, BUN 47, creatinine 1.33, calcium 10.3, total protein 8.0.            5/1/2024 BUN = 40 creatinine = 1.33 GFR = 36 calcium = 9.5 LFTs WNL total protein = 7.3    9/12/2023 SPEP demonstrated a monoclonal peak in the gamma region (previous immunofixation from March 2023 identified IgG kappa)     9/12/2023 BUN = 52 creatinine = 1.41 LFTs WNL calcium = 10.3 total protein = 7.6 beta-2 microglobulin = 4.2

## 2024-11-06 DIAGNOSIS — I15.0 RENOVASCULAR HYPERTENSION: ICD-10-CM

## 2024-11-06 RX ORDER — CARVEDILOL 12.5 MG/1
12.5 TABLET ORAL 2 TIMES DAILY
Qty: 180 TABLET | Refills: 1 | Status: SHIPPED | OUTPATIENT
Start: 2024-11-06

## 2024-11-19 DIAGNOSIS — E83.42 HYPOMAGNESEMIA: ICD-10-CM

## 2024-11-20 RX ORDER — MAGNESIUM GLUCONATE 27 MG(500)
1 TABLET ORAL EVERY MORNING
Qty: 90 TABLET | Refills: 0 | Status: SHIPPED | OUTPATIENT
Start: 2024-11-20

## 2024-12-10 DIAGNOSIS — I10 ESSENTIAL HYPERTENSION: Chronic | ICD-10-CM

## 2024-12-10 DIAGNOSIS — E78.2 MIXED HYPERLIPIDEMIA: ICD-10-CM

## 2024-12-11 RX ORDER — ATORVASTATIN CALCIUM 80 MG/1
80 TABLET, FILM COATED ORAL
Qty: 90 TABLET | Refills: 1 | Status: SHIPPED | OUTPATIENT
Start: 2024-12-11

## 2024-12-11 RX ORDER — ENALAPRIL MALEATE 10 MG/1
10 TABLET ORAL 2 TIMES DAILY
Qty: 180 TABLET | Refills: 1 | Status: SHIPPED | OUTPATIENT
Start: 2024-12-11

## 2024-12-12 ENCOUNTER — HOSPITAL ENCOUNTER (OUTPATIENT)
Dept: RADIOLOGY | Facility: HOSPITAL | Age: 84
Discharge: HOME | End: 2024-12-12
Attending: PODIATRIST
Payer: COMMERCIAL

## 2024-12-12 DIAGNOSIS — M79.672 CHRONIC PAIN IN LEFT FOOT: ICD-10-CM

## 2024-12-12 DIAGNOSIS — G89.29 CHRONIC PAIN IN LEFT FOOT: ICD-10-CM

## 2024-12-12 DIAGNOSIS — M76.822 LEFT TIBIALIS TENDONITIS: ICD-10-CM

## 2024-12-12 PROCEDURE — 73718 MRI LOWER EXTREMITY W/O DYE: CPT

## 2025-01-16 ENCOUNTER — TELEPHONE (OUTPATIENT)
Dept: OTHER | Facility: HOSPITAL | Age: 85
End: 2025-01-16

## 2025-01-16 ENCOUNTER — HOSPITAL ENCOUNTER (EMERGENCY)
Facility: HOSPITAL | Age: 85
Discharge: HOME | End: 2025-01-16
Payer: COMMERCIAL

## 2025-01-16 VITALS
BODY MASS INDEX: 20.7 KG/M2 | TEMPERATURE: 98 F | RESPIRATION RATE: 20 BRPM | WEIGHT: 116.84 LBS | HEART RATE: 64 BPM | DIASTOLIC BLOOD PRESSURE: 72 MMHG | SYSTOLIC BLOOD PRESSURE: 169 MMHG | OXYGEN SATURATION: 96 %

## 2025-01-16 DIAGNOSIS — T78.40XA ALLERGIC REACTION, INITIAL ENCOUNTER: Primary | ICD-10-CM

## 2025-01-16 LAB
ALBUMIN SERPL BCG-MCNC: 4 G/DL (ref 3.5–5)
ALP SERPL-CCNC: 49 U/L (ref 34–104)
ALT SERPL W P-5'-P-CCNC: 15 U/L (ref 7–52)
ANION GAP SERPL CALCULATED.3IONS-SCNC: 5 MMOL/L (ref 4–13)
AST SERPL W P-5'-P-CCNC: 32 U/L (ref 13–39)
ATRIAL RATE: 68 BPM
BASOPHILS # BLD AUTO: 0.04 THOUSANDS/ΜL (ref 0–0.1)
BASOPHILS NFR BLD AUTO: 1 % (ref 0–1)
BILIRUB SERPL-MCNC: 0.48 MG/DL (ref 0.2–1)
BUN SERPL-MCNC: 29 MG/DL (ref 5–25)
CALCIUM SERPL-MCNC: 9.6 MG/DL (ref 8.4–10.2)
CHLORIDE SERPL-SCNC: 110 MMOL/L (ref 96–108)
CO2 SERPL-SCNC: 23 MMOL/L (ref 21–32)
CREAT SERPL-MCNC: 1.11 MG/DL (ref 0.6–1.3)
EOSINOPHIL # BLD AUTO: 0.18 THOUSAND/ΜL (ref 0–0.61)
EOSINOPHIL NFR BLD AUTO: 3 % (ref 0–6)
ERYTHROCYTE [DISTWIDTH] IN BLOOD BY AUTOMATED COUNT: 15.5 % (ref 11.6–15.1)
GFR SERPL CREATININE-BSD FRML MDRD: 45 ML/MIN/1.73SQ M
GLUCOSE SERPL-MCNC: 81 MG/DL (ref 65–140)
HCT VFR BLD AUTO: 34.3 % (ref 34.8–46.1)
HGB BLD-MCNC: 11.4 G/DL (ref 11.5–15.4)
IMM GRANULOCYTES # BLD AUTO: 0.01 THOUSAND/UL (ref 0–0.2)
IMM GRANULOCYTES NFR BLD AUTO: 0 % (ref 0–2)
LYMPHOCYTES # BLD AUTO: 1.42 THOUSANDS/ΜL (ref 0.6–4.47)
LYMPHOCYTES NFR BLD AUTO: 23 % (ref 14–44)
MAGNESIUM SERPL-MCNC: 1.5 MG/DL (ref 1.9–2.7)
MCH RBC QN AUTO: 33.7 PG (ref 26.8–34.3)
MCHC RBC AUTO-ENTMCNC: 33.2 G/DL (ref 31.4–37.4)
MCV RBC AUTO: 102 FL (ref 82–98)
MONOCYTES # BLD AUTO: 0.85 THOUSAND/ΜL (ref 0.17–1.22)
MONOCYTES NFR BLD AUTO: 14 % (ref 4–12)
NEUTROPHILS # BLD AUTO: 3.71 THOUSANDS/ΜL (ref 1.85–7.62)
NEUTS SEG NFR BLD AUTO: 59 % (ref 43–75)
NRBC BLD AUTO-RTO: 0 /100 WBCS
P AXIS: 90 DEGREES
PHOSPHATE SERPL-MCNC: 3.5 MG/DL (ref 2.3–4.1)
PLATELET # BLD AUTO: 197 THOUSANDS/UL (ref 149–390)
PMV BLD AUTO: 11.7 FL (ref 8.9–12.7)
POTASSIUM SERPL-SCNC: 4.3 MMOL/L (ref 3.5–5.3)
PR INTERVAL: 162 MS
PROT SERPL-MCNC: 7.2 G/DL (ref 6.4–8.4)
QRS AXIS: 80 DEGREES
QRSD INTERVAL: 78 MS
QT INTERVAL: 398 MS
QTC INTERVAL: 423 MS
RBC # BLD AUTO: 3.38 MILLION/UL (ref 3.81–5.12)
SODIUM SERPL-SCNC: 138 MMOL/L (ref 135–147)
T WAVE AXIS: 75 DEGREES
VENTRICULAR RATE: 68 BPM
WBC # BLD AUTO: 6.21 THOUSAND/UL (ref 4.31–10.16)

## 2025-01-16 PROCEDURE — 93005 ELECTROCARDIOGRAM TRACING: CPT

## 2025-01-16 PROCEDURE — 96361 HYDRATE IV INFUSION ADD-ON: CPT

## 2025-01-16 PROCEDURE — 99284 EMERGENCY DEPT VISIT MOD MDM: CPT

## 2025-01-16 PROCEDURE — 84100 ASSAY OF PHOSPHORUS: CPT

## 2025-01-16 PROCEDURE — 83735 ASSAY OF MAGNESIUM: CPT

## 2025-01-16 PROCEDURE — 96375 TX/PRO/DX INJ NEW DRUG ADDON: CPT

## 2025-01-16 PROCEDURE — 80053 COMPREHEN METABOLIC PANEL: CPT

## 2025-01-16 PROCEDURE — 85025 COMPLETE CBC W/AUTO DIFF WBC: CPT

## 2025-01-16 PROCEDURE — 93010 ELECTROCARDIOGRAM REPORT: CPT | Performed by: INTERNAL MEDICINE

## 2025-01-16 PROCEDURE — 36415 COLL VENOUS BLD VENIPUNCTURE: CPT

## 2025-01-16 PROCEDURE — 99283 EMERGENCY DEPT VISIT LOW MDM: CPT

## 2025-01-16 PROCEDURE — 96365 THER/PROPH/DIAG IV INF INIT: CPT

## 2025-01-16 RX ORDER — MAGNESIUM SULFATE HEPTAHYDRATE 40 MG/ML
2 INJECTION, SOLUTION INTRAVENOUS ONCE
Status: COMPLETED | OUTPATIENT
Start: 2025-01-16 | End: 2025-01-16

## 2025-01-16 RX ORDER — LORATADINE 10 MG/1
10 TABLET ORAL DAILY
Qty: 20 TABLET | Refills: 0 | Status: SHIPPED | OUTPATIENT
Start: 2025-01-16

## 2025-01-16 RX ORDER — FAMOTIDINE 10 MG/ML
20 INJECTION, SOLUTION INTRAVENOUS ONCE
Status: COMPLETED | OUTPATIENT
Start: 2025-01-16 | End: 2025-01-16

## 2025-01-16 RX ORDER — PREDNISONE 20 MG/1
40 TABLET ORAL DAILY
Qty: 8 TABLET | Refills: 0 | Status: SHIPPED | OUTPATIENT
Start: 2025-01-16 | End: 2025-01-20

## 2025-01-16 RX ORDER — PREDNISONE 20 MG/1
40 TABLET ORAL ONCE
Status: COMPLETED | OUTPATIENT
Start: 2025-01-16 | End: 2025-01-16

## 2025-01-16 RX ORDER — DIPHENHYDRAMINE HYDROCHLORIDE 50 MG/ML
25 INJECTION INTRAMUSCULAR; INTRAVENOUS ONCE
Status: COMPLETED | OUTPATIENT
Start: 2025-01-16 | End: 2025-01-16

## 2025-01-16 RX ADMIN — MAGNESIUM SULFATE HEPTAHYDRATE 2 G: 40 INJECTION, SOLUTION INTRAVENOUS at 08:43

## 2025-01-16 RX ADMIN — FAMOTIDINE 20 MG: 10 INJECTION, SOLUTION INTRAVENOUS at 07:46

## 2025-01-16 RX ADMIN — DIPHENHYDRAMINE HYDROCHLORIDE 25 MG: 50 INJECTION, SOLUTION INTRAMUSCULAR; INTRAVENOUS at 07:45

## 2025-01-16 RX ADMIN — SODIUM CHLORIDE 1000 ML: 0.9 INJECTION, SOLUTION INTRAVENOUS at 07:55

## 2025-01-16 RX ADMIN — PREDNISONE 40 MG: 20 TABLET ORAL at 07:43

## 2025-01-16 NOTE — ED PROVIDER NOTES
Time reflects when diagnosis was documented in both MDM as applicable and the Disposition within this note       Time User Action Codes Description Comment    1/16/2025  9:35 AM Kade Sands Add [T78.40XA] Allergic reaction, initial encounter           ED Disposition       ED Disposition   Discharge    Condition   Stable    Date/Time   Thu Jan 16, 2025  9:35 AM    Comment   Deena Wolff discharge to home/self care.                   Assessment & Plan       Medical Decision Making  84-year-old female present emergency department due to a nonspecific skin eruption, likely allergic.  No signs of anaphylaxis requiring epinephrine.  Patient given Benadryl and steroid.  With further monitoring in the emergency department, patient noted improvement in symptoms.  Discharged with recommendations for course of steroids, Benadryl, daytime nondrowsy antihistamine, and follow-up with primary care doctor for reevaluation.    Amount and/or Complexity of Data Reviewed  Labs: ordered.    Risk  OTC drugs.  Prescription drug management.             Medications   diphenhydrAMINE (BENADRYL) injection 25 mg (25 mg Intravenous Given 1/16/25 0745)   predniSONE tablet 40 mg (40 mg Oral Given 1/16/25 0743)   Famotidine (PF) (PEPCID) injection 20 mg (20 mg Intravenous Given 1/16/25 0746)   sodium chloride 0.9 % bolus 1,000 mL (0 mL Intravenous Stopped 1/16/25 0949)   magnesium sulfate 2 g/50 mL IVPB (premix) 2 g (0 g Intravenous Stopped 1/16/25 0949)       ED Risk Strat Scores                                              History of Present Illness       Chief Complaint   Patient presents with    Allergic Reaction     Woke up at 4 am with scalp itching, redness and itching spread to arms, neck, chest       Past Medical History:   Diagnosis Date    Anemia     sees  Hematologist Dr Brunson appt 11/1/23    Anxiety     Arthritis     Asthma     CAD (coronary artery disease)     Cardiac murmur     sees Dr. Kapoor    Carotid stenosis, bilateral   "   CHF (congestive heart failure) (HCC)     Chronic kidney disease     renal artery stenosis    Chronic pain disorder     back    Chronic sinusitis     treated with antibiotics 3/22    Colitis     COPD (chronic obstructive pulmonary disease) (HCC)     Coronary artery disease     Cough     Current every day smoker     encouraged to cut back    Dental crowns present     Depression     Disease of thyroid gland     Edema of leg     compression stocking left leg    History of dizziness     History of hyperkalemia     \"couple years ago\"    History of transfusion     Hyperlipidemia     Hypertension     Left leg pain     and foot-to call surgeon office 11/2/23 also right leg pain    Muscle weakness     sometimes of legs    Other disorder of circulatory system (CODE)     Presence of dental bridge     Renal artery stenosis (HCC)     Right    Risk for falls     Subclavian arterial stenosis (HCC)     B/L    Type 2 diabetes mellitus without complication, unspecified whether long term insulin use ([facility]) 09/20/2022 11/2/23-pt not aware of being diabetic    Walker as ambulation aid     \"sometimes\"    Wears glasses       Past Surgical History:   Procedure Laterality Date    BREAST SURGERY      bxs,benign    COLONOSCOPY      HYSTERECTOMY  1983    43    INCISIONAL BREAST BIOPSY      x5, 1964, 1965, 1985 and 1990    NE BYP OTH/THN VEIN AORTOBIFEMORAL Bilateral 12/09/2019    Procedure: BYPASS AORTA BIFEMORAL WITH LEFT FEMORAL THROMBOEMBOLECTOMY;  Surgeon: Yobany Mendoza MD;  Location: [facility];  Service: Vascular    NE TEAEC W/PATCH GRF CAROTID VERTB SUBCLAV NECK INC Right 10/01/2021    Procedure: ENDARTERECTOMY ARTERY CAROTIDWITH BOVINE PATCH, INTROP DUPLEX;  Surgeon: Curt Oseguera MD;  Location: [facility];  Service: Vascular    NE TEAEC W/PATCH GRF CAROTID VERTB SUBCLAV NECK INC Left 11/16/2023    Procedure: LEFT CAROTID ENDARTERECTOMY WITH BOVINE PATCH, INTRA-OP DUPLEX;  Surgeon: Curt Oseguera MD;  Location: [facility] " OR;  Service: Vascular    WISDOM TOOTH EXTRACTION        Family History   Problem Relation Age of Onset    Hypertension Father     Heart disease Father         CHF    Coronary artery disease Family     Arthritis Family     Abdominal aortic aneurysm Mother     Hypertension Mother     Skin cancer Daughter     Cancer Paternal Aunt     No Known Problems Maternal Aunt     No Known Problems Maternal Aunt     No Known Problems Maternal Aunt       Social History     Tobacco Use    Smoking status: Every Day     Current packs/day: 1.00     Average packs/day: 1 pack/day for 65.0 years (65.0 ttl pk-yrs)     Types: Cigarettes     Start date: 1960     Passive exposure: Current    Smokeless tobacco: Never    Tobacco comments:     Encouraged cutting back ahead of surgery---has quit in the past   Vaping Use    Vaping status: Never Used   Substance Use Topics    Alcohol use: Yes     Comment: manhattans 2-3 every day for 50 years    Drug use: No      E-Cigarette/Vaping    E-Cigarette Use Never User       E-Cigarette/Vaping Substances    Nicotine No     THC No     CBD No     Flavoring No     Other No     Unknown No       I have reviewed and agree with the history as documented.     84-year-old female with history of local reaction to ragweed, presents emergency department due to diffuse urticarial rash that started around 4 AM and has been progressively spreading throughout the day.  Notes that initially started on her scalp and then has not been having bilateral arms, back, and chest involvement.  Describes it is intensely itchy.  Has also been feeling lightheaded this morning.  Denies any airway involvement, abdominal pain, or other complaints.  No known exposures, no new soaps, detergents, foods, or other potential triggers per patient.  No history of similar rash like this.        Review of Systems   All other systems reviewed and are negative.          Objective       ED Triage Vitals   Temperature Pulse Blood Pressure  Respirations SpO2 Patient Position - Orthostatic VS   01/16/25 0731 01/16/25 0726 01/16/25 0726 01/16/25 0726 01/16/25 0726 01/16/25 0726   98 °F (36.7 °C) 68 (!) 185/74 18 97 % Lying      Temp Source Heart Rate Source BP Location FiO2 (%) Pain Score    01/16/25 0731 01/16/25 0726 01/16/25 0726 -- --    Oral Monitor Right arm        Vitals      Date and Time Temp Pulse SpO2 Resp BP Pain Score FACES Pain Rating User   01/16/25 0945 -- 64 96 % 20 169/72 -- -- OE   01/16/25 0830 -- 64 97 % 20 164/70 -- -- OE   01/16/25 0731 98 °F (36.7 °C) -- -- -- -- -- -- OE   01/16/25 0726 -- 68 97 % 18 185/74 -- -- OE            Physical Exam  Vitals and nursing note reviewed.   Constitutional:       General: She is not in acute distress.     Appearance: She is well-developed.   HENT:      Head: Normocephalic and atraumatic.   Eyes:      Conjunctiva/sclera: Conjunctivae normal.   Cardiovascular:      Rate and Rhythm: Normal rate and regular rhythm.      Heart sounds: No murmur heard.  Pulmonary:      Effort: Pulmonary effort is normal. No respiratory distress.      Breath sounds: Normal breath sounds.   Abdominal:      Palpations: Abdomen is soft.      Tenderness: There is no abdominal tenderness.   Musculoskeletal:         General: No swelling.      Cervical back: Neck supple.   Skin:     General: Skin is warm and dry.      Capillary Refill: Capillary refill takes less than 2 seconds.      Comments: Erythematous macules on arms/chest/scalp   Neurological:      Mental Status: She is alert.   Psychiatric:         Mood and Affect: Mood normal.         Results Reviewed       Procedure Component Value Units Date/Time    Comprehensive metabolic panel [[id number]]  (Abnormal) Collected: 01/16/25 0739    Lab Status: Final result Specimen: Blood from Arm, Left Updated: 01/16/25 0814     Sodium 138 mmol/L      Potassium 4.3 mmol/L      Chloride 110 mmol/L      CO2 23 mmol/L      ANION GAP 5 mmol/L      BUN 29 mg/dL      Creatinine 1.11 mg/dL       Glucose 81 mg/dL      Calcium 9.6 mg/dL      AST 32 U/L      ALT 15 U/L      Alkaline Phosphatase 49 U/L      Total Protein 7.2 g/dL      Albumin 4.0 g/dL      Total Bilirubin 0.48 mg/dL      eGFR 45 ml/min/1.73sq m     Narrative:      National Kid[employer] Disease Foundation guidelines for Chronic Kidney Disease (CKD):     Stage 1 with normal or high GFR (GFR > 90 mL/min/1.73 square meters)    Stage 2 Mild CKD (GFR = 60-89 mL/min/1.73 square meters)    Stage 3A Moderate CKD (GFR = 45-59 mL/min/1.73 square meters)    Stage 3B Moderate CKD (GFR = 30-44 mL/min/1.73 square meters)    Stage 4 Severe CKD (GFR = 15-29 mL/min/1.73 square meters)    Stage 5 End Stage CKD (GFR <15 mL/min/1.73 square meters)  Note: GFR calculation is accurate only with a steady state creatinine    Magnesium [[id number]]  (Abnormal) Collected: 01/16/25 0739    Lab Status: Final result Specimen: Blood from Arm, Left Updated: 01/16/25 0814     Magnesium 1.5 mg/dL     Phosphorus [[id number]]  (Normal) Collected: 01/16/25 0739    Lab Status: Final result Specimen: Blood from Arm, Left Updated: 01/16/25 0814     Phosphorus 3.5 mg/dL     CBC and differential [[id number]]  (Abnormal) Collected: 01/16/25 0739    Lab Status: Final result Specimen: Blood from Arm, Left Updated: 01/16/25 0751     WBC 6.21 Thousand/uL      RBC 3.38 Million/uL      Hemoglobin 11.4 g/dL      Hematocrit 34.3 %       fL      MCH 33.7 pg      MCHC 33.2 g/dL      RDW 15.5 %      MPV 11.7 fL      Platelets 197 Thousands/uL      nRBC 0 /100 WBCs      Segmented % 59 %      Immature Grans % 0 %      Lymphocytes % 23 %      Monocytes % 14 %      Eosinophils Relative 3 %      Basophils Relative 1 %      Absolute Neutrophils 3.71 Thousands/µL      Absolute Immature Grans 0.01 Thousand/uL      Absolute Lymphocytes 1.42 Thousands/µL      Absolute Monocytes 0.85 Thousand/µL      Eosinophils Absolute 0.18 Thousand/µL      Basophils Absolute 0.04 Thousands/µL             No orders to  display       Procedures    ED Medication and Procedure Management   Prior to Admission Medications   Prescriptions Last Dose Informant Patient Reported? Taking?   B Complex Vitamins (VITAMIN B-COMPLEX) TABS  Self Yes No   Sig: Take by mouth daily    Bioflavonoid Products (VITAMIN C PLUS PO)  Self Yes No   Sig: Take by mouth daily   COLLAGEN PO  Self Yes No   Sig: Take by mouth Includes a probiotic and other vitamins-10 supplements-2 am-one in the afternoon-one hs   Patient not taking: Reported on 2024   Cholecalciferol (Vitamin D3) 125 MCG (5000 UT) TABS  Self Yes No   Sig: Take 5,000 Units by mouth daily   MAGNESIUM PO  Self Yes No   Sig: Take 500 mg by mouth daily at bedtime   Patient not taking: Reported on 2024   Ubiquinol 100 MG CAPS   Yes No   Sig: Take 1 capsule by mouth in the morning   acetaminophen (TYLENOL) 500 mg tablet  Self Yes No   Sig: Take 500 mg by mouth every 6 (six) hours as needed for mild pain   albuterol (ProAir HFA) 90 mcg/act inhaler   No No   Sig: Inhale 2 puffs every 6 (six) hours as needed for wheezing   amLODIPine (NORVASC) 10 mg tablet  Self No No   Sig: Take 1 tablet (10 mg total) by mouth every morning   atorvastatin (LIPITOR) 80 mg tablet   No No   Sig: TAKE 1 TABLET BY MOUTH DAILY AT BEDTIME   bisacodyl (DULCOLAX) 5 mg EC tablet  Self Yes No   Sig: Take 10 mg by mouth every evening   carvedilol (COREG) 12.5 mg tablet   No No   Sig: TAKE ONE TABLET TWICE DAILY   clopidogrel (PLAVIX) 75 mg tablet   No No   Sig: TAKE 1 TABLET BY MOUTH DAILY   colchicine (COLCRYS) 0.6 mg tablet  Self Yes No   Patient not taking: Reported on 2024   enalapril (VASOTEC) 10 mg tablet   No No   Sig: TAKE 1 TABLET (10 MG TOTAL) BY MOUTH 2 (TWO) TIMES A DAY   furosemide (LASIX) 20 mg tablet  Self No No   Sig: Take 1 tablet (20 mg total) by mouth daily   Patient taking differently: Take 20 mg by mouth if needed   ipratropium (ATROVENT) 0.03 % nasal spray  Self No No   Si sprays into each  nostril 3 (three) times a day as needed for rhinitis   levothyroxine 25 mcg tablet   No No   Sig: Take 1 tablet (25 mcg total) by mouth daily   magnesium gluconate (MAGONATE) 500 MG tablet   No No   Sig: TAKE 1 TABLET BY MOUTH IN THE MORNING   spironolactone (ALDACTONE) 25 mg tablet   No No   Sig: TAKE ONE TABLET BY MOUTH DAILY      Facility-Administered Medications: None     Discharge Medication List as of 1/16/2025  9:35 AM        START taking these medications    Details   loratadine (CLARITIN) 10 mg tablet Take 1 tablet (10 mg total) by mouth daily, Starting Thu 1/16/2025, Normal      predniSONE 20 mg tablet Take 2 tablets (40 mg total) by mouth daily for 4 days, Starting Thu 1/16/2025, Until Mon 1/20/2025, Normal           CONTINUE these medications which have NOT CHANGED    Details   acetaminophen (TYLENOL) 500 mg tablet Take 500 mg by mouth every 6 (six) hours as needed for mild pain, Historical Med      albuterol (ProAir HFA) 90 mcg/act inhaler Inhale 2 puffs every 6 (six) hours as needed for wheezing, Starting Tue 9/10/2024, Normal      amLODIPine (NORVASC) 10 mg tablet Take 1 tablet (10 mg total) by mouth every morning, Starting Fri 8/16/2024, Normal      atorvastatin (LIPITOR) 80 mg tablet TAKE 1 TABLET BY MOUTH DAILY AT BEDTIME, Starting Wed 12/11/2024, Normal      B Complex Vitamins (VITAMIN B-COMPLEX) TABS Take by mouth daily , Historical Med      Bioflavonoid Products (VITAMIN C PLUS PO) Take by mouth daily, Historical Med      bisacodyl (DULCOLAX) 5 mg EC tablet Take 10 mg by mouth every evening, Historical Med      carvedilol (COREG) 12.5 mg tablet TAKE ONE TABLET TWICE DAILY, Starting Wed 11/6/2024, Normal      Cholecalciferol (Vitamin D3) 125 MCG (5000 UT) TABS Take 5,000 Units by mouth daily, Historical Med      clopidogrel (PLAVIX) 75 mg tablet TAKE 1 TABLET BY MOUTH DAILY, Starting Tue 9/24/2024, Normal      colchicine (COLCRYS) 0.6 mg tablet Historical Med      COLLAGEN PO Take by mouth  Includes a probiotic and other vitamins-10 supplements-2 am-one in the afternoon-one hs, Historical Med      enalapril (VASOTEC) 10 mg tablet TAKE 1 TABLET (10 MG TOTAL) BY MOUTH 2 (TWO) TIMES A DAY, Starting Wed 12/11/2024, Normal      furosemide (LASIX) 20 mg tablet Take 1 tablet (20 mg total) by mouth daily, Starting Tue 11/21/2023, Normal      ipratropium (ATROVENT) 0.03 % nasal spray 2 sprays into each nostril 3 (three) times a day as needed for rhinitis, Starting Fri 7/5/2024, Normal      levothyroxine 25 mcg tablet Take 1 tablet (25 mcg total) by mouth daily, Starting Tue 10/29/2024, Normal      magnesium gluconate (MAGONATE) 500 MG tablet TAKE 1 TABLET BY MOUTH IN THE MORNING, Starting Wed 11/20/2024, Normal      MAGNESIUM PO Take 500 mg by mouth daily at bedtime, Historical Med      spironolactone (ALDACTONE) 25 mg tablet TAKE ONE TABLET BY MOUTH DAILY, Starting Tue 9/10/2024, Normal      Ubiquinol 100 MG CAPS Take 1 capsule by mouth in the morning, Historical Med           No discharge procedures on file.  ED SEPSIS DOCUMENTATION   Time reflects when diagnosis was documented in both MDM as applicable and the Disposition within this note       Time User Action Codes Description Comment    1/16/2025  9:35 AM Kade Sands Add [T78.40XA] Allergic reaction, initial encounter                  Kade Sands MD  01/19/25 0353

## 2025-01-16 NOTE — TELEPHONE ENCOUNTER
Patient called the rx line. She is requesting a call back from Dr. Willett office. She will be a new patient. Her appointment is scheduled for 5/2/25.

## 2025-01-17 NOTE — TELEPHONE ENCOUNTER
1/17/2025 10:41 AM Deena returned my call  She was in the ER yesterday for an allergic reaction    She is feeling much better today.    She will follow up with me in May as scheduled for her new visit with me  Recommended that she keep benadryl at home if needed for a reaction.     Neha Willett, DO

## 2025-01-17 NOTE — TELEPHONE ENCOUNTER
1/17/2025 9:59 AM returned call to Deena     Left message on her voicemail to call the office.   Neha Willett, DO

## 2025-01-17 NOTE — TELEPHONE ENCOUNTER
Patient returned call to Dr Willett, called office and spoke to Rosa M-clinical and warm transfer completed.

## 2025-02-03 ENCOUNTER — TELEPHONE (OUTPATIENT)
Age: 85
End: 2025-02-03

## 2025-02-03 NOTE — TELEPHONE ENCOUNTER
Patient calling to confirm appointment date. Confirmed her appointment in 2/19 at 1:30 PM  No further action needed

## 2025-02-13 NOTE — ASSESSMENT & PLAN NOTE
Wt Readings from Last 3 Encounters:   11/16/23 55.1 kg (121 lb 7.6 oz)   11/01/23 54.2 kg (119 lb 6.4 oz)   10/26/23 54 kg (119 lb)     Last echocardiogram October 2023 with EF 65 to 70%  Maintain -160 postoperatively -use Tamir-Synephrine or Cardene if needed  Hold home dose Lasix/Coreg  Continue with Norvasc/Aldactone per surgery  Currently stable on room air You will need to wear a splint (brace) for the next 4 weeks. You may come out of it to do range-of-motion exercises a couple times per day.  You may participate in physical education with the brace on as long as there is no upper body lifting; however, you may not do any contact activity/sports for the next 4 weeks while in the splint.  In 2 weeks, if pain and range of motion are normal, you may take splint off at night and while at home if not participating in sports activities (being, bathing, homework, etc.)  Elevate above the heart level, ice, acetaminophen (tylenol) for pain and swelling  Follow-up in 4 weeks

## 2025-02-17 ENCOUNTER — TELEPHONE (OUTPATIENT)
Age: 85
End: 2025-02-17

## 2025-02-17 DIAGNOSIS — E83.42 HYPOMAGNESEMIA: ICD-10-CM

## 2025-02-17 NOTE — TELEPHONE ENCOUNTER
Called the patient to inform [employer] is scheduled for  at 1:00 pm for 1:30 appointment with Dr. Jeffries.

## 2025-02-18 RX ORDER — MAGNESIUM GLUCONATE 27 MG(500)
1 TABLET ORAL DAILY
Qty: 90 TABLET | Refills: 0 | Status: SHIPPED | OUTPATIENT
Start: 2025-02-18

## 2025-02-19 ENCOUNTER — TELEPHONE (OUTPATIENT)
Age: 85
End: 2025-02-19

## 2025-02-19 ENCOUNTER — OFFICE VISIT (OUTPATIENT)
Dept: NEPHROLOGY | Facility: CLINIC | Age: 85
End: 2025-02-19
Payer: COMMERCIAL

## 2025-02-19 VITALS
HEART RATE: 74 BPM | WEIGHT: 118 LBS | HEIGHT: 63 IN | SYSTOLIC BLOOD PRESSURE: 152 MMHG | OXYGEN SATURATION: 96 % | BODY MASS INDEX: 20.91 KG/M2 | DIASTOLIC BLOOD PRESSURE: 82 MMHG

## 2025-02-19 DIAGNOSIS — M89.9 CHRONIC KIDNEY DISEASE-MINERAL BONE DISORDER (CKD-MBD) WITH STAGE 3B CHRONIC KIDNEY DISEASE (HCC): ICD-10-CM

## 2025-02-19 DIAGNOSIS — N18.30 BENIGN HYPERTENSION WITH CHRONIC KIDNEY DISEASE, STAGE III (HCC): ICD-10-CM

## 2025-02-19 DIAGNOSIS — I74.09 AORTOILIAC OCCLUSIVE DISEASE (HCC): Chronic | ICD-10-CM

## 2025-02-19 DIAGNOSIS — I70.1 RENAL ARTERY STENOSIS (HCC): Primary | Chronic | ICD-10-CM

## 2025-02-19 DIAGNOSIS — I11.0 HYPERTENSIVE HEART DISEASE WITH CHRONIC DIASTOLIC CONGESTIVE HEART FAILURE (HCC): ICD-10-CM

## 2025-02-19 DIAGNOSIS — E83.9 CHRONIC KIDNEY DISEASE-MINERAL BONE DISORDER (CKD-MBD) WITH STAGE 3B CHRONIC KIDNEY DISEASE (HCC): ICD-10-CM

## 2025-02-19 DIAGNOSIS — I50.32 HYPERTENSIVE HEART DISEASE WITH CHRONIC DIASTOLIC CONGESTIVE HEART FAILURE (HCC): ICD-10-CM

## 2025-02-19 DIAGNOSIS — F17.210 CIGARETTE SMOKER: Chronic | ICD-10-CM

## 2025-02-19 DIAGNOSIS — E83.42 HYPOMAGNESEMIA: ICD-10-CM

## 2025-02-19 DIAGNOSIS — I12.9 BENIGN HYPERTENSION WITH CHRONIC KIDNEY DISEASE, STAGE III (HCC): ICD-10-CM

## 2025-02-19 DIAGNOSIS — N18.32 CHRONIC KIDNEY DISEASE-MINERAL BONE DISORDER (CKD-MBD) WITH STAGE 3B CHRONIC KIDNEY DISEASE (HCC): ICD-10-CM

## 2025-02-19 DIAGNOSIS — E79.0 HYPERURICEMIA: ICD-10-CM

## 2025-02-19 DIAGNOSIS — D63.1 ANEMIA IN STAGE 3B CHRONIC KIDNEY DISEASE  (HCC): Chronic | ICD-10-CM

## 2025-02-19 DIAGNOSIS — N18.32 ANEMIA IN STAGE 3B CHRONIC KIDNEY DISEASE  (HCC): Chronic | ICD-10-CM

## 2025-02-19 PROCEDURE — 99406 BEHAV CHNG SMOKING 3-10 MIN: CPT | Performed by: INTERNAL MEDICINE

## 2025-02-19 PROCEDURE — 99214 OFFICE O/P EST MOD 30 MIN: CPT | Performed by: INTERNAL MEDICINE

## 2025-02-19 NOTE — ASSESSMENT & PLAN NOTE
-- Actively smoking: Yes  -- Duration of smokin+ years  -- Spent approximately: Spent approximately 5 minutes discussing smoking cessation  -- Eager to quit smoking: No  -- Offered nicotine patch, recommend cognitive behavioral therapy   -- Smoking can affect medications used to treat high blood pressure.  Smoking increases the risk of strokes and heart attacks in people with high blood pressure.  Smoking is a well-known risk factor for chronic kidney disease.  Active smoking increases development and progression of chronic kidney disease.  It can lead to an increased risk of worsening microalbuminuria.  Can lead to increased risk of developing some forms of kidney malignancies.  Causes damage to the cardiovascular system leading to poor blood flow resulting in worsening kidney function.  It can perpetuate worsening diabetic kidney disease.  It can also lead to idiopathic nodular glomerulosclerosis.    Orders:    Magnesium; Future    Albumin / creatinine urine ratio; Future    Comprehensive metabolic panel; Future    Cystatin C With eGFR; Future

## 2025-02-19 NOTE — TELEPHONE ENCOUNTER
Patient calling to see if we can recommend her a dermatologist in the [facility] area?

## 2025-02-19 NOTE — ASSESSMENT & PLAN NOTE
-- Start magnesium oxide 400 mg daily    Orders:    Magnesium; Future    Albumin / creatinine urine ratio; Future    Comprehensive metabolic panel; Future    Cystatin C With eGFR; Future

## 2025-02-19 NOTE — TELEPHONE ENCOUNTER
Patient called stating she does not have a cell phone for  to call when they arrive. Asking if it is possible for office to find out what color car will be picking her up. Thank you.

## 2025-02-19 NOTE — ASSESSMENT & PLAN NOTE
Wt Readings from Last 3 Encounters:   02/19/25 53.5 kg (118 lb)   01/16/25 53 kg (116 lb 13.5 oz)   11/04/24 53.5 kg (118 lb)         Orders:    Magnesium; Future    Albumin / creatinine urine ratio; Future    Comprehensive metabolic panel; Future    Cystatin C With eGFR; Future

## 2025-02-19 NOTE — PATIENT INSTRUCTIONS
1.)  Low 2 g sodium diet    2.)  Monitor weights at home    3.)  Avoid NSAIDs (ibuprofen, Motrin, Advil, Aleve, naproxen)    4.)  Monitor blood pressure at home, call if blood pressure greater than 150/90 persistently    5.) I will plan to discuss all results including blood work, and/or imaging at our next visit, unless there is an urgent indication, in which case I will call you earlier. If you have any questions or concerns about your results, please feel free to call our office.    6.) Continue Magnesium tablets    7.) Cut back smoking

## 2025-02-19 NOTE — ASSESSMENT & PLAN NOTE
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

## 2025-02-19 NOTE — TELEPHONE ENCOUNTER
Patient provided with numbers for  [facility] and [facility].

## 2025-02-19 NOTE — ASSESSMENT & PLAN NOTE
Lab Results   Component Value Date    EGFR 45 01/16/2025    EGFR 36 08/13/2024    EGFR 23 (L) 08/13/2024    CREATININE 1.11 01/16/2025    CREATININE 1.33 (H) 08/13/2024    CREATININE 1.33 (H) 05/01/2024   -- Hemoglobin stable at 11.4  -- MCV elevated at 102.  Recommend a multivitamin fortified and B12 and folate.    Orders:    Magnesium; Future    Albumin / creatinine urine ratio; Future    Comprehensive metabolic panel; Future    Cystatin C With eGFR; Future

## 2025-02-19 NOTE — ASSESSMENT & PLAN NOTE
-- Renal artery Doppler showed greater than 60% stenosis in the proximal right renal artery  -- Renal vascular index 0.85  --Renal function remained stable with a creatinine 1.1 mg/dL.  Blood pressure slightly above target usually has been controlled  -- No indication for intervention  --Smoking cessation strongly advised  --Can continue follow-up with vascular surgery  --Continue ACE inhibitor    Orders:    Magnesium; Future    Albumin / creatinine urine ratio; Future    Comprehensive metabolic panel; Future    Cystatin C With eGFR; Future

## 2025-02-19 NOTE — ASSESSMENT & PLAN NOTE
Lab Results   Component Value Date    EGFR 45 01/16/2025    EGFR 36 08/13/2024    EGFR 23 (L) 08/13/2024    CREATININE 1.11 01/16/2025    CREATININE 1.33 (H) 08/13/2024    CREATININE 1.33 (H) 05/01/2024     Chronic Kidney Disease Stage IIIB  --Imaging: Right kidney 9.9 cm.  Left kidney 10.6 cm.  Both kidneys are diffusely echogenic  --Urinalysis: Trace protein and otherwise bland  --Proteinuria: Check microalbumin/creatinine ratio at the next visit  --Baseline Kidney Function: 1.3-1.8 mg/dL  --Etiology: Presumed be secondary to renovascular hypertension, arteriolosclerosis, hypertensive nephrosclerosis.  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, age-related nephron loss  --Biopsy Proven: No indication  --Serologies: No  --RAAS Blockade: Enalapril  --Reducing Cardiovascular Risk Factors:  Simvastatin+ Ezetimibe reduced atherosclerotic events in CKD (SHARP trial); Low dose aspirin safe (if no contraindications exist); smoking is an independent risk factor for Chronic Kidney Disease and progression, strongly recommend smoking cessation  --Sodium-Glucose Cotransporter-2 (SGLT2) Inhibitors:  May see an acute drop in the eGFR initially when starting the medication but then a stabilization of the renal function, with slower loss of renal function as compared to placebo.  Relative risk of end-stage renal disease, doubling of serum creatinine or death from renal causes were also found to be lower as compared to placebo. ([facility]).  DAPA-CKD study, showed that patients with chronic kidney disease regardless of the presence or absence of diabetes the risk of composite of a sustained decline in the estimated GFR of at least 50%, end-stage renal disease or death from renal or cardiovascular causes was significantly lower with Dapagliflozin than with placebo. EMPEROR-Reduced study also showed beneficial effects. EMPA-CKD, among wide range of patients with CKD, who were at risk for progression, empagliflozin led a lower  risk of kidney disease progression or death from cardiovascular causes than placebo.  If no contraindications exist I would recommend this medication added to the regiment. If eGFR < 60 cc/min (avoid ertugliflozin); avoid or caution with GFR < 20 mL/min, not an absolute contraindication, likely lower benefits.   --Finerenone: In patients with chronic kidney disease with type 2 diabetes, treatment led to lower risks of CKD progression and cardiovascular events than placebo. (FIDELIO-DKD)  --Status: Renal function stable with a creatinine 1.1 mg/dL.  Electrolytes are normal.  --Management/Recommendations: Agree with magnesium supplementation.  Continue RAAS blockade with ACE inhibitor.  Renal function currently stable.  Recommend home blood pressure monitoring.  Smoking cessation strongly advised.     Hypertension  -- Stage II (American College of Cardiology/American Heart Association)  -- with underlying chronic kidney disease and smoking  -- Body mass index is 20.9 kg/m².  -- Volume status: Euvolemic  -- Etiology: Primary hypertension, renal artery stenosis  -- Secondary Work-Up: Renal artery Doppler showed right renal artery stenosis  -- Target Goal: < 130/80 (ACC/AHA, CKD with proteinuria, CKD in diabetics) ; if tolerated can target SBP < 120 mmHg in high cardiovascular risk ( Age > 75, CKD, CVD, No Diabetics SPRINT)  -- Lifestyle Modifications: DASH Diet, Weight Loss for ideal body weight, 45 mins of cardiovascular exercise 3 times a week as tolerated, and if no contraindications exist, smoking cessation, limit alcohol use, avoid NSAIDS, monitor blood pressure at home  -- Status: Per her age Target blood pressure less than 150/80.  Blood pressure slightly above target today.  -- Current antihypertensive regiment: Amlodipine 10 mg daily, carvedilol 12.5 mg twice a day, enalapril 10 mg daily, furosemide 20 mg daily, spironolactone 25 mg daily  -- Changes: Continue current antihypertensive regimen.  Smoking  cessation strongly advised.  Magnesium was low so I recommend magnesium supplementation which can help with the blood pressure.       Orders:    Magnesium; Future    Albumin / creatinine urine ratio; Future    Comprehensive metabolic panel; Future    Cystatin C With eGFR; Future

## 2025-02-19 NOTE — ASSESSMENT & PLAN NOTE
-- Low purine diet    Orders:    Magnesium; Future    Albumin / creatinine urine ratio; Future    Comprehensive metabolic panel; Future    Cystatin C With eGFR; Future

## 2025-02-19 NOTE — PROGRESS NOTES
Name: Deena Wolff      : 1940      MRN: [id number]  Encounter Provider: Amaury Jeffries MD  Encounter Date: 2025   Encounter department: [facility]  :  Assessment & Plan  Renal artery stenosis (HCC)  -- Renal artery Doppler showed greater than 60% stenosis in the proximal right renal artery  -- Renal vascular index 0.85  --Renal function remained stable with a creatinine 1.1 mg/dL.  Blood pressure slightly above target usually has been controlled  -- No indication for intervention  --Smoking cessation strongly advised  --Can continue follow-up with vascular surgery  --Continue ACE inhibitor    Orders:    Magnesium; Future    Albumin / creatinine urine ratio; Future    Comprehensive metabolic panel; Future    Cystatin C With eGFR; Future    Hypertensive heart disease with chronic diastolic congestive heart failure (HCC)  Wt Readings from Last 3 Encounters:   25 53.5 kg (118 lb)   25 53 kg (116 lb 13.5 oz)   24 53.5 kg (118 lb)         Orders:    Magnesium; Future    Albumin / creatinine urine ratio; Future    Comprehensive metabolic panel; Future    Cystatin C With eGFR; Future    Benign hypertension with chronic kidney disease, stage III ([facility])  Lab Results   Component Value Date    EGFR 45 2025    EGFR 36 2024    EGFR 23 (L) 2024    CREATININE 1.11 2025    CREATININE 1.33 (H) 2024    CREATININE 1.33 (H) 2024     Chronic Kidney Disease Stage IIIB  --Imaging: Right kidney 9.9 cm.  Left kidney 10.6 cm.  Both kidneys are diffusely echogenic  --Urinalysis: Trace protein and otherwise bland  --Proteinuria: Check microalbumin/creatinine ratio at the next visit  --Baseline Kidney Function: 1.3-1.8 mg/dL  --Etiology: Presumed be secondary to renovascular hypertension, arteriolosclerosis, hypertensive nephrosclerosis.  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, age-related nephron loss  --Biopsy Proven: No  indication  --Serologies: No  --RAAS Blockade: Enalapril  --Reducing Cardiovascular Risk Factors:  Simvastatin+ Ezetimibe reduced atherosclerotic events in CKD (SHARP trial); Low dose aspirin safe (if no contraindications exist); smoking is an independent risk factor for Chronic Kidney Disease and progression, strongly recommend smoking cessation  --Sodium-Glucose Cotransporter-2 (SGLT2) Inhibitors:  May see an acute drop in the eGFR initially when starting the medication but then a stabilization of the renal function, with slower loss of renal function as compared to placebo.  Relative risk of end-stage renal disease, doubling of serum creatinine or death from renal causes were also found to be lower as compared to placebo. (CR[facility]ENCE).  DAPA-CKD study, showed that patients with chronic kidney disease regardless of the presence or absence of diabetes the risk of composite of a sustained decline in the estimated GFR of at least 50%, end-stage renal disease or death from renal or cardiovascular causes was significantly lower with Dapagliflozin than with placebo. EMPEROR-Reduced study also showed beneficial effects. EMPA-CKD, among wide range of patients with CKD, who were at risk for progression, empagliflozin led a lower risk of kidney disease progression or death from cardiovascular causes than placebo.  If no contraindications exist I would recommend this medication added to the regiment. If eGFR < 60 cc/min (avoid ertugliflozin); avoid or caution with GFR < 20 mL/min, not an absolute contraindication, likely lower benefits.   --Finerenone: In patients with chronic kidney disease with type 2 diabetes, treatment led to lower risks of CKD progression and cardiovascular events than placebo. (FIDELIO-DKD)  --Status: Renal function stable with a creatinine 1.1 mg/dL.  Electrolytes are normal.  --Management/Recommendations: Agree with magnesium supplementation.  Continue RAAS blockade with ACE inhibitor.  Renal function  currently stable.  Recommend home blood pressure monitoring.  Smoking cessation strongly advised.     Hypertension  -- Stage II (American College of Cardiology/American Heart Association)  -- with underlying chronic kidney disease and smoking  -- Body mass index is 20.9 kg/m².  -- Volume status: Euvolemic  -- Etiology: Primary hypertension, renal artery stenosis  -- Secondary Work-Up: Renal artery Doppler showed right renal artery stenosis  -- Target Goal: < 130/80 (ACC/AHA, CKD with proteinuria, CKD in diabetics) ; if tolerated can target SBP < 120 mmHg in high cardiovascular risk ( Age > 75, CKD, CVD, No Diabetics SPRINT)  -- Lifestyle Modifications: DASH Diet, Weight Loss for ideal body weight, 45 mins of cardiovascular exercise 3 times a week as tolerated, and if no contraindications exist, smoking cessation, limit alcohol use, avoid NSAIDS, monitor blood pressure at home  -- Status: Per her age Target blood pressure less than 150/80.  Blood pressure slightly above target today.  -- Current antihypertensive regiment: Amlodipine 10 mg daily, carvedilol 12.5 mg twice a day, enalapril 10 mg daily, furosemide 20 mg daily, spironolactone 25 mg daily  -- Changes: Continue current antihypertensive regimen.  Smoking cessation strongly advised.  Magnesium was low so I recommend magnesium supplementation which can help with the blood pressure.       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  Recommend a multivitamin fortified and B12 and folate.    Orders:    Magnesium; Future    Albumin / creatinine urine ratio; Future    Comprehensive metabolic panel; Future    Cystatin C With eGFR; Future    Hyperuricemia  -- Low purine diet    Orders:    Magnesium; Future    Albumin / creatinine urine ratio; Future    Comprehensive metabolic panel; Future    Cystatin C With eGFR; Future    Cigarette smoker  -- Actively smoking: Yes  -- Duration of smokin+ years  -- Spent approximately: Spent approximately 5 minutes discussing smoking cessation  -- Eager to quit smoking: No  -- Offered nicotine patch, recommend cognitive behavioral therapy   -- Smoking can affect medications used to treat high blood pressure.  Smoking increases the risk of strokes and heart attacks in people with high blood pressure.  Smoking is a well-known risk factor for chronic kidney disease.  Active smoking increases development and progression of chronic kidney disease.  It can lead to an increased risk of worsening microalbuminuria.  Can lead to increased risk of developing some forms of kidney malignancies.  Causes damage to the cardiovascular system leading to poor blood flow resulting in worsening kidney function.  It can perpetuate worsening diabetic kidney disease.  It can also lead to idiopathic nodular glomerulosclerosis.    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)  Lab Results   Component Value Date    EGFR 45 01/16/2025    EGFR 36 08/13/2024    EGFR 23 (L) 08/13/2024    CREATININE 1.11 01/16/2025    CREATININE 1.33 (H) 08/13/2024    CREATININE 1.33 (H) 05/01/2024   -- Phosphorus and calcium are normal    Orders:    Magnesium; Future    Albumin / creatinine urine ratio; Future    Comprehensive metabolic panel; Future    Cystatin C With eGFR; Future    Hypomagnesemia  -- Start magnesium oxide 400 mg daily    Orders:    Magnesium; Future    Albumin / creatinine urine ratio; Future    Comprehensive metabolic panel; Future    Cystatin C With eGFR; Future        History of Present Illness   HPI  Deena Wolff is a 84 y.o. female who presents follow-up for chronic kidney disease stage IIIb and hypertension.  Since her last visit no hospitalizations.    She unfortunately continues to smoke almost 1 pack/day.  We discussed this at length.    She has not been checking her blood pressure at home.    She has neuropathy in her lower extremities.  I had sent her to neurology the last visit.    No chest pain no shortness of breath.    We reviewed her blood work from January 16 which included CBC CMP mag and Phos.  Magnesium was low at 1.5.  Phosphorus was normal.  Renal function was stable with creatinine 1.1 mg/dL.  Sodium potassium are normal.    History obtained from: patient    Review of Systems   Constitutional:  Negative for activity change and fever.   Respiratory:  Negative for cough, chest tightness, shortness of breath and wheezing.    Cardiovascular:  Negative for chest pain and leg swelling.   Gastrointestinal:  Negative for abdominal pain, diarrhea, nausea and vomiting.   Endocrine: Negative for polyuria.   Genitourinary:  Negative for difficulty urinating, dysuria, flank pain, frequency and urgency.   Skin:  Negative for rash.   Neurological:  Negative for dizziness,  syncope, light-headedness and headaches.     Current Outpatient Medications on File Prior to Visit   Medication Sig Dispense Refill    acetaminophen (TYLENOL) 500 mg tablet Take 500 mg by mouth every 6 (six) hours as needed for mild pain      albuterol (ProAir HFA) 90 mcg/act inhaler Inhale 2 puffs every 6 (six) hours as needed for wheezing 8 g 3    amLODIPine (NORVASC) 10 mg tablet Take 1 tablet (10 mg total) by mouth every morning 90 tablet 3    atorvastatin (LIPITOR) 80 mg tablet TAKE 1 TABLET BY MOUTH DAILY AT BEDTIME 90 tablet 1    B Complex Vitamins (VITAMIN B-COMPLEX) TABS Take by mouth daily       Bioflavonoid Products (VITAMIN C PLUS PO) Take by mouth daily      bisacodyl (DULCOLAX) 5 mg EC tablet Take 10 mg by mouth every evening      carvedilol (COREG) 12.5 mg tablet TAKE ONE TABLET TWICE DAILY 180 tablet 1    Cholecalciferol (Vitamin D3) 125 MCG (5000 UT) TABS Take 5,000 Units by mouth daily      clopidogrel (PLAVIX) 75 mg tablet TAKE 1 TABLET BY MOUTH DAILY 90 tablet 1    enalapril (VASOTEC) 10 mg tablet TAKE 1 TABLET (10 MG TOTAL) BY MOUTH 2 (TWO) TIMES A  tablet 1    furosemide (LASIX) 20 mg tablet Take 1 tablet (20 mg total) by mouth daily 90 tablet 3    levothyroxine 25 mcg tablet Take 1 tablet (25 mcg total) by mouth daily 90 tablet 1    magnesium gluconate (MAGONATE) 500 MG tablet TAKE ONE TABLET BY MOUTH EVERY MORNING 90 tablet 0    spironolactone (ALDACTONE) 25 mg tablet TAKE ONE TABLET BY MOUTH DAILY 90 tablet 1    Ubiquinol 100 MG CAPS Take 1 capsule by mouth in the morning      colchicine (COLCRYS) 0.6 mg tablet  (Patient not taking: Reported on 7/5/2024)      COLLAGEN PO Take by mouth Includes a probiotic and other vitamins-10 supplements-2 am-one in the afternoon-one hs (Patient not taking: Reported on 8/16/2024)      ipratropium (ATROVENT) 0.03 % nasal spray 2 sprays into each nostril 3 (three) times a day as needed for rhinitis 30 mL 5    loratadine (CLARITIN) 10 mg tablet Take 1  "tablet (10 mg total) by mouth daily (Patient not taking: Reported on 2/19/2025) 20 tablet 0    MAGNESIUM PO Take 500 mg by mouth daily at bedtime (Patient not taking: Reported on 9/6/2024)       No current facility-administered medications on file prior to visit.         Objective   /82 (BP Location: Left arm, Patient Position: Sitting, Cuff Size: Standard)   Pulse 74   Ht 5' 3\" (1.6 m)   Wt 53.5 kg (118 lb)   SpO2 96%   BMI 20.90 kg/m²      Physical Exam  Vitals and nursing note reviewed.   Constitutional:       General: She is not in acute distress.     Appearance: She is well-developed. She is not diaphoretic.   HENT:      Head: Normocephalic and atraumatic.   Eyes:      General: No scleral icterus.     Pupils: Pupils are equal, round, and reactive to light.   Cardiovascular:      Rate and Rhythm: Normal rate and regular rhythm.      Heart sounds: Normal heart sounds. No murmur heard.     No friction rub. No gallop.   Pulmonary:      Effort: Pulmonary effort is normal. No respiratory distress.      Breath sounds: Normal breath sounds. No wheezing or rales.   Chest:      Chest wall: No tenderness.   Abdominal:      General: Bowel sounds are normal. There is no distension.      Palpations: Abdomen is soft.      Tenderness: There is no abdominal tenderness. There is no rebound.   Musculoskeletal:         General: Normal range of motion.      Cervical back: Normal range of motion and neck supple.   Skin:     Findings: No rash.   Neurological:      Mental Status: She is alert and oriented to person, place, and time.         Administrative Statements   I have spent a total time of 20 minutes in caring for this patient on the day of the visit/encounter including Counseling / Coordination of care, Documenting in the medical record, Reviewing/placing orders in the medical record (including tests, medications, and/or procedures), and Obtaining or reviewing history  .  "

## 2025-02-19 NOTE — ASSESSMENT & PLAN NOTE
Lab Results   Component Value Date    EGFR 45 01/16/2025    EGFR 36 08/13/2024    EGFR 23 (L) 08/13/2024    CREATININE 1.11 01/16/2025    CREATININE 1.33 (H) 08/13/2024    CREATININE 1.33 (H) 05/01/2024   -- Phosphorus and calcium are normal    Orders:    Magnesium; Future    Albumin / creatinine urine ratio; Future    Comprehensive metabolic panel; Future    Cystatin C With eGFR; Future

## 2025-03-06 DIAGNOSIS — I10 ESSENTIAL HYPERTENSION: Chronic | ICD-10-CM

## 2025-03-07 RX ORDER — SPIRONOLACTONE 25 MG/1
25 TABLET ORAL DAILY
Qty: 90 TABLET | Refills: 1 | Status: SHIPPED | OUTPATIENT
Start: 2025-03-07

## 2025-03-26 DIAGNOSIS — I74.09 AORTOILIAC OCCLUSIVE DISEASE (HCC): Chronic | ICD-10-CM

## 2025-03-27 RX ORDER — CLOPIDOGREL BISULFATE 75 MG/1
75 TABLET ORAL DAILY
Qty: 90 TABLET | Refills: 1 | Status: SHIPPED | OUTPATIENT
Start: 2025-03-27

## 2025-04-09 ENCOUNTER — TELEPHONE (OUTPATIENT)
Age: 85
End: 2025-04-09

## 2025-04-09 NOTE — TELEPHONE ENCOUNTER
Patient calling to confirm appmt.    Need STAR or LYFT to appmt 5/12 Dr Brunson.    Please call [phone]

## 2025-04-17 DIAGNOSIS — E03.9 HYPOTHYROIDISM, UNSPECIFIED TYPE: ICD-10-CM

## 2025-04-17 RX ORDER — LEVOTHYROXINE SODIUM 25 UG/1
25 TABLET ORAL DAILY
Qty: 90 TABLET | Refills: 1 | Status: SHIPPED | OUTPATIENT
Start: 2025-04-17

## 2025-04-23 ENCOUNTER — TELEPHONE (OUTPATIENT)
Age: 85
End: 2025-04-23

## 2025-04-23 NOTE — TELEPHONE ENCOUNTER
Patient called requesting a lyft ride to scheduled appt on 5/2/25.Please call patient with any updates.

## 2025-04-29 DIAGNOSIS — I15.0 RENOVASCULAR HYPERTENSION: ICD-10-CM

## 2025-04-30 RX ORDER — CARVEDILOL 12.5 MG/1
12.5 TABLET ORAL 2 TIMES DAILY
Qty: 180 TABLET | Refills: 0 | Status: SHIPPED | OUTPATIENT
Start: 2025-04-30

## 2025-05-02 ENCOUNTER — OFFICE VISIT (OUTPATIENT)
Dept: FAMILY MEDICINE CLINIC | Facility: CLINIC | Age: 85
End: 2025-05-02
Payer: COMMERCIAL

## 2025-05-02 VITALS
TEMPERATURE: 97.6 F | DIASTOLIC BLOOD PRESSURE: 70 MMHG | WEIGHT: 117 LBS | HEIGHT: 63 IN | SYSTOLIC BLOOD PRESSURE: 140 MMHG | HEART RATE: 76 BPM | RESPIRATION RATE: 18 BRPM | OXYGEN SATURATION: 95 % | BODY MASS INDEX: 20.73 KG/M2

## 2025-05-02 DIAGNOSIS — J31.0 NONALLERGIC RHINITIS: ICD-10-CM

## 2025-05-02 DIAGNOSIS — I50.32 CHRONIC DIASTOLIC HEART FAILURE (HCC): Chronic | ICD-10-CM

## 2025-05-02 DIAGNOSIS — E03.9 HYPOTHYROIDISM, UNSPECIFIED TYPE: Primary | Chronic | ICD-10-CM

## 2025-05-02 DIAGNOSIS — J41.0 SIMPLE CHRONIC BRONCHITIS (HCC): ICD-10-CM

## 2025-05-02 DIAGNOSIS — I12.9 BENIGN HYPERTENSION WITH CHRONIC KIDNEY DISEASE, STAGE III (HCC): ICD-10-CM

## 2025-05-02 DIAGNOSIS — Z99.89 USES WALKER: ICD-10-CM

## 2025-05-02 DIAGNOSIS — N18.30 BENIGN HYPERTENSION WITH CHRONIC KIDNEY DISEASE, STAGE III (HCC): ICD-10-CM

## 2025-05-02 PROBLEM — D62 ACUTE BLOOD LOSS ANEMIA: Status: RESOLVED | Noted: 2019-12-11 | Resolved: 2025-05-02

## 2025-05-02 PROBLEM — L03.039 PARONYCHIA OF TOENAIL: Status: RESOLVED | Noted: 2018-07-12 | Resolved: 2025-05-02

## 2025-05-02 PROCEDURE — 99214 OFFICE O/P EST MOD 30 MIN: CPT | Performed by: FAMILY MEDICINE

## 2025-05-02 NOTE — ASSESSMENT & PLAN NOTE
Lab Results   Component Value Date    EGFR 45 01/16/2025    EGFR 36 08/13/2024    EGFR 23 (L) 08/13/2024    CREATININE 1.11 01/16/2025    CREATININE 1.33 (H) 08/13/2024    CREATININE 1.33 (H) 05/01/2024   Stable  Managed by Dr. Jeffries     Orders:  •  Lipid Panel with Direct LDL reflex; Future  •  CBC; Future

## 2025-05-02 NOTE — ASSESSMENT & PLAN NOTE
Wt Readings from Last 3 Encounters:   05/02/25 53.1 kg (117 lb)   02/19/25 53.5 kg (118 lb)   01/16/25 53 kg (116 lb 13.5 oz)     Stable  Discussed diastolic heart failure with patient  Will continue follow-up with cardiologist

## 2025-05-02 NOTE — PROGRESS NOTES
"Name: Deena Wolff      : 1940      MRN: [id number]  Encounter Provider: Neha Willett DO  Encounter Date: 2025   Encounter department: [facility]  :  Assessment & Plan  Simple chronic bronchitis (HCC)  Stable        Hypothyroidism, unspecified type  Due for labs   Continue levothyroxine 25 mcg daily  Orders:  •  TSH, 3rd generation; Future  •  T4, free; Future    Chronic diastolic heart failure (HCC)  Wt Readings from Last 3 Encounters:   25 53.1 kg (117 lb)   25 53.5 kg (118 lb)   25 53 kg (116 lb 13.5 oz)     Stable  Discussed diastolic heart failure with patient  Will continue follow-up with cardiologist               Uses walker  Handicap placard form completed        Benign hypertension with chronic kidney disease, stage III (HCC)  Lab Results   Component Value Date    EGFR 45 2025    EGFR 36 2024    EGFR 23 (L) 2024    CREATININE 1.11 2025    CREATININE 1.33 (H) 2024    CREATININE 1.33 (H) 2024   Stable  Managed by Dr. Jeffries     Orders:  •  Lipid Panel with Direct LDL reflex; Future  •  CBC; Future    Nonallergic rhinitis  Worsening   Would like to go back to ENT   Orders:  •  Ambulatory Referral to Otolaryngology; Future      Assessment & Plan      Return in about 6 months (around 2025) for Next scheduled follow up.     History of Present Illness   She is here for follow up today.  She has a couple questions.  Her nasal congestion postnasal drip is worsening again.  She is been taking her nasal spray and it is not controlling her symptoms.  She saw an ENT 4 years ago and would like to go back for reevaluation.    She would also like a handicap placard form completed.  She uses a walker      Review of Systems    Objective   /70   Pulse 76   Temp 97.6 °F (36.4 °C)   Resp 18   Ht 5' 3\" (1.6 m)   Wt 53.1 kg (117 lb)   SpO2 95%   BMI 20.73 kg/m²      Physical Exam  Vitals and nursing note reviewed.   Constitutional:  "      General: She is not in acute distress.     Appearance: She is well-developed.   HENT:      Head: Normocephalic and atraumatic.      Right Ear: Tympanic membrane normal.      Left Ear: Tympanic membrane normal.   Cardiovascular:      Rate and Rhythm: Normal rate and regular rhythm.      Heart sounds: No murmur heard.  Pulmonary:      Effort: Pulmonary effort is normal. No respiratory distress.      Breath sounds: Normal breath sounds.   Musculoskeletal:      Cervical back: Neck supple.      Right lower leg: Edema present.      Left lower leg: Edema present.   Neurological:      Mental Status: She is alert.      Gait: Gait abnormal (using walker).

## 2025-05-02 NOTE — ASSESSMENT & PLAN NOTE
Due for labs   Continue levothyroxine 25 mcg daily  Orders:  •  TSH, 3rd generation; Future  •  T4, free; Future

## 2025-05-05 ENCOUNTER — TELEPHONE (OUTPATIENT)
Dept: HEMATOLOGY ONCOLOGY | Facility: MEDICAL CENTER | Age: 85
End: 2025-05-05

## 2025-05-06 ENCOUNTER — TELEPHONE (OUTPATIENT)
Age: 85
End: 2025-05-06

## 2025-05-06 ENCOUNTER — APPOINTMENT (OUTPATIENT)
Dept: LAB | Facility: CLINIC | Age: 85
End: 2025-05-06
Payer: COMMERCIAL

## 2025-05-06 DIAGNOSIS — I50.32 HYPERTENSIVE HEART DISEASE WITH CHRONIC DIASTOLIC CONGESTIVE HEART FAILURE (HCC): ICD-10-CM

## 2025-05-06 DIAGNOSIS — E03.9 HYPOTHYROIDISM, UNSPECIFIED TYPE: Chronic | ICD-10-CM

## 2025-05-06 DIAGNOSIS — N18.32 ANEMIA IN STAGE 3B CHRONIC KIDNEY DISEASE  (HCC): Chronic | ICD-10-CM

## 2025-05-06 DIAGNOSIS — I74.09 AORTOILIAC OCCLUSIVE DISEASE (HCC): ICD-10-CM

## 2025-05-06 DIAGNOSIS — N18.30 ANEMIA IN STAGE 3 CHRONIC KIDNEY DISEASE, UNSPECIFIED WHETHER STAGE 3A OR 3B CKD  (HCC): ICD-10-CM

## 2025-05-06 DIAGNOSIS — D63.1 ANEMIA IN STAGE 3 CHRONIC KIDNEY DISEASE, UNSPECIFIED WHETHER STAGE 3A OR 3B CKD  (HCC): ICD-10-CM

## 2025-05-06 DIAGNOSIS — E79.0 HYPERURICEMIA: ICD-10-CM

## 2025-05-06 DIAGNOSIS — I11.0 HYPERTENSIVE HEART DISEASE WITH CHRONIC DIASTOLIC CONGESTIVE HEART FAILURE (HCC): ICD-10-CM

## 2025-05-06 DIAGNOSIS — D47.2 MGUS (MONOCLONAL GAMMOPATHY OF UNKNOWN SIGNIFICANCE): ICD-10-CM

## 2025-05-06 DIAGNOSIS — I70.1 RENAL ARTERY STENOSIS (HCC): Chronic | ICD-10-CM

## 2025-05-06 DIAGNOSIS — N18.32 STAGE 3B CHRONIC KIDNEY DISEASE (HCC): ICD-10-CM

## 2025-05-06 DIAGNOSIS — I12.9 BENIGN HYPERTENSION WITH CHRONIC KIDNEY DISEASE, STAGE III (HCC): ICD-10-CM

## 2025-05-06 DIAGNOSIS — N18.30 BENIGN HYPERTENSION WITH CHRONIC KIDNEY DISEASE, STAGE III (HCC): ICD-10-CM

## 2025-05-06 DIAGNOSIS — D63.1 ANEMIA IN STAGE 3B CHRONIC KIDNEY DISEASE  (HCC): Chronic | ICD-10-CM

## 2025-05-06 LAB
ALBUMIN SERPL BCG-MCNC: 4.5 G/DL (ref 3.5–5)
ALP SERPL-CCNC: 57 U/L (ref 34–104)
ALT SERPL W P-5'-P-CCNC: 17 U/L (ref 7–52)
ANION GAP SERPL CALCULATED.3IONS-SCNC: 10 MMOL/L (ref 4–13)
AST SERPL W P-5'-P-CCNC: 32 U/L (ref 13–39)
BASOPHILS # BLD AUTO: 0.06 THOUSANDS/ÂΜL (ref 0–0.1)
BASOPHILS NFR BLD AUTO: 1 % (ref 0–1)
BILIRUB SERPL-MCNC: 0.64 MG/DL (ref 0.2–1)
BUN SERPL-MCNC: 32 MG/DL (ref 5–25)
CA-I BLD-SCNC: 1.23 MMOL/L (ref 1.12–1.32)
CALCIUM SERPL-MCNC: 10.1 MG/DL (ref 8.4–10.2)
CHLORIDE SERPL-SCNC: 105 MMOL/L (ref 96–108)
CHOLEST SERPL-MCNC: 129 MG/DL (ref ?–200)
CO2 SERPL-SCNC: 23 MMOL/L (ref 21–32)
CREAT SERPL-MCNC: 1.09 MG/DL (ref 0.6–1.3)
EOSINOPHIL # BLD AUTO: 0.22 THOUSAND/ÂΜL (ref 0–0.61)
EOSINOPHIL NFR BLD AUTO: 4 % (ref 0–6)
ERYTHROCYTE [DISTWIDTH] IN BLOOD BY AUTOMATED COUNT: 15.9 % (ref 11.6–15.1)
GFR SERPL CREATININE-BSD FRML MDRD: 46 ML/MIN/1.73SQ M
GLUCOSE P FAST SERPL-MCNC: 96 MG/DL (ref 65–99)
HCT VFR BLD AUTO: 38.6 % (ref 34.8–46.1)
HDLC SERPL-MCNC: 77 MG/DL
HGB BLD-MCNC: 13.1 G/DL (ref 11.5–15.4)
IGA SERPL-MCNC: 263 MG/DL (ref 66–433)
IGG SERPL-MCNC: 1422 MG/DL (ref 635–1741)
IGM SERPL-MCNC: 97 MG/DL (ref 45–281)
IMM GRANULOCYTES # BLD AUTO: 0.03 THOUSAND/UL (ref 0–0.2)
IMM GRANULOCYTES NFR BLD AUTO: 1 % (ref 0–2)
LDLC SERPL CALC-MCNC: 39 MG/DL (ref 0–100)
LYMPHOCYTES # BLD AUTO: 1.45 THOUSANDS/ÂΜL (ref 0.6–4.47)
LYMPHOCYTES NFR BLD AUTO: 25 % (ref 14–44)
MCH RBC QN AUTO: 34.7 PG (ref 26.8–34.3)
MCHC RBC AUTO-ENTMCNC: 33.9 G/DL (ref 31.4–37.4)
MCV RBC AUTO: 102 FL (ref 82–98)
MONOCYTES # BLD AUTO: 0.81 THOUSAND/ÂΜL (ref 0.17–1.22)
MONOCYTES NFR BLD AUTO: 14 % (ref 4–12)
NEUTROPHILS # BLD AUTO: 3.24 THOUSANDS/ÂΜL (ref 1.85–7.62)
NEUTS SEG NFR BLD AUTO: 55 % (ref 43–75)
NRBC BLD AUTO-RTO: 0 /100 WBCS
PHOSPHATE SERPL-MCNC: 3.6 MG/DL (ref 2.3–4.1)
PLATELET # BLD AUTO: 222 THOUSANDS/UL (ref 149–390)
PMV BLD AUTO: 11.4 FL (ref 8.9–12.7)
POTASSIUM SERPL-SCNC: 4.9 MMOL/L (ref 3.5–5.3)
PROT SERPL-MCNC: 7.7 G/DL (ref 6.4–8.4)
RBC # BLD AUTO: 3.78 MILLION/UL (ref 3.81–5.12)
SODIUM SERPL-SCNC: 138 MMOL/L (ref 135–147)
T4 FREE SERPL-MCNC: 0.87 NG/DL (ref 0.61–1.12)
TRIGL SERPL-MCNC: 64 MG/DL (ref ?–150)
TSH SERPL DL<=0.05 MIU/L-ACNC: 2.41 UIU/ML (ref 0.45–4.5)
URATE SERPL-MCNC: 8.3 MG/DL (ref 2–7.5)
WBC # BLD AUTO: 5.81 THOUSAND/UL (ref 4.31–10.16)

## 2025-05-06 PROCEDURE — 83521 IG LIGHT CHAINS FREE EACH: CPT

## 2025-05-06 PROCEDURE — 80061 LIPID PANEL: CPT

## 2025-05-06 PROCEDURE — 82232 ASSAY OF BETA-2 PROTEIN: CPT

## 2025-05-06 PROCEDURE — 80053 COMPREHEN METABOLIC PANEL: CPT

## 2025-05-06 PROCEDURE — 84550 ASSAY OF BLOOD/URIC ACID: CPT

## 2025-05-06 PROCEDURE — 36415 COLL VENOUS BLD VENIPUNCTURE: CPT

## 2025-05-06 PROCEDURE — 84165 PROTEIN E-PHORESIS SERUM: CPT

## 2025-05-06 PROCEDURE — 82330 ASSAY OF CALCIUM: CPT

## 2025-05-06 PROCEDURE — 84443 ASSAY THYROID STIM HORMONE: CPT

## 2025-05-06 PROCEDURE — 82784 ASSAY IGA/IGD/IGG/IGM EACH: CPT

## 2025-05-06 PROCEDURE — 84439 ASSAY OF FREE THYROXINE: CPT

## 2025-05-06 PROCEDURE — 84100 ASSAY OF PHOSPHORUS: CPT

## 2025-05-06 PROCEDURE — 86334 IMMUNOFIX E-PHORESIS SERUM: CPT

## 2025-05-06 PROCEDURE — 85025 COMPLETE CBC W/AUTO DIFF WBC: CPT

## 2025-05-06 NOTE — TELEPHONE ENCOUNTER
Received call from pt.  She is requesting Lyft transportation for her visit 6/4 with Dr. Kapoor.    operating room

## 2025-05-07 ENCOUNTER — TELEPHONE (OUTPATIENT)
Age: 85
End: 2025-05-07

## 2025-05-07 ENCOUNTER — RESULTS FOLLOW-UP (OUTPATIENT)
Dept: FAMILY MEDICINE CLINIC | Facility: CLINIC | Age: 85
End: 2025-05-07

## 2025-05-07 LAB
ALBUMIN SERPL ELPH-MCNC: 4.23 G/DL (ref 3.2–5.1)
ALBUMIN SERPL ELPH-MCNC: 58 % (ref 48–70)
ALPHA1 GLOB SERPL ELPH-MCNC: 0.31 G/DL (ref 0.15–0.47)
ALPHA1 GLOB SERPL ELPH-MCNC: 4.3 % (ref 1.8–7)
ALPHA2 GLOB SERPL ELPH-MCNC: 0.61 G/DL (ref 0.42–1.04)
ALPHA2 GLOB SERPL ELPH-MCNC: 8.3 % (ref 5.9–14.9)
BETA GLOB ABNORMAL SERPL ELPH-MCNC: 0.45 G/DL (ref 0.31–0.57)
BETA1 GLOB SERPL ELPH-MCNC: 6.1 % (ref 4.7–7.7)
BETA2 GLOB SERPL ELPH-MCNC: 5.2 % (ref 3.1–7.9)
BETA2+GAMMA GLOB SERPL ELPH-MCNC: 0.38 G/DL (ref 0.2–0.58)
GAMMA GLOB ABNORMAL SERPL ELPH-MCNC: 1.32 G/DL (ref 0.4–1.66)
GAMMA GLOB SERPL ELPH-MCNC: 18.1 % (ref 6.9–22.3)
IGG/ALB SER: 1.38 {RATIO} (ref 1.1–1.8)
M PROTEIN 1 SERPL ELPH-MCNC: 0.31 G/DL
PROT SERPL-MCNC: 7.3 G/DL (ref 6.4–8.4)

## 2025-05-07 PROCEDURE — 84165 PROTEIN E-PHORESIS SERUM: CPT | Performed by: STUDENT IN AN ORGANIZED HEALTH CARE EDUCATION/TRAINING PROGRAM

## 2025-05-07 PROCEDURE — 86334 IMMUNOFIX E-PHORESIS SERUM: CPT | Performed by: STUDENT IN AN ORGANIZED HEALTH CARE EDUCATION/TRAINING PROGRAM

## 2025-05-07 NOTE — TELEPHONE ENCOUNTER
Called and spoke with patient regarding request for lyft 5/22, asked that patient signs wavier when she go to her hematology appt 5/12. I also contacted Hematology to let them know patient needs to sign wavier. I also informed patient of the [employer] policy of 5 complimentary rides for [employer] a year. I instructed patient can contact PCP or call her insurance company to see what her options are ahead of time.    Lyft ride not scheduled yet.

## 2025-05-07 NOTE — TELEPHONE ENCOUNTER
Patient needs Lyft to her testing on 5/22/25      I have confirmed her address:  [address] [place], [place] [address]  [facility] [id number]

## 2025-05-08 ENCOUNTER — APPOINTMENT (OUTPATIENT)
Dept: LAB | Facility: CLINIC | Age: 85
End: 2025-05-08
Attending: INTERNAL MEDICINE
Payer: COMMERCIAL

## 2025-05-08 DIAGNOSIS — D47.2 MGUS (MONOCLONAL GAMMOPATHY OF UNKNOWN SIGNIFICANCE): ICD-10-CM

## 2025-05-08 DIAGNOSIS — N18.32 STAGE 3B CHRONIC KIDNEY DISEASE (HCC): ICD-10-CM

## 2025-05-08 DIAGNOSIS — I12.9 BENIGN HYPERTENSION WITH CHRONIC KIDNEY DISEASE, STAGE III (HCC): ICD-10-CM

## 2025-05-08 DIAGNOSIS — N18.30 BENIGN HYPERTENSION WITH CHRONIC KIDNEY DISEASE, STAGE III (HCC): ICD-10-CM

## 2025-05-08 DIAGNOSIS — I11.0 HYPERTENSIVE HEART DISEASE WITH CHRONIC DIASTOLIC CONGESTIVE HEART FAILURE (HCC): ICD-10-CM

## 2025-05-08 DIAGNOSIS — I50.32 HYPERTENSIVE HEART DISEASE WITH CHRONIC DIASTOLIC CONGESTIVE HEART FAILURE (HCC): ICD-10-CM

## 2025-05-08 DIAGNOSIS — D63.1 ANEMIA IN STAGE 3B CHRONIC KIDNEY DISEASE  (HCC): Chronic | ICD-10-CM

## 2025-05-08 DIAGNOSIS — N18.32 ANEMIA IN STAGE 3B CHRONIC KIDNEY DISEASE  (HCC): Chronic | ICD-10-CM

## 2025-05-08 DIAGNOSIS — E79.0 HYPERURICEMIA: ICD-10-CM

## 2025-05-08 DIAGNOSIS — I70.1 RENAL ARTERY STENOSIS (HCC): Chronic | ICD-10-CM

## 2025-05-08 LAB
CREAT UR-MCNC: 66.5 MG/DL
KAPPA LC FREE SER-MCNC: 37.4 MG/L (ref 3.3–19.4)
KAPPA LC FREE/LAMBDA FREE SER: 1.41 {RATIO} (ref 0.26–1.65)
LAMBDA LC FREE SERPL-MCNC: 26.5 MG/L (ref 5.7–26.3)
MICROALBUMIN UR-MCNC: 219.4 MG/L
MICROALBUMIN/CREAT 24H UR: 330 MG/G CREATININE (ref 0–30)

## 2025-05-09 LAB — B2 MICROGLOB SERPL-MCNC: 3.4 MG/L (ref 0.6–2.4)

## 2025-05-12 ENCOUNTER — TELEMEDICINE (OUTPATIENT)
Dept: HEMATOLOGY ONCOLOGY | Facility: MEDICAL CENTER | Age: 85
End: 2025-05-12
Payer: COMMERCIAL

## 2025-05-12 DIAGNOSIS — D47.2 MGUS (MONOCLONAL GAMMOPATHY OF UNKNOWN SIGNIFICANCE): ICD-10-CM

## 2025-05-12 DIAGNOSIS — D63.1 ANEMIA IN STAGE 3 CHRONIC KIDNEY DISEASE, UNSPECIFIED WHETHER STAGE 3A OR 3B CKD  (HCC): Primary | Chronic | ICD-10-CM

## 2025-05-12 DIAGNOSIS — N18.30 ANEMIA IN STAGE 3 CHRONIC KIDNEY DISEASE, UNSPECIFIED WHETHER STAGE 3A OR 3B CKD  (HCC): Primary | Chronic | ICD-10-CM

## 2025-05-12 PROCEDURE — 99213 OFFICE O/P EST LOW 20 MIN: CPT | Performed by: INTERNAL MEDICINE

## 2025-05-12 NOTE — PROGRESS NOTES
Virtual Brief Visit  Name: Deena Wolff      : 1940      MRN: [id number]  Encounter Provider: Sav Brunson MD  Encounter Date: 2025   Encounter department: [facility]  :  Assessment & Plan    84-year-old female with history of MGUS.  Presently Mrs. Wolff states feeling +/-, recent issues with back pain and leg pain.  Patient was seen via televisit/telephone today (patient does not have any computer capacity, limited phone abilities).  Laboratory tests are listed below.  There is no evidence of any progressing MGUS.  Additionally the CBC parameters are okay/acceptable; an TERRY is obviously not indicated.  The plan is to continue with surveillance.  Etiology for the back pain and leg pain not clear, patient will follow-up with her PCP.    Patient agrees to return to the office in 6 months with repeat blood work before.  Patient knows to call if she has any other hematology questions or concerns.    Carefully review your medication list and verify that the list is accurate and up-to-date. Please call the hematology/oncology office if there are medications missing from the list, medications on the list that you are not currently taking or if there is a dosage or instruction that is different from how you're taking that medication.     Patient goals and areas of care: Continue with MGUS surveillance  Barriers to care: None  Patient is able to self-care  __________________________________________________________________________________  History of Present Illness   HPI: 84-year-old female previously referred for anemia.  Patient underwent anemia workup, eventually started Epogen.  Patient had good response, Epogen was held.  Since that time the plan has been surveillance.  Patient also with a history of MGUS also on surveillance.    Mrs. Wolff was supposed to be seen in the office today.  Patient requested a phone office visit follow-up.  Patient apparently  recently with significant back pain and leg pain, etiology unclear.  No recent infections, no recent hospitalizations.  Appetite is okay, weight is stable.  Activities are limited, patient walks with a walker.  Patient states that she lives alone, having trouble getting to doctors appointments.    Laboratory    5/6/2025 WBC = 5.81 hemoglobin = 13.1 hematocrit = 38.6 MCV = 102 platelet = 222 neutrophil = 55% immature granulocytes = 1% lymphocyte = 25% monocyte = 14% eosinophil = 4%    5/22/2025 SPEP demonstrated a monoclonal peak in the gamma region, previous immunofixation identified IgG kappa.    5/6/2025 beta-2 microglobulin = 3.4 Kappa = 37.4 lambda = 26.5 Kappa/lambda ratio = 1.41    5/6/2025 IgA = 263 IgG = 1422 IgM = 97    Administrative Statements   Encounter provider Sav Brunson MD    The Patient is located at Home and in the following state in which I hold an active license [place].    The patient was identified by name and date of birth. Deena Wolff was informed that this is a telemedicine visit and that the visit is being conducted through the Epic Embedded platform. She agrees to proceed..  My office door was closed. No one else was in the room.  She acknowledged consent and understanding of privacy and security of the video platform. The patient has agreed to participate and understands they can discontinue the visit at any time.    I have spent a total time of 15 minutes in caring for this patient on the day of the visit/encounter including Diagnostic results, not including the time spent for establishing the audio/video connection.

## 2025-05-12 NOTE — TELEPHONE ENCOUNTER
Called and spoke with patient regarding setting up lyft ride for her upcoming appointment scheduled for 5/22 @ [facility]. Per patient she might have a ride and will let us know if she needs one scheduled closer to the appointment date.

## 2025-05-15 DIAGNOSIS — E83.42 HYPOMAGNESEMIA: ICD-10-CM

## 2025-05-19 DIAGNOSIS — I10 MALIGNANT HYPERTENSION: ICD-10-CM

## 2025-05-19 RX ORDER — MAGNESIUM GLUCONATE 27 MG(500)
TABLET ORAL
Qty: 90 TABLET | Refills: 1 | Status: SHIPPED | OUTPATIENT
Start: 2025-05-19

## 2025-05-19 NOTE — TELEPHONE ENCOUNTER
Patient called to check status of Magnesium refill request. States needs this medication sent to [facility]. Would like a call when sent

## 2025-05-20 RX ORDER — AMLODIPINE BESYLATE 10 MG/1
10 TABLET ORAL EVERY MORNING
Qty: 90 TABLET | Refills: 1 | Status: SHIPPED | OUTPATIENT
Start: 2025-05-20

## 2025-06-03 ENCOUNTER — TELEPHONE (OUTPATIENT)
Age: 85
End: 2025-06-03

## 2025-06-03 DIAGNOSIS — I10 ESSENTIAL HYPERTENSION: Chronic | ICD-10-CM

## 2025-06-03 DIAGNOSIS — E78.2 MIXED HYPERLIPIDEMIA: ICD-10-CM

## 2025-06-03 NOTE — TELEPHONE ENCOUNTER
Received call from pt.  She called to confirm appt time for tomorrow and  time.    She also asked if we could arrange lyft for a podiatry she had. Advised pt she would need to contact her podiatry office to have them schedule a ride.

## 2025-06-04 ENCOUNTER — OFFICE VISIT (OUTPATIENT)
Dept: CARDIOLOGY CLINIC | Facility: CLINIC | Age: 85
End: 2025-06-04
Payer: COMMERCIAL

## 2025-06-04 VITALS
OXYGEN SATURATION: 94 % | SYSTOLIC BLOOD PRESSURE: 112 MMHG | HEIGHT: 63 IN | DIASTOLIC BLOOD PRESSURE: 60 MMHG | HEART RATE: 72 BPM | BODY MASS INDEX: 20.91 KG/M2 | WEIGHT: 118 LBS

## 2025-06-04 DIAGNOSIS — E78.2 MIXED HYPERLIPIDEMIA: Primary | ICD-10-CM

## 2025-06-04 DIAGNOSIS — I50.32 HYPERTENSIVE HEART DISEASE WITH CHRONIC DIASTOLIC CONGESTIVE HEART FAILURE (HCC): ICD-10-CM

## 2025-06-04 DIAGNOSIS — I11.0 HYPERTENSIVE HEART DISEASE WITH CHRONIC DIASTOLIC CONGESTIVE HEART FAILURE (HCC): ICD-10-CM

## 2025-06-04 DIAGNOSIS — I50.32 CHRONIC DIASTOLIC HEART FAILURE (HCC): ICD-10-CM

## 2025-06-04 PROCEDURE — 93000 ELECTROCARDIOGRAM COMPLETE: CPT | Performed by: INTERNAL MEDICINE

## 2025-06-04 PROCEDURE — 99214 OFFICE O/P EST MOD 30 MIN: CPT | Performed by: INTERNAL MEDICINE

## 2025-06-04 RX ORDER — ATORVASTATIN CALCIUM 80 MG/1
80 TABLET, FILM COATED ORAL
Qty: 90 TABLET | Refills: 1 | Status: SHIPPED | OUTPATIENT
Start: 2025-06-04

## 2025-06-04 RX ORDER — ENALAPRIL MALEATE 10 MG/1
10 TABLET ORAL 2 TIMES DAILY
Qty: 180 TABLET | Refills: 1 | Status: SHIPPED | OUTPATIENT
Start: 2025-06-04

## 2025-06-04 NOTE — PROGRESS NOTES
Cardiology   Sid Kapoor DO, [facility]  Lawrence Glover MD, [facility]  Krishna Shahid MD, [facility]  Lissett Yi MD, [facility]  -------------------------------------------------------------------  [facility]  [address], [address], [address]  [place], [place], [address]  [phone]    Cardiology Follow Up  Deena Wolff  1940  [id number]          Assessment/Plan:    1. Mixed hyperlipidemia    2. Hypertensive heart disease with chronic diastolic congestive heart failure (HCC)    3. Chronic diastolic heart failure (HCC)      -Discussed importance of smoking cessation.  She does not plan on quitting at this time.  -Continue current medication regimen.    - Continue furosemide 20 mg daily.    - continue Plavix.  Aspirin was stopped because of large amount of bruising.       - She will follow up regularly with vascular surgery and podiatry.    - Continue atorvastatin - Last LDL at goal.      Interval History:     Deena Wolff is 84 y.o. female here for followup of hypertension and CHF.  Since her last visit, she has noticed worsening pain in left leg/foot.  She is following with vascular surgery and podiatry.  She gets LE edema in the summer..  Denies any chest pain.  She has chronic exertional dyspnea which has been unchanged.      She had carotid endarterectomy in November 2023.  There were no complications with procedure.   She is limited due to arthritis and vascular disease.  She continues to smoke nearly a pack per day.      Her current medication regimen includes carvedilol 12.5 mg twice daily, enalapril 10 mg daily, amlodipine 10 mg daily and spironolactone 25 mg daily.  She overall reports good adherence with medications.       She has a history of peripheral vascular disease including celiac and SMA stenosis.  She does not have any pain with eating or exertion.  Previously, She underwent right carotid endarterectomy along with aortobifemoral bypass in December  "2019.  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          Past Medical History:   Diagnosis Date    Anemia     sees  Hematologist Dr Brunson appt 11/1/23    Anxiety     Arthritis     Asthma     CAD (coronary artery disease)     Cardiac murmur     sees Dr. Kapoor    Carotid stenosis, bilateral     CHF (congestive heart failure) (HCC)     Chronic kidney disease     renal artery stenosis    Chronic pain disorder     back    Chronic sinusitis     treated with antibiotics 3/22    Colitis     COPD (chronic obstructive pulmonary disease) (HCC)     Coronary artery disease     Cough     Current every day smoker     encouraged to cut back    Dental crowns present     Depression     Disease of thyroid gland     Edema of leg     compression stocking left leg    History of dizziness     History of hyperkalemia     \"couple years ago\"    History of transfusion     Hyperlipidemia     Hypertension     Left leg pain     and foot-to call surgeon office 11/2/23 also right leg pain    Muscle weakness     sometimes of legs    Other disorder of circulatory system (CODE)     Presence of dental bridge     Renal artery stenosis (HCC)     Right    Risk for falls     Subclavian arterial stenosis (HCC)     B/L    Type 2 diabetes mellitus without complication, unspecified whether long term insulin use ([facility]) 09/20/2022 11/2/23-pt not aware of being diabetic    Walker as ambulation aid     \"sometimes\"    Wears glasses      Social History     Socioeconomic History    Marital status:      Spouse name: Not on file    Number of children: Not on file    Years of education: Not on file    Highest education level: Not on file   Occupational History    Not on file   Tobacco Use    Smoking status: Every Day     Current packs/day: 1.00     Average packs/day: 1 pack/day for 65.4 years (65.4 ttl pk-yrs)     Types: Cigarettes     Start date: 1960     Passive exposure: Current    Smokeless tobacco: Never    Tobacco comments:     " Encouraged cutting back ahead of surgery---has quit in the past   Vaping Use    Vaping status: Never Used   Substance and Sexual Activity    Alcohol use: Yes     Comment: wilfrid 2-3 every day for 50 years    Drug use: No    Sexual activity: Not on file     Comment: defer   Other Topics Concern    Not on file   Social History Narrative    Rarely exercises    Sleeps 6-7 hours per day     Social Drivers of Health     Financial Resource Strain: Low Risk  (5/9/2023)    Overall Financial Resource Strain (CARDIA)     Difficulty of Paying Living Expenses: Not hard at all   Food Insecurity: Not on file   Transportation Needs: No Transportation Needs (5/9/2023)    PRAPARE - Transportation     Lack of Transportation (Medical): No     Lack of Transportation (Non-Medical): No   Physical Activity: Not on file   Stress: Not on file   Social Connections: Not on file   Intimate Partner Violence: Not on file   Housing Stability: Not on file      Family History   Problem Relation Name Age of Onset    Hypertension Father      Heart disease Father          CHF    Coronary artery disease Family      Arthritis Family      Abdominal aortic aneurysm Mother      Hypertension Mother      Skin cancer Daughter      Cancer Paternal Aunt      No Known Problems Maternal Aunt      No Known Problems Maternal Aunt      No Known Problems Maternal Aunt       Past Surgical History:   Procedure Laterality Date    BREAST SURGERY      bxs,benign    COLONOSCOPY      HYSTERECTOMY  1983    43    INCISIONAL BREAST BIOPSY      x5, 1964, 1965, 1985 and 1990    [place] BYP OTH/THN VEIN AORTOBIFEMORAL Bilateral 12/09/2019    Procedure: BYPASS AORTA BIFEMORAL WITH LEFT FEMORAL THROMBOEMBOLECTOMY;  Surgeon: Yobany Mendoza MD;  [facility];  Service: Vascular    MA TEAEC W/PATCH GRF CAROTID VERTB SUBCLAV NECK INC Right 10/01/2021    Procedure: ENDARTERECTOMY ARTERY CAROTIDWITH BOVINE PATCH, INTROP DUPLEX;  Surgeon: Curt Oseguera MD;  [facility];   Service: Vascular    [place] TEAEC W/PATCH GRF CAROTID VERTB SUBCLAV NECK INC Left 11/16/2023    Procedure: LEFT CAROTID ENDARTERECTOMY WITH BOVINE PATCH, INTRA-OP DUPLEX;  Surgeon: Curt Oseguera MD;  Location: [facility];  Service: Vascular    WISDOM TOOTH EXTRACTION         Current Outpatient Medications:     acetaminophen (TYLENOL) 500 mg tablet, Take 500 mg by mouth every 6 (six) hours as needed for mild pain, Disp: , Rfl:     albuterol (ProAir HFA) 90 mcg/act inhaler, Inhale 2 puffs every 6 (six) hours as needed for wheezing, Disp: 8 g, Rfl: 3    amLODIPine (NORVASC) 10 mg tablet, TAKE 1 TABLET BY MOUTH EVERY MORNING, Disp: 90 tablet, Rfl: 1    atorvastatin (LIPITOR) 80 mg tablet, TAKE 1 TABLET BY MOUTH DAILY AT BEDTIME, Disp: 90 tablet, Rfl: 1    B Complex Vitamins (VITAMIN B-COMPLEX) TABS, Take by mouth in the morning., Disp: , Rfl:     Bioflavonoid Products (VITAMIN C PLUS PO), Take by mouth in the morning., Disp: , Rfl:     bisacodyl (DULCOLAX) 5 mg EC tablet, Take 10 mg by mouth every evening, Disp: , Rfl:     carvedilol (COREG) 12.5 mg tablet, TAKE ONE TABLET TWICE DAILY, Disp: 180 tablet, Rfl: 0    Cholecalciferol (Vitamin D3) 125 MCG (5000 UT) TABS, Take 5,000 Units by mouth in the morning., Disp: , Rfl:     clopidogrel (PLAVIX) 75 mg tablet, TAKE 1 TABLET BY MOUTH DAILY, Disp: 90 tablet, Rfl: 1    enalapril (VASOTEC) 10 mg tablet, TAKE 1 TABLET (10 MG TOTAL) BY MOUTH 2 (TWO) TIMES A DAY, Disp: 180 tablet, Rfl: 1    ipratropium (ATROVENT) 0.03 % nasal spray, 2 sprays into each nostril 3 (three) times a day as needed for rhinitis, Disp: 30 mL, Rfl: 5    levothyroxine 25 mcg tablet, TAKE 1 TABLET BY MOUTH DAILY, Disp: 90 tablet, Rfl: 1    magnesium gluconate (MAGONATE) 500 MG tablet, Take 1 tablet by mouth every morning, Disp: 90 tablet, Rfl: 1    spironolactone (ALDACTONE) 25 mg tablet, TAKE ONE TABLET BY MOUTH DAILY, Disp: 90 tablet, Rfl: 1    Ubiquinol 100 MG CAPS, Take 1 capsule by mouth in the  "morning, Disp: , Rfl:     colchicine (COLCRYS) 0.6 mg tablet, , Disp: , Rfl:     COLLAGEN PO, Take by mouth Includes a probiotic and other vitamins-10 supplements-2 am-one in the afternoon-one hs (Patient not taking: Reported on 8/16/2024), Disp: , Rfl:     furosemide (LASIX) 20 mg tablet, Take 1 tablet (20 mg total) by mouth daily (Patient not taking: Reported on 5/2/2025), Disp: 90 tablet, Rfl: 3    loratadine (CLARITIN) 10 mg tablet, Take 1 tablet (10 mg total) by mouth daily (Patient not taking: Reported on 2/19/2025), Disp: 20 tablet, Rfl: 0    MAGNESIUM PO, Take 500 mg by mouth daily at bedtime, Disp: , Rfl:         Review of Systems:  Review of Systems   Respiratory:  Positive for shortness of breath.    Cardiovascular:  Positive for leg swelling. Negative for chest pain and palpitations.   Musculoskeletal:  Positive for arthralgias, gait problem and myalgias.   All other systems reviewed and are negative.        Physical Exam:  Vitals:  Vitals:    06/04/25 1320   BP: 112/60   BP Location: Left arm   Patient Position: Sitting   Cuff Size: Standard   Pulse: 72   SpO2: 94%   Weight: 53.5 kg (118 lb)   Height: 5' 3\" (1.6 m)     Physical Exam   Constitutional: She appears healthy. No distress.   Eyes: Pupils are equal, round, and reactive to light. Conjunctivae are normal.   Neck: No JVD present.   Cardiovascular: Normal rate and regular rhythm. Exam reveals no gallop and no friction rub.   Murmur heard.  Pulmonary/Chest: Effort normal and breath sounds normal. She has no wheezes. She has no rales.   Musculoskeletal:         General: No tenderness, deformity or edema.      Cervical back: Normal range of motion and neck supple.   Neurological: She is alert and oriented to person, place, and time.   Skin: Skin is warm and dry.        Cardiographics:  EKG: Personally reviewed NSR with PAC and LVH    Last known EF: 60%    This note was completed in part utilizing [employer] Direct Software.  Grammatical errors, " random word insertions, spelling mistakes, and incomplete sentences can be an occasional consequence of this system secondary to software limitations, ambient noise, and hardware issues.  If you have any questions or concerns about the content, text, or information contained within the body of this dictation, please contact the provider for clarification.

## 2025-06-11 ENCOUNTER — HOSPITAL ENCOUNTER (OUTPATIENT)
Dept: RADIOLOGY | Facility: HOSPITAL | Age: 85
Discharge: HOME | End: 2025-06-11
Attending: PHYSICIAN ASSISTANT
Payer: COMMERCIAL

## 2025-06-11 DIAGNOSIS — I77.1 STENOSIS OF ILIAC ARTERY (HCC): ICD-10-CM

## 2025-06-11 DIAGNOSIS — I74.09 AORTOILIAC OCCLUSIVE DISEASE (HCC): ICD-10-CM

## 2025-06-11 PROCEDURE — 93978 VASCULAR STUDY: CPT

## 2025-06-11 PROCEDURE — 93978 VASCULAR STUDY: CPT | Performed by: SURGERY

## 2025-06-13 ENCOUNTER — RESULTS FOLLOW-UP (OUTPATIENT)
Dept: VASCULAR SURGERY | Facility: CLINIC | Age: 85
End: 2025-06-13

## 2025-06-19 ENCOUNTER — TELEPHONE (OUTPATIENT)
Dept: OTOLARYNGOLOGY | Facility: CLINIC | Age: 85
End: 2025-06-19

## 2025-06-19 ENCOUNTER — TELEPHONE (OUTPATIENT)
Age: 85
End: 2025-06-19

## 2025-06-19 NOTE — TELEPHONE ENCOUNTER
Rec'd call from patient she is stating all of her [facility] gets her the LYFT services she is scheduled with us for 09/15/2025 1 pm  and will need a ride for that da.        Please reach out to patient when transportation is scheduled

## 2025-06-19 NOTE — TELEPHONE ENCOUNTER
Patient has an appointment scheduled on 6/30/25 at 2:15 pm with     She is requesting a LYFT ride to her appointment and back to her home.

## 2025-06-24 DIAGNOSIS — I50.32 HYPERTENSIVE HEART DISEASE WITH CHRONIC DIASTOLIC CONGESTIVE HEART FAILURE (HCC): ICD-10-CM

## 2025-06-24 DIAGNOSIS — I11.0 HYPERTENSIVE HEART DISEASE WITH CHRONIC DIASTOLIC CONGESTIVE HEART FAILURE (HCC): ICD-10-CM

## 2025-06-24 RX ORDER — FUROSEMIDE 20 MG/1
20 TABLET ORAL DAILY
Qty: 90 TABLET | Refills: 1 | Status: SHIPPED | OUTPATIENT
Start: 2025-06-24

## 2025-06-24 NOTE — TELEPHONE ENCOUNTER
Medication: Furosemide    Dose/Frequency: 20mg one tablet daily by mouth    Quantity: 90    Pharmacy: [facility]     Office:   [] PCP/Provider -   [x] Speciality/Provider - Dr. Kapoor    Does the patient have enough for 3 days?   [] Yes   [x] No - Send as HP to POD   Has two tablets left

## 2025-06-27 DIAGNOSIS — I74.09 AORTOILIAC OCCLUSIVE DISEASE (HCC): Chronic | ICD-10-CM

## 2025-06-27 RX ORDER — CLOPIDOGREL BISULFATE 75 MG/1
75 TABLET ORAL DAILY
Qty: 90 TABLET | Refills: 1 | Status: SHIPPED | OUTPATIENT
Start: 2025-06-27

## 2025-06-27 NOTE — TELEPHONE ENCOUNTER
Pharmacy requesting new prescription from   Reason for call:   [x] Refill   [] Prior Auth  [] Other:     Office:   [x] PCP/Provider - Gely Bond  [] Specialty/Provider -     Medication: Clopidogrel    Dose/Frequency: 75 mg Daily     Quantity: 90    Pharmacy: [facility]   Does the patient have enough for 3 days?   [] Yes   [x] No - Send as HP to POD    Mail Away Pharmacy   Does the patient have enough for 10 days?   [] Yes   [] No - Send as HP to POD

## 2025-06-30 ENCOUNTER — CONSULT (OUTPATIENT)
Dept: OTOLARYNGOLOGY | Facility: CLINIC | Age: 85
End: 2025-06-30
Attending: FAMILY MEDICINE
Payer: COMMERCIAL

## 2025-06-30 ENCOUNTER — TELEPHONE (OUTPATIENT)
Age: 85
End: 2025-06-30

## 2025-06-30 VITALS — HEIGHT: 63 IN | WEIGHT: 118 LBS | TEMPERATURE: 98.5 F | BODY MASS INDEX: 20.91 KG/M2

## 2025-06-30 DIAGNOSIS — J31.0 NONALLERGIC RHINITIS: ICD-10-CM

## 2025-06-30 PROCEDURE — 31575 DIAGNOSTIC LARYNGOSCOPY: CPT | Performed by: OTOLARYNGOLOGY

## 2025-06-30 PROCEDURE — 99213 OFFICE O/P EST LOW 20 MIN: CPT | Performed by: OTOLARYNGOLOGY

## 2025-06-30 RX ORDER — FLUTICASONE PROPIONATE 50 MCG
1 SPRAY, SUSPENSION (ML) NASAL DAILY
Qty: 1 G | Refills: 10 | Status: SHIPPED | OUTPATIENT
Start: 2025-06-30

## 2025-06-30 NOTE — TELEPHONE ENCOUNTER
PT called in to confirm Lyft ride today. Advised confirmed ride at 1:45pm. PT verbalized understanding and thankful.

## 2025-06-30 NOTE — PROGRESS NOTES
"Otolaryngology Clinic Visit  Name:  Deena Wolff  MRN:  [id number]  Date:  6/30/2025 2:48 PM  ________________________________________________________________________    ASSESSMENT:  Deena Wolff is a 85 y.o. female with acute and chronic problems as above who presents with:    1. Nonallergic rhinitis        85 y.o. here today for further evaluation of rhinorrhea    PLAN:  Please see Dr. Faulkner's note for details concerning this section  ________________________________________________________________________    CHIEF COMPLAINT:   Rhinorrhea     HPI:  Deena Wolff is a 85 y.o. female who presented with ant/post clear rhinorrhea (worse w/ temperature change), mucus in throat since winter months with possible viral URI at that time.  Patient also had cough and raspy voice then, which is now nearly resolved.  Patient uses nasal irrigation which temporarily helps with her clear rhinorrhea.  Does not use any reflux or allergy meds.    PMHx:  Past Medical History[1]    PSHx:  Past Surgical History[2]    FAMHx:  Family History[3]    SOCHx:  Social History[4]    Allergies:  Allergies[5]     MEDS:  Reviewed    ROS:  As above       PHYSICAL EXAM:  Temp 98.5 °F (36.9 °C) (Temporal)   Ht 5' 3\" (1.6 m)   Wt 53.5 kg (118 lb)   BMI 20.90 kg/m²   General: NAD, AOx4  Eyes: EOMI  Ears:  Right: ear canal normal, TM normal appearance, no fluid  Left: ear canal normal, TM normal appearance, no fluid  Nose: no septal deviation, no inferior turbinate hypertrophy, no mass/lesions  Oral cavity/oropharynx: No trismus, no mass/lesions, tonsils unremarkable  Neck: Trachea is midline; no thyroid nodules, Salivary glands symmetrical, no masses/abnormality on palpation  Lymph: No cervical lymphadenopathy  Skin: No obvious facial lesions  Neuro: Face symmetrical. No focal deficits   Lungs: Normal work of breathing  Vascular: Well perfused    Procedures:  Please see Dr. Faulkner's note for details concerning this section    Medical Data " "Reviewed:  Records reviewed and summarized as in [facility]    Radiology:  -    Labs:  -     Problem List[6]    Curt Cheney MD  Otolaryngology--Head and Neck Surgery  6/30/2025 2:48 PM         [1]   Past Medical History:  Diagnosis Date    Anemia     sees  Hematologist Dr Brunson appt 11/1/23    Anxiety     Arthritis     Asthma     CAD (coronary artery disease)     Cardiac murmur     sees Dr. Kapoor    Carotid stenosis, bilateral     CHF (congestive heart failure) (HCC)     Chronic kidney disease     renal artery stenosis    Chronic pain disorder     back    Chronic sinusitis     treated with antibiotics 3/22    Colitis     COPD (chronic obstructive pulmonary disease) (HCC)     Coronary artery disease     Cough     Current every day smoker     encouraged to cut back    Dental crowns present     Depression     Disease of thyroid gland     Edema of leg     compression stocking left leg    History of dizziness     History of hyperkalemia     \"couple years ago\"    History of transfusion     Hyperlipidemia     Hypertension     Left leg pain     and foot-to call surgeon office 11/2/23 also right leg pain    Muscle weakness     sometimes of legs    Other disorder of circulatory system (CODE)     Presence of dental bridge     Renal artery stenosis (HCC)     Right    Risk for falls     Subclavian arterial stenosis (HCC)     B/L    Type 2 diabetes mellitus without complication, unspecified whether long term insulin use (HCC) 09/20/2022 11/2/23-pt not aware of being diabetic    Walker as ambulation aid     \"sometimes\"    Wears glasses    [2]   Past Surgical History:  Procedure Laterality Date    BREAST SURGERY      bxs,benign    COLONOSCOPY      HYSTERECTOMY  1983    43    INCISIONAL BREAST BIOPSY      x5, 1964, 1965, 1985 and 1990    [place] BYP OTH/THN VEIN AORTOBIFEMORAL Bilateral 12/09/2019    Procedure: BYPASS AORTA BIFEMORAL WITH LEFT FEMORAL THROMBOEMBOLECTOMY;  Surgeon: Yobany Mendoza MD;  [facility];  Service: " Vascular    IA TEAEC W/PATCH GRF CAROTID VERTB SUBCLAV NECK INC Right 10/01/2021    Procedure: ENDARTERECTOMY ARTERY CAROTIDWITH BOVINE PATCH, INTROP DUPLEX;  Surgeon: Curt Oseguera MD;  Location:  MAIN OR;  Service: Vascular    IA TEAEC W/PATCH GRF CAROTID VERTB SUBCLAV NECK INC Left 11/16/2023    Procedure: LEFT CAROTID ENDARTERECTOMY WITH BOVINE PATCH, INTRA-OP DUPLEX;  Surgeon: Curt Oseguera MD;  Location: [facility];  Service: Vascular    WISDOM TOOTH EXTRACTION     [3]   Family History  Problem Relation Name Age of Onset    Hypertension Father      Heart disease Father          CHF    Coronary artery disease Family      Arthritis Family      Abdominal aortic aneurysm Mother      Hypertension Mother      Skin cancer Daughter      Cancer Paternal Aunt      No Known Problems Maternal Aunt      No Known Problems Maternal Aunt      No Known Problems Maternal Aunt     [4]   Social History  Socioeconomic History    Marital status:    Tobacco Use    Smoking status: Every Day     Current packs/day: 1.00     Average packs/day: 1 pack/day for 65.5 years (65.5 ttl pk-yrs)     Types: Cigarettes     Start date: 1960     Passive exposure: Current    Smokeless tobacco: Never    Tobacco comments:     Encouraged cutting back ahead of surgery---has quit in the past   Vaping Use    Vaping status: Never Used   Substance and Sexual Activity    Alcohol use: Yes     Comment: manhattans 2-3 every day for 50 years    Drug use: No   Social History Narrative    Rarely exercises    Sleeps 6-7 hours per day     Social Drivers of Health     Financial Resource Strain: Low Risk  (5/9/2023)    Overall Financial Resource Strain (CARDIA)     Difficulty of Paying Living Expenses: Not hard at all   Transportation Needs: No Transportation Needs (5/9/2023)    PRAPARE - Transportation     Lack of Transportation (Medical): No     Lack of Transportation (Non-Medical): No   [5]   Allergies  Allergen Reactions    Tall Ragweed Wheezing    [6]   Patient Active Problem List  Diagnosis    Aortoiliac occlusive disease (HCC)    Bilateral carotid artery stenosis    Hypothyroidism    Nicotine dependence    Subclavian artery stenosis, left (HCC)    Aortic stenosis    Benign hypertension with chronic kidney disease, stage III (HCC)    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HCC)    Hyperlipidemia    Onychomycosis    Simple chronic bronchitis (HCC)    Cigarette smoker    Cardiac murmur    Mass of spine    Chronic diastolic heart failure (HCC)    Hypertensive heart disease with chronic diastolic congestive heart failure (HCC)    Bilateral leg edema    Peripheral neuropathy    Parenchymal renal hypertension    Anemia in stage 3 chronic kidney disease  (HCC)    Chronic kidney disease, stage 3 (HCC)    Chronic anemia    Renal artery stenosis (HCC)    History of ischemic colitis    Gastritis without bleeding    Arteriovenous malformation (AVM)    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HCC)    MGUS (monoclonal gammopathy of unknown significance)    Platelets decreased (HCC)    Protein-calorie malnutrition, unspecified severity (HCC)    Hyperuricemia    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

## 2025-06-30 NOTE — PROGRESS NOTES
"  CC: Follow-up (Nose & throat issues)      REF: Neha WillettDO    History obtained from patient    HXPI:85 y.o. female     Viral uri, postnasal drip    Clear drainage   No purulence       PMH: Past Medical History[1]  PSH:Past Surgical History[2]  All: Allergies[3]  Family Hx: Family History[4]  Social: Social History[5]    Medications: Current Medications[6] .meds    ROS:           Vitals:   Vitals:    06/30/25 1412   Temp: 98.5 °F (36.9 °C)   TempSrc: Temporal   Weight: 53.5 kg (118 lb)   Height: 5' 3\" (1.6 m)         Physical Exam    General: Patient is normal weight, Normocephalic atraumatic, has normal communication, no stridor, and no hoarseness  The patient's mood and affect is  appropriate    Face: No scars, no lesions, no pain on palpation, normal salivary glands, normal conjunctivae/sclerae, and no TMJ Tenderness/crepitus    Right Ear: Normal pinna/EAC., Normal tympanic membranes and normal mobility., and Normal gross hearing.  Left Ear:   Normal pinna/EAC., Normal tympanic membranes and normal mobility., and Normal gross hearing.    Nasal Exam: normal external anatomy, normal pale mucosa, no rhinorrhea, sinuses normal and nontender, and no septal deviation    Oral Exam: Normal lips, teeth, and gums, Normal tongue, floor of mouth, Normal buccal mucosa, retromolar trigone, and Normal hard palate and uvula    Oropharynx Exam: Normal tonsils and Normal pharynx    Neck Exam: Trachea midline, no crepitus., No Thyromegaly, and No lateral cervical lymphadenopathy bilaterally.  Neck with full range of motion    Neuro Exam: EOMI/PERRLA, Vision grossly intact, Normal sensation in V1, V2, V3, Normal bilateral facial nerve function and strength, Normal CN IX/X, Normal spinal accessory nerve function, and Normal hypogossal function    Data & Imaging Review:     Lab Results   Component Value Date    WBC 5.81 05/06/2025    HGB 13.1 05/06/2025    HCT 38.6 05/06/2025     (H) 05/06/2025     05/06/2025 "             Assessment & Plan    Assessment: 85 y.o. female with     Plan:  1.  Post viral persistent nasal rhinitis    Trial of flonase   Is already taking ipratroprium nasal spray. Will add     Fup 3 months       Procedure Note: Flexible Fiberoptic Laryngoscopy (CPT 04639)    Indication: 85 y.o. year old female with  for Flexible Fiberoptic Laryngoscopy    Procedure Details:    After informed consent was obtained, the patient's nostrils were sprayed with a oxymetazoline/Lidocaine mixture. A flexible endoscope was then passed along the floor of the nose into the nasopharynx, oropharynx, and larynx examining the structures in detail. The vocal folds were examined during rest, spontaneous respiration, and phonation.     Findings:  Anterior nasal exam normal with normal turbinates, septum  nasopharynx with normal anatomy, clear fossa of rosenmuller, torus tubarious  oropharynx clear,   good bilateral vocal cord movement, normal larynx and pyriforms.      Condition:  The patient tolerated the procedure well.    Complications:  None    I was present and personally performed the procedure.    Attending Electronic Signature: Jhonatan Faulkner  Date: 6/30/2025            Past Medical History[7]         [1]   Past Medical History:  Diagnosis Date    Anemia     sees  Hematologist Dr Brunson appt 11/1/23    Anxiety     Arthritis     Asthma     CAD (coronary artery disease)     Cardiac murmur     sees Dr. Kapoor    Carotid stenosis, bilateral     CHF (congestive heart failure) (HCC)     Chronic kidney disease     renal artery stenosis    Chronic pain disorder     back    Chronic sinusitis     treated with antibiotics 3/22    Colitis     COPD (chronic obstructive pulmonary disease) (HCC)     Coronary artery disease     Cough     Current every day smoker     encouraged to cut back    Dental crowns present     Depression     Disease of thyroid gland     Edema of leg     compression stocking left leg    History of dizziness     History of  "hyperkalemia     \"couple years ago\"    History of transfusion     Hyperlipidemia     Hypertension     Left leg pain     and foot-to call surgeon office 11/2/23 also right leg pain    Muscle weakness     sometimes of legs    Other disorder of circulatory system (CODE)     Presence of dental bridge     Renal artery stenosis (HCC)     Right    Risk for falls     Subclavian arterial stenosis (HCC)     B/L    Type 2 diabetes mellitus without complication, unspecified whether long term insulin use (HCC) 09/20/2022 11/2/23-pt not aware of being diabetic    Walker as ambulation aid     \"sometimes\"    Wears glasses    [2]   Past Surgical History:  Procedure Laterality Date    BREAST SURGERY      bxs,benign    COLONOSCOPY      HYSTERECTOMY  1983    43    INCISIONAL BREAST BIOPSY      x5, 1964, 1965, 1985 and 1990    DC BYP OTH/THN VEIN AORTOBIFEMORAL Bilateral 12/09/2019    Procedure: BYPASS AORTA BIFEMORAL WITH LEFT FEMORAL THROMBOEMBOLECTOMY;  Surgeon: Yobany Mendoza MD;  Location: [facility];  Service: Vascular    DC TEAEC W/PATCH GRF CAROTID VERTB SUBCLAV NECK INC Right 10/01/2021    Procedure: ENDARTERECTOMY ARTERY CAROTIDWITH BOVINE PATCH, INTROP DUPLEX;  Surgeon: Curt Oseguera MD;  Location: [facility];  Service: Vascular    DC TEAEC W/PATCH GRF CAROTID VERTB SUBCLAV NECK INC Left 11/16/2023    Procedure: LEFT CAROTID ENDARTERECTOMY WITH BOVINE PATCH, INTRA-OP DUPLEX;  Surgeon: Curt Oseguera MD;  Location: [facility];  Service: Vascular    WISDOM TOOTH EXTRACTION     [3]   Allergies  Allergen Reactions    Tall Ragweed Wheezing   [4]   Family History  Problem Relation Name Age of Onset    Hypertension Father      Heart disease Father          CHF    Coronary artery disease Family      Arthritis Family      Abdominal aortic aneurysm Mother      Hypertension Mother      Skin cancer Daughter      Cancer Paternal Aunt      No Known Problems Maternal Aunt      No Known Problems Maternal Aunt      No Known " Problems Maternal Aunt     [5]   Social History  Tobacco Use    Smoking status: Every Day     Current packs/day: 1.00     Average packs/day: 1 pack/day for 65.5 years (65.5 ttl pk-yrs)     Types: Cigarettes     Start date: 1960     Passive exposure: Current    Smokeless tobacco: Never    Tobacco comments:     Encouraged cutting back ahead of surgery---has quit in the past   Vaping Use    Vaping status: Never Used   Substance Use Topics    Alcohol use: Yes     Comment: wilfrid 2-3 every day for 50 years    Drug use: No   [6]   Current Outpatient Medications:     acetaminophen (TYLENOL) 500 mg tablet, Take 500 mg by mouth every 6 (six) hours as needed for mild pain, Disp: , Rfl:     albuterol (ProAir HFA) 90 mcg/act inhaler, Inhale 2 puffs every 6 (six) hours as needed for wheezing, Disp: 8 g, Rfl: 3    amLODIPine (NORVASC) 10 mg tablet, TAKE 1 TABLET BY MOUTH EVERY MORNING, Disp: 90 tablet, Rfl: 1    atorvastatin (LIPITOR) 80 mg tablet, TAKE 1 TABLET BY MOUTH DAILY AT BEDTIME, Disp: 90 tablet, Rfl: 1    B Complex Vitamins (VITAMIN B-COMPLEX) TABS, Take by mouth in the morning., Disp: , Rfl:     Bioflavonoid Products (VITAMIN C PLUS PO), Take by mouth in the morning., Disp: , Rfl:     bisacodyl (DULCOLAX) 5 mg EC tablet, Take 10 mg by mouth every evening, Disp: , Rfl:     carvedilol (COREG) 12.5 mg tablet, TAKE ONE TABLET TWICE DAILY, Disp: 180 tablet, Rfl: 0    Cholecalciferol (Vitamin D3) 125 MCG (5000 UT) TABS, Take 5,000 Units by mouth in the morning., Disp: , Rfl:     clopidogrel (PLAVIX) 75 mg tablet, Take 1 tablet (75 mg total) by mouth daily, Disp: 90 tablet, Rfl: 1    enalapril (VASOTEC) 10 mg tablet, TAKE 1 TABLET (10 MG TOTAL) BY MOUTH 2 (TWO) TIMES A DAY, Disp: 180 tablet, Rfl: 1    furosemide (LASIX) 20 mg tablet, Take 1 tablet (20 mg total) by mouth daily, Disp: 90 tablet, Rfl: 1    ipratropium (ATROVENT) 0.03 % nasal spray, 2 sprays into each nostril 3 (three) times a day as needed for rhinitis,  "Disp: 30 mL, Rfl: 5    levothyroxine 25 mcg tablet, TAKE 1 TABLET BY MOUTH DAILY, Disp: 90 tablet, Rfl: 1    magnesium gluconate (MAGONATE) 500 MG tablet, Take 1 tablet by mouth every morning, Disp: 90 tablet, Rfl: 1    MAGNESIUM PO, Take 500 mg by mouth daily at bedtime, Disp: , Rfl:     spironolactone (ALDACTONE) 25 mg tablet, TAKE ONE TABLET BY MOUTH DAILY, Disp: 90 tablet, Rfl: 1    Ubiquinol 100 MG CAPS, Take 1 capsule by mouth in the morning, Disp: , Rfl:     colchicine (COLCRYS) 0.6 mg tablet, , Disp: , Rfl:     COLLAGEN PO, Take by mouth Includes a probiotic and other vitamins-10 supplements-2 am-one in the afternoon-one hs (Patient not taking: Reported on 8/16/2024), Disp: , Rfl:     loratadine (CLARITIN) 10 mg tablet, Take 1 tablet (10 mg total) by mouth daily (Patient not taking: Reported on 2/19/2025), Disp: 20 tablet, Rfl: 0  [7]   Past Medical History:  Diagnosis Date    Anemia     sees  Hematologist Dr Brunson appt 11/1/23    Anxiety     Arthritis     Asthma     CAD (coronary artery disease)     Cardiac murmur     sees Dr. Kapoor    Carotid stenosis, bilateral     CHF (congestive heart failure) (HCC)     Chronic kidney disease     renal artery stenosis    Chronic pain disorder     back    Chronic sinusitis     treated with antibiotics 3/22    Colitis     COPD (chronic obstructive pulmonary disease) (HCC)     Coronary artery disease     Cough     Current every day smoker     encouraged to cut back    Dental crowns present     Depression     Disease of thyroid gland     Edema of leg     compression stocking left leg    History of dizziness     History of hyperkalemia     \"couple years ago\"    History of transfusion     Hyperlipidemia     Hypertension     Left leg pain     and foot-to call surgeon office 11/2/23 also right leg pain    Muscle weakness     sometimes of legs    Other disorder of circulatory system (CODE)     Presence of dental bridge     Renal artery stenosis (HCC)     Right    Risk for falls " "    Subclavian arterial stenosis (HCC)     B/L    Type 2 diabetes mellitus without complication, unspecified whether long term insulin use (HCC) 09/20/2022 11/2/23-pt not aware of being diabetic    Walker as ambulation aid     \"sometimes\"    Wears glasses      "

## 2025-07-18 ENCOUNTER — TELEPHONE (OUTPATIENT)
Age: 85
End: 2025-07-18

## 2025-07-18 DIAGNOSIS — J31.0 NONALLERGIC RHINITIS: ICD-10-CM

## 2025-07-18 DIAGNOSIS — E03.9 HYPOTHYROIDISM, UNSPECIFIED TYPE: ICD-10-CM

## 2025-07-18 RX ORDER — LEVOTHYROXINE SODIUM 25 UG/1
25 TABLET ORAL DAILY
Qty: 90 TABLET | Refills: 1 | Status: SHIPPED | OUTPATIENT
Start: 2025-07-18

## 2025-07-18 NOTE — TELEPHONE ENCOUNTER
Patient called to review medications.   Furosemide was delivered today and she did not call for it and reports she has not been taking it. She did say Dr. Kapoor told her to use if needed for swelling because taking daily causes dizziness. She is feeling well at present and officer no complaints.    I reviewed Dr. Kapoor's plan from her office visit 6/4 in detail with her. Patient verbalized understanding of how to take medications.    Copied from 6/4 office visit note:  Regarding medications  -Continue current medication regimen.    - Continue furosemide 20 mg daily.    - continue Plavix.  Aspirin was stopped because of large amount of bruising.       - She will follow up regularly with vascular surgery and podiatry.    - Continue atorvastatin - Last LDL at goal.        Her current medication regimen includes carvedilol 12.5 mg twice daily, enalapril 10 mg daily, amlodipine 10 mg daily and spironolactone 25 mg daily.  She overall reports good adherence with medications.        Please call patient with Physician response

## 2025-07-21 RX ORDER — IPRATROPIUM BROMIDE 21 UG/1
2 SPRAY, METERED NASAL 3 TIMES DAILY PRN
Qty: 30 ML | Refills: 5 | Status: SHIPPED | OUTPATIENT
Start: 2025-07-21

## 2025-07-22 ENCOUNTER — TELEPHONE (OUTPATIENT)
Age: 85
End: 2025-07-22

## 2025-07-22 NOTE — TELEPHONE ENCOUNTER
I have pended a clearance letter for dental office.   Luis Cortez.     And I did some digging in her chart and her PCP had completed handicap placard application.   I am going to print that for her and mail it to her home,   Address confirmed.

## 2025-07-22 NOTE — LETTER
Cardiology Pre Operative Risk Assessment      PRE OPERATIVE CARDIAC RISK ASSESSMENT    07/22/25    Deena Wolff  1940  [id number]    Date of Surgery: TBD    Type of Surgery: Dental Treatment    Surgeon: ***    {SLA AMB CARDIAC CONTRAINDICATIONS:82731}    Anticoagulation: {RESPONSE; YES/NO/NA:19980}    Physician Comment: ***    Electronically Signed: ***

## 2025-07-22 NOTE — TELEPHONE ENCOUNTER
Received call from patient stating she has a new dentist, who needs a letter from our office with information regarding med holds or premed instructions patient may need prior to dental procedures.  Patient also needs a new disability card, and needs the paperwork from our office renewed.    She is asking for this paperwork to be mailed to her address as she does not have [employer].

## 2025-07-24 ENCOUNTER — TELEPHONE (OUTPATIENT)
Age: 85
End: 2025-07-24

## 2025-07-24 NOTE — TELEPHONE ENCOUNTER
Patient called asking if she needs labs done for her 9/11 appt. Informed her that she does and they are in the system. She can goa week before her appt, and fasting is recommended for at least 8 hours. No further questions at this time.

## 2025-08-05 ENCOUNTER — TELEPHONE (OUTPATIENT)
Age: 85
End: 2025-08-05

## 2025-08-19 ENCOUNTER — APPOINTMENT (OUTPATIENT)
Dept: LAB | Facility: CLINIC | Age: 85
End: 2025-08-19
Attending: INTERNAL MEDICINE
Payer: COMMERCIAL

## 2025-08-19 DIAGNOSIS — I11.0 HYPERTENSIVE HEART DISEASE WITH CHRONIC DIASTOLIC CONGESTIVE HEART FAILURE (HCC): ICD-10-CM

## 2025-08-19 DIAGNOSIS — I50.32 HYPERTENSIVE HEART DISEASE WITH CHRONIC DIASTOLIC CONGESTIVE HEART FAILURE (HCC): ICD-10-CM

## 2025-08-19 DIAGNOSIS — N18.32 ANEMIA IN STAGE 3B CHRONIC KIDNEY DISEASE  (HCC): Chronic | ICD-10-CM

## 2025-08-19 DIAGNOSIS — I74.09 AORTOILIAC OCCLUSIVE DISEASE (HCC): Chronic | ICD-10-CM

## 2025-08-19 DIAGNOSIS — N18.32 CHRONIC KIDNEY DISEASE-MINERAL BONE DISORDER (CKD-MBD) WITH STAGE 3B CHRONIC KIDNEY DISEASE (HCC): ICD-10-CM

## 2025-08-19 DIAGNOSIS — E83.42 HYPOMAGNESEMIA: ICD-10-CM

## 2025-08-19 DIAGNOSIS — I12.9 BENIGN HYPERTENSION WITH CHRONIC KIDNEY DISEASE, STAGE III (HCC): ICD-10-CM

## 2025-08-19 DIAGNOSIS — D63.1 ANEMIA IN STAGE 3B CHRONIC KIDNEY DISEASE  (HCC): Chronic | ICD-10-CM

## 2025-08-19 DIAGNOSIS — E79.0 HYPERURICEMIA: ICD-10-CM

## 2025-08-19 DIAGNOSIS — M89.9 CHRONIC KIDNEY DISEASE-MINERAL BONE DISORDER (CKD-MBD) WITH STAGE 3B CHRONIC KIDNEY DISEASE (HCC): ICD-10-CM

## 2025-08-19 DIAGNOSIS — N18.30 BENIGN HYPERTENSION WITH CHRONIC KIDNEY DISEASE, STAGE III (HCC): ICD-10-CM

## 2025-08-19 DIAGNOSIS — E83.9 CHRONIC KIDNEY DISEASE-MINERAL BONE DISORDER (CKD-MBD) WITH STAGE 3B CHRONIC KIDNEY DISEASE (HCC): ICD-10-CM

## 2025-08-19 DIAGNOSIS — I70.1 RENAL ARTERY STENOSIS (HCC): Chronic | ICD-10-CM

## 2025-08-19 DIAGNOSIS — D47.2 MGUS (MONOCLONAL GAMMOPATHY OF UNKNOWN SIGNIFICANCE): ICD-10-CM

## 2025-08-19 DIAGNOSIS — F17.210 CIGARETTE SMOKER: Chronic | ICD-10-CM

## 2025-08-19 LAB
ALBUMIN SERPL BCG-MCNC: 4.3 G/DL (ref 3.5–5)
ALP SERPL-CCNC: 47 U/L (ref 34–104)
ALT SERPL W P-5'-P-CCNC: 19 U/L (ref 7–52)
ANION GAP SERPL CALCULATED.3IONS-SCNC: 10 MMOL/L (ref 4–13)
AST SERPL W P-5'-P-CCNC: 33 U/L (ref 13–39)
BASOPHILS # BLD AUTO: 0.06 THOUSANDS/ÂΜL (ref 0–0.1)
BASOPHILS NFR BLD AUTO: 1 % (ref 0–1)
BILIRUB SERPL-MCNC: 0.71 MG/DL (ref 0.2–1)
BUN SERPL-MCNC: 39 MG/DL (ref 5–25)
CALCIUM SERPL-MCNC: 9.9 MG/DL (ref 8.4–10.2)
CHLORIDE SERPL-SCNC: 104 MMOL/L (ref 96–108)
CO2 SERPL-SCNC: 25 MMOL/L (ref 21–32)
CREAT SERPL-MCNC: 1.3 MG/DL (ref 0.6–1.3)
CREAT UR-MCNC: 35.8 MG/DL
EOSINOPHIL # BLD AUTO: 0.18 THOUSAND/ÂΜL (ref 0–0.61)
EOSINOPHIL NFR BLD AUTO: 3 % (ref 0–6)
ERYTHROCYTE [DISTWIDTH] IN BLOOD BY AUTOMATED COUNT: 16.2 % (ref 11.6–15.1)
GFR SERPL CREATININE-BSD FRML MDRD: 37 ML/MIN/1.73SQ M
GLUCOSE P FAST SERPL-MCNC: 87 MG/DL (ref 65–99)
HCT VFR BLD AUTO: 36.3 % (ref 34.8–46.1)
HGB BLD-MCNC: 12.5 G/DL (ref 11.5–15.4)
IGA SERPL-MCNC: 248 MG/DL (ref 66–433)
IGG SERPL-MCNC: 1321 MG/DL (ref 635–1741)
IGM SERPL-MCNC: 89 MG/DL (ref 45–281)
IMM GRANULOCYTES # BLD AUTO: 0.03 THOUSAND/UL (ref 0–0.2)
IMM GRANULOCYTES NFR BLD AUTO: 1 % (ref 0–2)
LYMPHOCYTES # BLD AUTO: 1.14 THOUSANDS/ÂΜL (ref 0.6–4.47)
LYMPHOCYTES NFR BLD AUTO: 18 % (ref 14–44)
MAGNESIUM SERPL-MCNC: 1.7 MG/DL (ref 1.9–2.7)
MCH RBC QN AUTO: 35.2 PG (ref 26.8–34.3)
MCHC RBC AUTO-ENTMCNC: 34.4 G/DL (ref 31.4–37.4)
MCV RBC AUTO: 102 FL (ref 82–98)
MICROALBUMIN UR-MCNC: 169.5 MG/L
MICROALBUMIN/CREAT 24H UR: 473 MG/G CREATININE (ref 0–30)
MONOCYTES # BLD AUTO: 0.79 THOUSAND/ÂΜL (ref 0.17–1.22)
MONOCYTES NFR BLD AUTO: 13 % (ref 4–12)
NEUTROPHILS # BLD AUTO: 4.08 THOUSANDS/ÂΜL (ref 1.85–7.62)
NEUTS SEG NFR BLD AUTO: 64 % (ref 43–75)
NRBC BLD AUTO-RTO: 0 /100 WBCS
PLATELET # BLD AUTO: 176 THOUSANDS/UL (ref 149–390)
PMV BLD AUTO: 11.9 FL (ref 8.9–12.7)
POTASSIUM SERPL-SCNC: 5.1 MMOL/L (ref 3.5–5.3)
PROT SERPL-MCNC: 7.3 G/DL (ref 6.4–8.4)
RBC # BLD AUTO: 3.55 MILLION/UL (ref 3.81–5.12)
SODIUM SERPL-SCNC: 139 MMOL/L (ref 135–147)
URATE SERPL-MCNC: 10.4 MG/DL (ref 2–7.5)
WBC # BLD AUTO: 6.28 THOUSAND/UL (ref 4.31–10.16)

## 2025-08-19 PROCEDURE — 82232 ASSAY OF BETA-2 PROTEIN: CPT

## 2025-08-19 PROCEDURE — 80053 COMPREHEN METABOLIC PANEL: CPT

## 2025-08-19 PROCEDURE — 82570 ASSAY OF URINE CREATININE: CPT

## 2025-08-19 PROCEDURE — 85025 COMPLETE CBC W/AUTO DIFF WBC: CPT

## 2025-08-19 PROCEDURE — 36415 COLL VENOUS BLD VENIPUNCTURE: CPT

## 2025-08-19 PROCEDURE — 83735 ASSAY OF MAGNESIUM: CPT

## 2025-08-19 PROCEDURE — 82784 ASSAY IGA/IGD/IGG/IGM EACH: CPT

## 2025-08-19 PROCEDURE — 84550 ASSAY OF BLOOD/URIC ACID: CPT

## 2025-08-19 PROCEDURE — 82043 UR ALBUMIN QUANTITATIVE: CPT

## 2025-08-19 PROCEDURE — 83521 IG LIGHT CHAINS FREE EACH: CPT

## 2025-08-19 PROCEDURE — 82610 CYSTATIN C: CPT

## 2025-08-20 LAB
CYSTATIN C SERPL-MCNC: 2.05 MG/L (ref 0.87–1.12)
GFR/BSA.PRED SERPLBLD CYS-BASED-ARV: 25 ML/MIN/1.73
KAPPA LC FREE SER-MCNC: 37.4 MG/L (ref 3.3–19.4)
KAPPA LC FREE/LAMBDA FREE SER: 1.35 {RATIO} (ref 0.26–1.65)
LAMBDA LC FREE SERPL-MCNC: 27.8 MG/L (ref 5.7–26.3)

## 2025-08-22 LAB — B2 MICROGLOB SERPL-MCNC: 3.7 MG/L (ref 0.6–2.4)

## (undated) DEVICE — 3M™ TEGADERM™ TRANSPARENT FILM DRESSING FRAME STYLE, 1628, 6 IN X 8 IN (15 CM X 20 CM), 10/CT 8CT/CASE: Brand: 3M™ TEGADERM™

## (undated) DEVICE — CAROTID ARTERY SHUNT KIT,RADIOPAQUE LINE, STRAIGHT: Brand: ARGYLE

## (undated) DEVICE — SUT MONOCRYL 4-0 PS-2 18 IN Y496G

## (undated) DEVICE — SUT SILK 3-0 30 IN SA84H

## (undated) DEVICE — BAG DECANTER

## (undated) DEVICE — TRAY FOLEY 16FR URIMETER SURESTEP

## (undated) DEVICE — 3000CC GUARDIAN II: Brand: GUARDIAN

## (undated) DEVICE — ADHESIVE SKIN HIGH VISCOSITY EXOFIN 1ML

## (undated) DEVICE — SUT MONOCRYL 3-0 SH 27 IN Y416H

## (undated) DEVICE — 40601 PROLONGED POSITIONING SYSTEM: Brand: 40601 PROLONGED POSITIONING SYSTEM

## (undated) DEVICE — FOGARTY ARTERIAL EMBOLECTOMY CATHETER 3F 80CM: Brand: FOGARTY

## (undated) DEVICE — 1200CC GUARDIAN II: Brand: GUARDIAN

## (undated) DEVICE — PROXIMATE PLUS MD MULTI-DIRECTIONAL RELEASE SKIN STAPLERS CONTAINS 35 STAINLESS STEEL STAPLES APPROXIMATE CLOSED DIMENSIONS: 6.9MM X 3.9MM WIDE: Brand: PROXIMATE

## (undated) DEVICE — SYRINGE 1ML SLIP TIP

## (undated) DEVICE — SUT PROLENE 6-0 BV-1/BV-1 24 IN 8805H

## (undated) DEVICE — DRAPE PROBE NEO-PROBE/ULTRASOUND

## (undated) DEVICE — DRAPE SURGIKIT SADDLE BAG

## (undated) DEVICE — SUT MONOCRYL 2-0 CT-1 27 IN Y339H

## (undated) DEVICE — VESSEL LOOPS X-RAY DETECTABLE: Brand: DEROYAL

## (undated) DEVICE — SUT PROLENE 4-0 RB-1/RB-1 36 IN 8557H

## (undated) DEVICE — SUT SILK 0 30 IN A306H

## (undated) DEVICE — GLOVE SRG BIOGEL ORTHOPEDIC 7.5

## (undated) DEVICE — INTENDED FOR TISSUE SEPARATION, AND OTHER PROCEDURES THAT REQUIRE A SHARP SURGICAL BLADE TO PUNCTURE OR CUT.: Brand: BARD-PARKER SAFETY BLADES SIZE 10, STERILE

## (undated) DEVICE — SURGICEL FIBRILLAR 1 X 2

## (undated) DEVICE — LIGACLIP MCA MULTIPLE CLIP APPLIERS, 20 MEDIUM CLIPS: Brand: LIGACLIP

## (undated) DEVICE — BETHL CAROTID ENDARTERECTOMY: Brand: CARDINAL HEALTH

## (undated) DEVICE — LIGACLIP MCA MULTIPLE CLIP APPLIERS, 20 LARGE CLIPS: Brand: LIGACLIP

## (undated) DEVICE — PAD GROUNDING ADULT

## (undated) DEVICE — DRESSING MEPILEX AG BORDER 4 X 12 IN

## (undated) DEVICE — 3M™ IOBAN™ 2 ANTIMICROBIAL INCISE DRAPE 6640EZ: Brand: IOBAN™ 2

## (undated) DEVICE — SUT PDS II 1 XLH 96 IN LOOPED Z881G

## (undated) DEVICE — LIGACLIP APPLIER MEDIUM

## (undated) DEVICE — PACK PBDS AORTIC ANEURYSM RF

## (undated) DEVICE — SUT CHROMIC 2-0 MH 27 IN G127H

## (undated) DEVICE — SUT SILK 2-0 30 IN SA85H

## (undated) DEVICE — SUT PROLENE 6-0 BV130 30 IN 8709H

## (undated) DEVICE — SURGICEL 4 X 8

## (undated) DEVICE — DEVON SURGICAL SITE MINI-MARKER PREP RESISTANT: Brand: DEVON

## (undated) DEVICE — SUT PROLENE 3-0 V-7 60 IN D9721

## (undated) DEVICE — LIGACLIP MCA MULTIPLE CLIP APPLIERS, 20 SMALL CLIPS: Brand: LIGACLIP

## (undated) DEVICE — CHLORAPREP HI-LITE 10.5ML ORANGE

## (undated) DEVICE — PETRI DISH STERILE

## (undated) DEVICE — DECANTER: Brand: UNBRANDED

## (undated) DEVICE — FISH VISCERAL RETAINER LG

## (undated) DEVICE — CHLORAPREP HI-LITE 26ML ORANGE

## (undated) DEVICE — ELECTRODE BLADE E-Z CLEAN 4IN -0014A

## (undated) DEVICE — THYROID SHEET: Brand: CONVERTORS

## (undated) DEVICE — SUT PROLENE 7-0 BV175-6 24 IN M8737

## (undated) DEVICE — SPECIMEN CONTAINER: Brand: CARDINAL HEALTH

## (undated) DEVICE — GLOVE SRG BIOGEL 8

## (undated) DEVICE — SUT SILK 2-0 18 IN A185H

## (undated) DEVICE — PROVE COVER: Brand: UNBRANDED

## (undated) DEVICE — TOWEL SET X-RAY

## (undated) DEVICE — SUT SILK 3-0 18 IN A184H

## (undated) DEVICE — GAUZE SPONGES,16 PLY: Brand: CURITY

## (undated) DEVICE — STRAIGHT CATH RED RUBBER 12FR

## (undated) DEVICE — INTENDED FOR TISSUE SEPARATION, AND OTHER PROCEDURES THAT REQUIRE A SHARP SURGICAL BLADE TO PUNCTURE OR CUT.: Brand: BARD-PARKER ® CARBON RIB-BACK BLADES

## (undated) DEVICE — CURITY NON-ADHERENT STRIPS: Brand: CURITY